# Patient Record
Sex: FEMALE | Race: WHITE | NOT HISPANIC OR LATINO | Employment: OTHER | URBAN - METROPOLITAN AREA
[De-identification: names, ages, dates, MRNs, and addresses within clinical notes are randomized per-mention and may not be internally consistent; named-entity substitution may affect disease eponyms.]

---

## 2017-02-28 ENCOUNTER — APPOINTMENT (OUTPATIENT)
Dept: LAB | Facility: HOSPITAL | Age: 77
End: 2017-02-28
Attending: INTERNAL MEDICINE
Payer: MEDICARE

## 2017-02-28 ENCOUNTER — TRANSCRIBE ORDERS (OUTPATIENT)
Dept: ADMINISTRATIVE | Facility: HOSPITAL | Age: 77
End: 2017-02-28

## 2017-02-28 DIAGNOSIS — E03.9 UNSPECIFIED HYPOTHYROIDISM: ICD-10-CM

## 2017-02-28 DIAGNOSIS — N39.0 URINARY TRACT INFECTION, SITE NOT SPECIFIED: ICD-10-CM

## 2017-02-28 DIAGNOSIS — I10 ESSENTIAL HYPERTENSION, MALIGNANT: Primary | ICD-10-CM

## 2017-02-28 DIAGNOSIS — D64.9 ANEMIA, UNSPECIFIED: ICD-10-CM

## 2017-02-28 DIAGNOSIS — E78.00 PURE HYPERCHOLESTEROLEMIA: ICD-10-CM

## 2017-02-28 DIAGNOSIS — E11.9 DIABETES MELLITUS WITHOUT COMPLICATION (HCC): ICD-10-CM

## 2017-02-28 DIAGNOSIS — M10.00 ACUTE IDIOPATHIC GOUT, UNSPECIFIED SITE: ICD-10-CM

## 2017-02-28 DIAGNOSIS — I10 ESSENTIAL HYPERTENSION, MALIGNANT: ICD-10-CM

## 2017-02-28 LAB
ALBUMIN SERPL BCP-MCNC: 3.7 G/DL (ref 3.5–5)
ALP SERPL-CCNC: 96 U/L (ref 46–116)
ALT SERPL W P-5'-P-CCNC: 68 U/L (ref 12–78)
ANION GAP SERPL CALCULATED.3IONS-SCNC: 11 MMOL/L (ref 4–13)
AST SERPL W P-5'-P-CCNC: 87 U/L (ref 5–45)
BASOPHILS # BLD AUTO: 0.1 THOUSANDS/ΜL (ref 0–0.1)
BASOPHILS NFR BLD AUTO: 1 % (ref 0–1)
BILIRUB SERPL-MCNC: 0.6 MG/DL (ref 0.2–1)
BUN SERPL-MCNC: 15 MG/DL (ref 5–25)
CALCIUM SERPL-MCNC: 8.9 MG/DL (ref 8.3–10.1)
CHLORIDE SERPL-SCNC: 103 MMOL/L (ref 100–108)
CO2 SERPL-SCNC: 26 MMOL/L (ref 21–32)
CREAT SERPL-MCNC: 0.75 MG/DL (ref 0.6–1.3)
EOSINOPHIL # BLD AUTO: 0.3 THOUSAND/ΜL (ref 0–0.61)
EOSINOPHIL NFR BLD AUTO: 3 % (ref 0–6)
ERYTHROCYTE [DISTWIDTH] IN BLOOD BY AUTOMATED COUNT: 14.7 % (ref 11.6–15.1)
ERYTHROCYTE [SEDIMENTATION RATE] IN BLOOD: 20 MM/HOUR (ref 2–25)
GFR SERPL CREATININE-BSD FRML MDRD: >60 ML/MIN/1.73SQ M
GLUCOSE SERPL-MCNC: 120 MG/DL (ref 65–140)
HCT VFR BLD AUTO: 44 % (ref 37–47)
HGB BLD-MCNC: 14.3 G/DL (ref 12–16)
LYMPHOCYTES # BLD AUTO: 1.9 THOUSANDS/ΜL (ref 0.6–4.47)
LYMPHOCYTES NFR BLD AUTO: 20 % (ref 14–44)
MCH RBC QN AUTO: 30.3 PG (ref 27–31)
MCHC RBC AUTO-ENTMCNC: 32.5 G/DL (ref 31.4–37.4)
MCV RBC AUTO: 93 FL (ref 82–98)
MONOCYTES # BLD AUTO: 0.6 THOUSAND/ΜL (ref 0.17–1.22)
MONOCYTES NFR BLD AUTO: 6 % (ref 4–12)
NEUTROPHILS # BLD AUTO: 6.5 THOUSANDS/ΜL (ref 1.85–7.62)
NEUTS SEG NFR BLD AUTO: 70 % (ref 43–75)
NRBC BLD AUTO-RTO: 0 /100 WBCS
PLATELET # BLD AUTO: 297 THOUSANDS/UL (ref 130–400)
PMV BLD AUTO: 8.2 FL (ref 8.9–12.7)
POTASSIUM SERPL-SCNC: 4.3 MMOL/L (ref 3.5–5.3)
PROT SERPL-MCNC: 7.5 G/DL (ref 6.4–8.2)
RBC # BLD AUTO: 4.72 MILLION/UL (ref 4.2–5.4)
SODIUM SERPL-SCNC: 140 MMOL/L (ref 136–145)
WBC # BLD AUTO: 9.3 THOUSAND/UL (ref 4.8–10.8)

## 2017-02-28 PROCEDURE — 80053 COMPREHEN METABOLIC PANEL: CPT | Performed by: INTERNAL MEDICINE

## 2017-02-28 PROCEDURE — 85652 RBC SED RATE AUTOMATED: CPT

## 2017-02-28 PROCEDURE — 85025 COMPLETE CBC W/AUTO DIFF WBC: CPT | Performed by: INTERNAL MEDICINE

## 2017-02-28 PROCEDURE — 36415 COLL VENOUS BLD VENIPUNCTURE: CPT | Performed by: INTERNAL MEDICINE

## 2017-04-25 ENCOUNTER — LAB (OUTPATIENT)
Dept: LAB | Facility: HOSPITAL | Age: 77
End: 2017-04-25
Attending: INTERNAL MEDICINE
Payer: MEDICARE

## 2017-04-25 ENCOUNTER — TRANSCRIBE ORDERS (OUTPATIENT)
Dept: ADMINISTRATIVE | Facility: HOSPITAL | Age: 77
End: 2017-04-25

## 2017-04-25 DIAGNOSIS — N39.0 URINARY TRACT INFECTION, SITE NOT SPECIFIED: Primary | ICD-10-CM

## 2017-04-25 LAB
BACTERIA UR QL AUTO: ABNORMAL /HPF
BILIRUB UR QL STRIP: NEGATIVE
CLARITY UR: ABNORMAL
COLOR UR: YELLOW
GLUCOSE UR STRIP-MCNC: NEGATIVE MG/DL
HGB UR QL STRIP.AUTO: NEGATIVE
HYALINE CASTS #/AREA URNS LPF: ABNORMAL /LPF
KETONES UR STRIP-MCNC: ABNORMAL MG/DL
LEUKOCYTE ESTERASE UR QL STRIP: ABNORMAL
MUCOUS THREADS UR QL AUTO: ABNORMAL
NITRITE UR QL STRIP: NEGATIVE
NON-SQ EPI CELLS URNS QL MICRO: ABNORMAL /HPF
PH UR STRIP.AUTO: 5.5 [PH] (ref 5–9)
PROT UR STRIP-MCNC: ABNORMAL MG/DL
RBC #/AREA URNS AUTO: ABNORMAL /HPF
SP GR UR STRIP.AUTO: 1.02 (ref 1–1.03)
UROBILINOGEN UR QL STRIP.AUTO: 0.2 E.U./DL
WBC #/AREA URNS AUTO: ABNORMAL /HPF

## 2017-04-25 PROCEDURE — 81001 URINALYSIS AUTO W/SCOPE: CPT | Performed by: INTERNAL MEDICINE

## 2017-04-25 PROCEDURE — 87086 URINE CULTURE/COLONY COUNT: CPT

## 2017-04-26 LAB — BACTERIA UR CULT: NORMAL

## 2017-10-02 ENCOUNTER — HOSPITAL ENCOUNTER (OUTPATIENT)
Dept: RADIOLOGY | Facility: HOSPITAL | Age: 77
Discharge: HOME | End: 2017-10-02
Attending: INTERNAL MEDICINE
Payer: MEDICARE

## 2017-10-02 ENCOUNTER — TRANSCRIBE ORDERS (OUTPATIENT)
Dept: ADMINISTRATIVE | Facility: HOSPITAL | Age: 77
End: 2017-10-02

## 2017-10-02 DIAGNOSIS — R05.9 COUGH: Primary | ICD-10-CM

## 2017-10-02 DIAGNOSIS — N64.4 PAINFUL BREASTS: ICD-10-CM

## 2017-10-02 DIAGNOSIS — R07.9 CHEST PAIN, UNSPECIFIED TYPE: ICD-10-CM

## 2017-10-02 PROCEDURE — 71020 HB CHEST X-RAY 2VW FRONTAL&LATL: CPT

## 2017-10-13 ENCOUNTER — TRANSCRIBE ORDERS (OUTPATIENT)
Dept: ADMINISTRATIVE | Facility: HOSPITAL | Age: 77
End: 2017-10-13

## 2017-10-13 DIAGNOSIS — R52 PAIN: Primary | ICD-10-CM

## 2017-10-27 ENCOUNTER — HOSPITAL ENCOUNTER (OUTPATIENT)
Dept: RADIOLOGY | Facility: HOSPITAL | Age: 77
Discharge: HOME | End: 2017-10-27
Attending: INTERNAL MEDICINE
Payer: MEDICARE

## 2017-10-27 DIAGNOSIS — R52 PAIN: ICD-10-CM

## 2017-10-27 PROCEDURE — G0204 DX MAMMO INCL CAD BI: HCPCS

## 2018-06-01 ENCOUNTER — APPOINTMENT (OUTPATIENT)
Dept: LAB | Facility: HOSPITAL | Age: 78
End: 2018-06-01
Attending: INTERNAL MEDICINE
Payer: MEDICARE

## 2018-06-01 ENCOUNTER — TRANSCRIBE ORDERS (OUTPATIENT)
Dept: ADMINISTRATIVE | Facility: HOSPITAL | Age: 78
End: 2018-06-01

## 2018-06-01 DIAGNOSIS — E11.9 SEVERE DIABETES MELLITUS (HCC): ICD-10-CM

## 2018-06-01 DIAGNOSIS — E55.9 VITAMIN D DEFICIENCY: ICD-10-CM

## 2018-06-01 DIAGNOSIS — I51.9 MYXEDEMA HEART DISEASE: ICD-10-CM

## 2018-06-01 DIAGNOSIS — E03.9 MYXEDEMA HEART DISEASE: ICD-10-CM

## 2018-06-01 DIAGNOSIS — Z13.820 SCREENING FOR OSTEOPOROSIS: ICD-10-CM

## 2018-06-01 DIAGNOSIS — D51.9 ANEMIA DUE TO VITAMIN B12 DEFICIENCY, UNSPECIFIED B12 DEFICIENCY TYPE: ICD-10-CM

## 2018-06-01 DIAGNOSIS — I10 ESSENTIAL HYPERTENSION, MALIGNANT: ICD-10-CM

## 2018-06-01 DIAGNOSIS — N39.0 URINARY TRACT INFECTION WITHOUT HEMATURIA, SITE UNSPECIFIED: ICD-10-CM

## 2018-06-01 DIAGNOSIS — E78.00 PURE HYPERCHOLESTEROLEMIA: ICD-10-CM

## 2018-06-01 DIAGNOSIS — D64.9 ANEMIA, UNSPECIFIED TYPE: ICD-10-CM

## 2018-06-01 DIAGNOSIS — D64.9 ANEMIA, UNSPECIFIED TYPE: Primary | ICD-10-CM

## 2018-06-01 LAB
25(OH)D3 SERPL-MCNC: 30.6 NG/ML (ref 30–100)
ALBUMIN SERPL BCP-MCNC: 3.5 G/DL (ref 3.5–5)
ALP SERPL-CCNC: 100 U/L (ref 46–116)
ALT SERPL W P-5'-P-CCNC: 59 U/L (ref 12–78)
ANION GAP SERPL CALCULATED.3IONS-SCNC: 11 MMOL/L (ref 4–13)
AST SERPL W P-5'-P-CCNC: 61 U/L (ref 5–45)
BACTERIA UR QL AUTO: ABNORMAL /HPF
BASOPHILS # BLD AUTO: 0.09 THOUSANDS/ΜL (ref 0–0.1)
BASOPHILS NFR BLD AUTO: 1 % (ref 0–1)
BILIRUB SERPL-MCNC: 0.6 MG/DL (ref 0.2–1)
BILIRUB UR QL STRIP: NEGATIVE
BUN SERPL-MCNC: 15 MG/DL (ref 5–25)
CALCIUM SERPL-MCNC: 9.3 MG/DL (ref 8.3–10.1)
CHLORIDE SERPL-SCNC: 101 MMOL/L (ref 100–108)
CHOLEST SERPL-MCNC: 149 MG/DL (ref 50–200)
CLARITY UR: ABNORMAL
CO2 SERPL-SCNC: 28 MMOL/L (ref 21–32)
COLOR UR: YELLOW
CREAT SERPL-MCNC: 0.78 MG/DL (ref 0.6–1.3)
EOSINOPHIL # BLD AUTO: 0.32 THOUSAND/ΜL (ref 0–0.61)
EOSINOPHIL NFR BLD AUTO: 3 % (ref 0–6)
ERYTHROCYTE [DISTWIDTH] IN BLOOD BY AUTOMATED COUNT: 14.6 % (ref 11.6–15.1)
EST. AVERAGE GLUCOSE BLD GHB EST-MCNC: 157 MG/DL
GFR SERPL CREATININE-BSD FRML MDRD: 72 ML/MIN/1.73SQ M
GLUCOSE P FAST SERPL-MCNC: 147 MG/DL (ref 65–99)
GLUCOSE UR STRIP-MCNC: NEGATIVE MG/DL
HBA1C MFR BLD: 7.1 % (ref 4.2–6.3)
HCT VFR BLD AUTO: 44.6 % (ref 34.8–46.1)
HDLC SERPL-MCNC: 36 MG/DL (ref 40–60)
HGB BLD-MCNC: 14 G/DL (ref 11.5–15.4)
HGB UR QL STRIP.AUTO: ABNORMAL
IMM GRANULOCYTES # BLD AUTO: 0.05 THOUSAND/UL (ref 0–0.2)
IMM GRANULOCYTES NFR BLD AUTO: 1 % (ref 0–2)
KETONES UR STRIP-MCNC: NEGATIVE MG/DL
LDLC SERPL CALC-MCNC: 83 MG/DL (ref 0–100)
LEUKOCYTE ESTERASE UR QL STRIP: ABNORMAL
LYMPHOCYTES # BLD AUTO: 2.07 THOUSANDS/ΜL (ref 0.6–4.47)
LYMPHOCYTES NFR BLD AUTO: 20 % (ref 14–44)
MCH RBC QN AUTO: 30.6 PG (ref 26.8–34.3)
MCHC RBC AUTO-ENTMCNC: 31.4 G/DL (ref 31.4–37.4)
MCV RBC AUTO: 98 FL (ref 82–98)
MONOCYTES # BLD AUTO: 0.44 THOUSAND/ΜL (ref 0.17–1.22)
MONOCYTES NFR BLD AUTO: 4 % (ref 4–12)
MUCOUS THREADS UR QL AUTO: ABNORMAL
NEUTROPHILS # BLD AUTO: 7.5 THOUSANDS/ΜL (ref 1.85–7.62)
NEUTS SEG NFR BLD AUTO: 71 % (ref 43–75)
NITRITE UR QL STRIP: NEGATIVE
NON-SQ EPI CELLS URNS QL MICRO: ABNORMAL /HPF
NONHDLC SERPL-MCNC: 113 MG/DL
NRBC BLD AUTO-RTO: 0 /100 WBCS
PH UR STRIP.AUTO: 5.5 [PH] (ref 5–9)
PLATELET # BLD AUTO: 301 THOUSANDS/UL (ref 149–390)
PMV BLD AUTO: 10.8 FL (ref 8.9–12.7)
POTASSIUM SERPL-SCNC: 4 MMOL/L (ref 3.5–5.3)
PROT SERPL-MCNC: 7.4 G/DL (ref 6.4–8.2)
PROT UR STRIP-MCNC: ABNORMAL MG/DL
RBC # BLD AUTO: 4.57 MILLION/UL (ref 3.81–5.12)
RBC #/AREA URNS AUTO: ABNORMAL /HPF
SODIUM SERPL-SCNC: 140 MMOL/L (ref 136–145)
SP GR UR STRIP.AUTO: >=1.03 (ref 1–1.03)
TRIGL SERPL-MCNC: 152 MG/DL
TSH SERPL DL<=0.05 MIU/L-ACNC: 0.76 UIU/ML (ref 0.36–3.74)
UROBILINOGEN UR QL STRIP.AUTO: 0.2 E.U./DL
VIT B12 SERPL-MCNC: 962 PG/ML (ref 100–900)
WBC # BLD AUTO: 10.47 THOUSAND/UL (ref 4.31–10.16)
WBC #/AREA URNS AUTO: ABNORMAL /HPF

## 2018-06-01 PROCEDURE — 84443 ASSAY THYROID STIM HORMONE: CPT

## 2018-06-01 PROCEDURE — 80061 LIPID PANEL: CPT | Performed by: INTERNAL MEDICINE

## 2018-06-01 PROCEDURE — 80053 COMPREHEN METABOLIC PANEL: CPT | Performed by: INTERNAL MEDICINE

## 2018-06-01 PROCEDURE — 81001 URINALYSIS AUTO W/SCOPE: CPT | Performed by: INTERNAL MEDICINE

## 2018-06-01 PROCEDURE — 82306 VITAMIN D 25 HYDROXY: CPT

## 2018-06-01 PROCEDURE — 83036 HEMOGLOBIN GLYCOSYLATED A1C: CPT | Performed by: INTERNAL MEDICINE

## 2018-06-01 PROCEDURE — 85025 COMPLETE CBC W/AUTO DIFF WBC: CPT | Performed by: INTERNAL MEDICINE

## 2018-06-01 PROCEDURE — 36415 COLL VENOUS BLD VENIPUNCTURE: CPT | Performed by: INTERNAL MEDICINE

## 2018-06-01 PROCEDURE — 82607 VITAMIN B-12: CPT

## 2018-06-28 ENCOUNTER — APPOINTMENT (OUTPATIENT)
Dept: LAB | Facility: HOSPITAL | Age: 78
End: 2018-06-28
Attending: INTERNAL MEDICINE
Payer: MEDICARE

## 2018-06-28 ENCOUNTER — HOSPITAL ENCOUNTER (OUTPATIENT)
Dept: RADIOLOGY | Facility: HOSPITAL | Age: 78
Discharge: HOME | End: 2018-06-28
Attending: INTERNAL MEDICINE
Payer: MEDICARE

## 2018-06-28 DIAGNOSIS — Z13.820 SCREENING FOR OSTEOPOROSIS: ICD-10-CM

## 2018-06-28 DIAGNOSIS — E55.9 VITAMIN D DEFICIENCY: ICD-10-CM

## 2018-06-28 DIAGNOSIS — E03.9 MYXEDEMA HEART DISEASE: ICD-10-CM

## 2018-06-28 DIAGNOSIS — E11.9 SEVERE DIABETES MELLITUS (HCC): ICD-10-CM

## 2018-06-28 DIAGNOSIS — E78.00 PURE HYPERCHOLESTEROLEMIA: ICD-10-CM

## 2018-06-28 DIAGNOSIS — I10 ESSENTIAL HYPERTENSION, MALIGNANT: ICD-10-CM

## 2018-06-28 DIAGNOSIS — D51.9 ANEMIA DUE TO VITAMIN B12 DEFICIENCY, UNSPECIFIED B12 DEFICIENCY TYPE: ICD-10-CM

## 2018-06-28 DIAGNOSIS — N39.0 URINARY TRACT INFECTION WITHOUT HEMATURIA, SITE UNSPECIFIED: ICD-10-CM

## 2018-06-28 DIAGNOSIS — D64.9 ANEMIA, UNSPECIFIED TYPE: ICD-10-CM

## 2018-06-28 DIAGNOSIS — I51.9 MYXEDEMA HEART DISEASE: ICD-10-CM

## 2018-06-28 LAB
BACTERIA UR QL AUTO: ABNORMAL /HPF
BILIRUB UR QL STRIP: NEGATIVE
CLARITY UR: ABNORMAL
COLOR UR: YELLOW
FINE GRAN CASTS URNS QL MICRO: ABNORMAL /LPF
GLUCOSE UR STRIP-MCNC: NEGATIVE MG/DL
HGB UR QL STRIP.AUTO: NEGATIVE
HYALINE CASTS #/AREA URNS LPF: ABNORMAL /LPF
KETONES UR STRIP-MCNC: NEGATIVE MG/DL
LEUKOCYTE ESTERASE UR QL STRIP: ABNORMAL
MUCOUS THREADS UR QL AUTO: ABNORMAL
NITRITE UR QL STRIP: NEGATIVE
NON-SQ EPI CELLS URNS QL MICRO: ABNORMAL /HPF
PH UR STRIP.AUTO: 5.5 [PH] (ref 5–9)
PROT UR STRIP-MCNC: NEGATIVE MG/DL
RBC #/AREA URNS AUTO: ABNORMAL /HPF
SP GR UR STRIP.AUTO: 1.02 (ref 1–1.03)
UROBILINOGEN UR QL STRIP.AUTO: 0.2 E.U./DL
WBC #/AREA URNS AUTO: ABNORMAL /HPF

## 2018-06-28 PROCEDURE — 77080 DXA BONE DENSITY AXIAL: CPT

## 2018-06-28 PROCEDURE — 87086 URINE CULTURE/COLONY COUNT: CPT

## 2018-06-28 PROCEDURE — 81001 URINALYSIS AUTO W/SCOPE: CPT | Performed by: INTERNAL MEDICINE

## 2018-06-29 LAB — BACTERIA UR CULT: NORMAL

## 2018-09-11 ENCOUNTER — TRANSCRIBE ORDERS (OUTPATIENT)
Dept: ADMINISTRATIVE | Facility: HOSPITAL | Age: 78
End: 2018-09-11

## 2018-09-11 ENCOUNTER — APPOINTMENT (OUTPATIENT)
Dept: LAB | Facility: HOSPITAL | Age: 78
End: 2018-09-11
Attending: INTERNAL MEDICINE
Payer: MEDICARE

## 2018-09-11 DIAGNOSIS — E78.00 PURE HYPERCHOLESTEROLEMIA: ICD-10-CM

## 2018-09-11 DIAGNOSIS — E03.9 HYPOTHYROIDISM, UNSPECIFIED TYPE: ICD-10-CM

## 2018-09-11 DIAGNOSIS — I10 ESSENTIAL HYPERTENSION, MALIGNANT: ICD-10-CM

## 2018-09-11 DIAGNOSIS — E11.9 DIABETES MELLITUS WITHOUT COMPLICATION (HCC): ICD-10-CM

## 2018-09-11 DIAGNOSIS — N39.0 URINARY TRACT INFECTION WITHOUT HEMATURIA, SITE UNSPECIFIED: ICD-10-CM

## 2018-09-11 DIAGNOSIS — E55.9 VITAMIN D DEFICIENCY DISEASE: ICD-10-CM

## 2018-09-11 DIAGNOSIS — D64.9 ANEMIA, UNSPECIFIED TYPE: ICD-10-CM

## 2018-09-11 DIAGNOSIS — D64.9 ANEMIA, UNSPECIFIED TYPE: Primary | ICD-10-CM

## 2018-09-11 LAB
ALBUMIN SERPL BCP-MCNC: 3.6 G/DL (ref 3.5–5)
ALP SERPL-CCNC: 92 U/L (ref 46–116)
ALT SERPL W P-5'-P-CCNC: 32 U/L (ref 12–78)
ANION GAP SERPL CALCULATED.3IONS-SCNC: 3 MMOL/L (ref 4–13)
AST SERPL W P-5'-P-CCNC: 35 U/L (ref 5–45)
BILIRUB SERPL-MCNC: 0.6 MG/DL (ref 0.2–1)
BUN SERPL-MCNC: 18 MG/DL (ref 5–25)
CALCIUM SERPL-MCNC: 9 MG/DL (ref 8.3–10.1)
CHLORIDE SERPL-SCNC: 105 MMOL/L (ref 100–108)
CO2 SERPL-SCNC: 32 MMOL/L (ref 21–32)
CREAT SERPL-MCNC: 0.72 MG/DL (ref 0.6–1.3)
EST. AVERAGE GLUCOSE BLD GHB EST-MCNC: 123 MG/DL
GFR SERPL CREATININE-BSD FRML MDRD: 79 ML/MIN/1.73SQ M
GLUCOSE SERPL-MCNC: 109 MG/DL (ref 65–140)
HBA1C MFR BLD: 5.9 % (ref 4.2–6.3)
POTASSIUM SERPL-SCNC: 3.6 MMOL/L (ref 3.5–5.3)
PROT SERPL-MCNC: 7.2 G/DL (ref 6.4–8.2)
SODIUM SERPL-SCNC: 140 MMOL/L (ref 136–145)
T4 FREE SERPL-MCNC: 0.98 NG/DL (ref 0.76–1.46)
TSH SERPL DL<=0.05 MIU/L-ACNC: 1.22 UIU/ML (ref 0.36–3.74)

## 2018-09-11 PROCEDURE — 80053 COMPREHEN METABOLIC PANEL: CPT | Performed by: INTERNAL MEDICINE

## 2018-09-11 PROCEDURE — 36415 COLL VENOUS BLD VENIPUNCTURE: CPT | Performed by: INTERNAL MEDICINE

## 2018-09-11 PROCEDURE — 83036 HEMOGLOBIN GLYCOSYLATED A1C: CPT | Performed by: INTERNAL MEDICINE

## 2018-09-11 PROCEDURE — 84443 ASSAY THYROID STIM HORMONE: CPT

## 2018-09-11 PROCEDURE — 84439 ASSAY OF FREE THYROXINE: CPT

## 2019-06-10 ENCOUNTER — TRANSCRIBE ORDERS (OUTPATIENT)
Dept: ADMINISTRATIVE | Facility: HOSPITAL | Age: 79
End: 2019-06-10

## 2019-06-10 DIAGNOSIS — Z12.39 BREAST SCREENING, UNSPECIFIED: ICD-10-CM

## 2019-06-10 DIAGNOSIS — R06.02 SOB (SHORTNESS OF BREATH): Primary | ICD-10-CM

## 2019-08-13 ENCOUNTER — HOSPITAL ENCOUNTER (OUTPATIENT)
Dept: RADIOLOGY | Facility: HOSPITAL | Age: 79
Discharge: HOME | End: 2019-08-13
Attending: INTERNAL MEDICINE
Payer: MEDICARE

## 2019-08-13 ENCOUNTER — HOSPITAL ENCOUNTER (OUTPATIENT)
Dept: PULMONOLOGY | Facility: HOSPITAL | Age: 79
Discharge: HOME | End: 2019-08-13
Attending: INTERNAL MEDICINE
Payer: MEDICARE

## 2019-08-13 VITALS — HEIGHT: 68 IN | BODY MASS INDEX: 31.07 KG/M2 | WEIGHT: 205 LBS

## 2019-08-13 DIAGNOSIS — Z12.39 BREAST SCREENING, UNSPECIFIED: ICD-10-CM

## 2019-08-13 DIAGNOSIS — R06.02 SOB (SHORTNESS OF BREATH): ICD-10-CM

## 2019-08-13 PROCEDURE — 94726 PLETHYSMOGRAPHY LUNG VOLUMES: CPT | Performed by: INTERNAL MEDICINE

## 2019-08-13 PROCEDURE — 94760 N-INVAS EAR/PLS OXIMETRY 1: CPT

## 2019-08-13 PROCEDURE — 94729 DIFFUSING CAPACITY: CPT

## 2019-08-13 PROCEDURE — 94010 BREATHING CAPACITY TEST: CPT

## 2019-08-13 PROCEDURE — 94729 DIFFUSING CAPACITY: CPT | Performed by: INTERNAL MEDICINE

## 2019-08-13 PROCEDURE — 94726 PLETHYSMOGRAPHY LUNG VOLUMES: CPT

## 2019-08-13 PROCEDURE — 77067 SCR MAMMO BI INCL CAD: CPT

## 2019-08-13 PROCEDURE — 94010 BREATHING CAPACITY TEST: CPT | Performed by: INTERNAL MEDICINE

## 2019-12-06 ENCOUNTER — APPOINTMENT (OUTPATIENT)
Dept: LAB | Facility: HOSPITAL | Age: 79
End: 2019-12-06
Attending: INTERNAL MEDICINE
Payer: MEDICARE

## 2019-12-06 ENCOUNTER — TRANSCRIBE ORDERS (OUTPATIENT)
Dept: ADMINISTRATIVE | Facility: HOSPITAL | Age: 79
End: 2019-12-06

## 2019-12-06 DIAGNOSIS — D64.9 ANEMIA, UNSPECIFIED TYPE: ICD-10-CM

## 2019-12-06 DIAGNOSIS — R06.02 SOB (SHORTNESS OF BREATH): ICD-10-CM

## 2019-12-06 DIAGNOSIS — D64.9 ANEMIA, UNSPECIFIED TYPE: Primary | ICD-10-CM

## 2019-12-06 LAB
25(OH)D3 SERPL-MCNC: 33.3 NG/ML (ref 30–100)
ALBUMIN SERPL BCP-MCNC: 3.8 G/DL (ref 3.5–5)
ALP SERPL-CCNC: 85 U/L (ref 46–116)
ALT SERPL W P-5'-P-CCNC: 43 U/L (ref 12–78)
ANION GAP SERPL CALCULATED.3IONS-SCNC: 6 MMOL/L (ref 4–13)
AST SERPL W P-5'-P-CCNC: 39 U/L (ref 5–45)
BACTERIA UR QL AUTO: ABNORMAL /HPF
BASOPHILS # BLD AUTO: 0.09 THOUSANDS/ΜL (ref 0–0.1)
BASOPHILS NFR BLD AUTO: 1 % (ref 0–1)
BILIRUB SERPL-MCNC: 0.58 MG/DL (ref 0.2–1)
BILIRUB UR QL STRIP: ABNORMAL
BUN SERPL-MCNC: 18 MG/DL (ref 5–25)
CALCIUM SERPL-MCNC: 9.6 MG/DL (ref 8.3–10.1)
CHLORIDE SERPL-SCNC: 106 MMOL/L (ref 100–108)
CHOLEST SERPL-MCNC: 145 MG/DL (ref 50–200)
CLARITY UR: ABNORMAL
CO2 SERPL-SCNC: 26 MMOL/L (ref 21–32)
COLOR UR: YELLOW
CREAT SERPL-MCNC: 0.76 MG/DL (ref 0.6–1.3)
EOSINOPHIL # BLD AUTO: 0.28 THOUSAND/ΜL (ref 0–0.61)
EOSINOPHIL NFR BLD AUTO: 3 % (ref 0–6)
ERYTHROCYTE [DISTWIDTH] IN BLOOD BY AUTOMATED COUNT: 14.2 % (ref 11.6–15.1)
EST. AVERAGE GLUCOSE BLD GHB EST-MCNC: 146 MG/DL
GFR SERPL CREATININE-BSD FRML MDRD: 74 ML/MIN/1.73SQ M
GLUCOSE P FAST SERPL-MCNC: 91 MG/DL (ref 65–99)
GLUCOSE UR STRIP-MCNC: NEGATIVE MG/DL
HBA1C MFR BLD: 6.7 % (ref 4.2–6.3)
HCT VFR BLD AUTO: 43.6 % (ref 34.8–46.1)
HDLC SERPL-MCNC: 37 MG/DL
HGB BLD-MCNC: 14 G/DL (ref 11.5–15.4)
HGB UR QL STRIP.AUTO: ABNORMAL
IMM GRANULOCYTES # BLD AUTO: 0.06 THOUSAND/UL (ref 0–0.2)
IMM GRANULOCYTES NFR BLD AUTO: 1 % (ref 0–2)
KETONES UR STRIP-MCNC: ABNORMAL MG/DL
LDLC SERPL CALC-MCNC: 77 MG/DL (ref 0–100)
LEUKOCYTE ESTERASE UR QL STRIP: ABNORMAL
LYMPHOCYTES # BLD AUTO: 2.61 THOUSANDS/ΜL (ref 0.6–4.47)
LYMPHOCYTES NFR BLD AUTO: 23 % (ref 14–44)
MCH RBC QN AUTO: 30.8 PG (ref 26.8–34.3)
MCHC RBC AUTO-ENTMCNC: 32.1 G/DL (ref 31.4–37.4)
MCV RBC AUTO: 96 FL (ref 82–98)
MONOCYTES # BLD AUTO: 0.74 THOUSAND/ΜL (ref 0.17–1.22)
MONOCYTES NFR BLD AUTO: 7 % (ref 4–12)
MUCOUS THREADS UR QL AUTO: ABNORMAL
NEUTROPHILS # BLD AUTO: 7.36 THOUSANDS/ΜL (ref 1.85–7.62)
NEUTS SEG NFR BLD AUTO: 65 % (ref 43–75)
NITRITE UR QL STRIP: NEGATIVE
NON-SQ EPI CELLS URNS QL MICRO: ABNORMAL /HPF
NONHDLC SERPL-MCNC: 108 MG/DL
NRBC BLD AUTO-RTO: 0 /100 WBCS
PH UR STRIP.AUTO: 5 [PH]
PLATELET # BLD AUTO: 320 THOUSANDS/UL (ref 149–390)
PMV BLD AUTO: 10.8 FL (ref 8.9–12.7)
POTASSIUM SERPL-SCNC: 3.8 MMOL/L (ref 3.5–5.3)
PROT SERPL-MCNC: 7.5 G/DL (ref 6.4–8.2)
PROT UR STRIP-MCNC: ABNORMAL MG/DL
RBC # BLD AUTO: 4.55 MILLION/UL (ref 3.81–5.12)
RBC #/AREA URNS AUTO: ABNORMAL /HPF
SODIUM SERPL-SCNC: 138 MMOL/L (ref 136–145)
SP GR UR STRIP.AUTO: >=1.03 (ref 1–1.03)
T4 FREE SERPL-MCNC: 1.13 NG/DL (ref 0.76–1.46)
TRIGL SERPL-MCNC: 154 MG/DL
TSH SERPL DL<=0.05 MIU/L-ACNC: 0.94 UIU/ML (ref 0.36–3.74)
UROBILINOGEN UR QL STRIP.AUTO: 0.2 E.U./DL
WBC # BLD AUTO: 11.14 THOUSAND/UL (ref 4.31–10.16)
WBC #/AREA URNS AUTO: ABNORMAL /HPF

## 2019-12-06 PROCEDURE — 80053 COMPREHEN METABOLIC PANEL: CPT

## 2019-12-06 PROCEDURE — 82306 VITAMIN D 25 HYDROXY: CPT

## 2019-12-06 PROCEDURE — 36415 COLL VENOUS BLD VENIPUNCTURE: CPT | Performed by: INTERNAL MEDICINE

## 2019-12-06 PROCEDURE — 84439 ASSAY OF FREE THYROXINE: CPT

## 2019-12-06 PROCEDURE — 84443 ASSAY THYROID STIM HORMONE: CPT

## 2019-12-06 PROCEDURE — 85025 COMPLETE CBC W/AUTO DIFF WBC: CPT | Performed by: INTERNAL MEDICINE

## 2019-12-06 PROCEDURE — 81001 URINALYSIS AUTO W/SCOPE: CPT | Performed by: INTERNAL MEDICINE

## 2019-12-06 PROCEDURE — 80061 LIPID PANEL: CPT | Performed by: INTERNAL MEDICINE

## 2019-12-06 PROCEDURE — 83036 HEMOGLOBIN GLYCOSYLATED A1C: CPT | Performed by: INTERNAL MEDICINE

## 2019-12-13 ENCOUNTER — HOSPITAL ENCOUNTER (OUTPATIENT)
Dept: RADIOLOGY | Facility: HOSPITAL | Age: 79
Discharge: HOME | End: 2019-12-13
Attending: INTERNAL MEDICINE
Payer: MEDICARE

## 2019-12-13 DIAGNOSIS — R06.02 SOB (SHORTNESS OF BREATH): ICD-10-CM

## 2019-12-13 PROCEDURE — 71045 X-RAY EXAM CHEST 1 VIEW: CPT

## 2020-01-31 ENCOUNTER — APPOINTMENT (EMERGENCY)
Dept: RADIOLOGY | Facility: HOSPITAL | Age: 80
DRG: 091 | End: 2020-01-31
Payer: MEDICARE

## 2020-01-31 ENCOUNTER — HOSPITAL ENCOUNTER (INPATIENT)
Facility: HOSPITAL | Age: 80
LOS: 4 days | Discharge: HOME | DRG: 091 | End: 2020-02-05
Attending: EMERGENCY MEDICINE | Admitting: INTERNAL MEDICINE
Payer: MEDICARE

## 2020-01-31 DIAGNOSIS — I10 HYPERTENSION: ICD-10-CM

## 2020-01-31 DIAGNOSIS — R09.02 HYPOXIA: ICD-10-CM

## 2020-01-31 DIAGNOSIS — R42 ORTHOSTATIC DIZZINESS: ICD-10-CM

## 2020-01-31 DIAGNOSIS — R06.02 SOB (SHORTNESS OF BREATH): ICD-10-CM

## 2020-01-31 DIAGNOSIS — R42 DIZZINESS: Primary | ICD-10-CM

## 2020-01-31 DIAGNOSIS — I63.9 CVA (CEREBRAL VASCULAR ACCIDENT) (HCC): ICD-10-CM

## 2020-01-31 LAB
ALBUMIN SERPL BCP-MCNC: 3.5 G/DL (ref 3.5–5)
ALP SERPL-CCNC: 81 U/L (ref 46–116)
ALT SERPL W P-5'-P-CCNC: 39 U/L (ref 12–78)
ANION GAP SERPL CALCULATED.3IONS-SCNC: 6 MMOL/L (ref 4–13)
APTT PPP: 25 SECONDS (ref 25–32)
AST SERPL W P-5'-P-CCNC: 43 U/L (ref 5–45)
BASOPHILS # BLD AUTO: 0.08 THOUSANDS/ΜL (ref 0–0.1)
BASOPHILS NFR BLD AUTO: 1 % (ref 0–1)
BILIRUB SERPL-MCNC: 0.3 MG/DL (ref 0.2–1)
BUN SERPL-MCNC: 24 MG/DL (ref 5–25)
CALCIUM SERPL-MCNC: 9.8 MG/DL (ref 8.3–10.1)
CHLORIDE SERPL-SCNC: 104 MMOL/L (ref 100–108)
CO2 SERPL-SCNC: 27 MMOL/L (ref 21–32)
CREAT SERPL-MCNC: 0.81 MG/DL (ref 0.6–1.3)
EOSINOPHIL # BLD AUTO: 0.29 THOUSAND/ΜL (ref 0–0.61)
EOSINOPHIL NFR BLD AUTO: 3 % (ref 0–6)
ERYTHROCYTE [DISTWIDTH] IN BLOOD BY AUTOMATED COUNT: 14.4 % (ref 11.6–15.1)
GFR SERPL CREATININE-BSD FRML MDRD: 68 ML/MIN/1.73SQ M
GLUCOSE SERPL-MCNC: 120 MG/DL (ref 65–140)
HCT VFR BLD AUTO: 41.4 % (ref 34.8–46.1)
HGB BLD-MCNC: 13.1 G/DL (ref 11.5–15.4)
IMM GRANULOCYTES # BLD AUTO: 0.04 THOUSAND/UL (ref 0–0.2)
IMM GRANULOCYTES NFR BLD AUTO: 0 % (ref 0–2)
INR PPP: 1 (ref 0.91–1.09)
LYMPHOCYTES # BLD AUTO: 1.92 THOUSANDS/ΜL (ref 0.6–4.47)
LYMPHOCYTES NFR BLD AUTO: 19 % (ref 14–44)
MCH RBC QN AUTO: 30.7 PG (ref 26.8–34.3)
MCHC RBC AUTO-ENTMCNC: 31.6 G/DL (ref 31.4–37.4)
MCV RBC AUTO: 97 FL (ref 82–98)
MONOCYTES # BLD AUTO: 0.72 THOUSAND/ΜL (ref 0.17–1.22)
MONOCYTES NFR BLD AUTO: 7 % (ref 4–12)
NEUTROPHILS # BLD AUTO: 7.02 THOUSANDS/ΜL (ref 1.85–7.62)
NEUTS SEG NFR BLD AUTO: 70 % (ref 43–75)
NRBC BLD AUTO-RTO: 0 /100 WBCS
PLATELET # BLD AUTO: 240 THOUSANDS/UL (ref 149–390)
PMV BLD AUTO: 10.9 FL (ref 8.9–12.7)
POTASSIUM SERPL-SCNC: 4.6 MMOL/L (ref 3.5–5.3)
PROT SERPL-MCNC: 7.1 G/DL (ref 6.4–8.2)
PROTHROMBIN TIME: 10.7 SECONDS (ref 9.8–12)
RBC # BLD AUTO: 4.27 MILLION/UL (ref 3.81–5.12)
SODIUM SERPL-SCNC: 137 MMOL/L (ref 136–145)
TROPONIN I SERPL-MCNC: <0.02 NG/ML
WBC # BLD AUTO: 10.07 THOUSAND/UL (ref 4.31–10.16)

## 2020-01-31 PROCEDURE — 85730 THROMBOPLASTIN TIME PARTIAL: CPT | Performed by: EMERGENCY MEDICINE

## 2020-01-31 PROCEDURE — 99285 EMERGENCY DEPT VISIT HI MDM: CPT | Performed by: EMERGENCY MEDICINE

## 2020-01-31 PROCEDURE — 36415 COLL VENOUS BLD VENIPUNCTURE: CPT | Performed by: EMERGENCY MEDICINE

## 2020-01-31 PROCEDURE — 71045 X-RAY EXAM CHEST 1 VIEW: CPT

## 2020-01-31 PROCEDURE — 85025 COMPLETE CBC W/AUTO DIFF WBC: CPT | Performed by: EMERGENCY MEDICINE

## 2020-01-31 PROCEDURE — 80053 COMPREHEN METABOLIC PANEL: CPT | Performed by: EMERGENCY MEDICINE

## 2020-01-31 PROCEDURE — 70498 CT ANGIOGRAPHY NECK: CPT

## 2020-01-31 PROCEDURE — 99285 EMERGENCY DEPT VISIT HI MDM: CPT

## 2020-01-31 PROCEDURE — 1123F ACP DISCUSS/DSCN MKR DOCD: CPT | Performed by: INTERNAL MEDICINE

## 2020-01-31 PROCEDURE — 70496 CT ANGIOGRAPHY HEAD: CPT

## 2020-01-31 PROCEDURE — 85610 PROTHROMBIN TIME: CPT | Performed by: EMERGENCY MEDICINE

## 2020-01-31 PROCEDURE — 84484 ASSAY OF TROPONIN QUANT: CPT | Performed by: EMERGENCY MEDICINE

## 2020-01-31 PROCEDURE — 96360 HYDRATION IV INFUSION INIT: CPT

## 2020-01-31 PROCEDURE — 93005 ELECTROCARDIOGRAM TRACING: CPT

## 2020-01-31 RX ORDER — MIRABEGRON 25 MG/1
25 TABLET, FILM COATED, EXTENDED RELEASE ORAL DAILY
Refills: 0 | COMMUNITY
Start: 2019-11-20 | End: 2021-06-16

## 2020-01-31 RX ORDER — ACETAMINOPHEN 325 MG/1
650 TABLET ORAL EVERY 6 HOURS PRN
Status: DISCONTINUED | OUTPATIENT
Start: 2020-01-31 | End: 2020-02-05 | Stop reason: HOSPADM

## 2020-01-31 RX ORDER — ASPIRIN 81 MG/1
81 TABLET, CHEWABLE ORAL DAILY
COMMUNITY
End: 2020-02-05 | Stop reason: HOSPADM

## 2020-01-31 RX ORDER — LEVOTHYROXINE SODIUM 0.1 MG/1
100 TABLET ORAL DAILY
Refills: 1 | COMMUNITY
Start: 2019-11-20 | End: 2021-11-18 | Stop reason: SDUPTHER

## 2020-01-31 RX ORDER — ASPIRIN 325 MG
325 TABLET ORAL DAILY
Status: DISCONTINUED | OUTPATIENT
Start: 2020-02-01 | End: 2020-02-01

## 2020-01-31 RX ORDER — PRIMIDONE 50 MG/1
50 TABLET ORAL
Status: DISCONTINUED | OUTPATIENT
Start: 2020-01-31 | End: 2020-02-05 | Stop reason: HOSPADM

## 2020-01-31 RX ORDER — NEBIVOLOL HYDROCHLORIDE 5 MG/1
5 TABLET ORAL EVERY EVENING
Refills: 2 | COMMUNITY
Start: 2019-12-01

## 2020-01-31 RX ORDER — AMLODIPINE BESYLATE 5 MG/1
5 TABLET ORAL EVERY EVENING
Refills: 1 | COMMUNITY
Start: 2019-11-14 | End: 2020-02-05 | Stop reason: HOSPADM

## 2020-01-31 RX ORDER — NEBIVOLOL 5 MG/1
5 TABLET ORAL EVERY EVENING
Status: DISCONTINUED | OUTPATIENT
Start: 2020-01-31 | End: 2020-02-05 | Stop reason: HOSPADM

## 2020-01-31 RX ORDER — ASPIRIN 325 MG
325 TABLET ORAL ONCE
Status: COMPLETED | OUTPATIENT
Start: 2020-01-31 | End: 2020-01-31

## 2020-01-31 RX ORDER — SODIUM CHLORIDE 9 MG/ML
50 INJECTION, SOLUTION INTRAVENOUS CONTINUOUS
Status: DISCONTINUED | OUTPATIENT
Start: 2020-01-31 | End: 2020-02-02

## 2020-01-31 RX ORDER — ATORVASTATIN CALCIUM 80 MG/1
80 TABLET, FILM COATED ORAL DAILY
Status: DISCONTINUED | OUTPATIENT
Start: 2020-02-01 | End: 2020-02-05 | Stop reason: HOSPADM

## 2020-01-31 RX ORDER — PANTOPRAZOLE SODIUM 40 MG/1
40 TABLET, DELAYED RELEASE ORAL 3 TIMES WEEKLY
COMMUNITY

## 2020-01-31 RX ORDER — AMLODIPINE BESYLATE 5 MG/1
5 TABLET ORAL EVERY EVENING
Status: DISCONTINUED | OUTPATIENT
Start: 2020-01-31 | End: 2020-02-04

## 2020-01-31 RX ORDER — MOMETASONE FUROATE 50 UG/1
1 SPRAY, METERED NASAL DAILY
COMMUNITY

## 2020-01-31 RX ORDER — POTASSIUM CHLORIDE 20 MEQ/1
20 TABLET, EXTENDED RELEASE ORAL WEEKLY
Refills: 1 | COMMUNITY
Start: 2019-11-20

## 2020-01-31 RX ORDER — CETIRIZINE HYDROCHLORIDE 10 MG/1
10 TABLET ORAL DAILY
COMMUNITY

## 2020-01-31 RX ORDER — POTASSIUM CHLORIDE 20 MEQ/1
20 TABLET, EXTENDED RELEASE ORAL WEEKLY
Status: DISCONTINUED | OUTPATIENT
Start: 2020-01-31 | End: 2020-02-05 | Stop reason: HOSPADM

## 2020-01-31 RX ORDER — DULOXETIN HYDROCHLORIDE 60 MG/1
60 CAPSULE, DELAYED RELEASE ORAL DAILY
Refills: 0 | COMMUNITY
Start: 2019-12-02

## 2020-01-31 RX ORDER — PRIMIDONE 50 MG/1
50 TABLET ORAL
COMMUNITY

## 2020-01-31 RX ORDER — FLUTICASONE PROPIONATE 50 MCG
2 SPRAY, SUSPENSION (ML) NASAL DAILY
Status: DISCONTINUED | OUTPATIENT
Start: 2020-02-01 | End: 2020-02-05 | Stop reason: HOSPADM

## 2020-01-31 RX ORDER — OXYBUTYNIN CHLORIDE 5 MG/1
5 TABLET, EXTENDED RELEASE ORAL DAILY
Status: DISCONTINUED | OUTPATIENT
Start: 2020-02-01 | End: 2020-02-05 | Stop reason: HOSPADM

## 2020-01-31 RX ORDER — LEVOTHYROXINE SODIUM 0.1 MG/1
100 TABLET ORAL DAILY
Status: DISCONTINUED | OUTPATIENT
Start: 2020-02-01 | End: 2020-02-05 | Stop reason: HOSPADM

## 2020-01-31 RX ORDER — PANTOPRAZOLE SODIUM 40 MG/1
40 TABLET, DELAYED RELEASE ORAL 3 TIMES WEEKLY
Status: DISCONTINUED | OUTPATIENT
Start: 2020-02-01 | End: 2020-02-05 | Stop reason: HOSPADM

## 2020-01-31 RX ORDER — LORATADINE 10 MG/1
10 TABLET ORAL DAILY
Status: DISCONTINUED | OUTPATIENT
Start: 2020-02-01 | End: 2020-02-05 | Stop reason: HOSPADM

## 2020-01-31 RX ORDER — ATORVASTATIN CALCIUM 80 MG/1
80 TABLET, FILM COATED ORAL DAILY
Refills: 2 | COMMUNITY
Start: 2019-11-14

## 2020-01-31 RX ORDER — DULOXETIN HYDROCHLORIDE 60 MG/1
60 CAPSULE, DELAYED RELEASE ORAL DAILY
Status: DISCONTINUED | OUTPATIENT
Start: 2020-01-31 | End: 2020-02-05 | Stop reason: HOSPADM

## 2020-01-31 RX ADMIN — NEBIVOLOL HYDROCHLORIDE 5 MG: 5 TABLET ORAL at 22:08

## 2020-01-31 RX ADMIN — AMLODIPINE BESYLATE 5 MG: 5 TABLET ORAL at 22:08

## 2020-01-31 RX ADMIN — PRIMIDONE 50 MG: 50 TABLET ORAL at 22:08

## 2020-01-31 RX ADMIN — IOHEXOL 85 ML: 350 INJECTION, SOLUTION INTRAVENOUS at 17:33

## 2020-01-31 RX ADMIN — SODIUM CHLORIDE 1000 ML: 0.9 INJECTION, SOLUTION INTRAVENOUS at 15:05

## 2020-01-31 RX ADMIN — ASPIRIN 325 MG: 325 TABLET, FILM COATED ORAL at 19:15

## 2020-01-31 RX ADMIN — SODIUM CHLORIDE 50 ML/HR: 0.9 INJECTION, SOLUTION INTRAVENOUS at 22:08

## 2020-01-31 RX ADMIN — POTASSIUM CHLORIDE 20 MEQ: 1500 TABLET, EXTENDED RELEASE ORAL at 22:08

## 2020-01-31 RX ADMIN — DULOXETINE HYDROCHLORIDE 60 MG: 60 CAPSULE, DELAYED RELEASE ORAL at 22:41

## 2020-01-31 NOTE — ED PROVIDER NOTES
History  Chief Complaint   Patient presents with    Dizziness     Pt reports feeling dizzy at about noon today  Pt reports having hx of vertigo and denies S/S of vertigo  Pt reports squeezing at the back of head  Pt reports SOb getting out of shower, and then dizziness started  Patient states she has a history of both vertigo and orthostatic dizziness for which she has been evaluated by cardiology in the past   Patient states she was well today when she awoke  After taking a shower hours later she felt sudden onset of shortness of breath, with some pain in the back of the head and severe dizziness patient states he was not like her vertiginous dizziness however her head was spinning  She had no nausea or vomiting  Her symptoms have moderately improved, but is sent in after evaluation by her PMD          Prior to Admission Medications   Prescriptions Last Dose Informant Patient Reported? Taking?    BYSTOLIC 5 MG tablet Past Week at Unknown time  Yes Yes   Sig: Take 5 mg by mouth every evening   DULoxetine (CYMBALTA) 60 mg delayed release capsule 2020 at Unknown time  Yes Yes   Sig: Take 60 mg by mouth daily   MYRBETRIQ 25 MG TB24 Past Week at Unknown time  Yes Yes   Sig: Take 25 mg by mouth daily   amLODIPine (NORVASC) 5 mg tablet Past Week at Unknown time  Yes Yes   Sig: Take 5 mg by mouth every evening   aspirin 81 mg chewable tablet  Self Yes Yes   Sig: Chew 81 mg daily   atorvastatin (LIPITOR) 80 mg tablet Past Week at Unknown time  Yes Yes   Sig: Take 80 mg by mouth daily   cetirizine (ZyrTEC) 10 mg tablet 2020 at Unknown time  Yes Yes   Sig: Take 10 mg by mouth daily   levothyroxine 100 mcg tablet Past Week at Unknown time  Yes Yes   Sig: Take 100 mcg by mouth daily   pantoprazole (PROTONIX) 40 mg tablet Past Week at Unknown time  Yes Yes   Si mg 3 (three) times a week   potassium chloride (K-DUR,KLOR-CON) 20 mEq tablet Past Week at Unknown time  Yes Yes   Sig: Take 20 mEq by mouth once a week    primidone (MYSOLINE) 50 mg tablet Past Week at Unknown time  Yes Yes   Sig: Take 50 mg by mouth daily at bedtime      Facility-Administered Medications: None       Past Medical History:   Diagnosis Date    Blind left eye     Deaf, left     Disease of thyroid gland     DVT complicating pregnancy, unspecified trimester ([facility] [address] ) postpartum    Orthostatic hypotension     Sinus congestion        Past Surgical History:   Procedure Laterality Date    BREAST BIOPSY Left 2010    HYSTERECTOMY      TONSILLECTOMY         Family History   Problem Relation Age of Onset    Breast cancer Sister 76    Ovarian cancer Daughter 48    BRCA1 Negative Daughter     BRCA2 Negative Daughter      I have reviewed and agree with the history as documented  Social History     Tobacco Use    Smoking status: Former Smoker    Smokeless tobacco: Never Used   Substance Use Topics    Alcohol use: Never     Frequency: Never    Drug use: Never        Review of Systems   Constitutional: Negative for chills and fever  HENT: Negative for congestion and sore throat  Eyes: Negative for visual disturbance  Respiratory: Positive for shortness of breath  Negative for cough and chest tightness  Cardiovascular: Negative for chest pain and palpitations  Gastrointestinal: Negative for abdominal pain, nausea and vomiting  Genitourinary: Negative for dysuria  Musculoskeletal: Negative for back pain, gait problem and neck pain  Skin: Negative for rash  Neurological: Positive for dizziness, tremors, light-headedness and headaches  Negative for seizures, syncope, speech difficulty, weakness and numbness  Hematological: Does not bruise/bleed easily  Psychiatric/Behavioral: Negative for confusion  All other systems reviewed and are negative  Physical Exam  Physical Exam   Constitutional: She is oriented to person, place, and time  She appears well-developed and well-nourished     HENT:   Head: Normocephalic and atraumatic  Mouth/Throat: Oropharynx is clear and moist    Eyes: Conjunctivae are normal    Neck: Normal range of motion  Neck supple  No bruit   Cardiovascular: Normal rate, regular rhythm and normal heart sounds  Pulmonary/Chest: Effort normal and breath sounds normal    Abdominal: Soft  Bowel sounds are normal  There is tenderness  Left upper   Musculoskeletal: Normal range of motion  She exhibits no tenderness  Neurological: She is alert and oriented to person, place, and time  No cranial nerve deficit or sensory deficit  She exhibits normal muscle tone  Coordination normal    Skin: Skin is warm and dry  Capillary refill takes less than 2 seconds  Psychiatric: She has a normal mood and affect  Her behavior is normal    Nursing note and vitals reviewed        Vital Signs  ED Triage Vitals [01/31/20 1437]   Temperature Pulse Respirations Blood Pressure SpO2   97 7 °F (36 5 °C) 61 18 154/68 95 %      Temp Source Heart Rate Source Patient Position - Orthostatic VS BP Location FiO2 (%)   Tympanic Monitor Lying Left arm --      Pain Score       No Pain           Vitals:    01/31/20 1437 01/31/20 1600 01/31/20 1630   BP: 154/68     Pulse: 61 74 70   Patient Position - Orthostatic VS: Lying           Visual Acuity      ED Medications  Medications   aspirin tablet 325 mg (has no administration in time range)   sodium chloride 0 9 % bolus 1,000 mL (0 mL Intravenous Stopped 1/31/20 1555)   iohexol (OMNIPAQUE) 350 MG/ML injection (MULTI-DOSE) 100 mL (85 mL Intravenous Given 1/31/20 1733)       Diagnostic Studies  Results Reviewed     Procedure Component Value Units Date/Time    Troponin I [[id number]]  (Normal) Collected:  01/31/20 1505    Lab Status:  Final result Specimen:  Blood from Arm, Right Updated:  01/31/20 1530     Troponin I <0 02 ng/mL     Comprehensive metabolic panel [[id number]] Collected:  01/31/20 1505    Lab Status:  Final result Specimen:  Blood from Arm, Right Updated:  01/31/20 1527     Sodium 137 mmol/L      Potassium 4 6 mmol/L      Chloride 104 mmol/L      CO2 27 mmol/L      ANION GAP 6 mmol/L      BUN 24 mg/dL      Creatinine 0 81 mg/dL      Glucose 120 mg/dL      Calcium 9 8 mg/dL      AST 43 U/L      ALT 39 U/L      Alkaline Phosphatase 81 U/L      Total Protein 7 1 g/dL      Albumin 3 5 g/dL      Total Bilirubin 0 30 mg/dL      eGFR 68 ml/min/1 73sq m     Narrative:       [place] guidelines for Chronic Kidney Disease (CKD):     Stage 1 with normal or high GFR (GFR > 90 mL/min/1 73 square meters)    Stage 2 Mild CKD (GFR = 60-89 mL/min/1 73 square meters)    Stage 3A Moderate CKD (GFR = 45-59 mL/min/1 73 square meters)    Stage 3B Moderate CKD (GFR = 30-44 mL/min/1 73 square meters)    Stage 4 Severe CKD (GFR = 15-29 mL/min/1 73 square meters)    Stage 5 End Stage CKD (GFR <15 mL/min/1 73 square meters)  Note: GFR calculation is accurate only with a steady state creatinine    Protime-INR [[id number]]  (Normal) Collected:  01/31/20 1505    Lab Status:  Final result Specimen:  Blood from Arm, Right Updated:  01/31/20 1523     Protime 10 7 seconds      INR 1 00    APTT [[id number]]  (Normal) Collected:  01/31/20 1505    Lab Status:  Final result Specimen:  Blood from Arm, Right Updated:  01/31/20 1523     PTT 25 seconds     CBC and differential [[id number]] Collected:  01/31/20 1505    Lab Status:  Final result Specimen:  Blood from Arm, Right Updated:  01/31/20 1511     WBC 10 07 Thousand/uL      RBC 4 27 Million/uL      Hemoglobin 13 1 g/dL      Hematocrit 41 4 %      MCV 97 fL      MCH 30 7 pg      MCHC 31 6 g/dL      RDW 14 4 %      MPV 10 9 fL      Platelets 076 Thousands/uL      nRBC 0 /100 WBCs      Neutrophils Relative 70 %      Immat GRANS % 0 %      Lymphocytes Relative 19 %      Monocytes Relative 7 %      Eosinophils Relative 3 %      Basophils Relative 1 %      Neutrophils Absolute 7 02 Thousands/µL      Immature Grans Absolute 0 04 Thousand/uL      Lymphocytes Absolute 1 92 Thousands/µL      Monocytes Absolute 0 72 Thousand/µL      Eosinophils Absolute 0 29 Thousand/µL      Basophils Absolute 0 08 Thousands/µL                  CTA head and neck with and without contrast   Final Result by Andrea Reeves MD (01/31 1758)      No intracranial hemorrhage or cortical infarction  Mild cerebral chronic microangiopathic disease  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  Patent dominant left vertebral artery  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  Essentially fetal right PCA circulation and patent left posterior communicating    artery with unremarkable appearance to the bilateral PCAs  Mild atherosclerotic cervical and intracranial carotid atherosclerotic disease  Focal moderate to severe stenosis in the proximal and distal A2 segments of the left KAMILLE  Complex right thyroid nodule measuring 3 7 cm  Nonemergent sonographic evaluation is recommended for further characterization  I personally discussed this study with Gabby Nolasco on 1/31/2020 at 5:58 PM                Workstation performed: [id number]         XR chest 1 view portable   Final Result by Densiha Erickson MD (01/31 1702)      No acute cardiopulmonary disease              Workstation performed: [id number]         IR consult    (Results Pending)              Procedures  ECG 12 Lead Documentation Only  Date/Time: 1/31/2020 2:41 PM  Performed by: Starr Yee MD  Authorized by: Starr Yee MD     Indications / Diagnosis:  Dizziness  ECG reviewed by me, the ED Provider: yes    Patient location:  ED  Interpretation:     Interpretation: normal    Rate:     ECG rate:  85    ECG rate assessment: normal    Rhythm:     Rhythm: sinus rhythm    Ectopy:     Ectopy: none    QRS:     QRS axis:  Normal    QRS intervals:  Normal  Conduction:     Conduction: normal    ST segments:     ST segments:  Normal  T waves:     T waves: normal    Q waves: Q waves:  III             ED Course                               MDM      Disposition  Final diagnoses:   Dizziness     Time reflects when diagnosis was documented in both MDM as applicable and the Disposition within this note     Time User Action Codes Description Comment    1/31/2020  6:45 PM Aleisha So [R42] Dizziness       ED Disposition     ED Disposition Condition Date/Time Comment    Admit Stable Fri Jan 31, 2020  6:45 PM Case was discussed withDr Caraballo and the patient's admission status was agreed to be Admission Status: observation status to the service of Dr Dawn Zuniga    None         Patient's Medications   Discharge Prescriptions    No medications on file     No discharge procedures on file      ED Provider  Electronically Signed by           Amena Spaulding MD  01/31/20 8997

## 2020-02-01 ENCOUNTER — APPOINTMENT (OUTPATIENT)
Dept: RADIOLOGY | Facility: HOSPITAL | Age: 80
DRG: 091 | End: 2020-02-01
Payer: MEDICARE

## 2020-02-01 LAB
ANION GAP SERPL CALCULATED.3IONS-SCNC: 9 MMOL/L (ref 4–13)
BUN SERPL-MCNC: 21 MG/DL (ref 5–25)
CALCIUM SERPL-MCNC: 8.9 MG/DL (ref 8.3–10.1)
CHLORIDE SERPL-SCNC: 105 MMOL/L (ref 100–108)
CO2 SERPL-SCNC: 28 MMOL/L (ref 21–32)
CREAT SERPL-MCNC: 0.69 MG/DL (ref 0.6–1.3)
ERYTHROCYTE [DISTWIDTH] IN BLOOD BY AUTOMATED COUNT: 14.3 % (ref 11.6–15.1)
GFR SERPL CREATININE-BSD FRML MDRD: 82 ML/MIN/1.73SQ M
GLUCOSE P FAST SERPL-MCNC: 96 MG/DL (ref 65–99)
GLUCOSE SERPL-MCNC: 96 MG/DL (ref 65–140)
HCT VFR BLD AUTO: 38.8 % (ref 34.8–46.1)
HGB BLD-MCNC: 12.4 G/DL (ref 11.5–15.4)
MCH RBC QN AUTO: 31.2 PG (ref 26.8–34.3)
MCHC RBC AUTO-ENTMCNC: 32 G/DL (ref 31.4–37.4)
MCV RBC AUTO: 98 FL (ref 82–98)
PLATELET # BLD AUTO: 207 THOUSANDS/UL (ref 149–390)
PMV BLD AUTO: 11 FL (ref 8.9–12.7)
POTASSIUM SERPL-SCNC: 4 MMOL/L (ref 3.5–5.3)
RBC # BLD AUTO: 3.98 MILLION/UL (ref 3.81–5.12)
SODIUM SERPL-SCNC: 142 MMOL/L (ref 136–145)
TSH SERPL DL<=0.05 MIU/L-ACNC: 0.85 UIU/ML (ref 0.36–3.74)
WBC # BLD AUTO: 8.05 THOUSAND/UL (ref 4.31–10.16)

## 2020-02-01 PROCEDURE — 85027 COMPLETE CBC AUTOMATED: CPT | Performed by: INTERNAL MEDICINE

## 2020-02-01 PROCEDURE — 70551 MRI BRAIN STEM W/O DYE: CPT

## 2020-02-01 PROCEDURE — 99222 1ST HOSP IP/OBS MODERATE 55: CPT | Performed by: INTERNAL MEDICINE

## 2020-02-01 PROCEDURE — 84443 ASSAY THYROID STIM HORMONE: CPT | Performed by: INTERNAL MEDICINE

## 2020-02-01 PROCEDURE — 80048 BASIC METABOLIC PNL TOTAL CA: CPT | Performed by: INTERNAL MEDICINE

## 2020-02-01 RX ORDER — ASPIRIN 81 MG/1
81 TABLET, CHEWABLE ORAL DAILY
Status: DISCONTINUED | OUTPATIENT
Start: 2020-02-02 | End: 2020-02-04

## 2020-02-01 RX ORDER — CLOPIDOGREL BISULFATE 75 MG/1
75 TABLET ORAL DAILY
Status: DISCONTINUED | OUTPATIENT
Start: 2020-02-01 | End: 2020-02-05 | Stop reason: HOSPADM

## 2020-02-01 RX ADMIN — LEVOTHYROXINE SODIUM 100 MCG: 100 TABLET ORAL at 05:44

## 2020-02-01 RX ADMIN — ASPIRIN 325 MG: 325 TABLET, FILM COATED ORAL at 09:39

## 2020-02-01 RX ADMIN — AMLODIPINE BESYLATE 5 MG: 5 TABLET ORAL at 17:13

## 2020-02-01 RX ADMIN — NEBIVOLOL HYDROCHLORIDE 5 MG: 5 TABLET ORAL at 17:13

## 2020-02-01 RX ADMIN — FLUTICASONE PROPIONATE 2 SPRAY: 50 SPRAY, METERED NASAL at 09:48

## 2020-02-01 RX ADMIN — DULOXETINE HYDROCHLORIDE 60 MG: 60 CAPSULE, DELAYED RELEASE ORAL at 21:00

## 2020-02-01 RX ADMIN — LORATADINE 10 MG: 10 TABLET ORAL at 09:39

## 2020-02-01 RX ADMIN — ATORVASTATIN CALCIUM 80 MG: 80 TABLET, FILM COATED ORAL at 09:39

## 2020-02-01 RX ADMIN — ENOXAPARIN SODIUM 40 MG: 40 INJECTION SUBCUTANEOUS at 09:39

## 2020-02-01 RX ADMIN — CLOPIDOGREL BISULFATE 75 MG: 75 TABLET ORAL at 17:13

## 2020-02-01 RX ADMIN — PRIMIDONE 50 MG: 50 TABLET ORAL at 21:01

## 2020-02-01 RX ADMIN — OXYBUTYNIN 5 MG: 5 TABLET, FILM COATED, EXTENDED RELEASE ORAL at 09:39

## 2020-02-01 NOTE — ED NOTES
Pt and daughter requesting to see   for re-explanation before going up to the floor  Dr Scot Mcgowan notified       [address], RN  01/31/20 1920

## 2020-02-01 NOTE — PLAN OF CARE
Problem: Potential for Falls  Goal: Patient will remain free of falls  Description  INTERVENTIONS:  - Assess patient frequently for physical needs  -  Identify cognitive and physical deficits and behaviors that affect risk of falls    -  [place] fall precautions as indicated by assessment   - Educate patient/family on patient safety including physical limitations  - Instruct patient to call for assistance with activity based on assessment  - Modify environment to reduce risk of injury  - Consider OT/PT consult to assist with strengthening/mobility  Outcome: Progressing     Problem: CARDIOVASCULAR - ADULT  Goal: Maintains optimal cardiac output and hemodynamic stability  Description  INTERVENTIONS:  - Monitor I/O, vital signs and rhythm  - Monitor for S/S and trends of decreased cardiac output  - Administer and titrate ordered vasoactive medications to optimize hemodynamic stability  - Assess quality of pulses, skin color and temperature  - Assess for signs of decreased coronary artery perfusion  - Instruct patient to report change in severity of symptoms  Outcome: Progressing     Problem: MUSCULOSKELETAL - ADULT  Goal: Maintain or return mobility to safest level of function  Description  INTERVENTIONS:  - Assess patient's ability to carry out ADLs; assess patient's baseline for ADL function and identify physical deficits which impact ability to perform ADLs (bathing, care of mouth/teeth, toileting, grooming, dressing, etc )  - Assess/evaluate cause of self-care deficits   - Assess range of motion  - Assess patient's mobility  - Assess patient's need for assistive devices and provide as appropriate  - Encourage maximum independence but intervene and supervise when necessary  - Involve family in performance of ADLs  - Assess for home care needs following discharge   - Consider OT consult to assist with ADL evaluation and planning for discharge  - Provide patient education as appropriate  Outcome: Progressing

## 2020-02-01 NOTE — PROGRESS NOTES
Progress Note - Tri Thomas 78 y o  female MRN: [id number]    Unit/Bed#: [address] [place] [address] Encounter: [id number]        Subjective:   Patient feels unsteady when she stands up  Denies chest pains palpitations shortness of breath at this time  Dizziness is less  No nausea vomiting abdominal pain but does complain of bloating sensation and a different feeling in the chest she was not able to describe  Vital signs noted labs noted            Review of Systems    Objective:     Vitals: Blood pressure 125/58, pulse 74, temperature 98 °F (36 7 °C), temperature source Oral, resp  rate 18, height 5' 8" (1 727 m), weight 92 8 kg (204 lb 9 4 oz), SpO2 93 %  ,Body mass index is 31 11 kg/m²        Intake/Output Summary (Last 24 hours) at 2/1/2020 1524  Last data filed at 2/1/2020 0601  Gross per 24 hour   Intake 1634 17 ml   Output    Net 1634 17 ml         Current Facility-Administered Medications:     acetaminophen (TYLENOL) tablet 650 mg, 650 mg, Oral, Q6H PRN, Bladimir Caraballo MD    amLODIPine (NORVASC) tablet 5 mg, 5 mg, Oral, QPM, Bladimir Caraballo MD, 5 mg at 01/31/20 2208    aspirin tablet 325 mg, 325 mg, Oral, Daily, Bladimir Caraballo MD, 325 mg at 02/01/20 0939    atorvastatin (LIPITOR) tablet 80 mg, 80 mg, Oral, Daily, Bladimir Caraballo MD, 80 mg at 02/01/20 0939    DULoxetine (CYMBALTA) delayed release capsule 60 mg, 60 mg, Oral, Daily, Bladimir Caraballo MD, 60 mg at 01/31/20 2241    enoxaparin (LOVENOX) subcutaneous injection 40 mg, 40 mg, Subcutaneous, Daily, Bladimir Caraballo MD, 40 mg at 02/01/20 0939    fluticasone (FLONASE) 50 mcg/act nasal spray 2 spray, 2 spray, Each Nare, Daily, Bladimir aCraballo MD, 2 spray at 02/01/20 0948    levothyroxine tablet 100 mcg, 100 mcg, Oral, Daily, Bladimir Caraballo MD, 100 mcg at 02/01/20 0544    loratadine (CLARITIN) tablet 10 mg, 10 mg, Oral, Daily, Bladimir Caraballo MD, 10 mg at 02/01/20 0939    nebivolol (BYSTOLIC) tablet 5 mg, 5 mg, Oral, QPM, Bladimir Caraballo MD, 5 mg at 01/31/20 2208    oxybutynin (DITROPAN-XL) 24 hr tablet 5 mg, 5 mg, Oral, Daily, Bladimir Caraballo MD, 5 mg at 02/01/20 4159    pantoprazole (PROTONIX) EC tablet 40 mg, 40 mg, Oral, Once per day on Mon Wed Fri, Bladimir Caraballo MD    potassium chloride (K-DUR,KLOR-CON) CR tablet 20 mEq, 20 mEq, Oral, Weekly, Bladimir Caraballo MD, 20 mEq at 01/31/20 2208    primidone (MYSOLINE) tablet 50 mg, 50 mg, Oral, HS, Bladimir Caraballo MD, 50 mg at 01/31/20 2208    sodium chloride 0 9 % infusion, 50 mL/hr, Intravenous, Continuous, Bladimir Caraballo MD, Last Rate: 50 mL/hr at 01/31/20 2208, 50 mL/hr at 01/31/20 2208     Physical Exam   Constitutional: She is oriented to person, place, and time  No distress  Neck: Normal range of motion  Neck supple  No thyromegaly present  Cardiovascular: Normal rate and regular rhythm  Pulmonary/Chest: Effort normal and breath sounds normal    Abdominal: Soft  Bowel sounds are normal    Musculoskeletal: She exhibits no edema or deformity  Neurological: She is alert and oriented to person, place, and time  Skin: Skin is warm and dry  Psychiatric: She has a normal mood and affect   Judgment normal            Lab, Imaging and culture:     Lab Results:     CBC:   Results from last 7 days   Lab Units 02/01/20  0524 01/31/20  1505   WBC Thousand/uL 8 05 10 07   HEMOGLOBIN g/dL 12 4 13 1   HEMATOCRIT % 38 8 41 4   MCV fL 98 97   PLATELETS Thousands/uL 207 240     CMP:   Results from last 7 days   Lab Units 02/01/20  0524 01/31/20  1505   POTASSIUM mmol/L 4 0 4 6   CHLORIDE mmol/L 105 104   CO2 mmol/L 28 27   BUN mg/dL 21 24   CREATININE mg/dL 0 69 0 81   CALCIUM mg/dL 8 9 9 8   AST U/L  --  43   ALT U/L  --  39   ALK PHOS U/L  --  81   EGFR ml/min/1 73sq m 82 68     No components found for: ABG    Magnesium:     Phosphorous:     Troponin:   Results from last 7 days   Lab Units 01/31/20  1505   TROPONIN I ng/mL <0 02     PT/INR:   Results from last 7 days   Lab Units 01/31/20  6695 PTT seconds 25   INR  1 00     Lactic Acid:     BNP:     TSH:   Results from last 7 days   Lab Units 02/01/20  0524   TSH 3RD GENERATON uIU/mL 0 853     Procalcitonin: Invalid input(s): PROCALCITONIN  Urinalysis:       Invalid input(s): CNITRITE      Imaging:   CTA head and neck with and without contrast   Final Result by Georgette Menendez MD (01/31 4303)      No intracranial hemorrhage or cortical infarction  Mild cerebral chronic microangiopathic disease  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  Patent dominant left vertebral artery  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  Essentially fetal right PCA circulation and patent left posterior communicating    artery with unremarkable appearance to the bilateral PCAs  Mild atherosclerotic cervical and intracranial carotid atherosclerotic disease  Focal moderate to severe stenosis in the proximal and distal A2 segments of the left KAMILLE  Complex right thyroid nodule measuring 3 7 cm  Nonemergent sonographic evaluation is recommended for further characterization  I personally discussed this study with Epi Mcdaniels on 1/31/2020 at 5:58 PM                Workstation performed: [id number]         XR chest 1 view portable   Final Result by Nba Hillman MD (01/31 0112)      No acute cardiopulmonary disease  Workstation performed: [id number]         IR consult    (Results Pending)   MRI brain wo contrast    (Results Pending)         Micro:   Lab Results   Component Value Date    URINECX 30,000-39,000 cfu/ml  06/28/2018    URINECX >100,000 cfu/ml Mixed Contaminants X6 04/25/2017    URINECX 50,000-59,000 cfu/ml Mixed Contaminants X4 10/31/2016            Invalid input(s): LEGIONELLAURINARYANTIGEN      CTA head and neck with and without contrast   Final Result      No intracranial hemorrhage or cortical infarction  Mild cerebral chronic microangiopathic disease        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  Patent dominant left vertebral artery  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  Essentially fetal right PCA circulation and patent left posterior communicating    artery with unremarkable appearance to the bilateral PCAs  Mild atherosclerotic cervical and intracranial carotid atherosclerotic disease  Focal moderate to severe stenosis in the proximal and distal A2 segments of the left KAMILLE  Complex right thyroid nodule measuring 3 7 cm  Nonemergent sonographic evaluation is recommended for further characterization  I personally discussed this study with Makenna Leonard on 1/31/2020 at 5:58 PM                Workstation performed: [id number]         XR chest 1 view portable   Final Result      No acute cardiopulmonary disease  Workstation performed: [id number]         IR consult    (Results Pending)   MRI brain wo contrast    (Results Pending)       Invasive Devices     Peripheral Intravenous Line            Peripheral IV 01/31/20 Right Antecubital 1 day                      Assessment:  Dizziness with abnormal CT angiogram rule out CVA  History of BPPV  History of orthostatic hypotension  Hypothyroidism  Anxiety disorder  Shortness of breath etiology not clear  Cardiology note appreciated  Plan:   We will change aspirin to 81 mg and add Plavix 75 mg  Echocardiogram  Neurology consultation is pending  Discussed with the patient and the daughter at length  Follow-up with the MRI report done this morning

## 2020-02-01 NOTE — UTILIZATION REVIEW
Initial Clinical Review    Admission: Date/Time/Statement: 1-31-20 1845  OBSERVATION CHANGED TO INPATIENT 2-1-20  [address] TREATMENT OF DIZZINESS      Inpatient Admission Once     Transfer Service: General Medicine       Question Answer   Admitting Physician KIRSTIN Raya   Level of Care Med Surg   Estimated length of stay More than 2 Midnights   Certification I certify that inpatient services are medically necessary for this patient for a duration of greater than two midnights  See H&P and MD Progress Notes for additional information about the patient's course of treatment  ED Arrival Information     Expected Arrival Acuity Means of Arrival Escorted By Service Admission Type    - 1/31/2020 14:17 Urgent Walk-In Family Member General Medicine Urgent    Arrival Complaint    Blood Pressure Issue; Dizzy        Chief Complaint   Patient presents with    Dizziness     Pt reports feeling dizzy at about noon today  Pt reports having hx of vertigo and denies S/S of vertigo  Pt reports squeezing at the back of head  Pt reports SOb getting out of shower, and then dizziness started  Assessment/Plan:       78year old female presents to ed from home for evaluation and treatment of dizziness  PMHX  orthostatic dizziness and vertigo  On arrival she reports symptoms began hours after taking a shower with shortness of breath, a headache and dizziness ( head spinning)  CTA shows 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 and severe focal stenosis in proximal basilar artery  Moderate to severe stenosis in proximal ad distal A2 left KAMILLE  Treated in ed with iv  9% ns 1L bolus, aspirin  Admit to observation for dizziness  Plan consult cardiology, telemetry, echo, MRI, neurology consult for abnormal CTA  Consult cardiology    Assessment:  1  Dizziness concerning for possible CVA  2  History of orthostatic hypotension  3  BPPV  4   Hypothyroidism       Plan:  Patient has been admitted to Dr Misty Mason service  1  Continue monitor telemetry to rule out any arrhythmia  2  Obtain 2D echocardiogram to evaluate cardiac function, structure and wall motion with bubble study to evaluate for possible ASD/PFO  3  Await results of MRI and Neurology consult  Continue aspirin  Consider adding 2nd anti-platelet agent such as Plavix  4  May need LOVELY to evaluate for left atrial thrombus +/- loop recorder  Awaiting neurology's input      Addendum  neurology consult pending     ED Triage Vitals [01/31/20 1437]   97 7 °F (36 5 °C) 61 18 154/68 95 %      Tympanic Monitor         No Pain       01/31/20 92 8 kg (204 lb 9 4 oz)     Additional Vital Signs:     Vitals:    01/31/20 2145 01/31/20 2148 01/31/20 2152 02/01/20 0345   BP: 128/69 133/71 113/67 135/60   BP Location: Left arm Left arm Left arm Left arm   Pulse: 67 71 76 75   Resp:    18   Temp:    98 2 °F (36 8 °C)   TempSrc:    Oral   SpO2:    94%   Weight:       Height:           Pertinent Labs/Diagnostic Test Results:       ECG 12 Lead Documentation Only  Date/Time: 1/31/2020 2:41 PM        Indications / Diagnosis:  Dizziness  ECG reviewed by me, the ED Provider: yes    Patient location:  ED  Interpretation:     Interpretation: normal    Rate:     ECG rate:  85    ECG rate assessment: normal    Rhythm:     Rhythm: sinus rhythm    Ectopy:     Ectopy: none    QRS:     QRS axis:  Normal    QRS intervals:  Normal  Conduction:     Conduction: normal    ST segments:     ST segments:  Normal  T waves:     T waves: normal    Q waves:     Q waves: III       CTA head and neck with and without contrast   Final  (01/31 1758)      No intracranial hemorrhage or cortical infarction  Mild cerebral chronic microangiopathic disease  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  Patent dominant left vertebral artery  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    Essentially fetal right PCA circulation and patent left posterior communicating artery with unremarkable appearance to the bilateral PCAs  Mild atherosclerotic cervical and intracranial carotid atherosclerotic disease  Focal moderate to severe stenosis in the proximal and distal A2 segments of the left KAMILLE  Complex right thyroid nodule measuring 3 7 cm  Nonemergent sonographic evaluation is recommended for further characterization  XR chest 1 view portable   Final  (01/31 1702)      No acute cardiopulmonary disease           IR consult    (Results Pending)   MRI brain wo contrast    (Results Pending)     Results from last 7 days   Lab Units 02/01/20  0524 01/31/20  1505   WBC Thousand/uL 8 05 10 07   HEMOGLOBIN g/dL 12 4 13 1   HEMATOCRIT % 38 8 41 4   PLATELETS Thousands/uL 207 240   NEUTROS ABS Thousands/µL  --  7 02         Results from last 7 days   Lab Units 02/01/20  0524 01/31/20  1505   SODIUM mmol/L 142 137   POTASSIUM mmol/L 4 0 4 6   CHLORIDE mmol/L 105 104   CO2 mmol/L 28 27   ANION GAP mmol/L 9 6   BUN mg/dL 21 24   CREATININE mg/dL 0 69 0 81   EGFR ml/min/1 73sq m 82 68   CALCIUM mg/dL 8 9 9 8     Results from last 7 days   Lab Units 01/31/20  1505   AST U/L 43   ALT U/L 39   ALK PHOS U/L 81   TOTAL PROTEIN g/dL 7 1   ALBUMIN g/dL 3 5   TOTAL BILIRUBIN mg/dL 0 30         Results from last 7 days   Lab Units 02/01/20  0524 01/31/20  1505   GLUCOSE RANDOM mg/dL 96 120       Results from last 7 days   Lab Units 01/31/20  1505   TROPONIN I ng/mL <0 02         Results from last 7 days   Lab Units 01/31/20  1505   PROTIME seconds 10 7   INR  1 00   PTT seconds 25     Results from last 7 days   Lab Units 02/01/20  0524   TSH 3RD GENERATON uIU/mL 0 853       ED Treatment:   Medication Administration from 01/31/2020 1417 to 01/31/2020 2015       Date/Time Order Dose Route Action     01/31/2020 1505 sodium chloride 0 9 % bolus 1,000 mL 1,000 mL Intravenous New Bag     01/31/2020 1915 aspirin tablet 325 mg 325 mg Oral Given        Past Medical History:   Diagnosis Date    Blind left eye     Deaf, left     Disease of thyroid gland     DVT complicating pregnancy, unspecified trimester ([facility] [address] ) postpartum    Lyme disease     Orthostatic hypotension     Sinus congestion      Present on Admission:  **None**      Admitting Diagnosis: Dizziness [R42]  Age/Sex: 78 y o  female  Admission Orders:  Scheduled Medications:    Medications:  amLODIPine 5 mg Oral QPM   aspirin 325 mg Oral Daily   atorvastatin 80 mg Oral Daily   DULoxetine 60 mg Oral Daily   enoxaparin 40 mg Subcutaneous Daily   fluticasone 2 spray Each Nare Daily   levothyroxine 100 mcg Oral Daily   loratadine 10 mg Oral Daily   nebivolol 5 mg Oral QPM   oxybutynin 5 mg Oral Daily   pantoprazole 40 mg Oral Once per day on Mon Wed Fri   potassium chloride 20 mEq Oral Weekly   primidone 50 mg Oral HS     Continuous IV Infusions:    sodium chloride 50 mL/hr Intravenous Continuous     PRN Meds:    acetaminophen 650 mg Oral Q6H PRN       IP CONSULT TO NEUROLOGY  IP CONSULT TO CARDIOLOGY    Network Utilization Review Department  [email or web address]  ATTENTION: Please call with any questions or concerns to [phone] and carefully listen to the prompts so that you are directed to the right person  All voicemails are confidential   Vicki Servin all requests for admission clinical reviews, approved or denied determinations and any other requests to dedicated fax number below belonging to the campus where the patient is receiving treatment   List of dedicated fax numbers for the Facilities:  FACILITY NAME UR FAX NUMBER   ADMISSION DENIALS (Administrative/Medical Necessity) [phone]   [address]  (Maternity/NICU/Pediatrics) [phone]   Fannie Mckenzie [phone]   [facility] [phone]   Bennie Ricks [address] [place] [address] [phone] [address] [facility] [phone]   [facility]  [phone]   [address] [facility], [facility]  [phone]   [address] [place] [address] [address] [facility] [phone]

## 2020-02-01 NOTE — H&P
H&P Exam - Tomasz Quiñones 78 y o  female MRN: [id number]    Unit/Bed#: 2 [address] Encounter: [id number]      History of Present Illness     HPI:  Tomasz Quiñones is a 78 y o  female who presents with dizziness to my office  It started while she was taking shower  She has a history of orthostatic blood pressure drop and also BPPV but according to her this was different than her usual symptoms  She denies headache double vision chest pain palpitation nausea vomiting  Patient does complain of shortness of breath on and off  She felt unsteady when she was standing on and off  In my office there was a drop in the blood pressures readings and I referred her to the emergency room for further evaluation and treatment  Workup in the emergency room with a CT of the head and neck with contrast had shown mild cerebral chronic microangiopathic disease occlusion of the non dominant right distal intradural vertebral artery, severe focal stenosis in the proximal basilar artery, essentially fetal right PCA of circulation with patent left posterior communicating arteries  There was mild atherosclerotic cervical intracranial carotids disease  There is focal moderate to severe stenosis in the proximal and distal A2 segments of the left KAMILLE  Emergency room physician consulted the endovascular physician who at present time as recommended conservative management and patient subsequently got admitted for further evaluation and treatment  Review of Systems   Constitutional: Positive for activity change and fatigue  Negative for fever  HENT: Negative for ear discharge, ear pain and tinnitus  Eyes: Negative  Respiratory: Positive for shortness of breath  Complains of shortness of breath on and off and also a feeling in the chest she could not describe clearly   Cardiovascular: Negative for chest pain, palpitations and leg swelling     Gastrointestinal: Negative for abdominal distention, blood in stool and diarrhea  Genitourinary: Negative  Musculoskeletal: Positive for arthralgias and myalgias  Neurological: Positive for dizziness, weakness and light-headedness  Hematological: Negative  Psychiatric/Behavioral: The patient is nervous/anxious  All other systems reviewed and are negative        Historical Information   Past Medical History:   Diagnosis Date    Blind left eye     Deaf, left     Disease of thyroid gland     DVT complicating pregnancy, unspecified trimester ([address] ) postpartum    Lyme disease     Orthostatic hypotension     Sinus congestion      Past Surgical History:   Procedure Laterality Date    APPENDECTOMY      BREAST BIOPSY Left 2010     SECTION      HAND SURGERY Left     HERNIA REPAIR      HYSTERECTOMY      ROTATOR CUFF REPAIR Left     TONSILLECTOMY       Family History   Problem Relation Age of Onset    Breast cancer Sister 76    Ovarian cancer Daughter 48    BRCA1 Negative Daughter     BRCA2 Negative Daughter      Social History   Social History     Substance and Sexual Activity   Alcohol Use Never    Frequency: Never     Social History     Substance and Sexual Activity   Drug Use Never     Social History     Tobacco Use   Smoking Status Former Smoker    Last attempt to quit: 1990    Years since quittin 0   Smokeless Tobacco Never Used         Meds/Allergies     Allergies   Allergen Reactions    Penicillins Swelling       Medications Prior to Admission   Medication Sig Dispense Refill Last Dose    Alcaftadine (LASTACAFT OP) Apply 1 drop to eye daily       amLODIPine (NORVASC) 5 mg tablet Take 5 mg by mouth every evening  1 Past Week at Unknown time    aspirin 81 mg chewable tablet Chew 81 mg daily       atorvastatin (LIPITOR) 80 mg tablet Take 80 mg by mouth daily  2 Past Week at Unknown time    BYSTOLIC 5 MG tablet Take 5 mg by mouth every evening  2 Past Week at Unknown time    Calcium Carbonate-Vit D-Min (CALCIUM 1200 PO) Take 1,200 mg by mouth daily       cetirizine (ZyrTEC) 10 mg tablet Take 10 mg by mouth daily   1/30/2020 at Unknown time    Cholecalciferol (D3-1000 PO) Take 1,000 Units by mouth daily       DULoxetine (CYMBALTA) 60 mg delayed release capsule Take 60 mg by mouth daily  0 1/30/2020 at Unknown time    levothyroxine 100 mcg tablet Take 100 mcg by mouth daily  1 Past Week at Unknown time    mometasone (NASONEX) 50 mcg/act nasal spray 2 sprays into each nostril daily       MYRBETRIQ 25 MG TB24 Take 25 mg by mouth daily  0 Past Week at Unknown time    pantoprazole (PROTONIX) 40 mg tablet 40 mg 3 (three) times a week   Past Week at Unknown time    potassium chloride (K-DUR,KLOR-CON) 20 mEq tablet Take 20 mEq by mouth once a week   1 Past Week at Unknown time    primidone (MYSOLINE) 50 mg tablet Take 50 mg by mouth daily at bedtime   Past Week at Unknown time       Objective   Vitals: Blood pressure 126/68, pulse 69, temperature 98 9 °F (37 2 °C), temperature source Oral, resp  rate 19, height 5' 8" (1 727 m), weight 92 8 kg (204 lb 9 4 oz), SpO2 94 %  Intake/Output Summary (Last 24 hours) at 1/31/2020 2134  Last data filed at 1/31/2020 1555  Gross per 24 hour   Intake 1000 ml   Output    Net 1000 ml       Invasive Devices     Peripheral Intravenous Line            Peripheral IV 01/31/20 Right Antecubital less than 1 day                Physical Exam   Constitutional: She is oriented to person, place, and time  She appears well-developed and well-nourished  No distress  HENT:   Head: Normocephalic and atraumatic  Neck: Normal range of motion  Neck supple  No thyromegaly present  Cardiovascular: Normal rate, regular rhythm and normal heart sounds  Pulmonary/Chest: Effort normal and breath sounds normal    Abdominal: Soft  Bowel sounds are normal  She exhibits no distension  There is no tenderness  Musculoskeletal: She exhibits no edema, tenderness or deformity     Neurological: She is alert and oriented to person, place, and time  She displays normal reflexes  No cranial nerve deficit or sensory deficit  She exhibits normal muscle tone  Skin: Skin is warm and dry  Psychiatric: She has a normal mood and affect   Judgment normal          Lab Results:   Recent Results (from the past 72 hour(s))   CBC and differential    Collection Time: 01/31/20  3:05 PM   Result Value Ref Range    WBC 10 07 4 31 - 10 16 Thousand/uL    RBC 4 27 3 81 - 5 12 Million/uL    Hemoglobin 13 1 11 5 - 15 4 g/dL    Hematocrit 41 4 34 8 - 46 1 %    MCV 97 82 - 98 fL    MCH 30 7 26 8 - 34 3 pg    MCHC 31 6 31 4 - 37 4 g/dL    RDW 14 4 11 6 - 15 1 %    MPV 10 9 8 9 - 12 7 fL    Platelets 612 784 - 151 Thousands/uL    nRBC 0 /100 WBCs    Neutrophils Relative 70 43 - 75 %    Immat GRANS % 0 0 - 2 %    Lymphocytes Relative 19 14 - 44 %    Monocytes Relative 7 4 - 12 %    Eosinophils Relative 3 0 - 6 %    Basophils Relative 1 0 - 1 %    Neutrophils Absolute 7 02 1 85 - 7 62 Thousands/µL    Immature Grans Absolute 0 04 0 00 - 0 20 Thousand/uL    Lymphocytes Absolute 1 92 0 60 - 4 47 Thousands/µL    Monocytes Absolute 0 72 0 17 - 1 22 Thousand/µL    Eosinophils Absolute 0 29 0 00 - 0 61 Thousand/µL    Basophils Absolute 0 08 0 00 - 0 10 Thousands/µL   Protime-INR    Collection Time: 01/31/20  3:05 PM   Result Value Ref Range    Protime 10 7 9 8 - 12 0 seconds    INR 1 00 0 91 - 1 09   APTT    Collection Time: 01/31/20  3:05 PM   Result Value Ref Range    PTT 25 25 - 32 seconds   Comprehensive metabolic panel    Collection Time: 01/31/20  3:05 PM   Result Value Ref Range    Sodium 137 136 - 145 mmol/L    Potassium 4 6 3 5 - 5 3 mmol/L    Chloride 104 100 - 108 mmol/L    CO2 27 21 - 32 mmol/L    ANION GAP 6 4 - 13 mmol/L    BUN 24 5 - 25 mg/dL    Creatinine 0 81 0 60 - 1 30 mg/dL    Glucose 120 65 - 140 mg/dL    Calcium 9 8 8 3 - 10 1 mg/dL    AST 43 5 - 45 U/L    ALT 39 12 - 78 U/L    Alkaline Phosphatase 81 46 - 116 U/L    Total Protein 7 1 6 4 - 8 2 g/dL Albumin 3 5 3 5 - 5 0 g/dL    Total Bilirubin 0 30 0 20 - 1 00 mg/dL    eGFR 68 ml/min/1 73sq m   Troponin I    Collection Time: 01/31/20  3:05 PM   Result Value Ref Range    Troponin I <0 02 <=0 04 ng/mL       Imaging:   CTA head and neck with and without contrast   Final Result      No intracranial hemorrhage or cortical infarction  Mild cerebral chronic microangiopathic disease  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  Patent dominant left vertebral artery  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  Essentially fetal right PCA circulation and patent left posterior communicating    artery with unremarkable appearance to the bilateral PCAs  Mild atherosclerotic cervical and intracranial carotid atherosclerotic disease  Focal moderate to severe stenosis in the proximal and distal A2 segments of the left KAMILLE  Complex right thyroid nodule measuring 3 7 cm  Nonemergent sonographic evaluation is recommended for further characterization  I personally discussed this study with Marie Maloney on 1/31/2020 at 5:58 PM                Workstation performed: [id number]         XR chest 1 view portable   Final Result      No acute cardiopulmonary disease  Workstation performed: [id number]         IR consult    (Results Pending)   MRI inpatient order    (Results Pending)       CTA head and neck with and without contrast   Final Result      No intracranial hemorrhage or cortical infarction  Mild cerebral chronic microangiopathic disease  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  Patent dominant left vertebral artery  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  Essentially fetal right PCA circulation and patent left posterior communicating    artery with unremarkable appearance to the bilateral PCAs  Mild atherosclerotic cervical and intracranial carotid atherosclerotic disease  Focal moderate to severe stenosis in the proximal and distal A2 segments of the left KAMILLE  Complex right thyroid nodule measuring 3 7 cm  Nonemergent sonographic evaluation is recommended for further characterization  I personally discussed this study with Nick Eli on 1/31/2020 at 5:58 PM                Workstation performed: [id number]         XR chest 1 view portable   Final Result      No acute cardiopulmonary disease  Workstation performed: [id number]         IR consult    (Results Pending)   MRI inpatient order    (Results Pending)       EKG, Pathology, and Other Studies: I have personally reviewed pertinent reports  Assessment/Plan     Assessment:  Dizziness with abnormal findings on the CT angiogram question CVA  Patient with a history of orthostatic hypotension  BPPV  Hypothyroidism  History of Meniere's disease  Episodes of shortness of breath rule out cardiac etiology  Plan:  Admitted to the telemetry bed monitor for any arrhythmias  MRI of the brain  Neurology consultation  Cardiology consultation  Resume the medications    Code Status: Level 1 - Full Code  Advance Directive and Living Will:      Power of :    POLST:      Counseling / Coordination of Care  Total floor / unit time spent today 60 minutes  Greater than 50% of total time was spent with the patient and / or family counseling and / or coordination of care  A description of the counseling / coordination of care:  Discussed with emergency room physician emergency room nurse staff on the floor discussed with the patient at length  Hector Melo

## 2020-02-01 NOTE — PLAN OF CARE
Problem: Potential for Falls  Goal: Patient will remain free of falls  Description  INTERVENTIONS:  - Assess patient frequently for physical needs  -  Identify cognitive and physical deficits and behaviors that affect risk of falls    -  [place] fall precautions as indicated by assessment   - Educate patient/family on patient safety including physical limitations  - Instruct patient to call for assistance with activity based on assessment  - Modify environment to reduce risk of injury  - Consider OT/PT consult to assist with strengthening/mobility  Outcome: Progressing     Problem: CARDIOVASCULAR - ADULT  Goal: Maintains optimal cardiac output and hemodynamic stability  Description  INTERVENTIONS:  - Monitor I/O, vital signs and rhythm  - Monitor for S/S and trends of decreased cardiac output  - Administer and titrate ordered vasoactive medications to optimize hemodynamic stability  - Assess quality of pulses, skin color and temperature  - Assess for signs of decreased coronary artery perfusion  - Instruct patient to report change in severity of symptoms  Outcome: Progressing     Problem: MUSCULOSKELETAL - ADULT  Goal: Maintain or return mobility to safest level of function  Description  INTERVENTIONS:  - Assess patient's ability to carry out ADLs; assess patient's baseline for ADL function and identify physical deficits which impact ability to perform ADLs (bathing, care of mouth/teeth, toileting, grooming, dressing, etc )  - Assess/evaluate cause of self-care deficits   - Assess range of motion  - Assess patient's mobility  - Assess patient's need for assistive devices and provide as appropriate  - Encourage maximum independence but intervene and supervise when necessary  - Involve family in performance of ADLs  - Assess for home care needs following discharge   - Consider OT consult to assist with ADL evaluation and planning for discharge  - Provide patient education as appropriate  Outcome: Progressing

## 2020-02-01 NOTE — PROGRESS NOTES
Dr Virgil Oglesby on call for Dr Keaton Porras called to clarify MRI order for Radiology  Radiology called and stated that the MRI order did not clarify what it was to be done on  Dr Kj Moura stated that Dr Koffi Lambert is making rounds at "some time today" and to clarify the MRI order with him at that time

## 2020-02-01 NOTE — CONSULTS
Consultation - Cardiology   Yohan Balbuena 78 y o  female MRN: [id number]  Unit/Bed#: 2 [address] Encounter: [id number]    Assessment/Plan     Assessment:  1  Dizziness concerning for possible CVA  2  History of orthostatic hypotension  3  BPPV  4  Hypothyroidism       Plan:  Patient has been admitted to Dr Jerome Arredondo service  1  Continue monitor telemetry to rule out any arrhythmia  2  Obtain 2D echocardiogram to evaluate cardiac function, structure and wall motion with bubble study to evaluate for possible ASD/PFO  3  Await results of MRI and Neurology consult  Continue aspirin  Consider adding 2nd anti-platelet agent such as Plavix  4  May need LOVELY to evaluate for left atrial thrombus +/- loop recorder  Awaiting neurology's input  History of Present Illness   Physician Requesting Consult: Nithin Cardenas MD  Reason for Consult / Principal Problem:  Dizziness      HPI: Yohan Balbuena is a 78y o  year old female who presented to the emergency room with complaints of lightheadedness which occurred after she was taking a shower yesterday  Patient has a history of both BPPV and orthostasis  She admitted in the emergency room that the symptom she was have an seem to be different than her usual symptoms  She saw Dr Koffi Lambert, at his office, and was referred for further evaluation  Troponins were negative and 12 lead EKG is benign  Orthostatic vital signs were borderline positive with only a 15 mmHg drop in her blood pressure when standing after 3 minutes, without symptoms  CT of the head and neck with contrast performed in the emergency room demonstrated mild cerebral chronic microangiopathic disease, occlusion of the non dominant right distal intradural vertebral artery, severe focal stenosis in the proximal basilar artery, essentially fetal right PCA circulation with patent left posterior communicating arteries  There was mild atherosclerotic cervical intracranial carotids disease    There is focal moderate to severe stenosis in the proximal and distal A2 segments of the left KAMILLE  MRI of the brain is currently pending, neuro consult is currently pending  Telemetry has demonstrated sinus rhythm without any ectopy  Patient denies any chest pain, pressure or palpitations  Echocardiogram has been ordered and is currently pending  Inpatient consult to Cardiology  Consult performed by: ROBERT Ndiaye  Consult ordered by: Lidya Aleman MD          Review of Systems   Constitutional: Positive for activity change and fatigue  HENT: Negative for congestion, facial swelling, postnasal drip, tinnitus and voice change  Eyes: Negative  Negative for photophobia and visual disturbance  Respiratory: Negative  Negative for cough, chest tightness and shortness of breath  Cardiovascular: Negative  Negative for chest pain, palpitations and leg swelling  Gastrointestinal: Negative for abdominal distention  Endocrine: Negative  Negative for polydipsia, polyphagia and polyuria  Genitourinary: Negative  Musculoskeletal: Negative  Skin: Negative  Neurological: Positive for dizziness and light-headedness  Negative for syncope  Hematological: Negative  Psychiatric/Behavioral: Negative          Historical Information   Past Medical History:   Diagnosis Date    Blind left eye     Deaf, left     Disease of thyroid gland     DVT complicating pregnancy, unspecified trimester ([address] ) postpartum    Lyme disease     Orthostatic hypotension     Sinus congestion      Past Surgical History:   Procedure Laterality Date    APPENDECTOMY      BREAST BIOPSY Left 2010     SECTION      HAND SURGERY Left     HERNIA REPAIR      HYSTERECTOMY      ROTATOR CUFF REPAIR Left     TONSILLECTOMY       Social History     Substance and Sexual Activity   Alcohol Use Never    Frequency: Never     Social History     Substance and Sexual Activity   Drug Use Never     Social History     Tobacco Use Smoking Status Former Smoker    Last attempt to quit: 1990    Years since quittin 0   Smokeless Tobacco Never Used     Family History:   Family History   Problem Relation Age of Onset    Breast cancer Sister 76    Ovarian cancer Daughter 48    BRCA1 Negative Daughter     BRCA2 Negative Daughter        Meds/Allergies   all current active meds have been reviewed, current meds:   Current Facility-Administered Medications   Medication Dose Route Frequency    acetaminophen (TYLENOL) tablet 650 mg  650 mg Oral Q6H PRN    amLODIPine (NORVASC) tablet 5 mg  5 mg Oral QPM    aspirin tablet 325 mg  325 mg Oral Daily    atorvastatin (LIPITOR) tablet 80 mg  80 mg Oral Daily    DULoxetine (CYMBALTA) delayed release capsule 60 mg  60 mg Oral Daily    enoxaparin (LOVENOX) subcutaneous injection 40 mg  40 mg Subcutaneous Daily    fluticasone (FLONASE) 50 mcg/act nasal spray 2 spray  2 spray Each Nare Daily    levothyroxine tablet 100 mcg  100 mcg Oral Daily    loratadine (CLARITIN) tablet 10 mg  10 mg Oral Daily    nebivolol (BYSTOLIC) tablet 5 mg  5 mg Oral QPM    oxybutynin (DITROPAN-XL) 24 hr tablet 5 mg  5 mg Oral Daily    pantoprazole (PROTONIX) EC tablet 40 mg  40 mg Oral Once per day on     potassium chloride (K-DUR,KLOR-CON) CR tablet 20 mEq  20 mEq Oral Weekly    primidone (MYSOLINE) tablet 50 mg  50 mg Oral HS    sodium chloride 0 9 % infusion  50 mL/hr Intravenous Continuous    and PTA meds:   Prior to Admission Medications   Prescriptions Last Dose Informant Patient Reported? Taking?    Alcaftadine (LASTACAFT OP)   Yes Yes   Sig: Apply 1 drop to eye daily   BYSTOLIC 5 MG tablet Past Week at Unknown time  Yes Yes   Sig: Take 5 mg by mouth every evening   Calcium Carbonate-Vit D-Min (CALCIUM 1200 PO)  Self Yes Yes   Sig: Take 1,200 mg by mouth daily   Cholecalciferol (D3-1000 PO)  Self Yes Yes   Sig: Take 1,000 Units by mouth daily   DULoxetine (CYMBALTA) 60 mg delayed release capsule 2020 at 2000   Yes Yes   Sig: Take 60 mg by mouth daily   MYRBETRIQ 25 MG TB24 Past Week at Unknown time  Yes Yes   Sig: Take 25 mg by mouth daily   amLODIPine (NORVASC) 5 mg tablet Past Week at Unknown time  Yes Yes   Sig: Take 5 mg by mouth every evening   aspirin 81 mg chewable tablet  Self Yes Yes   Sig: Chew 81 mg daily   atorvastatin (LIPITOR) 80 mg tablet Past Week at Unknown time  Yes Yes   Sig: Take 80 mg by mouth daily   cetirizine (ZyrTEC) 10 mg tablet 2020 at Unknown time  Yes Yes   Sig: Take 10 mg by mouth daily   levothyroxine 100 mcg tablet Past Week at Unknown time  Yes Yes   Sig: Take 100 mcg by mouth daily   mometasone (NASONEX) 50 mcg/act nasal spray  Self Yes Yes   Si sprays into each nostril daily   pantoprazole (PROTONIX) 40 mg tablet Past Week at Unknown time  Yes Yes   Si mg 3 (three) times a week   potassium chloride (K-DUR,KLOR-CON) 20 mEq tablet Past Week at Unknown time  Yes Yes   Sig: Take 20 mEq by mouth once a week    primidone (MYSOLINE) 50 mg tablet Past Week at Unknown time  Yes Yes   Sig: Take 50 mg by mouth daily at bedtime      Facility-Administered Medications: None     Allergies   Allergen Reactions    Penicillins Swelling       Objective   Vitals: Blood pressure 135/60, pulse 75, temperature 98 2 °F (36 8 °C), temperature source Oral, resp  rate 18, height 5' 8" (1 727 m), weight 92 8 kg (204 lb 9 4 oz), SpO2 94 %    Orthostatic Blood Pressures      Most Recent Value   Blood Pressure  135/60 filed at 2020 0345   Patient Position - Orthostatic VS  Lying filed at 2020 0345            Intake/Output Summary (Last 24 hours) at 2020 1122  Last data filed at 2020 0601  Gross per 24 hour   Intake 1634 17 ml   Output    Net 1634 17 ml       Invasive Devices     Peripheral Intravenous Line            Peripheral IV 20 Right Antecubital less than 1 day                Physical Exam   Constitutional: She is oriented to person, place, and time  She appears well-developed and well-nourished  No distress  HENT:   Head: Normocephalic  Right Ear: External ear normal    Left Ear: External ear normal    Eyes: Pupils are equal, round, and reactive to light  Conjunctivae are normal  Right eye exhibits no discharge  Left eye exhibits no discharge  No scleral icterus  Neck: Normal range of motion  Neck supple  No thyromegaly present  Cardiovascular: Normal rate, regular rhythm, normal heart sounds and intact distal pulses  No murmur heard  Pulmonary/Chest: Effort normal and breath sounds normal  No respiratory distress  She has no wheezes  She has no rales  Abdominal: Soft  Bowel sounds are normal  She exhibits no distension  There is no rebound  Musculoskeletal: She exhibits no edema  Lymphadenopathy:     She has no cervical adenopathy  Neurological: She is alert and oriented to person, place, and time  Skin: Skin is warm and dry  Capillary refill takes less than 2 seconds  She is not diaphoretic  Psychiatric: She has a normal mood and affect  Her behavior is normal  Judgment and thought content normal    Nursing note and vitals reviewed  Lab Results:   I have personally reviewed pertinent lab results      CBC with diff:   Results from last 7 days   Lab Units 02/01/20  0524   WBC Thousand/uL 8 05   RBC Million/uL 3 98   HEMOGLOBIN g/dL 12 4   HEMATOCRIT % 38 8   MCV fL 98   MCH pg 31 2   MCHC g/dL 32 0   RDW % 14 3   MPV fL 11 0   PLATELETS Thousands/uL 207     CMP:   Results from last 7 days   Lab Units 02/01/20  0524 01/31/20  1505   SODIUM mmol/L 142 137   POTASSIUM mmol/L 4 0 4 6   CHLORIDE mmol/L 105 104   CO2 mmol/L 28 27   BUN mg/dL 21 24   CREATININE mg/dL 0 69 0 81   CALCIUM mg/dL 8 9 9 8   AST U/L  --  43   ALT U/L  --  39   ALK PHOS U/L  --  81   EGFR ml/min/1 73sq m 82 68     Troponin:   0   Lab Value Date/Time    TROPONINI <0 02 01/31/2020 1505     BNP:   Results from last 7 days   Lab Units 02/01/20  0524   POTASSIUM mmol/L 4 0   CHLORIDE mmol/L 105   CO2 mmol/L 28   BUN mg/dL 21   CREATININE mg/dL 0 69   CALCIUM mg/dL 8 9   EGFR ml/min/1 73sq m 82     Coags:   Results from last 7 days   Lab Units 01/31/20  1505   PTT seconds 25   INR  1 00     TSH:   Results from last 7 days   Lab Units 02/01/20  0524   TSH 3RD GENERATON uIU/mL 0 853     Imaging: I have personally reviewed pertinent reports      EKG:  Sinus rhythm without any changes  VTE Prophylaxis: Sequential compression device Izabella Dura)     Code Status: Level 1 - Full Code  Advance Directive and Living Will:      Power of :    POLST:      Eric Cabral, [facility]

## 2020-02-02 PROCEDURE — 99232 SBSQ HOSP IP/OBS MODERATE 35: CPT | Performed by: INTERNAL MEDICINE

## 2020-02-02 RX ORDER — DOCUSATE SODIUM 100 MG/1
100 CAPSULE, LIQUID FILLED ORAL 2 TIMES DAILY PRN
Status: DISCONTINUED | OUTPATIENT
Start: 2020-02-02 | End: 2020-02-05 | Stop reason: HOSPADM

## 2020-02-02 RX ADMIN — OXYBUTYNIN 5 MG: 5 TABLET, FILM COATED, EXTENDED RELEASE ORAL at 08:41

## 2020-02-02 RX ADMIN — DULOXETINE HYDROCHLORIDE 60 MG: 60 CAPSULE, DELAYED RELEASE ORAL at 21:22

## 2020-02-02 RX ADMIN — LEVOTHYROXINE SODIUM 100 MCG: 100 TABLET ORAL at 05:24

## 2020-02-02 RX ADMIN — ASPIRIN 81 MG 81 MG: 81 TABLET ORAL at 08:41

## 2020-02-02 RX ADMIN — DOCUSATE SODIUM 100 MG: 100 CAPSULE, LIQUID FILLED ORAL at 18:55

## 2020-02-02 RX ADMIN — PRIMIDONE 50 MG: 50 TABLET ORAL at 21:23

## 2020-02-02 RX ADMIN — ENOXAPARIN SODIUM 40 MG: 40 INJECTION SUBCUTANEOUS at 08:41

## 2020-02-02 RX ADMIN — CLOPIDOGREL BISULFATE 75 MG: 75 TABLET ORAL at 08:41

## 2020-02-02 RX ADMIN — ATORVASTATIN CALCIUM 80 MG: 80 TABLET, FILM COATED ORAL at 08:41

## 2020-02-02 RX ADMIN — LORATADINE 10 MG: 10 TABLET ORAL at 08:41

## 2020-02-02 RX ADMIN — FLUTICASONE PROPIONATE 2 SPRAY: 50 SPRAY, METERED NASAL at 08:42

## 2020-02-02 NOTE — PROGRESS NOTES
Progress Note - Mikhail Noble 78 y o  female MRN: [id number]    Unit/Bed#: [address] [place] [address] Encounter: [id number]        Subjective:   Chart reviewed patient evaluated discussed with the nursing staff and also with the patient and the daughter  Patient denies chest pains palpitations shortness of breath still has some lightheaded feeling at times  MRI findings noted          Review of Systems    Objective:     Vitals: Blood pressure 135/81, pulse 68, temperature 97 9 °F (36 6 °C), resp  rate 18, height 5' 8" (1 727 m), weight 92 8 kg (204 lb 9 4 oz), SpO2 92 %  ,Body mass index is 31 11 kg/m²        Intake/Output Summary (Last 24 hours) at 2/2/2020 1335  Last data filed at 2/2/2020 0601  Gross per 24 hour   Intake 840 ml   Output    Net 840 ml         Current Facility-Administered Medications:     acetaminophen (TYLENOL) tablet 650 mg, 650 mg, Oral, Q6H PRN, Bladimir Caraballo MD    amLODIPine (NORVASC) tablet 5 mg, 5 mg, Oral, QPM, Bladimir Caraballo MD, 5 mg at 02/01/20 1713    aspirin chewable tablet 81 mg, 81 mg, Oral, Daily, Bladimir Caraballo MD, 81 mg at 02/02/20 0841    atorvastatin (LIPITOR) tablet 80 mg, 80 mg, Oral, Daily, Bladimir Caraballo MD, 80 mg at 02/02/20 0841    clopidogrel (PLAVIX) tablet 75 mg, 75 mg, Oral, Daily, Bladimir Caraballo MD, 75 mg at 02/02/20 0841    DULoxetine (CYMBALTA) delayed release capsule 60 mg, 60 mg, Oral, Daily, Bladimir Caraballo MD, 60 mg at 02/01/20 2100    enoxaparin (LOVENOX) subcutaneous injection 40 mg, 40 mg, Subcutaneous, Daily, Bladimir Caraballo MD, 40 mg at 02/02/20 0841    fluticasone (FLONASE) 50 mcg/act nasal spray 2 spray, 2 spray, Each Nare, Daily, Bladimir Caraballo MD, 2 spray at 02/02/20 0842    levothyroxine tablet 100 mcg, 100 mcg, Oral, Daily, Bladimir Caraballo MD, 100 mcg at 02/02/20 0524    loratadine (CLARITIN) tablet 10 mg, 10 mg, Oral, Daily, Bladimir Caraballo MD, 10 mg at 02/02/20 0841    nebivolol (BYSTOLIC) tablet 5 mg, 5 mg, Oral, QPM, Ronaldo Doyle MD Rashmi, 5 mg at 02/01/20 1713    oxybutynin (DITROPAN-XL) 24 hr tablet 5 mg, 5 mg, Oral, Daily, Bladimir Caraballo MD, 5 mg at 02/02/20 0841    pantoprazole (PROTONIX) EC tablet 40 mg, 40 mg, Oral, Once per day on Mon Wed Fri, Bladimir Caraballo MD    potassium chloride (K-DUR,KLOR-CON) CR tablet 20 mEq, 20 mEq, Oral, Weekly, Bladimir Caraballo MD, 20 mEq at 01/31/20 2208    primidone (MYSOLINE) tablet 50 mg, 50 mg, Oral, HS, Killian Caraballo MD, 50 mg at 02/01/20 2101     Physical Exam   Constitutional: She is oriented to person, place, and time  No distress  Neck: Normal range of motion  Neck supple  No thyromegaly present  Cardiovascular: Normal rate and regular rhythm  Pulmonary/Chest: Effort normal and breath sounds normal    Abdominal: Soft  Bowel sounds are normal    Musculoskeletal: She exhibits no edema or deformity  Neurological: She is alert and oriented to person, place, and time  Skin: Skin is warm and dry  Psychiatric: She has a normal mood and affect   Judgment normal            Lab, Imaging and culture:     Lab Results:     CBC:   Results from last 7 days   Lab Units 02/01/20  0524 01/31/20  1505   WBC Thousand/uL 8 05 10 07   HEMOGLOBIN g/dL 12 4 13 1   HEMATOCRIT % 38 8 41 4   MCV fL 98 97   PLATELETS Thousands/uL 207 240     CMP:   Results from last 7 days   Lab Units 02/01/20  0524 01/31/20  1505   POTASSIUM mmol/L 4 0 4 6   CHLORIDE mmol/L 105 104   CO2 mmol/L 28 27   BUN mg/dL 21 24   CREATININE mg/dL 0 69 0 81   CALCIUM mg/dL 8 9 9 8   AST U/L  --  43   ALT U/L  --  39   ALK PHOS U/L  --  81   EGFR ml/min/1 73sq m 82 68     No components found for: ABG    Magnesium:     Phosphorous:     Troponin:   Results from last 7 days   Lab Units 01/31/20  1505   TROPONIN I ng/mL <0 02     PT/INR:   Results from last 7 days   Lab Units 01/31/20  1505   PTT seconds 25   INR  1 00     Lactic Acid:     BNP:     TSH:   Results from last 7 days   Lab Units 02/01/20  0524   TSH 3RD GENERATON uIU/mL 0 853     Procalcitonin: Invalid input(s): PROCALCITONIN  Urinalysis:       Invalid input(s): CNITRITE      Imaging:   MRI brain wo contrast   Final Result by Hoda Gonzales MD (02/01 1720)      No acute infarction  Mild cerebral chronic microangiopathic disease  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  Abnormal flow void within the right intradural vertebral artery suggesting proximal stenosis  Inflammatory changes within the paranasal sinuses and left mastoid air cells  Workstation performed: [id number]         CTA head and neck with and without contrast   Final Result by Hoda Gonzales MD (01/31 5018)      No intracranial hemorrhage or cortical infarction  Mild cerebral chronic microangiopathic disease  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  Patent dominant left vertebral artery  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  Essentially fetal right PCA circulation and patent left posterior communicating    artery with unremarkable appearance to the bilateral PCAs  Mild atherosclerotic cervical and intracranial carotid atherosclerotic disease  Focal moderate to severe stenosis in the proximal and distal A2 segments of the left KAMILLE  Complex right thyroid nodule measuring 3 7 cm  Nonemergent sonographic evaluation is recommended for further characterization  I personally discussed this study with Basilia Estrada on 1/31/2020 at 5:58 PM                Workstation performed: [id number]         XR chest 1 view portable   Final Result by Chana Fraga MD (01/31 1702)      No acute cardiopulmonary disease              Workstation performed: [id number]         IR consult    (Results Pending)         Micro:   Lab Results   Component Value Date    URINECX 30,000-39,000 cfu/ml  06/28/2018    URINECX >100,000 cfu/ml Mixed Contaminants X6 04/25/2017    URINECX 50,000-59,000 cfu/ml Mixed Contaminants X4 10/31/2016            Invalid input(s): Neno [place]      MRI brain wo contrast   Final Result      No acute infarction  Mild cerebral chronic microangiopathic disease  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  Abnormal flow void within the right intradural vertebral artery suggesting proximal stenosis  Inflammatory changes within the paranasal sinuses and left mastoid air cells  Workstation performed: [id number]         CTA head and neck with and without contrast   Final Result      No intracranial hemorrhage or cortical infarction  Mild cerebral chronic microangiopathic disease  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  Patent dominant left vertebral artery  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  Essentially fetal right PCA circulation and patent left posterior communicating    artery with unremarkable appearance to the bilateral PCAs  Mild atherosclerotic cervical and intracranial carotid atherosclerotic disease  Focal moderate to severe stenosis in the proximal and distal A2 segments of the left KAMILLE  Complex right thyroid nodule measuring 3 7 cm  Nonemergent sonographic evaluation is recommended for further characterization  I personally discussed this study with Nick Eli on 1/31/2020 at 5:58 PM                Workstation performed: [id number]         XR chest 1 view portable   Final Result      No acute cardiopulmonary disease              Workstation performed: [id number]         IR consult    (Results Pending)       Invasive Devices     Peripheral Intravenous Line            Peripheral IV 02/01/20 Left Arm less than 1 day                      Assessment:  Dizziness with abnormal CT angiogram rule out CVA  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

## 2020-02-02 NOTE — PROGRESS NOTES
Progress Note - Cardiology   [facility]     Angeles Guidry 78 y o  female MRN: [id number]  : 1940  Unit/Bed#: [address] Encounter: [id number]    Assessment and Plan:   1  Dizziness with abnormal inter vertebral flow:  MRI negative for CVA  Patient with history of Meniere's disease and BPPV  Neurology consult pending  No arrhythmia noted on telemetry  Echocardiogram pending    2  Shortness of breath:  Family have noted change in mother's ability to perform activities  Patient denies any chest discomfort  Will obtain nuclear stress test to evaluate for any ischemia  3  History of orthostatic hypotension:  Vitals have been stable since admission  Continue to monitor    Subjective / Objective:   Patient seen  Still with complaints of intermittent dizziness  MRI last evening confirmed abnormal flow within the right intradural vertebral artery  Patient also with history of Meniere's disease and deaf in left ear  Family also noted patient has been more short of breath lately and easily fatigued  2D echocardiogram pending    Vitals: Blood pressure 135/81, pulse 68, temperature 97 9 °F (36 6 °C), resp  rate 18, height 5' 8" (1 727 m), weight 92 8 kg (204 lb 9 4 oz), SpO2 (!) 89 %  Vitals:    20 1437 20   Weight: 98 1 kg (216 lb 4 3 oz) 92 8 kg (204 lb 9 4 oz)     Body mass index is 31 11 kg/m²  BP Readings from Last 3 Encounters:   20 135/81     Orthostatic Blood Pressures      Most Recent Value   Blood Pressure  135/81 filed at 2020 0744   Patient Position - Orthostatic VS  Standing for 3 minutes - Orthostatic VS filed at 2020 1824        I/O        07 -  0700  07 -  0700  07 -  0700    P  O  240 240     I V  (mL/kg) 394 2 (4 2) 600 (6 5)     IV Piggyback 1000      Total Intake(mL/kg) 1634 2 (17 6) 840 (9 1)     Net +1634 2 +840            Unmeasured Urine Occurrence  3 x         Invasive Devices Peripheral Intravenous Line            Peripheral IV 02/01/20 Left Arm less than 1 day                  Intake/Output Summary (Last 24 hours) at 2/2/2020 0759  Last data filed at 2/2/2020 0601  Gross per 24 hour   Intake 840 ml   Output    Net 840 ml         Physical Exam:   Physical Exam   Constitutional: She is oriented to person, place, and time  She appears well-developed and well-nourished  No distress  HENT:   Head: Normocephalic and atraumatic  Right Ear: External ear normal    Left Ear: External ear normal    Eyes: Pupils are equal, round, and reactive to light  Conjunctivae are normal  Right eye exhibits no discharge  Left eye exhibits no discharge  No scleral icterus  Neck: Normal range of motion  Neck supple  No JVD present  No thyromegaly present  Cardiovascular: Normal rate, regular rhythm and intact distal pulses  No murmur heard  Pulmonary/Chest: Effort normal and breath sounds normal  No respiratory distress  She has no wheezes  She has no rales  Abdominal: Soft  Bowel sounds are normal  She exhibits no distension  There is no rebound  Musculoskeletal: She exhibits no edema  Neurological: She is alert and oriented to person, place, and time  Skin: Skin is warm and dry  Capillary refill takes less than 2 seconds  She is not diaphoretic  Psychiatric: She has a normal mood and affect  Her behavior is normal  Judgment and thought content normal    Nursing note and vitals reviewed                Medications/ Allergies:     Current Facility-Administered Medications:  acetaminophen 650 mg Oral Q6H PRN Bladimir Caraballo MD    amLODIPine 5 mg Oral QPM Bladimir Caraballo MD    aspirin 81 mg Oral Daily Bladimir Caraballo MD    atorvastatin 80 mg Oral Daily Bladimir Caraballo MD    clopidogrel 75 mg Oral Daily Bladimir Caraballo MD    DULoxetine 60 mg Oral Daily Bladimir Caraballo MD    enoxaparin 40 mg Subcutaneous Daily Bladimir Caraballo MD    fluticasone 2 spray Each Nare Daily Paola Bravo MD levothyroxine 100 mcg Oral Daily Bladimir Caraballo MD    loratadine 10 mg Oral Daily Bladimir Caraballo MD    nebivolol 5 mg Oral QPM Bladimir Caraballo MD    oxybutynin 5 mg Oral Daily Bladimir Caraballo MD    pantoprazole 40 mg Oral Once per day on Mon Wed Fri Bladimir Caraballo MD    potassium chloride 20 mEq Oral Weekly Bladimir Caraballo MD    primidone 50 mg Oral HS Bladimir Caraballo MD    sodium chloride 50 mL/hr Intravenous Continuous Bladimir Caraballo MD Last Rate: 50 mL/hr (01/31/20 2208)       acetaminophen 650 mg Q6H PRN     Allergies   Allergen Reactions    Penicillins Swelling       VTE Pharmacologic Prophylaxis:   Sequential compression device (Venodyne)     Labs:   Troponins:  Results from last 7 days   Lab Units 01/31/20  1505   TROPONIN I ng/mL <0 02     CBC with diff:  Results from last 7 days   Lab Units 02/01/20  0524 01/31/20  1505   WBC Thousand/uL 8 05 10 07   HEMOGLOBIN g/dL 12 4 13 1   HEMATOCRIT % 38 8 41 4   MCV fL 98 97   PLATELETS Thousands/uL 207 240   MCH pg 31 2 30 7   MCHC g/dL 32 0 31 6   RDW % 14 3 14 4   MPV fL 11 0 10 9   NRBC AUTO /100 WBCs  --  0     CMP:  Results from last 7 days   Lab Units 02/01/20  0524 01/31/20  1505   SODIUM mmol/L 142 137   POTASSIUM mmol/L 4 0 4 6   CHLORIDE mmol/L 105 104   CO2 mmol/L 28 27   ANION GAP mmol/L 9 6   BUN mg/dL 21 24   CREATININE mg/dL 0 69 0 81   GLUCOSE FASTING mg/dL 96  --    CALCIUM mg/dL 8 9 9 8   AST U/L  --  43   ALT U/L  --  39   ALK PHOS U/L  --  81   TOTAL PROTEIN g/dL  --  7 1   ALBUMIN g/dL  --  3 5   TOTAL BILIRUBIN mg/dL  --  0 30   EGFR ml/min/1 73sq m 82 68     Coags:  Results from last 7 days   Lab Units 01/31/20  1505   PTT seconds 25   INR  1 00     TSH:  Results from last 7 days   Lab Units 02/01/20  0524   TSH 3RD GENERATON uIU/mL 0 853        Imaging & Testing   I have personally reviewed pertinent reports      Cta Head And Neck With And Without Contrast    Result Date: 1/31/2020  Narrative: CTA NECK AND BRAIN WITH AND WITHOUT CONTRAST INDICATION: Dizziness, vertigo  COMPARISON:   Head CT from July 21, 2011  MRI of the brain from July 22, 2011  TECHNIQUE:  Routine CT imaging of the Brain without contrast   Post contrast imaging was performed after administration of iodinated contrast through the neck and brain  Post contrast axial 0 625 mm images timed to opacify the arterial system  3D rendering was performed on an independent workstation  MIP reconstructions performed  Coronal reconstructions were performed of the noncontrast portion of the brain  Radiation dose length product (DLP) for this visit:  1320 5 mGy-cm   This examination, like all CT scans performed in the [facility], was performed utilizing techniques to minimize radiation dose exposure, including the use of iterative  reconstruction and automated exposure control  IV Contrast:  85 mL of iohexol (OMNIPAQUE)  IMAGE QUALITY:   Diagnostic FINDINGS: NONCONTRAST BRAIN PARENCHYMA: Decreased attenuation is noted in periventricular and subcortical white matter demonstrating an appearance that is statistically most likely to represent mild microangiopathic change  No CT signs of acute infarction  No intracranial mass, mass effect or midline shift  No acute parenchymal hemorrhage  Intracranial carotid and vertebral artery atherosclerotic calcifications  VENTRICLES AND EXTRA-AXIAL SPACES:  Normal for the patient's age  VISUALIZED ORBITS AND PARANASAL SINUSES:  Orbits are unremarkable  Small retention cyst in the right maxillary sinus  Mild inflammatory mucosal thickening  Trace fluid within the left mastoid air cells  Calvarium: Hyperostosis frontalis  Miscellaneous: Torus palatini  CERVICAL VASCULATURE AORTIC ARCH AND GREAT VESSELS:  Mild atherosclerotic disease of the arch, proximal great vessels and visualized subclavian vessels  No significant stenosis  RIGHT VERTEBRAL ARTERY CERVICAL SEGMENT:  Normal origin   The vessel is diffusely 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  LEFT VERTEBRAL ARTERY CERVICAL SEGMENT:  Normal origin  The vessel is normal in caliber throughout the neck  This is the dominant vessel  RIGHT EXTRACRANIAL CAROTID SEGMENT:  Mild atherosclerotic disease of the distal common carotid artery and proximal cervical internal carotid artery without significant stenosis compared to the more distal ICA  Less than 30% stenosis at the ICA origin and carotid bulb  LEFT EXTRACRANIAL CAROTID SEGMENT:  Mild atherosclerotic disease of the distal common carotid artery and proximal cervical internal carotid artery without significant stenosis compared to the more distal ICA  Less than 30% stenosis at the ICA origin and  bulb  The proximal left cervical ICA has a tortuous and retropharyngeal course indenting the left posterior hypopharyngeal wall  NASCET criteria was used to determine the degree of internal carotid artery diameter stenosis  INTRACRANIAL VASCULATURE INTERNAL CAROTID ARTERIES:  Normal enhancement of the intracranial portions of the internal carotid arteries  Normal ophthalmic artery origins  Normal ICA terminus  ANTERIOR CIRCULATION:  The horizontal ACAs are symmetrically unremarkable  There is atherosclerotic disease within the left vertical KAMILLE with focal moderate to severe stenosis in the proximal distal A2 segments  Both vertical ACAs remain patent  MIDDLE CEREBRAL ARTERY CIRCULATION:  M1 segment and middle cerebral artery branches demonstrate normal enhancement bilaterally  Mild stenosis in the proximal left M2 segment  DISTAL VERTEBRAL ARTERIES:  The right intradural vertebral artery is quite diminutive with occlusion in its distal segment  The left intradural vertebral artery is patent  Both posterior inferior cerebellar artery branches are patent however the origin  of the right PICA branch is not well seen  The vertebral basilar junction is patent   BASILAR ARTERY:  There is focal moderate to severe stenosis in the proximal basilar artery with improved caliber to the diffusely hypoplastic remaining basilar artery  POSTERIOR CEREBRAL ARTERIES: The P1 segment is markedly diminutive and likely developmentally hypoplastic with a robust right posterior communicating artery serving as the fetal PCA  The left P1 and P2 segments are well-visualized with a patent left posterior communicating artery  DURAL VENOUS SINUSES:  Normal  NON VASCULAR ANATOMY BONY STRUCTURES:  No acute osseous abnormality  SOFT TISSUES OF THE NECK:  Enlarged right thyroid lobe with heterogeneous right-sided thyroid nodules at the level of the isthmus and occupying much of the right thyroid lobe  The largest thyroid nodule within the right thyroid lobe measures approximately 3 7 x 2 6 cm with irregular borders and internal septations  No cervical adenopathy is appreciated  THORACIC INLET:  Mosaic attenuation in the visualized upper lobes  Impression: No intracranial hemorrhage or cortical infarction  Mild cerebral chronic microangiopathic disease  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  Patent dominant left vertebral artery  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  Essentially fetal right PCA circulation and patent left posterior communicating artery with unremarkable appearance to the bilateral PCAs  Mild atherosclerotic cervical and intracranial carotid atherosclerotic disease  Focal moderate to severe stenosis in the proximal and distal A2 segments of the left KAMILLE  Complex right thyroid nodule measuring 3 7 cm  Nonemergent sonographic evaluation is recommended for further characterization  I personally discussed this study with Franky Ambrosio on 1/31/2020 at 5:58 PM  Workstation performed: [id number]     Xr Chest 1 View Portable    Result Date: 1/31/2020  Narrative: CHEST INDICATION:   Dizziness   COMPARISON:  12/13/2019 EXAM PERFORMED/VIEWS:  XR CHEST PORTABLE FINDINGS: Cardiomediastinal silhouette appears unremarkable  The lungs are clear  No pneumothorax or pleural effusion  Osseous structures appear within normal limits for patient age  Impression: No acute cardiopulmonary disease  Workstation performed: [id number]     Mri Brain Wo Contrast    Result Date: 2/1/2020  Narrative: MRI BRAIN WITHOUT CONTRAST INDICATION: Abnormal CT angiogram  COMPARISON:   CTA of the head from January 31, 2020  TECHNIQUE:  Sagittal T1, axial T2, axial FLAIR, axial T1, axial [place] and axial diffusion imaging  Imaging performed on 1 5T MRI  IMAGE QUALITY:  Diagnostic  FINDINGS: BRAIN PARENCHYMA:  Diffusion-weighted imaging is normal   There is no pathologic parenchymal gradient susceptibility artifact either  Mild periventricular, subcortical and deep cerebral white matter chronic microangiopathic disease is noted  Punctate chronic infarction in the posterior right cerebellum  VENTRICLES:  Normal for the patient's age  SELLA AND PITUITARY GLAND:  Normal  ORBITS:  Normal  PARANASAL SINUSES:  Mild mucosal thickening within the ethmoid and maxillary sinuses  Small retention cyst in the right maxillary sinus  Fluid within the left mastoid air cells  VASCULATURE:  Loss of the normal flow void within the right intradural vertebral artery suggests proximal stenosis  CALVARIUM AND SKULL BASE:  Hyperostosis frontalis  EXTRACRANIAL SOFT TISSUES:  Normal      Impression: No acute infarction  Mild cerebral chronic microangiopathic disease  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

## 2020-02-02 NOTE — PLAN OF CARE
Problem: Potential for Falls  Goal: Patient will remain free of falls  Description  INTERVENTIONS:  - Assess patient frequently for physical needs  -  Identify cognitive and physical deficits and behaviors that affect risk of falls    -  [place] fall precautions as indicated by assessment   - Educate patient/family on patient safety including physical limitations  - Instruct patient to call for assistance with activity based on assessment  - Modify environment to reduce risk of injury  - Consider OT/PT consult to assist with strengthening/mobility  Outcome: Progressing     Problem: CARDIOVASCULAR - ADULT  Goal: Maintains optimal cardiac output and hemodynamic stability  Description  INTERVENTIONS:  - Monitor I/O, vital signs and rhythm  - Monitor for S/S and trends of decreased cardiac output  - Administer and titrate ordered vasoactive medications to optimize hemodynamic stability  - Assess quality of pulses, skin color and temperature  - Assess for signs of decreased coronary artery perfusion  - Instruct patient to report change in severity of symptoms  Outcome: Progressing     Problem: MUSCULOSKELETAL - ADULT  Goal: Maintain or return mobility to safest level of function  Description  INTERVENTIONS:  - Assess patient's ability to carry out ADLs; assess patient's baseline for ADL function and identify physical deficits which impact ability to perform ADLs (bathing, care of mouth/teeth, toileting, grooming, dressing, etc )  - Assess/evaluate cause of self-care deficits   - Assess range of motion  - Assess patient's mobility  - Assess patient's need for assistive devices and provide as appropriate  - Encourage maximum independence but intervene and supervise when necessary  - Involve family in performance of ADLs  - Assess for home care needs following discharge   - Consider OT consult to assist with ADL evaluation and planning for discharge  - Provide patient education as appropriate  Outcome: Progressing

## 2020-02-02 NOTE — PLAN OF CARE
Problem: Potential for Falls  Goal: Patient will remain free of falls  Description  INTERVENTIONS:  - Assess patient frequently for physical needs  -  Identify cognitive and physical deficits and behaviors that affect risk of falls    -  [place] fall precautions as indicated by assessment   - Educate patient/family on patient safety including physical limitations  - Instruct patient to call for assistance with activity based on assessment  - Modify environment to reduce risk of injury  - Consider OT/PT consult to assist with strengthening/mobility  Outcome: Progressing     Problem: CARDIOVASCULAR - ADULT  Goal: Maintains optimal cardiac output and hemodynamic stability  Description  INTERVENTIONS:  - Monitor I/O, vital signs and rhythm  - Monitor for S/S and trends of decreased cardiac output  - Administer and titrate ordered vasoactive medications to optimize hemodynamic stability  - Assess quality of pulses, skin color and temperature  - Assess for signs of decreased coronary artery perfusion  - Instruct patient to report change in severity of symptoms  Outcome: Progressing     Problem: MUSCULOSKELETAL - ADULT  Goal: Maintain or return mobility to safest level of function  Description  INTERVENTIONS:  - Assess patient's ability to carry out ADLs; assess patient's baseline for ADL function and identify physical deficits which impact ability to perform ADLs (bathing, care of mouth/teeth, toileting, grooming, dressing, etc )  - Assess/evaluate cause of self-care deficits   - Assess range of motion  - Assess patient's mobility  - Assess patient's need for assistive devices and provide as appropriate  - Encourage maximum independence but intervene and supervise when necessary  - Involve family in performance of ADLs  - Assess for home care needs following discharge   - Consider OT consult to assist with ADL evaluation and planning for discharge  - Provide patient education as appropriate  Outcome: Progressing

## 2020-02-03 ENCOUNTER — APPOINTMENT (INPATIENT)
Dept: NON INVASIVE DIAGNOSTICS | Facility: HOSPITAL | Age: 80
DRG: 091 | End: 2020-02-03
Payer: MEDICARE

## 2020-02-03 ENCOUNTER — APPOINTMENT (INPATIENT)
Dept: RADIOLOGY | Facility: HOSPITAL | Age: 80
DRG: 091 | End: 2020-02-03
Payer: MEDICARE

## 2020-02-03 LAB
ANION GAP SERPL CALCULATED.3IONS-SCNC: 10 MMOL/L (ref 4–13)
ATRIAL RATE: 85 BPM
BASOPHILS # BLD AUTO: 0.05 THOUSANDS/ΜL (ref 0–0.1)
BASOPHILS NFR BLD AUTO: 1 % (ref 0–1)
BUN SERPL-MCNC: 19 MG/DL (ref 5–25)
CALCIUM SERPL-MCNC: 8.7 MG/DL (ref 8.3–10.1)
CHEST PAIN STATEMENT: NORMAL
CHLORIDE SERPL-SCNC: 105 MMOL/L (ref 100–108)
CO2 SERPL-SCNC: 26 MMOL/L (ref 21–32)
CREAT SERPL-MCNC: 0.63 MG/DL (ref 0.6–1.3)
EOSINOPHIL # BLD AUTO: 0.32 THOUSAND/ΜL (ref 0–0.61)
EOSINOPHIL NFR BLD AUTO: 4 % (ref 0–6)
ERYTHROCYTE [DISTWIDTH] IN BLOOD BY AUTOMATED COUNT: 14.2 % (ref 11.6–15.1)
GFR SERPL CREATININE-BSD FRML MDRD: 84 ML/MIN/1.73SQ M
GLUCOSE SERPL-MCNC: 104 MG/DL (ref 65–140)
HCT VFR BLD AUTO: 38.3 % (ref 34.8–46.1)
HGB BLD-MCNC: 12.5 G/DL (ref 11.5–15.4)
IMM GRANULOCYTES # BLD AUTO: 0.04 THOUSAND/UL (ref 0–0.2)
IMM GRANULOCYTES NFR BLD AUTO: 1 % (ref 0–2)
LYMPHOCYTES # BLD AUTO: 2.06 THOUSANDS/ΜL (ref 0.6–4.47)
LYMPHOCYTES NFR BLD AUTO: 24 % (ref 14–44)
MAX DIASTOLIC BP: 67 MMHG
MAX HEART RATE: 83 BPM
MAX PREDICTED HEART RATE: 139 BPM
MAX. SYSTOLIC BP: 157 MMHG
MCH RBC QN AUTO: 31.1 PG (ref 26.8–34.3)
MCHC RBC AUTO-ENTMCNC: 32.6 G/DL (ref 31.4–37.4)
MCV RBC AUTO: 95 FL (ref 82–98)
MONOCYTES # BLD AUTO: 0.58 THOUSAND/ΜL (ref 0.17–1.22)
MONOCYTES NFR BLD AUTO: 7 % (ref 4–12)
NEUTROPHILS # BLD AUTO: 5.69 THOUSANDS/ΜL (ref 1.85–7.62)
NEUTS SEG NFR BLD AUTO: 63 % (ref 43–75)
NRBC BLD AUTO-RTO: 0 /100 WBCS
P AXIS: 46 DEGREES
PLATELET # BLD AUTO: 202 THOUSANDS/UL (ref 149–390)
PMV BLD AUTO: 10.5 FL (ref 8.9–12.7)
POTASSIUM SERPL-SCNC: 3.6 MMOL/L (ref 3.5–5.3)
PR INTERVAL: 160 MS
PROTOCOL NAME: NORMAL
QRS AXIS: 46 DEGREES
QRSD INTERVAL: 90 MS
QT INTERVAL: 398 MS
QTC INTERVAL: 473 MS
RBC # BLD AUTO: 4.02 MILLION/UL (ref 3.81–5.12)
REASON FOR TERMINATION: NORMAL
SODIUM SERPL-SCNC: 141 MMOL/L (ref 136–145)
T WAVE AXIS: 51 DEGREES
TARGET HR FORMULA: NORMAL
TEST INDICATION: NORMAL
TIME IN EXERCISE PHASE: NORMAL
VENTRICULAR RATE: 85 BPM
WBC # BLD AUTO: 8.74 THOUSAND/UL (ref 4.31–10.16)

## 2020-02-03 PROCEDURE — 78452 HT MUSCLE IMAGE SPECT MULT: CPT | Performed by: INTERNAL MEDICINE

## 2020-02-03 PROCEDURE — 80048 BASIC METABOLIC PNL TOTAL CA: CPT | Performed by: INTERNAL MEDICINE

## 2020-02-03 PROCEDURE — A9502 TC99M TETROFOSMIN: HCPCS

## 2020-02-03 PROCEDURE — 85025 COMPLETE CBC W/AUTO DIFF WBC: CPT | Performed by: INTERNAL MEDICINE

## 2020-02-03 PROCEDURE — 93306 TTE W/DOPPLER COMPLETE: CPT

## 2020-02-03 PROCEDURE — 78452 HT MUSCLE IMAGE SPECT MULT: CPT

## 2020-02-03 PROCEDURE — 93018 CV STRESS TEST I&R ONLY: CPT | Performed by: INTERNAL MEDICINE

## 2020-02-03 PROCEDURE — 93016 CV STRESS TEST SUPVJ ONLY: CPT | Performed by: INTERNAL MEDICINE

## 2020-02-03 PROCEDURE — 93017 CV STRESS TEST TRACING ONLY: CPT

## 2020-02-03 PROCEDURE — 93306 TTE W/DOPPLER COMPLETE: CPT | Performed by: INTERNAL MEDICINE

## 2020-02-03 PROCEDURE — 99232 SBSQ HOSP IP/OBS MODERATE 35: CPT | Performed by: INTERNAL MEDICINE

## 2020-02-03 PROCEDURE — 99223 1ST HOSP IP/OBS HIGH 75: CPT | Performed by: PSYCHIATRY & NEUROLOGY

## 2020-02-03 PROCEDURE — 94761 N-INVAS EAR/PLS OXIMETRY MLT: CPT

## 2020-02-03 PROCEDURE — 93010 ELECTROCARDIOGRAM REPORT: CPT | Performed by: INTERNAL MEDICINE

## 2020-02-03 RX ADMIN — NEBIVOLOL HYDROCHLORIDE 5 MG: 5 TABLET ORAL at 17:16

## 2020-02-03 RX ADMIN — AMLODIPINE BESYLATE 5 MG: 5 TABLET ORAL at 17:17

## 2020-02-03 RX ADMIN — PRIMIDONE 50 MG: 50 TABLET ORAL at 21:49

## 2020-02-03 RX ADMIN — OXYBUTYNIN 5 MG: 5 TABLET, FILM COATED, EXTENDED RELEASE ORAL at 08:09

## 2020-02-03 RX ADMIN — CLOPIDOGREL BISULFATE 75 MG: 75 TABLET ORAL at 08:09

## 2020-02-03 RX ADMIN — LORATADINE 10 MG: 10 TABLET ORAL at 08:09

## 2020-02-03 RX ADMIN — PANTOPRAZOLE SODIUM 40 MG: 40 TABLET, DELAYED RELEASE ORAL at 08:09

## 2020-02-03 RX ADMIN — ENOXAPARIN SODIUM 40 MG: 40 INJECTION SUBCUTANEOUS at 08:09

## 2020-02-03 RX ADMIN — REGADENOSON 0.4 MG: 0.08 INJECTION, SOLUTION INTRAVENOUS at 10:30

## 2020-02-03 RX ADMIN — ATORVASTATIN CALCIUM 80 MG: 80 TABLET, FILM COATED ORAL at 08:09

## 2020-02-03 RX ADMIN — ASPIRIN 81 MG 81 MG: 81 TABLET ORAL at 08:09

## 2020-02-03 RX ADMIN — FLUTICASONE PROPIONATE 2 SPRAY: 50 SPRAY, METERED NASAL at 08:09

## 2020-02-03 RX ADMIN — DULOXETINE HYDROCHLORIDE 60 MG: 60 CAPSULE, DELAYED RELEASE ORAL at 19:58

## 2020-02-03 RX ADMIN — LEVOTHYROXINE SODIUM 100 MCG: 100 TABLET ORAL at 05:54

## 2020-02-03 NOTE — PROGRESS NOTES
Progress Note - Cardiology   [facility]     Keith Pacheco 78 y o  female MRN: [id number]  : 1940  Unit/Bed#: [address]  B Encounter: [id number]    Assessment and Plan:   1  Dizziness with abnormal inter vertebral flow:  MRI negative for CVA  Patient with history of Meniere's disease and BPPV  Neurology consult pending  No arrhythmia noted on telemetry  Echocardiogram pending     2  Shortness of breath:  Family have noted change in mother's ability to perform activities  Patient denies any chest discomfort  Will obtain nuclear stress test to evaluate for any ischemia      3  History of orthostatic hypotension:  Vitals have been stable since admission  Continue to monitor    Subjective / Objective:   Patient still with intermittent dizziness  Neuro consult pending  She denies any chest pain, pressure or palpitations  Telemetry demonstrates sinus rhythm without ectopy  Patient for echocardiogram and nuclear stress test today  Vitals: Blood pressure 122/55, pulse 83, temperature 98 4 °F (36 9 °C), temperature source Oral, resp  rate 15, height 5' 8" (1 727 m), weight 92 8 kg (204 lb 9 4 oz), SpO2 94 %  Vitals:    20 1437 20   Weight: 98 1 kg (216 lb 4 3 oz) 92 8 kg (204 lb 9 4 oz)     Body mass index is 31 11 kg/m²  BP Readings from Last 3 Encounters:   20 122/55     Orthostatic Blood Pressures      Most Recent Value   Blood Pressure  122/55 filed at 2020 0754   Patient Position - Orthostatic VS  Standing - Orthostatic VS filed at 2020 0754        I/O        07 -  0700  07 -  0700  07 -  0700    P  O  240      I V  (mL/kg) 600 (6 5)      IV Piggyback       Total Intake(mL/kg) 840 (9 1)      Net +840             Unmeasured Urine Occurrence 3 x          Invasive Devices     Peripheral Intravenous Line            Peripheral IV 20 Left Arm 1 day                No intake or output data in the 24 hours ending 02/03/20 2163      Physical Exam:   Physical Exam   Constitutional: She is oriented to person, place, and time  She appears well-developed and well-nourished  No distress  HENT:   Head: Normocephalic and atraumatic  Right Ear: External ear normal    Left Ear: External ear normal    Eyes: Pupils are equal, round, and reactive to light  Conjunctivae are normal  Right eye exhibits no discharge  Left eye exhibits no discharge  No scleral icterus  Neck: Normal range of motion  Neck supple  No JVD present  No thyromegaly present  Cardiovascular: Normal rate, regular rhythm, normal heart sounds and intact distal pulses  No murmur heard  Pulmonary/Chest: Effort normal and breath sounds normal  No respiratory distress  She has no wheezes  She has no rales  Abdominal: Soft  Bowel sounds are normal  She exhibits no distension and no mass  Musculoskeletal: She exhibits no edema  Neurological: She is alert and oriented to person, place, and time  Skin: Skin is warm and dry  Capillary refill takes less than 2 seconds  She is not diaphoretic  Psychiatric: She has a normal mood and affect  Her behavior is normal  Judgment and thought content normal    Nursing note and vitals reviewed                  Medications/ Allergies:     Current Facility-Administered Medications:  acetaminophen 650 mg Oral Q6H PRN Bladimir Caraballo MD   amLODIPine 5 mg Oral QPM Bladimir Caraballo MD   aspirin 81 mg Oral Daily Bladimir Caraballo MD   atorvastatin 80 mg Oral Daily Bladimir Caraballo MD   clopidogrel 75 mg Oral Daily Bladimir Caraballo MD   docusate sodium 100 mg Oral BID PRN Maria E Diego MD   DULoxetine 60 mg Oral Daily Bladimir Caraballo MD   enoxaparin 40 mg Subcutaneous Daily Bladimir Caraballo MD   fluticasone 2 spray Each Nare Daily Bladimir Caraballo MD   levothyroxine 100 mcg Oral Daily Bladimir Caraballo MD   loratadine 10 mg Oral Daily Bladimir Caraballo MD   nebivolol 5 mg Oral QPM Bladimir Caraballo MD   oxybutynin 5 mg Oral Daily Bladimir Caraballo MD   pantoprazole 40 mg Oral Once per day on Mon Wed Fri Bladimir Caraballo MD   potassium chloride 20 mEq Oral Weekly Bladimir Caraballo MD   primidone 50 mg Oral HS Bladimir Caraballo MD       acetaminophen 650 mg Q6H PRN   docusate sodium 100 mg BID PRN     Allergies   Allergen Reactions    Penicillins Swelling       VTE Pharmacologic Prophylaxis:   Sequential compression device (Venodyne)     Labs:   Troponins:  Results from last 7 days   Lab Units 01/31/20  1505   TROPONIN I ng/mL <0 02     CBC with diff:  Results from last 7 days   Lab Units 02/03/20  0432 02/01/20  0524 01/31/20  1505   WBC Thousand/uL 8 74 8 05 10 07   HEMOGLOBIN g/dL 12 5 12 4 13 1   HEMATOCRIT % 38 3 38 8 41 4   MCV fL 95 98 97   PLATELETS Thousands/uL 202 207 240   MCH pg 31 1 31 2 30 7   MCHC g/dL 32 6 32 0 31 6   RDW % 14 2 14 3 14 4   MPV fL 10 5 11 0 10 9   NRBC AUTO /100 WBCs 0  --  0     CMP:  Results from last 7 days   Lab Units 02/03/20  0432 02/01/20  0524 01/31/20  1505   SODIUM mmol/L 141 142 137   POTASSIUM mmol/L 3 6 4 0 4 6   CHLORIDE mmol/L 105 105 104   CO2 mmol/L 26 28 27   ANION GAP mmol/L 10 9 6   BUN mg/dL 19 21 24   CREATININE mg/dL 0 63 0 69 0 81   GLUCOSE FASTING mg/dL  --  96  --    CALCIUM mg/dL 8 7 8 9 9 8   AST U/L  --   --  43   ALT U/L  --   --  39   ALK PHOS U/L  --   --  81   TOTAL PROTEIN g/dL  --   --  7 1   ALBUMIN g/dL  --   --  3 5   TOTAL BILIRUBIN mg/dL  --   --  0 30   EGFR ml/min/1 73sq m 84 82 68     Coags:  Results from last 7 days   Lab Units 01/31/20  1505   PTT seconds 25   INR  1 00     TSH:  Results from last 7 days   Lab Units 02/01/20  0524   TSH 3RD GENERATON uIU/mL 0 853       Imaging & Testing   I have personally reviewed pertinent reports  Cta Head And Neck With And Without Contrast    Result Date: 1/31/2020  Narrative: CTA NECK AND BRAIN WITH AND WITHOUT CONTRAST INDICATION: Dizziness, vertigo  COMPARISON:   Head CT from July 21, 2011    MRI of the brain from July 22, 2011  TECHNIQUE:  Routine CT imaging of the Brain without contrast   Post contrast imaging was performed after administration of iodinated contrast through the neck and brain  Post contrast axial 0 625 mm images timed to opacify the arterial system  3D rendering was performed on an independent workstation  MIP reconstructions performed  Coronal reconstructions were performed of the noncontrast portion of the brain  Radiation dose length product (DLP) for this visit:  1320 5 mGy-cm   This examination, like all CT scans performed in the [facility], was performed utilizing techniques to minimize radiation dose exposure, including the use of iterative  reconstruction and automated exposure control  IV Contrast:  85 mL of iohexol (OMNIPAQUE)  IMAGE QUALITY:   Diagnostic FINDINGS: NONCONTRAST BRAIN PARENCHYMA: Decreased attenuation is noted in periventricular and subcortical white matter demonstrating an appearance that is statistically most likely to represent mild microangiopathic change  No CT signs of acute infarction  No intracranial mass, mass effect or midline shift  No acute parenchymal hemorrhage  Intracranial carotid and vertebral artery atherosclerotic calcifications  VENTRICLES AND EXTRA-AXIAL SPACES:  Normal for the patient's age  VISUALIZED ORBITS AND PARANASAL SINUSES:  Orbits are unremarkable  Small retention cyst in the right maxillary sinus  Mild inflammatory mucosal thickening  Trace fluid within the left mastoid air cells  Calvarium: Hyperostosis frontalis  Miscellaneous: Torus palatini  CERVICAL VASCULATURE AORTIC ARCH AND GREAT VESSELS:  Mild atherosclerotic disease of the arch, proximal great vessels and visualized subclavian vessels  No significant stenosis  RIGHT VERTEBRAL ARTERY CERVICAL SEGMENT:  Normal origin  The vessel is diffusely 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  LEFT VERTEBRAL ARTERY CERVICAL SEGMENT:  Normal origin   The vessel is normal in caliber throughout the neck  This is the dominant vessel  RIGHT EXTRACRANIAL CAROTID SEGMENT:  Mild atherosclerotic disease of the distal common carotid artery and proximal cervical internal carotid artery without significant stenosis compared to the more distal ICA  Less than 30% stenosis at the ICA origin and carotid bulb  LEFT EXTRACRANIAL CAROTID SEGMENT:  Mild atherosclerotic disease of the distal common carotid artery and proximal cervical internal carotid artery without significant stenosis compared to the more distal ICA  Less than 30% stenosis at the ICA origin and  bulb  The proximal left cervical ICA has a tortuous and retropharyngeal course indenting the left posterior hypopharyngeal wall  NASCET criteria was used to determine the degree of internal carotid artery diameter stenosis  INTRACRANIAL VASCULATURE INTERNAL CAROTID ARTERIES:  Normal enhancement of the intracranial portions of the internal carotid arteries  Normal ophthalmic artery origins  Normal ICA terminus  ANTERIOR CIRCULATION:  The horizontal ACAs are symmetrically unremarkable  There is atherosclerotic disease within the left vertical KAMILLE with focal moderate to severe stenosis in the proximal distal A2 segments  Both vertical ACAs remain patent  MIDDLE CEREBRAL ARTERY CIRCULATION:  M1 segment and middle cerebral artery branches demonstrate normal enhancement bilaterally  Mild stenosis in the proximal left M2 segment  DISTAL VERTEBRAL ARTERIES:  The right intradural vertebral artery is quite diminutive with occlusion in its distal segment  The left intradural vertebral artery is patent  Both posterior inferior cerebellar artery branches are patent however the origin  of the right PICA branch is not well seen  The vertebral basilar junction is patent   BASILAR ARTERY:  There is focal moderate to severe stenosis in the proximal basilar artery with improved caliber to the diffusely hypoplastic remaining basilar artery  POSTERIOR CEREBRAL ARTERIES: The P1 segment is markedly diminutive and likely developmentally hypoplastic with a robust right posterior communicating artery serving as the fetal PCA  The left P1 and P2 segments are well-visualized with a patent left posterior communicating artery  DURAL VENOUS SINUSES:  Normal  NON VASCULAR ANATOMY BONY STRUCTURES:  No acute osseous abnormality  SOFT TISSUES OF THE NECK:  Enlarged right thyroid lobe with heterogeneous right-sided thyroid nodules at the level of the isthmus and occupying much of the right thyroid lobe  The largest thyroid nodule within the right thyroid lobe measures approximately 3 7 x 2 6 cm with irregular borders and internal septations  No cervical adenopathy is appreciated  THORACIC INLET:  Mosaic attenuation in the visualized upper lobes  Impression: No intracranial hemorrhage or cortical infarction  Mild cerebral chronic microangiopathic disease  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  Patent dominant left vertebral artery  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  Essentially fetal right PCA circulation and patent left posterior communicating artery with unremarkable appearance to the bilateral PCAs  Mild atherosclerotic cervical and intracranial carotid atherosclerotic disease  Focal moderate to severe stenosis in the proximal and distal A2 segments of the left KAMILLE  Complex right thyroid nodule measuring 3 7 cm  Nonemergent sonographic evaluation is recommended for further characterization  I personally discussed this study with Sami Aranda on 1/31/2020 at 5:58 PM  Workstation performed: [id number]     Xr Chest 1 View Portable    Result Date: 1/31/2020  Narrative: CHEST INDICATION:   Dizziness  COMPARISON:  12/13/2019 EXAM PERFORMED/VIEWS:  XR CHEST PORTABLE FINDINGS: Cardiomediastinal silhouette appears unremarkable  The lungs are clear  No pneumothorax or pleural effusion   Osseous structures appear within normal limits for patient age  Impression: No acute cardiopulmonary disease  Workstation performed: [id number]     Mri Brain Wo Contrast    Result Date: 2/1/2020  Narrative: MRI BRAIN WITHOUT CONTRAST INDICATION: Abnormal CT angiogram  COMPARISON:   CTA of the head from January 31, 2020  TECHNIQUE:  Sagittal T1, axial T2, axial FLAIR, axial T1, axial [place] and axial diffusion imaging  Imaging performed on 1 5T MRI  IMAGE QUALITY:  Diagnostic  FINDINGS: BRAIN PARENCHYMA:  Diffusion-weighted imaging is normal   There is no pathologic parenchymal gradient susceptibility artifact either  Mild periventricular, subcortical and deep cerebral white matter chronic microangiopathic disease is noted  Punctate chronic infarction in the posterior right cerebellum  VENTRICLES:  Normal for the patient's age  SELLA AND PITUITARY GLAND:  Normal  ORBITS:  Normal  PARANASAL SINUSES:  Mild mucosal thickening within the ethmoid and maxillary sinuses  Small retention cyst in the right maxillary sinus  Fluid within the left mastoid air cells  VASCULATURE:  Loss of the normal flow void within the right intradural vertebral artery suggests proximal stenosis  CALVARIUM AND SKULL BASE:  Hyperostosis frontalis  EXTRACRANIAL SOFT TISSUES:  Normal      Impression: No acute infarction  Mild cerebral chronic microangiopathic disease  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

## 2020-02-03 NOTE — RESPIRATORY THERAPY NOTE
Home Oxygen Qualifying Test       Patient name: Geovanna Martinez        : 1940   Date of Test:  February 3, 2020  Diagnosis:      Home Oxygen Test:    **Medicare Guidelines require item(s) 1-5 on all ambulatory patients or 1 and 2 on non-ambulatory patients  1   Baseline SPO2 on Room Air at rest 92 %  2   SPO2 during exercise on Room Air 86 %  During exercise monitor SpO2  If SPO2 increases >=89% with ambulation do not add supplemental             oxygen  If <= 88% on room air add O2 via NC and titrate patient  Patient must be ambulated with O2 and titrated to > 88% with exertion  3   SPO2 on Oxygen at rest 96 % 2 lpm     4   SPO2 during exercise on Oxygen  92 % a liter flow of 2 lpm     5   Exercise performed:          walking          [x]  Supplemental Home Oxygen is indicated  []  Client does not qualify for home oxygen        Respiratory Additional Notes- ROB, ambulated with walker  Gareth Staley, RTRRT/ACCS

## 2020-02-03 NOTE — SOCIAL WORK
LOS - 3 days    SW met with pt to assess needs and discuss plans  Discussed goals of making sure pt's needs are met upon discharge and that Freedom of Choice is offered  Pt lives alone in her apartment  Independent, driving (not at night)  Has a cane but only uses it outdoors for balance  No [facility]  Pt's PCP is Dr Hector Baron MD   [facility]  Per pt she has prescription coverage and has no difficulty getting her medication as prescribed  Per pt she had POA paperwork  No copy on chart, requested copy if possible  Pt's daughter is her POA  Pt's plan is to return home when discharged  No discharge needs expressed or anticipated by pt at this time  Offered support in future if needed

## 2020-02-03 NOTE — PROGRESS NOTES
Progress Note - Sabra Gates 78 y o  female MRN: [id number]    Unit/Bed#: 2 [address] Encounter: [id number]        Subjective:   Patient denies chest pains palpitations and shortness of breath  Mild lightheaded feeling  Vital signs noted patient is afebrile  Cardiology note appreciated  Neurology consultation is still pending          Review of Systems    Objective:     Vitals: Blood pressure 122/55, pulse 83, temperature 98 4 °F (36 9 °C), temperature source Oral, resp  rate 15, height 5' 8" (1 727 m), weight 92 8 kg (204 lb 9 4 oz), SpO2 94 %  ,Body mass index is 31 11 kg/m²      No intake or output data in the 24 hours ending 02/03/20 0851      Current Facility-Administered Medications:     acetaminophen (TYLENOL) tablet 650 mg, 650 mg, Oral, Q6H PRN, Bladimir Caraballo MD    amLODIPine (NORVASC) tablet 5 mg, 5 mg, Oral, QPM, Bladimir Caraballo MD, 5 mg at 02/01/20 1713    aspirin chewable tablet 81 mg, 81 mg, Oral, Daily, Bladimir Caraballo MD, 81 mg at 02/03/20 0809    atorvastatin (LIPITOR) tablet 80 mg, 80 mg, Oral, Daily, Bladimir Caraballo MD, 80 mg at 02/03/20 0809    clopidogrel (PLAVIX) tablet 75 mg, 75 mg, Oral, Daily, Bladimir Caraballo MD, 75 mg at 02/03/20 0809    docusate sodium (COLACE) capsule 100 mg, 100 mg, Oral, BID PRN, Lebron Zavaleta MD, 100 mg at 02/02/20 1855    DULoxetine (CYMBALTA) delayed release capsule 60 mg, 60 mg, Oral, Daily, Bladimir Caraballo MD, 60 mg at 02/02/20 2122    enoxaparin (LOVENOX) subcutaneous injection 40 mg, 40 mg, Subcutaneous, Daily, Bladimir Caraballo MD, 40 mg at 02/03/20 0809    fluticasone (FLONASE) 50 mcg/act nasal spray 2 spray, 2 spray, Each Nare, Daily, Bladimir Caraballo MD, 2 spray at 02/03/20 0809    levothyroxine tablet 100 mcg, 100 mcg, Oral, Daily, Bladimir Caraballo MD, 100 mcg at 02/03/20 0554    loratadine (CLARITIN) tablet 10 mg, 10 mg, Oral, Daily, Bladimir Caraballo MD, 10 mg at 02/03/20 0809    nebivolol (BYSTOLIC) tablet 5 mg, 5 mg, Oral, QPM, Bladimir Caraballo MD, 5 mg at 02/01/20 1713    oxybutynin (DITROPAN-XL) 24 hr tablet 5 mg, 5 mg, Oral, Daily, Bladimir Caraballo MD, 5 mg at 02/03/20 0809    pantoprazole (PROTONIX) EC tablet 40 mg, 40 mg, Oral, Once per day on Mon Wed Fri, Bladimir Caraballo MD, 40 mg at 02/03/20 0809    potassium chloride (K-DUR,KLOR-CON) CR tablet 20 mEq, 20 mEq, Oral, Weekly, Bladimir Caraballo MD, 20 mEq at 01/31/20 2208    primidone (MYSOLINE) tablet 50 mg, 50 mg, Oral, HS, Preeti Caraballo MD, 50 mg at 02/02/20 2123     Physical Exam   Constitutional: She is oriented to person, place, and time  No distress  Neck: Normal range of motion  Neck supple  No thyromegaly present  Cardiovascular: Normal rate and regular rhythm  Pulmonary/Chest: Effort normal and breath sounds normal    Abdominal: Soft  Bowel sounds are normal    Musculoskeletal: She exhibits no edema or deformity  Neurological: She is alert and oriented to person, place, and time  Skin: Skin is warm and dry  Psychiatric: She has a normal mood and affect   Judgment normal            Lab, Imaging and culture:     Lab Results:     CBC:   Results from last 7 days   Lab Units 02/03/20 0432 02/01/20  0524 01/31/20  1505   WBC Thousand/uL 8 74 8 05 10 07   HEMOGLOBIN g/dL 12 5 12 4 13 1   HEMATOCRIT % 38 3 38 8 41 4   MCV fL 95 98 97   PLATELETS Thousands/uL 202 207 240     CMP:   Results from last 7 days   Lab Units 02/03/20  0432 02/01/20  0524 01/31/20  1505   POTASSIUM mmol/L 3 6 4 0 4 6   CHLORIDE mmol/L 105 105 104   CO2 mmol/L 26 28 27   BUN mg/dL 19 21 24   CREATININE mg/dL 0 63 0 69 0 81   CALCIUM mg/dL 8 7 8 9 9 8   AST U/L  --   --  43   ALT U/L  --   --  39   ALK PHOS U/L  --   --  81   EGFR ml/min/1 73sq m 84 82 68     No components found for: ABG    Magnesium:     Phosphorous:     Troponin:   Results from last 7 days   Lab Units 01/31/20  1505   TROPONIN I ng/mL <0 02     PT/INR:   Results from last 7 days   Lab Units 01/31/20  1505   PTT seconds 25   INR  1 00     Lactic Acid:     BNP:     TSH:   Results from last 7 days   Lab Units 02/01/20  0524   TSH 3RD GENERATON uIU/mL 0 853     Procalcitonin: Invalid input(s): PROCALCITONIN  Urinalysis:       Invalid input(s): CNITRITE      Imaging:   MRI brain wo contrast   Final Result by Hakeem Cisneros MD (02/01 1720)      No acute infarction  Mild cerebral chronic microangiopathic disease  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  Abnormal flow void within the right intradural vertebral artery suggesting proximal stenosis  Inflammatory changes within the paranasal sinuses and left mastoid air cells  Workstation performed: [id number]         CTA head and neck with and without contrast   Final Result by Hakeem Cisneros MD (01/31 1758)      No intracranial hemorrhage or cortical infarction  Mild cerebral chronic microangiopathic disease  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  Patent dominant left vertebral artery  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  Essentially fetal right PCA circulation and patent left posterior communicating    artery with unremarkable appearance to the bilateral PCAs  Mild atherosclerotic cervical and intracranial carotid atherosclerotic disease  Focal moderate to severe stenosis in the proximal and distal A2 segments of the left KAMILLE  Complex right thyroid nodule measuring 3 7 cm  Nonemergent sonographic evaluation is recommended for further characterization  I personally discussed this study with Margot Benjamin on 1/31/2020 at 5:58 PM                Workstation performed: [id number]         XR chest 1 view portable   Final Result by Mariana Dominguez MD (01/31 1702)      No acute cardiopulmonary disease              Workstation performed: [id number]         IR consult    (Results Pending)         Micro:   Lab Results   Component Value Date    URINECX 30,000-39,000 cfu/ml 06/28/2018    URINECX >100,000 cfu/ml Mixed Contaminants X6 04/25/2017    URINECX 50,000-59,000 cfu/ml Mixed Contaminants X4 10/31/2016            Invalid input(s): Heladio Mares      MRI brain wo contrast   Final Result      No acute infarction  Mild cerebral chronic microangiopathic disease  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  Abnormal flow void within the right intradural vertebral artery suggesting proximal stenosis  Inflammatory changes within the paranasal sinuses and left mastoid air cells  Workstation performed: [id number]         CTA head and neck with and without contrast   Final Result      No intracranial hemorrhage or cortical infarction  Mild cerebral chronic microangiopathic disease  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  Patent dominant left vertebral artery  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  Essentially fetal right PCA circulation and patent left posterior communicating    artery with unremarkable appearance to the bilateral PCAs  Mild atherosclerotic cervical and intracranial carotid atherosclerotic disease  Focal moderate to severe stenosis in the proximal and distal A2 segments of the left KAMILLE  Complex right thyroid nodule measuring 3 7 cm  Nonemergent sonographic evaluation is recommended for further characterization  I personally discussed this study with Jairo Jarquin on 1/31/2020 at 5:58 PM                Workstation performed: [id number]         XR chest 1 view portable   Final Result      No acute cardiopulmonary disease              Workstation performed: [id number]         IR consult    (Results Pending)       Invasive Devices     Peripheral Intravenous Line            Peripheral IV 02/01/20 Left Arm 1 day                      Assessment:  Dizziness with abnormal CT angiogram rule out CVA  MRI showing small punctate chronic infarcts in the right posterior cerebellum  History of BPPV  History of orthostatic hypotension  Hypothyroidism  Anxiety disorder  Shortness of breath etiology not clear    Cardiology note appreciated  Plan:  Patient is on aspirin and Plavix  Echocardiogram today  Neurology consultation is pending  Discussed with the patient

## 2020-02-03 NOTE — CONSULTS
[facility]   Neurology Initial Consult        Information obtained from:   Chief Complaint   Patient presents with    Dizziness     Pt reports feeling dizzy at about noon today  Pt reports having hx of vertigo and denies S/S of vertigo  Pt reports squeezing at the back of head  Pt reports SOb getting out of shower, and then dizziness started  Assessment/Plan:    1  Dizziness with h/o BPPV  2  Chronic Cerebellar CVA  3  Orthostatic Hypotension - on admission  4  Meneire's Disease  5  Sinusitis   6  Essential Tremor    -continue on daily aspirin regimen  -continue Lipitor 40 milligrams daily  -Plavix 75mg daily re: chronic punctate R posterior cerebellum -scheduled for tilt-table testing-can be done outpatient if unable to schedule while in hospital  -[facility] referral  -Primidone 50mg at hs  -antibiotics for sinusitis at PCP discretion  -Continue home use of Zyrtec and Flonase    HPI:    Anastacia Gates is an 64-year-old female with significant comorbidities of hypothyroidism, DVT, orthostatic hypotension, Meniere's disease, BPPV, blindness to the left eye and hearing loss in left ear  Patient reports getting up from her chair on Friday morning to answer her door developed a severe dizziness to the cap of her head  Reports having intense feeling of dizziness and being off balance  Patient reports having a history of dizziness however this seemed very different to her when she sat down she continued to have this dizzy feeling in her head  Patient was being taken to an office appointment with her PCP at that time was found to have slight orthostatic hypotension at that time  Patient continued to feel this intense sense of dizziness was sent to the ER for further evaluation  Patient does have a longstanding history of dizziness with Meniere's disease starting approximately 20 years ago    The patient did indicate she felt this was different noting a pressure to the top of her head and a gait disturbance that did not resolve with sitting  CT of the head was done upon admission noting an occlusion of a non dominant distal right intradural vertebral artery with a patent dominant left vertebral artery  Has a 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 with fetal right PCA circulation and patent left posterior communicating artery  Neurovascular was consulted recommending conservative measures  Patient was seen by Cardiology echo and stress test ordered and to be completed today  The patient reported 1 episode of dizziness this morning however at this time denies headache, lightheadedness, dizziness, shortness of breath, chest pain, abdominal pain, photophobia, or phonophobia  Patient does indicate some sinus congestion which has not resolved with her Zyrtec and Flonase  Is able to ambulate walking with use of walker in hallways however patient normally uses a walking stick for stability  Discussed diagnostic findings with PCP we will schedule tilt-table test as outpatient with [facility] referral   Also noted sinus congestion with findings on MRI, PCP to address possible antibiotic      Past Medical History:   Diagnosis Date    Blind left eye     Deaf, left     Disease of thyroid gland     DVT complicating pregnancy, unspecified trimester ([address] ) postpartum    Lyme disease     Orthostatic hypotension     Sinus congestion        Past Surgical History:   Procedure Laterality Date    APPENDECTOMY      BREAST BIOPSY Left 2010     SECTION      HAND SURGERY Left     HERNIA REPAIR      HYSTERECTOMY      ROTATOR CUFF REPAIR Left     TONSILLECTOMY         Allergies   Allergen Reactions    Penicillins Swelling         Current Facility-Administered Medications:     acetaminophen (TYLENOL) tablet 650 mg, 650 mg, Oral, Q6H PRN, Bladimir Caraballo MD    amLODIPine (NORVASC) tablet 5 mg, 5 mg, Oral, QPM, Bladimir Caraballo MD, 5 mg at 20 0087   aspirin chewable tablet 81 mg, 81 mg, Oral, Daily, Bladimir Caraballo MD, 81 mg at 02/03/20 0809    atorvastatin (LIPITOR) tablet 80 mg, 80 mg, Oral, Daily, Bladimir Caraballo MD, 80 mg at 02/03/20 0809    clopidogrel (PLAVIX) tablet 75 mg, 75 mg, Oral, Daily, Bladimir Caraballo MD, 75 mg at 02/03/20 0809    docusate sodium (COLACE) capsule 100 mg, 100 mg, Oral, BID PRN, Alvin Reyes MD, 100 mg at 02/02/20 1855    DULoxetine (CYMBALTA) delayed release capsule 60 mg, 60 mg, Oral, Daily, Bladimir Caraballo MD, 60 mg at 02/02/20 2122    enoxaparin (LOVENOX) subcutaneous injection 40 mg, 40 mg, Subcutaneous, Daily, Bladimir Caraballo MD, 40 mg at 02/03/20 0809    fluticasone (FLONASE) 50 mcg/act nasal spray 2 spray, 2 spray, Each Nare, Daily, Bladimir Caraballo MD, 2 spray at 02/03/20 0809    levothyroxine tablet 100 mcg, 100 mcg, Oral, Daily, Bladimir Caraballo MD, 100 mcg at 02/03/20 0554    loratadine (CLARITIN) tablet 10 mg, 10 mg, Oral, Daily, Bladimir Caraballo MD, 10 mg at 02/03/20 0809    nebivolol (BYSTOLIC) tablet 5 mg, 5 mg, Oral, QPM, Bladimir Caraballo MD, 5 mg at 02/01/20 1713    oxybutynin (DITROPAN-XL) 24 hr tablet 5 mg, 5 mg, Oral, Daily, Bladimir Caraballo MD, 5 mg at 02/03/20 0809    pantoprazole (PROTONIX) EC tablet 40 mg, 40 mg, Oral, Once per day on Mon Wed Fri, Bladimir Caraballo MD, 40 mg at 02/03/20 0809    potassium chloride (K-DUR,KLOR-CON) CR tablet 20 mEq, 20 mEq, Oral, Weekly, Bladimir Caraballo MD, 20 mEq at 01/31/20 2208    primidone (MYSOLINE) tablet 50 mg, 50 mg, Oral, HS, Bladimir Caraballo MD, 50 mg at 02/02/20 2123    Social History     Socioeconomic History    Marital status:       Spouse name: Not on file    Number of children: Not on file    Years of education: Not on file    Highest education level: Not on file   Occupational History    Not on file   Social Needs    Financial resource strain: Not on file    Food insecurity:     Worry: Not on file     Inability: Not on file   i.Meter needs:     Medical: Not on file     Non-medical: Not on file   Tobacco Use    Smoking status: Former Smoker     Last attempt to quit: 1990     Years since quittin 0    Smokeless tobacco: Never Used   Substance and Sexual Activity    Alcohol use: Never     Frequency: Never    Drug use: Never    Sexual activity: Not on file   Lifestyle    Physical activity:     Days per week: Not on file     Minutes per session: Not on file    Stress: Not on file   Relationships    Social connections:     Talks on phone: Not on file     Gets together: Not on file     Attends Sabianism service: Not on file     Active member of club or organization: Not on file     Attends meetings of clubs or organizations: Not on file     Relationship status: Not on file    Intimate partner violence:     Fear of current or ex partner: Not on file     Emotionally abused: Not on file     Physically abused: Not on file     Forced sexual activity: Not on file   Other Topics Concern    Not on file   Social History Narrative    Not on file       Family History   Problem Relation Age of Onset    Breast cancer Sister 76    Ovarian cancer Daughter 48    BRCA1 Negative Daughter     BRCA2 Negative Daughter          Review of systems:  Please see HPI for positive symptoms  No fever, no chills, no weight change  Ocular: No drainage, no blurred vision  HEENT:  No sore throat, earache  No neck pain  COR:  No chest pain  No palpitations  Lungs:  no sob, wheezing,    GI:  no  nausea, no vomiting, no diarrhea, no constipation, no anorexia  :  No dysuria, frequency, or urgency  No hematuria  Musculoskeletal:  No joint pain or swelling or edema  Skin:  No rash or itching  Psychiatric:  no anxiety, no depression  Endocrine:  No polyuria or polydipsia      Physical examination:  Vitals:    20 1143   BP: 139/66   Pulse: 75   Resp: 18   Temp: (!) 97 4 °F (36 3 °C)   SpO2: 95%       GENERAL APPEARANCE: The patient is alert, oriented  He is in no acute distress  HEENT:  Head is normocephalic  The sinuses are otherwise nontender  Pupils are equal and reactive  NECK:  Supple without lymphadenopathy  HEART:  Regular rate and rhythm  LUNGS:  clear to auscultation  No crackles or wheezes are heard  ABDOMEN:  Soft, nontender, nondistended with good bowel sounds heard  EXTREMITIES:  Without cyanosis, clubbing or edema  Mental status: The patient is alert, attentive, and oriented  Speech is clear and fluent, good repetition, comprehension, and naming  Cranial nerves:  CN II: Visual fields are full to confrontation  Fundoscopic exam is normal with sharp discs and no vascular changes    Pupils are 3 mm and briskly reactive to light  CN III, IV, VI: At primary gaze, there is no eye deviation  CN V: Facial sensation is intact to pinprick in all 3 divisions bilaterally  Corneal responses are intact  CN VII: Face is symmetric with normal eye closure and smile  CN VIII: Hearing is normal to rubbing fingers  CN IX, X: Palate elevates symmetrically  Phonation is normal   CN XI: Head turning and shoulder shrug are intact  CN XII: Tongue is midline with normal movements and no atrophy  Motor: There is no pronator drift of out-stretched arms  Muscle bulk and tone are normal      Muscle exam  Arm Right Left Leg Right Left   Deltoid 5/5 5/5 Iliopsoas 5/5 5/5   Biceps 5/5 5/5 Quads 5/5 5/5   Triceps 5/5 5/5 Hamstrings 5/5 5/5   Wrist Extension   Ankle Dorsi Flexion 5/5 5/5   Wrist Flexion   Ankle Plantar Flexion 5/5 5/5   Interossei   Ankle Eversion     APB   Ankle Inversion         Reflexes   RJ BJ TJ KJ AJ Plantars Sanderson's   Right 2+ 2+ 2+ 2+ 2+ Downgoing Not present   Left 2+ 2+ 2+ 2+ 2+ Downgoing Not present     Sensory:  Light touch, cold sense are intact in fingers and toes  Coordination:  Rapid alternating movements and fine finger movements are intact   There is no dysmetria on finger-to-nose and heel-knee-shin, noted some tremor  There are no abnormal or extraneous movements  Romberg deferred for fall risk  Gait/Stance:  Gait steady with walker, patient is walking stick at home  Lab Results   Component Value Date    WBC 8 74 02/03/2020    HGB 12 5 02/03/2020    HCT 38 3 02/03/2020    MCV 95 02/03/2020     02/03/2020     Lab Results   Component Value Date    HGBA1C 6 7 (H) 12/06/2019     Lab Results   Component Value Date    ALT 39 01/31/2020    AST 43 01/31/2020    ALKPHOS 81 01/31/2020     Lab Results   Component Value Date    CALCIUM 8 7 02/03/2020    K 3 6 02/03/2020    CO2 26 02/03/2020     02/03/2020    BUN 19 02/03/2020    CREATININE 0 63 02/03/2020     TSH 0 853    Review of reports and notes reveal:    Independent Interpretation of images or specimens:  Cta Head And Neck With And Without Contrast Result Date: 1/31/2020  No intracranial hemorrhage or cortical infarction  Mild cerebral chronic microangiopathic disease  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  Patent dominant left vertebral artery  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  Essentially fetal right PCA circulation and patent left posterior communicating artery with unremarkable appearance to the bilateral PCAs  Mild atherosclerotic cervical and intracranial carotid atherosclerotic disease  Focal moderate to severe stenosis in the proximal and distal A2 segments of the left KAMILLE  Complex right thyroid nodule measuring 3 7 cm  Nonemergent sonographic evaluation is recommended for further characterization  Mri Brain Wo Contrast Result Date: 2/1/2020 No acute infarction  Mild cerebral chronic microangiopathic disease  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  Abnormal flow void within the right intradural vertebral artery suggesting proximal stenosis  Inflammatory changes within the paranasal sinuses and left mastoid air cells       Echo:   -R/L atrium midly dilated  -EF range 55-60%  -No regional wall abnormalities  Nuclear Stress Testing  Normal study after pharmacologic stress, Myocardial perfusion normal at rest and with stress  LV function was normal        Thank you for this consult  Total time of encounter: 70  More than 50% of time was spent in counseling and coordination of care of patient

## 2020-02-04 ENCOUNTER — APPOINTMENT (INPATIENT)
Dept: RADIOLOGY | Facility: HOSPITAL | Age: 80
DRG: 091 | End: 2020-02-04
Payer: MEDICARE

## 2020-02-04 ENCOUNTER — APPOINTMENT (INPATIENT)
Dept: NON INVASIVE DIAGNOSTICS | Facility: HOSPITAL | Age: 80
DRG: 091 | End: 2020-02-04
Payer: MEDICARE

## 2020-02-04 PROBLEM — G47.33 OSA (OBSTRUCTIVE SLEEP APNEA): Status: ACTIVE | Noted: 2020-02-04

## 2020-02-04 PROBLEM — J96.01 ACUTE HYPOXEMIC RESPIRATORY FAILURE (HCC): Status: ACTIVE | Noted: 2020-02-04

## 2020-02-04 LAB
D DIMER PPP FEU-MCNC: 0.66 UG/ML FEU (ref 0.19–0.52)
NT-PROBNP SERPL-MCNC: 112 PG/ML

## 2020-02-04 PROCEDURE — 93660 TILT TABLE EVALUATION: CPT

## 2020-02-04 PROCEDURE — 99233 SBSQ HOSP IP/OBS HIGH 50: CPT | Performed by: PSYCHIATRY & NEUROLOGY

## 2020-02-04 PROCEDURE — 85379 FIBRIN DEGRADATION QUANT: CPT | Performed by: PHYSICIAN ASSISTANT

## 2020-02-04 PROCEDURE — 93971 EXTREMITY STUDY: CPT

## 2020-02-04 PROCEDURE — 83880 ASSAY OF NATRIURETIC PEPTIDE: CPT | Performed by: PHYSICIAN ASSISTANT

## 2020-02-04 PROCEDURE — 93660 TILT TABLE EVALUATION: CPT | Performed by: INTERNAL MEDICINE

## 2020-02-04 PROCEDURE — 99223 1ST HOSP IP/OBS HIGH 75: CPT | Performed by: PHYSICIAN ASSISTANT

## 2020-02-04 PROCEDURE — 71275 CT ANGIOGRAPHY CHEST: CPT

## 2020-02-04 PROCEDURE — 93971 EXTREMITY STUDY: CPT | Performed by: SURGERY

## 2020-02-04 RX ORDER — AMLODIPINE BESYLATE 2.5 MG/1
2.5 TABLET ORAL EVERY EVENING
Status: DISCONTINUED | OUTPATIENT
Start: 2020-02-05 | End: 2020-02-05 | Stop reason: HOSPADM

## 2020-02-04 RX ADMIN — LORATADINE 10 MG: 10 TABLET ORAL at 08:22

## 2020-02-04 RX ADMIN — LEVOTHYROXINE SODIUM 100 MCG: 100 TABLET ORAL at 05:17

## 2020-02-04 RX ADMIN — OXYBUTYNIN 5 MG: 5 TABLET, FILM COATED, EXTENDED RELEASE ORAL at 08:22

## 2020-02-04 RX ADMIN — ASPIRIN 81 MG 81 MG: 81 TABLET ORAL at 08:22

## 2020-02-04 RX ADMIN — ATORVASTATIN CALCIUM 80 MG: 80 TABLET, FILM COATED ORAL at 08:22

## 2020-02-04 RX ADMIN — IOHEXOL 85 ML: 350 INJECTION, SOLUTION INTRAVENOUS at 12:12

## 2020-02-04 RX ADMIN — NEBIVOLOL HYDROCHLORIDE 5 MG: 5 TABLET ORAL at 17:42

## 2020-02-04 RX ADMIN — ENOXAPARIN SODIUM 40 MG: 40 INJECTION SUBCUTANEOUS at 08:27

## 2020-02-04 RX ADMIN — PRIMIDONE 50 MG: 50 TABLET ORAL at 21:35

## 2020-02-04 RX ADMIN — CLOPIDOGREL BISULFATE 75 MG: 75 TABLET ORAL at 08:22

## 2020-02-04 RX ADMIN — DULOXETINE HYDROCHLORIDE 60 MG: 60 CAPSULE, DELAYED RELEASE ORAL at 20:01

## 2020-02-04 RX ADMIN — FLUTICASONE PROPIONATE 2 SPRAY: 50 SPRAY, METERED NASAL at 08:24

## 2020-02-04 RX ADMIN — AMLODIPINE BESYLATE 5 MG: 5 TABLET ORAL at 17:42

## 2020-02-04 NOTE — NURSING NOTE
Tilt table test completed  Patient was able to tolerate and complete 30 minute tilt  Patient experienced symptoms of lightheadedness, feeling "odd", and feeling like she should sit down  Patient did experience BP drop of 170/79 to 92/51 with symptoms  However, BP did increase gradually  Patient states she felt better once sitting up after test was completed  Patient transferred to inpatient room in stable condition   Report given to primary RN

## 2020-02-04 NOTE — PROGRESS NOTES
Progress Note - Deon Murray 78 y o  female MRN: [id number]    Unit/Bed#: 2 [address] Encounter: [id number]        Subjective:   Chart reviewed patient evaluated discussed with the nursing staff and also with the pulmonary team   Patient with some shortness of breath with exertion  Her oxygen levels were dropping no and required 2 L of oxygen and home O2 evaluation came back positive requiring 2 L of oxygen  Patient had a tilt-table test which came back positive and has been placed on mysolin  CT scan of the chest for pulmonary embolism was negative  Review of Systems    Objective:     Vitals: Blood pressure 136/70, pulse 62, temperature 98 7 °F (37 1 °C), temperature source Oral, resp  rate 16, height 5' 8" (1 727 m), weight 92 8 kg (204 lb 9 4 oz), SpO2 98 %  ,Body mass index is 31 11 kg/m²        Intake/Output Summary (Last 24 hours) at 2/4/2020 1751  Last data filed at 2/4/2020 0519  Gross per 24 hour   Intake 350 ml   Output    Net 350 ml         Current Facility-Administered Medications:     acetaminophen (TYLENOL) tablet 650 mg, 650 mg, Oral, Q6H PRN, Bladimir Caraballo MD    amLODIPine (NORVASC) tablet 5 mg, 5 mg, Oral, QPM, Bladimir Caraballo MD, 5 mg at 02/04/20 1742    atorvastatin (LIPITOR) tablet 80 mg, 80 mg, Oral, Daily, Bladimir Caraballo MD, 80 mg at 02/04/20 0822    clopidogrel (PLAVIX) tablet 75 mg, 75 mg, Oral, Daily, Bladimir Caraballo MD, 75 mg at 02/04/20 0822    docusate sodium (COLACE) capsule 100 mg, 100 mg, Oral, BID PRN, Keith Alegre MD, 100 mg at 02/02/20 1855    DULoxetine (CYMBALTA) delayed release capsule 60 mg, 60 mg, Oral, Daily, Bladimir Caraballo MD, 60 mg at 02/03/20 1958    enoxaparin (LOVENOX) subcutaneous injection 40 mg, 40 mg, Subcutaneous, Daily, Bladimir Caraballo MD, 40 mg at 02/04/20 0827    fluticasone (FLONASE) 50 mcg/act nasal spray 2 spray, 2 spray, Each Nare, Daily, Bladimir Caraballo MD, 2 spray at 02/04/20 0824    levothyroxine tablet 100 mcg, 100 mcg, Oral, Daily, Bladimir Caraballo MD, 100 mcg at 02/04/20 0517    loratadine (CLARITIN) tablet 10 mg, 10 mg, Oral, Daily, Bladimir Caraballo MD, 10 mg at 02/04/20 6335    nebivolol (BYSTOLIC) tablet 5 mg, 5 mg, Oral, QPM, Bladimir Caraballo MD, 5 mg at 02/04/20 1742    oxybutynin (DITROPAN-XL) 24 hr tablet 5 mg, 5 mg, Oral, Daily, Bladimir Caraballo MD, 5 mg at 02/04/20 4088    pantoprazole (PROTONIX) EC tablet 40 mg, 40 mg, Oral, Once per day on Mon Wed Fri, Bladimir Caraballo MD, 40 mg at 02/03/20 0809    potassium chloride (K-DUR,KLOR-CON) CR tablet 20 mEq, 20 mEq, Oral, Weekly, Bladimir Caraballo MD, 20 mEq at 01/31/20 2208    primidone (MYSOLINE) tablet 50 mg, 50 mg, Oral, HS, Dailysteph Caraballo MD, 50 mg at 02/03/20 2149     Physical Exam   Constitutional: She is oriented to person, place, and time  No distress  Neck: Normal range of motion  Neck supple  No thyromegaly present  Cardiovascular: Normal rate and regular rhythm  Pulmonary/Chest: Effort normal and breath sounds normal    Abdominal: Soft  Bowel sounds are normal    Musculoskeletal: She exhibits no edema or deformity  Neurological: She is alert and oriented to person, place, and time  Skin: Skin is warm and dry  Psychiatric: She has a normal mood and affect   Judgment normal            Lab, Imaging and culture:     Lab Results:     CBC:   Results from last 7 days   Lab Units 02/03/20  0432 02/01/20  0524 01/31/20  1505   WBC Thousand/uL 8 74 8 05 10 07   HEMOGLOBIN g/dL 12 5 12 4 13 1   HEMATOCRIT % 38 3 38 8 41 4   MCV fL 95 98 97   PLATELETS Thousands/uL 202 207 240     CMP:   Results from last 7 days   Lab Units 02/03/20  0432 02/01/20  0524 01/31/20  1505   POTASSIUM mmol/L 3 6 4 0 4 6   CHLORIDE mmol/L 105 105 104   CO2 mmol/L 26 28 27   BUN mg/dL 19 21 24   CREATININE mg/dL 0 63 0 69 0 81   CALCIUM mg/dL 8 7 8 9 9 8   AST U/L  --   --  43   ALT U/L  --   --  39   ALK PHOS U/L  --   --  81   EGFR ml/min/1 73sq m 84 82 68     No components found for: ABG    Magnesium:     Phosphorous:     Troponin:   Results from last 7 days   Lab Units 01/31/20  1505   TROPONIN I ng/mL <0 02     PT/INR:   Results from last 7 days   Lab Units 01/31/20  1505   PTT seconds 25   INR  1 00     Lactic Acid:     BNP:   Results from last 7 days   Lab Units 02/04/20  1549   NT-PRO BNP pg/mL 112     TSH:   Results from last 7 days   Lab Units 02/01/20  0524   TSH 3RD GENERATON uIU/mL 0 853     Procalcitonin: Invalid input(s): PROCALCITONIN  Urinalysis:       Invalid input(s): CNITRITE      Imaging:   VAS lower limb venous duplex study, unilateral/limited   Final Result by Florinda Terrazas MD (02/04 9310)      CTA chest pe study   Final Result by Orlena Hamman, MD (02/04 1324)      1  No acute pulmonary embolism  2   Mosaic attenuation of the lung fields  This is nonspecific and may represent pulmonary edema or small airways disease  3   Mild shotty mediastinal and hilar lymphadenopathy, likely reactive  No pathologically enlarged lymph nodes by CT criteria  4   Mild lobulation of the liver contour  Cirrhosis is not excluded  5   Large right thyroid nodule though likely stable compared to the ultrasound from 2009  The study was marked in EPIC for significant notification  Workstation performed: [id number]         MRI brain wo contrast   Final Result by Deedee Gorman MD (02/01 1720)      No acute infarction  Mild cerebral chronic microangiopathic disease  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  Abnormal flow void within the right intradural vertebral artery suggesting proximal stenosis  Inflammatory changes within the paranasal sinuses and left mastoid air cells  Workstation performed: [id number]         CTA head and neck with and without contrast   Final Result by Deedee Gorman MD (01/31 7659)      No intracranial hemorrhage or cortical infarction  Mild cerebral chronic microangiopathic disease  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  Patent dominant left vertebral artery  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  Essentially fetal right PCA circulation and patent left posterior communicating    artery with unremarkable appearance to the bilateral PCAs  Mild atherosclerotic cervical and intracranial carotid atherosclerotic disease  Focal moderate to severe stenosis in the proximal and distal A2 segments of the left KAMILLE  Complex right thyroid nodule measuring 3 7 cm  Nonemergent sonographic evaluation is recommended for further characterization  I personally discussed this study with Cleve Dallas on 1/31/2020 at 5:58 PM                Workstation performed: [id number]         XR chest 1 view portable   Final Result by Krystina Vargas MD (01/31 1702)      No acute cardiopulmonary disease  Workstation performed: [id number]               Micro:   Lab Results   Component Value Date    URINECX 30,000-39,000 cfu/ml  06/28/2018    URINECX >100,000 cfu/ml Mixed Contaminants X6 04/25/2017    URINECX 50,000-59,000 cfu/ml Mixed Contaminants X4 10/31/2016            Invalid input(s): LEGIONELLAURINARYANTIGEN      VAS lower limb venous duplex study, unilateral/limited   Final Result      CTA chest pe study   Final Result      1  No acute pulmonary embolism  2   Mosaic attenuation of the lung fields  This is nonspecific and may represent pulmonary edema or small airways disease  3   Mild shotty mediastinal and hilar lymphadenopathy, likely reactive  No pathologically enlarged lymph nodes by CT criteria  4   Mild lobulation of the liver contour  Cirrhosis is not excluded  5   Large right thyroid nodule though likely stable compared to the ultrasound from 2009  The study was marked in EPIC for significant notification              Workstation performed: [id number]         MRI brain wo contrast   Final Result      No acute infarction  Mild cerebral chronic microangiopathic disease  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  Abnormal flow void within the right intradural vertebral artery suggesting proximal stenosis  Inflammatory changes within the paranasal sinuses and left mastoid air cells  Workstation performed: [id number]         CTA head and neck with and without contrast   Final Result      No intracranial hemorrhage or cortical infarction  Mild cerebral chronic microangiopathic disease  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  Patent dominant left vertebral artery  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  Essentially fetal right PCA circulation and patent left posterior communicating    artery with unremarkable appearance to the bilateral PCAs  Mild atherosclerotic cervical and intracranial carotid atherosclerotic disease  Focal moderate to severe stenosis in the proximal and distal A2 segments of the left KAMILLE  Complex right thyroid nodule measuring 3 7 cm  Nonemergent sonographic evaluation is recommended for further characterization  I personally discussed this study with Margot Mora on 1/31/2020 at 5:58 PM                Workstation performed: [id number]         XR chest 1 view portable   Final Result      No acute cardiopulmonary disease              Workstation performed: [id number]             Invasive Devices     Peripheral Intravenous Line            Peripheral IV 02/04/20 Left Forearm less than 1 day                      Assessment:  Dizziness appears multifactorial but mostly secondary to orthostatic hypotension with tilt-table test being very positive  MRI showing small punctate chronic infarcts in the right posterior cerebellum  History of BPPV  Hypothyroidism  Anxiety disorder  Neurology note appreciated  Plan:  Patient is   Echocardiogram reviewed good ejection fraction  Neurology consultation i appreciated discussed with neurology team last evening and this morning we will discontinue the aspirin just place her on Plavix  Will also reduce the dose of the amlodipine to 2 5 mg daily   Discussed with the patients daughter at length and also discussed with the patient will arrange for the home oxygen

## 2020-02-04 NOTE — ASSESSMENT & PLAN NOTE
Patient has new hypoxemic respiratory failure  Has an oxygen qualify for 2 L resting and 2 L ambulatory  No clear etiology  No history of hypoxemia, organic lung disease  Ongoing history of chest palpitations  Pursue CTA PE study to rule out PE

## 2020-02-04 NOTE — CONSULTS
Consultation - Pulmonary Medicine  Tri Thomas 78 y o  female MRN: [id number]  Unit/Bed#: [address] [place] [address] B Encounter: [id number]  Code Status: Level 1 - Full Code    Assessment/Plan:  Acute hypoxemic respiratory failure ([address] )  Assessment & Plan  Patient has new hypoxemic respiratory failure  Has an oxygen qualify for 2 L resting and 2 L ambulatory  No clear etiology  No history of hypoxemia, organic lung disease  Ongoing history of chest palpitations  Pursue CTA PE study to rule out PE    MARY LOU (obstructive sleep apnea)  Assessment & Plan  Longstanding history of sleep apnea untreated  Reports she was on CPAP approximately 10 years ago for approximately 1 year  Did not tolerate CPAP due to abnormal sleep patterns and "circadian rhythm"  Had 3 prior CPAP studies which she reported were positive for sleep apnea      D/C and Follow up Needs:  Has qualified for oxygen and will need order for oxygen therapy             Home Oxygen Qualifying Test         Patient name: Harriett Cushing        : 1940   Date of Test:  February 3, 2020             Diagnosis:       Home Oxygen Test:    **Medicare Guidelines require item(s) 1-5 on all ambulatory patients or 1 and 2 on non-ambulatory patients         1  Baseline SPO2 on Room Air at rest 92 %                    2   SPO2 during exercise on Room Air 86 %  During exercise monitor SpO2  If SPO2 increases >=89% with ambulation do not add supplemental             oxygen  If <= 88% on room air add O2 via NC and titrate patient  Patient must be ambulated with O2 and titrated to > 88% with exertion          3  SPO2 on Oxygen at rest 96 % 2 lpm      4  SPO2 during exercise on Oxygen  92 % a liter flow of 2 lpm      5    Exercise performed:          walking           [x]  Supplemental Home Oxygen is indicated      []  Client does not qualify for home oxygen         Respiratory Additional Notes- ROB, ambulated with walker  Alan Bautista RTRRT/ACCS       Thank you for this consultation; we will be happy to follow with you     ______________________________________________________________________      History of Present Illness   Physician Requesting Consult: Lily Allred MD  Reason for Consult / Principal Problem:  Hypoxemia  HX and PE limited by:     HPI:  Jimmy Hidalgo is a 78 y o  female  who presents with dizziness, hypoxemia, and history of dyspnea and history of palpitations  She was initially admitted to the hospital post presentation to her PCP office, Dr Harjit Hackett, with history of dizziness near-syncope  She has known past medical history of orthostatic hypotension, BPPV, Meniere's disease with hearing loss HTN, hyperlipidemia, hypothyroidism, GERD, and atherosclerotic disease  She had significant findings on a CTA of her head neck showing chronic microangiopathic changes and severe focal stenotic basilar arteries with discussion with endovascular team and recommendations for conservative medical management  Also with significant carotid artery stenosis  Chronic sinusitis for which she is on Flonase  She was admitted the hospital for further monitoring with neurologic and cardiologic consults with need for ruling out stroke and cardiac etiology  She had a 2D echo showing mild diastolic dysfunction  She also had a positive tilt-table test which was notable for orthostatic hypotension with blood pressure ranging from the 170s to 90s on tilt  Ongoing discussions for possible discontinuation of antihypertensive medications  She is hospital day 6 and approaching discharge  She was found to be hypoxemic overnight and dyspneic which according to her seems worse as of this hospitalization  She notes a history of dyspnea ongoing since this past December, however, she states that her family has noticed significant dyspnea long prior to this  She does not have a history of oxygen use  She was a remote smoker quit in 1994   No history of personal organic lung disease  She had a DVT back in 1972 which was treated with anticoagulation and ultimately had chronic right lower extremity swelling for approximately 40 years according her  She strangely notes that this resolved over the last 9 months and was apprised that she can now see her ankle again   She describes some ongoing symptoms of heart palpitations and also notes that on recumbency she feels palpitation and when turning to the left feels heart pounding in some dyspnea  Most of her sensations are for palpitations and she does not feel that she has had significant dyspnea over the past 1 month but does notice significant worsening over this past weekend hospital     It appears she has had follow-up with [facility] back in 2010  She reports that she was referred there for sleep apnea and had some tests done back then trialed CPAP for approximately 1 year and then discontinued  She reports that she did not do very well with a because of her sleep-wake cycles  Records have been requested    It appears she had a complete pulmonary function test in August 2019 by her PCP  It also appears that she had numerous scheduled appointments with Pulmonary, however, she canceled her appointments  Pulmonary was consulted regarding etiology for hypoxemia    Smoking history:  Half a pack per day since 10years old quit 1994  Occupational history:  Administration, 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  Environmental History:  No environmental exposures  Travel history:  No recent travel  Respiratory History:  Pneumonia x3, bronchitis, no history of asthma  Oxygen Therapy:  No history of oxygen therapy  PAP Therapy:  CPAP remotely approximately 10 years ago  DME Company:  Not applicable  Rx Insurance:    Notable family history with mother with DVTs and PEs  Review of Systems   Constitutional: Negative for activity change, appetite change, chills, fatigue and fever     HENT: Negative for postnasal drip, rhinorrhea, sinus pressure and sinus pain  Eyes: Negative for visual disturbance  Respiratory: Positive for shortness of breath  Negative for apnea, cough, chest tightness, wheezing and stridor  Cardiovascular: Positive for palpitations  Negative for chest pain and leg swelling  Gastrointestinal: Negative for diarrhea, nausea and vomiting  Endocrine: Negative for cold intolerance and heat intolerance  Musculoskeletal: Negative for arthralgias, back pain, joint swelling and myalgias  Skin: Negative for color change  Neurological: Negative for syncope and light-headedness  Hematological: Negative for adenopathy  Psychiatric/Behavioral: Negative for confusion and sleep disturbance         Past Medical/Surgical History  Past Medical History:   Diagnosis Date    Blind left eye     Deaf, left     Disease of thyroid gland     DVT complicating pregnancy, unspecified trimester ([facility] [address] ) postpartum    Lyme disease     Orthostatic hypotension     Sinus congestion      Past Surgical History:   Procedure Laterality Date    APPENDECTOMY      BREAST BIOPSY Left 2010     SECTION      HAND SURGERY Left     HERNIA REPAIR      HYSTERECTOMY      ROTATOR CUFF REPAIR Left     TONSILLECTOMY         Social History  Social History     Substance and Sexual Activity   Alcohol Use Never    Frequency: Never     Social History     Substance and Sexual Activity   Drug Use Never     Social History     Tobacco Use   Smoking Status Former Smoker    Last attempt to quit: 1990    Years since quittin 0   Smokeless Tobacco Never Used       Family History  Family History   Problem Relation Age of Onset    Breast cancer Sister 76    Ovarian cancer Daughter 48    BRCA1 Negative Daughter     BRCA2 Negative Daughter        Allergies  Allergies   Allergen Reactions    Penicillins Swelling       Home Meds:   Medications Prior to Admission   Medication Sig Dispense Refill Last Dose  Alcaftadine (LASTACAFT OP) Apply 1 drop to eye daily       amLODIPine (NORVASC) 5 mg tablet Take 5 mg by mouth every evening  1 Past Week at Unknown time    aspirin 81 mg chewable tablet Chew 81 mg daily       atorvastatin (LIPITOR) 80 mg tablet Take 80 mg by mouth daily  2 Past Week at Unknown time    BYSTOLIC 5 MG tablet Take 5 mg by mouth every evening  2 Past Week at Unknown time    Calcium Carbonate-Vit D-Min (CALCIUM 1200 PO) Take 1,200 mg by mouth daily       cetirizine (ZyrTEC) 10 mg tablet Take 10 mg by mouth daily   1/30/2020 at Unknown time    Cholecalciferol (D3-1000 PO) Take 1,000 Units by mouth daily       DULoxetine (CYMBALTA) 60 mg delayed release capsule Take 60 mg by mouth daily  0 1/30/2020 at 2000     levothyroxine 100 mcg tablet Take 100 mcg by mouth daily  1 Past Week at Unknown time    mometasone (NASONEX) 50 mcg/act nasal spray 2 sprays into each nostril daily       MYRBETRIQ 25 MG TB24 Take 25 mg by mouth daily  0 Past Week at Unknown time    pantoprazole (PROTONIX) 40 mg tablet 40 mg 3 (three) times a week   Past Week at Unknown time    potassium chloride (K-DUR,KLOR-CON) 20 mEq tablet Take 20 mEq by mouth once a week   1 Past Week at Unknown time    primidone (MYSOLINE) 50 mg tablet Take 50 mg by mouth daily at bedtime   Past Week at Unknown time     Current Meds:   Scheduled Meds:  Current Facility-Administered Medications:  acetaminophen 650 mg Oral Q6H PRN Bladimir Caraballo MD   amLODIPine 5 mg Oral QPM Bladimir Caraballo MD   aspirin 81 mg Oral Daily Bladimir Caraballo MD   atorvastatin 80 mg Oral Daily Bladimir Caraballo MD   clopidogrel 75 mg Oral Daily Bladimir Caraballo MD   docusate sodium 100 mg Oral BID PRN Taz Lea MD   DULoxetine 60 mg Oral Daily Bladimir Caraballo MD   enoxaparin 40 mg Subcutaneous Daily Bladimir Caraballo MD   fluticasone 2 spray Each Nare Daily Bladimir Caraballo MD   levothyroxine 100 mcg Oral Daily Bladimir Caraballo MD   loratadine 10 mg Oral Daily Bladimir Caraballo MD   nebivolol 5 mg Oral QPM Bladimir Caraballo MD   oxybutynin 5 mg Oral Daily Bladimir Caraballo MD   pantoprazole 40 mg Oral Once per day on Mon Wed Fri Bladimir Caraballo MD   potassium chloride 20 mEq Oral Weekly Bladimir Caraballo MD   primidone 50 mg Oral HS Bladimir Caraballo MD     PRN Meds:  acetaminophen 650 mg Q6H PRN   docusate sodium 100 mg BID PRN       ____________________________________________________________________    Objective   Vitals:   Temp:  [97 4 °F (36 3 °C)-98 6 °F (37 °C)] 98 6 °F (37 °C)  HR:  [64-79] 76  Resp:  [13-18] 16  BP: (104-150)/(50-67) 104/50  Weight (last 2 days)     None        Oxygen Therapy  SpO2: 96 %  O2 Flow Rate (L/min): 2 L/min    IV Infusions:        Nutrition:        Diet Orders   (From admission, onward)             Start     Ordered    02/03/20 0001  Diet Cardiovascular; Stress Test (1 Meal); No Caffeine  Diet effective midnight     Comments:  *If patient is having a stress test, no caffeine, decaf, or chocolate is to be given*   Question Answer Comment   Diet Type Cardiovascular    Cardiac Stress Test (1 Meal)    Other Restriction(s): No Caffeine    RD to adjust diet per protocol? Yes        02/02/20 0804    02/01/20 0904  Room Service  Once     Question:  Type of Service  Answer:  Room Service - Appropriate with Assistance    02/01/20 0903                  Ins/Outs:   I/O       02/02 0701 - 02/03 0700 02/03 0701 - 02/04 0700 02/04 0701 - 02/05 0700    P  O   350     I V  (mL/kg)       Total Intake(mL/kg)  350 (3 8)     Net  +350                    Lines/Drains:  Invasive Devices     Peripheral Intravenous Line            Peripheral IV 02/01/20 Left Arm 2 days                ____________________________________________________________________      Physical Exam   Constitutional: She is oriented to person, place, and time  She appears well-developed and well-nourished  HENT:   Head: Normocephalic and atraumatic     Eyes: Pupils are equal, round, and reactive to light  Conjunctivae are normal    Neck: Normal range of motion  Neck supple  Cardiovascular: Normal rate, regular rhythm and normal heart sounds  Pulmonary/Chest: Effort normal  No accessory muscle usage or stridor  No tachypnea  No respiratory distress  She has decreased breath sounds in the right lower field and the left lower field  She has no wheezes  She has no rhonchi  She has no rales  She exhibits no tenderness  98% on 2 L nasal cannula    No significant conversational dyspnea    Lungs clear bilateral    Mildly decreased breath sounds at the bases   Abdominal: Soft  Bowel sounds are normal    Musculoskeletal: Normal range of motion  She exhibits no edema  Right lower extremity chronically slightly larger than the left according to patient history   Neurological: She is alert and oriented to person, place, and time  Skin: Skin is warm and dry     Psychiatric: She has a normal mood and affect        ____________________________________________________________________    Labs:   CBC: Results from last 7 days   Lab Units 02/03/20  0432 02/01/20  0524 01/31/20  1505   WBC Thousand/uL 8 74 8 05 10 07   HEMOGLOBIN g/dL 12 5 12 4 13 1   HEMATOCRIT % 38 3 38 8 41 4   MCV fL 95 98 97   PLATELETS Thousands/uL 202 207 240     CMP: Results from last 7 days   Lab Units 02/03/20  0432 02/01/20  0524 01/31/20  1505   POTASSIUM mmol/L 3 6 4 0 4 6   CHLORIDE mmol/L 105 105 104   CO2 mmol/L 26 28 27   BUN mg/dL 19 21 24   CREATININE mg/dL 0 63 0 69 0 81   CALCIUM mg/dL 8 7 8 9 9 8   AST U/L  --   --  43   ALT U/L  --   --  39   ALK PHOS U/L  --   --  81   EGFR ml/min/1 73sq m 84 82 68     Magnesium:     Phosphorous:     Troponin:   Results from last 7 days   Lab Units 01/31/20  1505   TROPONIN I ng/mL <0 02     PT/INR:   Results from last 7 days   Lab Units 01/31/20  1505   PTT seconds 25   INR  1 00     Lactic Acid:     BNP:     ABG:      Procalcitonin: Invalid input(s): PROCALCITONIN    Imaging:   MRI brain wo contrast   Final Result by Elissa Jackman MD (02/01 1720)      No acute infarction  Mild cerebral chronic microangiopathic disease  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  Abnormal flow void within the right intradural vertebral artery suggesting proximal stenosis  Inflammatory changes within the paranasal sinuses and left mastoid air cells  Workstation performed: [id number]         CTA head and neck with and without contrast   Final Result by Elissa Jackman MD (01/31 3386)      No intracranial hemorrhage or cortical infarction  Mild cerebral chronic microangiopathic disease  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  Patent dominant left vertebral artery  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  Essentially fetal right PCA circulation and patent left posterior communicating    artery with unremarkable appearance to the bilateral PCAs  Mild atherosclerotic cervical and intracranial carotid atherosclerotic disease  Focal moderate to severe stenosis in the proximal and distal A2 segments of the left KAMILLE  Complex right thyroid nodule measuring 3 7 cm  Nonemergent sonographic evaluation is recommended for further characterization  I personally discussed this study with Jessee Nguyen on 1/31/2020 at 5:58 PM                Workstation performed: [id number]         XR chest 1 view portable   Final Result by Kathleen De León MD (01/31 1702)      No acute cardiopulmonary disease  Workstation performed: [id number]             PFT:  8/13/2019    Luh Eisenberg 78 y o  ZMB:[birth year] JEJ:[id number]     PULMONARY FUNCTION TEST     Indication:  Shortness of breath     Requesting provider:  Osmar Lovett     Results:  Patient gave good effort and cooperation    Results of this test appear to be valid despite not all testing meeting ATS standards for reproducibility  Baseline oxygen saturation was 93% on room air with a heart rate of 82      Spirometry:  FEV1/FVC Ratio is 84  FEV1 is 1 96L which is 90% predicted  FVC is 2 34L which is 81% predicted        Flow volume loop:  Normal     Lung volumes: Total lung capacity is 3 33L which is 58% predicted  Residual volume is 37% predicted      Diffusing capacity:  39% predicted     IMPRESSION:  Results of the test may be inaccurate as the patient was unable to complete the testing according to ATS standards     1  Normal spirometry  2  Total lung capacity suggesting restrictive lung disease without air trapping  3  Severe diffusion impairment     EKG/ECHO:     [address]  [address] [facility], [facility]  [phone]     Transthoracic Echocardiogram  2D, M-mode, Doppler, and Color Doppler     Study date:  2020     Patient: Zeke Soares  MR number: [id number]  Account number: [de-identified]  : 1940  Age: 78 years  Gender: Female  Status: Inpatient  Location: Bedside  Height: 68 in  Weight: 203 5 lb  BP: 125/ 56 mmHg     Indications: SOB,Dizziness     Diagnoses: R06 00 - Dyspnea, unspecified     Sonographer:  JAGDEEP Warner  Referring Physician:  ROBERT Baer  [facility]  Interpreting Physician:  DO PARUL Treadwell     LEFT VENTRICLE:  Systolic function was normal by visual assessment  Ejection fraction was estimated in the range of 55 % to 60 %  There were no regional wall motion abnormalities  There was mild concentric hypertrophy  Doppler parameters were consistent with abnormal left ventricular relaxation (grade 1 diastolic dysfunction)      MITRAL VALVE:  There was mild regurgitation      AORTIC VALVE:  There was no evidence for stenosis  There was no regurgitation      TRICUSPID VALVE:  There was mild regurgitation    Pulmonary artery systolic pressure was within the normal range      HISTORY: PRIOR HISTORY: Blind left eye,Deaf left ear,thyroid disease,DVT,Lyme disease,orthostatic hypotension      PROCEDURE: The procedure was performed at the bedside  This was a routine study  The transthoracic approach was used  The study included complete 2D imaging, M-mode, complete spectral Doppler, and color Doppler  The heart rate was 68 bpm,  at the start of the study  Images were obtained from the parasternal, apical, subcostal, and suprasternal notch acoustic windows  Image quality was adequate      LEFT VENTRICLE: Size was normal  Systolic function was normal by visual assessment  Ejection fraction was estimated in the range of 55 % to 60 %  There were no regional wall motion abnormalities  There was mild concentric hypertrophy  DOPPLER: Doppler parameters were consistent with abnormal left ventricular relaxation (grade 1 diastolic dysfunction)      RIGHT VENTRICLE: The size was normal  Systolic function was normal  DOPPLER: Systolic pressure was within the normal range      LEFT ATRIUM: The atrium was mildly dilated      RIGHT ATRIUM: The atrium was mildly dilated      MITRAL VALVE: Valve structure was normal  There was normal leaflet separation  No echocardiographic evidence for prolapse  DOPPLER: The transmitral velocity was within the normal range  There was no evidence for stenosis  There was mild  regurgitation      AORTIC VALVE: The valve was trileaflet  Leaflets exhibited normal thickness, normal cuspal separation, and sclerosis  DOPPLER: Transaortic velocity was within the normal range  There was no evidence for stenosis  There was no  regurgitation      TRICUSPID VALVE: The valve structure was normal  There was normal leaflet separation  DOPPLER: The transtricuspid velocity was within the normal range  There was mild regurgitation  Pulmonary artery systolic pressure was within the normal  range   Estimated peak PA pressure was 33 mmHg      PULMONIC VALVE: Leaflets exhibited normal thickness, no calcification, and normal cuspal separation  DOPPLER: The transpulmonic velocity was within the normal range  There was no regurgitation      PERICARDIUM: There was no thickening  There was no pericardial effusion      AORTA: The root exhibited normal size      PULMONARY ARTERY: The size was normal  The morphology appeared normal      SYSTEM MEASUREMENT TABLES     2D mode  AoR Diam 2D: 3 7 cm  LA Diam (2D): 3 9 cm  LA/Ao (2D): 1 05  FS (2D Teich): 25 9 %  IVSd (2D): 1 33 cm  LVDEV: 47 4 cmï¾³  LVESV: 22 8 cmï¾³  LVIDd(2D): 3 4 cm  LVISd (2D): 2 52 cm  LVOT Area 2D: 2 84 cmï¾²  LVPWd (2D): 1 28 cm  SV (Teich): 24 6 cmï¾³     Apical four chamber  LVEF A4C: 52 %     Unspecified Scan Mode  ROXI Cont Eq (Peak Urban): 2 21 cmï¾²  LVOT Diam : 2 cm  LVOT Vmax: 1320 mm/s  LVOT Vmax; Mean: 1320 mm/s  Peak Grad ; Mean: 7 mm[Hg]  MV Peak A Urban: 1080 mm/s  MV Peak E Urban   Mean: 959 mm/s  MVA (PHT): 2 65 cmï¾²  PHT: 84 ms  Max P mm[Hg]  V Max: 2500 mm/s  Vmax: 2370 mm/s  RA Area: 25 9 cmï¾²  RA Volume: 86 cmï¾³  TAPSE: 1 6 cm     [employer] Accredited Echocardiography Laboratory     Prepared and electronically signed by  Isaac Mesa DO  Signed 2020 13:09:49    Micro: Lab Results   Component Value Date    URINECX 30,000-39,000 cfu/ml  2018    URINECX >100,000 cfu/ml Mixed Contaminants X6 2017    URINECX 50,000-59,000 cfu/ml Mixed Contaminants X4 10/31/2016        ____________________________________________________________________

## 2020-02-04 NOTE — ASSESSMENT & PLAN NOTE
Longstanding history of sleep apnea untreated  Reports she was on CPAP approximately 10 years ago for approximately 1 year  Did not tolerate CPAP due to abnormal sleep patterns and "circadian rhythm"  Had 3 prior CPAP studies which she reported were positive for sleep apnea

## 2020-02-04 NOTE — PROGRESS NOTES
Neurology Consult Follow Up      Beth Smith is a 78 y o  female  [address] [facility]    [id number]        Assessment/Recommendations:     1  Dizziness with h/o BPPV  2  Chronic Cerebellar CVA  3  Orthostatic Hypotension   4  Meneire's Disease  5  Sinusitis   6  Essential Tremor     -Decrease Norvasc to 2 5mg daily  -DC Ditropan XL  -continue on daily aspirin regimen  -continue Lipitor 40 milligrams daily  -Plavix 75mg daily re: chronic punctate R posterior cerebellum -scheduled for tilt-table testing-can be done outpatient if unable to schedule while in hospital  -[facility] referral  -Primidone 50mg at hs  -antibiotics for sinusitis at PCP discretion  -Continue home use of Zyrtec and Flonase  -encourage fluid intake of 2-3L/day of non caffeinated beverages  -Support stockings      78year-old female with reports of dizziness and imbalance at home when she got up from a chair insert toward  Has had a couple of episodes while in hospital   Knows to have significant comorbidities including Meniere's disease, vertigo and orthostatic hypotension  During this admission there is no evidence of acute stroke, balance could be sequela of chronic cerebellar stroke  Tilt table testing was ordered for dysautonomia and completed this morning noted a significant change in systolic blood pressure from 170/79 to 92/51  Symptoms of lightheadedness persisted throughout the study  Discussed with Cardiology will consider discontinuing Ditropan XL and decreasing Norvasc dosing to 2 5 mg daily  Per patient, noted no change in symptomatology after she started on Ditropan XL, will discontinue it  Cardiology encouraging increase fluid intake of 2-3L/day of non caffeinated beverages and compression stockings for support  Chief Complaint:    Subjective:  Patient noted slight dizziness with imbalance when she got up from her tilt-table testing today  Denies any issues overnight or sensory testing    Denies any headache, lightheaded, dizziness, chest pain, shortness of breath, abdominal pain, nausea or vomiting  Past Medical History:   Diagnosis Date    Blind left eye     Deaf, left     Disease of thyroid gland     DVT complicating pregnancy, unspecified trimester ([facility] ) postpartum    Lyme disease     Orthostatic hypotension     Sinus congestion      Social History     Socioeconomic History    Marital status:       Spouse name: Not on file    Number of children: Not on file    Years of education: Not on file    Highest education level: Not on file   Occupational History    Not on file   Social Needs    Financial resource strain: Not on file    Food insecurity:     Worry: Not on file     Inability: Not on file    Transportation needs:     Medical: Not on file     Non-medical: Not on file   Tobacco Use    Smoking status: Former Smoker     Last attempt to quit: 1990     Years since quittin 0    Smokeless tobacco: Never Used   Substance and Sexual Activity    Alcohol use: Never     Frequency: Never    Drug use: Never    Sexual activity: Not on file   Lifestyle    Physical activity:     Days per week: Not on file     Minutes per session: Not on file    Stress: Not on file   Relationships    Social connections:     Talks on phone: Not on file     Gets together: Not on file     Attends [employer] service: Not on file     Active member of club or organization: Not on file     Attends meetings of clubs or organizations: Not on file     Relationship status: Not on file    Intimate partner violence:     Fear of current or ex partner: Not on file     Emotionally abused: Not on file     Physically abused: Not on file     Forced sexual activity: Not on file   Other Topics Concern    Not on file   Social History Narrative    Not on file     Family History   Problem Relation Age of Onset    Breast cancer Sister 76    Ovarian cancer Daughter 48    BRCA1 Negative Daughter     BRCA2 Negative Daughter        ROS:  Please see HPI for positive symptoms  No fever, no chills, no weight change  Ocular: No drainage, no blurred vision  HEENT:  No sore throat, earache, or congestion  No neck pain  COR:  No chest pain  No palpitations  Lungs:  no sob, wheezing,    GI:  no  nausea, no vomiting, no diarrhea, no constipation, no anorexia  :  No dysuria, frequency, or urgency  No hematuria  N  Musculoskeletal:  No joint pain or swelling or edema  Skin:  No rash or itching  Psychiatric:  no anxiety, no depression  Endocrine:  No polyuria or polydipsia  Objective:  /50 (BP Location: Right arm)   Pulse 76   Temp 98 6 °F (37 °C) (Oral)   Resp 16   Ht 5' 8" (1 727 m)   Wt 92 8 kg (204 lb 9 4 oz)   SpO2 96%   BMI 31 11 kg/m²     General: alert   Mental status: oriented x3  Attention: normal  Knowledge: fair  Language and Speech: normal  Cranial nerves: II-XII intact  Muscle tone: normal  Motor strength:  5/5 throughout  Sensory: normal to light touch, vibration  Proprioception intact  Gait:  Steady with walker/walking stick  Coordination: finger to nose and heel to toe normal  Reflexes: 2+ throughout      Labs:      Lab Results   Component Value Date    WBC 8 74 02/03/2020    HGB 12 5 02/03/2020    HCT 38 3 02/03/2020    MCV 95 02/03/2020     02/03/2020     Lab Results   Component Value Date    HGBA1C 6 7 (H) 12/06/2019     Lab Results   Component Value Date    ALT 39 01/31/2020    AST 43 01/31/2020    ALKPHOS 81 01/31/2020     Lab Results   Component Value Date    CALCIUM 8 7 02/03/2020    K 3 6 02/03/2020    CO2 26 02/03/2020     02/03/2020    BUN 19 02/03/2020    CREATININE 0 63 02/03/2020         Review of reports and notes reveal:     Cta Head And Neck With And Without Contrast Result Date: 1/31/2020  No intracranial hemorrhage or cortical infarction  Mild cerebral chronic microangiopathic disease  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  Patent dominant left vertebral artery  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  Essentially fetal right PCA circulation and patent left posterior communicating artery with unremarkable appearance to the bilateral PCAs  Mild atherosclerotic cervical and intracranial carotid atherosclerotic disease  Focal moderate to severe stenosis in the proximal and distal A2 segments of the left KAMILLE  Complex right thyroid nodule measuring 3 7 cm  Nonemergent sonographic evaluation is recommended for further characterization        Mri Brain Wo Contrast Result Date: 2/1/2020 No acute infarction  Mild cerebral chronic microangiopathic disease  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  Abnormal flow void within the right intradural vertebral artery suggesting proximal stenosis  Inflammatory changes within the paranasal sinuses and left mastoid air cells       Echo:   -R/L atrium midly dilated  -EF range 55-60%  -No regional wall abnormalities      Nuclear Stress Testing  Normal study after pharmacologic stress, Myocardial perfusion normal at rest and with stress  LV function was normal      Tilt Table:  02/04/2020  Positive tilt table test with reduction in BP immediately upon standing with no heart rate change  Symptoms of lightheadedness persisted throughout the study  No arrhythmia is present        Thank you for this consult      Total time of encounter:  30 min  More than 50% of the time was used in counseling and/or coordination of care  Extent of couseling and/or coordination of care

## 2020-02-05 VITALS
HEART RATE: 68 BPM | SYSTOLIC BLOOD PRESSURE: 148 MMHG | TEMPERATURE: 98 F | OXYGEN SATURATION: 96 % | HEIGHT: 68 IN | WEIGHT: 204.59 LBS | BODY MASS INDEX: 31.01 KG/M2 | RESPIRATION RATE: 20 BRPM | DIASTOLIC BLOOD PRESSURE: 73 MMHG

## 2020-02-05 LAB
CRP SERPL QL: 7.9 MG/L
ERYTHROCYTE [SEDIMENTATION RATE] IN BLOOD: 28 MM/HOUR (ref 2–25)

## 2020-02-05 PROCEDURE — 99231 SBSQ HOSP IP/OBS SF/LOW 25: CPT | Performed by: INTERNAL MEDICINE

## 2020-02-05 PROCEDURE — 86140 C-REACTIVE PROTEIN: CPT | Performed by: PHYSICIAN ASSISTANT

## 2020-02-05 PROCEDURE — 86038 ANTINUCLEAR ANTIBODIES: CPT | Performed by: PHYSICIAN ASSISTANT

## 2020-02-05 PROCEDURE — 85652 RBC SED RATE AUTOMATED: CPT | Performed by: PHYSICIAN ASSISTANT

## 2020-02-05 PROCEDURE — 86235 NUCLEAR ANTIGEN ANTIBODY: CPT | Performed by: PHYSICIAN ASSISTANT

## 2020-02-05 PROCEDURE — 86255 FLUORESCENT ANTIBODY SCREEN: CPT | Performed by: PHYSICIAN ASSISTANT

## 2020-02-05 RX ORDER — AMLODIPINE BESYLATE 2.5 MG/1
2.5 TABLET ORAL EVERY EVENING
Qty: 30 TABLET | Refills: 0 | Status: SHIPPED | OUTPATIENT
Start: 2020-02-05

## 2020-02-05 RX ORDER — ALBUTEROL SULFATE 90 UG/1
2 AEROSOL, METERED RESPIRATORY (INHALATION) EVERY 6 HOURS PRN
Qty: 8.5 G | Refills: 0 | Status: SHIPPED | OUTPATIENT
Start: 2020-02-05

## 2020-02-05 RX ORDER — PREDNISONE 10 MG/1
10 TABLET ORAL DAILY
Qty: 20 TABLET | Refills: 0 | Status: SHIPPED | OUTPATIENT
Start: 2020-02-05 | End: 2020-04-13

## 2020-02-05 RX ORDER — CLOPIDOGREL BISULFATE 75 MG/1
75 TABLET ORAL DAILY
Qty: 30 TABLET | Refills: 0 | Status: SHIPPED | OUTPATIENT
Start: 2020-02-06

## 2020-02-05 RX ADMIN — NEBIVOLOL HYDROCHLORIDE 5 MG: 5 TABLET ORAL at 17:32

## 2020-02-05 RX ADMIN — OXYBUTYNIN 5 MG: 5 TABLET, FILM COATED, EXTENDED RELEASE ORAL at 10:13

## 2020-02-05 RX ADMIN — LORATADINE 10 MG: 10 TABLET ORAL at 10:13

## 2020-02-05 RX ADMIN — ENOXAPARIN SODIUM 40 MG: 40 INJECTION SUBCUTANEOUS at 10:13

## 2020-02-05 RX ADMIN — LEVOTHYROXINE SODIUM 100 MCG: 100 TABLET ORAL at 05:13

## 2020-02-05 RX ADMIN — ATORVASTATIN CALCIUM 80 MG: 80 TABLET, FILM COATED ORAL at 10:12

## 2020-02-05 RX ADMIN — AMLODIPINE BESYLATE 2.5 MG: 2.5 TABLET ORAL at 17:32

## 2020-02-05 RX ADMIN — PANTOPRAZOLE SODIUM 40 MG: 40 TABLET, DELAYED RELEASE ORAL at 10:13

## 2020-02-05 RX ADMIN — FLUTICASONE PROPIONATE 2 SPRAY: 50 SPRAY, METERED NASAL at 10:13

## 2020-02-05 RX ADMIN — CLOPIDOGREL BISULFATE 75 MG: 75 TABLET ORAL at 10:13

## 2020-02-05 NOTE — SOCIAL WORK
Patient in need of Home 02  Referral sent to [facility]  O2 will be delivered this afternoon  Will follow through discharge

## 2020-02-05 NOTE — PROGRESS NOTES
Progress Note - Pulmonary   Jean Pierre Esteban 78 y o  female MRN: [id number]  Unit/Bed#: 2 [address] Encounter: [id number]    Assessment:  Shortness of breath with activity  I did review CTA of chest that was done on 02/04  No evidence of PE but there is some mosaic pattern in both lung fields possibly due to element of pneumonitis or small airways disease  Patient may have some mild underlying asthma or chronic respiratory bronchiolitis  Exercise-induced hypoxemia  Complete PFT that was done in August showed normal forced vital capacity and FEV1 but TLC was moderately reduced at 58% of predicted  Diffusion capacity was severely decreased  Echocardiogram done 2/3 shows normal LV systolic function with normal pulmonary pressure of 33 mm Hg  No significant valvular heart disease  History of sleep disorder breathing diagnosed about 20 years ago    Plan:  Patient did qualify for home oxygen with 2 liters/minute with activity  I did talk about monitor O2 saturation home to buy a pulse oximeter to do this  I did inform her that the goal be for O2 saturation to be 90% or better  C reactive protein is mildly elevated 7 9  There is family history of asthma  Would give him  Trial prednisone 20 mg for 7 days then 10 mg for 7 days and patient can use the albuterol inhaler 2 puffs 4 times a day as needed  She can follow up in our office in 2-3 weeks  I did advise her to make an appointment    Subjective:   Patient not having any shortness of breath at rest   She does have history of being short of breath with activity at home  No cough    Objective:     Vitals: Blood pressure 149/69, pulse 68, temperature 98 °F (36 7 °C), temperature source Oral, resp  rate 20, height 5' 8" (1 727 m), weight 92 8 kg (204 lb 9 4 oz), SpO2 96 %  ,Body mass index is 31 11 kg/m²  No intake or output data in the 24 hours ending 02/05/20 1106    Physical Exam: Physical Exam   Constitutional: She is oriented to person, place, and time  She appears well-developed and well-nourished  No distress  On 2 L of oxygen O2 saturation is 97%   HENT:   Head: Normocephalic  Nose: Nose normal    Mouth/Throat: Oropharynx is clear and moist  No oropharyngeal exudate  Eyes: Pupils are equal, round, and reactive to light  Conjunctivae are normal    Neck: Neck supple  No JVD present  No tracheal deviation present  Cardiovascular: Normal rate, regular rhythm and normal heart sounds  Pulmonary/Chest: Effort normal    Faint expiratory wheeze left lower lobe which cleared after a deep breaths  No crackles or rhonchi   Abdominal: Soft  She exhibits no distension  There is no tenderness  There is no guarding  Musculoskeletal: She exhibits no edema  No edema or clubbing  Lymphadenopathy:     She has no cervical adenopathy  Neurological: She is alert and oriented to person, place, and time  Skin: Skin is warm and dry  No rash noted  Psychiatric: She has a normal mood and affect  Her behavior is normal  Thought content normal         Labs: I have personally reviewed pertinent lab results  , ABG: No results found for: PHART, BOD7AFG, PO2ART, MTX0MBO, H1TMKPED, BEART, SOURCE, BNP: No results found for: BNP, CBC:   Lab Results   Component Value Date    WBC 8 74 02/03/2020    HGB 12 5 02/03/2020    HCT 38 3 02/03/2020    MCV 95 02/03/2020     02/03/2020    ADJUSTEDWBC 10 60 04/06/2016    MCH 31 1 02/03/2020    MCHC 32 6 02/03/2020    RDW 14 2 02/03/2020    MPV 10 5 02/03/2020    NRBC 0 02/03/2020   , CMP:   Lab Results   Component Value Date    K 3 6 02/03/2020     02/03/2020    CO2 26 02/03/2020    BUN 19 02/03/2020    CREATININE 0 63 02/03/2020    CALCIUM 8 7 02/03/2020    AST 43 01/31/2020    ALT 39 01/31/2020    ALKPHOS 81 01/31/2020    EGFR 84 02/03/2020   , PT/INR:   Lab Results   Component Value Date    INR 1 00 01/31/2020   , Troponin: No results found for: TROPONIN    Imaging and other studies: I have personally reviewed pertinent reports     and I have personally reviewed pertinent films in PACS

## 2020-02-05 NOTE — PLAN OF CARE
Problem: Potential for Falls  Goal: Patient will remain free of falls  Description  INTERVENTIONS:  - Assess patient frequently for physical needs  -  Identify cognitive and physical deficits and behaviors that affect risk of falls    -  [place] fall precautions as indicated by assessment   - Educate patient/family on patient safety including physical limitations  - Instruct patient to call for assistance with activity based on assessment  - Modify environment to reduce risk of injury  - Consider OT/PT consult to assist with strengthening/mobility  Outcome: Progressing     Problem: CARDIOVASCULAR - ADULT  Goal: Maintains optimal cardiac output and hemodynamic stability  Description  INTERVENTIONS:  - Monitor I/O, vital signs and rhythm  - Monitor for S/S and trends of decreased cardiac output  - Administer and titrate ordered vasoactive medications to optimize hemodynamic stability  - Assess quality of pulses, skin color and temperature  - Assess for signs of decreased coronary artery perfusion  - Instruct patient to report change in severity of symptoms  Outcome: Progressing     Problem: MUSCULOSKELETAL - ADULT  Goal: Maintain or return mobility to safest level of function  Description  INTERVENTIONS:  - Assess patient's ability to carry out ADLs; assess patient's baseline for ADL function and identify physical deficits which impact ability to perform ADLs (bathing, care of mouth/teeth, toileting, grooming, dressing, etc )  - Assess/evaluate cause of self-care deficits   - Assess range of motion  - Assess patient's mobility  - Assess patient's need for assistive devices and provide as appropriate  - Encourage maximum independence but intervene and supervise when necessary  - Involve family in performance of ADLs  - Assess for home care needs following discharge   - Consider OT consult to assist with ADL evaluation and planning for discharge  - Provide patient education as appropriate  Outcome: Progressing

## 2020-02-06 LAB
ENA SCL70 AB SER-ACNC: <0.2 AI (ref 0–0.9)
ENA SS-A AB SER-ACNC: <0.2 AI (ref 0–0.9)
ENA SS-B AB SER-ACNC: <0.2 AI (ref 0–0.9)

## 2020-02-06 NOTE — DISCHARGE SUMMARY
Discharge Summary - Cheryl Reeves 78 y o  female MRN: [id number]    Unit/Bed#: 2 [address] Encounter: [id number]    Admission Date: 1/31/2020     Admitting Diagnosis: Dizziness [R42]    HPI:  Patient is a 73-year-old white female with multiple medical problems has been brought to my office with chief complaint of dizziness  He has a history of orthostatic blood pressure drops and also BPPV and according to her this time it was different than her usual symptoms  Patient was evaluated in the emergency room with a CT scan of the head and neck with contrast and had shown mild cerebral chronic microangiopathic disease occlusion of the non dominant right side distal entered dural vertebral artery severe focal stenosis in the proximal basilar artery essentially fetal right PCA of circulation with patent left posterior communicating arteries there was focal moderate to severe stenosis in the proximal and distal A2 segments of the left KAMILLE   Patient subsequently got admitted for further evaluation and treatment    Procedures Performed:   Orders Placed This Encounter   Procedures    ED ECG Documentation Only       Hospital Course:  Patient was admitted to general medical floor on telemetry bed no arrhythmias were noted  Patient subsequently had an MRI of the brain done which has revealed no acute infarcts and also shown mild cerebral chronic microangiopathic disease chronic punctate infarction in the right posterior cerebellum  Patient was also seen by the cardiologist because she was complaining of shortness of breath at times  She had an echocardiogram done which was unremarkable and also stress test was unremarkable  While in the hospital it was noted her oxygen levels were dropping when she was ambulating  Home oxygen evaluation was done baseline SpO2 on room air was at 92% in SpO2 during exercise dropped to 86% and supplemental home oxygen was indicated      Patient was seen by the pulmonary will also felt patient rule out pulmonary embolism and a CT scan of the chest was done with contrast and no embolism was detected  Patient had a PFT done in the past August which had shown normal forced vital capacity and FEV1 but TLC was moderately reduced at 58% of predicted diffusion capacity was severely decreased  Patient was seen by the pulmonary physician also because of the family history of asthma he recommended short course of steroids and albuterol inhaler p r n  And recommended a follow-up in his office in couple of weeks time    Patient was seen by the neurologist   She also had a tilt-table test which was positive with a drop in the systolic pressure by about 80-90 mmHg  Neurologist recommended to cut back on the Norvasc to 2 5 mg   Midodrine was not recommended at this time  The also recommended a follow-up in the [facility]  After reviewing the CT angiogram they recommended Plavix and to discontinue the aspirin  Since patient condition has improved on the day of discharge she had no acute problems no shortness of breath chest pains palpitations vital signs were stable she is being discharged on the medications mentioned in the after visit summary  The changes that were made include reducing the dose of the Norvasc to 2 5 mg daily  She has been given prednisone 20 mg for 7 days followed by 10 mg for 7 days and also she was given albuterol inhaler 4 times a day 2 puffs as needed  She has been provided home oxygen  She will be seen by me in the office in 1 week time and also by the pulmonologist in the neurologist in 2 weeks time  She is going to make an appointment with the [facility] also for her unsteady gait and dizziness    Complications:  None    Labs:  Recent Results (from the past 72 hour(s))   D-dimer, quantitative    Collection Time: 02/04/20 11:53 AM   Result Value Ref Range    D-Dimer, Quant 0 66 (H) 0 19 - 0 52 ug/ml FEU   NT-BNP PRO    Collection Time: 02/04/20  3:49 PM   Result Value Ref Range    NT-proBNP 112 <450 pg/mL   C-reactive protein    Collection Time: 02/05/20  9:35 AM   Result Value Ref Range    CRP 7 9 (H) <3 0 mg/L   Sedimentation rate, automated    Collection Time: 02/05/20  9:35 AM   Result Value Ref Range    Sed Rate 28 (H) 2 - 25 mm/hour     VAS lower limb venous duplex study, unilateral/limited   Final Result      CTA chest pe study   Final Result      1  No acute pulmonary embolism  2   Mosaic attenuation of the lung fields  This is nonspecific and may represent pulmonary edema or small airways disease  3   Mild shotty mediastinal and hilar lymphadenopathy, likely reactive  No pathologically enlarged lymph nodes by CT criteria  4   Mild lobulation of the liver contour  Cirrhosis is not excluded  5   Large right thyroid nodule though likely stable compared to the ultrasound from 2009  The study was marked in EPIC for significant notification  Workstation performed: [id number]         MRI brain wo contrast   Final Result      No acute infarction  Mild cerebral chronic microangiopathic disease  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  Abnormal flow void within the right intradural vertebral artery suggesting proximal stenosis  Inflammatory changes within the paranasal sinuses and left mastoid air cells  Workstation performed: [id number]         CTA head and neck with and without contrast   Final Result      No intracranial hemorrhage or cortical infarction  Mild cerebral chronic microangiopathic disease  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  Patent dominant left vertebral artery  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  Essentially fetal right PCA circulation and patent left posterior communicating    artery with unremarkable appearance to the bilateral PCAs  Mild atherosclerotic cervical and intracranial carotid atherosclerotic disease  Focal moderate to severe stenosis in the proximal and distal A2 segments of the left KAMILLE  Complex right thyroid nodule measuring 3 7 cm  Nonemergent sonographic evaluation is recommended for further characterization  I personally discussed this study with Lamar Rocha on 1/31/2020 at 5:58 PM                Workstation performed: [id number]         XR chest 1 view portable   Final Result      No acute cardiopulmonary disease  Workstation performed: [id number]           Invasive Devices     None                 Discharge Diagnosis:   Dizziness appears to be multifactorial   Patient had significant orthostatic hypotension with a tilt-table test   MRA showing small punctate chronic infarcts in the right posterior cerebellum and she has also got history of BPPV  Patient's with oxygen saturations dropping with ambulation and qualified for the home oxygen which has been arranged  Hypothyroidism  Anxiety disorder  History of sleep apnea but patient claims she had 3 studies which were not conclusive    Plan:  Patient is being discharged on the medications mentioned in the after visit summary and also the end of the discharge summary  Norvasc has been reduced to 2 5 mg  Patient will be on home O2  She will be followed in the office in 1 week time and also with the pulmonary and neurology in 2 weeks time  She will also make appointment with the [facility]  Condition at Discharge: fair     Discharge instructions/Information to patient and family:   See after visit summary for information provided to patient and family  Provisions for Follow-Up Care:  See after visit summary for information related to follow-up care and any pertinent home health orders  Disposition: Home        Discharge Statement   I spent 30 minutes discharging the patient  This time was spent on the day of discharge  I had direct contact with the patient on the day of discharge   Additional documentation is required if more than 30 minutes were spent on discharge  Discharge Medications:  See after visit summary for reconciled discharge medications provided to patient and family          Discharge Medication List as of 2/5/2020 11:15 AM      CONTINUE these medications which have NOT CHANGED    Details   Alcaftadine (LASTACAFT OP) Apply 1 drop to eye daily, Historical Med      atorvastatin (LIPITOR) 80 mg tablet Take 80 mg by mouth daily, Starting Thu 11/14/2019, Historical Med      BYSTOLIC 5 MG tablet Take 5 mg by mouth every evening, Starting Sun 12/1/2019, Historical Med      Calcium Carbonate-Vit D-Min (CALCIUM 1200 PO) Take 1,200 mg by mouth daily, Historical Med      cetirizine (ZyrTEC) 10 mg tablet Take 10 mg by mouth daily, Historical Med      Cholecalciferol (D3-1000 PO) Take 1,000 Units by mouth daily, Historical Med      DULoxetine (CYMBALTA) 60 mg delayed release capsule Take 60 mg by mouth daily, Starting Mon 12/2/2019, Historical Med      levothyroxine 100 mcg tablet Take 100 mcg by mouth daily, Starting Wed 11/20/2019, Historical Med      mometasone (NASONEX) 50 mcg/act nasal spray 2 sprays into each nostril daily, Historical Med      MYRBETRIQ 25 MG TB24 Take 25 mg by mouth daily, Starting Wed 11/20/2019, Historical Med      pantoprazole (PROTONIX) 40 mg tablet 40 mg 3 (three) times a week, Historical Med      potassium chloride (K-DUR,KLOR-CON) 20 mEq tablet Take 20 mEq by mouth once a week , Starting Wed 11/20/2019, Historical Med      primidone (MYSOLINE) 50 mg tablet Take 50 mg by mouth daily at bedtime, Historical Med               Discharge Medication List as of 2/5/2020 11:15 AM      STOP taking these medications       aspirin 81 mg chewable tablet Comments:   Reason for Stopping:                 Discharge Medication List as of 2/5/2020 11:15 AM      START taking these medications    Details   albuterol (PROAIR HFA) 90 mcg/act inhaler Inhale 2 puffs every 6 (six) hours as needed for wheezing, Starting Wed 2/5/2020, Print      clopidogrel (PLAVIX) 75 mg tablet Take 1 tablet (75 mg total) by mouth daily, Starting u 2/6/2020, Print      predniSONE 10 mg tablet Take 1 tablet (10 mg total) by mouth daily Prednisone 10 mg 2 tablets for 7 days followed by 1 tablet for 7 days, Starting Wed 2/5/2020, Print

## 2020-02-06 NOTE — NURSING NOTE
IV removed prior to discharge, pt left via wheelchair accompanied by RN  Pt had home O2  AVS and prescriptions given to and reviewed with pt

## 2020-02-07 LAB
C-ANCA TITR SER IF: NORMAL TITER
MYELOPEROXIDASE AB SER IA-ACNC: <9 U/ML (ref 0–9)
P-ANCA ATYPICAL TITR SER IF: NORMAL TITER
P-ANCA TITR SER IF: NORMAL TITER
PROTEINASE3 AB SER IA-ACNC: <3.5 U/ML (ref 0–3.5)
RYE IGE QN: NEGATIVE

## 2020-03-31 ENCOUNTER — TELEPHONE (OUTPATIENT)
Dept: PULMONOLOGY | Facility: MEDICAL CENTER | Age: 80
End: 2020-03-31

## 2020-03-31 NOTE — TELEPHONE ENCOUNTER
Patient called bc she said you were going to put orders in for her to have testing done but when she called to schedule them they said nothing was in Epic     Can you please put the orders in?

## 2020-04-06 ENCOUNTER — TELEPHONE (OUTPATIENT)
Dept: PULMONOLOGY | Facility: MEDICAL CENTER | Age: 80
End: 2020-04-06

## 2020-04-06 ENCOUNTER — TELEMEDICINE (OUTPATIENT)
Dept: PULMONOLOGY | Facility: MEDICAL CENTER | Age: 80
End: 2020-04-06
Payer: MEDICARE

## 2020-04-06 DIAGNOSIS — R93.89 ABNORMAL CT OF THE CHEST: ICD-10-CM

## 2020-04-06 DIAGNOSIS — J96.11 CHRONIC RESPIRATORY FAILURE WITH HYPOXIA (HCC): ICD-10-CM

## 2020-04-06 DIAGNOSIS — J84.9 INTERSTITIAL LUNG DISEASE (HCC): Primary | ICD-10-CM

## 2020-04-06 PROBLEM — J96.01 ACUTE HYPOXEMIC RESPIRATORY FAILURE (HCC): Status: RESOLVED | Noted: 2020-02-04 | Resolved: 2020-04-06

## 2020-04-06 PROBLEM — G47.33 OSA (OBSTRUCTIVE SLEEP APNEA): Status: RESOLVED | Noted: 2020-02-04 | Resolved: 2020-04-06

## 2020-04-06 PROCEDURE — 99443 PR PHYS/QHP TELEPHONE EVALUATION 21-30 MIN: CPT | Performed by: NURSE PRACTITIONER

## 2020-04-07 ENCOUNTER — TELEPHONE (OUTPATIENT)
Dept: NEUROLOGY | Facility: CLINIC | Age: 80
End: 2020-04-07

## 2020-04-13 ENCOUNTER — TELEMEDICINE (OUTPATIENT)
Dept: NEUROLOGY | Facility: CLINIC | Age: 80
End: 2020-04-13
Payer: MEDICARE

## 2020-04-13 VITALS — BODY MASS INDEX: 30.18 KG/M2 | WEIGHT: 198.5 LBS

## 2020-04-13 DIAGNOSIS — I95.1 ORTHOSTATIC HYPOTENSION: ICD-10-CM

## 2020-04-13 DIAGNOSIS — G25.0 TREMOR, ESSENTIAL: ICD-10-CM

## 2020-04-13 DIAGNOSIS — R42 DIZZINESS AND GIDDINESS: Primary | ICD-10-CM

## 2020-04-13 DIAGNOSIS — H81.13 BENIGN PAROXYSMAL POSITIONAL VERTIGO DUE TO BILATERAL VESTIBULAR DISORDER: ICD-10-CM

## 2020-04-13 PROCEDURE — 99214 OFFICE O/P EST MOD 30 MIN: CPT | Performed by: NURSE PRACTITIONER

## 2020-04-13 RX ORDER — SODIUM FLUORIDE 6 MG/ML
PASTE, DENTIFRICE DENTAL
COMMUNITY
Start: 2020-02-27

## 2020-04-13 RX ORDER — CLOBETASOL PROPIONATE 0.5 MG/G
1 CREAM TOPICAL AS NEEDED
COMMUNITY
Start: 2020-02-25

## 2020-04-13 RX ORDER — FLUTICASONE PROPIONATE 50 MCG
1 SPRAY, SUSPENSION (ML) NASAL DAILY
COMMUNITY
End: 2021-06-16

## 2020-07-30 ENCOUNTER — TRANSCRIBE ORDERS (OUTPATIENT)
Dept: ADMINISTRATIVE | Facility: HOSPITAL | Age: 80
End: 2020-07-30

## 2020-07-30 ENCOUNTER — APPOINTMENT (OUTPATIENT)
Dept: LAB | Facility: HOSPITAL | Age: 80
End: 2020-07-30
Attending: INTERNAL MEDICINE
Payer: MEDICARE

## 2020-07-30 DIAGNOSIS — N39.0 URINARY TRACT INFECTION WITHOUT HEMATURIA, SITE UNSPECIFIED: ICD-10-CM

## 2020-07-30 DIAGNOSIS — J84.9 INTERSTITIAL LUNG DISEASE (HCC): ICD-10-CM

## 2020-07-30 DIAGNOSIS — D64.9 ANEMIA, UNSPECIFIED TYPE: ICD-10-CM

## 2020-07-30 DIAGNOSIS — I10 ESSENTIAL HYPERTENSION, MALIGNANT: ICD-10-CM

## 2020-07-30 DIAGNOSIS — E78.00 PURE HYPERCHOLESTEROLEMIA: ICD-10-CM

## 2020-07-30 DIAGNOSIS — E03.9 HYPOTHYROIDISM, UNSPECIFIED TYPE: ICD-10-CM

## 2020-07-30 DIAGNOSIS — E11.9 DIABETES MELLITUS WITHOUT COMPLICATION (HCC): ICD-10-CM

## 2020-07-30 DIAGNOSIS — D64.9 ANEMIA, UNSPECIFIED TYPE: Primary | ICD-10-CM

## 2020-07-30 DIAGNOSIS — E55.9 VITAMIN D DEFICIENCY: ICD-10-CM

## 2020-07-30 LAB
ALBUMIN SERPL BCP-MCNC: 3.5 G/DL (ref 3.5–5)
ALP SERPL-CCNC: 88 U/L (ref 46–116)
ALT SERPL W P-5'-P-CCNC: 49 U/L (ref 12–78)
ANION GAP SERPL CALCULATED.3IONS-SCNC: 9 MMOL/L (ref 4–13)
AST SERPL W P-5'-P-CCNC: 63 U/L (ref 5–45)
BASOPHILS # BLD AUTO: 0.07 THOUSANDS/ΜL (ref 0–0.1)
BASOPHILS NFR BLD AUTO: 1 % (ref 0–1)
BILIRUB SERPL-MCNC: 0.5 MG/DL (ref 0.2–1)
BUN SERPL-MCNC: 16 MG/DL (ref 5–25)
CALCIUM SERPL-MCNC: 9.1 MG/DL (ref 8.3–10.1)
CHLORIDE SERPL-SCNC: 103 MMOL/L (ref 100–108)
CO2 SERPL-SCNC: 26 MMOL/L (ref 21–32)
CREAT SERPL-MCNC: 0.65 MG/DL (ref 0.6–1.3)
CRP SERPL QL: 14.2 MG/L
EOSINOPHIL # BLD AUTO: 0.35 THOUSAND/ΜL (ref 0–0.61)
EOSINOPHIL NFR BLD AUTO: 4 % (ref 0–6)
ERYTHROCYTE [DISTWIDTH] IN BLOOD BY AUTOMATED COUNT: 15.1 % (ref 11.6–15.1)
EST. AVERAGE GLUCOSE BLD GHB EST-MCNC: 143 MG/DL
GFR SERPL CREATININE-BSD FRML MDRD: 83 ML/MIN/1.73SQ M
GLUCOSE P FAST SERPL-MCNC: 127 MG/DL (ref 65–99)
HBA1C MFR BLD: 6.6 %
HCT VFR BLD AUTO: 43.5 % (ref 34.8–46.1)
HGB BLD-MCNC: 14 G/DL (ref 11.5–15.4)
IMM GRANULOCYTES # BLD AUTO: 0.03 THOUSAND/UL (ref 0–0.2)
IMM GRANULOCYTES NFR BLD AUTO: 0 % (ref 0–2)
LYMPHOCYTES # BLD AUTO: 1.85 THOUSANDS/ΜL (ref 0.6–4.47)
LYMPHOCYTES NFR BLD AUTO: 20 % (ref 14–44)
MCH RBC QN AUTO: 30.7 PG (ref 26.8–34.3)
MCHC RBC AUTO-ENTMCNC: 32.2 G/DL (ref 31.4–37.4)
MCV RBC AUTO: 95 FL (ref 82–98)
MONOCYTES # BLD AUTO: 0.54 THOUSAND/ΜL (ref 0.17–1.22)
MONOCYTES NFR BLD AUTO: 6 % (ref 4–12)
NEUTROPHILS # BLD AUTO: 6.61 THOUSANDS/ΜL (ref 1.85–7.62)
NEUTS SEG NFR BLD AUTO: 69 % (ref 43–75)
NRBC BLD AUTO-RTO: 0 /100 WBCS
PLATELET # BLD AUTO: 305 THOUSANDS/UL (ref 149–390)
PMV BLD AUTO: 10.5 FL (ref 8.9–12.7)
POTASSIUM SERPL-SCNC: 4.1 MMOL/L (ref 3.5–5.3)
PROT SERPL-MCNC: 7.3 G/DL (ref 6.4–8.2)
RBC # BLD AUTO: 4.56 MILLION/UL (ref 3.81–5.12)
SODIUM SERPL-SCNC: 138 MMOL/L (ref 136–145)
T4 FREE SERPL-MCNC: 0.98 NG/DL (ref 0.76–1.46)
TSH SERPL DL<=0.05 MIU/L-ACNC: 1.02 UIU/ML (ref 0.36–3.74)
WBC # BLD AUTO: 9.45 THOUSAND/UL (ref 4.31–10.16)

## 2020-07-30 PROCEDURE — 85025 COMPLETE CBC W/AUTO DIFF WBC: CPT | Performed by: INTERNAL MEDICINE

## 2020-07-30 PROCEDURE — 84439 ASSAY OF FREE THYROXINE: CPT

## 2020-07-30 PROCEDURE — 83036 HEMOGLOBIN GLYCOSYLATED A1C: CPT | Performed by: INTERNAL MEDICINE

## 2020-07-30 PROCEDURE — 80053 COMPREHEN METABOLIC PANEL: CPT | Performed by: INTERNAL MEDICINE

## 2020-07-30 PROCEDURE — 84443 ASSAY THYROID STIM HORMONE: CPT

## 2020-07-30 PROCEDURE — 36415 COLL VENOUS BLD VENIPUNCTURE: CPT | Performed by: INTERNAL MEDICINE

## 2020-07-30 PROCEDURE — 86140 C-REACTIVE PROTEIN: CPT

## 2020-08-04 DIAGNOSIS — R06.02 SHORTNESS OF BREATH: Primary | ICD-10-CM

## 2020-08-04 RX ORDER — PREDNISONE 1 MG/1
TABLET ORAL
Qty: 60 TABLET | Refills: 0 | Status: SHIPPED | OUTPATIENT
Start: 2020-08-04 | End: 2021-06-16

## 2020-08-04 NOTE — PROGRESS NOTES
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

## 2020-08-04 NOTE — PROGRESS NOTES
I spoke with patient regarding results of CRP  It is elevated to 14 2  Patient has agreed to have high-resolution CT of the chest   This will be done after or on August 18, 2020  In the interim she is going to take prednisone 10 mg for 15 days and then 5 mg daily thereafter  Additionally a repeat CRP will be drawn in 1 month  I will contact patient after results of high-resolution CT  She has been feeling more short of breath

## 2020-08-20 ENCOUNTER — APPOINTMENT (OUTPATIENT)
Dept: LAB | Facility: HOSPITAL | Age: 80
End: 2020-08-20
Payer: MEDICARE

## 2020-08-20 ENCOUNTER — TRANSCRIBE ORDERS (OUTPATIENT)
Dept: ADMINISTRATIVE | Facility: HOSPITAL | Age: 80
End: 2020-08-20

## 2020-08-20 ENCOUNTER — HOSPITAL ENCOUNTER (OUTPATIENT)
Dept: RADIOLOGY | Facility: HOSPITAL | Age: 80
Discharge: HOME | End: 2020-08-20
Payer: MEDICARE

## 2020-08-20 DIAGNOSIS — R06.02 SHORTNESS OF BREATH: ICD-10-CM

## 2020-08-20 LAB
AMORPH URATE CRY URNS QL MICRO: ABNORMAL /HPF
BACTERIA UR QL AUTO: ABNORMAL /HPF
BILIRUB UR QL STRIP: NEGATIVE
CLARITY UR: CLEAR
COLOR UR: ABNORMAL
CRP SERPL QL: 5 MG/L
GLUCOSE UR STRIP-MCNC: NEGATIVE MG/DL
HGB UR QL STRIP.AUTO: NEGATIVE
HYALINE CASTS #/AREA URNS LPF: ABNORMAL /LPF
KETONES UR STRIP-MCNC: NEGATIVE MG/DL
LEUKOCYTE ESTERASE UR QL STRIP: ABNORMAL
MUCOUS THREADS UR QL AUTO: ABNORMAL
NITRITE UR QL STRIP: NEGATIVE
NON-SQ EPI CELLS URNS QL MICRO: ABNORMAL /HPF
PH UR STRIP.AUTO: 6 [PH]
PROT UR STRIP-MCNC: ABNORMAL MG/DL
RBC #/AREA URNS AUTO: ABNORMAL /HPF
SP GR UR STRIP.AUTO: 1.02 (ref 1–1.03)
UROBILINOGEN UR QL STRIP.AUTO: 0.2 E.U./DL
WBC #/AREA URNS AUTO: ABNORMAL /HPF

## 2020-08-20 PROCEDURE — G1004 CDSM NDSC: HCPCS

## 2020-08-20 PROCEDURE — 71250 CT THORAX DX C-: CPT

## 2020-08-20 PROCEDURE — 81001 URINALYSIS AUTO W/SCOPE: CPT | Performed by: INTERNAL MEDICINE

## 2020-08-20 PROCEDURE — 87086 URINE CULTURE/COLONY COUNT: CPT

## 2020-08-20 PROCEDURE — 86140 C-REACTIVE PROTEIN: CPT

## 2020-08-20 PROCEDURE — 36415 COLL VENOUS BLD VENIPUNCTURE: CPT

## 2020-08-22 LAB — BACTERIA UR CULT: NORMAL

## 2020-09-30 ENCOUNTER — TRANSCRIBE ORDERS (OUTPATIENT)
Dept: ADMINISTRATIVE | Facility: HOSPITAL | Age: 80
End: 2020-09-30

## 2020-09-30 DIAGNOSIS — M81.0 AGE-RELATED OSTEOPOROSIS WITHOUT CURRENT PATHOLOGICAL FRACTURE: ICD-10-CM

## 2020-09-30 DIAGNOSIS — R14.0 BLOATING SYMPTOM: Primary | ICD-10-CM

## 2020-10-28 ENCOUNTER — HOSPITAL ENCOUNTER (OUTPATIENT)
Dept: RADIOLOGY | Facility: HOSPITAL | Age: 80
Discharge: HOME | End: 2020-10-28
Attending: INTERNAL MEDICINE
Payer: MEDICARE

## 2020-10-28 DIAGNOSIS — M81.0 AGE-RELATED OSTEOPOROSIS WITHOUT CURRENT PATHOLOGICAL FRACTURE: ICD-10-CM

## 2020-10-28 DIAGNOSIS — R14.0 BLOATING SYMPTOM: ICD-10-CM

## 2020-10-28 PROCEDURE — 77080 DXA BONE DENSITY AXIAL: CPT

## 2020-10-28 PROCEDURE — 76700 US EXAM ABDOM COMPLETE: CPT

## 2020-12-29 ENCOUNTER — LAB (OUTPATIENT)
Dept: LAB | Facility: HOSPITAL | Age: 80
End: 2020-12-29
Attending: INTERNAL MEDICINE
Payer: MEDICARE

## 2020-12-29 ENCOUNTER — TRANSCRIBE ORDERS (OUTPATIENT)
Dept: ADMINISTRATIVE | Facility: HOSPITAL | Age: 80
End: 2020-12-29

## 2020-12-29 DIAGNOSIS — D64.9 ANEMIA, UNSPECIFIED TYPE: ICD-10-CM

## 2020-12-29 DIAGNOSIS — E78.00 PURE HYPERCHOLESTEROLEMIA: ICD-10-CM

## 2020-12-29 DIAGNOSIS — I10 ESSENTIAL HYPERTENSION, MALIGNANT: ICD-10-CM

## 2020-12-29 DIAGNOSIS — D64.9 ANEMIA, UNSPECIFIED TYPE: Primary | ICD-10-CM

## 2020-12-29 DIAGNOSIS — M10.9 GOUT, UNSPECIFIED CAUSE, UNSPECIFIED CHRONICITY, UNSPECIFIED SITE: ICD-10-CM

## 2020-12-29 DIAGNOSIS — N39.0 URINARY TRACT INFECTION WITHOUT HEMATURIA, SITE UNSPECIFIED: ICD-10-CM

## 2020-12-29 DIAGNOSIS — R94.5 NONSPECIFIC ABNORMAL RESULTS OF LIVER FUNCTION STUDY: ICD-10-CM

## 2020-12-29 DIAGNOSIS — E11.9 DIABETES MELLITUS WITHOUT COMPLICATION (HCC): ICD-10-CM

## 2020-12-29 DIAGNOSIS — M06.9 RHEUMATOID ARTHRITIS, INVOLVING UNSPECIFIED SITE, UNSPECIFIED WHETHER RHEUMATOID FACTOR PRESENT (HCC): ICD-10-CM

## 2020-12-29 DIAGNOSIS — E03.9 HYPOTHYROIDISM, UNSPECIFIED TYPE: ICD-10-CM

## 2020-12-29 LAB
ALBUMIN SERPL BCP-MCNC: 3.5 G/DL (ref 3.5–5)
ALP SERPL-CCNC: 93 U/L (ref 46–116)
ALT SERPL W P-5'-P-CCNC: 52 U/L (ref 12–78)
ANION GAP SERPL CALCULATED.3IONS-SCNC: 9 MMOL/L (ref 4–13)
AST SERPL W P-5'-P-CCNC: 48 U/L (ref 5–45)
BACTERIA UR QL AUTO: ABNORMAL /HPF
BASOPHILS # BLD AUTO: 0.08 THOUSANDS/ΜL (ref 0–0.1)
BASOPHILS NFR BLD AUTO: 1 % (ref 0–1)
BILIRUB DIRECT SERPL-MCNC: 0.13 MG/DL (ref 0–0.2)
BILIRUB SERPL-MCNC: 0.5 MG/DL (ref 0.2–1)
BILIRUB UR QL STRIP: NEGATIVE
BUN SERPL-MCNC: 16 MG/DL (ref 5–25)
CALCIUM SERPL-MCNC: 9.6 MG/DL (ref 8.3–10.1)
CHLORIDE SERPL-SCNC: 102 MMOL/L (ref 100–108)
CHOLEST SERPL-MCNC: 159 MG/DL (ref 50–200)
CLARITY UR: CLEAR
CO2 SERPL-SCNC: 29 MMOL/L (ref 21–32)
COLOR UR: YELLOW
CREAT SERPL-MCNC: 0.71 MG/DL (ref 0.6–1.3)
CRP SERPL QL: 9.3 MG/L
EOSINOPHIL # BLD AUTO: 0.47 THOUSAND/ΜL (ref 0–0.61)
EOSINOPHIL NFR BLD AUTO: 4 % (ref 0–6)
ERYTHROCYTE [DISTWIDTH] IN BLOOD BY AUTOMATED COUNT: 14 % (ref 11.6–15.1)
ERYTHROCYTE [SEDIMENTATION RATE] IN BLOOD: 27 MM/HOUR (ref 0–29)
EST. AVERAGE GLUCOSE BLD GHB EST-MCNC: 140 MG/DL
GFR SERPL CREATININE-BSD FRML MDRD: 80 ML/MIN/1.73SQ M
GLUCOSE P FAST SERPL-MCNC: 126 MG/DL (ref 65–99)
GLUCOSE UR STRIP-MCNC: NEGATIVE MG/DL
HBA1C MFR BLD: 6.5 %
HBV SURFACE AB SER-ACNC: 3.16 MIU/ML
HBV SURFACE AG SER QL: NORMAL
HCT VFR BLD AUTO: 44.2 % (ref 34.8–46.1)
HCV AB SER QL: NORMAL
HDLC SERPL-MCNC: 39 MG/DL
HGB BLD-MCNC: 13.7 G/DL (ref 11.5–15.4)
HGB UR QL STRIP.AUTO: NEGATIVE
IMM GRANULOCYTES # BLD AUTO: 0.06 THOUSAND/UL (ref 0–0.2)
IMM GRANULOCYTES NFR BLD AUTO: 1 % (ref 0–2)
KETONES UR STRIP-MCNC: NEGATIVE MG/DL
LDLC SERPL CALC-MCNC: 86 MG/DL (ref 0–100)
LEUKOCYTE ESTERASE UR QL STRIP: ABNORMAL
LYMPHOCYTES # BLD AUTO: 2.47 THOUSANDS/ΜL (ref 0.6–4.47)
LYMPHOCYTES NFR BLD AUTO: 22 % (ref 14–44)
MCH RBC QN AUTO: 30.6 PG (ref 26.8–34.3)
MCHC RBC AUTO-ENTMCNC: 31 G/DL (ref 31.4–37.4)
MCV RBC AUTO: 99 FL (ref 82–98)
MONOCYTES # BLD AUTO: 0.76 THOUSAND/ΜL (ref 0.17–1.22)
MONOCYTES NFR BLD AUTO: 7 % (ref 4–12)
NEUTROPHILS # BLD AUTO: 7.36 THOUSANDS/ΜL (ref 1.85–7.62)
NEUTS SEG NFR BLD AUTO: 65 % (ref 43–75)
NITRITE UR QL STRIP: NEGATIVE
NON-SQ EPI CELLS URNS QL MICRO: ABNORMAL /HPF
NONHDLC SERPL-MCNC: 120 MG/DL
NRBC BLD AUTO-RTO: 0 /100 WBCS
PH UR STRIP.AUTO: 6.5 [PH]
PLATELET # BLD AUTO: 288 THOUSANDS/UL (ref 149–390)
PMV BLD AUTO: 10.7 FL (ref 8.9–12.7)
POTASSIUM SERPL-SCNC: 4 MMOL/L (ref 3.5–5.3)
PROT SERPL-MCNC: 7.3 G/DL (ref 6.4–8.2)
PROT UR STRIP-MCNC: NEGATIVE MG/DL
RBC # BLD AUTO: 4.48 MILLION/UL (ref 3.81–5.12)
RBC #/AREA URNS AUTO: ABNORMAL /HPF
SODIUM SERPL-SCNC: 140 MMOL/L (ref 136–145)
SP GR UR STRIP.AUTO: 1.02 (ref 1–1.03)
T4 FREE SERPL-MCNC: 0.97 NG/DL (ref 0.76–1.46)
TRIGL SERPL-MCNC: 172 MG/DL
TSH SERPL DL<=0.05 MIU/L-ACNC: 2.08 UIU/ML (ref 0.36–3.74)
UROBILINOGEN UR QL STRIP.AUTO: 0.2 E.U./DL
WBC # BLD AUTO: 11.2 THOUSAND/UL (ref 4.31–10.16)
WBC #/AREA URNS AUTO: ABNORMAL /HPF

## 2020-12-29 PROCEDURE — 82248 BILIRUBIN DIRECT: CPT

## 2020-12-29 PROCEDURE — 84443 ASSAY THYROID STIM HORMONE: CPT

## 2020-12-29 PROCEDURE — 86803 HEPATITIS C AB TEST: CPT

## 2020-12-29 PROCEDURE — 84439 ASSAY OF FREE THYROXINE: CPT

## 2020-12-29 PROCEDURE — 80061 LIPID PANEL: CPT | Performed by: INTERNAL MEDICINE

## 2020-12-29 PROCEDURE — 86706 HEP B SURFACE ANTIBODY: CPT

## 2020-12-29 PROCEDURE — 85652 RBC SED RATE AUTOMATED: CPT

## 2020-12-29 PROCEDURE — 85025 COMPLETE CBC W/AUTO DIFF WBC: CPT | Performed by: INTERNAL MEDICINE

## 2020-12-29 PROCEDURE — 87340 HEPATITIS B SURFACE AG IA: CPT

## 2020-12-29 PROCEDURE — 86140 C-REACTIVE PROTEIN: CPT

## 2020-12-29 PROCEDURE — 80053 COMPREHEN METABOLIC PANEL: CPT | Performed by: INTERNAL MEDICINE

## 2020-12-29 PROCEDURE — 83036 HEMOGLOBIN GLYCOSYLATED A1C: CPT | Performed by: INTERNAL MEDICINE

## 2020-12-29 PROCEDURE — 81001 URINALYSIS AUTO W/SCOPE: CPT | Performed by: INTERNAL MEDICINE

## 2020-12-29 PROCEDURE — 36415 COLL VENOUS BLD VENIPUNCTURE: CPT | Performed by: INTERNAL MEDICINE

## 2020-12-29 PROCEDURE — 87086 URINE CULTURE/COLONY COUNT: CPT

## 2020-12-30 LAB — BACTERIA UR CULT: NORMAL

## 2021-01-28 DIAGNOSIS — Z23 ENCOUNTER FOR IMMUNIZATION: ICD-10-CM

## 2021-03-02 ENCOUNTER — IMMUNIZATIONS (OUTPATIENT)
Dept: FAMILY MEDICINE CLINIC | Facility: HOSPITAL | Age: 81
End: 2021-03-02

## 2021-03-02 DIAGNOSIS — Z23 ENCOUNTER FOR IMMUNIZATION: Primary | ICD-10-CM

## 2021-03-02 PROCEDURE — 0011A SARS-COV-2 / COVID-19 MRNA VACCINE (MODERNA) 100 MCG: CPT

## 2021-03-02 PROCEDURE — 91301 SARS-COV-2 / COVID-19 MRNA VACCINE (MODERNA) 100 MCG: CPT

## 2021-03-30 ENCOUNTER — IMMUNIZATIONS (OUTPATIENT)
Dept: FAMILY MEDICINE CLINIC | Facility: HOSPITAL | Age: 81
End: 2021-03-30

## 2021-03-30 DIAGNOSIS — Z23 ENCOUNTER FOR IMMUNIZATION: Primary | ICD-10-CM

## 2021-03-30 PROCEDURE — 91301 SARS-COV-2 / COVID-19 MRNA VACCINE (MODERNA) 100 MCG: CPT

## 2021-03-30 PROCEDURE — 0012A SARS-COV-2 / COVID-19 MRNA VACCINE (MODERNA) 100 MCG: CPT

## 2021-06-16 ENCOUNTER — OFFICE VISIT (OUTPATIENT)
Dept: PULMONOLOGY | Facility: MEDICAL CENTER | Age: 81
End: 2021-06-16
Payer: MEDICARE

## 2021-06-16 ENCOUNTER — APPOINTMENT (OUTPATIENT)
Dept: LAB | Facility: HOSPITAL | Age: 81
End: 2021-06-16
Attending: INTERNAL MEDICINE
Payer: MEDICARE

## 2021-06-16 VITALS
HEART RATE: 78 BPM | HEIGHT: 68 IN | RESPIRATION RATE: 20 BRPM | BODY MASS INDEX: 29.86 KG/M2 | WEIGHT: 197 LBS | DIASTOLIC BLOOD PRESSURE: 80 MMHG | SYSTOLIC BLOOD PRESSURE: 150 MMHG | OXYGEN SATURATION: 94 % | TEMPERATURE: 98.7 F

## 2021-06-16 DIAGNOSIS — R06.02 SHORTNESS OF BREATH: ICD-10-CM

## 2021-06-16 DIAGNOSIS — R09.02 HYPOXEMIA: ICD-10-CM

## 2021-06-16 DIAGNOSIS — J30.1 SEASONAL ALLERGIC RHINITIS DUE TO POLLEN: ICD-10-CM

## 2021-06-16 DIAGNOSIS — J96.11 CHRONIC HYPOXEMIC RESPIRATORY FAILURE (HCC): ICD-10-CM

## 2021-06-16 DIAGNOSIS — R06.02 SOB (SHORTNESS OF BREATH): Primary | ICD-10-CM

## 2021-06-16 LAB
ANION GAP SERPL CALCULATED.3IONS-SCNC: 9 MMOL/L (ref 4–13)
BUN SERPL-MCNC: 20 MG/DL (ref 5–25)
CALCIUM SERPL-MCNC: 10.4 MG/DL (ref 8.3–10.1)
CHLORIDE SERPL-SCNC: 106 MMOL/L (ref 100–108)
CO2 SERPL-SCNC: 31 MMOL/L (ref 21–32)
CREAT SERPL-MCNC: 0.78 MG/DL (ref 0.6–1.3)
GFR SERPL CREATININE-BSD FRML MDRD: 71 ML/MIN/1.73SQ M
GLUCOSE P FAST SERPL-MCNC: 131 MG/DL (ref 65–99)
POTASSIUM SERPL-SCNC: 4.3 MMOL/L (ref 3.5–5.3)
SODIUM SERPL-SCNC: 146 MMOL/L (ref 136–145)

## 2021-06-16 PROCEDURE — 80048 BASIC METABOLIC PNL TOTAL CA: CPT

## 2021-06-16 PROCEDURE — 36415 COLL VENOUS BLD VENIPUNCTURE: CPT

## 2021-06-16 PROCEDURE — 94618 PULMONARY STRESS TESTING: CPT | Performed by: INTERNAL MEDICINE

## 2021-06-16 PROCEDURE — 99214 OFFICE O/P EST MOD 30 MIN: CPT | Performed by: INTERNAL MEDICINE

## 2021-06-16 NOTE — PROGRESS NOTES
Assessment/Plan        Problem List Items Addressed This Visit        Respiratory    Chronic hypoxemic respiratory failure ([facility] [address] )       Ray Iraheta  does have oxygen desaturation with activity  Walking she saturates does low as 84%  Oximetry testing as below and she improved with use of 2 L of oxygen with ambulation as described below  I will order home oxygen and she use 2 liters/minutes with activity and at bedtime  No evidence of any pulmonary hypertension   By echocardiogram done 2020  Pulse oximetry testing:    Room air O2 saturation at rest was 93%  And on room air with ambulating 150 feet lowest O2 saturation was 84%    On 2 l/m nc  Oxygen at rest O2 saturation was 98% and on 2 l/m NC ambulating 150 feet O2 sat was 92%      She did have a CT scan of the chest done in August 2020 which did not show any distinct interstitial lung disease  I did order oxygen through [facility]         Relevant Orders    Home Oxygen with Portability    Seasonal allergic rhinitis due to pollen       Other    Shortness of breath - Primary      She has shortness of breath with minimal activity  Prior PFT in August 2018 showed total lung capacity moderate decreased at 58% of predicted  I did order CTA PE study of chest to evaluate for any possibility of new interstitial lung disease or other process that could be causing her shortness of breath including pulmonary embolism  There was no airflow obstruction noted on her prior PFT  she does have oxygen desaturation with ambulation of unclear etiology           Relevant Orders    Basic metabolic panel (Completed)    CTA chest pe study (Completed)    Hypoxemia    Relevant Orders    CTA chest pe study (Completed)            Follow-up and Shortness of Breath      HPI     Ray Iraheta does have history of hypoxemia and previous CT of chest done in February of last year showed evidence of mosaic pattern in both lung fields possibly due to 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  A follow-up have  CT of chest was done 2020 which did not show any interstitial lung disease and prior mosaic attenuation pattern was no longer present had resolved  She did have nodular surface contour liver possibly secondary cirrhosis  She had oxygen in the past but does not have it anymore  Alberto Ballard does get short of breath walking  feet  No chest pain  No wheezing or cough  She had oxygen for short period time last year but this was  returned to the DME in 2020  Echocardiogram done in February of last year showed LV systolic function be normal with estimated PA pressure of 33 millimeters Hg which is normal   No significant valvular heart disease  She quit smoking in 1990 and smoked 3/4 packs of cigarettes per day for 44 years     She has history DVT during 1 of her pregnancies  This occurred after the delivery  That she states after she had open cholecystectomy many years ago she had a DVT not long afterwards  No history of PE at that time  She does take zyrtec for allergic rhinitis  She quit smoking 19 years ago and smoked a pack per day for 46 years      Past Medical History:   Diagnosis Date    Blind left eye     Deaf, left     Disease of thyroid gland     DVT complicating pregnancy, unspecified trimester ([facility] [address] ) postpartum    Hearing loss     LEFT EAR    History of shingles     fACIAL     Lyme disease     Meniere's disease     Orthostatic hypotension     Sinus congestion        Past Surgical History:   Procedure Laterality Date    APPENDECTOMY      BREAST BIOPSY Left 2010     SECTION      HAND SURGERY Left     HERNIA REPAIR      HYSTERECTOMY      ROTATOR CUFF REPAIR Left     TONSILLECTOMY           Current Outpatient Medications:     albuterol (PROAIR HFA) 90 mcg/act inhaler, Inhale 2 puffs every 6 (six) hours as needed for wheezing, Disp: 8 5 g, Rfl: 0    Alcaftadine (LASTACAFT OP), Apply 1 drop to eye as needed , Disp: , Rfl:     amLODIPine (NORVASC) 2 5 mg tablet, Take 1 tablet (2 5 mg total) by mouth every evening, Disp: 30 tablet, Rfl: 0    atorvastatin (LIPITOR) 80 mg tablet, Take 80 mg by mouth daily, Disp: , Rfl: 2    BYSTOLIC 5 MG tablet, Take 5 mg by mouth every evening, Disp: , Rfl: 2    Calcium Carbonate-Vit D-Min (CALCIUM 1200 PO), Take 1,200 mg by mouth daily, Disp: , Rfl:     cetirizine (ZyrTEC) 10 mg tablet, Take 10 mg by mouth daily, Disp: , Rfl:     Cholecalciferol (D3-1000 PO), Take 1,000 Units by mouth daily, Disp: , Rfl:     clobetasol (TEMOVATE) 0 05 % cream, Apply 1 application topically as needed To affected area, Disp: , Rfl:     clopidogrel (PLAVIX) 75 mg tablet, Take 1 tablet (75 mg total) by mouth daily, Disp: 30 tablet, Rfl: 0    DULoxetine (CYMBALTA) 60 mg delayed release capsule, Take 60 mg by mouth daily, Disp: , Rfl: 0    levothyroxine 100 mcg tablet, Take 100 mcg by mouth daily, Disp: , Rfl: 1    mometasone (NASONEX) 50 mcg/act nasal spray, 2 sprays into each nostril daily, Disp: , Rfl:     pantoprazole (PROTONIX) 40 mg tablet, 40 mg 3 (three) times a week, Disp: , Rfl:     primidone (MYSOLINE) 50 mg tablet, Take 50 mg by mouth daily at bedtime, Disp: , Rfl:     potassium chloride (K-DUR,KLOR-CON) 20 mEq tablet, Take 20 mEq by mouth once a week , Disp: , Rfl: 1    PREVIDENT 5000 BOOSTER PLUS 1 1 % PSTE, USE UTD, Disp: , Rfl:     Allergies   Allergen Reactions    Penicillins Swelling       Social History     Tobacco Use    Smoking status: Former Smoker     Packs/day: 0 75     Years: 44 00     Pack years: 33 00     Quit date: 1990     Years since quittin 4    Smokeless tobacco: Never Used   Substance Use Topics    Alcohol use: Never         Family History   Problem Relation Age of Onset    Breast cancer Sister 76    Ovarian cancer Daughter 48    BRCA1 Negative Daughter     BRCA2 Negative Daughter        Review of Systems   Constitutional: Negative for chills, fever and unexpected weight change  HENT: Negative for congestion, rhinorrhea and sore throat  Eyes: Negative for discharge and redness  Respiratory: Positive for shortness of breath  Negative for cough  Cardiovascular: Negative for chest pain, palpitations and leg swelling  Gastrointestinal: Negative for abdominal distention, abdominal pain and nausea  Endocrine: Negative for polydipsia and polyphagia  Genitourinary: Negative for dysuria  Musculoskeletal: Negative for joint swelling and myalgias  Skin: Negative for rash  Neurological: Negative for light-headedness  Psychiatric/Behavioral: Negative for decreased concentration  Vitals:    06/16/21 1347   BP: 150/80   Pulse: 78   Resp: 20   Temp: 98 7 °F (37 1 °C)   SpO2: 94%     Pulse oximetry testing:    Room air O2 saturation at rest was 93%  And on room air with ambulating 150 feet lowest O2 saturation was 84%    On 2 l/m nc  Oxygen at rest O2 saturation was 98% and on 2 l/m NC ambulating 150 feet O2 sat was 92%      Physical Exam  Constitutional:       General: She is not in acute distress  Appearance: She is well-developed  HENT:      Head: Normocephalic  Nose: Nose normal       Mouth/Throat:      Pharynx: No oropharyngeal exudate  Eyes:      Conjunctiva/sclera: Conjunctivae normal       Pupils: Pupils are equal, round, and reactive to light  Neck:      Vascular: No JVD  Cardiovascular:      Rate and Rhythm: Normal rate and regular rhythm  Heart sounds: Normal heart sounds  Pulmonary:      Effort: Pulmonary effort is normal       Comments: Lung sounds are clear  No wheezes crackles or rhonchi  Abdominal:      General: There is no distension  Palpations: Abdomen is soft  Tenderness: There is no abdominal tenderness  Musculoskeletal:      Cervical back: Neck supple  Comments: Trace edema lower extremities    There is some slight bluish discoloration of toes of both feet due to venous insufficiency   Skin:     General: Skin is warm and dry  Neurological:      Mental Status: She is alert and oriented to person, place, and time     Psychiatric:         Mood and Affect: Mood normal          Behavior: Behavior normal

## 2021-06-16 NOTE — PATIENT INSTRUCTIONS
schedule CT scan of chest   I will call you with results    Get blood work today to check kidney numbers    I will arrange for oxygen again for through [address]   Would use 2 liters/minute when you sleep and with activity

## 2021-06-24 ENCOUNTER — HOSPITAL ENCOUNTER (OUTPATIENT)
Dept: RADIOLOGY | Facility: HOSPITAL | Age: 81
Discharge: HOME | End: 2021-06-24
Attending: INTERNAL MEDICINE
Payer: MEDICARE

## 2021-06-24 ENCOUNTER — TELEPHONE (OUTPATIENT)
Dept: PULMONOLOGY | Facility: CLINIC | Age: 81
End: 2021-06-24

## 2021-06-24 DIAGNOSIS — R06.02 SHORTNESS OF BREATH: ICD-10-CM

## 2021-06-24 DIAGNOSIS — R09.02 HYPOXEMIA: ICD-10-CM

## 2021-06-24 PROCEDURE — G1004 CDSM NDSC: HCPCS

## 2021-06-24 PROCEDURE — 71275 CT ANGIOGRAPHY CHEST: CPT

## 2021-06-24 RX ADMIN — IOHEXOL 85 ML: 350 INJECTION, SOLUTION INTRAVENOUS at 14:09

## 2021-06-24 NOTE — TELEPHONE ENCOUNTER
I called patient's phone and left message for her to call our office tomorrow  to review review results of CT scan of chest   No answer on her phone

## 2021-06-26 PROBLEM — R09.02 HYPOXEMIA: Status: ACTIVE | Noted: 2021-06-26

## 2021-06-26 PROBLEM — J30.1 SEASONAL ALLERGIC RHINITIS DUE TO POLLEN: Status: ACTIVE | Noted: 2021-06-26

## 2021-06-26 NOTE — ASSESSMENT & PLAN NOTE
She has shortness of breath with minimal activity  Prior PFT in August 2018 showed total lung capacity moderate decreased at 58% of predicted  I did order CTA PE study of chest to evaluate for any possibility of new interstitial lung disease or other process that could be causing her shortness of breath including pulmonary embolism  There was no airflow obstruction noted on her prior PFT  she does have oxygen desaturation with ambulation of unclear etiology

## 2021-06-26 NOTE — ASSESSMENT & PLAN NOTE
Abhijeet Lopez  does have oxygen desaturation with activity  Walking she saturates does low as 84%  Oximetry testing as below and she improved with use of 2 L of oxygen with ambulation as described below  I will order home oxygen and she use 2 liters/minutes with activity and at bedtime  No evidence of any pulmonary hypertension   By echocardiogram done 2020  Pulse oximetry testing:    Room air O2 saturation at rest was 93%  And on room air with ambulating 150 feet lowest O2 saturation was 84%    On 2 l/m nc  Oxygen at rest O2 saturation was 98% and on 2 l/m NC ambulating 150 feet O2 sat was 92%      She did have a CT scan of the chest done in August 2020 which did not show any distinct interstitial lung disease        I did order oxygen through [facility]

## 2021-06-30 DIAGNOSIS — E04.1 THYROID NODULE: Primary | ICD-10-CM

## 2021-06-30 NOTE — PROGRESS NOTES
I spoke with patient regarding CT scan of chest    There is a 2 9 x 2 6 cm right thyroid nodule that is slightly larger than last Ct in August 2020 when it was 2 5 x 2 5 cm in size    I will refer her to endocrinologist   She was agreeable to this    Patient would likereport and Dr Hazel Reyes office phone # emailed to her at [facility] com

## 2021-08-12 ENCOUNTER — OFFICE VISIT (OUTPATIENT)
Dept: PULMONOLOGY | Facility: MEDICAL CENTER | Age: 81
End: 2021-08-12
Payer: MEDICARE

## 2021-08-12 VITALS
DIASTOLIC BLOOD PRESSURE: 74 MMHG | HEIGHT: 68 IN | TEMPERATURE: 96.8 F | SYSTOLIC BLOOD PRESSURE: 150 MMHG | BODY MASS INDEX: 29.4 KG/M2 | RESPIRATION RATE: 12 BRPM | OXYGEN SATURATION: 90 % | WEIGHT: 194 LBS | HEART RATE: 103 BPM

## 2021-08-12 DIAGNOSIS — R00.2 PALPITATIONS: ICD-10-CM

## 2021-08-12 DIAGNOSIS — R06.00 DYSPNEA ON EXERTION: ICD-10-CM

## 2021-08-12 DIAGNOSIS — J96.11 CHRONIC HYPOXEMIC RESPIRATORY FAILURE (HCC): Primary | ICD-10-CM

## 2021-08-12 PROCEDURE — 99214 OFFICE O/P EST MOD 30 MIN: CPT | Performed by: PHYSICIAN ASSISTANT

## 2021-08-12 NOTE — PATIENT INSTRUCTIONS
Plan for today/next follow-up:    Chronic Hypoxemic Respiratory Failure:  -chronically uses 2 L sporadically / mostly at night  -perform night time pulse oxygen screening     Hx of Restrictive Lung disease:  -non specific  -historically does not appear ILD induced    Heart Palpitiations:  -possibly d/t nocturnal hypoxemia   -obtain overnight sleep screening  -referral to cardiology for ongoing palpitations / may need holter evaluation    Severely Reduced Diffusion Capacity:  -recheck complete PFT for monitoring for ongoing pulmonary process  -Call central scheduling at [phone] to schedule your follow up test     Historical Poor Sleep:  -no history of sleep apnea  -would consider sleep study if overnight oxygen study non diagnostic    Follow up 1 month

## 2021-08-12 NOTE — PROGRESS NOTES
Assessment/Plan:    Problem List Items Addressed This Visit        Respiratory    Chronic hypoxemic respiratory failure (HCC) - Primary    Relevant Orders    Pulse oximetry overnight      Other Visit Diagnoses     Palpitations        Relevant Orders    Ambulatory referral to Cardiology    Dyspnea on exertion        Relevant Orders    Complete PFT with post Bronchodilator and Six Minute walk            Plan for today/next follow-up:    Chronic Hypoxemic Respiratory Failure:  -chronically uses 2 L sporadically / mostly at night  -perform night time pulse oxygen screening     Hx of Restrictive Lung disease:  -non specific  -historically does not appear ILD induced    Heart Palpitiations:  -possibly d/t nocturnal hypoxemia   -obtain overnight sleep screening  -referral to cardiology for ongoing palpitations / may need holter evaluation    Severely Reduced Diffusion Capacity:  -recheck complete PFT for monitoring for ongoing pulmonary process  -Call central scheduling at [phone] to schedule your follow up test     Historical Poor Sleep:  -no history of sleep apnea  -would consider sleep study if overnight oxygen study non diagnostic    Follow up 1 month      No follow-ups on file  All questions are answered to the patient's satisfaction and understanding  She verbalizes understanding  She is encouraged to call with any further questions or concerns  ______________________________________________________________________    Chief Complaint: No chief complaint on file  Patient ID: Aris Rosenberg is a 78 y o  y o  female has a past medical history of Blind left eye, Deaf, left, Disease of thyroid gland, DVT complicating pregnancy, unspecified trimester ([address] ) (postpartum), Hearing loss, History of shingles (2007), Lyme disease, Meniere's disease, Orthostatic hypotension, and Sinus congestion  8/12/2021  Patient presents today for follow-up visit for:  Pounding in chest at night time      Chhaya Cox  does have oxygen desaturation with activity  Walking she saturates does low as 84% as per prior visits  Intermittently uses oxygen  No prior PAH per echo  Prior has been on 2 L w/ exertion intermittently and HS  Some nights wakes and 02 is off  Frequently w/ chest pounding at night time  Discussed possibly nocturnal hypoxemia  As well discussed cardiology consult / referral   Discussed referral to cardiolgy for holter  May further need sleep study  Discussed needing further f/u in future  Given ongoing SOB will puruse PFT  No historical ILD on CT or HRCT  Echo Reviewed: 2/2020:  EF 55-60 / G1DD / PAP 33  PFT:  Venda Peto 78 y o  MILAGRO:3/4/2087 QRE:[id number]     PULMONARY FUNCTION TEST     Indication:  Shortness of breath     Requesting provider:  Lupe Cano     Results:  Patient gave good effort and cooperation  Results of this test appear to be valid despite not all testing meeting ATS standards for reproducibility  Baseline oxygen saturation was 93% on room air with a heart rate of 82      Spirometry:  FEV1/FVC Ratio is 84  FEV1 is 1 96L which is 90% predicted  FVC is 2 34L which is 81% predicted        Flow volume loop:  Normal     Lung volumes: Total lung capacity is 3 33L which is 58% predicted  Residual volume is 37% predicted      Diffusing capacity:  39% predicted     IMPRESSION:  Results of the test may be inaccurate as the patient was unable to complete the testing according to ATS standards     1  Normal spirometry  2  Total lung capacity suggesting restrictive lung disease without air trapping  3  Severe diffusion impairment   PSG:      Review of Systems   Constitutional: Negative for activity change, appetite change, chills, fatigue and fever  HENT: Negative for postnasal drip, rhinorrhea, sinus pressure and sinus pain  Eyes: Negative for visual disturbance  Respiratory: Positive for shortness of breath  Negative for apnea, cough, chest tightness, wheezing and stridor  Cardiovascular: Positive for palpitations  Negative for chest pain and leg swelling  Gastrointestinal: Negative for diarrhea, nausea and vomiting  Endocrine: Negative for cold intolerance and heat intolerance  Musculoskeletal: Negative for arthralgias, back pain, joint swelling and myalgias  Skin: Negative for color change  Neurological: Negative for syncope and light-headedness  Hematological: Negative for adenopathy  Psychiatric/Behavioral: Negative for confusion and sleep disturbance  The following portions of the patient's history were reviewed and updated as appropriate: allergies, current medications, past family history, past medical history, past social history, past surgical history and problem list     Smoking history: She reports that she quit smoking about 31 years ago  She has a 33 00 pack-year smoking history  She has never used smokeless tobacco   Social history: She reports that she quit smoking about 31 years ago  She has a 33 00 pack-year smoking history  She has never used smokeless tobacco  She reports that she does not drink alcohol and does not use drugs    Past Medical History:   Diagnosis Date    Blind left eye     Deaf, left     Disease of thyroid gland     DVT complicating pregnancy, unspecified trimester ([address] ) postpartum    Hearing loss     LEFT EAR    History of shingles 2007    fACIAL     Lyme disease     Meniere's disease     Orthostatic hypotension     Sinus congestion      Past Surgical History:   Procedure Laterality Date    APPENDECTOMY      BREAST BIOPSY Left 2010     SECTION      HAND SURGERY Left     HERNIA REPAIR      HYSTERECTOMY      ROTATOR CUFF REPAIR Left     TONSILLECTOMY       Family History   Problem Relation Age of Onset    Breast cancer Sister 76    Ovarian cancer Daughter 48    BRCA1 Negative Daughter     BRCA2 Negative Daughter      Immunization History   Administered Date(s) Administered    SARS-CoV-2 / COVID-19 mRNA IM (Moderna) 03/02/2021, 03/30/2021     Current Outpatient Medications   Medication Sig Dispense Refill    Alcaftadine (LASTACAFT OP) Apply 1 drop to eye as needed       amLODIPine (NORVASC) 2 5 mg tablet Take 1 tablet (2 5 mg total) by mouth every evening 30 tablet 0    atorvastatin (LIPITOR) 80 mg tablet Take 80 mg by mouth daily  2    BYSTOLIC 5 MG tablet Take 5 mg by mouth every evening  2    Calcium Carbonate-Vit D-Min (CALCIUM 1200 PO) Take 1,200 mg by mouth daily      cetirizine (ZyrTEC) 10 mg tablet Take 10 mg by mouth daily      Cholecalciferol (D3-1000 PO) Take 1,000 Units by mouth daily      clobetasol (TEMOVATE) 0 05 % cream Apply 1 application topically as needed To affected area      clopidogrel (PLAVIX) 75 mg tablet Take 1 tablet (75 mg total) by mouth daily 30 tablet 0    DULoxetine (CYMBALTA) 60 mg delayed release capsule Take 60 mg by mouth daily  0    levothyroxine 100 mcg tablet Take 100 mcg by mouth daily  1    mometasone (NASONEX) 50 mcg/act nasal spray 2 sprays into each nostril daily      pantoprazole (PROTONIX) 40 mg tablet 40 mg 3 (three) times a week      potassium chloride (K-DUR,KLOR-CON) 20 mEq tablet Take 20 mEq by mouth once a week   1    PREVIDENT 5000 BOOSTER PLUS 1 1 % PSTE USE UTD      primidone (MYSOLINE) 50 mg tablet Take 50 mg by mouth daily at bedtime      albuterol (PROAIR HFA) 90 mcg/act inhaler Inhale 2 puffs every 6 (six) hours as needed for wheezing (Patient not taking: Reported on 8/12/2021) 8 5 g 0     No current facility-administered medications for this visit  Allergies: Penicillins    Objective:  Vitals:    08/12/21 1406 08/12/21 1503   BP: (!) 168/102 150/74   BP Location:  Left arm   Patient Position:  Sitting   Pulse: 103    Resp: 12    Temp: (!) 96 8 °F (36 °C)    TempSrc: Temporal    SpO2: 90%    Weight: 88 kg (194 lb)    Height: 5' 8" (1 727 m)    Oxygen Therapy  SpO2: 90 %      Wt Readings from Last 3 Encounters:   08/12/21 88 kg (194 lb)   06/16/21 89 4 kg (197 lb)   04/13/20 90 kg (198 lb 8 oz)     Body mass index is 29 5 kg/m²  Physical Exam  Constitutional:       Appearance: She is well-developed  HENT:      Head: Normocephalic and atraumatic  Eyes:      Conjunctiva/sclera: Conjunctivae normal       Pupils: Pupils are equal, round, and reactive to light  Cardiovascular:      Rate and Rhythm: Normal rate and regular rhythm  Heart sounds: Normal heart sounds  Pulmonary:      Effort: Pulmonary effort is normal  No respiratory distress  Breath sounds: Normal breath sounds  No wheezing or rales  Chest:      Chest wall: No tenderness  Abdominal:      General: Bowel sounds are normal       Palpations: Abdomen is soft  Musculoskeletal:         General: Normal range of motion  Cervical back: Normal range of motion and neck supple  Skin:     General: Skin is warm and dry  Neurological:      Mental Status: She is alert and oriented to person, place, and time  Lab Review:   Appointment on 06/16/2021   Component Date Value    Sodium 06/16/2021 146*    Potassium 06/16/2021 4 3     Chloride 06/16/2021 106     CO2 06/16/2021 31     ANION GAP 06/16/2021 9     BUN 06/16/2021 20     Creatinine 06/16/2021 0 78     Glucose, Fasting 06/16/2021 131*    Calcium 06/16/2021 10 4*    eGFR 06/16/2021 71        Diagnostics:  No orders to display     6/24/21 CTA PE study : no PE  8/20/2020:  HRCT no evidence of interstitial lung disease         ESS:    No results found  Portions of the record may have been created with voice recognition software  Occasional wrong word or "sound a like" substitutions may have occurred due to the inherent limitations of voice recognition software  Read the chart carefully and recognize, using context, where substitutions have occurred      Electronically Signed by Calos Valle PA-C

## 2021-08-26 ENCOUNTER — CONSULT (OUTPATIENT)
Dept: CARDIOLOGY CLINIC | Facility: CLINIC | Age: 81
End: 2021-08-26
Payer: MEDICARE

## 2021-08-26 VITALS
HEART RATE: 74 BPM | SYSTOLIC BLOOD PRESSURE: 138 MMHG | OXYGEN SATURATION: 96 % | TEMPERATURE: 98.5 F | DIASTOLIC BLOOD PRESSURE: 60 MMHG | WEIGHT: 196 LBS | BODY MASS INDEX: 29.8 KG/M2

## 2021-08-26 DIAGNOSIS — R00.2 PALPITATIONS: Primary | ICD-10-CM

## 2021-08-26 DIAGNOSIS — R06.02 SHORTNESS OF BREATH: ICD-10-CM

## 2021-08-26 DIAGNOSIS — E78.5 DYSLIPIDEMIA: ICD-10-CM

## 2021-08-26 DIAGNOSIS — I10 ESSENTIAL HYPERTENSION: ICD-10-CM

## 2021-08-26 DIAGNOSIS — J96.11 CHRONIC HYPOXEMIC RESPIRATORY FAILURE (HCC): ICD-10-CM

## 2021-08-26 PROCEDURE — 99215 OFFICE O/P EST HI 40 MIN: CPT | Performed by: INTERNAL MEDICINE

## 2021-08-26 PROCEDURE — 93229 REMOTE 30 DAY ECG TECH SUPP: CPT | Performed by: INTERNAL MEDICINE

## 2021-08-26 NOTE — PROGRESS NOTES
Enrolled and Activated Zio Heart Monitor with patient to be worn for 14 days  Reviewed instructions on wear/symptoms/restrictions and how to remove and return of device  Pt expressed a verbal understanding  No further questions upon departure from the office

## 2021-08-26 NOTE — PROGRESS NOTES
[facility]  [address] [facility] [address]  [address]   [place], [address]    Cardiology Consultation    Fabricio York  [id number]  1940      Consult for: palpitations   Appreciate consult by: Garett Rodriguez MD      Discussion/Summary:     1  Palpitations  Ambulatory referral to Cardiology    POCT ECG    AMB extended holter monitor   2  Chronic hypoxemic respiratory failure (HCC)  POCT ECG   3  Shortness of breath  POCT ECG   4  Essential hypertension     5  Dyslipidemia        - Patient with palpitations which occur intermittently  No structural heart disease on echocardiogram     Will schedule for 2 week event monitor to rule out any significant arrhythmias as she has risk factors for atrial fibrillation and SVT  (Placed this visit)  - Continue  Plavix (History of TIA with PVD)  - Continue amlodipine 2 5 mg daily along with bystolic 5 mg daily  Target BP is < 140/90 (history of TIA with PVD)  - Continue atorvastatin 80 mg - most recent LDL was 86  Will not intensify treatment due to age > [de-identified] and poor conditioning  HPI:     Fabricio York is a 78 y o  here for evaluation of palpitations  She has been feeling symptoms of a racing heart beat that has been present for the past 2 months  There was no inciting event  Symptoms last for 5-10 minutes then resolve without intervention  They occur every few days  She denies any associated shortness of breath or chest pain  She denies any syncope or near syncope  She has chronic dyspnea with reduced DLCO on PFTs  She follows with pulmonary medicine     She had echocardiogram and stress test done in February 2020 which were normal       Past Medical History:   Diagnosis Date    Blind left eye     Deaf, left     Disease of thyroid gland     DVT complicating pregnancy, unspecified trimester ([address] ) postpartum    Hearing loss     LEFT EAR    History of shingles 2007    fACIAL     Lyme disease     Meniere's disease     Orthostatic hypotension     Sinus congestion      Social History     Tobacco Use    Smoking status: Former Smoker     Packs/day: 0 75     Years: 44 00     Pack years: 33 00     Quit date: 1990     Years since quittin 5    Smokeless tobacco: Never Used   Vaping Use    Vaping Use: Never used   Substance Use Topics    Alcohol use: Never    Drug use: Never      Family History   Problem Relation Age of Onset    Breast cancer Sister 76    Ovarian cancer Daughter 48    BRCA1 Negative Daughter     BRCA2 Negative Daughter      Past Surgical History:   Procedure Laterality Date    APPENDECTOMY      BREAST BIOPSY Left 2010     SECTION      HAND SURGERY Left     HERNIA REPAIR      HYSTERECTOMY      ROTATOR CUFF REPAIR Left     TONSILLECTOMY         Current Outpatient Medications:     Alcaftadine (LASTACAFT OP), Apply 1 drop to eye as needed , Disp: , Rfl:     amLODIPine (NORVASC) 2 5 mg tablet, Take 1 tablet (2 5 mg total) by mouth every evening, Disp: 30 tablet, Rfl: 0    atorvastatin (LIPITOR) 80 mg tablet, Take 80 mg by mouth daily, Disp: , Rfl: 2    BYSTOLIC 5 MG tablet, Take 5 mg by mouth every evening, Disp: , Rfl: 2    Calcium Carbonate-Vit D-Min (CALCIUM 1200 PO), Take 1,200 mg by mouth daily, Disp: , Rfl:     cetirizine (ZyrTEC) 10 mg tablet, Take 10 mg by mouth daily, Disp: , Rfl:     Cholecalciferol (D3-1000 PO), Take 1,000 Units by mouth daily, Disp: , Rfl:     clobetasol (TEMOVATE) 0 05 % cream, Apply 1 application topically as needed To affected area, Disp: , Rfl:     clopidogrel (PLAVIX) 75 mg tablet, Take 1 tablet (75 mg total) by mouth daily, Disp: 30 tablet, Rfl: 0    DULoxetine (CYMBALTA) 60 mg delayed release capsule, Take 60 mg by mouth daily, Disp: , Rfl: 0    levothyroxine 100 mcg tablet, Take 100 mcg by mouth daily, Disp: , Rfl: 1    mometasone (NASONEX) 50 mcg/act nasal spray, 2 sprays into each nostril daily, Disp: , Rfl:     potassium chloride (K-DUR,KLOR-CON) 20 mEq tablet, Take 20 mEq by mouth once a week , Disp: , Rfl: 1    PREVIDENT 5000 BOOSTER PLUS 1 1 % PSTE, USE UTD, Disp: , Rfl:     primidone (MYSOLINE) 50 mg tablet, Take 50 mg by mouth daily at bedtime, Disp: , Rfl:     albuterol (PROAIR HFA) 90 mcg/act inhaler, Inhale 2 puffs every 6 (six) hours as needed for wheezing (Patient not taking: Reported on 8/26/2021), Disp: 8 5 g, Rfl: 0    pantoprazole (PROTONIX) 40 mg tablet, 40 mg 3 (three) times a week (Patient not taking: Reported on 8/26/2021), Disp: , Rfl:   Allergies   Allergen Reactions    Penicillins Swelling       Review of Systems:   Review of Systems   Respiratory: Positive for shortness of breath  Cardiovascular: Positive for palpitations  Negative for chest pain and leg swelling         Physical Examination:     Vitals:    08/26/21 1407   BP: 138/60   BP Location: Right arm   Patient Position: Sitting   Cuff Size: Standard   Pulse: 74   Temp: 98 5 °F (36 9 °C)   SpO2: 96%   Weight: 88 9 kg (196 lb)       Physical Exam     Labs:     Lab Results   Component Value Date    WBC 11 20 (H) 12/29/2020    HGB 13 7 12/29/2020    HCT 44 2 12/29/2020    MCV 99 (H) 12/29/2020    RDW 14 0 12/29/2020     12/29/2020     BMP:  Lab Results   Component Value Date    SODIUM 146 (H) 06/16/2021    K 4 3 06/16/2021     06/16/2021    CO2 31 06/16/2021    BUN 20 06/16/2021    CREATININE 0 78 06/16/2021    GLUC 104 02/03/2020    GLUF 131 (H) 06/16/2021    CALCIUM 10 4 (H) 06/16/2021    EGFR 71 06/16/2021     LFT:  Lab Results   Component Value Date    AST 48 (H) 12/29/2020    ALT 52 12/29/2020    ALKPHOS 93 12/29/2020    TP 7 3 12/29/2020    ALB 3 5 12/29/2020      Lab Results   Component Value Date    WZQ4KVVRTHOP 2 081 12/29/2020     Lab Results   Component Value Date    HGBA1C 6 5 (H) 12/29/2020     Lipid Profile:   Lab Results   Component Value Date    CHOLESTEROL 159 12/29/2020    HDL 39 (L) 12/29/2020    LDLCALC 86 12/29/2020    TRIG 172 (H) 2020     Lab Results   Component Value Date    CHOLESTEROL 159 2020    CHOLESTEROL 145 2019     Lab Results   Component Value Date    TROPONINI <0 02 2020     Lab Results   Component Value Date    NTBNP 112 2020      No results found for this or any previous visit (from the past 672 hour(s))  Imaging & Testing   I have personally reviewed pertinent reports  Cardiac Testing   Results for orders placed during the hospital encounter of 20    Echo complete with contrast if indicated    [address] [phone]    Transthoracic Echocardiogram  2D, M-mode, Doppler, and Color Doppler    Study date:  2020    Patient: Javier Hudson  MR number: [id number]  Account number: [de-identified]  : 1940  Age: 78 years  Gender: Female  Status: Inpatient  Location: Bedside  Height: 68 in  Weight: 203 5 lb  BP: 125/ 56 mmHg    Indications: SOB,Dizziness    Diagnoses: R06 00 - Dyspnea, unspecified    Sonographer:  JAGDEEP Spangler  Referring Physician:  ROBERT Bailey  Group:  [facility]  Interpreting Physician:  Missy Gambino DO    SUMMARY    LEFT VENTRICLE:  Systolic function was normal by visual assessment  Ejection fraction was estimated in the range of 55 % to 60 %  There were no regional wall motion abnormalities  There was mild concentric hypertrophy  Doppler parameters were consistent with abnormal left ventricular relaxation (grade 1 diastolic dysfunction)  MITRAL VALVE:  There was mild regurgitation  AORTIC VALVE:  There was no evidence for stenosis  There was no regurgitation  TRICUSPID VALVE:  There was mild regurgitation  Pulmonary artery systolic pressure was within the normal range  HISTORY: PRIOR HISTORY: Blind left eye,Deaf left ear,thyroid disease,DVT,Lyme disease,orthostatic hypotension      PROCEDURE: The procedure was performed at the bedside  This was a routine study  The transthoracic approach was used  The study included complete 2D imaging, M-mode, complete spectral Doppler, and color Doppler  The heart rate was 68 bpm,  at the start of the study  Images were obtained from the parasternal, apical, subcostal, and suprasternal notch acoustic windows  Image quality was adequate  LEFT VENTRICLE: Size was normal  Systolic function was normal by visual assessment  Ejection fraction was estimated in the range of 55 % to 60 %  There were no regional wall motion abnormalities  There was mild concentric hypertrophy  DOPPLER: Doppler parameters were consistent with abnormal left ventricular relaxation (grade 1 diastolic dysfunction)  RIGHT VENTRICLE: The size was normal  Systolic function was normal  DOPPLER: Systolic pressure was within the normal range  LEFT ATRIUM: The atrium was mildly dilated  RIGHT ATRIUM: The atrium was mildly dilated  MITRAL VALVE: Valve structure was normal  There was normal leaflet separation  No echocardiographic evidence for prolapse  DOPPLER: The transmitral velocity was within the normal range  There was no evidence for stenosis  There was mild  regurgitation  AORTIC VALVE: The valve was trileaflet  Leaflets exhibited normal thickness, normal cuspal separation, and sclerosis  DOPPLER: Transaortic velocity was within the normal range  There was no evidence for stenosis  There was no  regurgitation  TRICUSPID VALVE: The valve structure was normal  There was normal leaflet separation  DOPPLER: The transtricuspid velocity was within the normal range  There was mild regurgitation  Pulmonary artery systolic pressure was within the normal  range  Estimated peak PA pressure was 33 mmHg  PULMONIC VALVE: Leaflets exhibited normal thickness, no calcification, and normal cuspal separation  DOPPLER: The transpulmonic velocity was within the normal range  There was no regurgitation      PERICARDIUM: There was no thickening  There was no pericardial effusion  AORTA: The root exhibited normal size  PULMONARY ARTERY: The size was normal  The morphology appeared normal     SYSTEM MEASUREMENT TABLES    2D mode  AoR Diam 2D: 3 7 cm  LA Diam (2D): 3 9 cm  LA/Ao (2D): 1 05  FS (2D Teich): 25 9 %  IVSd (2D): 1 33 cm  LVDEV: 47 4 cmï¾³  LVESV: 22 8 cmï¾³  LVIDd(2D): 3 4 cm  LVISd (2D): 2 52 cm  LVOT Area 2D: 2 84 cmï¾²  LVPWd (2D): 1 28 cm  SV (Teich): 24 6 cmï¾³    Apical four chamber  LVEF A4C: 52 %    Unspecified Scan Mode  ROXI Cont Eq (Peak Urban): 2 21 cmï¾²  LVOT Diam : 2 cm  LVOT Vmax: 1320 mm/s  LVOT Vmax; Mean: 1320 mm/s  Peak Grad ; Mean: 7 mm[Hg]  MV Peak A Urban: 1080 mm/s  MV Peak E Urban  Mean: 959 mm/s  MVA (PHT): 2 65 cmï¾²  PHT: 84 ms  Max P mm[Hg]  V Max: 2500 mm/s  Vmax: 2370 mm/s  RA Area: 25 9 cmï¾²  RA Volume: 86 cmï¾³  TAPSE: 1 6 cm    [employer] Accredited Echocardiography Laboratory    Prepared and electronically signed by    Tiarra Peters DO  Signed 2020 13:09:49    No results found for this or any previous visit  No results found for this or any previous visit  No results found for this or any previous visit  No results found for this or any previous visit      Results for orders placed during the hospital encounter of 20    NM Myocardial Perfusion Spect (Pharmacological Induced Stress and/or Rest)    Narrative  [address]  [address] [place], [facility] 6 [phone]    Rest/Stress Gated SPECT Myocardial Perfusion Imaging After Regadenoson    Patient: John Mcginnis  MR number: [id number]  Account number: [de-identified]  : 1940  Age: 78 years  Gender: Female  Status: Inpatient  Location: Stress lab  Height: 68 in  Weight: 204 lb  BP: 157/ 67 mmHg    Allergies: PENICILLINS    Diagnosis: R42  - Dizziness and giddiness    Technician:  Irvin Dawn  RN:  FABIO ArzateN  Group:  [facility]  Report Prepared By[de-identified]  Maranda Krishnan Maryelizabeth Mcardle, FABION  Interpreting Physician:  Missy Gambino DO    INDICATIONS: Evaluation for coronary artery disease  HISTORY: The patient is a 78year old  female  Chest pain status: no chest pain  Other symptoms: dizziness  Coronary artery disease risk factors: dyslipidemia and hypertension  Cardiovascular history: none significant  Medications: a calcium channel blocker, aspirin, clopidogrel, a lipid lowering agent, and thyroid medications  PHYSICAL EXAM: Baseline physical exam screening: no wheezes audible  REST ECG: Normal sinus rhythm  Normal baseline ECG  PROCEDURE: The study was performed in the the Stress lab  A regadenoson infusion pharmacologic stress test was performed  Gated SPECT myocardial perfusion imaging was performed after stress and at rest  Systolic blood pressure was 157  mmHg, at the start of the study  Diastolic blood pressure was 67 mmHg, at the start of the study  The heart rate was 70 bpm, at the start of the study  Patient was not experiencing chest pain at the time of the injection of the  radiopharmaceutical  IV double checked  Regadenoson protocol:  Time HR bpm SBP mmHg DBP mmHg Symptoms ST change Rhythm/conduct  Baseline 10:22 70 157 67 none none NSR, no ectopy, NSR  Immediate 10:30 81 132 58 mild dyspnea, flushing -- same as above, rare PVC's  1 min 10:31 79 148 64 same as above none same as above  2 min 10:32 77 148 67 subsiding -- same as above  3 min 10:33 73 135 63 subsiding -- same as above  4 min 10:34 76 136 62 none -- NSR  No medications or fluids given  STRESS SUMMARY: Duration of pharmacologic stress was 3 min  Functional capacity was normal  Maximal heart rate during stress was 83 bpm  The heart rate response to stress was normal  There was normal resting blood pressure with an  appropriate response to stress  The rate-pressure product for the peak heart rate and blood pressure was 14302  There was no chest pain during stress   The stress test was terminated due to protocol completion  Pre oxygen saturation: 96 %  Peak oxygen saturation: 96 %  The stress ECG was negative for ischemia and normal  There were no stress arrhythmias or conduction abnormalities  ISOTOPE ADMINISTRATION:  Resting isotope administration Stress isotope administration  Agent Sestamibi Sestamibi  Dose 10 09 mCi 33 mCi  Date 02/03/2020 02/03/2020    The radiopharmaceutical was injected at the peak effect of pharmacologic stress  MYOCARDIAL PERFUSION IMAGING:  The image quality was good  Left ventricular size was normal     PERFUSION DEFECTS:  -  There were no perfusion defects  GATED SPECT:  The calculated left ventricular ejection fraction was 69 %  Left ventricular ejection fraction was within normal limits by visual estimate  There was no diagnostic evidence for left ventricular regional abnormality  SUMMARY:  -  Stress results: Target heart rate was achieved  There was no chest pain during stress  -  ECG conclusions: The stress ECG was negative for ischemia and normal   -  Perfusion imaging: There were no perfusion defects   -  Gated SPECT: The calculated left ventricular ejection fraction was 69 %  Left ventricular ejection fraction was within normal limits by visual estimate  There was no diagnostic evidence for left ventricular regional abnormality  IMPRESSIONS: Normal study after pharmacologic stress  Myocardial perfusion imaging was normal at rest and with stress  Left ventricular systolic function was normal     Prepared and signed by    Krystyna Roca DO  Signed 02/03/2020 14:32:17      EKG: Personally reviewed  normal ECG      Krystyna Roca DO [facility]  [phone]  Please call with any questions

## 2021-08-27 PROBLEM — I10 ESSENTIAL HYPERTENSION: Status: ACTIVE | Noted: 2021-08-27

## 2021-08-27 PROBLEM — E78.5 DYSLIPIDEMIA: Status: ACTIVE | Noted: 2021-08-27

## 2021-08-27 PROCEDURE — 93000 ELECTROCARDIOGRAM COMPLETE: CPT | Performed by: INTERNAL MEDICINE

## 2021-09-27 ENCOUNTER — TELEPHONE (OUTPATIENT)
Dept: CARDIOLOGY CLINIC | Facility: CLINIC | Age: 81
End: 2021-09-27

## 2021-09-27 ENCOUNTER — CLINICAL SUPPORT (OUTPATIENT)
Dept: CARDIOLOGY CLINIC | Facility: CLINIC | Age: 81
End: 2021-09-27
Payer: MEDICARE

## 2021-09-27 DIAGNOSIS — R00.2 PALPITATIONS: ICD-10-CM

## 2021-09-27 PROCEDURE — 93248 EXT ECG>7D<15D REV&INTERPJ: CPT | Performed by: INTERNAL MEDICINE

## 2021-10-01 ENCOUNTER — TELEPHONE (OUTPATIENT)
Dept: CARDIOLOGY CLINIC | Facility: CLINIC | Age: 81
End: 2021-10-01

## 2021-10-19 ENCOUNTER — APPOINTMENT (OUTPATIENT)
Dept: LAB | Facility: HOSPITAL | Age: 81
End: 2021-10-19
Attending: INTERNAL MEDICINE
Payer: MEDICARE

## 2021-10-19 DIAGNOSIS — D64.9 ANEMIA, UNSPECIFIED TYPE: ICD-10-CM

## 2021-10-19 DIAGNOSIS — I10 ESSENTIAL HYPERTENSION, MALIGNANT: ICD-10-CM

## 2021-10-19 DIAGNOSIS — E55.9 VITAMIN D DEFICIENCY: ICD-10-CM

## 2021-10-19 DIAGNOSIS — E78.00 PURE HYPERCHOLESTEROLEMIA: ICD-10-CM

## 2021-10-19 DIAGNOSIS — E11.9 TYPE 2 DIABETES MELLITUS WITHOUT COMPLICATION, WITHOUT LONG-TERM CURRENT USE OF INSULIN (HCC): ICD-10-CM

## 2021-10-19 DIAGNOSIS — E03.9 HYPOTHYROIDISM, UNSPECIFIED TYPE: ICD-10-CM

## 2021-10-19 DIAGNOSIS — N39.0 URINARY TRACT INFECTION WITHOUT HEMATURIA, SITE UNSPECIFIED: ICD-10-CM

## 2021-10-19 LAB
25(OH)D3 SERPL-MCNC: 34.7 NG/ML (ref 30–100)
TSH SERPL DL<=0.05 MIU/L-ACNC: 0.5 UIU/ML (ref 0.36–3.74)

## 2021-10-19 PROCEDURE — 82306 VITAMIN D 25 HYDROXY: CPT

## 2021-10-19 PROCEDURE — 84443 ASSAY THYROID STIM HORMONE: CPT

## 2021-10-19 PROCEDURE — 87086 URINE CULTURE/COLONY COUNT: CPT

## 2021-10-20 LAB — BACTERIA UR CULT: NORMAL

## 2021-11-02 ENCOUNTER — OFFICE VISIT (OUTPATIENT)
Dept: CARDIOLOGY CLINIC | Facility: CLINIC | Age: 81
End: 2021-11-02
Payer: MEDICARE

## 2021-11-02 VITALS
WEIGHT: 190 LBS | OXYGEN SATURATION: 93 % | DIASTOLIC BLOOD PRESSURE: 66 MMHG | HEIGHT: 68 IN | TEMPERATURE: 98.4 F | BODY MASS INDEX: 28.79 KG/M2 | SYSTOLIC BLOOD PRESSURE: 104 MMHG | HEART RATE: 75 BPM

## 2021-11-02 DIAGNOSIS — R00.2 PALPITATIONS: ICD-10-CM

## 2021-11-02 DIAGNOSIS — I47.1 SVT (SUPRAVENTRICULAR TACHYCARDIA) (HCC): Primary | ICD-10-CM

## 2021-11-02 DIAGNOSIS — E78.5 DYSLIPIDEMIA: ICD-10-CM

## 2021-11-02 DIAGNOSIS — I10 ESSENTIAL HYPERTENSION: ICD-10-CM

## 2021-11-02 DIAGNOSIS — Z86.73 HISTORY OF TRANSIENT ISCHEMIC ATTACK (TIA): ICD-10-CM

## 2021-11-02 PROCEDURE — 99214 OFFICE O/P EST MOD 30 MIN: CPT | Performed by: INTERNAL MEDICINE

## 2021-11-18 ENCOUNTER — CONSULT (OUTPATIENT)
Dept: ENDOCRINOLOGY | Facility: CLINIC | Age: 81
End: 2021-11-18
Payer: MEDICARE

## 2021-11-18 VITALS
HEART RATE: 70 BPM | HEIGHT: 68 IN | WEIGHT: 198.4 LBS | BODY MASS INDEX: 30.07 KG/M2 | DIASTOLIC BLOOD PRESSURE: 60 MMHG | SYSTOLIC BLOOD PRESSURE: 110 MMHG

## 2021-11-18 DIAGNOSIS — R00.2 PALPITATIONS: ICD-10-CM

## 2021-11-18 DIAGNOSIS — E04.1 THYROID NODULE: ICD-10-CM

## 2021-11-18 DIAGNOSIS — R25.1 TREMOR: ICD-10-CM

## 2021-11-18 DIAGNOSIS — E03.9 ACQUIRED HYPOTHYROIDISM: Primary | ICD-10-CM

## 2021-11-18 PROCEDURE — 99205 OFFICE O/P NEW HI 60 MIN: CPT | Performed by: INTERNAL MEDICINE

## 2021-11-18 RX ORDER — LEVOTHYROXINE SODIUM 88 UG/1
88 TABLET ORAL DAILY
Qty: 90 TABLET | Refills: 3 | Status: SHIPPED | OUTPATIENT
Start: 2021-11-18

## 2021-11-22 ENCOUNTER — HOSPITAL ENCOUNTER (OUTPATIENT)
Dept: PULMONOLOGY | Facility: HOSPITAL | Age: 81
Discharge: HOME | End: 2021-11-22
Payer: MEDICARE

## 2021-11-22 ENCOUNTER — OFFICE VISIT (OUTPATIENT)
Dept: PULMONOLOGY | Facility: MEDICAL CENTER | Age: 81
End: 2021-11-22
Payer: MEDICARE

## 2021-11-22 ENCOUNTER — DOCUMENTATION (OUTPATIENT)
Dept: PULMONOLOGY | Facility: MEDICAL CENTER | Age: 81
End: 2021-11-22

## 2021-11-22 VITALS
RESPIRATION RATE: 20 BRPM | WEIGHT: 194 LBS | SYSTOLIC BLOOD PRESSURE: 130 MMHG | TEMPERATURE: 97.8 F | HEIGHT: 68 IN | BODY MASS INDEX: 29.4 KG/M2 | DIASTOLIC BLOOD PRESSURE: 64 MMHG | HEART RATE: 76 BPM | OXYGEN SATURATION: 95 %

## 2021-11-22 DIAGNOSIS — R06.00 DYSPNEA ON EXERTION: ICD-10-CM

## 2021-11-22 DIAGNOSIS — E03.9 ACQUIRED HYPOTHYROIDISM: ICD-10-CM

## 2021-11-22 DIAGNOSIS — G47.34 NOCTURNAL HYPOXEMIA: Primary | ICD-10-CM

## 2021-11-22 DIAGNOSIS — R09.02 EXERCISE HYPOXEMIA: ICD-10-CM

## 2021-11-22 PROCEDURE — 94726 PLETHYSMOGRAPHY LUNG VOLUMES: CPT | Performed by: INTERNAL MEDICINE

## 2021-11-22 PROCEDURE — 94729 DIFFUSING CAPACITY: CPT

## 2021-11-22 PROCEDURE — 94060 EVALUATION OF WHEEZING: CPT

## 2021-11-22 PROCEDURE — 94761 N-INVAS EAR/PLS OXIMETRY MLT: CPT

## 2021-11-22 PROCEDURE — 94618 PULMONARY STRESS TESTING: CPT | Performed by: INTERNAL MEDICINE

## 2021-11-22 PROCEDURE — 94726 PLETHYSMOGRAPHY LUNG VOLUMES: CPT

## 2021-11-22 PROCEDURE — 94060 EVALUATION OF WHEEZING: CPT | Performed by: INTERNAL MEDICINE

## 2021-11-22 PROCEDURE — 99214 OFFICE O/P EST MOD 30 MIN: CPT | Performed by: INTERNAL MEDICINE

## 2021-11-22 PROCEDURE — 94729 DIFFUSING CAPACITY: CPT | Performed by: INTERNAL MEDICINE

## 2021-11-22 RX ORDER — PREDNISONE 10 MG/1
TABLET ORAL
Qty: 50 TABLET | Refills: 0 | Status: SHIPPED | OUTPATIENT
Start: 2021-11-22

## 2021-11-22 RX ORDER — ALBUTEROL SULFATE 2.5 MG/3ML
2.5 SOLUTION RESPIRATORY (INHALATION) ONCE
Status: DISCONTINUED | OUTPATIENT
Start: 2021-11-22 | End: 2021-11-26 | Stop reason: HOSPADM

## 2021-11-22 RX ORDER — ALBUTEROL SULFATE 2.5 MG/3ML
2.5 SOLUTION RESPIRATORY (INHALATION) ONCE
Status: CANCELLED | OUTPATIENT
Start: 2021-11-22

## 2021-11-23 ENCOUNTER — DOCUMENTATION (OUTPATIENT)
Dept: PULMONOLOGY | Facility: MEDICAL CENTER | Age: 81
End: 2021-11-23

## 2021-11-23 ENCOUNTER — TELEPHONE (OUTPATIENT)
Dept: PULMONOLOGY | Facility: MEDICAL CENTER | Age: 81
End: 2021-11-23

## 2021-12-06 ENCOUNTER — HOSPITAL ENCOUNTER (OUTPATIENT)
Dept: RADIOLOGY | Facility: HOSPITAL | Age: 81
Discharge: HOME | End: 2021-12-06
Attending: INTERNAL MEDICINE
Payer: MEDICARE

## 2021-12-06 DIAGNOSIS — E03.9 ACQUIRED HYPOTHYROIDISM: ICD-10-CM

## 2021-12-06 DIAGNOSIS — E04.1 THYROID NODULE: ICD-10-CM

## 2021-12-06 PROCEDURE — 76536 US EXAM OF HEAD AND NECK: CPT

## 2021-12-09 ENCOUNTER — HOSPITAL ENCOUNTER (OUTPATIENT)
Dept: RADIOLOGY | Facility: HOSPITAL | Age: 81
Discharge: HOME | End: 2021-12-09
Attending: INTERNAL MEDICINE
Payer: MEDICARE

## 2021-12-09 ENCOUNTER — APPOINTMENT (OUTPATIENT)
Dept: LAB | Facility: HOSPITAL | Age: 81
End: 2021-12-09
Attending: INTERNAL MEDICINE
Payer: MEDICARE

## 2021-12-09 DIAGNOSIS — E08.00 DIABETES MELLITUS DUE TO UNDERLYING CONDITION WITH HYPEROSMOLARITY WITHOUT COMA, WITHOUT LONG-TERM CURRENT USE OF INSULIN (HCC): ICD-10-CM

## 2021-12-09 DIAGNOSIS — I10 ESSENTIAL HYPERTENSION, MALIGNANT: ICD-10-CM

## 2021-12-09 DIAGNOSIS — R10.11 RUQ PAIN: ICD-10-CM

## 2021-12-09 DIAGNOSIS — E78.00 PURE HYPERCHOLESTEROLEMIA: ICD-10-CM

## 2021-12-09 DIAGNOSIS — R94.6 NONSPECIFIC ABNORMAL RESULTS OF THYROID FUNCTION STUDY: ICD-10-CM

## 2021-12-09 DIAGNOSIS — E55.9 AVITAMINOSIS D: ICD-10-CM

## 2021-12-09 DIAGNOSIS — E03.9 ACQUIRED HYPOTHYROIDISM: ICD-10-CM

## 2021-12-09 DIAGNOSIS — N10 ACUTE PYELONEPHRITIS: ICD-10-CM

## 2021-12-09 DIAGNOSIS — R94.5 ABNORMAL RESULTS OF LIVER FUNCTION STUDIES: ICD-10-CM

## 2021-12-09 DIAGNOSIS — I51.9 MYXEDEMA HEART DISEASE: ICD-10-CM

## 2021-12-09 DIAGNOSIS — E04.1 THYROID NODULE: ICD-10-CM

## 2021-12-09 DIAGNOSIS — D50.0 IRON DEFICIENCY ANEMIA DUE TO CHRONIC BLOOD LOSS: ICD-10-CM

## 2021-12-09 DIAGNOSIS — E03.9 MYXEDEMA HEART DISEASE: ICD-10-CM

## 2021-12-09 LAB
ALBUMIN SERPL BCP-MCNC: 3.4 G/DL (ref 3.5–5)
ALP SERPL-CCNC: 101 U/L (ref 46–116)
ALT SERPL W P-5'-P-CCNC: 53 U/L (ref 12–78)
AMYLASE SERPL-CCNC: 32 IU/L (ref 25–115)
ANION GAP SERPL CALCULATED.3IONS-SCNC: 9 MMOL/L (ref 4–13)
AST SERPL W P-5'-P-CCNC: 56 U/L (ref 5–45)
BASOPHILS # BLD AUTO: 0.09 THOUSANDS/ΜL (ref 0–0.1)
BASOPHILS NFR BLD AUTO: 1 % (ref 0–1)
BILIRUB SERPL-MCNC: 0.41 MG/DL (ref 0.2–1)
BUN SERPL-MCNC: 18 MG/DL (ref 5–25)
CALCIUM ALBUM COR SERPL-MCNC: 10.1 MG/DL (ref 8.3–10.1)
CALCIUM SERPL-MCNC: 9.6 MG/DL (ref 8.3–10.1)
CHLORIDE SERPL-SCNC: 104 MMOL/L (ref 100–108)
CO2 SERPL-SCNC: 30 MMOL/L (ref 21–32)
CREAT SERPL-MCNC: 0.72 MG/DL (ref 0.6–1.3)
EOSINOPHIL # BLD AUTO: 0.41 THOUSAND/ΜL (ref 0–0.61)
EOSINOPHIL NFR BLD AUTO: 4 % (ref 0–6)
ERYTHROCYTE [DISTWIDTH] IN BLOOD BY AUTOMATED COUNT: 14.3 % (ref 11.6–15.1)
GFR SERPL CREATININE-BSD FRML MDRD: 78 ML/MIN/1.73SQ M
GLUCOSE P FAST SERPL-MCNC: 153 MG/DL (ref 65–99)
HCT VFR BLD AUTO: 43 % (ref 34.8–46.1)
HGB BLD-MCNC: 13.5 G/DL (ref 11.5–15.4)
IMM GRANULOCYTES # BLD AUTO: 0.06 THOUSAND/UL (ref 0–0.2)
IMM GRANULOCYTES NFR BLD AUTO: 1 % (ref 0–2)
LIPASE SERPL-CCNC: 129 U/L (ref 73–393)
LYMPHOCYTES # BLD AUTO: 2.3 THOUSANDS/ΜL (ref 0.6–4.47)
LYMPHOCYTES NFR BLD AUTO: 21 % (ref 14–44)
MCH RBC QN AUTO: 30.8 PG (ref 26.8–34.3)
MCHC RBC AUTO-ENTMCNC: 31.4 G/DL (ref 31.4–37.4)
MCV RBC AUTO: 98 FL (ref 82–98)
MONOCYTES # BLD AUTO: 0.72 THOUSAND/ΜL (ref 0.17–1.22)
MONOCYTES NFR BLD AUTO: 7 % (ref 4–12)
NEUTROPHILS # BLD AUTO: 7.55 THOUSANDS/ΜL (ref 1.85–7.62)
NEUTS SEG NFR BLD AUTO: 66 % (ref 43–75)
NRBC BLD AUTO-RTO: 0 /100 WBCS
PLATELET # BLD AUTO: 267 THOUSANDS/UL (ref 149–390)
PMV BLD AUTO: 10.9 FL (ref 8.9–12.7)
POTASSIUM SERPL-SCNC: 4.3 MMOL/L (ref 3.5–5.3)
PROT SERPL-MCNC: 7 G/DL (ref 6.4–8.2)
RBC # BLD AUTO: 4.38 MILLION/UL (ref 3.81–5.12)
SODIUM SERPL-SCNC: 143 MMOL/L (ref 136–145)
T4 FREE SERPL-MCNC: 0.91 NG/DL (ref 0.76–1.46)
TSH SERPL DL<=0.05 MIU/L-ACNC: 2.23 UIU/ML (ref 0.36–3.74)
WBC # BLD AUTO: 11.13 THOUSAND/UL (ref 4.31–10.16)

## 2021-12-09 PROCEDURE — 83690 ASSAY OF LIPASE: CPT

## 2021-12-09 PROCEDURE — 82150 ASSAY OF AMYLASE: CPT

## 2021-12-09 PROCEDURE — 85025 COMPLETE CBC W/AUTO DIFF WBC: CPT

## 2021-12-09 PROCEDURE — 36415 COLL VENOUS BLD VENIPUNCTURE: CPT

## 2021-12-09 PROCEDURE — 76705 ECHO EXAM OF ABDOMEN: CPT

## 2021-12-09 PROCEDURE — 84443 ASSAY THYROID STIM HORMONE: CPT

## 2021-12-09 PROCEDURE — 80053 COMPREHEN METABOLIC PANEL: CPT

## 2021-12-09 PROCEDURE — 84439 ASSAY OF FREE THYROXINE: CPT

## 2021-12-10 ENCOUNTER — IMMUNIZATIONS (OUTPATIENT)
Dept: FAMILY MEDICINE CLINIC | Facility: HOSPITAL | Age: 81
End: 2021-12-10

## 2021-12-10 ENCOUNTER — TELEPHONE (OUTPATIENT)
Dept: ENDOCRINOLOGY | Facility: CLINIC | Age: 81
End: 2021-12-10

## 2021-12-10 DIAGNOSIS — Z23 ENCOUNTER FOR IMMUNIZATION: Primary | ICD-10-CM

## 2021-12-10 PROCEDURE — 0064A COVID-19 MODERNA VACC 0.25 ML BOOSTER: CPT

## 2021-12-10 PROCEDURE — 91306 COVID-19 MODERNA VACC 0.25 ML BOOSTER: CPT

## 2021-12-16 ENCOUNTER — HOSPITAL ENCOUNTER (OUTPATIENT)
Dept: RADIOLOGY | Facility: HOSPITAL | Age: 81
Discharge: HOME | End: 2021-12-16
Attending: INTERNAL MEDICINE
Payer: MEDICARE

## 2021-12-16 DIAGNOSIS — R94.5 ABNORMAL RESULTS OF LIVER FUNCTION STUDIES: ICD-10-CM

## 2021-12-16 DIAGNOSIS — R10.11 RIGHT UPPER QUADRANT PAIN: ICD-10-CM

## 2021-12-16 PROCEDURE — 74177 CT ABD & PELVIS W/CONTRAST: CPT

## 2021-12-16 PROCEDURE — G1004 CDSM NDSC: HCPCS

## 2021-12-16 RX ADMIN — IOHEXOL 100 ML: 350 INJECTION, SOLUTION INTRAVENOUS at 13:27

## 2022-01-04 ENCOUNTER — TELEPHONE (OUTPATIENT)
Dept: PULMONOLOGY | Facility: MEDICAL CENTER | Age: 82
End: 2022-01-04

## 2022-01-04 NOTE — TELEPHONE ENCOUNTER
Received fax from [facility] stating they had left several messages for the patient to contact their office regarding overnight oximetry test  LM message to call our office regarding overnight oximetry test

## 2022-01-06 NOTE — TELEPHONE ENCOUNTER
LM for patient to call office back to reschedule missed appointment with Dr Kathie Mai  No Show letter mailed

## 2022-03-21 ENCOUNTER — OFFICE VISIT (OUTPATIENT)
Dept: PULMONOLOGY | Facility: MEDICAL CENTER | Age: 82
End: 2022-03-21
Payer: MEDICARE

## 2022-03-21 VITALS
BODY MASS INDEX: 29.07 KG/M2 | HEART RATE: 79 BPM | SYSTOLIC BLOOD PRESSURE: 116 MMHG | HEIGHT: 68 IN | WEIGHT: 191.8 LBS | RESPIRATION RATE: 12 BRPM | DIASTOLIC BLOOD PRESSURE: 74 MMHG | TEMPERATURE: 94.6 F | OXYGEN SATURATION: 95 %

## 2022-03-21 DIAGNOSIS — J30.1 SEASONAL ALLERGIC RHINITIS DUE TO POLLEN: ICD-10-CM

## 2022-03-21 DIAGNOSIS — R06.00 DYSPNEA ON EXERTION: ICD-10-CM

## 2022-03-21 DIAGNOSIS — G47.34 NOCTURNAL HYPOXEMIA: Primary | ICD-10-CM

## 2022-03-21 PROCEDURE — 99214 OFFICE O/P EST MOD 30 MIN: CPT | Performed by: INTERNAL MEDICINE

## 2022-03-21 RX ORDER — CLONAZEPAM 0.5 MG/1
0.5 TABLET ORAL
COMMUNITY
Start: 2022-03-15

## 2022-03-21 NOTE — PATIENT INSTRUCTIONS
Continue oxygen 3 liters/minute at bedtime and can use during the day as needed for active    Use Zyrtec and nasal spray Nasonex    Also can use saline nasal spray as often as she wanted

## 2022-03-22 NOTE — PROGRESS NOTES
Assessment/Plan        Problem List Items Addressed This Visit        Respiratory    Seasonal allergic rhinitis due to pollen     She started have some sneezing  She does get some postnasal drainage at times  Told she can resume taking her Nasonex and Zyrtec  Can also use saline nasal rinse         Nocturnal hypoxemia - Primary     Maxim Coleman does have nocturnal hypoxemia some exercise do's hypoxemia  Some of this is prior related to diastolic cardiac dysfunction and also possibly due to alveolar hypoventilation from being overweight  Prior CT scan of chest done June of last year was a PE study showed no evidence of PE or any sign of any interstitial lung disease    She will continue oxygen 3 liters/minute at bedtime and as needed during the day with activity                Other    Dyspnea on exertion     She states that her shortness of breath is improved since last visit  She gets some exertional shortness of breath but this is better  No wheezing  PFT done November shows no sign of any obstructive disease  There was mild to moderate restrictive pair which is probably due to her being overweight                 Cc: some shortness of breath with activity      HPI     Maxim Coleman presents for follow-up visit today  She uses oxygen 3 liters/minute at bedtime sometimes when sitting at home  She has some seasonal allergies that she started his knees  She use Nasonex in the past which has helped her  Not having any wheezing or cough  She also will take Zyrtec when needed  She does have sleep-related hypoxemia also exercise-induced hypoxemia  She did have CTA PE study of chest done June of last year with no evidence of PE  Also no evidence of any interstitial lung disease  She did have complete PFT done November of 2021  I did review this and showed total lung capacity to be mildly to moderately decreased at 67% predicted    FVC was normal at 2 35 L or 85% predicted FEV1 was normal at 1 9 L or 89% predicted obstructive ratio 81%  She did have echocardiogram done in 2020 which showed LV systolic function be normal with ejection fraction of 55-60%  There was grade 1 diastolic dysfunction  Pulmonary pressure appeared to be normal   There was mild mitral regurgitation    Does have hypothyroidism and is on thyroid supplement  She states she is doing okay  Not having any shortness of breath walking on level ground at home  She does live in [facility]      Her DME company for her oxygen is [email or web address]    Past Medical History:   Diagnosis Date    Blind left eye     Deaf, left     Disease of thyroid gland     DVT complicating pregnancy, unspecified trimester ([address] ) postpartum    Hearing loss     LEFT EAR    History of shingles     fACIAL     Lyme disease     Meniere's disease     Orthostatic hypotension     Sinus congestion        Past Surgical History:   Procedure Laterality Date    APPENDECTOMY      BREAST BIOPSY Left 2010     SECTION      HAND SURGERY Left     HERNIA REPAIR      HYSTERECTOMY      ROTATOR CUFF REPAIR Left     TONSILLECTOMY           Current Outpatient Medications:     Alcaftadine (LASTACAFT OP), Apply 1 drop to eye as needed , Disp: , Rfl:     amLODIPine (NORVASC) 2 5 mg tablet, Take 1 tablet (2 5 mg total) by mouth every evening, Disp: 30 tablet, Rfl: 0    atorvastatin (LIPITOR) 80 mg tablet, Take 80 mg by mouth daily, Disp: , Rfl: 2    BYSTOLIC 5 MG tablet, Take 5 mg by mouth every evening, Disp: , Rfl: 2    Calcium Carbonate-Vit D-Min (CALCIUM 1200 PO), Take 1,200 mg by mouth daily, Disp: , Rfl:     cetirizine (ZyrTEC) 10 mg tablet, Take 10 mg by mouth daily, Disp: , Rfl:     Cholecalciferol (D3-1000 PO), Take 2,000 Units by mouth daily  , Disp: , Rfl:     clonazePAM (KlonoPIN) 0 5 mg tablet, Take 0 5 mg by mouth daily at bedtime as needed, Disp: , Rfl:     DULoxetine (CYMBALTA) 60 mg delayed release capsule, Take 60 mg by mouth daily, Disp: , Rfl: 0    levothyroxine 88 mcg tablet, Take 1 tablet (88 mcg total) by mouth daily, Disp: 90 tablet, Rfl: 3    mometasone (NASONEX) 50 mcg/act nasal spray, 1 spray into each nostril daily  , Disp: , Rfl:     potassium chloride (K-DUR,KLOR-CON) 20 mEq tablet, Take 20 mEq by mouth once a week , Disp: , Rfl: 1    PREVIDENT 5000 BOOSTER PLUS 1 1 % PSTE, USE UTD, Disp: , Rfl:     primidone (MYSOLINE) 50 mg tablet, Take 50 mg by mouth daily at bedtime, Disp: , Rfl:     albuterol (PROAIR HFA) 90 mcg/act inhaler, Inhale 2 puffs every 6 (six) hours as needed for wheezing (Patient not taking: Reported on 2021), Disp: 8 5 g, Rfl: 0    clobetasol (TEMOVATE) 0 05 % cream, Apply 1 application topically as needed To affected area (Patient not taking: Reported on 2021 ), Disp: , Rfl:     clopidogrel (PLAVIX) 75 mg tablet, Take 1 tablet (75 mg total) by mouth daily (Patient not taking: Reported on 3/21/2022 ), Disp: 30 tablet, Rfl: 0    pantoprazole (PROTONIX) 40 mg tablet, 40 mg 3 (three) times a week (Patient not taking: Reported on 2021), Disp: , Rfl:     predniSONE 10 mg tablet, Take 3 tablets daily for 7 days then 2 tablets daily for 7 days then 1 tablet daily for 7 days then stop (Patient not taking: Reported on 3/21/2022 ), Disp: 50 tablet, Rfl: 0    Allergies   Allergen Reactions    Penicillins Swelling       Social History     Tobacco Use    Smoking status: Former Smoker     Packs/day: 0 75     Years: 44 00     Pack years: 33 00     Quit date: 1990     Years since quittin 1    Smokeless tobacco: Never Used   Substance Use Topics    Alcohol use: Never         Family History   Problem Relation Age of Onset    Breast cancer Sister 76    Ovarian cancer Daughter 48    BRCA1 Negative Daughter     BRCA2 Negative Daughter        Review of Systems   Constitutional: Negative for chills, fever and unexpected weight change  HENT: Negative for congestion, rhinorrhea and sore throat  Does have postnasal drainage does have some sneezing   Eyes: Negative for discharge and redness  Respiratory:        Has some mild shortness of breath with activity   Cardiovascular: Negative for chest pain, palpitations and leg swelling  Gastrointestinal: Negative for abdominal distention, abdominal pain and nausea  Endocrine: Negative for polydipsia and polyphagia  Genitourinary: Negative for dysuria  Musculoskeletal: Negative for joint swelling and myalgias  Skin: Negative for rash  Neurological: Negative for light-headedness  Psychiatric/Behavioral: Negative for confusion  Vitals:    03/21/22 1551   BP: 116/74   Pulse: 79   Resp: 12   Temp: (!) 94 6 °F (34 8 °C)   SpO2: 95% on room air     Height 5 ft 8 in tall weight 191 lb BMI 29 16      Physical Exam  Vitals and nursing note reviewed  Constitutional:       General: She is not in acute distress  Appearance: She is well-developed  HENT:      Head: Normocephalic and atraumatic  Right Ear: External ear normal       Left Ear: External ear normal       Nose: Nose normal       Mouth/Throat:      Mouth: Mucous membranes are moist       Pharynx: Oropharynx is clear  Eyes:      Conjunctiva/sclera: Conjunctivae normal    Cardiovascular:      Rate and Rhythm: Normal rate and regular rhythm  Heart sounds: No murmur heard  Pulmonary:      Effort: Pulmonary effort is normal  No respiratory distress  Breath sounds: Normal breath sounds  Comments: Lung sounds are clear  No wheezes, crackles or rhonchi  Abdominal:      Palpations: Abdomen is soft  Tenderness: There is no abdominal tenderness  Musculoskeletal:      Cervical back: Neck supple  Comments: No edema or cyanosis   Skin:     General: Skin is warm and dry  Neurological:      Mental Status: She is alert and oriented to person, place, and time  Mental status is at baseline     Psychiatric:         Mood and Affect: Mood normal          Behavior: Behavior normal            :

## 2022-03-22 NOTE — ASSESSMENT & PLAN NOTE
Angelic Wise does have nocturnal hypoxemia some exercise do's hypoxemia  Some of this is prior related to diastolic cardiac dysfunction and also possibly due to alveolar hypoventilation from being overweight    Prior CT scan of chest done June of last year was a PE study showed no evidence of PE or any sign of any interstitial lung disease    She will continue oxygen 3 liters/minute at bedtime and as needed during the day with activity

## 2022-03-22 NOTE — ASSESSMENT & PLAN NOTE
She started have some sneezing  She does get some postnasal drainage at times  Told she can resume taking her Nasonex and Zyrtec    Can also use saline nasal rinse

## 2022-03-22 NOTE — ASSESSMENT & PLAN NOTE
She states that her shortness of breath is improved since last visit  She gets some exertional shortness of breath but this is better  No wheezing  PFT done November shows no sign of any obstructive disease    There was mild to moderate restrictive pair which is probably due to her being overweight

## 2022-09-02 DIAGNOSIS — F33.40 RECURRENT MAJOR DEPRESSIVE DISORDER, IN REMISSION (HCC): ICD-10-CM

## 2022-09-02 DIAGNOSIS — I10 HTN (HYPERTENSION), BENIGN: Primary | ICD-10-CM

## 2022-09-06 RX ORDER — NEBIVOLOL 5 MG/1
TABLET ORAL
Qty: 90 TABLET | Refills: 1 | Status: SHIPPED | OUTPATIENT
Start: 2022-09-06

## 2022-09-06 RX ORDER — DULOXETIN HYDROCHLORIDE 60 MG/1
CAPSULE, DELAYED RELEASE ORAL
Qty: 90 CAPSULE | Refills: 1 | Status: SHIPPED | OUTPATIENT
Start: 2022-09-06

## 2022-09-14 DIAGNOSIS — E78.5 HYPERLIPIDEMIA, UNSPECIFIED HYPERLIPIDEMIA TYPE: Primary | ICD-10-CM

## 2022-09-14 RX ORDER — ATORVASTATIN CALCIUM 80 MG/1
TABLET, FILM COATED ORAL
Qty: 90 TABLET | Refills: 0 | Status: SHIPPED | OUTPATIENT
Start: 2022-09-14

## 2022-09-22 DIAGNOSIS — R25.1 TREMORS OF NERVOUS SYSTEM: Primary | ICD-10-CM

## 2022-09-22 RX ORDER — PRIMIDONE 50 MG/1
TABLET ORAL
Qty: 90 TABLET | Refills: 0 | Status: SHIPPED | OUTPATIENT
Start: 2022-09-22

## 2022-10-12 ENCOUNTER — OFFICE VISIT (OUTPATIENT)
Dept: FAMILY MEDICINE CLINIC | Facility: CLINIC | Age: 82
End: 2022-10-12
Payer: MEDICARE

## 2022-10-12 DIAGNOSIS — Z13.0 SCREENING FOR DEFICIENCY ANEMIA: ICD-10-CM

## 2022-10-12 DIAGNOSIS — E78.5 DYSLIPIDEMIA: ICD-10-CM

## 2022-10-12 DIAGNOSIS — F41.9 ANXIETY: ICD-10-CM

## 2022-10-12 DIAGNOSIS — R73.03 PRE-DIABETES: ICD-10-CM

## 2022-10-12 DIAGNOSIS — L30.9 DERMATITIS: ICD-10-CM

## 2022-10-12 DIAGNOSIS — K21.9 GASTROESOPHAGEAL REFLUX DISEASE WITHOUT ESOPHAGITIS: ICD-10-CM

## 2022-10-12 DIAGNOSIS — N39.0 URINARY TRACT INFECTION WITHOUT HEMATURIA, SITE UNSPECIFIED: ICD-10-CM

## 2022-10-12 DIAGNOSIS — E04.1 THYROID NODULE: ICD-10-CM

## 2022-10-12 DIAGNOSIS — Z23 ENCOUNTER FOR IMMUNIZATION: Primary | ICD-10-CM

## 2022-10-12 DIAGNOSIS — I10 HYPERTENSION: ICD-10-CM

## 2022-10-12 DIAGNOSIS — I10 ESSENTIAL HYPERTENSION: ICD-10-CM

## 2022-10-12 DIAGNOSIS — G47.34 NOCTURNAL HYPOXEMIA: ICD-10-CM

## 2022-10-12 DIAGNOSIS — E03.9 ACQUIRED HYPOTHYROIDISM: ICD-10-CM

## 2022-10-12 PROCEDURE — G0008 ADMIN INFLUENZA VIRUS VAC: HCPCS | Performed by: INTERNAL MEDICINE

## 2022-10-12 PROCEDURE — 90662 IIV NO PRSV INCREASED AG IM: CPT | Performed by: INTERNAL MEDICINE

## 2022-10-12 PROCEDURE — 99214 OFFICE O/P EST MOD 30 MIN: CPT | Performed by: INTERNAL MEDICINE

## 2022-10-12 RX ORDER — AMLODIPINE BESYLATE 2.5 MG/1
2.5 TABLET ORAL EVERY EVENING
Qty: 90 TABLET | Refills: 0 | Status: SHIPPED | OUTPATIENT
Start: 2022-10-12

## 2022-10-12 RX ORDER — TRIAMCINOLONE ACETONIDE 5 MG/G
CREAM TOPICAL 2 TIMES DAILY
Qty: 30 G | Refills: 0 | Status: SHIPPED | OUTPATIENT
Start: 2022-10-12

## 2022-10-12 RX ORDER — CLONAZEPAM 0.5 MG/1
0.5 TABLET ORAL
Qty: 30 TABLET | Refills: 0 | Status: SHIPPED | OUTPATIENT
Start: 2022-10-12

## 2022-10-12 RX ORDER — LEVOTHYROXINE SODIUM 88 UG/1
88 TABLET ORAL DAILY
Qty: 90 TABLET | Refills: 0 | Status: SHIPPED | OUTPATIENT
Start: 2022-10-12

## 2022-10-12 NOTE — PROGRESS NOTES
Office Visit Note  10/13/22     Leodan Baker 78 y o  female MRN: [id number]  : 1940    Assessment:     1  Encounter for immunization  -     influenza vaccine, high-dose, PF 0 5 mL    2  Acquired hypothyroidism  Assessment & Plan:  Patient with hypothyroidism currently taking 88 mcg of levothyroxine last T4 level 0 91 TSH level is 2 2 to will continue with the same dose of medication follow up with repeat lab  Orders:  -     levothyroxine 88 mcg tablet; Take 1 tablet (88 mcg total) by mouth daily  -     TSH, 3rd generation with Free T4 reflex; Future; Expected date: 2022    3  Dyslipidemia  Assessment & Plan:  Patient with history of dyslipidemia currently taking atorvastatin 80  Lipid profile shows cholesterol 125 triglycerides 159 HDL is 35 and LDL is 58   Will continue with the current dose follow up with repeat lab  Orders:  -     Lipid panel; Future    4  Essential hypertension  Assessment & Plan:  Patient with a history of hypertension currently taking amlodipine 2 5 mg daily  Today's blood pressure is 116/72       5  Nocturnal hypoxemia  Assessment & Plan:  Patient with a history of having nocturnal hypoxemia currently using 3 L of oxygen at bedtime and is being followed by the pulmonary physician which she has not seen him for a while now  Recommend a follow-up with the pulmonary physician also  6  Hypertension  -     amLODIPine (NORVASC) 2 5 mg tablet; Take 1 tablet (2 5 mg total) by mouth every evening  -     Comprehensive metabolic panel; Future    7  Thyroid nodule  Assessment & Plan:  Patient had a thyroid nodule noted on the ultrasound which met the criteria for the biopsy  The nodule was on the right side  Patient was seen by the endocrinologist soon recommended the biopsy but she has not gone for it yet  Orders:  -     levothyroxine 88 mcg tablet; Take 1 tablet (88 mcg total) by mouth daily  -     TSH, 3rd generation with Free T4 reflex;  Future; Expected date: 12/23/2022    8  Anxiety  -     clonazePAM (KlonoPIN) 0 5 mg tablet; Take 1 tablet (0 5 mg total) by mouth daily at bedtime as needed for anxiety    9  Screening for deficiency anemia  -     CBC and differential; Future    10  Pre-diabetes  -     Hemoglobin A1C; Future; Expected date: 10/26/2022    11  Urinary tract infection without hematuria, site unspecified  -     UA w Reflex to Microscopic w Reflex to Culture; Future; Expected date: 10/12/2022    12  Gastroesophageal reflux disease without esophagitis  Assessment & Plan:  Patient with recurrent GERD symptoms currently taking pantoprazole 40 mg daily will continue with the same  Recommend at a later date an EGD  13  Dermatitis  -     triamcinolone (KENALOG) 0 5 % cream; Apply topically 2 (two) times a day        Discussion Summary and Plan: Today's care plan and medications were reviewed with patient in detail and all their questions answered to their satisfaction  Chief Complaint   Patient presents with   • Follow-up      Subjective:  Patient has come for evaluation regarding symptoms anxiety depression  Her daughter passed away recently  Complains of not able to focus concentrate  Denies any symptoms of chest pains palpitation shortness of breath  Reviewed all the medications with the patient at length  The following portions of the patient's history were reviewed and updated as appropriate: allergies, current medications, past family history, past medical history, past social history, past surgical history and problem list     Review of Systems   Constitutional: Negative for chills and fever  HENT: Negative for ear pain and sore throat  Eyes: Negative for pain and visual disturbance  Respiratory: Negative for cough and shortness of breath  Cardiovascular: Negative for chest pain and palpitations  Gastrointestinal: Negative for abdominal pain and vomiting  Genitourinary: Negative for dysuria and hematuria     Musculoskeletal: Negative for arthralgias and back pain  Skin: Negative for color change and rash  Neurological: Negative for seizures and syncope  All other systems reviewed and are negative          Historical Information   Patient Active Problem List   Diagnosis   • Dyspnea on exertion   • Dizziness   • Abnormal CT of the chest   • Chronic hypoxemic respiratory failure (HCC)   • Hypoxemia   • Seasonal allergic rhinitis due to pollen   • Essential hypertension   • Dyslipidemia   • History of transient ischemic attack (TIA)   • Acquired hypothyroidism   • Thyroid nodule   • Palpitations   • Tremor   • Nocturnal hypoxemia   • Encounter for immunization   • Gastroesophageal reflux disease without esophagitis     Past Medical History:   Diagnosis Date   • Anxiety    • Blind left eye    • Deaf, left    • Depression    • Disease of thyroid gland    • DVT complicating pregnancy, unspecified trimester ([facility] [address] ) postpartum   • Hearing loss     LEFT EAR   • History of shingles 2007    fACIAL    • Lyme disease    • Meniere's disease    • Obesity    • Orthostatic hypotension    • Sinus congestion    • Sleep apnea      Past Surgical History:   Procedure Laterality Date   • APPENDECTOMY     • BREAST BIOPSY Left 2010   •  SECTION      x2   • DXA PROCEDURE (HISTORICAL)  10/28/2020   • EYE SURGERY     • HAND SURGERY Left    • HERNIA REPAIR     • HYSTERECTOMY     • ROTATOR CUFF REPAIR Left    • TONSILLECTOMY     • TYMPANOSTOMY TUBE PLACEMENT       Social History     Substance and Sexual Activity   Alcohol Use Never     Social History     Substance and Sexual Activity   Drug Use Never     Social History     Tobacco Use   Smoking Status Former Smoker   • Packs/day: 0 75   • Years: 44 00   • Pack years: 33 00   • Quit date: 1990   • Years since quittin 7   Smokeless Tobacco Never Used     Family History   Problem Relation Age of Onset   • Depression Mother    • Hypertension Mother    • Obesity Mother    • Depression Father    • Alcohol abuse Father    • Stroke Father    • Breast cancer Sister 76   • Ovarian cancer Daughter 48   • BRCA1 Negative Daughter    • BRCA2 Negative Daughter      Health Maintenance Due   Topic   • Medicare Annual Wellness Visit (AWV)    • Pneumococcal Vaccine: 65+ Years (1 - PCV)   • Depression Screening    • BMI: Followup Plan    • Fall Risk    • Urinary Incontinence Screening    • COVID-19 Vaccine (4 - Booster for Moderna series)      Meds/Allergies       Current Outpatient Medications:   •  amLODIPine (NORVASC) 2 5 mg tablet, Take 1 tablet (2 5 mg total) by mouth every evening, Disp: 90 tablet, Rfl: 0  •  atorvastatin (LIPITOR) 80 mg tablet, TAKE 1 TABLET BY MOUTH EVERY NIGHT AT BEDTIME, Disp: 90 tablet, Rfl: 0  •  Calcium Carbonate-Vit D-Min (CALCIUM 1200 PO), Take 1,200 mg by mouth daily, Disp: , Rfl:   •  cetirizine (ZyrTEC) 10 mg tablet, Take 10 mg by mouth daily, Disp: , Rfl:   •  Cholecalciferol (D3-1000 PO), Take 2,000 Units by mouth daily  , Disp: , Rfl:   •  clonazePAM (KlonoPIN) 0 5 mg tablet, Take 1 tablet (0 5 mg total) by mouth daily at bedtime as needed for anxiety, Disp: 30 tablet, Rfl: 0  •  clopidogrel (PLAVIX) 75 mg tablet, Take 1 tablet (75 mg total) by mouth daily, Disp: 30 tablet, Rfl: 0  •  DULoxetine (CYMBALTA) 60 mg delayed release capsule, TAKE ONE CAPSULE BY MOUTH EVERY DAY, Disp: 90 capsule, Rfl: 1  •  levothyroxine 88 mcg tablet, Take 1 tablet (88 mcg total) by mouth daily, Disp: 90 tablet, Rfl: 0  •  mometasone (NASONEX) 50 mcg/act nasal spray, 1 spray into each nostril daily  , Disp: , Rfl:   •  nebivolol (BYSTOLIC) 5 mg tablet, TAKE 1 TABLET BY MOUTH EVERY MORNING, Disp: 90 tablet, Rfl: 1  •  pantoprazole (PROTONIX) 40 mg tablet, 40 mg daily, Disp: , Rfl:   •  potassium chloride (K-DUR,KLOR-CON) 20 mEq tablet, Take 20 mEq by mouth once a week , Disp: , Rfl: 1  •  primidone (MYSOLINE) 50 mg tablet, TAKE 1 TABLET BY MOUTH EVERY DAY, Disp: 90 tablet, Rfl: 0  •  triamcinolone (KENALOG) 0 5 % cream, Apply topically 2 (two) times a day, Disp: 30 g, Rfl: 0  •  clobetasol (TEMOVATE) 0 05 % cream, Apply 1 application topically as needed To affected area (Patient not taking: No sig reported), Disp: , Rfl:   •  PREVIDENT 5000 BOOSTER PLUS 1 1 % PSTE, USE UTD (Patient not taking: Reported on 10/12/2022), Disp: , Rfl:       Objective:    Vitals:   /72 (BP Location: Right arm, Patient Position: Sitting, Cuff Size: Standard)   Pulse 62   Ht 5' 5 5" (1 664 m)   Wt 84 4 kg (186 lb)   SpO2 95%   BMI 30 48 kg/m²   Body mass index is 30 48 kg/m²  Vitals:    10/12/22 1543   Weight: 84 4 kg (186 lb)       Physical Exam  Vitals and nursing note reviewed  Constitutional:       Appearance: Normal appearance  Cardiovascular:      Rate and Rhythm: Normal rate and regular rhythm  Heart sounds: Normal heart sounds  Pulmonary:      Effort: Pulmonary effort is normal       Breath sounds: Normal breath sounds  Abdominal:      General: Abdomen is flat  Palpations: Abdomen is soft  Musculoskeletal:      Cervical back: Normal range of motion and neck supple  Right lower leg: No edema  Left lower leg: No edema  Neurological:      Mental Status: She is alert and oriented to person, place, and time  Lab Review   No visits with results within 2 Month(s) from this visit     Latest known visit with results is:   Appointment on 12/09/2021   Component Date Value Ref Range Status   • Amylase 12/09/2021 32  25 - 115 IU/L Final   • WBC 12/09/2021 11 13 (A) 4 31 - 10 16 Thousand/uL Final   • RBC 12/09/2021 4 38  3 81 - 5 12 Million/uL Final   • Hemoglobin 12/09/2021 13 5  11 5 - 15 4 g/dL Final   • Hematocrit 12/09/2021 43 0  34 8 - 46 1 % Final   • MCV 12/09/2021 98  82 - 98 fL Final   • MCH 12/09/2021 30 8  26 8 - 34 3 pg Final   • MCHC 12/09/2021 31 4  31 4 - 37 4 g/dL Final   • RDW 12/09/2021 14 3  11 6 - 15 1 % Final   • MPV 12/09/2021 10 9  8 9 - 12 7 fL Final   • Platelets 12/09/2021 267  149 - 390 Thousands/uL Final   • nRBC 12/09/2021 0  /100 WBCs Final   • Neutrophils Relative 12/09/2021 66  43 - 75 % Final   • Immat GRANS % 12/09/2021 1  0 - 2 % Final   • Lymphocytes Relative 12/09/2021 21  14 - 44 % Final   • Monocytes Relative 12/09/2021 7  4 - 12 % Final   • Eosinophils Relative 12/09/2021 4  0 - 6 % Final   • Basophils Relative 12/09/2021 1  0 - 1 % Final   • Neutrophils Absolute 12/09/2021 7 55  1 85 - 7 62 Thousands/µL Final   • Immature Grans Absolute 12/09/2021 0 06  0 00 - 0 20 Thousand/uL Final   • Lymphocytes Absolute 12/09/2021 2 30  0 60 - 4 47 Thousands/µL Final   • Monocytes Absolute 12/09/2021 0 72  0 17 - 1 22 Thousand/µL Final   • Eosinophils Absolute 12/09/2021 0 41  0 00 - 0 61 Thousand/µL Final   • Basophils Absolute 12/09/2021 0 09  0 00 - 0 10 Thousands/µL Final   • Sodium 12/09/2021 143  136 - 145 mmol/L Final   • Potassium 12/09/2021 4 3  3 5 - 5 3 mmol/L Final   • Chloride 12/09/2021 104  100 - 108 mmol/L Final   • CO2 12/09/2021 30  21 - 32 mmol/L Final   • ANION GAP 12/09/2021 9  4 - 13 mmol/L Final   • BUN 12/09/2021 18  5 - 25 mg/dL Final   • Creatinine 12/09/2021 0 72  0 60 - 1 30 mg/dL Final    Standardized to IDMS reference method   • Glucose, Fasting 12/09/2021 153 (A) 65 - 99 mg/dL Final    Specimen collection should occur prior to Sulfasalazine administration due to the potential for falsely depressed results  Specimen collection should occur prior to Sulfapyridine administration due to the potential for falsely elevated results  • Calcium 12/09/2021 9 6  8 3 - 10 1 mg/dL Final   • Corrected Calcium 12/09/2021 10 1  8 3 - 10 1 mg/dL Final   • AST 12/09/2021 56 (A) 5 - 45 U/L Final    Specimen collection should occur prior to Sulfasalazine administration due to the potential for falsely depressed results      • ALT 12/09/2021 53  12 - 78 U/L Final    Specimen collection should occur prior to Sulfasalazine administration due to the potential for falsely depressed results  • Alkaline Phosphatase 12/09/2021 101  46 - 116 U/L Final   • Total Protein 12/09/2021 7 0  6 4 - 8 2 g/dL Final   • Albumin 12/09/2021 3 4 (A) 3 5 - 5 0 g/dL Final   • Total Bilirubin 12/09/2021 0 41  0 20 - 1 00 mg/dL Final    Use of this assay is not recommended for patients undergoing treatment with eltrombopag due to the potential for falsely elevated results  • eGFR 12/09/2021 78  ml/min/1 73sq m Final   • Lipase 12/09/2021 129  73 - 393 u/L Final   • TSH 3RD GENERATON 12/09/2021 2 226  0 358 - 3 740 uIU/mL Final    The recommended reference ranges for TSH during pregnancy are as follows:   First trimester 0 1 to 2 5 uIU/mL   Second trimester  0 2 to 3 0 uIU/mL   Third trimester 0 3 to 3 0 uIU/m    Note: Normal ranges may not apply to patients who are transgender, non-binary, or whose legal sex, sex at birth, and gender identity differ  • Free T4 12/09/2021 0 91  0 76 - 1 46 ng/dL Final    Specimen collection should occur prior to Sulfasalazine administration due to the potential for falsely elevated results  Vianney Gaspar        "This note has been constructed using a voice recognition system  Therefore there may be syntax, spelling, and/or grammatical errors   Please call if you have any questions  "

## 2022-10-13 VITALS
DIASTOLIC BLOOD PRESSURE: 72 MMHG | WEIGHT: 186 LBS | HEART RATE: 62 BPM | OXYGEN SATURATION: 95 % | SYSTOLIC BLOOD PRESSURE: 116 MMHG | HEIGHT: 66 IN | BODY MASS INDEX: 29.89 KG/M2

## 2022-10-13 NOTE — ASSESSMENT & PLAN NOTE
Patient had a thyroid nodule noted on the ultrasound which met the criteria for the biopsy  The nodule was on the right side  Patient was seen by the endocrinologist soon recommended the biopsy but she has not gone for it yet

## 2022-10-13 NOTE — ASSESSMENT & PLAN NOTE
Patient with a history of hypertension currently taking amlodipine 2 5 mg daily  Today's blood pressure is 116/72

## 2022-10-13 NOTE — ASSESSMENT & PLAN NOTE
Patient with hypothyroidism currently taking 88 mcg of levothyroxine last T4 level 0 91 TSH level is 2 2 to will continue with the same dose of medication follow up with repeat lab

## 2022-10-13 NOTE — ASSESSMENT & PLAN NOTE
Patient with a history of having nocturnal hypoxemia currently using 3 L of oxygen at bedtime and is being followed by the pulmonary physician which she has not seen him for a while now  Recommend a follow-up with the pulmonary physician also

## 2022-10-13 NOTE — ASSESSMENT & PLAN NOTE
Patient with recurrent GERD symptoms currently taking pantoprazole 40 mg daily will continue with the same  Recommend at a later date an EGD

## 2022-10-13 NOTE — ASSESSMENT & PLAN NOTE
Patient with history of dyslipidemia currently taking atorvastatin 80  Lipid profile shows cholesterol 125 triglycerides 159 HDL is 35 and LDL is 58   Will continue with the current dose follow up with repeat lab

## 2022-11-03 ENCOUNTER — OFFICE VISIT (OUTPATIENT)
Dept: FAMILY MEDICINE CLINIC | Facility: CLINIC | Age: 82
End: 2022-11-03

## 2022-11-03 ENCOUNTER — HOSPITAL ENCOUNTER (OUTPATIENT)
Dept: RADIOLOGY | Facility: HOSPITAL | Age: 82
Discharge: HOME | End: 2022-11-03
Attending: INTERNAL MEDICINE

## 2022-11-03 VITALS
DIASTOLIC BLOOD PRESSURE: 70 MMHG | OXYGEN SATURATION: 96 % | HEIGHT: 66 IN | SYSTOLIC BLOOD PRESSURE: 120 MMHG | WEIGHT: 185 LBS | BODY MASS INDEX: 29.73 KG/M2 | HEART RATE: 71 BPM

## 2022-11-03 DIAGNOSIS — I10 ESSENTIAL HYPERTENSION: Primary | ICD-10-CM

## 2022-11-03 DIAGNOSIS — E78.5 DYSLIPIDEMIA: ICD-10-CM

## 2022-11-03 DIAGNOSIS — R22.41 LOCALIZED SWELLING OF RIGHT LOWER LEG: ICD-10-CM

## 2022-11-03 DIAGNOSIS — F41.9 ANXIETY: ICD-10-CM

## 2022-11-03 DIAGNOSIS — K21.9 GASTROESOPHAGEAL REFLUX DISEASE WITHOUT ESOPHAGITIS: ICD-10-CM

## 2022-11-03 DIAGNOSIS — Z86.73 HISTORY OF TRANSIENT ISCHEMIC ATTACK (TIA): ICD-10-CM

## 2022-11-03 DIAGNOSIS — G47.34 NOCTURNAL HYPOXEMIA: ICD-10-CM

## 2022-11-03 DIAGNOSIS — E03.9 ACQUIRED HYPOTHYROIDISM: ICD-10-CM

## 2022-11-03 NOTE — ASSESSMENT & PLAN NOTE
Patient uses oxygen 3 L at bedtime history of nocturnal hypoxemia  However patient denies any shortness of breath at this time

## 2022-11-03 NOTE — PROGRESS NOTES
Office Visit Note  22     Jeb List 78 y o  female MRN: [id number]  : 1940    Assessment:     1  Essential hypertension  Assessment & Plan:  Patient's blood pressure is stable at 1 20/70  Currently taking nebivolol 5 mg daily  2  History of transient ischemic attack (TIA)  Assessment & Plan:  History of TIA like symptoms currently taking clopidogrel 75 mg daily will continue with the same  3  Dyslipidemia  Assessment & Plan:  Patient on atorvastatin 80 mg daily repeat lipid profile is pending which was ordered the last visit      4  Gastroesophageal reflux disease without esophagitis    5  Acquired hypothyroidism  Assessment & Plan:  History of hypothyroidism currently on levothyroxine 88 mcg repeat TSH level is pending  6  Localized swelling of right lower leg  Assessment & Plan:  As mentioned in HPI patient elbow swelling/lump in the right calf area no injury no shortness of breath chest pains palpitations  History of DVT in the past   Will arrange for S tab Doppler venous study and based on the findings make decisions  Orders:  -     VAS lower limb venous duplex study, complete bilateral; Future; Expected date: 2022    7  Anxiety  Assessment & Plan:  Patient recently has been not feeling well ever since her daughter passed away currently taking duloxetine 60 mg daily and also clonazepam 0 5 mg daily p r n  patient also admits not moving around much in the recent past   She she has been having call swelling also  8  Nocturnal hypoxemia  Assessment & Plan:  Patient uses oxygen 3 L at bedtime history of nocturnal hypoxemia  However patient denies any shortness of breath at this time  Discussion Summary and Plan: Today's care plan and medications were reviewed with patient in detail and all their questions answered to their satisfaction      Chief Complaint   Patient presents with   • Leg Pain      Subjective:  Patient is coming here for evaluation regarding a lump she noted in the right calf area 3 days back  History of deep vein thrombosis in the past after childbirth and was on heparin  Denies any symptoms of shortness of breath chest pains or palpitations  There was no injury to the right leg in the recent past   However patient has been not moving around much since her daughter passed away recently  Seeing the patient on an emergency basis      The following portions of the patient's history were reviewed and updated as appropriate: allergies, current medications, past family history, past medical history, past social history, past surgical history and problem list     Review of Systems   Constitutional: Negative for chills and fever  HENT: Negative for ear pain and sore throat  Eyes: Negative for pain and visual disturbance  Respiratory: Negative for cough and shortness of breath  Cardiovascular: Negative for chest pain and palpitations  Gastrointestinal: Negative for abdominal pain and vomiting  Genitourinary: Negative for dysuria and hematuria  Musculoskeletal: Negative for arthralgias and back pain  Right leg pain and swelling   Skin: Negative for color change and rash  Neurological: Negative for seizures and syncope  All other systems reviewed and are negative          Historical Information   Patient Active Problem List   Diagnosis   • Dyspnea on exertion   • Dizziness   • Abnormal CT of the chest   • Chronic hypoxemic respiratory failure (HCC)   • Hypoxemia   • Seasonal allergic rhinitis due to pollen   • Essential hypertension   • Dyslipidemia   • History of transient ischemic attack (TIA)   • Acquired hypothyroidism   • Thyroid nodule   • Palpitations   • Tremor   • Nocturnal hypoxemia   • Encounter for immunization   • Gastroesophageal reflux disease without esophagitis   • Localized swelling of right lower leg   • Anxiety     Past Medical History:   Diagnosis Date   • Anxiety    • Blind left eye    • Deaf, left • Depression    • Disease of thyroid gland    • DVT complicating pregnancy, unspecified trimester ([facility] [address] ) postpartum   • Hearing loss     LEFT EAR   • History of shingles 2007    fACIAL    • Lyme disease    • Meniere's disease    • Obesity    • Orthostatic hypotension    • Sinus congestion    • Sleep apnea      Past Surgical History:   Procedure Laterality Date   • APPENDECTOMY     • BREAST BIOPSY Left 2010   •  SECTION      x2   • DXA PROCEDURE (HISTORICAL)  10/28/2020   • EYE SURGERY     • HAND SURGERY Left    • HERNIA REPAIR     • HYSTERECTOMY     • ROTATOR CUFF REPAIR Left    • TONSILLECTOMY     • TYMPANOSTOMY TUBE PLACEMENT       Social History     Substance and Sexual Activity   Alcohol Use Never     Social History     Substance and Sexual Activity   Drug Use Never     Social History     Tobacco Use   Smoking Status Former Smoker   • Packs/day: 0 75   • Years: 44 00   • Pack years: 33 00   • Quit date: 1990   • Years since quittin 7   Smokeless Tobacco Never Used     Family History   Problem Relation Age of Onset   • Depression Mother    • Hypertension Mother    • Obesity Mother    • Depression Father    • Alcohol abuse Father    • Stroke Father    • Breast cancer Sister 76   • Ovarian cancer Daughter 48   • BRCA1 Negative Daughter    • BRCA2 Negative Daughter      Health Maintenance Due   Topic   • Medicare Annual Wellness Visit (AWV)    • Pneumococcal Vaccine: 65+ Years (1 - PCV)   • Depression Screening    • BMI: Followup Plan    • Fall Risk    • Urinary Incontinence Screening    • COVID-19 Vaccine (4 - Booster for Moderna series)      Meds/Allergies       Current Outpatient Medications:   •  amLODIPine (NORVASC) 2 5 mg tablet, Take 1 tablet (2 5 mg total) by mouth every evening, Disp: 90 tablet, Rfl: 0  •  atorvastatin (LIPITOR) 80 mg tablet, TAKE 1 TABLET BY MOUTH EVERY NIGHT AT BEDTIME, Disp: 90 tablet, Rfl: 0  •  Calcium Carbonate-Vit D-Min (CALCIUM 1200 PO), Take 1,200 mg by mouth daily, Disp: , Rfl:   •  cetirizine (ZyrTEC) 10 mg tablet, Take 10 mg by mouth daily, Disp: , Rfl:   •  Cholecalciferol (D3-1000 PO), Take 2,000 Units by mouth daily  , Disp: , Rfl:   •  clobetasol (TEMOVATE) 0 05 % cream, Apply 1 application topically as needed To affected area, Disp: , Rfl:   •  clonazePAM (KlonoPIN) 0 5 mg tablet, Take 1 tablet (0 5 mg total) by mouth daily at bedtime as needed for anxiety, Disp: 30 tablet, Rfl: 0  •  clopidogrel (PLAVIX) 75 mg tablet, Take 1 tablet (75 mg total) by mouth daily, Disp: 30 tablet, Rfl: 0  •  DULoxetine (CYMBALTA) 60 mg delayed release capsule, TAKE ONE CAPSULE BY MOUTH EVERY DAY, Disp: 90 capsule, Rfl: 1  •  levothyroxine 88 mcg tablet, Take 1 tablet (88 mcg total) by mouth daily, Disp: 90 tablet, Rfl: 0  •  mometasone (NASONEX) 50 mcg/act nasal spray, 1 spray into each nostril daily  , Disp: , Rfl:   •  nebivolol (BYSTOLIC) 5 mg tablet, TAKE 1 TABLET BY MOUTH EVERY MORNING, Disp: 90 tablet, Rfl: 1  •  pantoprazole (PROTONIX) 40 mg tablet, 40 mg daily, Disp: , Rfl:   •  potassium chloride (K-DUR,KLOR-CON) 20 mEq tablet, Take 20 mEq by mouth once a week , Disp: , Rfl: 1  •  primidone (MYSOLINE) 50 mg tablet, TAKE 1 TABLET BY MOUTH EVERY DAY, Disp: 90 tablet, Rfl: 0  •  triamcinolone (KENALOG) 0 5 % cream, Apply topically 2 (two) times a day, Disp: 30 g, Rfl: 0      Objective:    Vitals:   /70 (BP Location: Right arm, Patient Position: Sitting, Cuff Size: Standard)   Pulse 71   Ht 5' 5 5" (1 664 m)   Wt 83 9 kg (185 lb)   SpO2 96%   BMI 30 32 kg/m²   Body mass index is 30 32 kg/m²  Vitals:    11/03/22 0740   Weight: 83 9 kg (185 lb)       Physical Exam  Vitals and nursing note reviewed  Constitutional:       Appearance: Normal appearance  Cardiovascular:      Rate and Rhythm: Normal rate and regular rhythm  Heart sounds: Normal heart sounds  Pulmonary:      Effort: Pulmonary effort is normal       Breath sounds: Normal breath sounds  Musculoskeletal:      Cervical back: Normal range of motion and neck supple  Right lower leg: No edema  Left lower leg: No edema  Comments: Swelling noted in the calf area patient on the right side also has varicosities of the veins in both lower extremities   Neurological:      Mental Status: She is alert  Lab Review   No visits with results within 2 Month(s) from this visit     Latest known visit with results is:   Appointment on 12/09/2021   Component Date Value Ref Range Status   • Amylase 12/09/2021 32  25 - 115 IU/L Final   • WBC 12/09/2021 11 13 (A) 4 31 - 10 16 Thousand/uL Final   • RBC 12/09/2021 4 38  3 81 - 5 12 Million/uL Final   • Hemoglobin 12/09/2021 13 5  11 5 - 15 4 g/dL Final   • Hematocrit 12/09/2021 43 0  34 8 - 46 1 % Final   • MCV 12/09/2021 98  82 - 98 fL Final   • MCH 12/09/2021 30 8  26 8 - 34 3 pg Final   • MCHC 12/09/2021 31 4  31 4 - 37 4 g/dL Final   • RDW 12/09/2021 14 3  11 6 - 15 1 % Final   • MPV 12/09/2021 10 9  8 9 - 12 7 fL Final   • Platelets 36/57/6593 267  149 - 390 Thousands/uL Final   • nRBC 12/09/2021 0  /100 WBCs Final   • Neutrophils Relative 12/09/2021 66  43 - 75 % Final   • Immat GRANS % 12/09/2021 1  0 - 2 % Final   • Lymphocytes Relative 12/09/2021 21  14 - 44 % Final   • Monocytes Relative 12/09/2021 7  4 - 12 % Final   • Eosinophils Relative 12/09/2021 4  0 - 6 % Final   • Basophils Relative 12/09/2021 1  0 - 1 % Final   • Neutrophils Absolute 12/09/2021 7 55  1 85 - 7 62 Thousands/µL Final   • Immature Grans Absolute 12/09/2021 0 06  0 00 - 0 20 Thousand/uL Final   • Lymphocytes Absolute 12/09/2021 2 30  0 60 - 4 47 Thousands/µL Final   • Monocytes Absolute 12/09/2021 0 72  0 17 - 1 22 Thousand/µL Final   • Eosinophils Absolute 12/09/2021 0 41  0 00 - 0 61 Thousand/µL Final   • Basophils Absolute 12/09/2021 0 09  0 00 - 0 10 Thousands/µL Final   • Sodium 12/09/2021 143  136 - 145 mmol/L Final   • Potassium 12/09/2021 4 3  3 5 - 5 3 mmol/L Final   • Chloride 12/09/2021 104  100 - 108 mmol/L Final   • CO2 12/09/2021 30  21 - 32 mmol/L Final   • ANION GAP 12/09/2021 9  4 - 13 mmol/L Final   • BUN 12/09/2021 18  5 - 25 mg/dL Final   • Creatinine 12/09/2021 0 72  0 60 - 1 30 mg/dL Final    Standardized to IDMS reference method   • Glucose, Fasting 12/09/2021 153 (A) 65 - 99 mg/dL Final    Specimen collection should occur prior to Sulfasalazine administration due to the potential for falsely depressed results  Specimen collection should occur prior to Sulfapyridine administration due to the potential for falsely elevated results  • Calcium 12/09/2021 9 6  8 3 - 10 1 mg/dL Final   • Corrected Calcium 12/09/2021 10 1  8 3 - 10 1 mg/dL Final   • AST 12/09/2021 56 (A) 5 - 45 U/L Final    Specimen collection should occur prior to Sulfasalazine administration due to the potential for falsely depressed results  • ALT 12/09/2021 53  12 - 78 U/L Final    Specimen collection should occur prior to Sulfasalazine administration due to the potential for falsely depressed results  • Alkaline Phosphatase 12/09/2021 101  46 - 116 U/L Final   • Total Protein 12/09/2021 7 0  6 4 - 8 2 g/dL Final   • Albumin 12/09/2021 3 4 (A) 3 5 - 5 0 g/dL Final   • Total Bilirubin 12/09/2021 0 41  0 20 - 1 00 mg/dL Final    Use of this assay is not recommended for patients undergoing treatment with eltrombopag due to the potential for falsely elevated results  • eGFR 12/09/2021 78  ml/min/1 73sq m Final   • Lipase 12/09/2021 129  73 - 393 u/L Final   • TSH 3RD GENERATON 12/09/2021 2 226  0 358 - 3 740 uIU/mL Final    The recommended reference ranges for TSH during pregnancy are as follows:   First trimester 0 1 to 2 5 uIU/mL   Second trimester  0 2 to 3 0 uIU/mL   Third trimester 0 3 to 3 0 uIU/m    Note: Normal ranges may not apply to patients who are transgender, non-binary, or whose legal sex, sex at birth, and gender identity differ     • Free T4 12/09/2021 0 91  0 76 - 1 46 ng/dL Final    Specimen collection should occur prior to Sulfasalazine administration due to the potential for falsely elevated results  Estuardo Trevino        "This note has been constructed using a voice recognition system  Therefore there may be syntax, spelling, and/or grammatical errors   Please call if you have any questions  "

## 2022-11-03 NOTE — ASSESSMENT & PLAN NOTE
As mentioned in HPI patient elbow swelling/lump in the right calf area no injury no shortness of breath chest pains palpitations  History of DVT in the past   Will arrange for S tab Doppler venous study and based on the findings make decisions

## 2022-11-03 NOTE — ASSESSMENT & PLAN NOTE
Patient on atorvastatin 80 mg daily repeat lipid profile is pending which was ordered the last visit

## 2022-11-07 ENCOUNTER — TELEPHONE (OUTPATIENT)
Dept: CARDIOLOGY CLINIC | Facility: CLINIC | Age: 82
End: 2022-11-07

## 2022-11-21 ENCOUNTER — TELEPHONE (OUTPATIENT)
Dept: FAMILY MEDICINE CLINIC | Facility: CLINIC | Age: 82
End: 2022-11-21

## 2022-11-30 ENCOUNTER — OFFICE VISIT (OUTPATIENT)
Dept: FAMILY MEDICINE CLINIC | Facility: CLINIC | Age: 82
End: 2022-11-30

## 2022-11-30 VITALS
HEART RATE: 73 BPM | OXYGEN SATURATION: 95 % | WEIGHT: 183 LBS | SYSTOLIC BLOOD PRESSURE: 126 MMHG | HEIGHT: 66 IN | DIASTOLIC BLOOD PRESSURE: 76 MMHG | BODY MASS INDEX: 29.41 KG/M2

## 2022-11-30 DIAGNOSIS — R29.6 FREQUENT FALLS: Primary | ICD-10-CM

## 2022-11-30 DIAGNOSIS — K21.9 GASTROESOPHAGEAL REFLUX DISEASE WITHOUT ESOPHAGITIS: ICD-10-CM

## 2022-11-30 DIAGNOSIS — I63.9 CVA (CEREBRAL VASCULAR ACCIDENT) (HCC): ICD-10-CM

## 2022-11-30 DIAGNOSIS — Z86.73 HISTORY OF TRANSIENT ISCHEMIC ATTACK (TIA): ICD-10-CM

## 2022-11-30 DIAGNOSIS — I10 ESSENTIAL HYPERTENSION: ICD-10-CM

## 2022-11-30 DIAGNOSIS — F41.9 ANXIETY: ICD-10-CM

## 2022-11-30 DIAGNOSIS — E03.9 ACQUIRED HYPOTHYROIDISM: ICD-10-CM

## 2022-11-30 DIAGNOSIS — F33.40 RECURRENT MAJOR DEPRESSIVE DISORDER, IN REMISSION (HCC): ICD-10-CM

## 2022-11-30 DIAGNOSIS — R09.02 HYPOXEMIA: ICD-10-CM

## 2022-11-30 DIAGNOSIS — E78.5 DYSLIPIDEMIA: ICD-10-CM

## 2022-11-30 RX ORDER — DULOXETIN HYDROCHLORIDE 60 MG/1
60 CAPSULE, DELAYED RELEASE ORAL DAILY
Qty: 90 CAPSULE | Refills: 1 | Status: SHIPPED | OUTPATIENT
Start: 2022-11-30

## 2022-11-30 RX ORDER — CLONAZEPAM 0.5 MG/1
0.5 TABLET ORAL
Qty: 30 TABLET | Refills: 0 | Status: SHIPPED | OUTPATIENT
Start: 2022-11-30

## 2022-11-30 RX ORDER — CLOPIDOGREL BISULFATE 75 MG/1
75 TABLET ORAL DAILY
Qty: 30 TABLET | Refills: 6 | Status: SHIPPED | OUTPATIENT
Start: 2022-11-30

## 2022-11-30 NOTE — ASSESSMENT & PLAN NOTE
Patient still with anxiety currently taking clonazepam at bedtime p r n    Has been under lot of stress in the recent past

## 2022-11-30 NOTE — PROGRESS NOTES
Office Visit Note  22     Debbie Cuevas 78 y o  female MRN: [id number]  : 1940    Assessment:     1  Frequent falls  Assessment & Plan:  Patient with a history of frequent falls in the past also attributed to orthostatic hypotension  She has also history of TIA in the past currently she is wearing Cleve stockings  She denies any symptoms in the recent fall with any dizziness chest pains palpitations prior to the fall  Exam is unremarkable except for the blood pressure  Will get MRI of the brain    Orders:  -     Ambulatory Referral to Neurology; Future  -     Ambulatory Referral to Physical Therapy; Future  -     MRI brain wo contrast; Future; Expected date: 2022  -     Ambulatory Referral to Physical Therapy; Future    2  CVA (cerebral vascular accident) ([address] )  -     clopidogrel (PLAVIX) 75 mg tablet; Take 1 tablet (75 mg total) by mouth daily    3  Recurrent major depressive disorder, in remission (HCC)  -     DULoxetine (CYMBALTA) 60 mg delayed release capsule; Take 1 capsule (60 mg total) by mouth daily    4  Anxiety  Assessment & Plan:  Patient still with anxiety currently taking clonazepam at bedtime p r n  Has been under lot of stress in the recent past     Orders:  -     clonazePAM (KlonoPIN) 0 5 mg tablet; Take 1 tablet (0 5 mg total) by mouth daily at bedtime as needed for anxiety    5  Gastroesophageal reflux disease without esophagitis  Assessment & Plan:  Patient currently taking pantoprazole 40 mg daily will continue the same discussed with patient regarding diet keeping the head end of the bed up while sleeping      6  Acquired hypothyroidism  Assessment & Plan:  Patient has not gone for a repeat blood test yet currently taking levothyroxine 88 mcg will continue the same  7  History of transient ischemic attack (TIA)  Assessment & Plan:  History of transient ischemic attacks on clopidogrel will continue      8   Dyslipidemia  Assessment & Plan:  Patient has not gone for the blood work at continue atorvastatin 80 mg at HS      9  Essential hypertension  Assessment & Plan:  Patient's blood pressure 126/76 she has history of orthostatic blood pressures on standing up her pressure is 100/60  Discussed with patient regarding getting over the bed and out of the chair slowly  10  Hypoxemia  Assessment & Plan:  Patient uses 2 L oxygen the nighttime  Discussion Summary and Plan: Today's care plan and medications were reviewed with patient in detail and all their questions answered to their satisfaction  Chief Complaint   Patient presents with   • Follow-up     Had a fall, went blank and fell  Did not go to ER, could not get up off the floor  Daughter and  came and got her up  Subjective:  Patient has come in for evaluation regarding symptoms of frequent falls   She recently had a fall without any warning she landed on her right side injuring her right thigh area causing large hematoma and also injured her right ribcage area  There was no loss of consciousness no head injury  History of falls in the past also she had near syncopal episodes she has orthostatic hypotension  For which she was in the hospital and had an extensive workup  However she feels this fall his little different  She feels very unsteady with the gait  Discussed with the patient at length various causes including orthostatic hypotension       The following portions of the patient's history were reviewed and updated as appropriate: allergies, current medications, past family history, past medical history, past social history, past surgical history and problem list     Review of Systems   Constitutional: Positive for fatigue  Negative for chills and fever  HENT: Negative for ear pain and sore throat  Eyes: Negative for pain and visual disturbance  Respiratory: Negative for cough and shortness of breath  Cardiovascular: Negative for chest pain and palpitations  Gastrointestinal: Negative for abdominal pain and vomiting  Genitourinary: Negative for dysuria and hematuria  Musculoskeletal: Positive for arthralgias  Negative for back pain  Skin: Negative for color change and rash  Neurological: Negative for seizures and syncope  All other systems reviewed and are negative          Historical Information   Patient Active Problem List   Diagnosis   • Dyspnea on exertion   • Dizziness   • Abnormal CT of the chest   • Chronic hypoxemic respiratory failure (HCC)   • Hypoxemia   • Seasonal allergic rhinitis due to pollen   • Essential hypertension   • Dyslipidemia   • History of transient ischemic attack (TIA)   • Acquired hypothyroidism   • Thyroid nodule   • Palpitations   • Tremor   • Nocturnal hypoxemia   • Encounter for immunization   • Gastroesophageal reflux disease without esophagitis   • Localized swelling of right lower leg   • Anxiety   • Frequent falls     Past Medical History:   Diagnosis Date   • Anxiety    • Blind left eye    • Deaf, left    • Depression    • Disease of thyroid gland    • DVT complicating pregnancy, unspecified trimester ([facility] ) postpartum   • Hearing loss     LEFT EAR   • History of shingles 2007    fACIAL    • Lyme disease    • Meniere's disease    • Obesity    • Orthostatic hypotension    • Sinus congestion    • Sleep apnea      Past Surgical History:   Procedure Laterality Date   • APPENDECTOMY     • BREAST BIOPSY Left 2010   •  SECTION      x2   • DXA PROCEDURE (HISTORICAL)  10/28/2020   • EYE SURGERY     • HAND SURGERY Left    • HERNIA REPAIR     • HYSTERECTOMY     • ROTATOR CUFF REPAIR Left    • TONSILLECTOMY     • TYMPANOSTOMY TUBE PLACEMENT       Social History     Substance and Sexual Activity   Alcohol Use Never     Social History     Substance and Sexual Activity   Drug Use Never     Social History     Tobacco Use   Smoking Status Former   • Packs/day: 0 75   • Years: 44 00   • Pack years: 33 00   • Types: Cigarettes   • Quit date: 1990   • Years since quittin 8   Smokeless Tobacco Never     Family History   Problem Relation Age of Onset   • Depression Mother    • Hypertension Mother    • Obesity Mother    • Depression Father    • Alcohol abuse Father    • Stroke Father    • Breast cancer Sister 76   • Ovarian cancer Daughter 48   • BRCA1 Negative Daughter    • BRCA2 Negative Daughter      Health Maintenance Due   Topic   • Medicare Annual Wellness Visit (AWV)    • Hepatitis B Vaccine (1 of 3 - 3-dose series)   • Pneumococcal Vaccine: 65+ Years (1 - PCV)   • Depression Screening    • BMI: Followup Plan    • Fall Risk    • Urinary Incontinence Screening    • COVID-19 Vaccine (4 - Booster for Moderna series)      Meds/Allergies       Current Outpatient Medications:   •  amLODIPine (NORVASC) 2 5 mg tablet, TAKE 1 TABLET BY MOUTH EVERY MORNING, Disp: 90 tablet, Rfl: 1  •  atorvastatin (LIPITOR) 80 mg tablet, TAKE 1 TABLET BY MOUTH EVERY NIGHT AT BEDTIME, Disp: 90 tablet, Rfl: 0  •  Calcium Carbonate-Vit D-Min (CALCIUM 1200 PO), Take 1,200 mg by mouth daily, Disp: , Rfl:   •  cetirizine (ZyrTEC) 10 mg tablet, Take 10 mg by mouth daily, Disp: , Rfl:   •  Cholecalciferol (D3-1000 PO), Take 2,000 Units by mouth daily  , Disp: , Rfl:   •  clonazePAM (KlonoPIN) 0 5 mg tablet, Take 1 tablet (0 5 mg total) by mouth daily at bedtime as needed for anxiety, Disp: 30 tablet, Rfl: 0  •  clopidogrel (PLAVIX) 75 mg tablet, Take 1 tablet (75 mg total) by mouth daily, Disp: 30 tablet, Rfl: 6  •  DULoxetine (CYMBALTA) 60 mg delayed release capsule, Take 1 capsule (60 mg total) by mouth daily, Disp: 90 capsule, Rfl: 1  •  levothyroxine 88 mcg tablet, Take 1 tablet (88 mcg total) by mouth daily, Disp: 90 tablet, Rfl: 0  •  mometasone (NASONEX) 50 mcg/act nasal spray, 1 spray into each nostril daily  , Disp: , Rfl:   •  nebivolol (BYSTOLIC) 5 mg tablet, TAKE 1 TABLET BY MOUTH EVERY MORNING, Disp: 90 tablet, Rfl: 1  •  pantoprazole (PROTONIX) 40 mg tablet, 40 mg daily, Disp: , Rfl:   •  potassium chloride (K-DUR,KLOR-CON) 20 mEq tablet, Take 20 mEq by mouth once a week , Disp: , Rfl: 1  •  primidone (MYSOLINE) 50 mg tablet, TAKE 1 TABLET BY MOUTH EVERY DAY, Disp: 90 tablet, Rfl: 0  •  clobetasol (TEMOVATE) 0 05 % cream, Apply 1 application topically as needed To affected area (Patient not taking: Reported on 11/30/2022), Disp: , Rfl:   •  triamcinolone (KENALOG) 0 5 % cream, Apply topically 2 (two) times a day, Disp: 30 g, Rfl: 0      Objective:    Vitals:   /76 (BP Location: Right arm, Patient Position: Sitting, Cuff Size: Standard) Comment (Patient Position): Orthostatic hypotension 100/60  Pulse 73   Ht 5' 6" (1 676 m)   Wt 83 kg (183 lb)   SpO2 95%   BMI 29 54 kg/m²   Body mass index is 29 54 kg/m²  Vitals:    11/30/22 1319   Weight: 83 kg (183 lb)       Physical Exam  Vitals and nursing note reviewed  Constitutional:       Appearance: Normal appearance  Cardiovascular:      Rate and Rhythm: Normal rate and regular rhythm  Heart sounds: Normal heart sounds  Pulmonary:      Effort: Pulmonary effort is normal       Breath sounds: Normal breath sounds  Abdominal:      General: Abdomen is flat  Palpations: Abdomen is soft  Musculoskeletal:      Cervical back: Normal range of motion and neck supple  Right lower leg: No edema  Left lower leg: No edema  Neurological:      Mental Status: She is alert and oriented to person, place, and time  Lab Review   No visits with results within 2 Month(s) from this visit     Latest known visit with results is:   Appointment on 12/09/2021   Component Date Value Ref Range Status   • Amylase 12/09/2021 32  25 - 115 IU/L Final   • WBC 12/09/2021 11 13 (H)  4 31 - 10 16 Thousand/uL Final   • RBC 12/09/2021 4 38  3 81 - 5 12 Million/uL Final   • Hemoglobin 12/09/2021 13 5  11 5 - 15 4 g/dL Final   • Hematocrit 12/09/2021 43 0  34 8 - 46 1 % Final   • MCV 12/09/2021 98  82 - 98 fL Final   • MCH 12/09/2021 30 8  26 8 - 34 3 pg Final   • MCHC 12/09/2021 31 4  31 4 - 37 4 g/dL Final   • RDW 12/09/2021 14 3  11 6 - 15 1 % Final   • MPV 12/09/2021 10 9  8 9 - 12 7 fL Final   • Platelets 44/23/6219 267  149 - 390 Thousands/uL Final   • nRBC 12/09/2021 0  /100 WBCs Final   • Neutrophils Relative 12/09/2021 66  43 - 75 % Final   • Immat GRANS % 12/09/2021 1  0 - 2 % Final   • Lymphocytes Relative 12/09/2021 21  14 - 44 % Final   • Monocytes Relative 12/09/2021 7  4 - 12 % Final   • Eosinophils Relative 12/09/2021 4  0 - 6 % Final   • Basophils Relative 12/09/2021 1  0 - 1 % Final   • Neutrophils Absolute 12/09/2021 7 55  1 85 - 7 62 Thousands/µL Final   • Immature Grans Absolute 12/09/2021 0 06  0 00 - 0 20 Thousand/uL Final   • Lymphocytes Absolute 12/09/2021 2 30  0 60 - 4 47 Thousands/µL Final   • Monocytes Absolute 12/09/2021 0 72  0 17 - 1 22 Thousand/µL Final   • Eosinophils Absolute 12/09/2021 0 41  0 00 - 0 61 Thousand/µL Final   • Basophils Absolute 12/09/2021 0 09  0 00 - 0 10 Thousands/µL Final   • Sodium 12/09/2021 143  136 - 145 mmol/L Final   • Potassium 12/09/2021 4 3  3 5 - 5 3 mmol/L Final   • Chloride 12/09/2021 104  100 - 108 mmol/L Final   • CO2 12/09/2021 30  21 - 32 mmol/L Final   • ANION GAP 12/09/2021 9  4 - 13 mmol/L Final   • BUN 12/09/2021 18  5 - 25 mg/dL Final   • Creatinine 12/09/2021 0 72  0 60 - 1 30 mg/dL Final    Standardized to IDMS reference method   • Glucose, Fasting 12/09/2021 153 (H)  65 - 99 mg/dL Final    Specimen collection should occur prior to Sulfasalazine administration due to the potential for falsely depressed results  Specimen collection should occur prior to Sulfapyridine administration due to the potential for falsely elevated results     • Calcium 12/09/2021 9 6  8 3 - 10 1 mg/dL Final   • Corrected Calcium 12/09/2021 10 1  8 3 - 10 1 mg/dL Final   • AST 12/09/2021 56 (H)  5 - 45 U/L Final    Specimen collection should occur prior to Sulfasalazine administration due to the potential for falsely depressed results  • ALT 12/09/2021 53  12 - 78 U/L Final    Specimen collection should occur prior to Sulfasalazine administration due to the potential for falsely depressed results  • Alkaline Phosphatase 12/09/2021 101  46 - 116 U/L Final   • Total Protein 12/09/2021 7 0  6 4 - 8 2 g/dL Final   • Albumin 12/09/2021 3 4 (L)  3 5 - 5 0 g/dL Final   • Total Bilirubin 12/09/2021 0 41  0 20 - 1 00 mg/dL Final    Use of this assay is not recommended for patients undergoing treatment with eltrombopag due to the potential for falsely elevated results  • eGFR 12/09/2021 78  ml/min/1 73sq m Final   • Lipase 12/09/2021 129  73 - 393 u/L Final   • TSH 3RD GENERATON 12/09/2021 2 226  0 358 - 3 740 uIU/mL Final    The recommended reference ranges for TSH during pregnancy are as follows:   First trimester 0 1 to 2 5 uIU/mL   Second trimester  0 2 to 3 0 uIU/mL   Third trimester 0 3 to 3 0 uIU/m    Note: Normal ranges may not apply to patients who are transgender, non-binary, or whose legal sex, sex at birth, and gender identity differ  • Free T4 12/09/2021 0 91  0 76 - 1 46 ng/dL Final    Specimen collection should occur prior to Sulfasalazine administration due to the potential for falsely elevated results  Berkley Perez MD        "This note has been constructed using a voice recognition system  Therefore there may be syntax, spelling, and/or grammatical errors   Please call if you have any questions  "

## 2022-11-30 NOTE — ASSESSMENT & PLAN NOTE
Patient has not gone for a repeat blood test yet currently taking levothyroxine 88 mcg will continue the same

## 2022-11-30 NOTE — ASSESSMENT & PLAN NOTE
Patient currently taking pantoprazole 40 mg daily will continue the same discussed with patient regarding diet keeping the head end of the bed up while sleeping

## 2022-11-30 NOTE — ASSESSMENT & PLAN NOTE
Patient with a history of frequent falls in the past also attributed to orthostatic hypotension  She has also history of TIA in the past currently she is wearing Cleve stockings  She denies any symptoms in the recent fall with any dizziness chest pains palpitations prior to the fall  Exam is unremarkable except for the blood pressure    Will get MRI of the brain

## 2022-11-30 NOTE — ASSESSMENT & PLAN NOTE
Patient's blood pressure 126/76 she has history of orthostatic blood pressures on standing up her pressure is 100/60  Discussed with patient regarding getting over the bed and out of the chair slowly

## 2022-12-05 ENCOUNTER — RA CDI HCC (OUTPATIENT)
Dept: OTHER | Facility: HOSPITAL | Age: 82
End: 2022-12-05

## 2022-12-19 ENCOUNTER — OFFICE VISIT (OUTPATIENT)
Dept: FAMILY MEDICINE CLINIC | Facility: CLINIC | Age: 82
End: 2022-12-19

## 2022-12-19 VITALS
HEIGHT: 66 IN | DIASTOLIC BLOOD PRESSURE: 76 MMHG | SYSTOLIC BLOOD PRESSURE: 120 MMHG | HEART RATE: 82 BPM | BODY MASS INDEX: 29.41 KG/M2 | OXYGEN SATURATION: 97 % | WEIGHT: 183 LBS

## 2022-12-19 DIAGNOSIS — R10.11 RIGHT UPPER QUADRANT PAIN: ICD-10-CM

## 2022-12-19 DIAGNOSIS — R29.6 FREQUENT FALLS: ICD-10-CM

## 2022-12-19 DIAGNOSIS — E03.9 ACQUIRED HYPOTHYROIDISM: ICD-10-CM

## 2022-12-19 DIAGNOSIS — J96.11 CHRONIC HYPOXEMIC RESPIRATORY FAILURE (HCC): ICD-10-CM

## 2022-12-19 DIAGNOSIS — R10.13 DYSPEPSIA: Primary | ICD-10-CM

## 2022-12-19 DIAGNOSIS — K21.9 GASTROESOPHAGEAL REFLUX DISEASE WITHOUT ESOPHAGITIS: ICD-10-CM

## 2022-12-19 DIAGNOSIS — R42 DIZZINESS: ICD-10-CM

## 2022-12-19 DIAGNOSIS — E78.5 DYSLIPIDEMIA: ICD-10-CM

## 2022-12-19 DIAGNOSIS — I10 ESSENTIAL HYPERTENSION: ICD-10-CM

## 2022-12-19 DIAGNOSIS — F41.9 ANXIETY: ICD-10-CM

## 2022-12-19 NOTE — ASSESSMENT & PLAN NOTE
Patient on pantoprazole we will continue the same we will also get gastroenterologist input regarding this dyspepsia symptoms bloating sensation may need an EGD

## 2022-12-19 NOTE — ASSESSMENT & PLAN NOTE
Patient blood pressure is stable 120/76 standing up 110/70 recommend patient to get up slowly out of the bed and chair

## 2022-12-19 NOTE — PROGRESS NOTES
Office Visit Note  22     Jonah Fish 78 y o  female MRN: [id number]  : 1940    Assessment:     1  Dyspepsia  Assessment & Plan:  As in HPI complaining of bloating sensation dyspepsia symptoms right upper quadrant discomfort  Patient has diffuse mild tenderness on examination of the abdomen  Patient also has got diarrhea nausea at times question gastroenteritis patient is status post cholecystectomy  Will order labs and also ultrasound of the liver common bile duct follow-up with a gastroenterologist    Orders:  -     Ambulatory Referral to Gastroenterology; Future; Expected date: 2023    2  Acquired hypothyroidism  Assessment & Plan:  Patient to have a repeat lab work not done yet currently on levothyroxine 88 mcg we will continue the same and adjust the dose if necessary after looking the lab results  3  Anxiety  Assessment & Plan:  Patient with anxiety symptoms on and off she takes clonazepam at bedtime  Stress management discussed      4  Essential hypertension  Assessment & Plan:  Patient blood pressure is stable 120/76 standing up 110/70 recommend patient to get up slowly out of the bed and chair      5  Dizziness  Assessment & Plan:  Patient with symptoms of dizziness for long period of time there was some orthostatic changes in the blood pressures also in the past she wears GAGE stockings  Recommend patient drink more fluids  No orthostatic changes acute record this time      6  Gastroesophageal reflux disease without esophagitis  Assessment & Plan:  Patient on pantoprazole we will continue the same we will also get gastroenterologist input regarding this dyspepsia symptoms bloating sensation may need an EGD      7  Dyslipidemia    8  Frequent falls  Assessment & Plan:  Patient with frequent falls history of orthostatic blood pressure changes history of TIA wearing GAGE stockings  Reordered MRI of the brain but not done yet      9   Right upper quadrant pain  -     US right upper quadrant; Future; Expected date: 12/19/2022  -     Ambulatory Referral to Gastroenterology; Future; Expected date: 01/02/2023    10  Chronic hypoxemic respiratory failure (HCC)  Assessment & Plan:  Patient uses oxygen 2 L in the nighttime but sometimes she has been using in the daytime also when she feels stressed  We will continue the same  Discussion Summary and Plan: Today's care plan and medications were reviewed with patient in detail and all their questions answered to their satisfaction  Chief Complaint   Patient presents with   • Follow-up      Subjective:  Patient is coming here for evaluation regarding symptoms of nausea bloating sensation cramps feeling in the abdomen and also following also feeling very fatigued I reviewed the  notes she has written regarding the GI symptoms she has been having some episodes of diarrhea increased gas feeling she has taken over-the-counter Gas-X but it did not help much and the patient also complains of increasing pain in the right rib cage area off and on and  denies any symptoms of chest pain or palpitations and no shortness of breath on exertion patient is status post gallbladder surgery  Will recommend patient to have clear liquid diet for 24 hours we will also do an ultrasound of the liver and common bile duct also recommend  to go for the lab work which I have already ordered  We will make a referral to the gastroenterologist also      The following portions of the patient's history were reviewed and updated as appropriate: allergies, current medications, past family history, past medical history, past social history, past surgical history and problem list     Review of Systems   Constitutional: Negative for chills and fever  HENT: Negative for ear pain and sore throat  Eyes: Negative for pain and visual disturbance  Respiratory: Negative for cough and shortness of breath  Cardiovascular: Negative for chest pain and palpitations  Gastrointestinal: Negative for abdominal pain and vomiting  Dyspepsia symptoms bloating sensation right upper quadrant discomfort   Genitourinary: Negative for dysuria and hematuria  Musculoskeletal: Negative for arthralgias and back pain  Skin: Negative for color change and rash  Neurological: Negative for seizures and syncope  All other systems reviewed and are negative          Historical Information   Patient Active Problem List   Diagnosis   • Dyspnea on exertion   • Dizziness   • Abnormal CT of the chest   • Chronic hypoxemic respiratory failure (HCC)   • Seasonal allergic rhinitis due to pollen   • Essential hypertension   • Dyslipidemia   • History of transient ischemic attack (TIA)   • Acquired hypothyroidism   • Thyroid nodule   • Palpitations   • Tremor   • Nocturnal hypoxemia   • Encounter for immunization   • Gastroesophageal reflux disease without esophagitis   • Localized swelling of right lower leg   • Anxiety   • Frequent falls   • Dyspepsia     Past Medical History:   Diagnosis Date   • Anxiety    • Blind left eye    • Deaf, left    • Depression    • Disease of thyroid gland    • DVT complicating pregnancy, unspecified trimester ([facility] [address] ) postpartum   • Hearing loss     LEFT EAR   • History of shingles 2007    fACIAL    • Lyme disease    • Meniere's disease    • Obesity    • Orthostatic hypotension    • Sinus congestion    • Sleep apnea      Past Surgical History:   Procedure Laterality Date   • APPENDECTOMY     • BREAST BIOPSY Left    •  SECTION      x2   • DXA PROCEDURE (HISTORICAL)  10/28/2020   • EYE SURGERY     • HAND SURGERY Left    • HERNIA REPAIR     • HYSTERECTOMY     • ROTATOR CUFF REPAIR Left    • TONSILLECTOMY     • TYMPANOSTOMY TUBE PLACEMENT       Social History     Substance and Sexual Activity   Alcohol Use Never     Social History     Substance and Sexual Activity   Drug Use Never     Social History     Tobacco Use   Smoking Status Former   • Packs/day: 0 75 • Years: 44 00   • Pack years: 33 00   • Types: Cigarettes   • Quit date: 1990   • Years since quittin 9   Smokeless Tobacco Never     Family History   Problem Relation Age of Onset   • Depression Mother    • Hypertension Mother    • Obesity Mother    • Depression Father    • Alcohol abuse Father    • Stroke Father    • Breast cancer Sister 76   • Ovarian cancer Daughter 48   • BRCA1 Negative Daughter    • BRCA2 Negative Daughter      Health Maintenance Due   Topic   • Medicare Annual Wellness Visit (AWV)    • Hepatitis B Vaccine (1 of 3 - 3-dose series)   • Depression Screening    • BMI: Followup Plan    • Fall Risk    • Urinary Incontinence Screening    • Pneumococcal Vaccine: 65+ Years (1 - PCV)   • COVID-19 Vaccine (4 - Booster for Moderna series)      Meds/Allergies       Current Outpatient Medications:   •  amLODIPine (NORVASC) 2 5 mg tablet, TAKE 1 TABLET BY MOUTH EVERY MORNING, Disp: 90 tablet, Rfl: 1  •  atorvastatin (LIPITOR) 80 mg tablet, TAKE 1 TABLET BY MOUTH EVERY NIGHT AT BEDTIME, Disp: 90 tablet, Rfl: 0  •  Calcium Carbonate-Vit D-Min (CALCIUM 1200 PO), Take 1,200 mg by mouth daily, Disp: , Rfl:   •  cetirizine (ZyrTEC) 10 mg tablet, Take 10 mg by mouth daily, Disp: , Rfl:   •  Cholecalciferol (D3-1000 PO), Take 2,000 Units by mouth daily  , Disp: , Rfl:   •  clobetasol (TEMOVATE) 0 05 % cream, Apply 1 application topically as needed To affected area, Disp: , Rfl:   •  clonazePAM (KlonoPIN) 0 5 mg tablet, Take 1 tablet (0 5 mg total) by mouth daily at bedtime as needed for anxiety, Disp: 30 tablet, Rfl: 0  •  clopidogrel (PLAVIX) 75 mg tablet, Take 1 tablet (75 mg total) by mouth daily, Disp: 30 tablet, Rfl: 6  •  DULoxetine (CYMBALTA) 60 mg delayed release capsule, Take 1 capsule (60 mg total) by mouth daily, Disp: 90 capsule, Rfl: 1  •  levothyroxine 88 mcg tablet, Take 1 tablet (88 mcg total) by mouth daily, Disp: 90 tablet, Rfl: 0  •  mometasone (NASONEX) 50 mcg/act nasal spray, 1 spray into each nostril daily  , Disp: , Rfl:   •  nebivolol (BYSTOLIC) 5 mg tablet, TAKE 1 TABLET BY MOUTH EVERY MORNING, Disp: 90 tablet, Rfl: 1  •  pantoprazole (PROTONIX) 40 mg tablet, 40 mg daily, Disp: , Rfl:   •  potassium chloride (K-DUR,KLOR-CON) 20 mEq tablet, Take 20 mEq by mouth once a week , Disp: , Rfl: 1  •  primidone (MYSOLINE) 50 mg tablet, TAKE 1 TABLET BY MOUTH EVERY DAY, Disp: 90 tablet, Rfl: 0  •  triamcinolone (KENALOG) 0 5 % cream, Apply topically 2 (two) times a day, Disp: 30 g, Rfl: 0      Objective:    Vitals:   /76 (BP Location: Right arm, Patient Position: Sitting, Cuff Size: Standard)   Pulse 82   Ht 5' 6" (1 676 m)   Wt 83 kg (183 lb)   SpO2 97%   BMI 29 54 kg/m²   Body mass index is 29 54 kg/m²  Vitals:    12/19/22 1502   Weight: 83 kg (183 lb)       Physical Exam  Vitals and nursing note reviewed  Constitutional:       Appearance: Normal appearance  Cardiovascular:      Rate and Rhythm: Normal rate and regular rhythm  Heart sounds: Normal heart sounds  Pulmonary:      Effort: Pulmonary effort is normal       Breath sounds: Normal breath sounds  Abdominal:      General: Abdomen is flat  Bowel sounds are normal  There is no distension  Palpations: Abdomen is soft  Tenderness: There is abdominal tenderness  There is no right CVA tenderness, left CVA tenderness or guarding  Musculoskeletal:      Cervical back: Normal range of motion and neck supple  Right lower leg: No edema  Left lower leg: No edema  Skin:     General: Skin is warm and dry  Neurological:      Mental Status: She is alert and oriented to person, place, and time  Lab Review   No visits with results within 2 Month(s) from this visit     Latest known visit with results is:   Appointment on 12/09/2021   Component Date Value Ref Range Status   • Amylase 12/09/2021 32  25 - 115 IU/L Final   • WBC 12/09/2021 11 13 (H)  4 31 - 10 16 Thousand/uL Final   • RBC 12/09/2021 4 38 3 81 - 5 12 Million/uL Final   • Hemoglobin 12/09/2021 13 5  11 5 - 15 4 g/dL Final   • Hematocrit 12/09/2021 43 0  34 8 - 46 1 % Final   • MCV 12/09/2021 98  82 - 98 fL Final   • MCH 12/09/2021 30 8  26 8 - 34 3 pg Final   • MCHC 12/09/2021 31 4  31 4 - 37 4 g/dL Final   • RDW 12/09/2021 14 3  11 6 - 15 1 % Final   • MPV 12/09/2021 10 9  8 9 - 12 7 fL Final   • Platelets 14/60/5062 267  149 - 390 Thousands/uL Final   • nRBC 12/09/2021 0  /100 WBCs Final   • Neutrophils Relative 12/09/2021 66  43 - 75 % Final   • Immat GRANS % 12/09/2021 1  0 - 2 % Final   • Lymphocytes Relative 12/09/2021 21  14 - 44 % Final   • Monocytes Relative 12/09/2021 7  4 - 12 % Final   • Eosinophils Relative 12/09/2021 4  0 - 6 % Final   • Basophils Relative 12/09/2021 1  0 - 1 % Final   • Neutrophils Absolute 12/09/2021 7 55  1 85 - 7 62 Thousands/µL Final   • Immature Grans Absolute 12/09/2021 0 06  0 00 - 0 20 Thousand/uL Final   • Lymphocytes Absolute 12/09/2021 2 30  0 60 - 4 47 Thousands/µL Final   • Monocytes Absolute 12/09/2021 0 72  0 17 - 1 22 Thousand/µL Final   • Eosinophils Absolute 12/09/2021 0 41  0 00 - 0 61 Thousand/µL Final   • Basophils Absolute 12/09/2021 0 09  0 00 - 0 10 Thousands/µL Final   • Sodium 12/09/2021 143  136 - 145 mmol/L Final   • Potassium 12/09/2021 4 3  3 5 - 5 3 mmol/L Final   • Chloride 12/09/2021 104  100 - 108 mmol/L Final   • CO2 12/09/2021 30  21 - 32 mmol/L Final   • ANION GAP 12/09/2021 9  4 - 13 mmol/L Final   • BUN 12/09/2021 18  5 - 25 mg/dL Final   • Creatinine 12/09/2021 0 72  0 60 - 1 30 mg/dL Final    Standardized to IDMS reference method   • Glucose, Fasting 12/09/2021 153 (H)  65 - 99 mg/dL Final    Specimen collection should occur prior to Sulfasalazine administration due to the potential for falsely depressed results  Specimen collection should occur prior to Sulfapyridine administration due to the potential for falsely elevated results     • Calcium 12/09/2021 9 6  8 3 - 10 1 mg/dL Final   • Corrected Calcium 12/09/2021 10 1  8 3 - 10 1 mg/dL Final   • AST 12/09/2021 56 (H)  5 - 45 U/L Final    Specimen collection should occur prior to Sulfasalazine administration due to the potential for falsely depressed results  • ALT 12/09/2021 53  12 - 78 U/L Final    Specimen collection should occur prior to Sulfasalazine administration due to the potential for falsely depressed results  • Alkaline Phosphatase 12/09/2021 101  46 - 116 U/L Final   • Total Protein 12/09/2021 7 0  6 4 - 8 2 g/dL Final   • Albumin 12/09/2021 3 4 (L)  3 5 - 5 0 g/dL Final   • Total Bilirubin 12/09/2021 0 41  0 20 - 1 00 mg/dL Final    Use of this assay is not recommended for patients undergoing treatment with eltrombopag due to the potential for falsely elevated results  • eGFR 12/09/2021 78  ml/min/1 73sq m Final   • Lipase 12/09/2021 129  73 - 393 u/L Final   • TSH 3RD GENERATON 12/09/2021 2 226  0 358 - 3 740 uIU/mL Final    The recommended reference ranges for TSH during pregnancy are as follows:   First trimester 0 1 to 2 5 uIU/mL   Second trimester  0 2 to 3 0 uIU/mL   Third trimester 0 3 to 3 0 uIU/m    Note: Normal ranges may not apply to patients who are transgender, non-binary, or whose legal sex, sex at birth, and gender identity differ  • Free T4 12/09/2021 0 91  0 76 - 1 46 ng/dL Final    Specimen collection should occur prior to Sulfasalazine administration due to the potential for falsely elevated results  Valentín Foster MD        "This note has been constructed using a voice recognition system  Therefore there may be syntax, spelling, and/or grammatical errors   Please call if you have any questions  "

## 2022-12-19 NOTE — ASSESSMENT & PLAN NOTE
Patient with frequent falls history of orthostatic blood pressure changes history of TIA wearing GAGE stockings    Reordered MRI of the brain but not done yet

## 2022-12-19 NOTE — ASSESSMENT & PLAN NOTE
Patient uses oxygen 2 L in the nighttime but sometimes she has been using in the daytime also when she feels stressed  We will continue the same

## 2022-12-19 NOTE — ASSESSMENT & PLAN NOTE
Patient to have a repeat lab work not done yet currently on levothyroxine 88 mcg we will continue the same and adjust the dose if necessary after looking the lab results

## 2022-12-19 NOTE — ASSESSMENT & PLAN NOTE
Patient with symptoms of dizziness for long period of time there was some orthostatic changes in the blood pressures also in the past she wears GAGE stockings  Recommend patient drink more fluids    No orthostatic changes acute record this time

## 2022-12-19 NOTE — ASSESSMENT & PLAN NOTE
As in HPI complaining of bloating sensation dyspepsia symptoms right upper quadrant discomfort  Patient has diffuse mild tenderness on examination of the abdomen  Patient also has got diarrhea nausea at times question gastroenteritis patient is status post cholecystectomy    Will order labs and also ultrasound of the liver common bile duct follow-up with a gastroenterologist

## 2022-12-19 NOTE — ASSESSMENT & PLAN NOTE
Patient with anxiety symptoms on and off she takes clonazepam at bedtime    Stress management discussed

## 2022-12-27 DIAGNOSIS — K21.9 GASTROESOPHAGEAL REFLUX DISEASE WITHOUT ESOPHAGITIS: Primary | ICD-10-CM

## 2022-12-28 RX ORDER — PANTOPRAZOLE SODIUM 40 MG/1
TABLET, DELAYED RELEASE ORAL
Qty: 90 TABLET | Refills: 1 | Status: SHIPPED | OUTPATIENT
Start: 2022-12-28 | End: 2023-01-03 | Stop reason: SDUPTHER

## 2022-12-30 ENCOUNTER — APPOINTMENT (OUTPATIENT)
Dept: LAB | Facility: HOSPITAL | Age: 82
End: 2022-12-30

## 2022-12-30 DIAGNOSIS — E78.5 DYSLIPIDEMIA: ICD-10-CM

## 2022-12-30 DIAGNOSIS — N39.0 URINARY TRACT INFECTION WITHOUT HEMATURIA, SITE UNSPECIFIED: ICD-10-CM

## 2022-12-30 DIAGNOSIS — E03.9 ACQUIRED HYPOTHYROIDISM: ICD-10-CM

## 2022-12-30 DIAGNOSIS — E04.1 THYROID NODULE: ICD-10-CM

## 2022-12-30 DIAGNOSIS — Z13.0 SCREENING FOR DEFICIENCY ANEMIA: ICD-10-CM

## 2022-12-30 DIAGNOSIS — I10 HYPERTENSION: ICD-10-CM

## 2022-12-30 DIAGNOSIS — R73.03 PRE-DIABETES: ICD-10-CM

## 2022-12-30 LAB
ALBUMIN SERPL BCP-MCNC: 3.4 G/DL (ref 3.5–5)
ALP SERPL-CCNC: 128 U/L (ref 46–116)
ALT SERPL W P-5'-P-CCNC: 50 U/L (ref 12–78)
ANION GAP SERPL CALCULATED.3IONS-SCNC: 8 MMOL/L (ref 4–13)
AST SERPL W P-5'-P-CCNC: 80 U/L (ref 5–45)
BASOPHILS # BLD AUTO: 0.07 THOUSANDS/ÂΜL (ref 0–0.1)
BASOPHILS NFR BLD AUTO: 1 % (ref 0–1)
BILIRUB SERPL-MCNC: 0.68 MG/DL (ref 0.2–1)
BUN SERPL-MCNC: 14 MG/DL (ref 5–25)
CALCIUM ALBUM COR SERPL-MCNC: 10.2 MG/DL (ref 8.3–10.1)
CALCIUM SERPL-MCNC: 9.7 MG/DL (ref 8.3–10.1)
CHLORIDE SERPL-SCNC: 102 MMOL/L (ref 96–108)
CHOLEST SERPL-MCNC: 151 MG/DL
CO2 SERPL-SCNC: 31 MMOL/L (ref 21–32)
CREAT SERPL-MCNC: 0.73 MG/DL (ref 0.6–1.3)
EOSINOPHIL # BLD AUTO: 0.38 THOUSAND/ÂΜL (ref 0–0.61)
EOSINOPHIL NFR BLD AUTO: 4 % (ref 0–6)
ERYTHROCYTE [DISTWIDTH] IN BLOOD BY AUTOMATED COUNT: 15 % (ref 11.6–15.1)
GFR SERPL CREATININE-BSD FRML MDRD: 75 ML/MIN/1.73SQ M
GLUCOSE P FAST SERPL-MCNC: 130 MG/DL (ref 65–99)
HCT VFR BLD AUTO: 41.6 % (ref 34.8–46.1)
HDLC SERPL-MCNC: 37 MG/DL
HGB BLD-MCNC: 13.5 G/DL (ref 11.5–15.4)
IMM GRANULOCYTES # BLD AUTO: 0.04 THOUSAND/UL (ref 0–0.2)
IMM GRANULOCYTES NFR BLD AUTO: 0 % (ref 0–2)
LDLC SERPL CALC-MCNC: 72 MG/DL (ref 0–100)
LYMPHOCYTES # BLD AUTO: 2.25 THOUSANDS/ÂΜL (ref 0.6–4.47)
LYMPHOCYTES NFR BLD AUTO: 23 % (ref 14–44)
MCH RBC QN AUTO: 31.6 PG (ref 26.8–34.3)
MCHC RBC AUTO-ENTMCNC: 32.5 G/DL (ref 31.4–37.4)
MCV RBC AUTO: 97 FL (ref 82–98)
MONOCYTES # BLD AUTO: 0.67 THOUSAND/ÂΜL (ref 0.17–1.22)
MONOCYTES NFR BLD AUTO: 7 % (ref 4–12)
NEUTROPHILS # BLD AUTO: 6.28 THOUSANDS/ÂΜL (ref 1.85–7.62)
NEUTS SEG NFR BLD AUTO: 65 % (ref 43–75)
NONHDLC SERPL-MCNC: 114 MG/DL
NRBC BLD AUTO-RTO: 0 /100 WBCS
PLATELET # BLD AUTO: 279 THOUSANDS/UL (ref 149–390)
PMV BLD AUTO: 11.1 FL (ref 8.9–12.7)
POTASSIUM SERPL-SCNC: 3.9 MMOL/L (ref 3.5–5.3)
PROT SERPL-MCNC: 6.9 G/DL (ref 6.4–8.4)
RBC # BLD AUTO: 4.27 MILLION/UL (ref 3.81–5.12)
SODIUM SERPL-SCNC: 141 MMOL/L (ref 135–147)
TRIGL SERPL-MCNC: 209 MG/DL
TSH SERPL DL<=0.05 MIU/L-ACNC: 1 UIU/ML (ref 0.45–4.5)
WBC # BLD AUTO: 9.69 THOUSAND/UL (ref 4.31–10.16)

## 2022-12-31 LAB
EST. AVERAGE GLUCOSE BLD GHB EST-MCNC: 143 MG/DL
HBA1C MFR BLD: 6.6 %

## 2023-01-03 ENCOUNTER — OFFICE VISIT (OUTPATIENT)
Dept: FAMILY MEDICINE CLINIC | Facility: CLINIC | Age: 83
End: 2023-01-03

## 2023-01-03 VITALS
SYSTOLIC BLOOD PRESSURE: 130 MMHG | HEART RATE: 86 BPM | WEIGHT: 183 LBS | HEIGHT: 66 IN | DIASTOLIC BLOOD PRESSURE: 78 MMHG | BODY MASS INDEX: 29.41 KG/M2 | OXYGEN SATURATION: 90 %

## 2023-01-03 DIAGNOSIS — R29.6 FREQUENT FALLS: ICD-10-CM

## 2023-01-03 DIAGNOSIS — R74.8 ELEVATED LIVER ENZYMES: Primary | ICD-10-CM

## 2023-01-03 DIAGNOSIS — M17.11 PRIMARY OSTEOARTHRITIS OF RIGHT KNEE: ICD-10-CM

## 2023-01-03 DIAGNOSIS — E78.5 DYSLIPIDEMIA: ICD-10-CM

## 2023-01-03 DIAGNOSIS — J96.11 CHRONIC HYPOXEMIC RESPIRATORY FAILURE (HCC): ICD-10-CM

## 2023-01-03 DIAGNOSIS — E03.9 ACQUIRED HYPOTHYROIDISM: ICD-10-CM

## 2023-01-03 DIAGNOSIS — K21.9 GASTROESOPHAGEAL REFLUX DISEASE WITHOUT ESOPHAGITIS: ICD-10-CM

## 2023-01-03 DIAGNOSIS — E11.9 TYPE 2 DIABETES MELLITUS WITHOUT COMPLICATION, WITHOUT LONG-TERM CURRENT USE OF INSULIN (HCC): ICD-10-CM

## 2023-01-03 DIAGNOSIS — I47.1 SVT (SUPRAVENTRICULAR TACHYCARDIA) (HCC): ICD-10-CM

## 2023-01-03 DIAGNOSIS — I10 ESSENTIAL HYPERTENSION: ICD-10-CM

## 2023-01-03 DIAGNOSIS — R00.2 PALPITATIONS: ICD-10-CM

## 2023-01-03 DIAGNOSIS — F41.9 ANXIETY: ICD-10-CM

## 2023-01-03 PROBLEM — I47.10 SVT (SUPRAVENTRICULAR TACHYCARDIA): Status: ACTIVE | Noted: 2023-01-03

## 2023-01-03 RX ORDER — CLONAZEPAM 0.5 MG/1
0.5 TABLET ORAL
Qty: 30 TABLET | Refills: 0 | Status: SHIPPED | OUTPATIENT
Start: 2023-01-03

## 2023-01-03 RX ORDER — PANTOPRAZOLE SODIUM 40 MG/1
40 TABLET, DELAYED RELEASE ORAL EVERY MORNING
Qty: 90 TABLET | Refills: 1 | Status: SHIPPED | OUTPATIENT
Start: 2023-01-03

## 2023-01-03 NOTE — ASSESSMENT & PLAN NOTE
Patient with a history of palpitations on and off history of supraventricular tachycardia currently taking nebivolol 5 mg daily currently her heart rate is around 70/min regular we will monitor

## 2023-01-03 NOTE — ASSESSMENT & PLAN NOTE
With hypothyroidism her TSH level is normal 0 99 we will continue with the current dose of medication 88 mcg of levothyroxine daily

## 2023-01-03 NOTE — ASSESSMENT & PLAN NOTE
Patient complains of palpitations on and off I reviewed the records from the past history of similar complaints and was noted to have paroxysmal supraventricular tachycardia currently her heart rate is around 76/min

## 2023-01-03 NOTE — ASSESSMENT & PLAN NOTE
His blood pressure is stable at 130/78 currently he is taking amlodipine 2 5 mg daily and Bystolic 5 mg daily we will continue the same

## 2023-01-03 NOTE — ASSESSMENT & PLAN NOTE
Patient with anxiety symptoms on clonazepam as needed at bedtime we will continue the same medications reviewed stress management discussed

## 2023-01-03 NOTE — ASSESSMENT & PLAN NOTE
Patient's liver enzymes are coming up high her AST is 18 ALT is 50 alkaline phosphatase is 128 I ordered ultrasound for the liver not done yet  Recommend to go for the same

## 2023-01-03 NOTE — ASSESSMENT & PLAN NOTE
Patient's triglycerides are coming up high at 209 HDL 37 LDL 72 and cholesterol of 151 again emphasized regarding diet exercise losing weight

## 2023-01-03 NOTE — PROGRESS NOTES
Office Visit Note  23     Patricia Knox 78 y o  female MRN: [id number]  : 1940    Assessment:     1  Palpitations  Assessment & Plan:  Patient complains of palpitations on and off I reviewed the records from the past history of similar complaints and was noted to have paroxysmal supraventricular tachycardia currently her heart rate is around 76/min  2  Gastroesophageal reflux disease without esophagitis  Assessment & Plan:  Continue pantoprazole 40 mg daily in the morning    Orders:  -     pantoprazole (PROTONIX) 40 mg tablet; Take 1 tablet (40 mg total) by mouth every morning    3  Acquired hypothyroidism  Assessment & Plan: With hypothyroidism her TSH level is normal 0 99 we will continue with the current dose of medication 88 mcg of levothyroxine daily  4  Anxiety  Assessment & Plan:  Patient with anxiety symptoms on clonazepam as needed at bedtime we will continue the same medications reviewed stress management discussed  Orders:  -     clonazePAM (KlonoPIN) 0 5 mg tablet; Take 1 tablet (0 5 mg total) by mouth daily at bedtime as needed for anxiety    5  Chronic hypoxemic respiratory failure (HCC)  Assessment & Plan:  Patient to continue oxygen 2 L at bedtime      6  Dyslipidemia  Assessment & Plan:  Patient's triglycerides are coming up high at 209 HDL 37 LDL 72 and cholesterol of 151 again emphasized regarding diet exercise losing weight  7  Essential hypertension  Assessment & Plan:  His blood pressure is stable at 130/78 currently he is taking amlodipine 2 5 mg daily and Bystolic 5 mg daily we will continue the same  8  Frequent falls  Assessment & Plan:  Patient with a history of frequent falls in the recent past history of orthostatic blood pressure changes history of TIA currently wearing GAGE stockings MRI ordered but not done yet discussed with the patient at length to get this thing done as soon as possible      9   SVT (supraventricular tachycardia) [facility])  Assessment & Plan:  Patient with a history of palpitations on and off history of supraventricular tachycardia currently taking nebivolol 5 mg daily currently her heart rate is around 70/min regular we will monitor  10  Primary osteoarthritis of right knee  Assessment & Plan:  Patient with difficulty with ambulation because of  the right knee pain history of receiving steroid injections in the past history of osteoarthritis  Movements of the knee causing discomfort and pain will refer her to the orthopedic surgeon for possible steroid injection  Orders:  -     Ambulatory Referral to Orthopedic Surgery; Future    11  Elevated liver enzymes  Assessment & Plan:  Patient's liver enzymes are coming up high her AST is 18 ALT is 50 alkaline phosphatase is 128 I ordered ultrasound for the liver not done yet  Recommend to go for the same  12  Type 2 diabetes mellitus without complication, without long-term current use of insulin ([facility] )  Assessment & Plan:    Lab Results   Component Value Date    HGBA1C 6 6 (H) 12/30/2022   Patient is blood sugar is mildly elevated at 130 her A1c is 6 6  Emphasized again regarding diet exercise losing weight watching the carbohydrate intake  Will monitor closely if necessary started on medication like metformin if A1c keeps going up high  Try to avoid medications          Discussion Summary and Plan: Today's care plan and medications were reviewed with patient in detail and all their questions answered to their satisfaction  Chief Complaint   Patient presents with   • Follow-up      Subjective:  Patient is coming here for evaluation regarding multiple symptoms including dyspepsia which is getting better she had bloating sensation gaseous feeling she took some Gas-X  Lab work had revealed elevated liver enzyme AST  Triglycerides were high also her A1c is at 6 6    Patient also complains of palpitations history of supraventricular tachycardia in the past   No chest pains as such or shortness of breath  Patient also complains of pain in the right knee sometimes difficult to ambulate history of receiving injections in the right knee in the past   Patient has not gone for the ultrasound of the gallbladder yet history of frequent falls I ordered MRI also not done yet  The following portions of the patient's history were reviewed and updated as appropriate: allergies, current medications, past family history, past medical history, past social history, past surgical history and problem list     Review of Systems   Constitutional: Negative for chills and fever  HENT: Negative for ear pain and sore throat  Eyes: Negative for pain and visual disturbance  Respiratory: Negative for cough and shortness of breath  Cardiovascular: Positive for palpitations  Negative for chest pain  Gastrointestinal: Negative for abdominal pain and vomiting  Genitourinary: Negative for dysuria and hematuria  Musculoskeletal: Positive for arthralgias  Negative for back pain  Skin: Negative for color change and rash  Neurological: Negative for seizures and syncope  Psychiatric/Behavioral: The patient is nervous/anxious  All other systems reviewed and are negative          Historical Information   Patient Active Problem List   Diagnosis   • Dyspnea on exertion   • Dizziness   • Chronic hypoxemic respiratory failure (HCC)   • Seasonal allergic rhinitis due to pollen   • Essential hypertension   • Dyslipidemia   • History of transient ischemic attack (TIA)   • Acquired hypothyroidism   • Thyroid nodule   • Palpitations   • Tremor   • Nocturnal hypoxemia   • Encounter for immunization   • Gastroesophageal reflux disease without esophagitis   • Localized swelling of right lower leg   • Anxiety   • Frequent falls   • Dyspepsia   • SVT (supraventricular tachycardia) (HCC)   • Osteoarthritis of right knee   • Elevated liver enzymes   • Type 2 diabetes mellitus, without long-term current use of insulin ([facility] )     Past Medical History:   Diagnosis Date   • Anxiety    • Blind left eye    • Deaf, left    • Depression    • Disease of thyroid gland    • DVT complicating pregnancy, unspecified trimester ([facility] ) postpartum   • Hearing loss     LEFT EAR   • History of shingles 2007    fACIAL    • Lyme disease    • Meniere's disease    • Obesity    • Orthostatic hypotension    • Sinus congestion    • Sleep apnea      Past Surgical History:   Procedure Laterality Date   • APPENDECTOMY     • BREAST BIOPSY Left 2010   •  SECTION      x2   • DXA PROCEDURE (HISTORICAL)  10/28/2020   • EYE SURGERY     • HAND SURGERY Left    • HERNIA REPAIR     • HYSTERECTOMY     • ROTATOR CUFF REPAIR Left    • TONSILLECTOMY     • TYMPANOSTOMY TUBE PLACEMENT       Social History     Substance and Sexual Activity   Alcohol Use Never     Social History     Substance and Sexual Activity   Drug Use Never     Social History     Tobacco Use   Smoking Status Former   • Packs/day: 0 75   • Years: 44 00   • Pack years: 33 00   • Types: Cigarettes   • Quit date: 1990   • Years since quittin 9   Smokeless Tobacco Never     Family History   Problem Relation Age of Onset   • Depression Mother    • Hypertension Mother    • Obesity Mother    • Depression Father    • Alcohol abuse Father    • Stroke Father    • Breast cancer Sister 76   • Ovarian cancer Daughter 48   • BRCA1 Negative Daughter    • BRCA2 Negative Daughter      Health Maintenance Due   Topic   • Medicare Annual Wellness Visit (AWV)    • Kidney Health Evaluation: GFR    • Kidney Health Evaluation: Microalbumin/Creatinine Ratio    • Hepatitis B Vaccine (1 of 3 - 3-dose series)   • Pneumococcal Vaccine: 65+ Years (1 - PCV)   • Diabetic Foot Exam    • DM Eye Exam    • Depression Screening    • BMI: Followup Plan    • Fall Risk    • Urinary Incontinence Screening    • COVID-19 Vaccine (4 - Booster for Moderna series)      Meds/Allergies       Current Outpatient Medications:   •  amLODIPine (NORVASC) 2 5 mg tablet, TAKE 1 TABLET BY MOUTH EVERY MORNING, Disp: 90 tablet, Rfl: 1  •  atorvastatin (LIPITOR) 80 mg tablet, TAKE 1 TABLET BY MOUTH EVERY NIGHT AT BEDTIME, Disp: 90 tablet, Rfl: 0  •  Calcium Carbonate-Vit D-Min (CALCIUM 1200 PO), Take 1,200 mg by mouth daily, Disp: , Rfl:   •  cetirizine (ZyrTEC) 10 mg tablet, Take 10 mg by mouth daily, Disp: , Rfl:   •  Cholecalciferol (D3-1000 PO), Take 2,000 Units by mouth daily  , Disp: , Rfl:   •  clobetasol (TEMOVATE) 0 05 % cream, Apply 1 application topically as needed To affected area, Disp: , Rfl:   •  clonazePAM (KlonoPIN) 0 5 mg tablet, Take 1 tablet (0 5 mg total) by mouth daily at bedtime as needed for anxiety, Disp: 30 tablet, Rfl: 0  •  clopidogrel (PLAVIX) 75 mg tablet, Take 1 tablet (75 mg total) by mouth daily, Disp: 30 tablet, Rfl: 6  •  DULoxetine (CYMBALTA) 60 mg delayed release capsule, Take 1 capsule (60 mg total) by mouth daily, Disp: 90 capsule, Rfl: 1  •  levothyroxine 88 mcg tablet, Take 1 tablet (88 mcg total) by mouth daily, Disp: 90 tablet, Rfl: 0  •  mometasone (NASONEX) 50 mcg/act nasal spray, 1 spray into each nostril daily  , Disp: , Rfl:   •  nebivolol (BYSTOLIC) 5 mg tablet, TAKE 1 TABLET BY MOUTH EVERY MORNING, Disp: 90 tablet, Rfl: 1  •  pantoprazole (PROTONIX) 40 mg tablet, Take 1 tablet (40 mg total) by mouth every morning, Disp: 90 tablet, Rfl: 1  •  potassium chloride (K-DUR,KLOR-CON) 20 mEq tablet, Take 20 mEq by mouth once a week , Disp: , Rfl: 1  •  primidone (MYSOLINE) 50 mg tablet, TAKE 1 TABLET BY MOUTH EVERY DAY, Disp: 90 tablet, Rfl: 0  •  triamcinolone (KENALOG) 0 5 % cream, Apply topically 2 (two) times a day, Disp: 30 g, Rfl: 0      Objective:    Vitals:   /78 (BP Location: Right arm, Patient Position: Sitting, Cuff Size: Standard)   Pulse 86   Ht 5' 6" (1 676 m)   Wt 83 kg (183 lb)   SpO2 90%   BMI 29 54 kg/m²   Body mass index is 29 54 kg/m²    Vitals:    01/03/23 1413 Weight: 83 kg (183 lb)       Physical Exam  Vitals and nursing note reviewed  Constitutional:       Appearance: Normal appearance  Cardiovascular:      Rate and Rhythm: Normal rate and regular rhythm  Heart sounds: Normal heart sounds  Pulmonary:      Effort: Pulmonary effort is normal       Breath sounds: Normal breath sounds  Abdominal:      Palpations: Abdomen is soft  Musculoskeletal:      Cervical back: Normal range of motion and neck supple  Right lower leg: No edema  Left lower leg: No edema  Neurological:      Mental Status: She is alert and oriented to person, place, and time           Lab Review   Appointment on 12/30/2022   Component Date Value Ref Range Status   • WBC 12/30/2022 9 69  4 31 - 10 16 Thousand/uL Final   • RBC 12/30/2022 4 27  3 81 - 5 12 Million/uL Final   • Hemoglobin 12/30/2022 13 5  11 5 - 15 4 g/dL Final   • Hematocrit 12/30/2022 41 6  34 8 - 46 1 % Final   • MCV 12/30/2022 97  82 - 98 fL Final   • MCH 12/30/2022 31 6  26 8 - 34 3 pg Final   • MCHC 12/30/2022 32 5  31 4 - 37 4 g/dL Final   • RDW 12/30/2022 15 0  11 6 - 15 1 % Final   • MPV 12/30/2022 11 1  8 9 - 12 7 fL Final   • Platelets 10/15/6235 279  149 - 390 Thousands/uL Final   • nRBC 12/30/2022 0  /100 WBCs Final   • Neutrophils Relative 12/30/2022 65  43 - 75 % Final   • Immat GRANS % 12/30/2022 0  0 - 2 % Final   • Lymphocytes Relative 12/30/2022 23  14 - 44 % Final   • Monocytes Relative 12/30/2022 7  4 - 12 % Final   • Eosinophils Relative 12/30/2022 4  0 - 6 % Final   • Basophils Relative 12/30/2022 1  0 - 1 % Final   • Neutrophils Absolute 12/30/2022 6 28  1 85 - 7 62 Thousands/µL Final   • Immature Grans Absolute 12/30/2022 0 04  0 00 - 0 20 Thousand/uL Final   • Lymphocytes Absolute 12/30/2022 2 25  0 60 - 4 47 Thousands/µL Final   • Monocytes Absolute 12/30/2022 0 67  0 17 - 1 22 Thousand/µL Final   • Eosinophils Absolute 12/30/2022 0 38  0 00 - 0 61 Thousand/µL Final   • Basophils Absolute 12/30/2022 0 07  0 00 - 0 10 Thousands/µL Final   • Sodium 12/30/2022 141  135 - 147 mmol/L Final   • Potassium 12/30/2022 3 9  3 5 - 5 3 mmol/L Final   • Chloride 12/30/2022 102  96 - 108 mmol/L Final   • CO2 12/30/2022 31  21 - 32 mmol/L Final   • ANION GAP 12/30/2022 8  4 - 13 mmol/L Final   • BUN 12/30/2022 14  5 - 25 mg/dL Final   • Creatinine 12/30/2022 0 73  0 60 - 1 30 mg/dL Final    Standardized to IDMS reference method   • Glucose, Fasting 12/30/2022 130 (H)  65 - 99 mg/dL Final    Specimen collection should occur prior to Sulfasalazine administration due to the potential for falsely depressed results  Specimen collection should occur prior to Sulfapyridine administration due to the potential for falsely elevated results  • Calcium 12/30/2022 9 7  8 3 - 10 1 mg/dL Final   • Corrected Calcium 12/30/2022 10 2 (H)  8 3 - 10 1 mg/dL Final   • AST 12/30/2022 80 (H)  5 - 45 U/L Final    Specimen collection should occur prior to Sulfasalazine administration due to the potential for falsely depressed results  • ALT 12/30/2022 50  12 - 78 U/L Final    Specimen collection should occur prior to Sulfasalazine administration due to the potential for falsely depressed results  • Alkaline Phosphatase 12/30/2022 128 (H)  46 - 116 U/L Final   • Total Protein 12/30/2022 6 9  6 4 - 8 4 g/dL Final   • Albumin 12/30/2022 3 4 (L)  3 5 - 5 0 g/dL Final   • Total Bilirubin 12/30/2022 0 68  0 20 - 1 00 mg/dL Final    Use of this assay is not recommended for patients undergoing treatment with eltrombopag due to the potential for falsely elevated results  • eGFR 12/30/2022 75  ml/min/1 73sq m Final   • Hemoglobin A1C 12/30/2022 6 6 (H)  Normal 3 8-5 6%; PreDiabetic 5 7-6 4%;  Diabetic >=6 5%; Glycemic control for adults with diabetes <7 0% % Final   • EAG 12/30/2022 143  mg/dl Final   • Cholesterol 12/30/2022 151  See Comment mg/dL Final    Cholesterol:         Pediatric <18 Years        Desirable          <170 mg/dL Borderline High    170-199 mg/dL      High               >=200 mg/dL        Adult >=18 Years            Desirable         <200 mg/dL      Borderline High   200-239 mg/dL      High              >239 mg/dL     • Triglycerides 12/30/2022 209 (H)  See Comment mg/dL Final    Triglyceride:     0-9Y            <75mg/dL     10Y-17Y         <90 mg/dL       >=18Y     Normal          <150 mg/dL     Borderline High 150-199 mg/dL     High            200-499 mg/dL        Very High       >499 mg/dL    Specimen collection should occur prior to N-Acetylcysteine or Metamizole administration due to the potential for falsely depressed results  • HDL, Direct 12/30/2022 37 (L)  >=50 mg/dL Final    Specimen collection should occur prior to Metamizole administration due to the potential for falsley depressed results  • LDL Calculated 12/30/2022 72  0 - 100 mg/dL Final    LDL Cholesterol:     Optimal           <100 mg/dl     Near Optimal      100-129 mg/dl     Above Optimal       Borderline High 130-159 mg/dl       High            160-189 mg/dl       Very High       >189 mg/dl         This screening LDL is a calculated result  It does not have the accuracy of the Direct Measured LDL in the monitoring of patients with hyperlipidemia and/or statin therapy  Direct Measure LDL (KYX664) must be ordered separately in these patients  • Non-HDL-Chol (CHOL-HDL) 12/30/2022 114  mg/dl Final   • TSH 3RD GENERATON 12/30/2022 0 999  0 450 - 4 500 uIU/mL Final    The recommended reference ranges for TSH during pregnancy are as follows:   First trimester 0 1 to 2 5 uIU/mL   Second trimester  0 2 to 3 0 uIU/mL   Third trimester 0 3 to 3 0 uIU/m    Note: Normal ranges may not apply to patients who are transgender, non-binary, or whose legal sex, sex at birth, and gender identity differ  Adult TSH (3rd generation) reference range follows the recommended guidelines of the [employer], January, 2020           Bladimir Caraballo MD        "This note has been constructed using a voice recognition system  Therefore there may be syntax, spelling, and/or grammatical errors   Please call if you have any questions  "

## 2023-01-03 NOTE — ASSESSMENT & PLAN NOTE
Lab Results   Component Value Date    HGBA1C 6 6 (H) 12/30/2022   Patient is blood sugar is mildly elevated at 130 her A1c is 6 6  Emphasized again regarding diet exercise losing weight watching the carbohydrate intake  Will monitor closely if necessary started on medication like metformin if A1c keeps going up high    Try to avoid medications

## 2023-01-03 NOTE — ASSESSMENT & PLAN NOTE
Patient with a history of frequent falls in the recent past history of orthostatic blood pressure changes history of TIA currently wearing GAGE stockings MRI ordered but not done yet discussed with the patient at length to get this thing done as soon as possible

## 2023-01-03 NOTE — ASSESSMENT & PLAN NOTE
Patient with difficulty with ambulation because of  the right knee pain history of receiving steroid injections in the past history of osteoarthritis  Movements of the knee causing discomfort and pain will refer her to the orthopedic surgeon for possible steroid injection

## 2023-01-10 PROBLEM — H91.8X3 ASYMMETRICAL HEARING LOSS: Status: ACTIVE | Noted: 2023-01-10

## 2023-01-10 PROBLEM — H91.8X9 ASYMMETRICAL HEARING LOSS: Status: ACTIVE | Noted: 2023-01-10

## 2023-01-12 ENCOUNTER — HOSPITAL ENCOUNTER (OUTPATIENT)
Dept: RADIOLOGY | Facility: HOSPITAL | Age: 83
Discharge: HOME | End: 2023-01-12

## 2023-01-12 DIAGNOSIS — R10.11 RIGHT UPPER QUADRANT PAIN: ICD-10-CM

## 2023-01-27 ENCOUNTER — RA CDI HCC (OUTPATIENT)
Dept: OTHER | Facility: HOSPITAL | Age: 83
End: 2023-01-27

## 2023-02-02 ENCOUNTER — APPOINTMENT (OUTPATIENT)
Dept: RADIOLOGY | Facility: CLINIC | Age: 83
End: 2023-02-02

## 2023-02-02 ENCOUNTER — OFFICE VISIT (OUTPATIENT)
Dept: OBGYN CLINIC | Facility: CLINIC | Age: 83
End: 2023-02-02

## 2023-02-02 VITALS
WEIGHT: 183 LBS | HEART RATE: 80 BPM | DIASTOLIC BLOOD PRESSURE: 75 MMHG | SYSTOLIC BLOOD PRESSURE: 137 MMHG | HEIGHT: 66 IN | BODY MASS INDEX: 29.41 KG/M2

## 2023-02-02 DIAGNOSIS — Z01.89 ENCOUNTER FOR LOWER EXTREMITY COMPARISON IMAGING STUDY: ICD-10-CM

## 2023-02-02 DIAGNOSIS — M17.11 PRIMARY OSTEOARTHRITIS OF RIGHT KNEE: ICD-10-CM

## 2023-02-02 DIAGNOSIS — M47.816 LUMBAR SPONDYLOSIS: ICD-10-CM

## 2023-02-02 DIAGNOSIS — M17.11 PRIMARY OSTEOARTHRITIS OF RIGHT KNEE: Primary | ICD-10-CM

## 2023-02-02 PROBLEM — H61.21 RIGHT EAR IMPACTED CERUMEN: Status: ACTIVE | Noted: 2023-02-02

## 2023-02-02 RX ORDER — DEXAMETHASONE SODIUM PHOSPHATE 10 MG/ML
40 INJECTION, SOLUTION INTRAMUSCULAR; INTRAVENOUS
Status: COMPLETED | OUTPATIENT
Start: 2023-02-02 | End: 2023-02-02

## 2023-02-02 RX ADMIN — DEXAMETHASONE SODIUM PHOSPHATE 40 MG: 10 INJECTION, SOLUTION INTRAMUSCULAR; INTRAVENOUS at 12:05

## 2023-02-02 NOTE — PROGRESS NOTES
Assessment/Plan:  1   Primary osteoarthritis of right knee  Ambulatory Referral to Orthopedic Surgery          ***    Subjective:   Farooq Brown is a 78 y o  female who presents ***      Review of Systems      Past Medical History:   Diagnosis Date   • Anxiety    • Blind left eye    • Deaf, left    • Depression    • Disease of thyroid gland    • DVT complicating pregnancy, unspecified trimester ([address] ) postpartum   • Hearing loss     LEFT EAR   • History of shingles 2007    fACIAL    • Lyme disease    • Meniere's disease    • Obesity    • Orthostatic hypotension    • Sinus congestion    • Sleep apnea        Past Surgical History:   Procedure Laterality Date   • APPENDECTOMY     • BREAST BIOPSY Left 2010   •  SECTION      x2   • DXA PROCEDURE (HISTORICAL)  10/28/2020   • EYE SURGERY     • HAND SURGERY Left    • HERNIA REPAIR     • HYSTERECTOMY     • ROTATOR CUFF REPAIR Left    • TONSILLECTOMY     • TYMPANOSTOMY TUBE PLACEMENT         Family History   Problem Relation Age of Onset   • Depression Mother    • Hypertension Mother    • Obesity Mother    • Depression Father    • Alcohol abuse Father    • Stroke Father    • Breast cancer Sister 76   • Ovarian cancer Daughter 48   • BRCA1 Negative Daughter    • BRCA2 Negative Daughter        Social History     Occupational History   • Not on file   Tobacco Use   • Smoking status: Former     Packs/day: 0 75     Years: 44 00     Pack years: 33 00     Types: Cigarettes     Quit date: 1990     Years since quittin 0   • Smokeless tobacco: Never   Vaping Use   • Vaping Use: Never used   Substance and Sexual Activity   • Alcohol use: Never   • Drug use: Never   • Sexual activity: Not on file         Current Outpatient Medications:   •  amLODIPine (NORVASC) 2 5 mg tablet, TAKE 1 TABLET BY MOUTH EVERY MORNING, Disp: 90 tablet, Rfl: 1  •  atorvastatin (LIPITOR) 80 mg tablet, TAKE 1 TABLET BY MOUTH DAILY AT BEDTIME, Disp: 90 tablet, Rfl: 1  •  Calcium Carbonate-Vit D-Min (CALCIUM 1200 PO), Take 1,200 mg by mouth daily, Disp: , Rfl:   •  cetirizine (ZyrTEC) 10 mg tablet, Take 10 mg by mouth daily, Disp: , Rfl:   •  Cholecalciferol (D3-1000 PO), Take 2,000 Units by mouth daily  , Disp: , Rfl:   •  clobetasol (TEMOVATE) 0 05 % cream, Apply 1 application topically as needed To affected area, Disp: , Rfl:   •  clonazePAM (KlonoPIN) 0 5 mg tablet, Take 1 tablet (0 5 mg total) by mouth daily at bedtime as needed for anxiety, Disp: 30 tablet, Rfl: 0  •  clopidogrel (PLAVIX) 75 mg tablet, Take 1 tablet (75 mg total) by mouth daily, Disp: 30 tablet, Rfl: 6  •  DULoxetine (CYMBALTA) 60 mg delayed release capsule, Take 1 capsule (60 mg total) by mouth daily, Disp: 90 capsule, Rfl: 1  •  levothyroxine 88 mcg tablet, Take 1 tablet (88 mcg total) by mouth daily, Disp: 90 tablet, Rfl: 0  •  mometasone (NASONEX) 50 mcg/act nasal spray, 1 spray into each nostril daily  , Disp: , Rfl:   •  nebivolol (BYSTOLIC) 5 mg tablet, TAKE 1 TABLET BY MOUTH EVERY EVENING, Disp: 90 tablet, Rfl: 1  •  pantoprazole (PROTONIX) 40 mg tablet, Take 1 tablet (40 mg total) by mouth every morning, Disp: 90 tablet, Rfl: 1  •  potassium chloride (K-DUR,KLOR-CON) 20 mEq tablet, Take 20 mEq by mouth once a week , Disp: , Rfl: 1  •  primidone (MYSOLINE) 50 mg tablet, TAKE 1 TABLET BY MOUTH EVERY DAY, Disp: 90 tablet, Rfl: 0  •  triamcinolone (KENALOG) 0 5 % cream, Apply topically 2 (two) times a day, Disp: 30 g, Rfl: 0    Allergies   Allergen Reactions   • Penicillins Swelling       Objective: There were no vitals filed for this visit  Ortho Exam    Physical Exam    I have personally reviewed pertinent films in PACS and my interpretation is as follows:  ***      This document was created using speech voice recognition software     Grammatical errors, random word insertions, pronoun errors, and incomplete sentences are an occasional consequence of this system due to software limitations, ambient noise, and hardware issues  Any formal questions or concerns about content, text, or information contained within the body of this dictation should be directly addressed to the provider for clarification

## 2023-02-02 NOTE — PROGRESS NOTES
Assessment/Plan:  1  Primary osteoarthritis of right knee  Ambulatory Referral to Orthopedic Surgery    XR knee 3 vw right non injury    Large joint arthrocentesis: R knee      2  Encounter for lower extremity comparison imaging study  XR knee 1 or 2 vw left      3  Lumbar spondylosis  Ambulatory Referral to Pain Management        Scribe Attestation    I,:  Carla Sullivan Call am acting as a scribe while in the presence of the attending physician :       I,:  Reny Juarez DO personally performed the services described in this documentation    as scribed in my presence :         Large joint arthrocentesis: R knee  Universal Protocol:  Consent: Verbal consent not obtained  Risks and benefits: risks, benefits and alternatives were discussed  Consent given by: patient  Time out: Immediately prior to procedure a "time out" was called to verify the correct patient, procedure, equipment, support staff and site/side marked as required  Patient understanding: patient states understanding of the procedure being performed  Patient consent: the patient's understanding of the procedure matches consent given  Patient identity confirmed: verbally with patient    Supporting Documentation  Indications: pain   Procedure Details  Location: knee - R knee  Needle size: 22 G  Ultrasound guidance: no  Approach: anterolateral  Medications administered: 40 mg dexamethasone 100 mg/10 mL (4ml roplvacaine HCL (Naropin) 0 5% injection)    Patient tolerance: patient tolerated the procedure well with no immediate complications  Dressing:  Sterile dressing applied          Colen Ahumada and I reviewed her x-rays together  She does have severe lateral compartment osteoarthritis  She has responded well to steroid injection previously and is requesting an injection today  I am amenable to this  Discussed the risks and benefits of the steroid injection  Colen Ahumada tolerated the procedure well  Postinjection instructions were provided    Should her knee be sore she can apply ice and 10-minute intervals  She verbally states she understood  Should she fail to find relief with the steroid injection she can consider Visco injections  We discussed the medication Euflexxa  This would be a series of 3 shots, 1/week over 3-week  She will contact our office if she does not find relief over the next 2 to 3 weeks from the steroid shot  I have referred her to Dr Destiny Carrasco from pain management for her chronic back pain  I feel physical therapy may be difficult for her  Subjective:   Tahir Martínez is a 78 y o  female who presents to the clinic today complaining of right knee pain  Lucia Álvarez states she has a known history of right knee osteoarthritis  She has seen another provider for this  She states she has had a steroid injection back in 2017 which did provide her a significant amount of relief  She is requesting an injection today  Her chief complaint is of an intermittent moderate ache that is global about the right knee and is exacerbated when bearing weight on the right lower extremity  Walking can be quite painful for her  She denies radiating pain or distal paresthesias  Review of Systems   Constitutional: Negative for chills, fever and unexpected weight change  HENT: Negative for hearing loss, nosebleeds and sore throat  Eyes: Negative for pain, redness and visual disturbance  Respiratory: Negative for cough, shortness of breath and wheezing  Cardiovascular: Negative for chest pain, palpitations and leg swelling  Gastrointestinal: Negative for abdominal pain, nausea and vomiting  Endocrine: Negative for polydipsia and polyuria  Genitourinary: Negative for dysuria and hematuria  Musculoskeletal:        See HPI   Skin: Negative for rash and wound  Neurological: Negative for dizziness, numbness and headaches  Psychiatric/Behavioral: Negative for decreased concentration and suicidal ideas  The patient is not nervous/anxious            Past Medical History:   Diagnosis Date   • Anxiety    • Blind left eye    • Deaf, left    • Depression    • Disease of thyroid gland    • DVT complicating pregnancy, unspecified trimester ([address] ) postpartum   • Hearing loss     LEFT EAR   • History of shingles 2007    fACIAL    • Lyme disease    • Meniere's disease    • Obesity    • Orthostatic hypotension    • Sinus congestion    • Sleep apnea        Past Surgical History:   Procedure Laterality Date   • APPENDECTOMY     • BREAST BIOPSY Left 2010   •  SECTION      x2   • DXA PROCEDURE (HISTORICAL)  10/28/2020   • EYE SURGERY     • HAND SURGERY Left    • HERNIA REPAIR     • HYSTERECTOMY     • ROTATOR CUFF REPAIR Left    • TONSILLECTOMY     • TYMPANOSTOMY TUBE PLACEMENT         Family History   Problem Relation Age of Onset   • Depression Mother    • Hypertension Mother    • Obesity Mother    • Depression Father    • Alcohol abuse Father    • Stroke Father    • Breast cancer Sister 76   • Ovarian cancer Daughter 48   • BRCA1 Negative Daughter    • BRCA2 Negative Daughter        Social History     Occupational History   • Not on file   Tobacco Use   • Smoking status: Former     Packs/day: 0 75     Years: 44 00     Pack years: 33 00     Types: Cigarettes     Quit date: 1990     Years since quittin 0   • Smokeless tobacco: Never   Vaping Use   • Vaping Use: Never used   Substance and Sexual Activity   • Alcohol use: Never   • Drug use: Never   • Sexual activity: Not on file         Current Outpatient Medications:   •  amLODIPine (NORVASC) 2 5 mg tablet, TAKE 1 TABLET BY MOUTH EVERY MORNING, Disp: 90 tablet, Rfl: 1  •  atorvastatin (LIPITOR) 80 mg tablet, TAKE 1 TABLET BY MOUTH DAILY AT BEDTIME, Disp: 90 tablet, Rfl: 1  •  Calcium Carbonate-Vit D-Min (CALCIUM 1200 PO), Take 1,200 mg by mouth daily, Disp: , Rfl:   •  cetirizine (ZyrTEC) 10 mg tablet, Take 10 mg by mouth daily, Disp: , Rfl:   •  Cholecalciferol (D3-1000 PO), Take 2,000 Units by mouth daily  , Disp: , Rfl:   •  clobetasol (TEMOVATE) 0 05 % cream, Apply 1 application topically as needed To affected area, Disp: , Rfl:   •  clonazePAM (KlonoPIN) 0 5 mg tablet, Take 1 tablet (0 5 mg total) by mouth daily at bedtime as needed for anxiety, Disp: 30 tablet, Rfl: 0  •  clopidogrel (PLAVIX) 75 mg tablet, Take 1 tablet (75 mg total) by mouth daily, Disp: 30 tablet, Rfl: 6  •  DULoxetine (CYMBALTA) 60 mg delayed release capsule, Take 1 capsule (60 mg total) by mouth daily, Disp: 90 capsule, Rfl: 1  •  levothyroxine 88 mcg tablet, Take 1 tablet (88 mcg total) by mouth daily, Disp: 90 tablet, Rfl: 0  •  mometasone (NASONEX) 50 mcg/act nasal spray, 1 spray into each nostril daily  , Disp: , Rfl:   •  nebivolol (BYSTOLIC) 5 mg tablet, TAKE 1 TABLET BY MOUTH EVERY EVENING, Disp: 90 tablet, Rfl: 1  •  pantoprazole (PROTONIX) 40 mg tablet, Take 1 tablet (40 mg total) by mouth every morning, Disp: 90 tablet, Rfl: 1  •  potassium chloride (K-DUR,KLOR-CON) 20 mEq tablet, Take 20 mEq by mouth once a week , Disp: , Rfl: 1  •  primidone (MYSOLINE) 50 mg tablet, TAKE 1 TABLET BY MOUTH EVERY DAY, Disp: 90 tablet, Rfl: 0  •  triamcinolone (KENALOG) 0 5 % cream, Apply topically 2 (two) times a day, Disp: 30 g, Rfl: 0    Allergies   Allergen Reactions   • Penicillins Swelling       Objective:  Vitals:    02/02/23 1109   BP: 137/75   Pulse: 80       Right Knee Exam     Tenderness   The patient is experiencing tenderness in the lateral joint line  Range of Motion   Extension: 0   Flexion: 90     Tests   Varus: negative Valgus: negative  Drawer:  Anterior - negative    Posterior - negative    Other   Erythema: absent  Scars: absent  Sensation: normal  Swelling: mild  Effusion: no effusion present          Observations     Right Knee   Negative for effusion  Physical Exam  Vitals reviewed  Constitutional:       Appearance: She is well-developed  HENT:      Head: Normocephalic and atraumatic     Eyes:      General: Right eye: No discharge  Left eye: No discharge  Conjunctiva/sclera: Conjunctivae normal    Cardiovascular:      Rate and Rhythm: Regular rhythm  Pulmonary:      Effort: Pulmonary effort is normal  No respiratory distress  Breath sounds: No stridor  Musculoskeletal:      Cervical back: Normal range of motion and neck supple  Right knee: No effusion  Skin:     General: Skin is warm and dry  Neurological:      Mental Status: She is alert and oriented to person, place, and time  Psychiatric:         Behavior: Behavior normal            I have personally reviewed pertinent films in PACS and my interpretation is as follows:  X-rays of the right knee taken today demonstrate severe lateral compartment osteoarthritis  There are large osteophytes posteriorly  X-rays of the lumbar spine from September 9, 2016 were reviewed and demonstrates mild degenerative changes with disc space narrowing at L4-L5 and lower lumbar facet arthropathy  This document was created using speech voice recognition software  Grammatical errors, random word insertions, pronoun errors, and incomplete sentences are an occasional consequence of this system due to software limitations, ambient noise, and hardware issues  Any formal questions or concerns about content, text, or information contained within the body of this dictation should be directly addressed to the provider for clarification

## 2023-02-08 ENCOUNTER — OFFICE VISIT (OUTPATIENT)
Dept: PULMONOLOGY | Facility: MEDICAL CENTER | Age: 83
End: 2023-02-08

## 2023-02-08 VITALS
BODY MASS INDEX: 28.25 KG/M2 | TEMPERATURE: 98 F | DIASTOLIC BLOOD PRESSURE: 82 MMHG | HEART RATE: 79 BPM | WEIGHT: 175.8 LBS | RESPIRATION RATE: 12 BRPM | SYSTOLIC BLOOD PRESSURE: 142 MMHG | HEIGHT: 66 IN

## 2023-02-08 DIAGNOSIS — R06.02 SHORTNESS OF BREATH ON EXERTION: Chronic | ICD-10-CM

## 2023-02-08 DIAGNOSIS — J96.11 CHRONIC HYPOXEMIC RESPIRATORY FAILURE (HCC): Primary | Chronic | ICD-10-CM

## 2023-02-08 DIAGNOSIS — J30.1 SEASONAL ALLERGIC RHINITIS DUE TO POLLEN: Chronic | ICD-10-CM

## 2023-02-08 NOTE — PROGRESS NOTES
Assessment/Plan:     Problem List Items Addressed This Visit        Respiratory    Chronic hypoxemic respiratory failure (HCC) - Primary (Chronic)     Patient room air oxygen at rest was 95%  With ambulating 100 feet her O2 saturation went to 86%  On 2 L at rest O2 saturation was 98% and with ambulation was 96%  I did offer supplemental oxygen with ambulation which patient is deferred  She does use 3 L of supplemental oxygen at nighttime  She does use and benefit  I did discuss detriments of not using this  According to patient she has had a recent fall 6 weeks ago I will be contacting her PCP to discuss this with patient  Does have supplemental oxygen at home  I have told her to use at least 2 L when she is walking she has agreed to do this but defers any kind of portable oxygen         Seasonal allergic rhinitis due to pollen (Chronic)     She does use Nasonex nasal spray also uses Zyrtec as needed  She appears stable  Other    Shortness of breath on exertion (Chronic)     Patient does have shortness of breath on exertion  She did have a PFT done in November 2021 that showed a restrictive pattern  Patient have a CTA of her chest on that I personally reviewed  Lungs were clear and no PE was seen  She is hypoxic now with exertion but defers any supplemental oxygen  She does follow with her primary care physician as well as cardiology  Have ordered a chest x-ray for her  She has agreed to follow-up with                Return in about 6 months (around 8/8/2023)  All questions are answered to the patient's satisfaction and understanding  She verbalizes understanding  She is encouraged to call with any further questions or concerns  Portions of the record may have been created with voice recognition software  Occasional wrong word or "sound a like" substitutions may have occurred due to the inherent limitations of voice recognition software    Read the chart carefully and recognize, using context, where substitutions have occurred  Electronically Signed by ROBERT Huerta    ______________________________________________________________________    Chief Complaint: No chief complaint on file  Patient ID: Jeannie Sanders is a 78 y o  y o  female has a past medical history of Anxiety, Blind left eye, Deaf, left, Depression, Disease of thyroid gland, DVT complicating pregnancy, unspecified trimester ([address] ) (postpartum), Hearing loss, History of shingles (2007), Lyme disease, Meniere's disease, Obesity, Orthostatic hypotension, Sinus congestion, and Sleep apnea  2/8/2023  Patient presents today for follow-up visit  HPI Jonathan Flood is an 49-year-old female who has a history of nocturnal hypoxemia  She also has shortness of breath and seasonal allergic rhinitis  She had a 2D echocardiogram in February 2020  Ejection fraction was 55 to 60% there is grade 1 diastolic dysfunction she also has history of mild regurgitation of mitral valve pulmonary artery pressures within normal limits and no evidence for stenosis or regurgitation of aortic valve  Patient did have a CTA chest PE study in June 2021 lungs were clear no pulmonary embolus was seen  Patient has been seen in the past by cardiology she has history of TIA and SVT as well as heart palpitations  Review of Systems   Constitutional: Negative  HENT: Negative  Eyes: Negative  Respiratory: Positive for shortness of breath  Cardiovascular: Negative  Gastrointestinal: Negative  Endocrine: Negative  Genitourinary: Negative  Musculoskeletal: Negative  Skin: Negative  Allergic/Immunologic: Negative  Neurological: Negative  Hematological: Negative  Psychiatric/Behavioral: Negative  Smoking history: She reports that she quit smoking about 33 years ago  Her smoking use included cigarettes  She has a 33 00 pack-year smoking history   She has never used smokeless tobacco     The following portions of the patient's history were reviewed and updated as appropriate: current medications, past family history, past medical history, past social history, past surgical history and problem list     Immunization History   Administered Date(s) Administered   • COVID-19 MODERNA VACC 0 25 ML IM BOOSTER 12/10/2021   • COVID-19 MODERNA VACC 0 5 ML IM 03/02/2021, 03/30/2021   • Influenza, high dose seasonal 0 7 mL 10/12/2022     Current Outpatient Medications   Medication Sig Dispense Refill   • amLODIPine (NORVASC) 2 5 mg tablet TAKE 1 TABLET(2 5 MG) BY MOUTH EVERY EVENING 90 tablet 1   • atorvastatin (LIPITOR) 80 mg tablet TAKE 1 TABLET BY MOUTH DAILY AT BEDTIME 90 tablet 1   • Calcium Carbonate-Vit D-Min (CALCIUM 1200 PO) Take 1,200 mg by mouth daily     • cetirizine (ZyrTEC) 10 mg tablet Take 10 mg by mouth daily     • Cholecalciferol (D3-1000 PO) Take 2,000 Units by mouth daily       • clobetasol (TEMOVATE) 0 05 % cream Apply 1 application topically as needed To affected area     • clonazePAM (KlonoPIN) 0 5 mg tablet Take 1 tablet (0 5 mg total) by mouth daily at bedtime as needed for anxiety 30 tablet 0   • clopidogrel (PLAVIX) 75 mg tablet TAKE 1 TABLET BY MOUTH EVERY DAY 90 tablet 1   • DULoxetine (CYMBALTA) 60 mg delayed release capsule Take 1 capsule (60 mg total) by mouth daily 90 capsule 1   • levothyroxine 88 mcg tablet Take 1 tablet (88 mcg total) by mouth daily 90 tablet 0   • mometasone (NASONEX) 50 mcg/act nasal spray 1 spray into each nostril daily       • nebivolol (BYSTOLIC) 5 mg tablet TAKE 1 TABLET BY MOUTH EVERY EVENING 90 tablet 1   • pantoprazole (PROTONIX) 40 mg tablet Take 1 tablet (40 mg total) by mouth every morning 90 tablet 1   • potassium chloride (K-DUR,KLOR-CON) 20 mEq tablet Take 20 mEq by mouth once a week   1   • primidone (MYSOLINE) 50 mg tablet TAKE 1 TABLET BY MOUTH EVERY DAY 90 tablet 0   • triamcinolone (KENALOG) 0 5 % cream Apply topically 2 (two) times a day 30 g 0     No current facility-administered medications for this visit  Allergies: Penicillins    Objective:  Vitals:    02/08/23 1306   BP: 142/82   Pulse: 79   Resp: 12   Temp: 98 °F (36 7 °C)   TempSrc: Temporal   Weight: 79 7 kg (175 lb 12 8 oz)   Height: 5' 6" (1 676 m)   Oxygen Therapy  SpO2:  (89 upon arrival)    Wt Readings from Last 3 Encounters:   02/08/23 79 7 kg (175 lb 12 8 oz)   02/02/23 83 kg (183 lb)   01/10/23 83 kg (183 lb)     Body mass index is 28 37 kg/m²  Physical Exam  Constitutional:       Appearance: She is normal weight  HENT:      Head: Normocephalic  Nose: Nose normal       Mouth/Throat:      Mouth: Mucous membranes are moist       Pharynx: Oropharynx is clear  Cardiovascular:      Rate and Rhythm: Normal rate and regular rhythm  Pulses: Normal pulses  Pulmonary:      Effort: Pulmonary effort is normal    Abdominal:      General: Abdomen is flat  Genitourinary:     General: Normal vulva  Musculoskeletal:         General: Normal range of motion  Skin:     General: Skin is warm and dry  Capillary Refill: Capillary refill takes less than 2 seconds  Neurological:      General: No focal deficit present  Mental Status: She is alert and oriented to person, place, and time     Psychiatric:         Behavior: Behavior normal          Lab Review:   Appointment on 12/30/2022   Component Date Value   • WBC 12/30/2022 9 69    • RBC 12/30/2022 4 27    • Hemoglobin 12/30/2022 13 5    • Hematocrit 12/30/2022 41 6    • MCV 12/30/2022 97    • MCH 12/30/2022 31 6    • MCHC 12/30/2022 32 5    • RDW 12/30/2022 15 0    • MPV 12/30/2022 11 1    • Platelets 48/00/0684 279    • nRBC 12/30/2022 0    • Neutrophils Relative 12/30/2022 65    • Immat GRANS % 12/30/2022 0    • Lymphocytes Relative 12/30/2022 23    • Monocytes Relative 12/30/2022 7    • Eosinophils Relative 12/30/2022 4    • Basophils Relative 12/30/2022 1    • Neutrophils Absolute 12/30/2022 6 28    • Immature Grans Absolute 12/30/2022 0 04    • Lymphocytes Absolute 2022 2 25    • Monocytes Absolute 2022 0 67    • Eosinophils Absolute 2022 0 38    • Basophils Absolute 2022 0 07    • Sodium 2022 141    • Potassium 2022 3 9    • Chloride 2022 102    • CO2 2022 31    • ANION GAP 2022 8    • BUN 2022 14    • Creatinine 2022 0 73    • Glucose, Fasting 2022 130 (H)    • Calcium 2022 9 7    • Corrected Calcium 2022 10 2 (H)    • AST 2022 80 (H)    • ALT 2022 50    • Alkaline Phosphatase 2022 128 (H)    • Total Protein 2022 6 9    • Albumin 2022 3 4 (L)    • Total Bilirubin 2022 0 68    • eGFR 2022 75    • Hemoglobin A1C 2022 6 6 (H)    • EAG 2022 143    • Cholesterol 2022 151    • Triglycerides 2022 209 (H)    • HDL, Direct 2022 37 (L)    • LDL Calculated 2022 72    • Non-HDL-Chol (CHOL-HDL) 2022 114    • TSH 3RD GENERATON 2022 0 999        Past Surgical History:   Procedure Laterality Date   • APPENDECTOMY     • BREAST BIOPSY Left    •  SECTION      x2   • DXA PROCEDURE (HISTORICAL)  10/28/2020   • EYE SURGERY     • HAND SURGERY Left    • HERNIA REPAIR     • HYSTERECTOMY     • ROTATOR CUFF REPAIR Left    • TONSILLECTOMY     • TYMPANOSTOMY TUBE PLACEMENT          Family History   Problem Relation Age of Onset   • Depression Mother    • Hypertension Mother    • Obesity Mother    • Depression Father    • Alcohol abuse Father    • Stroke Father    • Breast cancer Sister 76   • Ovarian cancer Daughter 48   • BRCA1 Negative Daughter    • BRCA2 Negative Daughter         Diagnostics:  I have personally reviewed pertinent reports  Office Spirometry Results:     ESS:    XR knee 1 or 2 vw left    Result Date: 2023  Narrative: LEFT KNEE INDICATION:   Z01 89: Encounter for other specified special examinations   COMPARISON:  None VIEWS:  XR KNEE 1 OR 2 VW LEFT FINDINGS: There is no acute fracture or dislocation  There is no joint effusion  Mild osteoarthritis with small osteophytes seen  No lytic or blastic osseous lesion  Soft tissues are unremarkable  Impression: No acute osseous abnormality  Workstation performed: [id number]     XR knee 3 vw right non injury    Result Date: 2/5/2023  Narrative: RIGHT KNEE INDICATION:   M17 11: Unilateral primary osteoarthritis, right knee  COMPARISON:  None VIEWS:  XR KNEE 3 VW RIGHT NON INJURY FINDINGS: There is no acute fracture or dislocation  There is no joint effusion  Moderate to severe osteoarthritis with narrowing of the lateral tibiofemoral joint and small osteophytes seen  No lytic or blastic osseous lesion  Soft tissues are unremarkable  Impression: No acute osseous abnormality  Workstation performed: [id number]     JU right upper quadrant    Result Date: 1/18/2023  Narrative: RIGHT UPPER QUADRANT ULTRASOUND INDICATION:     R10 11: Right upper quadrant pain  COMPARISON:  CT 12/16/2021 and priors TECHNIQUE:   Real-time ultrasound of the right upper quadrant was performed with a curvilinear transducer with both volumetric sweeps and still imaging techniques  FINDINGS: PANCREAS:  Visualized portions of the pancreas are within normal limits  AORTA AND IVC:  Visualized portions are normal for patient age  LIVER: Size:  Enlarged  The liver measures 20 2 cm in the midclavicular line  Contour:  Surface contour is smooth  Parenchyma: There is mild diffuse increased echogenicity with smooth echotexture, without significant beam attenuation or loss of periportal echogenicity  Most consistent with mild hepatic steatosis  Simple right liver cyst measures 5 x 6 x 5 mm  No liver mass identified  Limited imaging of the main portal vein shows it to be patent and hepatopetal   BILIARY: Patient has undergone cholecystectomy  No intrahepatic biliary dilatation  CBD measures 9 0 mm  No choledocholithiasis  KIDNEY: Right kidney measures 12 6 x 4 9 x 5 0 cm   Volume 163 2 mL No hydronephrosis  Simple lower pole cyst measures 1 3 x 1 6 x 1 5 cm  Additional simple cyst measures 1 1 x 1 x 1 1 cm  ASCITES:   None  Impression: 1  Simple right renal cysts  2   Enlarged mildly echogenic liver suggesting hepatic steatosis   Workstation performed: [id number]

## 2023-02-08 NOTE — ASSESSMENT & PLAN NOTE
Patient room air oxygen at rest was 95%  With ambulating 100 feet her O2 saturation went to 86%  On 2 L at rest O2 saturation was 98% and with ambulation was 96%  I did offer supplemental oxygen with ambulation which patient is deferred  She does use 3 L of supplemental oxygen at nighttime  She does use and benefit  I did discuss detriments of not using this  According to patient she has had a recent fall 6 weeks ago I will be contacting her PCP to discuss this with patient  Does have supplemental oxygen at home    I have told her to use at least 2 L when she is walking she has agreed to do this but defers any kind of portable oxygen

## 2023-02-09 ENCOUNTER — HOSPITAL ENCOUNTER (OUTPATIENT)
Dept: RADIOLOGY | Facility: HOSPITAL | Age: 83
Discharge: HOME | End: 2023-02-09
Attending: INTERNAL MEDICINE

## 2023-02-09 DIAGNOSIS — R29.6 FREQUENT FALLS: ICD-10-CM

## 2023-03-04 ENCOUNTER — HOSPITAL ENCOUNTER (OUTPATIENT)
Dept: RADIOLOGY | Facility: HOSPITAL | Age: 83
Discharge: HOME | End: 2023-03-04
Attending: INTERNAL MEDICINE

## 2023-03-27 ENCOUNTER — TELEPHONE (OUTPATIENT)
Dept: GASTROENTEROLOGY | Facility: CLINIC | Age: 83
End: 2023-03-27

## 2023-03-30 ENCOUNTER — OFFICE VISIT (OUTPATIENT)
Dept: FAMILY MEDICINE CLINIC | Facility: CLINIC | Age: 83
End: 2023-03-30

## 2023-03-30 VITALS
OXYGEN SATURATION: 96 % | SYSTOLIC BLOOD PRESSURE: 110 MMHG | WEIGHT: 177 LBS | HEART RATE: 58 BPM | DIASTOLIC BLOOD PRESSURE: 60 MMHG | BODY MASS INDEX: 28.45 KG/M2 | HEIGHT: 66 IN

## 2023-03-30 DIAGNOSIS — J96.11 CHRONIC HYPOXEMIC RESPIRATORY FAILURE (HCC): Chronic | ICD-10-CM

## 2023-03-30 DIAGNOSIS — F33.40 RECURRENT MAJOR DEPRESSIVE DISORDER, IN REMISSION (HCC): ICD-10-CM

## 2023-03-30 DIAGNOSIS — E03.9 ACQUIRED HYPOTHYROIDISM: ICD-10-CM

## 2023-03-30 DIAGNOSIS — K21.9 GASTROESOPHAGEAL REFLUX DISEASE WITHOUT ESOPHAGITIS: ICD-10-CM

## 2023-03-30 DIAGNOSIS — M17.11 PRIMARY OSTEOARTHRITIS OF RIGHT KNEE: ICD-10-CM

## 2023-03-30 DIAGNOSIS — R22.41 LOCALIZED SWELLING OF RIGHT LOWER LEG: ICD-10-CM

## 2023-03-30 DIAGNOSIS — M81.0 AGE-RELATED OSTEOPOROSIS WITHOUT CURRENT PATHOLOGICAL FRACTURE: ICD-10-CM

## 2023-03-30 DIAGNOSIS — R25.1 TREMORS OF NERVOUS SYSTEM: ICD-10-CM

## 2023-03-30 DIAGNOSIS — I10 ESSENTIAL HYPERTENSION: ICD-10-CM

## 2023-03-30 DIAGNOSIS — R42 DIZZINESS: Primary | ICD-10-CM

## 2023-03-30 DIAGNOSIS — F41.9 ANXIETY: ICD-10-CM

## 2023-03-30 DIAGNOSIS — R29.6 FREQUENT FALLS: ICD-10-CM

## 2023-03-30 DIAGNOSIS — E78.5 DYSLIPIDEMIA: ICD-10-CM

## 2023-03-30 DIAGNOSIS — G47.34 NOCTURNAL HYPOXEMIA: ICD-10-CM

## 2023-03-30 DIAGNOSIS — R10.13 DYSPEPSIA: ICD-10-CM

## 2023-03-30 DIAGNOSIS — Z86.73 HISTORY OF TRANSIENT ISCHEMIC ATTACK (TIA): ICD-10-CM

## 2023-03-30 DIAGNOSIS — E11.9 TYPE 2 DIABETES MELLITUS WITHOUT COMPLICATION, WITHOUT LONG-TERM CURRENT USE OF INSULIN (HCC): ICD-10-CM

## 2023-03-30 DIAGNOSIS — I47.1 SVT (SUPRAVENTRICULAR TACHYCARDIA) (HCC): ICD-10-CM

## 2023-03-30 DIAGNOSIS — D51.8 OTHER VITAMIN B12 DEFICIENCY ANEMIA: ICD-10-CM

## 2023-03-30 DIAGNOSIS — R74.8 ELEVATED LIVER ENZYMES: ICD-10-CM

## 2023-03-30 RX ORDER — CLONAZEPAM 0.5 MG/1
0.5 TABLET ORAL
Qty: 30 TABLET | Refills: 0 | Status: SHIPPED | OUTPATIENT
Start: 2023-03-30

## 2023-03-30 RX ORDER — PRIMIDONE 50 MG/1
50 TABLET ORAL DAILY
Qty: 90 TABLET | Refills: 1 | Status: SHIPPED | OUTPATIENT
Start: 2023-03-30

## 2023-03-30 NOTE — PROGRESS NOTES
Name: Lizette Spivey      : 1940      MRN: [id number]  Encounter Provider: José Mckeon MD  Encounter Date: 3/30/2023   Encounter department: [place] [address]     1  Dizziness  Assessment & Plan:  Dizziness comparatively less but on and off she still has it  Work-up in the past has been negative we repeated the MRI which came back negative if the symptoms persist we will have him reevaluated by the neurology  2  Recurrent major depressive disorder, in remission ([facility] [address] )    3  Acquired hypothyroidism  Assessment & Plan:  Patient is currently taking levothyroxine 88 mcg we will continue the same last TSH level is 0 99      4  Anxiety  Assessment & Plan:  Continue clonazepam 0 5 mg as needed along with duloxetine 60 mg daily stress management discussed with the patient  Orders:  -     clonazePAM (KlonoPIN) 0 5 mg tablet; Take 1 tablet (0 5 mg total) by mouth daily at bedtime as needed for anxiety    5  Chronic hypoxemic respiratory failure [facility])  Assessment & Plan:  Pulmonary note appreciated  Patient was recommended to use oxygen while ambulating initially she has denied it now if she feels she will need the oxygen while ambulating I recommended her to get in touch with the pulmonary and follow-up  6  Dyslipidemia  Assessment & Plan:  Continue atorvastatin 80 mg follow-up with repeat lipid profile    Orders:  -     Lipid panel; Future    7  Dyspepsia  Assessment & Plan:  Patient with dyspepsia like symptoms currently taking pantoprazole on and off diarrhea recommended patient to have be evaluated by the gastroenterologist not done yet  Continue pantoprazole look into all medications which might trigger diarrhea      8  Elevated liver enzymes  Assessment & Plan:  Patient with elevated liver enzymes we will follow-up with repeat 1  Patient had an ultrasound of the liver which had shown enlarged mildly echogenic liver suggesting hepatic steatosis    Follow-up with the gastroenterologist also  9  Essential hypertension  Assessment & Plan: Today patient blood pressure is 110/60 currently taking amlodipine 2 5 mg and nebivolol 5 mg recommend patient to check the blood pressures at home also and bring some readings if the pressures are running low will adjust the medications  With regards to the dizziness recommend patient to check the blood pressure when she feels little dizzy      10  Frequent falls  Assessment & Plan:  MRI is negative  Recommend patient to drink more fluids GAGE stockings  We will also discuss about referring to the balance center      11  Gastroesophageal reflux disease without esophagitis  Assessment & Plan:  Continue pantoprazole      12  History of transient ischemic attack (TIA)  Assessment & Plan:  Continue clopidogrel      13  Localized swelling of right lower leg  Assessment & Plan:  No swelling noted vascular study negative      14  Nocturnal hypoxemia  Assessment & Plan:  Patient on oxygen 3 L at bedtime recommend patient to start using oxygen with ambulation also      15  Primary osteoarthritis of right knee  Assessment & Plan:  Not seen by the orthopedic yet recommend Tylenol as needed      16  SVT (supraventricular tachycardia) (HCC)  Assessment & Plan:  Stable 72/min on nebivolol 5 mg daily      17  Type 2 diabetes mellitus without complication, without long-term current use of insulin (HCC)  Assessment & Plan:    Lab Results   Component Value Date    HGBA1C 6 6 (H) 12/30/2022   Hemoglobin A1c 6 6 follow-up with repeat 1 if it is still high start her on medication  Orders:  -     Comprehensive metabolic panel; Future  -     Hemoglobin A1C; Future; Expected date: 03/31/2023  -     Microalbumin / creatinine urine ratio  -     UA w Reflex to Microscopic w Reflex to Culture; Future; Expected date: 03/30/2023    18  Tremors of nervous system  -     primidone (MYSOLINE) 50 mg tablet; Take 1 tablet (50 mg total) by mouth daily    19  Other vitamin B12 deficiency anemia  -     Vitamin B12; Future    20  Age-related osteoporosis without current pathological fracture  -     DXA bone density spine hip and pelvis; Future; Expected date: 03/30/2023    BMI Counseling: Body mass index is 28 57 kg/m²  The BMI is above normal  Nutrition recommendations include decreasing portion sizes, encouraging healthy choices of fruits and vegetables, consuming healthier snacks, limiting drinks that contain sugar, increasing intake of lean protein and reducing intake of cholesterol  Exercise recommendations include exercising 3-5 times per week and strength training exercises  No pharmacotherapy was ordered  Rationale for BMI follow-up plan is due to patient being overweight or obese  Depression Screening and Follow-up Plan: Patient was screened for depression during today's encounter  They screened negative with a PHQ-2 score of 0  Urinary Incontinence Plan of Care: counseling topics discussed: practice Kegel (pelvic floor strengthening) exercises and preventing constipation  Referral was placed for Urology  Patient with history of urinary incontinence recommend patient to do some Kegel exercises to prevent constipation and also refer the patient to the urologist for further evaluation            Subjective     Patient is here today for a follow up visit  She complains of some dizziness and as well as diarrhea at times  Nocturia x2-3  She has incontinence and uses about 2-3 diapers a day  Patient with hemoglobin A1c of 6 6 currently not on any medications  Denies chest pains palpitation shortness of breath  Medications reviewed she had history of frequent falls in the recent past work-up was negative including MRI  She had elevated liver enzymes and ultrasound had shown hepatic steatosis  Recommend patient to go for lab work done including A1c if it is still coming up high recommend very strongly to go on medication to control    Meanwhile recommend patient to follow strict diet with regards to carbohydrate calorie intake    Review of Systems   Constitutional: Negative for chills and fever  HENT: Positive for rhinorrhea  Negative for congestion, ear pain, postnasal drip and sore throat  Eyes: Negative for pain and visual disturbance  Respiratory: Negative for cough and shortness of breath  Cardiovascular: Negative for chest pain and palpitations  Gastrointestinal: Positive for diarrhea  Negative for abdominal pain, constipation and vomiting  Genitourinary: Negative for difficulty urinating, dysuria, hematuria and vaginal pain  Musculoskeletal: Negative for arthralgias and back pain  Skin: Negative for color change and rash  Neurological: Positive for dizziness  Negative for seizures, syncope and headaches  Psychiatric/Behavioral: Negative for agitation, confusion and hallucinations  All other systems reviewed and are negative        Past Medical History:   Diagnosis Date   • Anxiety    • Blind left eye    • Deaf, left    • Depression    • Disease of thyroid gland    • DVT complicating pregnancy, unspecified trimester ([facility] [address] ) postpartum   • Hearing loss     LEFT EAR   • History of shingles 2007    fACIAL    • Lyme disease    • Meniere's disease    • Obesity    • Orthostatic hypotension    • Sinus congestion    • Sleep apnea      Past Surgical History:   Procedure Laterality Date   • APPENDECTOMY     • BREAST BIOPSY Left    •  SECTION      x2   • DXA PROCEDURE (HISTORICAL)  10/28/2020   • EYE SURGERY     • HAND SURGERY Left    • HERNIA REPAIR     • HYSTERECTOMY     • ROTATOR CUFF REPAIR Left    • TONSILLECTOMY     • TYMPANOSTOMY TUBE PLACEMENT       Family History   Problem Relation Age of Onset   • Depression Mother    • Hypertension Mother    • Obesity Mother    • Depression Father    • Alcohol abuse Father    • Stroke Father    • Breast cancer Sister 76   • Ovarian cancer Daughter 48   • BRCA1 Negative Daughter    • BRCA2 Negative Daughter      Social History     Socioeconomic History   • Marital status:       Spouse name: None   • Number of children: None   • Years of education: None   • Highest education level: None   Occupational History   • None   Tobacco Use   • Smoking status: Former     Packs/day: 0 75     Years: 44 00     Pack years: 33 00     Types: Cigarettes     Quit date: 1990     Years since quittin 1   • Smokeless tobacco: Never   Vaping Use   • Vaping Use: Never used   Substance and Sexual Activity   • Alcohol use: Never   • Drug use: Never   • Sexual activity: None   Other Topics Concern   • None   Social History Narrative   • None     Social Determinants of Health     Financial Resource Strain: Not on file   Food Insecurity: Not on file   Transportation Needs: Not on file   Physical Activity: Not on file   Stress: Not on file   Social Connections: Not on file   Intimate Partner Violence: Not on file   Housing Stability: Not on file     Current Outpatient Medications on File Prior to Visit   Medication Sig   • amLODIPine (NORVASC) 2 5 mg tablet TAKE 1 TABLET(2 5 MG) BY MOUTH EVERY EVENING   • atorvastatin (LIPITOR) 80 mg tablet TAKE 1 TABLET BY MOUTH DAILY AT BEDTIME   • Calcium Carbonate-Vit D-Min (CALCIUM 1200 PO) Take 1,200 mg by mouth daily   • cetirizine (ZyrTEC) 10 mg tablet Take 10 mg by mouth daily   • Cholecalciferol (D3-1000 PO) Take 2,000 Units by mouth daily     • clobetasol (TEMOVATE) 0 05 % cream Apply 1 application topically as needed To affected area   • clopidogrel (PLAVIX) 75 mg tablet TAKE 1 TABLET BY MOUTH EVERY DAY   • DULoxetine (CYMBALTA) 60 mg delayed release capsule Take 1 capsule (60 mg total) by mouth daily   • levothyroxine 88 mcg tablet Take 1 tablet (88 mcg total) by mouth daily   • mometasone (NASONEX) 50 mcg/act nasal spray 1 spray into each nostril daily     • nebivolol (BYSTOLIC) 5 mg tablet TAKE 1 TABLET BY MOUTH EVERY EVENING   • pantoprazole (PROTONIX) 40 mg tablet Take 1 "tablet (40 mg total) by mouth every morning   • potassium chloride (K-DUR,KLOR-CON) 20 mEq tablet Take 20 mEq by mouth once a week    • triamcinolone (KENALOG) 0 5 % cream Apply topically 2 (two) times a day     Allergies   Allergen Reactions   • Penicillins Swelling     Immunization History   Administered Date(s) Administered   • COVID-19 MODERNA VACC 0 25 ML IM BOOSTER 12/10/2021   • COVID-19 MODERNA VACC 0 5 ML IM 03/02/2021, 03/30/2021   • Influenza, high dose seasonal 0 7 mL 10/12/2022       Diabetic Foot Exam    Patient's shoes and socks removed  Right Foot/Ankle   Right Foot Inspection  Skin Exam: skin normal and skin intact  No dry skin, no warmth, no callus, no erythema, no maceration, no abnormal color, no pre-ulcer, no ulcer and no callus  Toe Exam: ROM and strength within normal limits  No swelling, no tenderness, erythema and  no right toe deformity    Sensory   Monofilament testing: intact    Vascular  Capillary refills: < 3 seconds  The right DP pulse is 2+  The right PT pulse is 2+  Left Foot/Ankle  Left Foot Inspection  Skin Exam: skin normal and skin intact  No dry skin, no warmth, no erythema, no maceration, normal color, no pre-ulcer, no ulcer and no callus  Toe Exam: ROM and strength within normal limits  No swelling, no tenderness, no erythema and no left toe deformity  Sensory   Monofilament testing: intact    Vascular  Capillary refills: < 3 seconds  The left DP pulse is 2+  The left PT pulse is 2+  Assign Risk Category  No deformity present  No loss of protective sensation  No weak pulses  Risk: 0      Objective     /60   Pulse 58   Ht 5' 6\" (1 676 m)   Wt 80 3 kg (177 lb)   SpO2 96%   BMI 28 57 kg/m²     Physical Exam  Vitals and nursing note reviewed  Constitutional:       Appearance: Normal appearance  Cardiovascular:      Rate and Rhythm: Normal rate and regular rhythm        Pulses: no weak pulses          Dorsalis pedis pulses are 2+ on the right " side and 2+ on the left side  Posterior tibial pulses are 2+ on the right side and 2+ on the left side  Heart sounds: Normal heart sounds  Pulmonary:      Effort: Pulmonary effort is normal       Breath sounds: Normal breath sounds  Abdominal:      Palpations: Abdomen is soft  Musculoskeletal:      Cervical back: Normal range of motion and neck supple  Right lower leg: No edema  Left lower leg: No edema  Feet:      Right foot:      Skin integrity: No ulcer, skin breakdown, erythema, warmth, callus or dry skin  Left foot:      Skin integrity: No ulcer, skin breakdown, erythema, warmth, callus or dry skin  Skin:     General: Skin is warm and dry  Neurological:      General: No focal deficit present  Mental Status: She is alert and oriented to person, place, and time         Smiley George MD

## 2023-04-01 NOTE — ASSESSMENT & PLAN NOTE
Lab Results   Component Value Date    HGBA1C 6 6 (H) 12/30/2022   Hemoglobin A1c 6 6 follow-up with repeat 1 if it is still high start her on medication

## 2023-04-01 NOTE — ASSESSMENT & PLAN NOTE
Dizziness comparatively less but on and off she still has it  Work-up in the past has been negative we repeated the MRI which came back negative if the symptoms persist we will have him reevaluated by the neurology

## 2023-04-01 NOTE — ASSESSMENT & PLAN NOTE
Continue clonazepam 0 5 mg as needed along with duloxetine 60 mg daily stress management discussed with the patient

## 2023-04-01 NOTE — ASSESSMENT & PLAN NOTE
Patient with elevated liver enzymes we will follow-up with repeat 1  Patient had an ultrasound of the liver which had shown enlarged mildly echogenic liver suggesting hepatic steatosis  Follow-up with the gastroenterologist also

## 2023-04-01 NOTE — ASSESSMENT & PLAN NOTE
MRI is negative  Recommend patient to drink more fluids GAGE stockings    We will also discuss about referring to the balance center

## 2023-04-01 NOTE — ASSESSMENT & PLAN NOTE
Patient with dyspepsia like symptoms currently taking pantoprazole on and off diarrhea recommended patient to have be evaluated by the gastroenterologist not done yet    Continue pantoprazole look into all medications which might trigger diarrhea

## 2023-04-01 NOTE — ASSESSMENT & PLAN NOTE
Today patient blood pressure is 110/60 currently taking amlodipine 2 5 mg and nebivolol 5 mg recommend patient to check the blood pressures at home also and bring some readings if the pressures are running low will adjust the medications    With regards to the dizziness recommend patient to check the blood pressure when she feels little dizzy

## 2023-04-03 PROBLEM — H61.21 RIGHT EAR IMPACTED CERUMEN: Status: RESOLVED | Noted: 2023-02-02 | Resolved: 2023-04-03

## 2023-04-05 ENCOUNTER — OFFICE VISIT (OUTPATIENT)
Dept: GASTROENTEROLOGY | Facility: CLINIC | Age: 83
End: 2023-04-05

## 2023-04-05 VITALS
HEART RATE: 81 BPM | SYSTOLIC BLOOD PRESSURE: 164 MMHG | WEIGHT: 175 LBS | DIASTOLIC BLOOD PRESSURE: 64 MMHG | HEIGHT: 66 IN | BODY MASS INDEX: 28.12 KG/M2

## 2023-04-05 DIAGNOSIS — K76.89 LIVER CYST: ICD-10-CM

## 2023-04-05 DIAGNOSIS — K76.0 FATTY LIVER: ICD-10-CM

## 2023-04-05 DIAGNOSIS — R16.0 HEPATOMEGALY: Primary | ICD-10-CM

## 2023-04-05 NOTE — PROGRESS NOTES
[facility]  - Outpatient Consultation  Deanne Cordova 78 y o  female MRN: [id number]  Encounter: [id number]          ASSESSMENT AND PLAN:      1  Hepatomegaly    2  Fatty liver    3  Liver cyst    Mild elevation of AST at 180, ultrasound suggest fatty liver, mild hepatomegaly and a small liver cyst, risk factors may be hyperlipidemia, encourage her to watch her diet  I do not see any need for further work-up and are not excessive testing in this otherwise elderly and frail patient  Mild indigestion, antireflux measures reviewed, she takes pantoprazole with relief  History of colon anoscopy 7 years ago, no need for screening colonoscopy at this time  She has used some local cream for hemorrhoids  ______________________________________________________________________    HPI:      Patient came in for evaluation of fatty liver  She had mild elevation of AST at 80, ultrasound suggested fatty liver hepatomegaly  She denies any known history of liver disease hepatitis jaundice easy bleeding bruising itching, no high risk behavior history  Does have occasional discomfort abdomen, backache, denies any dysphagia coughing choking spells  Has occasional heartburn indigestion, bowels are irregular  Has some hemorrhoidal issues  Has perianal excoriation from urinary incontinence  Denies chest pain shortness of breath, lives alone, somewhat frail and unsteady  Diet medications more than 10 pertinent systems of the current records were reviewed  Last colonoscopy about 7 years ago  REVIEW OF SYSTEMS:    CONSTITUTIONAL: Denies any fever, chills, rigors, and weight loss  HEENT: No earache or tinnitus  CARDIOVASCULAR: No chest pain or palpitations  RESPIRATORY: Denies any cough, hemoptysis, shortness of breath or dyspnea on exertion  GASTROINTESTINAL: As noted in the History of Present Illness  GENITOURINARY: Denies any hematuria or dysuria    NEUROLOGIC: No dizziness or vertigo  MUSCULOSKELETAL: Denies any joint swellings  SKIN: Denies skin rashes or itching  ENDOCRINE: Denies excessive thirst  Denies intolerance to heat or cold  PSYCHOSOCIAL: Denies depression or anxiety  Denies any recent memory loss         Historical Information   Past Medical History:   Diagnosis Date   • Anxiety    • Blind left eye    • Deaf, left    • Depression    • Disease of thyroid gland    • DVT complicating pregnancy, unspecified trimester ([address] ) postpartum   • Hearing loss     LEFT EAR   • History of shingles 2007    fACIAL    • Lyme disease    • Meniere's disease    • Obesity    • Orthostatic hypotension    • Sinus congestion    • Sleep apnea      Past Surgical History:   Procedure Laterality Date   • APPENDECTOMY     • BREAST BIOPSY Left 2010   •  SECTION      x2   • DXA PROCEDURE (HISTORICAL)  10/28/2020   • EYE SURGERY     • HAND SURGERY Left    • HERNIA REPAIR     • HYSTERECTOMY     • ROTATOR CUFF REPAIR Left    • TONSILLECTOMY     • TYMPANOSTOMY TUBE PLACEMENT       Social History   Social History     Substance and Sexual Activity   Alcohol Use Never     Social History     Substance and Sexual Activity   Drug Use Never     Social History     Tobacco Use   Smoking Status Former   • Packs/day: 0 75   • Years: 44 00   • Pack years: 33 00   • Types: Cigarettes   • Quit date: 1990   • Years since quittin 1   Smokeless Tobacco Never     Family History   Problem Relation Age of Onset   • Depression Mother    • Hypertension Mother    • Obesity Mother    • Depression Father    • Alcohol abuse Father    • Stroke Father    • Breast cancer Sister 76   • Ovarian cancer Daughter 48   • BRCA1 Negative Daughter    • BRCA2 Negative Daughter        Meds/Allergies       Current Outpatient Medications:   •  amLODIPine (NORVASC) 2 5 mg tablet  •  atorvastatin (LIPITOR) 80 mg tablet  •  Calcium Carbonate-Vit D-Min (CALCIUM 1200 PO)  •  cetirizine (ZyrTEC) 10 mg tablet  •  Cholecalciferol "(D3-1000 PO)  •  clobetasol (TEMOVATE) 0 05 % cream  •  clonazePAM (KlonoPIN) 0 5 mg tablet  •  clopidogrel (PLAVIX) 75 mg tablet  •  DULoxetine (CYMBALTA) 60 mg delayed release capsule  •  levothyroxine 88 mcg tablet  •  mometasone (NASONEX) 50 mcg/act nasal spray  •  nebivolol (BYSTOLIC) 5 mg tablet  •  pantoprazole (PROTONIX) 40 mg tablet  •  potassium chloride (K-DUR,KLOR-CON) 20 mEq tablet  •  primidone (MYSOLINE) 50 mg tablet  •  triamcinolone (KENALOG) 0 5 % cream    Allergies   Allergen Reactions   • Penicillins Swelling           Objective     Blood pressure 164/64, pulse 81, height 5' 6\" (1 676 m), weight 79 4 kg (175 lb)  Body mass index is 28 25 kg/m²  PHYSICAL EXAM:      General Appearance:   Alert, cooperative, no distress   HEENT:   Normocephalic, atraumatic, anicteric  Neck:  Supple, symmetrical, trachea midline   Lungs:   Clear to auscultation bilaterally; no rales, rhonchi or wheezing; respirations unlabored    Heart[de-identified]   Regular rate and rhythm; no murmur  Abdomen:   Soft, non-tender, non-distended; normal bowel sounds; no masses, no organomegaly    Genitalia:   Deferred    Rectal:   Deferred    Extremities:  No cyanosis, clubbing or edema    Skin:  No jaundice, rashes, or lesions    Lymph nodes:  No palpable cervical lymphadenopathy        Lab Results:   No visits with results within 1 Day(s) from this visit     Latest known visit with results is:   Appointment on 12/30/2022   Component Date Value   • WBC 12/30/2022 9 69    • RBC 12/30/2022 4 27    • Hemoglobin 12/30/2022 13 5    • Hematocrit 12/30/2022 41 6    • MCV 12/30/2022 97    • MCH 12/30/2022 31 6    • MCHC 12/30/2022 32 5    • RDW 12/30/2022 15 0    • MPV 12/30/2022 11 1    • Platelets 59/63/7512 279    • nRBC 12/30/2022 0    • Neutrophils Relative 12/30/2022 65    • Immat GRANS % 12/30/2022 0    • Lymphocytes Relative 12/30/2022 23    • Monocytes Relative 12/30/2022 7    • Eosinophils Relative 12/30/2022 4    • Basophils " Relative 12/30/2022 1    • Neutrophils Absolute 12/30/2022 6 28    • Immature Grans Absolute 12/30/2022 0 04    • Lymphocytes Absolute 12/30/2022 2 25    • Monocytes Absolute 12/30/2022 0 67    • Eosinophils Absolute 12/30/2022 0 38    • Basophils Absolute 12/30/2022 0 07    • Sodium 12/30/2022 141    • Potassium 12/30/2022 3 9    • Chloride 12/30/2022 102    • CO2 12/30/2022 31    • ANION GAP 12/30/2022 8    • BUN 12/30/2022 14    • Creatinine 12/30/2022 0 73    • Glucose, Fasting 12/30/2022 130 (H)    • Calcium 12/30/2022 9 7    • Corrected Calcium 12/30/2022 10 2 (H)    • AST 12/30/2022 80 (H)    • ALT 12/30/2022 50    • Alkaline Phosphatase 12/30/2022 128 (H)    • Total Protein 12/30/2022 6 9    • Albumin 12/30/2022 3 4 (L)    • Total Bilirubin 12/30/2022 0 68    • eGFR 12/30/2022 75    • Hemoglobin A1C 12/30/2022 6 6 (H)    • EAG 12/30/2022 143    • Cholesterol 12/30/2022 151    • Triglycerides 12/30/2022 209 (H)    • HDL, Direct 12/30/2022 37 (L)    • LDL Calculated 12/30/2022 72    • Non-HDL-Chol (CHOL-HDL) 12/30/2022 114    • TSH 3RD GENERATON 12/30/2022 0 999          Radiology Results:   No results found

## 2023-04-22 DIAGNOSIS — F41.9 ANXIETY: ICD-10-CM

## 2023-04-23 DIAGNOSIS — E03.9 ACQUIRED HYPOTHYROIDISM: ICD-10-CM

## 2023-04-23 DIAGNOSIS — E04.1 THYROID NODULE: ICD-10-CM

## 2023-04-24 RX ORDER — LEVOTHYROXINE SODIUM 88 UG/1
TABLET ORAL
Qty: 90 TABLET | Refills: 0 | Status: SHIPPED | OUTPATIENT
Start: 2023-04-24

## 2023-04-24 RX ORDER — CLONAZEPAM 0.5 MG/1
TABLET ORAL
Qty: 30 TABLET | Refills: 0 | Status: SHIPPED | OUTPATIENT
Start: 2023-04-24

## 2023-05-09 ENCOUNTER — CONSULT (OUTPATIENT)
Dept: PAIN MEDICINE | Facility: CLINIC | Age: 83
End: 2023-05-09

## 2023-05-09 ENCOUNTER — APPOINTMENT (OUTPATIENT)
Dept: RADIOLOGY | Facility: CLINIC | Age: 83
End: 2023-05-09

## 2023-05-09 VITALS
HEART RATE: 76 BPM | TEMPERATURE: 98.6 F | BODY MASS INDEX: 29.05 KG/M2 | SYSTOLIC BLOOD PRESSURE: 135 MMHG | WEIGHT: 180 LBS | DIASTOLIC BLOOD PRESSURE: 64 MMHG

## 2023-05-09 DIAGNOSIS — M54.50 LOW BACK PAIN, UNSPECIFIED BACK PAIN LATERALITY, UNSPECIFIED CHRONICITY, UNSPECIFIED WHETHER SCIATICA PRESENT: ICD-10-CM

## 2023-05-09 DIAGNOSIS — G89.29 CHRONIC BILATERAL LOW BACK PAIN WITHOUT SCIATICA: Primary | ICD-10-CM

## 2023-05-09 DIAGNOSIS — G89.4 CHRONIC PAIN SYNDROME: ICD-10-CM

## 2023-05-09 DIAGNOSIS — S22.080A T12 COMPRESSION FRACTURE, INITIAL ENCOUNTER (HCC): ICD-10-CM

## 2023-05-09 DIAGNOSIS — M54.50 CHRONIC BILATERAL LOW BACK PAIN WITHOUT SCIATICA: Primary | ICD-10-CM

## 2023-05-09 NOTE — PROGRESS NOTES
Assessment:  1  Chronic bilateral low back pain without sciatica    2  Low back pain, unspecified back pain laterality, unspecified chronicity, unspecified whether sciatica present    3  T12 compression fracture, initial encounter ([facility] [address] )    4  Chronic pain syndrome        Plan:  Orders Placed This Encounter   Procedures   • XR spine lumbar minimum 4 views non injury     Standing Status:   Future     Number of Occurrences:   1     Standing Expiration Date:   5/9/2027     Scheduling Instructions:      Bring along any outside films relating to this procedure  • XR spine thoracic 3 vw     Standing Status:   Future     Number of Occurrences:   1     Standing Expiration Date:   5/9/2027     Scheduling Instructions:      Bring along any outside films relating to this procedure  • CT lumbar spine without contrast     Standing Status:   Future     Standing Expiration Date:   5/9/2027     Scheduling Instructions: There is no prep for this study  Please bring your insurance cards, a form of photo ID and a list of your medications with you  Arrive 15 minutes prior to your appointment time to register  On the day of your test, please bring any prior CT or MRI studies of this area with you that were not performed at a [facility]  To schedule this appointment, please contact Central Scheduling at [phone]  Order Specific Question:   What is the patient's sedation requirement?      Answer:   No Sedation     Order Specific Question:   Release to patient through [facility]     Answer:   Immediate     Order Specific Question:   Reason for Exam (FREE TEXT)     Answer:   History of T12 compression deformity   • Ambulatory referral to Physical Therapy     Standing Status:   Future     Standing Expiration Date:   5/9/2024     Referral Priority:   Routine     Referral Type:   Physical Therapy     Referral Reason:   Specialty Services Required     Requested Specialty:   Physical Therapy     Number of Visits Requested:   1     Expiration Date:   5/9/2024       Ms Avril Pelayo is a pleasant 78-year-old female who presents for initial evaluation regarding chronic low back pain  On December 16, 2021 CAT scan of the abdomen demonstrated a mild T12 compression fracture which may be contributing to the ongoing low back pain  At this time further diagnostic work-up and conservative measures would be beneficial and warranted  As such we will  1  Place the patient in formal physical therapy program starting with aqua therapy with plans to transition to land-based physical therapy  2  We will order updated thoracic and lumbar x-rays to evaluate for bony pathology contributing to symptoms  3  We will order CAT scan lumbar spine  4  We will consider medial branch blocks with subsequent radiofrequency ablation pending imaging results and relief/no relief from conservative measures with physical therapy  All questions answered, patient is agreeable with plan  History of Present Illness:    Ted Alvarez is a 78 y o  female who presents to [facility] for initial evaluation of the above stated pain complaints  The patient has a past medical and chronic pain history as outlined in the assessment section  She was referred by Kena Fitch DO  [address]  [place]    Ms Avril Pelayo is a pleasant 78-year-old female who presents for initial evaluation regarding chronic mid to low back pain but denies any significant radicular symptoms into the bilateral lower extremities  She reports the pain has been here for years and also does report falls in the past   Today reports moderate pain rated 4-7 out of 10 and interfere with activities  Pain is intermittent 30 to 60% of the time that is present throughout the day and night  Describes symptoms as burning, shooting, sharp, dull aching pain  Also reports lower extremity weakness    Requires a cane for ambulation  Symptoms are worse with standing, bending, walking  Reports moderate relief with heat, chiropractic manipulation, physical therapy in the past   Denies smoking, marijuana use  Currently taking over-the-counter Tylenol with minimal relief  Presents today for initial evaluation  Review of Systems:    Review of Systems   Constitutional: Positive for unexpected weight change  Negative for chills and fatigue  HENT: Positive for hearing loss  Negative for ear pain, mouth sores and sinus pressure  Eyes: Positive for visual disturbance  Negative for pain and redness  Respiratory: Positive for shortness of breath  Negative for wheezing  Cardiovascular: Positive for palpitations and leg swelling  Negative for chest pain  Gastrointestinal: Positive for abdominal pain  Negative for nausea  Endocrine: Negative for polyphagia  Genitourinary: Positive for frequency  Musculoskeletal: Positive for back pain, gait problem and joint swelling  Negative for arthralgias and neck pain  Skin: Negative for wound  Neurological: Positive for dizziness  Negative for seizures and weakness  Psychiatric/Behavioral: Positive for decreased concentration, dysphoric mood and sleep disturbance  The patient is nervous/anxious              Past Medical History:   Diagnosis Date   • Anxiety    • Arthritis     r knee and shoulders   • Blind left eye    • Deaf, left    • Depression    • Diabetes mellitus ([facility] [address] )    • Disease of thyroid gland    • DVT complicating pregnancy, unspecified trimester ([facility] [address] ) postpartum   • Hearing loss     LEFT EAR   • History of shingles 2007    fACIAL    • Hyperlipidemia    • Hypertension    • Liver disease    • Lyme disease    • Meniere's disease    • Obesity    • Orthostatic hypotension    • Psychiatric problem    • Sinus congestion    • Sleep apnea    • Stroke [facility])        Past Surgical History:   Procedure Laterality Date   • APPENDECTOMY     • BREAST BIOPSY Left 2010   •  SECTION      x2   • DXA PROCEDURE (HISTORICAL)  10/28/2020   • EYE SURGERY     • HAND SURGERY Left    • HERNIA REPAIR     • HYSTERECTOMY     • ROTATOR CUFF REPAIR Left    • TONSILLECTOMY     • TYMPANOSTOMY TUBE PLACEMENT         Family History   Problem Relation Age of Onset   • Depression Mother    • Hypertension Mother    • Obesity Mother    • Depression Father    • Alcohol abuse Father    • Stroke Father    • Breast cancer Sister 76   • Ovarian cancer Daughter 48   • BRCA1 Negative Daughter    • BRCA2 Negative Daughter        Social History     Occupational History   • Not on file   Tobacco Use   • Smoking status: Former     Packs/day: 0 75     Years: 44 00     Pack years: 33 00     Types: Cigarettes     Quit date: 1990     Years since quittin 2   • Smokeless tobacco: Never   Vaping Use   • Vaping Use: Never used   Substance and Sexual Activity   • Alcohol use: Never   • Drug use: Never   • Sexual activity: Not on file         Current Outpatient Medications:   •  amLODIPine (NORVASC) 2 5 mg tablet, TAKE 1 TABLET(2 5 MG) BY MOUTH EVERY EVENING, Disp: 90 tablet, Rfl: 1  •  atorvastatin (LIPITOR) 80 mg tablet, TAKE 1 TABLET BY MOUTH DAILY AT BEDTIME, Disp: 90 tablet, Rfl: 1  •  Calcium Carbonate-Vit D-Min (CALCIUM 1200 PO), Take 1,200 mg by mouth daily, Disp: , Rfl:   •  cetirizine (ZyrTEC) 10 mg tablet, Take 10 mg by mouth daily, Disp: , Rfl:   •  Cholecalciferol (D3-1000 PO), Take 2,000 Units by mouth daily  , Disp: , Rfl:   •  clobetasol (TEMOVATE) 0 05 % cream, Apply 1 application topically as needed To affected area, Disp: , Rfl:   •  clonazePAM (KlonoPIN) 0 5 mg tablet, TAKE 1 TABLET(0 5 MG) BY MOUTH DAILY AT BEDTIME AS NEEDED FOR ANXIETY, Disp: 30 tablet, Rfl: 0  •  clopidogrel (PLAVIX) 75 mg tablet, TAKE 1 TABLET BY MOUTH EVERY DAY, Disp: 90 tablet, Rfl: 1  •  DULoxetine (CYMBALTA) 60 mg delayed release capsule, Take 1 capsule (60 mg total) by mouth daily, Disp: 90 capsule, Rfl: 1  • levothyroxine 88 mcg tablet, TAKE 1 TABLET(88 MCG) BY MOUTH DAILY, Disp: 90 tablet, Rfl: 0  •  mometasone (NASONEX) 50 mcg/act nasal spray, 1 spray into each nostril daily  , Disp: , Rfl:   •  nebivolol (BYSTOLIC) 5 mg tablet, TAKE 1 TABLET BY MOUTH EVERY EVENING, Disp: 90 tablet, Rfl: 1  •  pantoprazole (PROTONIX) 40 mg tablet, Take 1 tablet (40 mg total) by mouth every morning, Disp: 90 tablet, Rfl: 1  •  potassium chloride (K-DUR,KLOR-CON) 20 mEq tablet, Take 20 mEq by mouth once a week , Disp: , Rfl: 1  •  primidone (MYSOLINE) 50 mg tablet, Take 1 tablet (50 mg total) by mouth daily, Disp: 90 tablet, Rfl: 1  •  triamcinolone (KENALOG) 0 5 % cream, Apply topically 2 (two) times a day, Disp: 30 g, Rfl: 0    Allergies   Allergen Reactions   • Penicillins Swelling       Physical Exam:    /64   Pulse 76   Temp 98 6 °F (37 °C)   Wt 81 6 kg (180 lb)   BMI 29 05 kg/m²     Constitutional: normal, well developed, well nourished, alert, in no distress and non-toxic and no overt pain behavior    Eyes: anicteric  HEENT: grossly intact  Neck: supple, symmetric, trachea midline and no masses   Pulmonary:even and unlabored  Cardiovascular:No edema or pitting edema present  Skin:Normal without rashes or lesions and well hydrated  Psychiatric:Mood and affect appropriate  Neurologic:Cranial Nerves II-XII grossly intact  Musculoskeletal:antalgic and ambulates with cane, tenderness palpation bilateral lumbar paraspinals, decreased active and passive range of motion with lumbar flexion and extension limited by pain, forward flexed posture, MMT 5 out of 5 bilateral extremities, sensation grossly tact light touch, DTRs hyporeflexic bilateral patellar and Achilles reflexes, positive pain to palpation bilateral lumbar facets with axial loading and rotational forces left and right    Imaging  CT lumbar spine without contrast    (Results Pending)       Orders Placed This Encounter   Procedures   • XR spine lumbar minimum 4 views non injury   • XR spine thoracic 3 vw   • CT lumbar spine without contrast   • Ambulatory referral to Physical Therapy

## 2023-05-17 ENCOUNTER — HOSPITAL ENCOUNTER (OUTPATIENT)
Dept: RADIOLOGY | Facility: HOSPITAL | Age: 83
Discharge: HOME | End: 2023-05-17
Attending: PHYSICAL MEDICINE & REHABILITATION

## 2023-05-17 DIAGNOSIS — G89.4 CHRONIC PAIN SYNDROME: ICD-10-CM

## 2023-05-17 DIAGNOSIS — S22.080A T12 COMPRESSION FRACTURE, INITIAL ENCOUNTER (HCC): ICD-10-CM

## 2023-05-17 DIAGNOSIS — M54.50 CHRONIC BILATERAL LOW BACK PAIN WITHOUT SCIATICA: ICD-10-CM

## 2023-05-17 DIAGNOSIS — M54.50 LOW BACK PAIN, UNSPECIFIED BACK PAIN LATERALITY, UNSPECIFIED CHRONICITY, UNSPECIFIED WHETHER SCIATICA PRESENT: ICD-10-CM

## 2023-05-17 DIAGNOSIS — G89.29 CHRONIC BILATERAL LOW BACK PAIN WITHOUT SCIATICA: ICD-10-CM

## 2023-05-17 NOTE — LETTER
[address] [place] [address] 20  [address] [facility] [address]      May 25, 2023    MRN: [id number]     Phone: [phone]     Dear Ms Jerod Nelson recently had a(n) Cat Scan performed on 5/17/2023 at  [address] [place] [address] that was requested by Schuyler Wu DO  The study was reviewed by a radiologist, which is a physician who specializes in medical imaging  The radiologist issued a report describing his or her findings  In that report there was a finding that the radiologist felt warranted further discussion with your health care provider and that discussion would be beneficial to you  The results were sent to Schuyler Wu DO on 05/19/2023  4:07 PM  We recommend that you contact Schuyler Wu DO at [phone] or set up an appointment to discuss the results of the imaging test  If you have already heard from Schuyler Wu DO regarding the results of your study, you can disregard this letter  This letter is not meant to alarm you, but intended to encourage you to follow-up on your results with the provider that sent you for the imaging study  In addition, we have enclosed answers to frequently asked questions by other patients who have also received a letter to review results with their health care provider (see page two)  Thank you for choosing [facility] for your medical imaging needs  FREQUENTLY ASKED QUESTIONS    Why am I receiving this letter? [facility] requires us to notify patients who have findings on imaging exams that may require more testing or follow-up with a health professional within the next 3 months          How serious is the finding on the imaging test?  This letter is sent to all patients who may need follow-up or more testing within the next 3 months  Receiving this letter does not necessarily mean you have a life-threatening imaging finding or disease  Recommendations in the radiologist’s imaging report are general in nature and it is up to your healthcare provider to say whether those recommendations make sense for your situation  You are strongly encouraged to talk to your health care provider about the results and ask whether additional steps need to be taken  Where can I get a copy of the final report for my recent radiology exam?  To get a full copy of the report you can access your records online at [email or web address] or please contact [facility] Medical Records Department at [phone] Monday through Friday between 8 am and 6 pm          What do I need to do now? Please contact your health care provider who requested the imaging study to discuss what further actions (if any) are needed  You may have already heard from (your ordering provider) in regard to this test in which case you can disregard this letter  NOTICE IN ACCORDANCE WITH THE [place] STATE “PATIENT TEST RESULT INFORMATION ACT OF 2018”    You are receiving this notice as a result of a determination by your diagnostic imaging service that further discussions of your test results are warranted and would be beneficial to you  The complete results of your test or tests have been or will be sent to the health care practitioner that ordered the test or tests  It is recommended that you contact your health care practitioner to discuss your results as soon as possible

## 2023-05-17 NOTE — PROGRESS NOTES
Called Dr Marquez Meter office & spoke w/ Ty Liu to relay that mutual patient Herb Joseph was here with us 05/17/2023 in [facility] Radiology department for CT evaluation of Lumbar spine  Following her CT imaging she did have a dizzy spell/ near syncopal event  She was assisted into a seated position and manual BP was checked, which initial reading was found to 116/54 Left arm  She had no loss of consciousness, no vomiting, vision changes, or other notable symptoms  She reports she did eat this morning closer to 9am, but has not had anything to eat since that time  She was provided apple juice in the case that some of her symptoms could have been from hypoglycemia  She had significant pallor noted, but per patient report her natural complexion is very fair at baseline  15 minute recovering period was provided to patient and repeat manual BP was again evaluated  2nd reading manual found to be 118/68 which was an improvement from her initial reading   Patient reports symptomatically feeling improved, le

## 2023-05-22 ENCOUNTER — TELEPHONE (OUTPATIENT)
Dept: PAIN MEDICINE | Facility: CLINIC | Age: 83
End: 2023-05-22

## 2023-05-22 ENCOUNTER — TELEPHONE (OUTPATIENT)
Dept: PAIN MEDICINE | Facility: MEDICAL CENTER | Age: 83
End: 2023-05-22

## 2023-05-22 NOTE — TELEPHONE ENCOUNTER
"----- Message from Argelia Alcocer DO sent at 5/22/2023  3:31 PM EDT -----  Please notify patient of CT lumbar spine results demonstrating  \"5 2 cm lobulated perinephric cystic lesion above the left kidney, likely benign, which appears unchanged since 12/16/2021 but has gradually grown in size since 12/5/2008  Consider MRI abdomen with and without contrast  Considerations related to the   patient's age and/or comorbidities may be used to alter these recommendations  \"    Please have patient schedule with her PCP to consider further diagnostic imaging if needed    Would not proceed with MBB plan until this has been addressed  Thank you  ----- Message -----  From: Interface, Radiology Results In  Sent: 5/19/2023   4:08 PM EDT  To: Argelia Alcocer DO      "

## 2023-05-22 NOTE — TELEPHONE ENCOUNTER
S/W pt and advised  Aware we would not move forward with MBB until this is addressed  Pt states in the past she was told it was not concerning  She notes she has a f/u with PCP 5/31  I recommended seh call their office now to have them review the scan and they may possibly want additional testing prior to her appointment   She was agreeable

## 2023-05-22 NOTE — TELEPHONE ENCOUNTER
Caller: [facility]     Doctor/Office : Dr Silva    Radiology CB# :     [facility] Radiology called to report Significant on CT Scan

## 2023-05-30 ENCOUNTER — APPOINTMENT (OUTPATIENT)
Dept: LAB | Facility: HOSPITAL | Age: 83
End: 2023-05-30

## 2023-05-30 DIAGNOSIS — E11.9 TYPE 2 DIABETES MELLITUS WITHOUT COMPLICATION, WITHOUT LONG-TERM CURRENT USE OF INSULIN (HCC): ICD-10-CM

## 2023-05-30 DIAGNOSIS — E78.5 DYSLIPIDEMIA: ICD-10-CM

## 2023-05-30 DIAGNOSIS — D51.8 OTHER VITAMIN B12 DEFICIENCY ANEMIA: ICD-10-CM

## 2023-05-30 LAB
ALBUMIN SERPL BCP-MCNC: 3.8 G/DL (ref 3.5–5)
ALP SERPL-CCNC: 89 U/L (ref 34–104)
ALT SERPL W P-5'-P-CCNC: 26 U/L (ref 7–52)
ANION GAP SERPL CALCULATED.3IONS-SCNC: 8 MMOL/L (ref 4–13)
AST SERPL W P-5'-P-CCNC: 46 U/L (ref 13–39)
BILIRUB SERPL-MCNC: 0.59 MG/DL (ref 0.2–1)
BUN SERPL-MCNC: 23 MG/DL (ref 5–25)
CALCIUM SERPL-MCNC: 9.4 MG/DL (ref 8.4–10.2)
CHLORIDE SERPL-SCNC: 102 MMOL/L (ref 96–108)
CHOLEST SERPL-MCNC: 139 MG/DL
CO2 SERPL-SCNC: 27 MMOL/L (ref 21–32)
CREAT SERPL-MCNC: 0.66 MG/DL (ref 0.6–1.3)
CREAT UR-MCNC: 212 MG/DL
EST. AVERAGE GLUCOSE BLD GHB EST-MCNC: 146 MG/DL
GFR SERPL CREATININE-BSD FRML MDRD: 80 ML/MIN/1.73SQ M
GLUCOSE P FAST SERPL-MCNC: 127 MG/DL (ref 65–99)
HBA1C MFR BLD: 6.7 %
HDLC SERPL-MCNC: 34 MG/DL
LDLC SERPL CALC-MCNC: 72 MG/DL (ref 0–100)
MICROALBUMIN UR-MCNC: 215 MG/L (ref 0–20)
MICROALBUMIN/CREAT 24H UR: 101 MG/G CREATININE (ref 0–30)
NONHDLC SERPL-MCNC: 105 MG/DL
POTASSIUM SERPL-SCNC: 3.9 MMOL/L (ref 3.5–5.3)
PROT SERPL-MCNC: 6.5 G/DL (ref 6.4–8.4)
SODIUM SERPL-SCNC: 137 MMOL/L (ref 135–147)
TRIGL SERPL-MCNC: 164 MG/DL
VIT B12 SERPL-MCNC: 367 PG/ML (ref 180–914)

## 2023-05-30 RX ORDER — CHLORHEXIDINE GLUCONATE 0.12 MG/ML
RINSE ORAL
COMMUNITY
Start: 2023-05-16

## 2023-05-31 ENCOUNTER — OFFICE VISIT (OUTPATIENT)
Dept: FAMILY MEDICINE CLINIC | Facility: CLINIC | Age: 83
End: 2023-05-31

## 2023-05-31 VITALS — HEART RATE: 75 BPM | WEIGHT: 180 LBS | OXYGEN SATURATION: 94 % | BODY MASS INDEX: 28.93 KG/M2 | HEIGHT: 66 IN

## 2023-05-31 DIAGNOSIS — E03.9 ACQUIRED HYPOTHYROIDISM: ICD-10-CM

## 2023-05-31 DIAGNOSIS — J30.1 SEASONAL ALLERGIC RHINITIS DUE TO POLLEN: Chronic | ICD-10-CM

## 2023-05-31 DIAGNOSIS — Z86.73 HISTORY OF TRANSIENT ISCHEMIC ATTACK (TIA): ICD-10-CM

## 2023-05-31 DIAGNOSIS — R29.6 FREQUENT FALLS: ICD-10-CM

## 2023-05-31 DIAGNOSIS — I47.1 SVT (SUPRAVENTRICULAR TACHYCARDIA) (HCC): ICD-10-CM

## 2023-05-31 DIAGNOSIS — E11.9 TYPE 2 DIABETES MELLITUS WITHOUT COMPLICATION, WITHOUT LONG-TERM CURRENT USE OF INSULIN (HCC): ICD-10-CM

## 2023-05-31 DIAGNOSIS — I10 ESSENTIAL HYPERTENSION: ICD-10-CM

## 2023-05-31 DIAGNOSIS — I10 HYPERTENSION: ICD-10-CM

## 2023-05-31 DIAGNOSIS — R42 DIZZINESS: ICD-10-CM

## 2023-05-31 DIAGNOSIS — F41.9 ANXIETY: ICD-10-CM

## 2023-05-31 DIAGNOSIS — E04.1 THYROID NODULE: ICD-10-CM

## 2023-05-31 DIAGNOSIS — Z00.00 MEDICARE ANNUAL WELLNESS VISIT, SUBSEQUENT: Primary | ICD-10-CM

## 2023-05-31 DIAGNOSIS — K21.9 GASTROESOPHAGEAL REFLUX DISEASE WITHOUT ESOPHAGITIS: ICD-10-CM

## 2023-05-31 DIAGNOSIS — J96.11 CHRONIC HYPOXEMIC RESPIRATORY FAILURE (HCC): Chronic | ICD-10-CM

## 2023-05-31 RX ORDER — AMLODIPINE BESYLATE 2.5 MG/1
2.5 TABLET ORAL DAILY
Qty: 90 TABLET | Refills: 1 | Status: SHIPPED | OUTPATIENT
Start: 2023-05-31

## 2023-05-31 RX ORDER — CLONAZEPAM 0.5 MG/1
0.5 TABLET ORAL
Qty: 30 TABLET | Refills: 0 | Status: SHIPPED | OUTPATIENT
Start: 2023-05-31

## 2023-05-31 NOTE — PATIENT INSTRUCTIONS
Medicare Preventive Visit Patient Instructions  Thank you for completing your Welcome to Medicare Visit or Medicare Annual Wellness Visit today  Your next wellness visit will be due in one year (5/31/2024)  The screening/preventive services that you may require over the next 5-10 years are detailed below  Some tests may not apply to you based off risk factors and/or age  Screening tests ordered at today's visit but not completed yet may show as past due  Also, please note that scanned in results may not display below  Preventive Screenings:  Service Recommendations Previous Testing/Comments   Colorectal Cancer Screening  * Colonoscopy    * Fecal Occult Blood Test (FOBT)/Fecal Immunochemical Test (FIT)  * Fecal DNA/Cologuard Test  * Flexible Sigmoidoscopy Age: 39-70 years old   Colonoscopy: every 10 years (may be performed more frequently if at higher risk)  OR  FOBT/FIT: every 1 year  OR  Cologuard: every 3 years  OR  Sigmoidoscopy: every 5 years  Screening may be recommended earlier than age 39 if at higher risk for colorectal cancer  Also, an individualized decision between you and your healthcare provider will decide whether screening between the ages of 74-80 would be appropriate  Colonoscopy: Not on file  FOBT/FIT: Not on file  Cologuard: Not on file  Sigmoidoscopy: Not on file          Breast Cancer Screening Age: 36 years old  Frequency: every 1-2 years  Not required if history of left and right mastectomy Mammogram: 08/13/2019        Cervical Cancer Screening Between the ages of 21-29, pap smear recommended once every 3 years  Between the ages of 33-67, can perform pap smear with HPV co-testing every 5 years     Recommendations may differ for women with a history of total hysterectomy, cervical cancer, or abnormal pap smears in past  Pap Smear: Not on file        Hepatitis C Screening Once for adults born between [facility]  More frequently in patients at high risk for Hepatitis C Hep C Antibody: 12/29/2020        Diabetes Screening 1-2 times per year if you're at risk for diabetes or have pre-diabetes Fasting glucose: 127 mg/dL (5/30/2023)  A1C: 6 7 % (5/30/2023)      Cholesterol Screening Once every 5 years if you don't have a lipid disorder  May order more often based on risk factors  Lipid panel: 05/30/2023          Other Preventive Screenings Covered by Medicare:  1  Abdominal Aortic Aneurysm (AAA) Screening: covered once if your at risk  You're considered to be at risk if you have a family history of AAA  2  Lung Cancer Screening: covers low dose CT scan once per year if you meet all of the following conditions: (1) Age 50-69; (2) No signs or symptoms of lung cancer; (3) Current smoker or have quit smoking within the last 15 years; (4) You have a tobacco smoking history of at least 20 pack years (packs per day multiplied by number of years you smoked); (5) You get a written order from a healthcare provider  3  Glaucoma Screening: covered annually if you're considered high risk: (1) You have diabetes OR (2) Family history of glaucoma OR (3)  aged 48 and older OR (3)  American aged 72 and older  3  Osteoporosis Screening: covered every 2 years if you meet one of the following conditions: (1) You're estrogen deficient and at risk for osteoporosis based off medical history and other findings; (2) Have a vertebral abnormality; (3) On glucocorticoid therapy for more than 3 months; (4) Have primary hyperparathyroidism; (5) On osteoporosis medications and need to assess response to drug therapy  · Last bone density test (DXA Scan): 10/28/2020   5  HIV Screening: covered annually if you're between the age of 15-65  Also covered annually if you are younger than 13 and older than 72 with risk factors for HIV infection  For pregnant patients, it is covered up to 3 times per pregnancy      Immunizations:  Immunization Recommendations   Influenza Vaccine Annual influenza vaccination during flu season is recommended for all persons aged >= 6 months who do not have contraindications   Pneumococcal Vaccine   * Pneumococcal conjugate vaccine = PCV13 (Prevnar 13), PCV15 (Vaxneuvance), PCV20 (Prevnar 20)  * Pneumococcal polysaccharide vaccine = PPSV23 (Pneumovax) Adults 25-60 years old: 1-3 doses may be recommended based on certain risk factors  Adults 72 years old: 1-2 doses may be recommended based off what pneumonia vaccine you previously received   Hepatitis B Vaccine 3 dose series if at intermediate or high risk (ex: diabetes, end stage renal disease, liver disease)   Tetanus (Td) Vaccine - COST NOT COVERED BY MEDICARE PART B Following completion of primary series, a booster dose should be given every 10 years to maintain immunity against tetanus  Td may also be given as tetanus wound prophylaxis  Tdap Vaccine - COST NOT COVERED BY MEDICARE PART B Recommended at least once for all adults  For pregnant patients, recommended with each pregnancy  Shingles Vaccine (Shingrix) - COST NOT COVERED BY MEDICARE PART B  2 shot series recommended in those aged 48 and above     Health Maintenance Due:      Topic Date Due   • Hepatitis C Screening  Completed     Immunizations Due:      Topic Date Due   • Pneumococcal Vaccine: 65+ Years (1 - PCV) Never done   • COVID-19 Vaccine (5 - Moderna series) 02/04/2022     Advance Directives   What are advance directives? Advance directives are legal documents that state your wishes and plans for medical care  These plans are made ahead of time in case you lose your ability to make decisions for yourself  Advance directives can apply to any medical decision, such as the treatments you want, and if you want to donate organs  What are the types of advance directives? There are many types of advance directives, and each state has rules about how to use them  You may choose a combination of any of the following:  · Living will:   This is a written record of the treatment you want  You can also choose which treatments you do not want, which to limit, and which to stop at a certain time  This includes surgery, medicine, IV fluid, and tube feedings  · Durable power of  for healthcare [facility]): This is a written record that states who you want to make healthcare choices for you when you are unable to make them for yourself  This person, called a proxy, is usually a family member or a friend  You may choose more than 1 proxy  · Do not resuscitate (DNR) order:  A DNR order is used in case your heart stops beating or you stop breathing  It is a request not to have certain forms of treatment, such as CPR  A DNR order may be included in other types of advance directives  · Medical directive: This covers the care that you want if you are in a coma, near death, or unable to make decisions for yourself  You can list the treatments you want for each condition  Treatment may include pain medicine, surgery, blood transfusions, dialysis, IV or tube feedings, and a ventilator (breathing machine)  · Values history: This document has questions about your views, beliefs, and how you feel and think about life  This information can help others choose the care that you would choose  Why are advance directives important? An advance directive helps you control your care  Although spoken wishes may be used, it is better to have your wishes written down  Spoken wishes can be misunderstood, or not followed  Treatments may be given even if you do not want them  An advance directive may make it easier for your family to make difficult choices about your care  Urinary Incontinence   Urinary incontinence (UI)  is when you lose control of your bladder  UI develops because your bladder cannot store or empty urine properly  The 3 most common types of UI are stress incontinence, urge incontinence, or both  Medicines:   · May be given to help strengthen your bladder control   Report any side effects of medication to your healthcare provider  Do pelvic muscle exercises often:  Your pelvic muscles help you stop urinating  Squeeze these muscles tight for 5 seconds, then relax for 5 seconds  Gradually work up to squeezing for 10 seconds  Do 3 sets of 15 repetitions a day, or as directed  This will help strengthen your pelvic muscles and improve bladder control  Train your bladder:  Go to the bathroom at set times, such as every 2 hours, even if you do not feel the urge to go  You can also try to hold your urine when you feel the urge to go  For example, hold your urine for 5 minutes when you feel the urge to go  As that becomes easier, hold your urine for 10 minutes  Self-care:   · Keep a UI record  Write down how often you leak urine and how much you leak  Make a note of what you were doing when you leaked urine  · Drink liquids as directed  You may need to limit the amount of liquid you drink to help control your urine leakage  Do not drink any liquid right before you go to bed  Limit or do not have drinks that contain caffeine or alcohol  · Prevent constipation  Eat a variety of high-fiber foods  Good examples are high-fiber cereals, beans, vegetables, and whole-grain breads  Walking is the best way to trigger your intestines to have a bowel movement  · Exercise regularly and maintain a healthy weight  Weight loss and exercise will decrease pressure on your bladder and help you control your leakage  · Use a catheter as directed  to help empty your bladder  A catheter is a tiny, plastic tube that is put into your bladder to drain your urine  · Go to behavior therapy as directed  Behavior therapy may be used to help you learn to control your urge to urinate  Weight Management   Why it is important to manage your weight:  Being overweight increases your risk of health conditions such as heart disease, high blood pressure, type 2 diabetes, and certain types of cancer   It can also increase your risk for osteoarthritis, sleep apnea, and other respiratory problems  Aim for a slow, steady weight loss  Even a small amount of weight loss can lower your risk of health problems  How to lose weight safely:  A safe and healthy way to lose weight is to eat fewer calories and get regular exercise  You can lose up about 1 pound a week by decreasing the number of calories you eat by 500 calories each day  Healthy meal plan for weight management:  A healthy meal plan includes a variety of foods, contains fewer calories, and helps you stay healthy  A healthy meal plan includes the following:  · Eat whole-grain foods more often  A healthy meal plan should contain fiber  Fiber is the part of grains, fruits, and vegetables that is not broken down by your body  Whole-grain foods are healthy and provide extra fiber in your diet  Some examples of whole-grain foods are whole-wheat breads and pastas, oatmeal, brown rice, and bulgur  · Eat a variety of vegetables every day  Include dark, leafy greens such as spinach, kale, cindy greens, and mustard greens  Eat yellow and orange vegetables such as carrots, sweet potatoes, and winter squash  · Eat a variety of fruits every day  Choose fresh or canned fruit (canned in its own juice or light syrup) instead of juice  Fruit juice has very little or no fiber  · Eat low-fat dairy foods  Drink fat-free (skim) milk or 1% milk  Eat fat-free yogurt and low-fat cottage cheese  Try low-fat cheeses such as mozzarella and other reduced-fat cheeses  · Choose meat and other protein foods that are low in fat  Choose beans or other legumes such as split peas or lentils  Choose fish, skinless poultry (chicken or turkey), or lean cuts of red meat (beef or pork)  Before you cook meat or poultry, cut off any visible fat  · Use less fat and oil  Try baking foods instead of frying them  Add less fat, such as margarine, sour cream, regular salad dressing and mayonnaise to foods   Eat fewer high-fat foods  Some examples of high-fat foods include french fries, doughnuts, ice cream, and cakes  · Eat fewer sweets  Limit foods and drinks that are high in sugar  This includes candy, cookies, regular soda, and sweetened drinks  Exercise:  Exercise at least 30 minutes per day on most days of the week  Some examples of exercise include walking, biking, dancing, and swimming  You can also fit in more physical activity by taking the stairs instead of the elevator or parking farther away from stores  Ask your healthcare provider about the best exercise plan for you  © Copyright [address] 2018 Information is for End User's use only and may not be sold, redistributed or otherwise used for commercial purposes   All illustrations and images included in CareNotes® are the copyrighted property of [employer]  or [address] [place] [address]

## 2023-05-31 NOTE — PROGRESS NOTES
Assessment and Plan:     Problem List Items Addressed This Visit        Digestive    Gastroesophageal reflux disease without esophagitis       Endocrine    Acquired hypothyroidism    Thyroid nodule    Type 2 diabetes mellitus, without long-term current use of insulin (HCC)       Respiratory    Chronic hypoxemic respiratory failure (HCC) (Chronic)    Seasonal allergic rhinitis due to pollen (Chronic)       Cardiovascular and Mediastinum    Essential hypertension    Relevant Medications    amLODIPine (NORVASC) 2 5 mg tablet    SVT (supraventricular tachycardia) (HCC)    Relevant Medications    amLODIPine (NORVASC) 2 5 mg tablet       Other    Dizziness    History of transient ischemic attack (TIA)    Anxiety    Relevant Medications    clonazePAM (KlonoPIN) 0 5 mg tablet    Frequent falls   Other Visit Diagnoses     Medicare annual wellness visit, subsequent    -  Primary    Hypertension        Relevant Medications    amLODIPine (NORVASC) 2 5 mg tablet          Urinary Incontinence Plan of Care: counseling topics discussed: practice Kegel (pelvic floor strengthening) exercises, use restroom every 2 hours, limiting fluid intake 3-4 hours before bed and preventing constipation  Patient has a history of incontinence for many years according to her work-up was negative          Preventive health issues were discussed with patient, and age appropriate screening tests were ordered as noted in patient's After Visit Summary  Personalized health advice and appropriate referrals for health education or preventive services given if needed, as noted in patient's After Visit Summary  History of Present Illness:     Patient presents for a Medicare Wellness Visit    Patient is coming here for evaluation regarding the symptoms of dizziness on and off for which in the past she had an extensive work-up which was negative she does have a history of orthostatic blood pressure changes    He recently had his CT scan of the back and while in the radiology department she had an episode of lightheaded feeling  History of low back pain  Medications reviewed Labs reviewed  Patient Care Team:  Osmani Peters MD as PCP - General (Internal Medicine)     Review of Systems:     Review of Systems   Constitutional: Negative for chills and fever  HENT: Negative for ear pain, sore throat and trouble swallowing  Eyes: Negative for pain and visual disturbance  Respiratory: Negative for cough and shortness of breath  Cardiovascular: Negative for chest pain and palpitations  Gastrointestinal: Negative for abdominal pain, constipation, diarrhea and vomiting  Endocrine: Negative for polydipsia  Genitourinary: Negative for dysuria and hematuria  Musculoskeletal: Negative for arthralgias and back pain  Skin: Negative for color change and rash  Neurological: Positive for dizziness  Negative for seizures, syncope and headaches  Psychiatric/Behavioral: Negative for agitation, confusion and sleep disturbance  All other systems reviewed and are negative         Problem List:     Patient Active Problem List   Diagnosis   • Shortness of breath on exertion   • Dizziness   • Chronic hypoxemic respiratory failure (HCC)   • Seasonal allergic rhinitis due to pollen   • Essential hypertension   • Dyslipidemia   • History of transient ischemic attack (TIA)   • Acquired hypothyroidism   • Thyroid nodule   • Palpitations   • Tremor   • Nocturnal hypoxemia   • Gastroesophageal reflux disease without esophagitis   • Localized swelling of right lower leg   • Anxiety   • Frequent falls   • Dyspepsia   • SVT (supraventricular tachycardia) (HCC)   • Osteoarthritis of right knee   • Elevated liver enzymes   • Type 2 diabetes mellitus, without long-term current use of insulin (HCC)   • Asymmetrical hearing loss   • Recurrent major depressive disorder, in remission [facility])      Past Medical and Surgical History:     Past Medical History:   Diagnosis Date   • Anxiety    • Arthritis     r knee and shoulders   • Blind left eye    • Deaf, left    • Depression    • Diabetes mellitus ([facility] [address] )    • Disease of thyroid gland    • DVT complicating pregnancy, unspecified trimester ([facility] [address] ) postpartum   • Hearing loss     LEFT EAR   • History of shingles 2007    fACIAL    • Hyperlipidemia    • Hypertension    • Liver disease    • Lyme disease    • Meniere's disease    • Obesity    • Orthostatic hypotension    • Psychiatric problem    • Sinus congestion    • Sleep apnea    • Stroke [facility])      Past Surgical History:   Procedure Laterality Date   • APPENDECTOMY     • BREAST BIOPSY Left 2010   •  SECTION      x2   • DXA PROCEDURE (HISTORICAL)  10/28/2020   • EYE SURGERY     • HAND SURGERY Left    • HERNIA REPAIR     • HYSTERECTOMY     • ROTATOR CUFF REPAIR Left    • TONSILLECTOMY     • TYMPANOSTOMY TUBE PLACEMENT        Family History:     Family History   Problem Relation Age of Onset   • Depression Mother    • Hypertension Mother    • Obesity Mother    • Depression Father    • Alcohol abuse Father    • Stroke Father    • Breast cancer Sister 76   • Ovarian cancer Daughter 48   • BRCA1 Negative Daughter    • BRCA2 Negative Daughter       Social History:     Social History     Socioeconomic History   • Marital status:       Spouse name: None   • Number of children: None   • Years of education: None   • Highest education level: None   Occupational History   • None   Tobacco Use   • Smoking status: Former     Packs/day: 0 75     Years: 44 00     Total pack years: 33 00     Types: Cigarettes     Quit date: 1990     Years since quittin 3   • Smokeless tobacco: Never   Vaping Use   • Vaping Use: Never used   Substance and Sexual Activity   • Alcohol use: Never   • Drug use: Never   • Sexual activity: None   Other Topics Concern   • None   Social History Narrative   • None     Social Determinants of Health     Financial Resource Strain: Low Risk  (2023)    Overall Financial Resource Strain (CARDIA)    • Difficulty of Paying Living Expenses: Not hard at all   Food Insecurity: Not on file   Transportation Needs: No Transportation Needs (5/31/2023)    PRAPARE - Transportation    • Lack of Transportation (Medical): No    • Lack of Transportation (Non-Medical): No   Physical Activity: Not on file   Stress: Not on file   Social Connections: Not on file   Intimate Partner Violence: Not on file   Housing Stability: Not on file      Medications and Allergies:     Current Outpatient Medications   Medication Sig Dispense Refill   • amLODIPine (NORVASC) 2 5 mg tablet Take 1 tablet (2 5 mg total) by mouth daily 90 tablet 1   • atorvastatin (LIPITOR) 80 mg tablet TAKE 1 TABLET BY MOUTH DAILY AT BEDTIME 90 tablet 1   • Calcium Carbonate-Vit D-Min (CALCIUM 1200 PO) Take 1,200 mg by mouth daily     • cetirizine (ZyrTEC) 10 mg tablet Take 10 mg by mouth daily     • chlorhexidine (PERIDEX) 0 12 % solution RINSE FOR 30 SECONDS WITH 1/2OZ AFTER BREAKFAST AND AT BEDTIME   STARTING 2 DAYS PRIOR TO VISIT AND 1 HOUR PRIOR TO APPOINTENT     • Cholecalciferol (D3-1000 PO) Take 2,000 Units by mouth daily       • clobetasol (TEMOVATE) 0 05 % cream Apply 1 application topically as needed To affected area     • clonazePAM (KlonoPIN) 0 5 mg tablet Take 1 tablet (0 5 mg total) by mouth daily at bedtime as needed for seizures 30 tablet 0   • clopidogrel (PLAVIX) 75 mg tablet TAKE 1 TABLET BY MOUTH EVERY DAY 90 tablet 1   • DULoxetine (CYMBALTA) 60 mg delayed release capsule Take 1 capsule (60 mg total) by mouth daily 90 capsule 1   • levothyroxine 88 mcg tablet TAKE 1 TABLET(88 MCG) BY MOUTH DAILY 90 tablet 0   • mometasone (NASONEX) 50 mcg/act nasal spray 1 spray into each nostril daily       • nebivolol (BYSTOLIC) 5 mg tablet TAKE 1 TABLET BY MOUTH EVERY EVENING 90 tablet 1   • pantoprazole (PROTONIX) 40 mg tablet Take 1 tablet (40 mg total) by mouth every morning 90 tablet 1   • potassium chloride (K-DUR,KLOR-CON) 20 mEq tablet Take 20 mEq by mouth once a week   1   • primidone (MYSOLINE) 50 mg tablet Take 1 tablet (50 mg total) by mouth daily 90 tablet 1   • triamcinolone (KENALOG) 0 5 % cream Apply topically 2 (two) times a day 30 g 0     No current facility-administered medications for this visit  Allergies   Allergen Reactions   • Penicillins Swelling      Immunizations:     Immunization History   Administered Date(s) Administered   • COVID-19 MODERNA VACC 0 25 ML IM BOOSTER 12/10/2021   • COVID-19 MODERNA VACC 0 5 ML IM 03/02/2021, 03/30/2021, 12/10/2021   • INFLUENZA 10/12/2022   • Influenza, high dose seasonal 0 7 mL 10/12/2022      Health Maintenance:         Topic Date Due   • Hepatitis C Screening  Completed         Topic Date Due   • Pneumococcal Vaccine: 65+ Years (1 - PCV) Never done   • COVID-19 Vaccine (5 - Moderna series) 02/04/2022      Medicare Screening Tests and Risk Assessments:     Last Medicare Wellness visit information reviewed, patient interviewed and updates made to the record today  Health Risk Assessment:   Patient rates overall health as fair  Patient feels that their physical health rating is slightly worse  Patient is very satisfied with their life  Eyesight was rated as slightly worse  Hearing was rated as slightly worse  Patient feels that their emotional and mental health rating is same  Patients states they are never, rarely angry  Patient states they are always unusually tired/fatigued  Pain experienced in the last 7 days has been some  Patient's pain rating has been 4/10  Patient states that she has experienced no weight loss or gain in last 6 months  Weight is stable    Depression Screening:   PHQ-9 Score: 8      Fall Risk Screening:    In the past year, patient has experienced: history of falling in past year    Number of falls: 1  Injured during fall?: No    Feels unsteady when standing or walking?: Yes    Worried about falling?: Yes      Urinary Incontinence Screening:   Patient has leaked urine accidently in the last six months  Uses 4 pads a day    Home Safety:  Patient does not have trouble with stairs inside or outside of their home  Patient has working smoke alarms and has working carbon monoxide detector  Home safety hazards include: none  No home safety hazards    Nutrition:   Current diet is No Added Salt and Low Cholesterol  No sugar    Medications:   Patient is currently taking over-the-counter supplements  OTC medications include: see medication list  Patient is able to manage medications  No problems managing meds    Activities of Daily Living (ADLs)/Instrumental Activities of Daily Living (IADLs):   Walk and transfer into and out of bed and chair?: Yes  Dress and groom yourself?: Yes    Bathe or shower yourself?: Yes    Feed yourself? Yes  Do your laundry/housekeeping?: Yes  Manage your money, pay your bills and track your expenses?: Yes  Make your own meals?: Yes    Do your own shopping?: Yes    ADL comments: Pt is independent    Previous Hospitalizations:   Any hospitalizations or ED visits within the last 12 months?: No      Hospitalization Comments: Conditions are stable    Advance Care Planning:   Living will: Yes    Durable POA for healthcare: Yes    Advanced directive: Yes    Advanced directive counseling given: Yes    Five wishes given: No    Patient declined ACP directive: No    End of Life Decisions reviewed with patient: Yes    Provider agrees with end of life decisions: Yes      Comments: Patient has living will, Dario Liu, advanced directives she is going to bring a copy of that for the records to the next visit  According to the patient she has not clearly mentioned her wishes and the above documents      Cognitive Screening:   Provider or family/friend/caregiver concerned regarding cognition?: No    PREVENTIVE SCREENINGS      Cardiovascular Screening:    General: Screening Not Indicated and History Lipid Disorder      Diabetes "Screening:     General: History Diabetes and Risks and Benefits Discussed      Colorectal Cancer Screening:     General: Screening Not Indicated      Breast Cancer Screening:     General: Risks and Benefits Discussed and Screening Not Indicated      Cervical Cancer Screening:    General: Screening Not Indicated      Osteoporosis Screening:    General: History Osteoporosis    Due for: DXA Axial      Abdominal Aortic Aneurysm (AAA) Screening:        General: Risks and Benefits Discussed      Lung Cancer Screening:     General: Screening Not Indicated      Hepatitis C Screening:    General: Screening Current    Screening, Brief Intervention, and Referral to Treatment (SBIRT)    Screening    Typical number of drinks in a week: 0    Single Item Drug Screening:  How often have you used an illegal drug (including marijuana) or a prescription medication for non-medical reasons in the past year? never    Single Item Drug Screen Score: 0  Interpretation: Negative screen for possible drug use disorder    Brief Intervention  Alcohol & drug use screenings were reviewed  No concerns regarding substance use disorder identified  Other Counseling Topics:   Car/seat belt/driving safety and calcium and vitamin D intake  No results found  Physical Exam:     Pulse 75   Ht 5' 6\" (1 676 m)   Wt 81 6 kg (180 lb)   SpO2 94%   BMI 29 05 kg/m²     Physical Exam  Vitals and nursing note reviewed  Constitutional:       Appearance: Normal appearance  She is well-developed  She is obese  She is not ill-appearing  HENT:      Head: Normocephalic and atraumatic  Nose: Nose normal       Mouth/Throat:      Pharynx: Oropharynx is clear  Eyes:      Conjunctiva/sclera: Conjunctivae normal    Cardiovascular:      Rate and Rhythm: Normal rate and regular rhythm  Pulses: Normal pulses  Heart sounds: Normal heart sounds  Pulmonary:      Effort: Pulmonary effort is normal  No respiratory distress        Breath sounds: " Normal breath sounds  Abdominal:      General: Bowel sounds are normal  There is no distension  Palpations: Abdomen is soft  Tenderness: There is no abdominal tenderness  Musculoskeletal:         General: No swelling  Cervical back: Neck supple  Skin:     General: Skin is warm and dry  Capillary Refill: Capillary refill takes less than 2 seconds  Coloration: Skin is not pale  Findings: No erythema or rash  Neurological:      Mental Status: She is alert and oriented to person, place, and time  Motor: Weakness present  Gait: Gait abnormal (pt is using a cane)  Psychiatric:         Mood and Affect: Mood normal          Thought Content:  Thought content normal           Lina Olguin MD

## 2023-07-16 DIAGNOSIS — F41.9 ANXIETY: ICD-10-CM

## 2023-07-18 RX ORDER — CLONAZEPAM 0.5 MG/1
TABLET ORAL
Qty: 30 TABLET | Refills: 0 | Status: SHIPPED | OUTPATIENT
Start: 2023-07-18

## 2023-07-23 DIAGNOSIS — F33.40 RECURRENT MAJOR DEPRESSIVE DISORDER, IN REMISSION (HCC): ICD-10-CM

## 2023-07-23 RX ORDER — DULOXETIN HYDROCHLORIDE 60 MG/1
CAPSULE, DELAYED RELEASE ORAL
Qty: 90 CAPSULE | Refills: 1 | Status: SHIPPED | OUTPATIENT
Start: 2023-07-23

## 2023-07-24 ENCOUNTER — RA CDI HCC (OUTPATIENT)
Dept: OTHER | Facility: HOSPITAL | Age: 83
End: 2023-07-24

## 2023-08-07 DIAGNOSIS — I63.9 CVA (CEREBRAL VASCULAR ACCIDENT) (HCC): ICD-10-CM

## 2023-08-07 RX ORDER — CLOPIDOGREL BISULFATE 75 MG/1
TABLET ORAL
Qty: 90 TABLET | Refills: 1 | Status: SHIPPED | OUTPATIENT
Start: 2023-08-07

## 2023-08-08 DIAGNOSIS — I10 HTN (HYPERTENSION), BENIGN: ICD-10-CM

## 2023-08-08 RX ORDER — NEBIVOLOL 5 MG/1
TABLET ORAL
Qty: 90 TABLET | Refills: 1 | Status: SHIPPED | OUTPATIENT
Start: 2023-08-08

## 2023-08-18 DIAGNOSIS — E03.9 ACQUIRED HYPOTHYROIDISM: ICD-10-CM

## 2023-08-18 DIAGNOSIS — E04.1 THYROID NODULE: ICD-10-CM

## 2023-08-18 RX ORDER — LEVOTHYROXINE SODIUM 88 UG/1
TABLET ORAL
Qty: 90 TABLET | Refills: 0 | Status: SHIPPED | OUTPATIENT
Start: 2023-08-18

## 2023-08-22 ENCOUNTER — TELEPHONE (OUTPATIENT)
Dept: PULMONOLOGY | Facility: MEDICAL CENTER | Age: 83
End: 2023-08-22

## 2023-08-22 NOTE — TELEPHONE ENCOUNTER
LM for patient to call office back to schedule 6m f/u , please schedule next available. Appointment reminder mailed.

## 2023-09-13 ENCOUNTER — HOSPITAL ENCOUNTER (OUTPATIENT)
Dept: RADIOLOGY | Facility: HOSPITAL | Age: 83
Discharge: HOME | End: 2023-09-13
Attending: INTERNAL MEDICINE
Payer: MEDICARE

## 2023-09-13 VITALS — WEIGHT: 170 LBS | BODY MASS INDEX: 27.32 KG/M2 | HEIGHT: 66 IN

## 2023-09-13 DIAGNOSIS — M81.0 AGE-RELATED OSTEOPOROSIS WITHOUT CURRENT PATHOLOGICAL FRACTURE: ICD-10-CM

## 2023-09-13 PROCEDURE — 77080 DXA BONE DENSITY AXIAL: CPT

## 2023-09-22 DIAGNOSIS — F41.9 ANXIETY: ICD-10-CM

## 2023-09-25 RX ORDER — CLONAZEPAM 0.5 MG/1
TABLET ORAL
Qty: 30 TABLET | Refills: 0 | Status: SHIPPED | OUTPATIENT
Start: 2023-09-25

## 2023-10-04 ENCOUNTER — OFFICE VISIT (OUTPATIENT)
Dept: FAMILY MEDICINE CLINIC | Facility: CLINIC | Age: 83
End: 2023-10-04
Payer: MEDICARE

## 2023-10-04 VITALS — BODY MASS INDEX: 27 KG/M2 | WEIGHT: 168 LBS | HEART RATE: 78 BPM | OXYGEN SATURATION: 95 % | HEIGHT: 66 IN

## 2023-10-04 DIAGNOSIS — E11.9 TYPE 2 DIABETES MELLITUS WITHOUT COMPLICATION, WITHOUT LONG-TERM CURRENT USE OF INSULIN (HCC): ICD-10-CM

## 2023-10-04 DIAGNOSIS — J96.11 CHRONIC HYPOXEMIC RESPIRATORY FAILURE (HCC): Chronic | ICD-10-CM

## 2023-10-04 DIAGNOSIS — E78.5 DYSLIPIDEMIA: ICD-10-CM

## 2023-10-04 DIAGNOSIS — I10 ESSENTIAL HYPERTENSION: ICD-10-CM

## 2023-10-04 DIAGNOSIS — K21.9 GASTROESOPHAGEAL REFLUX DISEASE WITHOUT ESOPHAGITIS: ICD-10-CM

## 2023-10-04 DIAGNOSIS — H91.8X3 ASYMMETRICAL HEARING LOSS: ICD-10-CM

## 2023-10-04 DIAGNOSIS — E03.9 ACQUIRED HYPOTHYROIDISM: ICD-10-CM

## 2023-10-04 DIAGNOSIS — F41.9 ANXIETY: ICD-10-CM

## 2023-10-04 DIAGNOSIS — Z13.0 SCREENING FOR DEFICIENCY ANEMIA: ICD-10-CM

## 2023-10-04 DIAGNOSIS — R42 DIZZINESS: Primary | ICD-10-CM

## 2023-10-04 DIAGNOSIS — Z23 NEED FOR VACCINATION: ICD-10-CM

## 2023-10-04 DIAGNOSIS — Z86.73 HISTORY OF TRANSIENT ISCHEMIC ATTACK (TIA): ICD-10-CM

## 2023-10-04 DIAGNOSIS — R74.8 ELEVATED LIVER ENZYMES: ICD-10-CM

## 2023-10-04 PROCEDURE — 99214 OFFICE O/P EST MOD 30 MIN: CPT | Performed by: INTERNAL MEDICINE

## 2023-10-04 PROCEDURE — G0008 ADMIN INFLUENZA VIRUS VAC: HCPCS

## 2023-10-04 PROCEDURE — 90662 IIV NO PRSV INCREASED AG IM: CPT

## 2023-10-04 NOTE — ASSESSMENT & PLAN NOTE
Patient with anxiety symptoms on and off she takes clonazepam as needed meanwhile she will continue with duloxetine 60 mg daily discussed with patient regarding stress management

## 2023-10-04 NOTE — ASSESSMENT & PLAN NOTE
Recommend patient to follow-up with the ENT for hearing aids.   However it appears to be very expensive

## 2023-10-04 NOTE — ASSESSMENT & PLAN NOTE
Discussed with patient regarding oxygen to be used while ambulating she agreed and she is going to be following up with the pulmonary I discussed this at the last visit also.   Patient does desaturate as per the pulmonary note

## 2023-10-04 NOTE — ASSESSMENT & PLAN NOTE
Currently patient taking pantoprazole on and off.   Patient not seen with a gastroenterologist as recommended in the past.

## 2023-10-04 NOTE — ASSESSMENT & PLAN NOTE
Lab Results   Component Value Date    HGBA1C 6.7 (H) 05/30/2023   Patient's A1c at 6.7 I reviewed her diet in detail how to read the labels patient prefers not to be on any medication we will follow with repeat hemoglobin A1c based on that make decisions.

## 2023-10-04 NOTE — ASSESSMENT & PLAN NOTE
Patient with hypothyroidism on levothyroxine 88 mcg daily we will continue the same follow-up with the repeat TSH level.

## 2023-10-04 NOTE — ASSESSMENT & PLAN NOTE
Patient felt lightheaded while coming to the office her blood pressures were little on the lower side 120/76 laying down 96/66 standing up recommend very strongly to drink more fluids she already wears compression stockings she has a history of hypertension also for which she is taking amlodipine 2.5 mg and nebivolol 5 mg daily she had extensive work-up done for the dizziness in the hospital.  Patient should be careful while getting out of the bed and chair to wait for few seconds before she starts walking

## 2023-10-04 NOTE — PROGRESS NOTES
Office Visit Note  10/04/23     Stoney Sarmiento 78 y.o. female MRN: [id number]  : 1940    Assessment:     1. Dizziness  Assessment & Plan:  Patient felt lightheaded while coming to the office her blood pressures were little on the lower side 120/76 laying down 96/66 standing up recommend very strongly to drink more fluids she already wears compression stockings she has a history of hypertension also for which she is taking amlodipine 2.5 mg and nebivolol 5 mg daily she had extensive work-up done for the dizziness in the hospital.  Patient should be careful while getting out of the bed and chair to wait for few seconds before she starts walking      2. Need for vaccination  -     influenza vaccine, high-dose, PF 0.7 mL (FLUZONE HIGH-DOSE)    3. Acquired hypothyroidism  Assessment & Plan:  Patient with hypothyroidism on levothyroxine 88 mcg daily we will continue the same follow-up with the repeat TSH level. Orders:  -     TSH, 3rd generation with Free T4 reflex; Future; Expected date: 10/04/2023    4. Screening for deficiency anemia  -     CBC and differential; Future    5. Dyslipidemia  Assessment & Plan:  Patient on atorvastatin 80 mg we will continue follow-up with a lipid profile    Orders:  -     Lipid panel; Future    6. Type 2 diabetes mellitus without complication, without long-term current use of insulin (HCC)  Assessment & Plan:    Lab Results   Component Value Date    HGBA1C 6.7 (H) 2023   Patient's A1c at 6.7 I reviewed her diet in detail how to read the labels patient prefers not to be on any medication we will follow with repeat hemoglobin A1c based on that make decisions. Orders:  -     Comprehensive metabolic panel; Future  -     Hemoglobin A1C; Future; Expected date: 10/04/2023  -     UA w Reflex to Microscopic w Reflex to Culture; Future; Expected date: 10/04/2023  -     Albumin / creatinine urine ratio; Future    7.  Anxiety  Assessment & Plan:  Patient with anxiety symptoms on and off she takes clonazepam as needed meanwhile she will continue with duloxetine 60 mg daily discussed with patient regarding stress management      8. Asymmetrical hearing loss  Assessment & Plan:  Recommend patient to follow-up with the ENT for hearing aids. However it appears to be very expensive      9. Chronic hypoxemic respiratory failure (HCC)  Assessment & Plan:  Discussed with patient regarding oxygen to be used while ambulating she agreed and she is going to be following up with the pulmonary I discussed this at the last visit also. Patient does desaturate as per the pulmonary note      10. Elevated liver enzymes  Assessment & Plan:  Follow-up with a gastroenterologist follow-up with repeat lab      11. Essential hypertension  Assessment & Plan:  Recommend patient check the blood pressures at home we may be stopping one of the medications if they are running on the lower side on the time      12. Gastroesophageal reflux disease without esophagitis    13. History of transient ischemic attack (TIA)  Assessment & Plan:  Patient on clopidogrel continue               Discussion Summary and Plan: Today's care plan and medications were reviewed with patient in detail and all their questions answered to their satisfaction. Chief Complaint   Patient presents with   • Follow-up      Subjective:  Patient is coming here for evaluation regarding symptoms of lightheadedness when she was coming to the office briefly. History of lightheaded feeling dizzy feeling with low blood pressures in the past.  She denies any symptoms of chest pains palpitations she does get mild shortness of breath with exertion and is being followed by the pulmonary physician also who had recommended to her to have oxygen which she was not very keen about getting it. No chest pains palpitations.   Exam revealed orthostatic changes of blood pressure laying down was 120/76 standing up was 96/66 recommend very strongly drink more fluids compression stockings she is drinking only around 30 ounces of water daily. Patient with A1c of 6.7 discussed with her about the diet in detail about cutting back carbohydrate intake watch the labels we will repeat the hemoglobin A1c prior to the next visit. The following portions of the patient's history were reviewed and updated as appropriate: allergies, current medications, past family history, past medical history, past social history, past surgical history and problem list.    Review of Systems   Constitutional: Negative for chills and fever. HENT: Negative for ear pain and sore throat. Eyes: Negative for pain and visual disturbance. Respiratory: Negative for cough and shortness of breath. Cardiovascular: Negative for chest pain and palpitations. Gastrointestinal: Negative for abdominal pain and vomiting. Genitourinary: Negative for dysuria and hematuria. Musculoskeletal: Positive for arthralgias and back pain. Skin: Negative for color change and rash. Neurological: Positive for dizziness. Negative for seizures and syncope. All other systems reviewed and are negative.         Historical Information   Patient Active Problem List   Diagnosis   • Shortness of breath on exertion   • Dizziness   • Chronic hypoxemic respiratory failure (HCC)   • Seasonal allergic rhinitis due to pollen   • Essential hypertension   • Dyslipidemia   • History of transient ischemic attack (TIA)   • Acquired hypothyroidism   • Thyroid nodule   • Palpitations   • Tremor   • Nocturnal hypoxemia   • Gastroesophageal reflux disease without esophagitis   • Localized swelling of right lower leg   • Anxiety   • Frequent falls   • Dyspepsia   • SVT (supraventricular tachycardia)   • Osteoarthritis of right knee   • Elevated liver enzymes   • Type 2 diabetes mellitus, without long-term current use of insulin (HCC)   • Asymmetrical hearing loss   • Recurrent major depressive disorder, in remission ([address])     Past Medical History:   Diagnosis Date   • Anxiety    • Arthritis     r knee and shoulders   • Blind left eye    • Deaf, left    • Depression    • Diabetes mellitus ([address])    • Disease of thyroid gland    • DVT complicating pregnancy, unspecified trimester ([address]) postpartum   • Hearing loss     LEFT EAR   • History of shingles 2007    fACIAL    • Hyperlipidemia    • Hypertension    • Liver disease    • Lyme disease    • Meniere's disease    • Obesity    • Orthostatic hypotension    • Psychiatric problem    • Sinus congestion    • Sleep apnea    • Stroke [facility])      Past Surgical History:   Procedure Laterality Date   • APPENDECTOMY     • BREAST BIOPSY Left 2010   •  SECTION      x2   • DXA PROCEDURE (HISTORICAL)  10/28/2020   • EYE SURGERY     • HAND SURGERY Left    • HERNIA REPAIR     • HYSTERECTOMY     • ROTATOR CUFF REPAIR Left    • TONSILLECTOMY     • TYMPANOSTOMY TUBE PLACEMENT       Social History     Substance and Sexual Activity   Alcohol Use Never     Social History     Substance and Sexual Activity   Drug Use Never     Social History     Tobacco Use   Smoking Status Former   • Packs/day: 0.75   • Years: 44.00   • Total pack years: 33.00   • Types: Cigarettes   • Quit date: 1990   • Years since quittin.6   • Passive exposure: Past   Smokeless Tobacco Never     Family History   Problem Relation Age of Onset   • Depression Mother    • Hypertension Mother    • Obesity Mother    • Depression Father    • Alcohol abuse Father    • Stroke Father    • Breast cancer Sister 76   • Ovarian cancer Daughter 48   • BRCA1 Negative Daughter    • BRCA2 Negative Daughter      Health Maintenance Due   Topic   • Pneumococcal Vaccine: 65+ Years (1 - PCV)   • DM Eye Exam    • COVID-19 Vaccine (5 - Moderna series)   • Depression Remission PHQ    • HEMOGLOBIN A1C    • BMI: Followup Plan       Meds/Allergies       Current Outpatient Medications:   •  amLODIPine (NORVASC) 2.5 mg tablet, Take 1 tablet (2.5 mg total) by mouth daily, Disp: 90 tablet, Rfl: 1  •  atorvastatin (LIPITOR) 80 mg tablet, TAKE 1 TABLET BY MOUTH DAILY AT BEDTIME, Disp: 90 tablet, Rfl: 1  •  Calcium Carbonate-Vit D-Min (CALCIUM 1200 PO), Take 1,200 mg by mouth daily, Disp: , Rfl:   •  cetirizine (ZyrTEC) 10 mg tablet, Take 10 mg by mouth daily, Disp: , Rfl:   •  chlorhexidine (PERIDEX) 0.12 % solution, RINSE FOR 30 SECONDS WITH 1/2OZ AFTER BREAKFAST AND AT BEDTIME. STARTING 2 DAYS PRIOR TO VISIT AND 1 HOUR PRIOR TO APPOINTENT, Disp: , Rfl:   •  Cholecalciferol (D3-1000 PO), Take 2,000 Units by mouth daily  , Disp: , Rfl:   •  clobetasol (TEMOVATE) 0.05 % cream, Apply 1 application topically as needed To affected area, Disp: , Rfl:   •  clonazePAM (KlonoPIN) 0.5 mg tablet, TAKE 1 TABLET(0.5 MG) BY MOUTH DAILY AT BEDTIME AS NEEDED FOR SEIZURES, Disp: 30 tablet, Rfl: 0  •  clopidogrel (PLAVIX) 75 mg tablet, TAKE 1 TABLET BY MOUTH EVERY DAY, Disp: 90 tablet, Rfl: 1  •  DULoxetine (CYMBALTA) 60 mg delayed release capsule, TAKE 1 CAPSULE(60 MG) BY MOUTH DAILY, Disp: 90 capsule, Rfl: 1  •  levothyroxine 88 mcg tablet, TAKE 1 TABLET(88 MCG) BY MOUTH DAILY, Disp: 90 tablet, Rfl: 0  •  mometasone (NASONEX) 50 mcg/act nasal spray, 1 spray into each nostril daily  , Disp: , Rfl:   •  nebivolol (BYSTOLIC) 5 mg tablet, TAKE 1 TABLET BY MOUTH EVERY EVENING, Disp: 90 tablet, Rfl: 1  •  pantoprazole (PROTONIX) 40 mg tablet, Take 1 tablet (40 mg total) by mouth every morning, Disp: 90 tablet, Rfl: 1  •  potassium chloride (K-DUR,KLOR-CON) 20 mEq tablet, Take 20 mEq by mouth once a week , Disp: , Rfl: 1  •  primidone (MYSOLINE) 50 mg tablet, Take 1 tablet (50 mg total) by mouth daily, Disp: 90 tablet, Rfl: 1  •  triamcinolone (KENALOG) 0.5 % cream, Apply topically 2 (two) times a day, Disp: 30 g, Rfl: 0      Objective:    Vitals:   Pulse 78   Ht 5' 6" (1.676 m)   Wt 76.2 kg (168 lb)   SpO2 95%   BMI 27.12 kg/m²   Body mass index is 27.12 kg/m².   Vitals:    10/04/23 1556 Weight: 76.2 kg (168 lb)       Physical Exam  Vitals and nursing note reviewed. Constitutional:       Appearance: Normal appearance. Cardiovascular:      Rate and Rhythm: Normal rate and regular rhythm. Heart sounds: Normal heart sounds. Pulmonary:      Effort: Pulmonary effort is normal.      Breath sounds: Normal breath sounds. Abdominal:      General: Abdomen is flat. Palpations: Abdomen is soft. Musculoskeletal:      Cervical back: Normal range of motion and neck supple. Right lower leg: No edema. Left lower leg: No edema. Skin:     General: Skin is warm and dry. Neurological:      General: No focal deficit present. Mental Status: She is alert and oriented to person, place, and time. Lab Review   No visits with results within 2 Month(s) from this visit. Latest known visit with results is:   Appointment on 05/30/2023   Component Date Value Ref Range Status   • Sodium 05/30/2023 137  135 - 147 mmol/L Final   • Potassium 05/30/2023 3.9  3.5 - 5.3 mmol/L Final   • Chloride 05/30/2023 102  96 - 108 mmol/L Final   • CO2 05/30/2023 27  21 - 32 mmol/L Final   • ANION GAP 05/30/2023 8  4 - 13 mmol/L Final   • BUN 05/30/2023 23  5 - 25 mg/dL Final   • Creatinine 05/30/2023 0.66  0.60 - 1.30 mg/dL Final    Standardized to IDMS reference method   • Glucose, Fasting 05/30/2023 127 (H)  65 - 99 mg/dL Final   • Calcium 05/30/2023 9.4  8.4 - 10.2 mg/dL Final   • AST 05/30/2023 46 (H)  13 - 39 U/L Final   • ALT 05/30/2023 26  7 - 52 U/L Final    Specimen collection should occur prior to Sulfasalazine administration due to the potential for falsely depressed results.     • Alkaline Phosphatase 05/30/2023 89  34 - 104 U/L Final   • Total Protein 05/30/2023 6.5  6.4 - 8.4 g/dL Final   • Albumin 05/30/2023 3.8  3.5 - 5.0 g/dL Final   • Total Bilirubin 05/30/2023 0.59  0.20 - 1.00 mg/dL Final    Use of this assay is not recommended for patients undergoing treatment with eltrombopag due to the potential for falsely elevated results. N-acetyl-p-benzoquinone imine (metabolite of Acetaminophen) will generate erroneously low results in samples for patients that have taken an overdose of Acetaminophen. • eGFR 05/30/2023 80  ml/min/1.73sq m Final   • Hemoglobin A1C 05/30/2023 6.7 (H)  Normal 3.8-5.6%; PreDiabetic 5.7-6.4%; Diabetic >=6.5%; Glycemic control for adults with diabetes <7.0% % Final   • EAG 05/30/2023 146  mg/dl Final   • Cholesterol 05/30/2023 139  See Comment mg/dL Final    Cholesterol:         Pediatric <18 Years        Desirable          <170 mg/dL      Borderline High    170-199 mg/dL      High               >=200 mg/dL        Adult >=18 Years            Desirable         <200 mg/dL      Borderline High   200-239 mg/dL      High              >239 mg/dL     • Triglycerides 05/30/2023 164 (H)  See Comment mg/dL Final    Triglyceride:     0-9Y            <75mg/dL     10Y-17Y         <90 mg/dL       >=18Y     Normal          <150 mg/dL     Borderline High 150-199 mg/dL     High            200-499 mg/dL        Very High       >499 mg/dL    Specimen collection should occur prior to Metamizole administration due to the potential for falsely depressed results. • HDL, Direct 05/30/2023 34 (L)  >=50 mg/dL Final   • LDL Calculated 05/30/2023 72  0 - 100 mg/dL Final    LDL Cholesterol:     Optimal           <100 mg/dl     Near Optimal      100-129 mg/dl     Above Optimal       Borderline High 130-159 mg/dl       High            160-189 mg/dl       Very High       >189 mg/dl         This screening LDL is a calculated result. It does not have the accuracy of the Direct Measured LDL in the monitoring of patients with hyperlipidemia and/or statin therapy. Direct Measure LDL (ERZ404) must be ordered separately in these patients.    • Non-HDL-Chol (CHOL-HDL) 05/30/2023 105  mg/dl Final   • Vitamin B-12 05/30/2023 367  180 - 914 pg/mL Final         Yuliana Davila MD        "This note has been constructed using a voice recognition system. Therefore there may be syntax, spelling, and/or grammatical errors.  Please call if you have any questions. "

## 2023-11-10 DIAGNOSIS — F41.9 ANXIETY: ICD-10-CM

## 2023-11-10 RX ORDER — CLONAZEPAM 0.5 MG/1
TABLET ORAL
Qty: 30 TABLET | Refills: 1 | Status: SHIPPED | OUTPATIENT
Start: 2023-11-10

## 2023-11-11 DIAGNOSIS — R25.1 TREMORS OF NERVOUS SYSTEM: ICD-10-CM

## 2023-11-13 DIAGNOSIS — E78.5 HYPERLIPIDEMIA, UNSPECIFIED HYPERLIPIDEMIA TYPE: ICD-10-CM

## 2023-11-13 DIAGNOSIS — K21.9 GASTROESOPHAGEAL REFLUX DISEASE WITHOUT ESOPHAGITIS: ICD-10-CM

## 2023-11-13 RX ORDER — PRIMIDONE 50 MG/1
50 TABLET ORAL DAILY
Qty: 90 TABLET | Refills: 1 | Status: SHIPPED | OUTPATIENT
Start: 2023-11-13

## 2023-11-14 RX ORDER — ATORVASTATIN CALCIUM 80 MG/1
TABLET, FILM COATED ORAL
Qty: 90 TABLET | Refills: 1 | Status: SHIPPED | OUTPATIENT
Start: 2023-11-14

## 2023-11-14 RX ORDER — PANTOPRAZOLE SODIUM 40 MG/1
40 TABLET, DELAYED RELEASE ORAL DAILY
Qty: 90 TABLET | Refills: 1 | Status: SHIPPED | OUTPATIENT
Start: 2023-11-14

## 2023-11-15 ENCOUNTER — APPOINTMENT (INPATIENT)
Dept: NON INVASIVE DIAGNOSTICS | Facility: HOSPITAL | Age: 83
DRG: 312 | End: 2023-11-15
Payer: MEDICARE

## 2023-11-15 ENCOUNTER — HOSPITAL ENCOUNTER (INPATIENT)
Facility: HOSPITAL | Age: 83
LOS: 3 days | Discharge: NURSING, REHAB OR LONG-TERM CARE | DRG: 312 | End: 2023-11-18
Attending: EMERGENCY MEDICINE | Admitting: STUDENT IN AN ORGANIZED HEALTH CARE EDUCATION/TRAINING PROGRAM
Payer: MEDICARE

## 2023-11-15 ENCOUNTER — RA CDI HCC (OUTPATIENT)
Dept: OTHER | Facility: HOSPITAL | Age: 83
End: 2023-11-15

## 2023-11-15 ENCOUNTER — APPOINTMENT (EMERGENCY)
Dept: RADIOLOGY | Facility: HOSPITAL | Age: 83
DRG: 312 | End: 2023-11-15
Payer: MEDICARE

## 2023-11-15 DIAGNOSIS — S22.019A CLOSED T1 FRACTURE (HCC): ICD-10-CM

## 2023-11-15 DIAGNOSIS — I95.1 ORTHOSTATIC HYPOTENSION: ICD-10-CM

## 2023-11-15 DIAGNOSIS — R55 SYNCOPE: ICD-10-CM

## 2023-11-15 DIAGNOSIS — I10 ESSENTIAL HYPERTENSION: ICD-10-CM

## 2023-11-15 DIAGNOSIS — R33.9 URINARY RETENTION: ICD-10-CM
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
## 2023-11-15 DIAGNOSIS — M62.82 RHABDOMYOLYSIS: Primary | ICD-10-CM

## 2023-11-15 LAB
ALBUMIN SERPL BCP-MCNC: 4.1 G/DL (ref 3.5–5)
ALP SERPL-CCNC: 71 U/L (ref 34–104)
ALT SERPL W P-5'-P-CCNC: 25 U/L (ref 7–52)
ANION GAP SERPL CALCULATED.3IONS-SCNC: 11 MMOL/L
AORTIC ROOT: 3.7 CM
AORTIC VALVE MEAN VELOCITY: 14.2 M/S
APICAL FOUR CHAMBER EJECTION FRACTION: 61 %
AST SERPL W P-5'-P-CCNC: 69 U/L (ref 13–39)
AV AREA BY CONTINUOUS VTI: 2.4 CM2
AV AREA PEAK VELOCITY: 1.9 CM2
AV LVOT MEAN GRADIENT: 4 MMHG
AV LVOT PEAK GRADIENT: 7 MMHG
AV MEAN GRADIENT: 10 MMHG
AV PEAK GRADIENT: 21 MMHG
AV VALVE AREA: 2.39 CM2
AV VELOCITY RATIO: 0.6
BACTERIA UR QL AUTO: ABNORMAL /HPF
BASOPHILS # BLD AUTO: 0.08 THOUSANDS/ÂΜL (ref 0–0.1)
BASOPHILS NFR BLD AUTO: 1 % (ref 0–1)
BILIRUB SERPL-MCNC: 0.93 MG/DL (ref 0.2–1)
BILIRUB UR QL STRIP: ABNORMAL
BUN SERPL-MCNC: 20 MG/DL (ref 5–25)
CALCIUM SERPL-MCNC: 10.1 MG/DL (ref 8.4–10.2)
CHLORIDE SERPL-SCNC: 102 MMOL/L (ref 96–108)
CK SERPL-CCNC: 1085 U/L (ref 26–192)
CLARITY UR: CLEAR
CO2 SERPL-SCNC: 25 MMOL/L (ref 21–32)
COLOR UR: ABNORMAL
CREAT SERPL-MCNC: 0.73 MG/DL (ref 0.6–1.3)
DOP CALC AO PEAK VEL: 2.3 M/S
DOP CALC AO VTI: 41.72 CM
DOP CALC LVOT AREA: 3.14 CM2
DOP CALC LVOT CARDIAC INDEX: 4.15 L/MIN/M2
DOP CALC LVOT CARDIAC OUTPUT: 7.67 L/MIN
DOP CALC LVOT DIAMETER: 2 CM
DOP CALC LVOT PEAK VEL VTI: 31.8 CM
DOP CALC LVOT PEAK VEL: 1.37 M/S
DOP CALC LVOT STROKE INDEX: 54.6 ML/M2
DOP CALC LVOT STROKE VOLUME: 99.85
E WAVE DECELERATION TIME: 250 MS
E/A RATIO: 0.86
EOSINOPHIL # BLD AUTO: 0.08 THOUSAND/ÂΜL (ref 0–0.61)
EOSINOPHIL NFR BLD AUTO: 1 % (ref 0–6)
ERYTHROCYTE [DISTWIDTH] IN BLOOD BY AUTOMATED COUNT: 15.9 % (ref 11.6–15.1)
FRACTIONAL SHORTENING: 31 (ref 28–44)
GFR SERPL CREATININE-BSD FRML MDRD: 75 ML/MIN/1.73SQ M
GLUCOSE SERPL-MCNC: 87 MG/DL (ref 65–140)
GLUCOSE UR STRIP-MCNC: NEGATIVE MG/DL
HCT VFR BLD AUTO: 44.4 % (ref 34.8–46.1)
HGB BLD-MCNC: 14.7 G/DL (ref 11.5–15.4)
HGB UR QL STRIP.AUTO: NEGATIVE
HYALINE CASTS #/AREA URNS LPF: ABNORMAL /LPF
IMM GRANULOCYTES # BLD AUTO: 0.05 THOUSAND/UL (ref 0–0.2)
IMM GRANULOCYTES NFR BLD AUTO: 1 % (ref 0–2)
INTERVENTRICULAR SEPTUM IN DIASTOLE (PARASTERNAL SHORT AXIS VIEW): 1.2 CM
INTERVENTRICULAR SEPTUM: 1.2 CM (ref 0.6–1.1)
KETONES UR STRIP-MCNC: ABNORMAL MG/DL
LAAS-AP2: 23.3 CM2
LAAS-AP4: 23.7 CM2
LEFT ATRIUM SIZE: 3.6 CM
LEFT ATRIUM VOLUME (MOD BIPLANE): 81 ML
LEFT ATRIUM VOLUME INDEX (MOD BIPLANE): 43.8 ML/M2
LEFT INTERNAL DIMENSION IN SYSTOLE: 3.1 CM (ref 2.1–4)
LEFT VENTRICULAR INTERNAL DIMENSION IN DIASTOLE: 4.5 CM (ref 3.5–6)
LEFT VENTRICULAR POSTERIOR WALL IN END DIASTOLE: 1.1 CM
LEFT VENTRICULAR STROKE VOLUME: 54 ML
LEUKOCYTE ESTERASE UR QL STRIP: ABNORMAL
LVSV (TEICH): 54 ML
LYMPHOCYTES # BLD AUTO: 0.92 THOUSANDS/ÂΜL (ref 0.6–4.47)
LYMPHOCYTES NFR BLD AUTO: 10 % (ref 14–44)
MCH RBC QN AUTO: 31.5 PG (ref 26.8–34.3)
MCHC RBC AUTO-ENTMCNC: 33.1 G/DL (ref 31.4–37.4)
MCV RBC AUTO: 95 FL (ref 82–98)
MONOCYTES # BLD AUTO: 0.8 THOUSAND/ÂΜL (ref 0.17–1.22)
MONOCYTES NFR BLD AUTO: 8 % (ref 4–12)
MUCOUS THREADS UR QL AUTO: ABNORMAL
MV E'TISSUE VEL-SEP: 5 CM/S
MV PEAK A VEL: 1.23 M/S
MV PEAK E VEL: 106 CM/S
MV STENOSIS PRESSURE HALF TIME: 72 MS
MV VALVE AREA P 1/2 METHOD: 3.06
NEUTROPHILS # BLD AUTO: 7.76 THOUSANDS/ÂΜL (ref 1.85–7.62)
NEUTS SEG NFR BLD AUTO: 79 % (ref 43–75)
NITRITE UR QL STRIP: NEGATIVE
NON-SQ EPI CELLS URNS QL MICRO: ABNORMAL /HPF
NRBC BLD AUTO-RTO: 0 /100 WBCS
PH UR STRIP.AUTO: 5.5 [PH]
PLATELET # BLD AUTO: 192 THOUSANDS/UL (ref 149–390)
PMV BLD AUTO: 11.1 FL (ref 8.9–12.7)
POTASSIUM SERPL-SCNC: 3.9 MMOL/L (ref 3.5–5.3)
PROT SERPL-MCNC: 7.2 G/DL (ref 6.4–8.4)
PROT UR STRIP-MCNC: ABNORMAL MG/DL
RBC # BLD AUTO: 4.66 MILLION/UL (ref 3.81–5.12)
RBC #/AREA URNS AUTO: ABNORMAL /HPF
RIGHT ATRIUM AREA SYSTOLE A4C: 13.2 CM2
RIGHT VENTRICLE ID DIMENSION: 3.2 CM
SARS-COV-2 RNA RESP QL NAA+PROBE: NEGATIVE
SL CV LEFT ATRIUM LENGTH A2C: 5.2 CM
SL CV LV EF: 58
SL CV PED ECHO LEFT VENTRICLE DIASTOLIC VOLUME (MOD BIPLANE) 2D: 93 ML
SL CV PED ECHO LEFT VENTRICLE SYSTOLIC VOLUME (MOD BIPLANE) 2D: 39 ML
SODIUM SERPL-SCNC: 138 MMOL/L (ref 135–147)
SP GR UR STRIP.AUTO: 1.02 (ref 1–1.03)
TR MAX PG: 32 MMHG
TR PEAK VELOCITY: 2.8 M/S
TRICUSPID ANNULAR PLANE SYSTOLIC EXCURSION: 2.1 CM
TRICUSPID VALVE PEAK REGURGITATION VELOCITY: 2.81 M/S
TSH SERPL DL<=0.05 MIU/L-ACNC: 1.18 UIU/ML (ref 0.45–4.5)
UROBILINOGEN UR QL STRIP.AUTO: 1 E.U./DL
WBC # BLD AUTO: 9.69 THOUSAND/UL (ref 4.31–10.16)
WBC #/AREA URNS AUTO: ABNORMAL /HPF

## 2023-11-15 PROCEDURE — 80053 COMPREHEN METABOLIC PANEL: CPT | Performed by: EMERGENCY MEDICINE

## 2023-11-15 PROCEDURE — 81001 URINALYSIS AUTO W/SCOPE: CPT | Performed by: STUDENT IN AN ORGANIZED HEALTH CARE EDUCATION/TRAINING PROGRAM

## 2023-11-15 PROCEDURE — 82550 ASSAY OF CK (CPK): CPT | Performed by: EMERGENCY MEDICINE

## 2023-11-15 PROCEDURE — 84443 ASSAY THYROID STIM HORMONE: CPT | Performed by: STUDENT IN AN ORGANIZED HEALTH CARE EDUCATION/TRAINING PROGRAM

## 2023-11-15 PROCEDURE — 99285 EMERGENCY DEPT VISIT HI MDM: CPT | Performed by: EMERGENCY MEDICINE

## 2023-11-15 PROCEDURE — 85025 COMPLETE CBC W/AUTO DIFF WBC: CPT | Performed by: EMERGENCY MEDICINE

## 2023-11-15 PROCEDURE — G1004 CDSM NDSC: HCPCS

## 2023-11-15 PROCEDURE — 99223 1ST HOSP IP/OBS HIGH 75: CPT | Performed by: STUDENT IN AN ORGANIZED HEALTH CARE EDUCATION/TRAINING PROGRAM

## 2023-11-15 PROCEDURE — 96360 HYDRATION IV INFUSION INIT: CPT

## 2023-11-15 PROCEDURE — 93306 TTE W/DOPPLER COMPLETE: CPT

## 2023-11-15 PROCEDURE — 99223 1ST HOSP IP/OBS HIGH 75: CPT | Performed by: INTERNAL MEDICINE

## 2023-11-15 PROCEDURE — 87635 SARS-COV-2 COVID-19 AMP PRB: CPT

## 2023-11-15 PROCEDURE — 36415 COLL VENOUS BLD VENIPUNCTURE: CPT | Performed by: EMERGENCY MEDICINE

## 2023-11-15 PROCEDURE — 93306 TTE W/DOPPLER COMPLETE: CPT | Performed by: INTERNAL MEDICINE

## 2023-11-15 PROCEDURE — 96361 HYDRATE IV INFUSION ADD-ON: CPT

## 2023-11-15 PROCEDURE — 72125 CT NECK SPINE W/O DYE: CPT

## 2023-11-15 PROCEDURE — 93005 ELECTROCARDIOGRAM TRACING: CPT

## 2023-11-15 PROCEDURE — 99284 EMERGENCY DEPT VISIT MOD MDM: CPT

## 2023-11-15 PROCEDURE — 70450 CT HEAD/BRAIN W/O DYE: CPT

## 2023-11-15 RX ORDER — NEBIVOLOL 10 MG/1
5 TABLET ORAL EVERY EVENING
Status: DISCONTINUED | OUTPATIENT
Start: 2023-11-15 | End: 2023-11-15

## 2023-11-15 RX ORDER — METHOCARBAMOL 500 MG/1
500 TABLET, FILM COATED ORAL EVERY 8 HOURS SCHEDULED
Status: DISCONTINUED | OUTPATIENT
Start: 2023-11-15 | End: 2023-11-16

## 2023-11-15 RX ORDER — MELATONIN
2000 DAILY
Status: DISCONTINUED | OUTPATIENT
Start: 2023-11-15 | End: 2023-11-18 | Stop reason: HOSPADM

## 2023-11-15 RX ORDER — LANOLIN ALCOHOL/MO/W.PET/CERES
2 CREAM (GRAM) TOPICAL
Status: DISCONTINUED | OUTPATIENT
Start: 2023-11-15 | End: 2023-11-18 | Stop reason: HOSPADM

## 2023-11-15 RX ORDER — ACETAMINOPHEN 325 MG/1
650 TABLET ORAL EVERY 8 HOURS SCHEDULED
Status: DISCONTINUED | OUTPATIENT
Start: 2023-11-15 | End: 2023-11-18 | Stop reason: HOSPADM

## 2023-11-15 RX ORDER — SODIUM CHLORIDE 9 MG/ML
125 INJECTION, SOLUTION INTRAVENOUS CONTINUOUS
Status: DISCONTINUED | OUTPATIENT
Start: 2023-11-15 | End: 2023-11-16

## 2023-11-15 RX ORDER — CLOPIDOGREL BISULFATE 75 MG/1
75 TABLET ORAL DAILY
Status: DISCONTINUED | OUTPATIENT
Start: 2023-11-15 | End: 2023-11-18 | Stop reason: HOSPADM

## 2023-11-15 RX ORDER — AMLODIPINE BESYLATE 2.5 MG/1
2.5 TABLET ORAL DAILY
Status: DISCONTINUED | OUTPATIENT
Start: 2023-11-15 | End: 2023-11-16

## 2023-11-15 RX ORDER — ENOXAPARIN SODIUM 100 MG/ML
40 INJECTION SUBCUTANEOUS DAILY
Status: DISCONTINUED | OUTPATIENT
Start: 2023-11-15 | End: 2023-11-18 | Stop reason: HOSPADM

## 2023-11-15 RX ORDER — LEVOTHYROXINE SODIUM 88 UG/1
88 TABLET ORAL
Status: DISCONTINUED | OUTPATIENT
Start: 2023-11-15 | End: 2023-11-18 | Stop reason: HOSPADM

## 2023-11-15 RX ORDER — NEBIVOLOL 10 MG/1
5 TABLET ORAL EVERY EVENING
Status: DISCONTINUED | OUTPATIENT
Start: 2023-11-15 | End: 2023-11-16

## 2023-11-15 RX ORDER — PRIMIDONE 50 MG/1
50 TABLET ORAL DAILY
Status: DISCONTINUED | OUTPATIENT
Start: 2023-11-15 | End: 2023-11-18 | Stop reason: HOSPADM

## 2023-11-15 RX ORDER — PANTOPRAZOLE SODIUM 40 MG/1
40 TABLET, DELAYED RELEASE ORAL DAILY
Status: DISCONTINUED | OUTPATIENT
Start: 2023-11-15 | End: 2023-11-18 | Stop reason: HOSPADM

## 2023-11-15 RX ORDER — CLONAZEPAM 0.5 MG/1
0.5 TABLET ORAL
Status: DISCONTINUED | OUTPATIENT
Start: 2023-11-15 | End: 2023-11-18 | Stop reason: HOSPADM

## 2023-11-15 RX ORDER — DULOXETIN HYDROCHLORIDE 60 MG/1
60 CAPSULE, DELAYED RELEASE ORAL DAILY
Status: DISCONTINUED | OUTPATIENT
Start: 2023-11-15 | End: 2023-11-18 | Stop reason: HOSPADM

## 2023-11-15 RX ORDER — ATORVASTATIN CALCIUM 80 MG/1
80 TABLET, FILM COATED ORAL
Status: DISCONTINUED | OUTPATIENT
Start: 2023-11-15 | End: 2023-11-18 | Stop reason: HOSPADM

## 2023-11-15 RX ADMIN — ENOXAPARIN SODIUM 40 MG: 40 INJECTION SUBCUTANEOUS at 09:25

## 2023-11-15 RX ADMIN — METHOCARBAMOL 500 MG: 500 TABLET ORAL at 13:09

## 2023-11-15 RX ADMIN — ACETAMINOPHEN 650 MG: 325 TABLET ORAL at 09:41

## 2023-11-15 RX ADMIN — METHOCARBAMOL 500 MG: 500 TABLET ORAL at 22:20

## 2023-11-15 RX ADMIN — Medication 2 TABLET: at 09:25

## 2023-11-15 RX ADMIN — PRIMIDONE 50 MG: 50 TABLET ORAL at 09:25

## 2023-11-15 RX ADMIN — DULOXETINE HYDROCHLORIDE 60 MG: 60 CAPSULE, DELAYED RELEASE ORAL at 09:25

## 2023-11-15 RX ADMIN — SODIUM CHLORIDE 1000 ML: 0.9 INJECTION, SOLUTION INTRAVENOUS at 13:09

## 2023-11-15 RX ADMIN — AMLODIPINE BESYLATE 2.5 MG: 2.5 TABLET ORAL at 09:25

## 2023-11-15 RX ADMIN — LEVOTHYROXINE SODIUM 88 MCG: 88 TABLET ORAL at 09:25

## 2023-11-15 RX ADMIN — CLOPIDOGREL BISULFATE 75 MG: 75 TABLET ORAL at 09:25

## 2023-11-15 RX ADMIN — ACETAMINOPHEN 650 MG: 325 TABLET ORAL at 22:20

## 2023-11-15 RX ADMIN — Medication 2000 UNITS: at 09:25

## 2023-11-15 RX ADMIN — PANTOPRAZOLE SODIUM 40 MG: 40 TABLET, DELAYED RELEASE ORAL at 09:25

## 2023-11-15 RX ADMIN — ATORVASTATIN CALCIUM 80 MG: 80 TABLET, FILM COATED ORAL at 22:20

## 2023-11-15 RX ADMIN — SODIUM CHLORIDE 1000 ML: 0.9 INJECTION, SOLUTION INTRAVENOUS at 04:24

## 2023-11-15 RX ADMIN — METHOCARBAMOL 500 MG: 500 TABLET ORAL at 09:41

## 2023-11-15 RX ADMIN — ACETAMINOPHEN 650 MG: 325 TABLET ORAL at 13:08

## 2023-11-15 RX ADMIN — SODIUM CHLORIDE 125 ML/HR: 0.9 INJECTION, SOLUTION INTRAVENOUS at 09:24

## 2023-11-15 NOTE — ASSESSMENT & PLAN NOTE
Patient follows with Dr. Laura Andino  - At the time had episodes of palpitations lasting for several seconds.    -At the time it was recommended to switch to an alternate beta blocker from [facility] but patient did not want any medication changes at the time  -Continue Bystolic for now  -Telemetry

## 2023-11-15 NOTE — CONSULTS
Consultation - Cardiology   [facility]     Kathy Olvera 78 y.o. female MRN: [id number]  : 1940  Unit/Bed#: [address] [place] [address] Encounter: [id number]      Assessment & Plan   Syncope. - Presented on 11/15/23 for evaluation after fall. Patient reports that she had fallen on 23 but was unable to get to the phone until 11/15/23. Patient reports that she remembers bending over to pick something up from the floor and then awoke on the floor not knowing what happened and down the martinez from the room where she remembers she was. She states that she was unable to get up off the floor due to weakness in her legs. She denies any prodrome. She denies experiencing chest pain, palpitations, shortness of breath, lightheadedness, dizziness, headache, nausea, vomiting or diaphoresis prior to event. - Patient with known history of orthostatic hypotension, positive orthostatic vitals signs during admission.   - Suspect syncope secondary to orthostatic hypotension.  - 11/15/23 EKG: Preliminary read- sinus rhythm, 77 bpm.  Nonspecific T wave abnormality. - 11/15/23 Telemetry reviewed: Currently sinus rhythm, 70 bpm.  No evidence of arrhythmia on telemetry. - 11/15/23 TTE: LVEF 58%. Left ventricle diastolic function is mildly abnormal, consistent with grade I (abnormal) relaxation. Left atrial filling pressure is elevated. Right ventricle cavity size normal.  Left atrium moderately dilated. Aortic valve with sclerosis. Trace mitral valve regurgitation. Trace tricuspid valve regurgitation.  - Continue telemetry. Hypertension.    - BP stable. - Continue amlodipine 2.5 mg daily and Bystolic 5 mg daily. - Continue to monitor BP.      Orthostatic hypotension.    - Patient with documented history of orthostatic hypotension, documented since .  - Positive orthostatic vital signs during admission:   Vitals:    11/15/23 1215 11/15/23 1220 11/15/23 1225 11/15/23 1335   BP: 131/62 111/54 (!) 95/49 111/54   Pulse: 75 75 88 77   Patient Position - Orthostatic VS: Lying - Orthostatic VS Sitting - Orthostatic VS Standing - Orthostatic VS    - Patient reports states that she periodically wears compression stockings but is not currently on any medications.   - Recommend daily compression stocking use. SVT. - Patient last seen in [facility] outpatient cardiology office on 11/02/23.   - 9/27/21 AMB extended holter monitor: Patient had a min HR of 57 bpm, max HR of 169 bpm, and avg HR of 76 bpm. Predominant underlying rhythm was Sinus Rhythm. 11 Supraventricular Tachycardia runs occurred, the run with the fastest interval lasting 4 beats with a max rate of 169 bpm, the longest lasting 19 beats with an avg rate of 116 bpm. Supraventricular Tachycardia was detected within +/- 45 seconds of symptomatic patient event(s). Isolated SVEs were rare (<1.0%), SVE Couplets were rare (<1.0%), and SVE Triplets were rare (<1.0%). Isolated VEs were rare (<1.0%), VE Couplets were rare (<1.0%), and no VE Triplets were present. Ventricular Bigeminy and Trigeminy were present. Available electrograms reviewed. She had episodes of palpitations that occasionally corresponded to PVC. Fluttering sensation once corresponded to sinus tachycardia 100-105 bpm.  No significant abnormalities seen. - Patient denies experiencing palpitations. - 11/15/23 EKG: Preliminary read- sinus rhythm, 77 bpm.  Nonspecific T wave abnormality. - 11/15/23 Telemetry reviewed: Currently sinus rhythm, 70 bpm.  No evidence of arrhythmia on telemetry. - Continue Bystolic 5 mg daily. - Continue telemetry. - Monitor electrolytes. Replete potassium above 4 and magnesium above 2. Dyslipidemia.    - 5/30/23 lipid panel: Cholesterol 139, triglycerides 164, HDL 34, LDL 72.   - Continue Lipitor 80 mg daily. Hypothyroidism.    - Currently on levothyroxine 88 mcg daily. - 11/15/23 TSH: 1.180.   - Care per primary team    Type II diabetes.     - 5/30/23 HgbA1c: 6.7.   - Care per primary team.    GERD.    - Currently on pantoprazole 40 mg daily.  - Follows outpatient with  [facility], last seen on/05/23. Summary of Recommendations: Thank you for your consultation. Physician Requesting Consult: Rosalind Oviedo DO    Reason for Consult / Principal Problem: Syncope. Inpatient consult to Cardiology  Consult performed by: Mirlande Boyer PA-C  Consult ordered by: Rosalind Oviedo DO        HPI: Victorina Evans is a 78 y.o. female with PMHx HTN, orthostatic hypotension, SVT, dyslipidemia, hypothyroidism, type II diabetes, GERD who presented on 11/15/23 for evaluation after fall. Patient reports that she had fallen on 11/13/23 but was unable to get to the phone until 11/15/23. Patient reports that she remembers bending over to pick something up from the floor and then awoke on the floor not knowing what happened and down the martinez from the room where she remembers she was. She states that she was unable to get up off the floor due to weakness in her legs. She denies any prodrome. She denies experiencing chest pain, palpitations, shortness of breath, lightheadedness, dizziness, headache, nausea, vomiting or diaphoresis prior to event. Patient has a known history of orthostatic hypotension. She states that she periodically wears compression stockings but is not currently on any medications. Review of Systems   Constitutional:  Negative for activity change, appetite change, chills, diaphoresis, fatigue, fever and unexpected weight change. Respiratory:  Negative for chest tightness, shortness of breath and wheezing. Cardiovascular:  Negative for chest pain, palpitations and leg swelling. Gastrointestinal:  Negative for abdominal pain, constipation, diarrhea, nausea and vomiting. Musculoskeletal:  Positive for arthralgias. Skin: Negative. Neurological:  Positive for syncope.  Negative for dizziness, weakness, light-headedness, numbness and headaches.        Historical Information   Past Medical History:   Diagnosis Date   • Anxiety    • Arthritis     r knee and shoulders   • Blind left eye    • Deaf, left    • Depression    • Diabetes mellitus ([address])    • Disease of thyroid gland    • DVT complicating pregnancy, unspecified trimester ([address]) postpartum   • Hearing loss     LEFT EAR   • History of shingles 2007    fACIAL    • Hyperlipidemia    • Hypertension    • Liver disease    • Lyme disease    • Meniere's disease    • Obesity    • Orthostatic hypotension    • Psychiatric problem    • Sinus congestion    • Sleep apnea    • Stroke [facility])      Past Surgical History:   Procedure Laterality Date   • APPENDECTOMY     • BREAST BIOPSY Left 2010   •  SECTION      x2   • DXA PROCEDURE (HISTORICAL)  10/28/2020   • EYE SURGERY     • HAND SURGERY Left    • HERNIA REPAIR     • HYSTERECTOMY     • ROTATOR CUFF REPAIR Left    • TONSILLECTOMY     • TYMPANOSTOMY TUBE PLACEMENT       Social History     Substance and Sexual Activity   Alcohol Use Never     Social History     Substance and Sexual Activity   Drug Use Never     Social History     Tobacco Use   Smoking Status Former   • Packs/day: 0.75   • Years: 44.00   • Total pack years: 33.00   • Types: Cigarettes   • Quit date: 1990   • Years since quittin.8   • Passive exposure: Past   Smokeless Tobacco Never     Family History:   Family History   Problem Relation Age of Onset   • Depression Mother    • Hypertension Mother    • Obesity Mother    • Depression Father    • Alcohol abuse Father    • Stroke Father    • Breast cancer Sister 76   • Ovarian cancer Daughter 48   • BRCA1 Negative Daughter    • BRCA2 Negative Daughter        Meds/Allergies    PTA meds:    Medications Prior to Admission   Medication   • amLODIPine (NORVASC) 2.5 mg tablet   • atorvastatin (LIPITOR) 80 mg tablet   • Calcium Carbonate-Vit D-Min (CALCIUM 1200 PO)   • cetirizine (ZyrTEC) 10 mg tablet   • chlorhexidine (PERIDEX) 0.12 % solution   • Cholecalciferol (D3-1000 PO)   • clobetasol (TEMOVATE) 0.05 % cream   • clonazePAM (KlonoPIN) 0.5 mg tablet   • clopidogrel (PLAVIX) 75 mg tablet   • DULoxetine (CYMBALTA) 60 mg delayed release capsule   • levothyroxine 88 mcg tablet   • mometasone (NASONEX) 50 mcg/act nasal spray   • nebivolol (BYSTOLIC) 5 mg tablet   • pantoprazole (PROTONIX) 40 mg tablet   • potassium chloride (K-DUR,KLOR-CON) 20 mEq tablet   • primidone (MYSOLINE) 50 mg tablet   • triamcinolone (KENALOG) 0.5 % cream      Allergies   Allergen Reactions   • Penicillins Swelling       Current Facility-Administered Medications:   •  acetaminophen (TYLENOL) tablet 650 mg, 650 mg, Oral, Q8H [address], Mari Emery DO, 650 mg at 11/15/23 1308  •  amLODIPine (NORVASC) tablet 2.5 mg, 2.5 mg, Oral, Daily, Mari Emery DO, 2.5 mg at 11/15/23 9099  •  atorvastatin (LIPITOR) tablet 80 mg, 80 mg, Oral, HS, Mari Emery DO  •  calcium carbonate-vitamin D 500 mg-5 mcg tablet 2 tablet, 2 tablet, Oral, Daily With Breakfast, Mari Emery DO, 2 tablet at 11/15/23 4255  •  cholecalciferol (VITAMIN D3) tablet 2,000 Units, 2,000 Units, Oral, Daily, Mari Emery DO, 2,000 Units at 11/15/23 0925  •  clonazePAM (KlonoPIN) tablet 0.5 mg, 0.5 mg, Oral, HS PRN, Mari Emery DO  •  clopidogrel (PLAVIX) tablet 75 mg, 75 mg, Oral, Daily, Mari Emery DO, 75 mg at 11/15/23 1806  •  DULoxetine (CYMBALTA) delayed release capsule 60 mg, 60 mg, Oral, Daily, Mari Emery DO, 60 mg at 11/15/23 0925  •  enoxaparin (LOVENOX) subcutaneous injection 40 mg, 40 mg, Subcutaneous, Daily, Mari Emery DO, 40 mg at 11/15/23 2978  •  levothyroxine tablet 88 mcg, 88 mcg, Oral, Early Morning, Mari Emery DO, 88 mcg at 11/15/23 0925  •  methocarbamol (ROBAXIN) tablet 500 mg, 500 mg, Oral, Q8H [address], Mari Emery DO, 500 mg at 11/15/23 1309  •  nebivolol (BYSTOLIC) tablet 5 mg, 5 mg, Oral, QPM, Mari Emery DO  •  pantoprazole (PROTONIX) EC tablet 40 mg, 40 mg, Oral, Daily, Mari Emery DO, 40 mg at 11/15/23 8549  •  primidone (MYSOLINE) tablet 50 mg, 50 mg, Oral, Daily, Mari Emery DO, 50 mg at 11/15/23 7496  •  sodium chloride 0.9 % infusion, 125 mL/hr, Intravenous, Continuous, Mari Emery DO, Last Rate: 125 mL/hr at 11/15/23 0924, 125 mL/hr at 11/15/23 0924    VTE Pharmacologic Prophylaxis:   Enoxaparin (Lovenox)    Objective:   Vitals: Blood pressure 111/54, pulse 77, temperature 97.8 °F (36.6 °C), temperature source Axillary, resp. rate 18, height 5' 6" (1.676 m), weight 75.3 kg (166 lb), SpO2 92 %. Body mass index is 26.79 kg/m². Wt Readings from Last 3 Encounters:   11/15/23 75.3 kg (166 lb)   10/04/23 76.2 kg (168 lb)   09/13/23 77.1 kg (170 lb)     BP Readings from Last 3 Encounters:   11/15/23 111/54   05/09/23 135/64   04/05/23 164/64     Orthostatic Blood Pressures      Flowsheet Row Most Recent Value   Blood Pressure 111/54 filed at 11/15/2023 1335   Patient Position - Orthostatic VS Standing - Orthostatic VS filed at 11/15/2023 1225            Intake/Output Summary (Last 24 hours) at 11/15/2023 1547  Last data filed at 11/15/2023 0601  Gross per 24 hour   Intake 1000 ml   Output --   Net 1000 ml       Invasive Devices       Peripheral Intravenous Line  Duration             Peripheral IV 11/15/23 Right Antecubital <1 day                      Physical Exam:   Physical Exam  Vitals reviewed. Constitutional:       General: She is not in acute distress. Cardiovascular:      Rate and Rhythm: Normal rate and regular rhythm. Pulses: Normal pulses. Heart sounds: Murmur heard. Pulmonary:      Effort: Pulmonary effort is normal. No respiratory distress. Abdominal:      General: Abdomen is flat. There is no distension. Palpations: Abdomen is soft. Tenderness: There is no abdominal tenderness. Musculoskeletal:      Right lower leg: No edema. Left lower leg: No edema. Skin:     General: Skin is warm and dry. Neurological:      Mental Status: She is alert. Labs:   Troponins:  Results from last 7 days   Lab Units 11/15/23  0421   CK TOTAL U/L 1,085*       CBC with diff:   Results from last 7 days   Lab Units 11/15/23  0421   WBC Thousand/uL 9.69   HEMOGLOBIN g/dL 14.7   HEMATOCRIT % 44.4   MCV fL 95   PLATELETS Thousands/uL 192   RBC Million/uL 4.66   MCH pg 31.5   MCHC g/dL 33.1   RDW % 15.9*   MPV fL 11.1   NRBC AUTO /100 WBCs 0       CMP:   Results from last 7 days   Lab Units 11/15/23  0421   SODIUM mmol/L 138   POTASSIUM mmol/L 3.9   CHLORIDE mmol/L 102   CO2 mmol/L 25   ANION GAP mmol/L 11   BUN mg/dL 20   CREATININE mg/dL 0.73   CALCIUM mg/dL 10.1   AST U/L 69*   ALT U/L 25   ALK PHOS U/L 71   TOTAL PROTEIN g/dL 7.2   ALBUMIN g/dL 4.1   TOTAL BILIRUBIN mg/dL 0.93   EGFR ml/min/1.73sq m 75   GLUCOSE RANDOM mg/dL 87       Magnesium:     Coags:     TSH:    Results from last 7 days   Lab Units 11/15/23  0421   TSH 3RD GENERATON uIU/mL 1.180     No components found for: "TSH3"  Lipid Profile:     Lipid Profile:   Lab Results   Component Value Date    CHOLESTEROL 139 05/30/2023    HDL 34 (L) 05/30/2023    LDLCALC 72 05/30/2023    TRIG 164 (H) 05/30/2023     Hgb A1c:     NT-proBNP: No results for input(s): "NTBNP" in the last 72 hours. Imaging & Testing     Cardiac testing:     Echo complete w/ contrast if indicated    Result Date: 11/15/2023  Narrative: •  Left Ventricle: Left ventricular cavity size is normal. Wall thickness is mildly increased. The left ventricular ejection fraction is 58% by single dimension measurement. Wall motion is normal. Diastolic function is mildly abnormal, consistent with grade I (abnormal) relaxation. Left atrial filling pressure is elevated. •  Right Ventricle: Right ventricular cavity size is normal. •  Left Atrium: The atrium is mildly dilated. •  Aortic Valve: There is aortic valve sclerosis.        Results for orders placed during the hospital encounter of 01/31/20    Echo complete with contrast if indicated    Narrative  [address]  [phone]    Transthoracic Echocardiogram  2D, M-mode, Doppler, and Color Doppler    Study date:  2020    Patient: Lizzeth Tomlin  MR number: [id number]  Account number: [de-identified]  : 1940  Age: 78 years  Gender: Female  Status: Inpatient  Location: Bedside  Height: 68 in  Weight: 203.5 lb  BP: 125/ 56 mmHg    Indications: SOB,Dizziness    Diagnoses: R06.00 - Dyspnea, unspecified    Sonographer:  JAGDEEP Osorio  Referring Physician:  ROBERT Stein  Group:  [facility]  Interpreting Physician:  DO PARUL Butt    LEFT VENTRICLE:  Systolic function was normal by visual assessment. Ejection fraction was estimated in the range of 55 % to 60 %. There were no regional wall motion abnormalities. There was mild concentric hypertrophy. Doppler parameters were consistent with abnormal left ventricular relaxation (grade 1 diastolic dysfunction). MITRAL VALVE:  There was mild regurgitation. AORTIC VALVE:  There was no evidence for stenosis. There was no regurgitation. TRICUSPID VALVE:  There was mild regurgitation. Pulmonary artery systolic pressure was within the normal range. HISTORY: PRIOR HISTORY: Blind left eye,Deaf left ear,thyroid disease,DVT,Lyme disease,orthostatic hypotension. PROCEDURE: The procedure was performed at the bedside. This was a routine study. The transthoracic approach was used. The study included complete 2D imaging, M-mode, complete spectral Doppler, and color Doppler. The heart rate was 68 bpm,  at the start of the study. Images were obtained from the parasternal, apical, subcostal, and suprasternal notch acoustic windows. Image quality was adequate. LEFT VENTRICLE: Size was normal. Systolic function was normal by visual assessment. Ejection fraction was estimated in the range of 55 % to 60 %.  There were no regional wall motion abnormalities. There was mild concentric hypertrophy. DOPPLER: Doppler parameters were consistent with abnormal left ventricular relaxation (grade 1 diastolic dysfunction). RIGHT VENTRICLE: The size was normal. Systolic function was normal. DOPPLER: Systolic pressure was within the normal range. LEFT ATRIUM: The atrium was mildly dilated. RIGHT ATRIUM: The atrium was mildly dilated. MITRAL VALVE: Valve structure was normal. There was normal leaflet separation. No echocardiographic evidence for prolapse. DOPPLER: The transmitral velocity was within the normal range. There was no evidence for stenosis. There was mild  regurgitation. AORTIC VALVE: The valve was trileaflet. Leaflets exhibited normal thickness, normal cuspal separation, and sclerosis. DOPPLER: Transaortic velocity was within the normal range. There was no evidence for stenosis. There was no  regurgitation. TRICUSPID VALVE: The valve structure was normal. There was normal leaflet separation. DOPPLER: The transtricuspid velocity was within the normal range. There was mild regurgitation. Pulmonary artery systolic pressure was within the normal  range. Estimated peak PA pressure was 33 mmHg. PULMONIC VALVE: Leaflets exhibited normal thickness, no calcification, and normal cuspal separation. DOPPLER: The transpulmonic velocity was within the normal range. There was no regurgitation. PERICARDIUM: There was no thickening. There was no pericardial effusion. AORTA: The root exhibited normal size.     PULMONARY ARTERY: The size was normal. The morphology appeared normal.    SYSTEM MEASUREMENT TABLES    2D mode  AoR Diam 2D: 3.7 cm  LA Diam (2D): 3.9 cm  LA/Ao (2D): 1.05  FS (2D Teich): 25.9 %  IVSd (2D): 1.33 cm  LVDEV: 47.4 cmï¾³  LVESV: 22.8 cmï¾³  LVIDd(2D): 3.4 cm  LVISd (2D): 2.52 cm  LVOT Area 2D: 2.84 cmï¾²  LVPWd (2D): 1.28 cm  SV (Teich): 24.6 cmï¾³    Apical four chamber  LVEF A4C: 52 %    Unspecified Scan Mode  ROXI Cont Eq (Peak Urban): 2.21 cmï¾²  LVOT Diam.: 2 cm  LVOT Vmax: 1320 mm/s  LVOT Vmax; Mean: 1320 mm/s  Peak Grad.; Mean: 7 mm[Hg]  MV Peak A Urban: 1080 mm/s  MV Peak E Urban. Mean: 959 mm/s  MVA (PHT): 2.65 cmï¾²  PHT: 84 ms  Max P mm[Hg]  V Max: 2500 mm/s  Vmax: 2370 mm/s  RA Area: 25.9 cmï¾²  RA Volume: 86 cmï¾³  TAPSE: 1.6 cm    [facility] Accredited Echocardiography Laboratory    Prepared and electronically signed by    Kiersten Tam DO  Signed 2020 13:09:49      Results for orders placed during the hospital encounter of 20    NM Myocardial Perfusion Spect (Pharmacological Induced Stress and/or Rest)    [address]  [phone]    Rest/Stress Gated SPECT Myocardial Perfusion Imaging After Regadenoson    Patient: Victorina Evans  MR number: [id number]  Account number: [de-identified]  : 1940  Age: 78 years  Gender: Female  Status: Inpatient  Location: Stress lab  Height: 68 in  Weight: 204 lb  BP: 157/ 67 mmHg    Allergies: PENICILLINS    Diagnosis: R42. - Dizziness and giddiness    Technician:  Pool Whitehead  RN:  KENJI Call  Group:  Mahin Tatum  Report Prepared By[de-identified]  KENJI Call  Interpreting Physician:  Kiersten Tam DO    INDICATIONS: Evaluation for coronary artery disease. HISTORY: The patient is a 78year old  female. Chest pain status: no chest pain. Other symptoms: dizziness. Coronary artery disease risk factors: dyslipidemia and hypertension. Cardiovascular history: none significant. Medications: a calcium channel blocker, aspirin, clopidogrel, a lipid lowering agent, and thyroid medications. PHYSICAL EXAM: Baseline physical exam screening: no wheezes audible. REST ECG: Normal sinus rhythm. Normal baseline ECG. PROCEDURE: The study was performed in the the Stress lab. A regadenoson infusion pharmacologic stress test was performed.  Gated SPECT myocardial perfusion imaging was performed after stress and at rest. Systolic blood pressure was 157  mmHg, at the start of the study. Diastolic blood pressure was 67 mmHg, at the start of the study. The heart rate was 70 bpm, at the start of the study. Patient was not experiencing chest pain at the time of the injection of the  radiopharmaceutical. IV double checked. Regadenoson protocol:  Time HR bpm SBP mmHg DBP mmHg Symptoms ST change Rhythm/conduct  Baseline 10:22 70 157 67 none none NSR, no ectopy, NSR  Immediate 10:30 81 132 58 mild dyspnea, flushing -- same as above, rare PVC's  1 min 10:31 79 148 64 same as above none same as above  2 min 10:32 77 148 67 subsiding -- same as above  3 min 10:33 73 135 63 subsiding -- same as above  4 min 10:34 76 136 62 none -- NSR  No medications or fluids given. STRESS SUMMARY: Duration of pharmacologic stress was 3 min. Functional capacity was normal. Maximal heart rate during stress was 83 bpm. The heart rate response to stress was normal. There was normal resting blood pressure with an  appropriate response to stress. The rate-pressure product for the peak heart rate and blood pressure was 37125. There was no chest pain during stress. The stress test was terminated due to protocol completion. Pre oxygen saturation: 96 %. Peak oxygen saturation: 96 %. The stress ECG was negative for ischemia and normal. There were no stress arrhythmias or conduction abnormalities. ISOTOPE ADMINISTRATION:  Resting isotope administration Stress isotope administration  Agent Sestamibi Sestamibi  Dose 10.09 mCi 33 mCi  Date 02/03/2020 02/03/2020    The radiopharmaceutical was injected at the peak effect of pharmacologic stress. MYOCARDIAL PERFUSION IMAGING:  The image quality was good. Left ventricular size was normal.    PERFUSION DEFECTS:  -  There were no perfusion defects. GATED SPECT:  The calculated left ventricular ejection fraction was 69 %.  Left ventricular ejection fraction was within normal limits by visual estimate. There was no diagnostic evidence for left ventricular regional abnormality. SUMMARY:  -  Stress results: Target heart rate was achieved. There was no chest pain during stress. -  ECG conclusions: The stress ECG was negative for ischemia and normal.  -  Perfusion imaging: There were no perfusion defects.  -  Gated SPECT: The calculated left ventricular ejection fraction was 69 %. Left ventricular ejection fraction was within normal limits by visual estimate. There was no diagnostic evidence for left ventricular regional abnormality. IMPRESSIONS: Normal study after pharmacologic stress. Myocardial perfusion imaging was normal at rest and with stress. Left ventricular systolic function was normal.    Prepared and signed by    Luke Drake DO  Signed 02/03/2020 14:32:17      Imaging: I have personally reviewed pertinent reports. CT spine cervical without contrast    Result Date: 11/15/2023    Impression: No cervical spine fracture or traumatic malalignment. Minimal compression deformity of T1 superior endplate, age-indeterminate but new since chest CTA on 6/24/2021. Correlate clinically. Stable dominant right thyroid nodule. CT head without contrast    Result Date: 11/15/2023    Impression: No acute intracranial abnormality. EKG/ Monitor: Personally reviewed. Telemetry reviewed: Currently sinus rhythm, 70 bpm.  No evidence of arrhythmia on telemetry. 11/15/23 EKG: Preliminary read- sinus rhythm, 77 bpm.  Nonspecific T wave abnormality.      Code Status: Level 3 - DNAR and DNI  Advance Directive and Living Will:      POLST:        Vic Choi PA-C

## 2023-11-15 NOTE — PLAN OF CARE
Problem: Potential for Falls  Goal: Patient will remain free of falls  Description: INTERVENTIONS:  - Educate patient/family on patient safety including physical limitations  - Instruct patient to call for assistance with activity   - Consult OT/PT to assist with strengthening/mobility   - Keep Call bell within reach  - Keep bed low and locked with side rails adjusted as appropriate  - Keep care items and personal belongings within reach  - Initiate and maintain comfort rounds  - Make Fall Risk Sign visible to staff  - Offer Toileting every  Hours, in advance of need  - Initiate/Maintain alarm  - Obtain necessary fall risk management equipment:   - Apply yellow socks and bracelet for high fall risk patients  - Consider moving patient to room near nurses station  Outcome: Progressing     Problem: PAIN - ADULT  Goal: Verbalizes/displays adequate comfort level or baseline comfort level  Description: Interventions:  - Encourage patient to monitor pain and request assistance  - Assess pain using appropriate pain scale  - Administer analgesics based on type and severity of pain and evaluate response  - Implement non-pharmacological measures as appropriate and evaluate response  - Consider cultural and social influences on pain and pain management  - Notify physician/advanced practitioner if interventions unsuccessful or patient reports new pain  Outcome: Progressing     Problem: INFECTION - ADULT  Goal: Absence or prevention of progression during hospitalization  Description: INTERVENTIONS:  - Assess and monitor for signs and symptoms of infection  - Monitor lab/diagnostic results  - Monitor all insertion sites, i.e. indwelling lines, tubes, and drains  - Monitor endotracheal if appropriate and nasal secretions for changes in amount and color  - Wallace appropriate cooling/warming therapies per order  - Administer medications as ordered  - Instruct and encourage patient and family to use good hand hygiene technique  - Identify and instruct in appropriate isolation precautions for identified infection/condition  Outcome: Progressing     Problem: SAFETY ADULT  Goal: Patient will remain free of falls  Description: INTERVENTIONS:  - Educate patient/family on patient safety including physical limitations  - Instruct patient to call for assistance with activity   - Consult OT/PT to assist with strengthening/mobility   - Keep Call bell within reach  - Keep bed low and locked with side rails adjusted as appropriate  - Keep care items and personal belongings within reach  - Initiate and maintain comfort rounds  - Make Fall Risk Sign visible to staff  - Offer Toileting every  Hours, in advance of need  - Initiate/Maintain alarm  - Obtain necessary fall risk management equipment  - Apply yellow socks and bracelet for high fall risk patients  - Consider moving patient to room near nurses station  Outcome: Progressing  Goal: Maintain or return to baseline ADL function  Description: INTERVENTIONS:  -  Assess patient's ability to carry out ADLs; assess patient's baseline for ADL function and identify physical deficits which impact ability to perform ADLs (bathing, care of mouth/teeth, toileting, grooming, dressing, etc.)  - Assess/evaluate cause of self-care deficits   - Assess range of motion  - Assess patient's mobility; develop plan if impaired  - Assess patient's need for assistive devices and provide as appropriate  - Encourage maximum independence but intervene and supervise when necessary  - Involve family in performance of ADLs  - Assess for home care needs following discharge   - Consider OT consult to assist with ADL evaluation and planning for discharge  - Provide patient education as appropriate  Outcome: Progressing     Problem: DISCHARGE PLANNING  Goal: Discharge to home or other facility with appropriate resources  Description: INTERVENTIONS:  - Identify barriers to discharge w/patient and caregiver  - Arrange for needed discharge resources and transportation as appropriate  - Identify discharge learning needs (meds, wound care, etc.)  - Arrange for interpretive services to assist at discharge as needed  - Refer to Case Management Department for coordinating discharge planning if the patient needs post-hospital services based on physician/advanced practitioner order or complex needs related to functional status, cognitive ability, or social support system  Outcome: Progressing     Problem: NEUROSENSORY - ADULT  Goal: Achieves stable or improved neurological status  Description: INTERVENTIONS  - Monitor and report changes in neurological status  - Monitor vital signs such as temperature, blood pressure, glucose, and any other labs ordered   - Initiate measures to prevent increased intracranial pressure  - Monitor for seizure activity and implement precautions if appropriate      Outcome: Progressing     Problem: CARDIOVASCULAR - ADULT  Goal: Maintains optimal cardiac output and hemodynamic stability  Description: INTERVENTIONS:  - Monitor I/O, vital signs and rhythm  - Monitor for S/S and trends of decreased cardiac output  - Administer and titrate ordered vasoactive medications to optimize hemodynamic stability  - Assess quality of pulses, skin color and temperature  - Assess for signs of decreased coronary artery perfusion  - Instruct patient to report change in severity of symptoms  Outcome: Progressing  Goal: Absence of cardiac dysrhythmias or at baseline rhythm  Description: INTERVENTIONS:  - Continuous cardiac monitoring, vital signs, obtain 12 lead EKG if ordered  - Administer antiarrhythmic and heart rate control medications as ordered  - Monitor electrolytes and administer replacement therapy as ordered  Outcome: Progressing

## 2023-11-15 NOTE — ASSESSMENT & PLAN NOTE
While reaching to get her gown she fell backwards and next thing she remembers is being on hallway  Unable to tell if she had syncopayl episode but does nto remember what happened  Admits to having previous episodes of falling where she describes it as her muscles giving out on her but unable to further clarify. States she denies having syncopy but does not remember what happened and how she got to hallway as she lives alone.     CT head and cervical spine negative for acute findings    Denies any prodromal symptoms  Telemetry  Obtain echo  Orthostatic vitals positive  Give 1 L bolus and NS @ 100ml/hr for 24 hours  History of SVT follows with Dr. Raghav Petty at the time was advised to switch Bystolic to an alternate betablocker but patient did not want any changes   Consult cardio  Check TSH    Admits to having low back pain, will obtain thoracolumbar xrays

## 2023-11-15 NOTE — CASE MANAGEMENT
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    SVT (supraventricular tachycardia) 2023    Osteoarthritis of right knee 2023    Elevated liver enzymes 2023    Type 2 diabetes mellitus, without long-term current use of insulin ([address]) 2023    Dyspepsia 2022    Frequent falls 2022    Localized swelling of right lower leg 2022    Anxiety 2022    Gastroesophageal reflux disease without esophagitis 10/12/2022    Nocturnal hypoxemia 2021    Acquired hypothyroidism 2021    Thyroid nodule 2021    Palpitations 2021    Tremor 2021    History of transient ischemic attack (TIA) 2021    Essential hypertension 2021    Dyslipidemia 2021    Seasonal allergic rhinitis due to pollen 2021    Chronic hypoxemic respiratory failure ([address]) 2020    Dizziness 2020    Shortness of breath on exertion       LOS (days): 0  Geometric Mean LOS (GMLOS) (days): 2.40  Days to GMLOS:2     OBJECTIVE:    Risk of Unplanned Readmission Score: 10.75     Current admission status: Inpatient    Preferred Pharmacy:   [address] [address] [address]  [address] [place] [address] [address] [place] [id number]  Phone: [phone] Fax: [phone]    Primary Care Provider: Shaniqua Ho MD    Primary Insurance: MEDICARE  Secondary Insurance: AARP    ASSESSMENT:  [address] [facility], [address] [place] [address] Road - Daughter   Primary Phone: [phone] (Home)                 Readmission Root Cause  30 Day Readmission: No    Patient Information  Admitted from[de-identified] Home  Mental Status: Alert  During Assessment patient was accompanied by: Not accompanied during assessment  Assessment information provided by[de-identified] Daughter (Patient's Daughter Kitty Benedict answered assessment questions.)  Primary Caregiver: Family  Caregiver's Name[de-identified] Daughter Blayne Cortes Relationship to Patient[de-identified] Family Member  Caregiver's Telephone Number[de-identified] [phone]  Support Systems: Daughter  [place] of Residence: [address] do you live in?: [address] entry access options.  Select all that apply.: No steps to enter home  Type of Current Residence: Apartment  Floor Level: 1  Upon entering residence, is there a bedroom on the main floor (no further steps)?: Yes  Upon entering residence, is there a bathroom on the main floor (no further steps)?: Yes  In the last 12 months, was there a time when you were not able to pay the mortgage or rent on time?: No  In the last 12 months, how many places have you lived?: 1  In the last 12 months, was there a time when you did not have a steady place to sleep or slept in a shelter (including now)?: No  Homeless/housing insecurity resource given?: N/A  Living Arrangements: Lives Alone    Activities of Daily Living Prior to Admission  Functional Status: Independent  Completes ADLs independently?: Yes  Ambulates independently?: No  Level of ambulatory dependence: Assistance  Does patient use assisted devices?: Yes  Assisted Devices (DME) used: Straight Cane  Does patient currently own DME?: Yes  What DME does the patient currently own?: Straight Cane  Does patient have a history of Outpatient Therapy (PT/OT)?: Yes (Patient has a history of OP PT, could not recall the location.)  Does the patient have a history of Short-Term Rehab?: No  Does patient have a history of HHC?: No  Does patient currently have [facility]?: No (Patient currently has someone come into her home to help with household-laundry, cleaning etc. Patient doesn't have any healthcare services at home currently.)       Patient Information Continued  Income Source: Pension/long term  Does patient have prescription coverage?: Yes  Within the past 12 months, you worried that your food would run out before you got the money to buy more.: Never true  Within the past 12 months, the food you bought just didn't last and you didn't have money to get more.: Never true  Food insecurity resource given?: N/A    Means of Transportation  In the past 12 months, has lack of transportation kept you from medical appointments or from getting medications?: No  In the past 12 months, has lack of transportation kept you from meetings, work, or from getting things needed for daily living?: No  Was application for public transport provided?: N/A    DISCHARGE DETAILS:    Discharge planning discussed with[de-identified] Patient's Daughter  Freedom of Choice: Yes     CM contacted family/caregiver?: Yes    Contacts  Patient Contacts: Kade Nunez  Relationship to Patient[de-identified] Family  Contact Method: Phone  Phone Number: [phone]  Reason/Outcome: Emergency Contact      CM spoke with patient briefly but unable to complete assessment as she was falling asleep. CM called and spoke with patient's Daughter Kavon Dee who answered assessment questions. Kavon Dee stated that patient would be open to both [facility] if recommended. PT/OT evaluations pending, CM will continue to follow.

## 2023-11-15 NOTE — ED PROVIDER NOTES
History  Chief Complaint   Patient presents with    Fall     Pt comes in EMS with a fall from standing position occurring on Monday AM. Pt was not able to get to get up and get to a phone until this am to call EMS. pt states that she feels sore all over. Pt has a hx of falls and on Plavix with no LOC. Pt states that she was having L flank pain but has gone away since being on O3      25-year-old female presenting to ED today after a fall early Monday morning. Patient has been on the ground since then. He says that she was too weak to get up. She has been able to sort of roll and grab things that she needs. However by time she was able to get to her phone her phone had . It was not until she was able to get a charge that she was able to call EMS. She states that when she fell initially she was reaching for her robe. She hit the back of her head. Denying any LOC. Denies any pain anywhere except for in the hip as she was on the ground for a while. She has no pain when she moves her hips however. She states that she thinks it is muscular. No chest pain or shortness of breath no fevers or chills. No abdominal pain. Prior to Admission Medications   Prescriptions Last Dose Informant Patient Reported? Taking?    Calcium Carbonate-Vit D-Min (CALCIUM 1200 PO)  Self Yes No   Sig: Take 1,200 mg by mouth daily   Cholecalciferol (D3-1000 PO) 2023 Self Yes No   Sig: Take 2,000 Units by mouth daily     DULoxetine (CYMBALTA) 60 mg delayed release capsule   No No   Sig: TAKE 1 CAPSULE(60 MG) BY MOUTH DAILY   amLODIPine (NORVASC) 2.5 mg tablet 2023  No No   Sig: Take 1 tablet (2.5 mg total) by mouth daily   atorvastatin (LIPITOR) 80 mg tablet 2023  No No   Sig: TAKE 1 TABLET BY MOUTH DAILY AT BEDTIME   cetirizine (ZyrTEC) 10 mg tablet 2023 Self Yes No   Sig: Take 10 mg by mouth daily   chlorhexidine (PERIDEX) 0.12 % solution   Yes No   Sig: RINSE FOR 30 SECONDS WITH 1/2OZ AFTER BREAKFAST AND AT BEDTIME.  STARTING 2 DAYS PRIOR TO VISIT AND 1 HOUR PRIOR TO APPOINTENT   clobetasol (TEMOVATE) 0.05 % cream  Self Yes No   Sig: Apply 1 application topically as needed To affected area   clonazePAM (KlonoPIN) 0.5 mg tablet 2023  No No   Sig: TAKE 1 TABLET(0.5 MG) BY MOUTH DAILY AT BEDTIME AS NEEDED FOR SEIZURES   clopidogrel (PLAVIX) 75 mg tablet 2023  No No   Sig: TAKE 1 TABLET BY MOUTH EVERY DAY   levothyroxine 88 mcg tablet   No No   Sig: TAKE 1 TABLET(88 MCG) BY MOUTH DAILY   mometasone (NASONEX) 50 mcg/act nasal spray  Self Yes No   Si spray into each nostril daily     nebivolol (BYSTOLIC) 5 mg tablet   No No   Sig: TAKE 1 TABLET BY MOUTH EVERY EVENING   pantoprazole (PROTONIX) 40 mg tablet   No No   Sig: TAKE 1 TABLET(40 MG) BY MOUTH EVERY MORNING   potassium chloride (K-DUR,KLOR-CON) 20 mEq tablet 2023 Self Yes No   Sig: Take 20 mEq by mouth once a week    primidone (MYSOLINE) 50 mg tablet   No No   Sig: TAKE 1 TABLET(50 MG) BY MOUTH DAILY   triamcinolone (KENALOG) 0.5 % cream  Self No No   Sig: Apply topically 2 (two) times a day      Facility-Administered Medications: None       Past Medical History:   Diagnosis Date    Anxiety     Arthritis     r knee and shoulders    Blind left eye     Deaf, left     Depression     Diabetes mellitus ([address])     Disease of thyroid gland     DVT complicating pregnancy, unspecified trimester ([address]) postpartum    Hearing loss     LEFT EAR    History of shingles     fACIAL     Hyperlipidemia     Hypertension     Liver disease     Lyme disease     Meniere's disease     Obesity     Orthostatic hypotension     Psychiatric problem     Sinus congestion     Sleep apnea     Stroke [facility])        Past Surgical History:   Procedure Laterality Date    APPENDECTOMY      BREAST BIOPSY Left 2010     SECTION      x2    DXA PROCEDURE (HISTORICAL)  10/28/2020    EYE SURGERY      HAND SURGERY Left     HERNIA REPAIR      HYSTERECTOMY      ROTATOR CUFF REPAIR Left     TONSILLECTOMY      TYMPANOSTOMY TUBE PLACEMENT         Family History   Problem Relation Age of Onset    Depression Mother     Hypertension Mother     Obesity Mother     Depression Father     Alcohol abuse Father     Stroke Father     Breast cancer Sister 76    Ovarian cancer Daughter 48    BRCA1 Negative Daughter     BRCA2 Negative Daughter      I have reviewed and agree with the history as documented. E-Cigarette/Vaping    E-Cigarette Use Never User      E-Cigarette/Vaping Substances    Nicotine No     THC No     CBD No     Flavoring No     Other No     Unknown No      Social History     Tobacco Use    Smoking status: Former     Packs/day: 0.75     Years: 44.00     Total pack years: 33.00     Types: Cigarettes     Quit date: 1990     Years since quittin.8     Passive exposure: Past    Smokeless tobacco: Never   Vaping Use    Vaping Use: Never used   Substance Use Topics    Alcohol use: Never    Drug use: Never       Review of Systems   Constitutional:  Negative for chills and fever. HENT:  Negative for hearing loss. Eyes:  Negative for visual disturbance. Respiratory:  Negative for shortness of breath. Cardiovascular:  Negative for chest pain. Gastrointestinal:  Negative for abdominal pain, constipation, diarrhea, nausea and vomiting. Genitourinary:  Negative for difficulty urinating. Musculoskeletal:  Negative for myalgias. Skin:  Negative for color change. Neurological:  Negative for dizziness and headaches. Psychiatric/Behavioral:  Negative for agitation. All other systems reviewed and are negative. Physical Exam  Physical Exam  Vitals and nursing note reviewed. Constitutional:       General: She is not in acute distress. Appearance: Normal appearance. She is well-developed. She is not ill-appearing. HENT:      Head: Normocephalic and atraumatic.       Right Ear: External ear normal.      Left Ear: External ear normal.      Nose: Nose normal. No congestion. Mouth/Throat:      Mouth: Mucous membranes are moist.      Pharynx: Oropharynx is clear. No oropharyngeal exudate. Eyes:      General:         Right eye: No discharge. Left eye: No discharge. Extraocular Movements: Extraocular movements intact. Conjunctiva/sclera: Conjunctivae normal.      Pupils: Pupils are equal, round, and reactive to light. Cardiovascular:      Rate and Rhythm: Normal rate and regular rhythm. Heart sounds: Normal heart sounds. No murmur heard. No friction rub. No gallop. Pulmonary:      Effort: Pulmonary effort is normal. No respiratory distress. Breath sounds: Normal breath sounds. No stridor. No wheezing. Abdominal:      General: Bowel sounds are normal. There is no distension. Palpations: Abdomen is soft. Tenderness: There is no abdominal tenderness. Musculoskeletal:         General: No swelling. Normal range of motion. Cervical back: Normal range of motion and neck supple. No rigidity. Comments: No C, T, L-spine tenderness. No pain with passive range of motion of the lower extremities. No pain with passive range of motion's of the upper extremities. Patient did have a small bruise on the left shoulder however patient says that she just got her Shingrix vaccine. Skin:     General: Skin is warm and dry. Capillary Refill: Capillary refill takes less than 2 seconds. Comments: Stage I sacral pressure ulcer. Neurological:      General: No focal deficit present. Mental Status: She is alert and oriented to person, place, and time. Mental status is at baseline. Cranial Nerves: No cranial nerve deficit. Motor: No weakness.    Psychiatric:         Mood and Affect: Mood normal.         Behavior: Behavior normal.         Vital Signs  ED Triage Vitals   Temperature Pulse Respirations Blood Pressure SpO2   11/15/23 0417 11/15/23 0414 11/15/23 0414 11/15/23 0414 11/15/23 0414   97.5 °F (36.4 °C) 69 20 159/72 99 %      Temp Source Heart Rate Source Patient Position - Orthostatic VS BP Location FiO2 (%)   11/15/23 0417 11/15/23 0414 11/15/23 0430 11/15/23 0414 --   Oral Monitor Lying Right arm       Pain Score       --                  Vitals:    11/15/23 0414 11/15/23 0430 11/15/23 0530   BP: 159/72 165/70 163/71   Pulse: 69 66 68   Patient Position - Orthostatic VS:  Lying          Visual Acuity  Visual Acuity      Flowsheet Row Most Recent Value   L Pupil Size (mm) 3   R Pupil Size (mm) 3            ED Medications  Medications   sodium chloride 0.9 % bolus 1,000 mL (0 mL Intravenous Stopped 11/15/23 0601)       Diagnostic Studies  Results Reviewed       Procedure Component Value Units Date/Time    CK [[id number]]  (Abnormal) Collected: 11/15/23 0421    Lab Status: Final result Specimen: Blood from Arm, Right Updated: 11/15/23 0446     Total CK 1,085 U/L     Comprehensive metabolic panel [[id number]]  (Abnormal) Collected: 11/15/23 0421    Lab Status: Final result Specimen: Blood from Arm, Right Updated: 11/15/23 0446     Sodium 138 mmol/L      Potassium 3.9 mmol/L      Chloride 102 mmol/L      CO2 25 mmol/L      ANION GAP 11 mmol/L      BUN 20 mg/dL      Creatinine 0.73 mg/dL      Glucose 87 mg/dL      Calcium 10.1 mg/dL      AST 69 U/L      ALT 25 U/L      Alkaline Phosphatase 71 U/L      Total Protein 7.2 g/dL      Albumin 4.1 g/dL      Total Bilirubin 0.93 mg/dL      eGFR 75 ml/min/1.73sq m     Narrative:      [place] guidelines for Chronic Kidney Disease (CKD):     Stage 1 with normal or high GFR (GFR > 90 mL/min/1.73 square meters)    Stage 2 Mild CKD (GFR = 60-89 mL/min/1.73 square meters)    Stage 3A Moderate CKD (GFR = 45-59 mL/min/1.73 square meters)    Stage 3B Moderate CKD (GFR = 30-44 mL/min/1.73 square meters)    Stage 4 Severe CKD (GFR = 15-29 mL/min/1.73 square meters)    Stage 5 End Stage CKD (GFR <15 mL/min/1.73 square meters)  Note: GFR calculation is accurate only with a steady state creatinine    CBC and differential [[id number]]  (Abnormal) Collected: 11/15/23 0421    Lab Status: Final result Specimen: Blood from Arm, Right Updated: 11/15/23 0427     WBC 9.69 Thousand/uL      RBC 4.66 Million/uL      Hemoglobin 14.7 g/dL      Hematocrit 44.4 %      MCV 95 fL      MCH 31.5 pg      MCHC 33.1 g/dL      RDW 15.9 %      MPV 11.1 fL      Platelets 775 Thousands/uL      nRBC 0 /100 WBCs      Neutrophils Relative 79 %      Immat GRANS % 1 %      Lymphocytes Relative 10 %      Monocytes Relative 8 %      Eosinophils Relative 1 %      Basophils Relative 1 %      Neutrophils Absolute 7.76 Thousands/µL      Immature Grans Absolute 0.05 Thousand/uL      Lymphocytes Absolute 0.92 Thousands/µL      Monocytes Absolute 0.80 Thousand/µL      Eosinophils Absolute 0.08 Thousand/µL      Basophils Absolute 0.08 Thousands/µL                    CT head without contrast   Final Result by Dylon Dorado MD (11/15 0601)      No acute intracranial abnormality. Workstation performed: [id number]         CT spine cervical without contrast   Final Result by Dylon Dorado MD (82/96 [id number])      No cervical spine fracture or traumatic malalignment. Minimal compression deformity of T1 superior endplate, age-indeterminate but new since chest CTA on 6/24/2021. Correlate clinically. Stable dominant right thyroid nodule. The study was marked in [facility] for immediate notification. Workstation performed: [id number]                    Procedures  Procedures         ED Course  ED Course as of 11/15/23 0623   Wed Nov 15, 2023   0430 CBC and differential(!)  Hemoglobin and hematocrit within normal limits. White blood cell within normal limits. No signs of acute blood loss anemia or infection. 0448 Total CK(!): 1,085  Elevated consistent with element of rhabdomyolysis. 0448 Comprehensive metabolic panel(!)  No actionable derangements.   Minimal elevation in AST.                               SBIRT 22yo+      Flowsheet Row Most Recent Value   Initial Alcohol Screen: US AUDIT-C     1. How often do you have a drink containing alcohol? 0 Filed at: 11/15/2023 0428   2. How many drinks containing alcohol do you have on a typical day you are drinking? 0 Filed at: 11/15/2023 0428   3b. FEMALE Any Age, or MALE 65+: How often do you have 4 or more drinks on one occassion? 0 Filed at: 11/15/2023 0428   Audit-C Score 0 Filed at: 11/15/2023 6558   KEYANA: How many times in the past year have you. .. Used an illegal drug or used a prescription medication for non-medical reasons? Never Filed at: 11/15/2023 0428                      Medical Decision Making  18-year-old female presenting to ED today after fall. Given that she is been on the ground for a long period of time concern for rhabdomyolysis. Could also be an KAYDEN secondary to dehydration given the fact that she has been unable to eat or drink for the last day or so. Given her nature of the fall, with head strike on Plavix concern for intracranial hemorrhage or skull fracture. We will do a CT head and CT C-spine without contrast to evaluate. We will give patient normal saline bolus as of now as well. I discussed the case with Hospitalist on call. Patient to be admitted to their service for further management. Amount and/or Complexity of Data Reviewed  Labs: ordered. Decision-making details documented in ED Course. Radiology: ordered. Risk  Decision regarding hospitalization. Disposition  Final diagnoses:   Rhabdomyolysis   Fall, initial encounter   Closed T1 fracture [facility])     Time reflects when diagnosis was documented in both MDM as applicable and the Disposition within this note       Time User Action Codes Description Comment    11/15/2023  4:49 AM Nilton Varela Add [M62.82] Rhabdomyolysis     11/15/2023  4:49 AM Nilton Varela Add Kamilla. OQWZ] SGKJ, initial encounter     11/15/2023  6:21 AM Clary Hinson Pritesh Add [X24.892D] Closed T1 fracture [facility])           ED Disposition       ED Disposition   Admit    Condition   Stable    Date/Time   Wed Nov 15, 2023 0621    Comment   Case was discussed with MARTELL and the patient's admission status was agreed to be Admission Status: inpatient status to the service of [facility] . Follow-up Information    None         Patient's Medications   Discharge Prescriptions    No medications on file       No discharge procedures on file.     PDMP Review       None            ED Provider  Electronically Signed by             Saba Eisenberg MD  11/15/23 0694

## 2023-11-15 NOTE — H&P
[address] pMDsoft  H&P  Name: Kathy Lantigua 78 y.o. female I MRN: [id number]  Unit/Bed#: [address] [place] [address] I Date of Admission: 11/15/2023   Date of Service: 11/15/2023 I Hospital Day: 0      Assessment/Plan   * Syncope, cardiogenic  Assessment & Plan  While reaching to get her gown she fell backwards and next thing she remembers is being on hallway  Unable to tell if she had syncopayl episode but does nto remember what happened  Admits to having previous episodes of falling where she describes it as her muscles giving out on her but unable to further clarify. States she denies having syncopy but does not remember what happened and how she got to hallway as she lives alone.     CT head and cervical spine negative for acute findings    Denies any prodromal symptoms  Telemetry  Obtain echo  Orthostatic vitals positive  Give 1 L bolus and NS @ 100ml/hr for 24 hours  History of SVT follows with Dr. Ricki Drummond at the time was advised to switch Bystolic to an alternate betablocker but patient did not want any changes   Consult cardio  Check TSH    Admits to having low back pain, will obtain thoracolumbar xrays    SVT (supraventricular tachycardia)  Assessment & Plan  Patient follows with Dr. Ricki Drummond  - At the time had episodes of palpitations lasting for several seconds.    -At the time it was recommended to switch to an alternate beta blocker from [facility] but patient did not want any medication changes at the time  -Continue Bystolic for now  -Telemetry    Non-traumatic rhabdomyolysis  Assessment & Plan  Patient states that she was laying on the ground for about 24 hours as she was unable to get to her phone  She states that she finally was able to reach her phone and dial 911  CK elevated in the ER 1085  Cr 0.7  Given 1L bolus and place on NS @ 100ml/hr for 24 hours  F/U AM Ck      Recurrent major depressive disorder, in remission [facility])  Assessment & Plan  Resume home duloxetine 60 mg daily    Acquired hypothyroidism  Assessment & Plan  Continue levothyroxine    History of transient ischemic attack (TIA)  Assessment & Plan  Continue statin and plavix    Dyslipidemia  Assessment & Plan  Continue statin    Essential hypertension  Assessment & Plan  Resume home amlodipine and nebivolol            VTE Pharmacologic Prophylaxis: VTE Score: 3 Moderate Risk (Score 3-4) - Pharmacological DVT Prophylaxis Ordered: enoxaparin (Lovenox). Code Status: Level 3 - DNAR and DNI   Discussion with family: Updated  (daughter) via phone. Anticipated Length of Stay: Patient will be admitted on an inpatient basis with an anticipated length of stay of greater than 2 midnights secondary to syncopy. Total Time Spent on Date of Encounter in care of patient: 50 mins. This time was spent on one or more of the following: performing physical exam; counseling and coordination of care; obtaining or reviewing history; documenting in the medical record; reviewing/ordering tests, medications or procedures; communicating with other healthcare professionals and discussing with patient's family/caregivers. Chief Complaint: Fall at home    History of Present Illness:  Ivet Mueller is a 78 y.o. female with a PMH of SVT, TIA, hypertension, hyperlipidemia, hypothyroidism, who presents from home after sustaining a fall. Patient states that she was reaching forward to grab her gown and the next thing she remembers is waking up in the hallway. Patient states that she has had multiple episodes in the past where " my muscles gave out on me". When asked if she fainted she states that she does not know. She admits to hitting the back of her head and having mild pain in her low back. .  Patient states she has been laying on the ground for the last 24 hours until she managed to pull herself up to grab the phone and call 911.   Patient states that she lives alone and is interested in talking to case management regarding outpatient resources. Upon arrival to the ED CT head and cervical spine were negative for acute findings. Lab work was noted for elevated CK of 1085. Patient admitted for IV hydration and monitoring for any possible arrhythmias. Review of Systems:  Review of Systems   Constitutional:  Negative for chills and fever. HENT:  Negative for ear pain and sore throat. Eyes:  Negative for pain and visual disturbance. Respiratory:  Negative for cough and shortness of breath. Cardiovascular:  Negative for chest pain and palpitations. Gastrointestinal:  Negative for abdominal pain and vomiting. Genitourinary:  Negative for dysuria and hematuria. Musculoskeletal:  Positive for back pain and myalgias. Negative for arthralgias. Skin:  Negative for color change and rash. Neurological:  Positive for syncope. Negative for seizures. All other systems reviewed and are negative. Past Medical and Surgical History:   Past Medical History:   Diagnosis Date    Anxiety     Arthritis     r knee and shoulders    Blind left eye     Deaf, left     Depression     Diabetes mellitus ([address])     Disease of thyroid gland     DVT complicating pregnancy, unspecified trimester ([address]) postpartum    Hearing loss     LEFT EAR    History of shingles 2007    fACIAL     Hyperlipidemia     Hypertension     Liver disease     Lyme disease     Meniere's disease     Obesity     Orthostatic hypotension     Psychiatric problem     Sinus congestion     Sleep apnea     Stroke [facility])        Past Surgical History:   Procedure Laterality Date    APPENDECTOMY      BREAST BIOPSY Left 2010     SECTION      x2    DXA PROCEDURE (HISTORICAL)  10/28/2020    EYE SURGERY      HAND SURGERY Left     HERNIA REPAIR      HYSTERECTOMY      ROTATOR CUFF REPAIR Left     TONSILLECTOMY      TYMPANOSTOMY TUBE PLACEMENT         Meds/Allergies:  Prior to Admission medications    Medication Sig Start Date End Date Taking?  Authorizing Provider   amLODIPine (NORVASC) 2.5 mg tablet Take 1 tablet (2.5 mg total) by mouth daily 5/31/23   Bladimir Caraballo MD   atorvastatin (LIPITOR) 80 mg tablet TAKE 1 TABLET BY MOUTH DAILY AT BEDTIME 11/14/23   Bladimir Caraballo MD   Calcium Carbonate-Vit D-Min (CALCIUM 1200 PO) Take 1,200 mg by mouth daily    Historical Provider, MD   cetirizine (ZyrTEC) 10 mg tablet Take 10 mg by mouth daily    Historical Provider, MD   chlorhexidine (PERIDEX) 0.12 % solution RINSE FOR 30 SECONDS WITH 1/2OZ AFTER BREAKFAST AND AT BEDTIME.  STARTING 2 DAYS PRIOR TO VISIT AND 1 HOUR PRIOR TO APPOINTENT 5/16/23   Historical Provider, MD   Cholecalciferol (D3-1000 PO) Take 2,000 Units by mouth daily      Historical Provider, MD   clobetasol (TEMOVATE) 0.05 % cream Apply 1 application topically as needed To affected area 2/25/20   Historical Provider, MD   clonazePAM (KlonoPIN) 0.5 mg tablet TAKE 1 TABLET(0.5 MG) BY MOUTH DAILY AT BEDTIME AS NEEDED FOR SEIZURES 11/10/23   Bladimir Caraballo MD   clopidogrel (PLAVIX) 75 mg tablet TAKE 1 TABLET BY MOUTH EVERY DAY 8/7/23   Bladimir Caraballo MD   DULoxetine (CYMBALTA) 60 mg delayed release capsule TAKE 1 CAPSULE(60 MG) BY MOUTH DAILY 7/23/23   Bladimir Caraballo MD   levothyroxine 88 mcg tablet TAKE 1 TABLET(88 MCG) BY MOUTH DAILY 8/18/23   Bladimir Caraballo MD   mometasone (NASONEX) 50 mcg/act nasal spray 1 spray into each nostril daily      Historical Provider, MD   nebivolol (BYSTOLIC) 5 mg tablet TAKE 1 TABLET BY MOUTH EVERY EVENING 8/8/23   Bladimir Caraballo MD   pantoprazole (PROTONIX) 40 mg tablet TAKE 1 TABLET(40 MG) BY MOUTH EVERY MORNING 11/14/23   Bladimir Caraballo MD   potassium chloride (K-DUR,KLOR-CON) 20 mEq tablet Take 20 mEq by mouth once a week  11/20/19   Historical Provider, MD   primidone (MYSOLINE) 50 mg tablet TAKE 1 TABLET(50 MG) BY MOUTH DAILY 11/13/23   Bladimir Caraballo MD   triamcinolone (KENALOG) 0.5 % cream Apply topically 2 (two) times a day 10/12/22   Constance Anne MD     I have reviewed home medications with patient personally. Allergies: Allergies   Allergen Reactions    Penicillins Swelling       Social History:  Marital Status:    Occupation: retired  Patient Pre-hospital Living Situation: Home  Patient Pre-hospital Level of Mobility: unable to be assessed at time of evaluation  Patient Pre-hospital Diet Restrictions: diabetic  Substance Use History:   Social History     Substance and Sexual Activity   Alcohol Use Never     Social History     Tobacco Use   Smoking Status Former    Packs/day: 0.75    Years: 44.00    Total pack years: 33.00    Types: Cigarettes    Quit date: 1990    Years since quittin.8    Passive exposure: Past   Smokeless Tobacco Never     Social History     Substance and Sexual Activity   Drug Use Never       Family History:  Family History   Problem Relation Age of Onset    Depression Mother     Hypertension Mother     Obesity Mother     Depression Father     Alcohol abuse Father     Stroke Father     Breast cancer Sister 76    Ovarian cancer Daughter 48    BRCA1 Negative Daughter     BRCA2 Negative Daughter        Physical Exam:     Vitals:   Blood Pressure: 111/54 (11/15/23 1335)  Pulse: 77 (11/15/23 1335)  Temperature: 97.8 °F (36.6 °C) (11/15/23 1144)  Temp Source: Axillary (11/15/23 1144)  Respirations: 18 (11/15/23 1144)  Height: 5' 6" (167.6 cm) (11/15/23 1335)  Weight - Scale: 75.3 kg (166 lb) (11/15/23 1335)  SpO2: 92 % (11/15/23 1230)    Physical Exam  Vitals and nursing note reviewed. Constitutional:       General: She is not in acute distress. Appearance: She is well-developed. HENT:      Head: Normocephalic and atraumatic. Eyes:      Conjunctiva/sclera: Conjunctivae normal.   Cardiovascular:      Rate and Rhythm: Normal rate and regular rhythm. Heart sounds: No murmur heard. Pulmonary:      Effort: Pulmonary effort is normal. No respiratory distress. Breath sounds: Normal breath sounds.    Abdominal:      Palpations: Abdomen is soft. Tenderness: There is no abdominal tenderness. Musculoskeletal:         General: No swelling. Cervical back: Neck supple. Skin:     General: Skin is warm and dry. Capillary Refill: Capillary refill takes less than 2 seconds. Neurological:      Mental Status: She is alert. Psychiatric:         Mood and Affect: Mood normal.          Additional Data:     Lab Results:  Results from last 7 days   Lab Units 11/15/23  0421   WBC Thousand/uL 9.69   HEMOGLOBIN g/dL 14.7   HEMATOCRIT % 44.4   PLATELETS Thousands/uL 192   NEUTROS PCT % 79*   LYMPHS PCT % 10*   MONOS PCT % 8   EOS PCT % 1     Results from last 7 days   Lab Units 11/15/23  0421   SODIUM mmol/L 138   POTASSIUM mmol/L 3.9   CHLORIDE mmol/L 102   CO2 mmol/L 25   BUN mg/dL 20   CREATININE mg/dL 0.73   ANION GAP mmol/L 11   CALCIUM mg/dL 10.1   ALBUMIN g/dL 4.1   TOTAL BILIRUBIN mg/dL 0.93   ALK PHOS U/L 71   ALT U/L 25   AST U/L 69*   GLUCOSE RANDOM mg/dL 87                       Lines/Drains:  Invasive Devices       Peripheral Intravenous Line  Duration             Peripheral IV 11/15/23 Right Antecubital <1 day                        Imaging: Reviewed radiology reports from this admission including: CT head  CT head without contrast   Final Result by Joyce Chauhan MD (11/15 0601)      No acute intracranial abnormality. Workstation performed: [id number]         CT spine cervical without contrast   Final Result by Joyce Chauhan MD (27/34 1075)      No cervical spine fracture or traumatic malalignment. Minimal compression deformity of T1 superior endplate, age-indeterminate but new since chest CTA on 6/24/2021. Correlate clinically. Stable dominant right thyroid nodule. The study was marked in [facility] for immediate notification.             Workstation performed: [id number]         XR spine thoracolumbar 2 vw    (Results Pending)       EKG and Other Studies Reviewed on Admission:   EKG:  Ordered, pending. ** Please Note: This note has been constructed using a voice recognition system.  **

## 2023-11-15 NOTE — ASSESSMENT & PLAN NOTE
Patient states that she was laying on the ground for about 24 hours as she was unable to get to her phone  She states that she finally was able to reach her phone and dial 911  CK elevated in the ER 1085  Cr 0.7  Given 1L bolus and place on NS @ 100ml/hr for 24 hours  F/U AM Ck

## 2023-11-15 NOTE — OCCUPATIONAL THERAPY NOTE
Occupational Therapy Cancellation     Patient Name: Eugene Hopkins  CDTTT'F Date: 11/15/2023  Problem List  Principal Problem:    Syncope, cardiogenic  Active Problems:    Non-traumatic rhabdomyolysis    Past Medical History  Past Medical History:   Diagnosis Date    Anxiety     Arthritis     r knee and shoulders    Blind left eye     Deaf, left     Depression     Diabetes mellitus ([address])     Disease of thyroid gland     DVT complicating pregnancy, unspecified trimester ([address]) postpartum    Hearing loss     LEFT EAR    History of shingles 2007    fACIAL     Hyperlipidemia     Hypertension     Liver disease     Lyme disease     Meniere's disease     Obesity     Orthostatic hypotension     Psychiatric problem     Sinus congestion     Sleep apnea     Stroke [facility])      Past Surgical History  Past Surgical History:   Procedure Laterality Date    APPENDECTOMY      BREAST BIOPSY Left 2010     SECTION      x2    DXA PROCEDURE (HISTORICAL)  10/28/2020    EYE SURGERY      HAND SURGERY Left     HERNIA REPAIR      HYSTERECTOMY      ROTATOR CUFF REPAIR Left     TONSILLECTOMY      TYMPANOSTOMY TUBE PLACEMENT          11/15/23 1336   Note Type   Note type Cancelled Session  (Wed 11/15/23)   Cancel Reasons Other  (ECHO at bedside)   Additional Comments OT orders received and chart review completed. Attempted to see pt for OT eval. ECHO at bedside.  Will cancel and continue to follow as appropriate / schedule allows     Stacie Rodriguez OTR/L  [id number]  [id number]

## 2023-11-16 ENCOUNTER — APPOINTMENT (INPATIENT)
Dept: RADIOLOGY | Facility: HOSPITAL | Age: 83
DRG: 312 | End: 2023-11-16
Payer: MEDICARE

## 2023-11-16 PROBLEM — M62.82 NON-TRAUMATIC RHABDOMYOLYSIS: Status: RESOLVED | Noted: 2023-11-15 | Resolved: 2023-11-16

## 2023-11-16 PROBLEM — N39.490 OVERFLOW INCONTINENCE OF URINE: Status: ACTIVE | Noted: 2023-11-16

## 2023-11-16 LAB
ALBUMIN SERPL BCP-MCNC: 3.4 G/DL (ref 3.5–5)
ANION GAP SERPL CALCULATED.3IONS-SCNC: 6 MMOL/L
ATRIAL RATE: 78 BPM
BUN SERPL-MCNC: 17 MG/DL (ref 5–25)
CALCIUM ALBUM COR SERPL-MCNC: 9.7 MG/DL (ref 8.3–10.1)
CALCIUM SERPL-MCNC: 9.2 MG/DL (ref 8.4–10.2)
CHLORIDE SERPL-SCNC: 108 MMOL/L (ref 96–108)
CK SERPL-CCNC: 310 U/L (ref 26–192)
CO2 SERPL-SCNC: 26 MMOL/L (ref 21–32)
CREAT SERPL-MCNC: 0.66 MG/DL (ref 0.6–1.3)
ERYTHROCYTE [DISTWIDTH] IN BLOOD BY AUTOMATED COUNT: 15.9 % (ref 11.6–15.1)
GFR SERPL CREATININE-BSD FRML MDRD: 80 ML/MIN/1.73SQ M
GLUCOSE SERPL-MCNC: 82 MG/DL (ref 65–140)
HCT VFR BLD AUTO: 37.6 % (ref 34.8–46.1)
HGB BLD-MCNC: 11.8 G/DL (ref 11.5–15.4)
MAGNESIUM SERPL-MCNC: 1.5 MG/DL (ref 1.9–2.7)
MCH RBC QN AUTO: 30.3 PG (ref 26.8–34.3)
MCHC RBC AUTO-ENTMCNC: 31.4 G/DL (ref 31.4–37.4)
MCV RBC AUTO: 97 FL (ref 82–98)
P AXIS: 62 DEGREES
PHOSPHATE SERPL-MCNC: 2.7 MG/DL (ref 2.3–4.1)
PLATELET # BLD AUTO: 147 THOUSANDS/UL (ref 149–390)
PMV BLD AUTO: 10.8 FL (ref 8.9–12.7)
POTASSIUM SERPL-SCNC: 3.8 MMOL/L (ref 3.5–5.3)
PR INTERVAL: 126 MS
QRS AXIS: 58 DEGREES
QRSD INTERVAL: 86 MS
QT INTERVAL: 406 MS
QTC INTERVAL: 459 MS
RBC # BLD AUTO: 3.89 MILLION/UL (ref 3.81–5.12)
SODIUM SERPL-SCNC: 140 MMOL/L (ref 135–147)
T WAVE AXIS: 65 DEGREES
VENTRICULAR RATE: 77 BPM
WBC # BLD AUTO: 6.69 THOUSAND/UL (ref 4.31–10.16)

## 2023-11-16 PROCEDURE — 93010 ELECTROCARDIOGRAM REPORT: CPT | Performed by: INTERNAL MEDICINE

## 2023-11-16 PROCEDURE — 83735 ASSAY OF MAGNESIUM: CPT | Performed by: STUDENT IN AN ORGANIZED HEALTH CARE EDUCATION/TRAINING PROGRAM

## 2023-11-16 PROCEDURE — 97163 PT EVAL HIGH COMPLEX 45 MIN: CPT

## 2023-11-16 PROCEDURE — 99232 SBSQ HOSP IP/OBS MODERATE 35: CPT | Performed by: STUDENT IN AN ORGANIZED HEALTH CARE EDUCATION/TRAINING PROGRAM

## 2023-11-16 PROCEDURE — 97167 OT EVAL HIGH COMPLEX 60 MIN: CPT

## 2023-11-16 PROCEDURE — 99232 SBSQ HOSP IP/OBS MODERATE 35: CPT | Performed by: INTERNAL MEDICINE

## 2023-11-16 PROCEDURE — 82550 ASSAY OF CK (CPK): CPT | Performed by: STUDENT IN AN ORGANIZED HEALTH CARE EDUCATION/TRAINING PROGRAM

## 2023-11-16 PROCEDURE — 85027 COMPLETE CBC AUTOMATED: CPT | Performed by: STUDENT IN AN ORGANIZED HEALTH CARE EDUCATION/TRAINING PROGRAM

## 2023-11-16 PROCEDURE — 80069 RENAL FUNCTION PANEL: CPT | Performed by: STUDENT IN AN ORGANIZED HEALTH CARE EDUCATION/TRAINING PROGRAM

## 2023-11-16 PROCEDURE — 72080 X-RAY EXAM THORACOLMB 2/> VW: CPT

## 2023-11-16 PROCEDURE — 97110 THERAPEUTIC EXERCISES: CPT

## 2023-11-16 RX ORDER — MAGNESIUM SULFATE HEPTAHYDRATE 40 MG/ML
4 INJECTION, SOLUTION INTRAVENOUS ONCE
Status: COMPLETED | OUTPATIENT
Start: 2023-11-16 | End: 2023-11-16

## 2023-11-16 RX ORDER — MAGNESIUM SULFATE HEPTAHYDRATE 40 MG/ML
4 INJECTION, SOLUTION INTRAVENOUS ONCE
Status: DISCONTINUED | OUTPATIENT
Start: 2023-11-16 | End: 2023-11-16

## 2023-11-16 RX ORDER — TAMSULOSIN HYDROCHLORIDE 0.4 MG/1
0.4 CAPSULE ORAL
Status: DISCONTINUED | OUTPATIENT
Start: 2023-11-16 | End: 2023-11-18 | Stop reason: HOSPADM

## 2023-11-16 RX ORDER — METHOCARBAMOL 500 MG/1
750 TABLET, FILM COATED ORAL EVERY 8 HOURS SCHEDULED
Status: DISCONTINUED | OUTPATIENT
Start: 2023-11-16 | End: 2023-11-18 | Stop reason: HOSPADM

## 2023-11-16 RX ORDER — MIDODRINE HYDROCHLORIDE 5 MG/1
2.5 TABLET ORAL
Status: DISCONTINUED | OUTPATIENT
Start: 2023-11-16 | End: 2023-11-16

## 2023-11-16 RX ORDER — BISOPROLOL FUMARATE 5 MG/1
2.5 TABLET, FILM COATED ORAL DAILY
Status: DISCONTINUED | OUTPATIENT
Start: 2023-11-17 | End: 2023-11-18 | Stop reason: HOSPADM

## 2023-11-16 RX ORDER — GABAPENTIN 100 MG/1
100 CAPSULE ORAL 3 TIMES DAILY
Status: DISCONTINUED | OUTPATIENT
Start: 2023-11-16 | End: 2023-11-18 | Stop reason: HOSPADM

## 2023-11-16 RX ADMIN — POTASSIUM & SODIUM PHOSPHATES POWDER PACK 280-160-250 MG 2 PACKET: 280-160-250 PACK at 09:27

## 2023-11-16 RX ADMIN — DULOXETINE HYDROCHLORIDE 60 MG: 60 CAPSULE, DELAYED RELEASE ORAL at 09:14

## 2023-11-16 RX ADMIN — GABAPENTIN 100 MG: 100 CAPSULE ORAL at 10:31

## 2023-11-16 RX ADMIN — AMLODIPINE BESYLATE 2.5 MG: 2.5 TABLET ORAL at 09:14

## 2023-11-16 RX ADMIN — GABAPENTIN 100 MG: 100 CAPSULE ORAL at 22:38

## 2023-11-16 RX ADMIN — SODIUM CHLORIDE 125 ML/HR: 0.9 INJECTION, SOLUTION INTRAVENOUS at 04:44

## 2023-11-16 RX ADMIN — CLOPIDOGREL BISULFATE 75 MG: 75 TABLET ORAL at 09:14

## 2023-11-16 RX ADMIN — METHOCARBAMOL 750 MG: 500 TABLET ORAL at 13:43

## 2023-11-16 RX ADMIN — PANTOPRAZOLE SODIUM 40 MG: 40 TABLET, DELAYED RELEASE ORAL at 06:23

## 2023-11-16 RX ADMIN — TAMSULOSIN HYDROCHLORIDE 0.4 MG: 0.4 CAPSULE ORAL at 18:44

## 2023-11-16 RX ADMIN — ENOXAPARIN SODIUM 40 MG: 40 INJECTION SUBCUTANEOUS at 09:14

## 2023-11-16 RX ADMIN — Medication 2 TABLET: at 06:40

## 2023-11-16 RX ADMIN — ATORVASTATIN CALCIUM 80 MG: 80 TABLET, FILM COATED ORAL at 22:29

## 2023-11-16 RX ADMIN — ACETAMINOPHEN 650 MG: 325 TABLET ORAL at 22:29

## 2023-11-16 RX ADMIN — LEVOTHYROXINE SODIUM 88 MCG: 88 TABLET ORAL at 06:22

## 2023-11-16 RX ADMIN — GABAPENTIN 100 MG: 100 CAPSULE ORAL at 16:04

## 2023-11-16 RX ADMIN — PRIMIDONE 50 MG: 50 TABLET ORAL at 09:14

## 2023-11-16 RX ADMIN — MAGNESIUM SULFATE HEPTAHYDRATE 4 G: 40 INJECTION, SOLUTION INTRAVENOUS at 09:20

## 2023-11-16 RX ADMIN — METHOCARBAMOL 750 MG: 500 TABLET ORAL at 22:29

## 2023-11-16 RX ADMIN — ACETAMINOPHEN 650 MG: 325 TABLET ORAL at 06:23

## 2023-11-16 RX ADMIN — Medication 2000 UNITS: at 09:14

## 2023-11-16 RX ADMIN — ACETAMINOPHEN 650 MG: 325 TABLET ORAL at 13:43

## 2023-11-16 RX ADMIN — METHOCARBAMOL 500 MG: 500 TABLET ORAL at 06:22

## 2023-11-16 NOTE — PROGRESS NOTES
Progress Note - Urology      Patient: Tona Torrez   : 1940 Sex: female   MRN: [id number]     CSN: [id number]  Unit/Bed#: [address] [place] [address]     SUBJECTIVE:   Starting tamsulosin for hypocontractile bladder has not started yet      Objective   Vitals: /55 (BP Location: Right arm)   Pulse 77   Temp 98 °F (36.7 °C) (Oral)   Resp 16   Ht 5' 6" (1.676 m)   Wt 77.8 kg (171 lb 9.6 oz)   SpO2 95%   BMI 27.70 kg/m²     I/O last 24 hours:   In: -   Out: [address] [Urine:1550]      Physical Exam:   General Alert awake   Normocephalic atraumatic PERRLA  Lungs clear bilaterally  Cardiac normal S1 normal S2  Abdomen soft, flank pain  Extremities no edema      Lab Results: CBC:   Lab Results   Component Value Date    WBC 6.69 2023    HGB 11.8 2023    HCT 37.6 2023    MCV 97 2023     (L) 2023    ADJUSTEDWBC 10.60 2016    RBC 3.89 2023    MCH 30.3 2023    MCHC 31.4 2023    RDW 15.9 (H) 2023    MPV 10.8 2023    NRBC 0 11/15/2023     CMP:   Lab Results   Component Value Date     2023    CO2 26 2023    BUN 17 2023    CREATININE 0.66 2023    CALCIUM 9.2 2023    AST 69 (H) 11/15/2023    ALT 25 11/15/2023    ALKPHOS 71 11/15/2023    EGFR 80 2023     Urinalysis:   Lab Results   Component Value Date    COLORU Dk Yellow 11/15/2023    CLARITYU Clear 11/15/2023    SPECGRAV 1.025 11/15/2023    PHUR 5.5 11/15/2023    PHUR 5.5 2018    LEUKOCYTESUR Trace (A) 11/15/2023    NITRITE Negative 11/15/2023    GLUCOSEU Negative 11/15/2023    KETONESU 15 (1+) (A) 11/15/2023    BILIRUBINUR Small (A) 11/15/2023    BLOODU Negative 11/15/2023     Urine Culture:   Lab Results   Component Value Date    URINECX >100,000 cfu/ml 10/19/2021     PSA: No results found for: "PSA"      Assessment/ Plan:  Continue Bernard Dumont MD

## 2023-11-16 NOTE — ASSESSMENT & PLAN NOTE
Patient follows with Dr. Lm Mendoza  - At the time had episodes of palpitations lasting for several seconds, pt was recommended to switch to an alternate beta blocker from Bystolic but patient did not want any medication changes at the time  Cardiology consulted  Amlodipine and bystolic discontinued  Pt started on bisoprolol 2.5 mg daily

## 2023-11-16 NOTE — WOUND OSTOMY CARE
Progress Note - Wound   Fantasma Adrian 78 y.o. female MRN: [id number]  Unit/Bed#: [address] [place] [address] Encounter: [id number]      Assessment:   The patient was unable to be seen today for wound visit due to having an echocardiogram done as per primary RN. Assessment Findings:  1-As per photo taken on admission, patient has intertriginous dermatitis to right groin. Order for Interdry already in place and is an appropriate dressing. This was discussed with primary RN. Plan:   Skin care plans:  1-Cleanse skin folds to bilateral groin daily with foaming cleanser & pat dry. Apply Interdry to right groin fold - may reuse - do not discard - please wash Interdry with soap and water and hang to dry. Do not mix with powders or lotions as this will inactivate the silver impregnated in the product. 2-Hydraguard to bilateral sacrum, buttock and heels BID and PRN  3-Float heels on 2 pillows to offload pressure so heels are not in contact with mattress or pillows. 4-Ehob pressure redistribution cushion in chair when out of bed. Limit prolonged sitting. 5-Moisturize skin daily with skin nourishing cream.  6-Turn/reposition q2h or when medically stable for pressure re-distribution on skin. Wound 11/15/23 Other (comment) Groin Anterior;Right (Active)   Wound Image    11/15/23 0995     Discussed assessment findings, and plan of care/recommendations with Maddison Mcintyre RN. Wound care will follow along with patient throughout admission, please call or tiger text with questions and concerns. Recommendations written as orders.   Elizabeth DOLL, RN, Jerod Garza

## 2023-11-16 NOTE — ASSESSMENT & PLAN NOTE
Patient states that she was laying on the ground for about 24 hours as she was unable to get to her phone  She states that she finally was able to reach her phone and dial 911  CK elevated in the ER 1085  Cr 0.7  Given 1L bolus and place on NS @ 100ml/hr for 24 hours  CK down to 310

## 2023-11-16 NOTE — PLAN OF CARE
Problem: Potential for Falls  Goal: Patient will remain free of falls  Description: INTERVENTIONS:  - Educate patient/family on patient safety including physical limitations  - Instruct patient to call for assistance with activity   - Consult OT/PT to assist with strengthening/mobility   - Keep Call bell within reach  - Keep bed low and locked with side rails adjusted as appropriate  - Keep care items and personal belongings within reach  - Initiate and maintain comfort rounds  - Make Fall Risk Sign visible to staff  - Offer Toileting every  Hours, in advance of need  - Initiate/Maintain alarm  - Obtain necessary fall risk management equipment:   - Apply yellow socks and bracelet for high fall risk patients  - Consider moving patient to room near nurses station  Outcome: Progressing     Problem: PAIN - ADULT  Goal: Verbalizes/displays adequate comfort level or baseline comfort level  Description: Interventions:  - Encourage patient to monitor pain and request assistance  - Assess pain using appropriate pain scale  - Administer analgesics based on type and severity of pain and evaluate response  - Implement non-pharmacological measures as appropriate and evaluate response  - Consider cultural and social influences on pain and pain management  - Notify physician/advanced practitioner if interventions unsuccessful or patient reports new pain  Outcome: Progressing     Problem: INFECTION - ADULT  Goal: Absence or prevention of progression during hospitalization  Description: INTERVENTIONS:  - Assess and monitor for signs and symptoms of infection  - Monitor lab/diagnostic results  - Monitor all insertion sites, i.e. indwelling lines, tubes, and drains  - Monitor endotracheal if appropriate and nasal secretions for changes in amount and color  - Grimes appropriate cooling/warming therapies per order  - Administer medications as ordered  - Instruct and encourage patient and family to use good hand hygiene technique  - Identify and instruct in appropriate isolation precautions for identified infection/condition  Outcome: Progressing     Problem: SAFETY ADULT  Goal: Patient will remain free of falls  Description: INTERVENTIONS:  - Educate patient/family on patient safety including physical limitations  - Instruct patient to call for assistance with activity   - Consult OT/PT to assist with strengthening/mobility   - Keep Call bell within reach  - Keep bed low and locked with side rails adjusted as appropriate  - Keep care items and personal belongings within reach  - Initiate and maintain comfort rounds  - Make Fall Risk Sign visible to staff  - Offer Toileting every  Hours, in advance of need  - Initiate/Maintain alarm  - Obtain necessary fall risk management equipment:   - Apply yellow socks and bracelet for high fall risk patients  - Consider moving patient to room near nurses station  Outcome: Progressing  Goal: Maintain or return to baseline ADL function  Description: INTERVENTIONS:  -  Assess patient's ability to carry out ADLs; assess patient's baseline for ADL function and identify physical deficits which impact ability to perform ADLs (bathing, care of mouth/teeth, toileting, grooming, dressing, etc.)  - Assess/evaluate cause of self-care deficits   - Assess range of motion  - Assess patient's mobility; develop plan if impaired  - Assess patient's need for assistive devices and provide as appropriate  - Encourage maximum independence but intervene and supervise when necessary  - Involve family in performance of ADLs  - Assess for home care needs following discharge   - Consider OT consult to assist with ADL evaluation and planning for discharge  - Provide patient education as appropriate  Outcome: Progressing     Problem: DISCHARGE PLANNING  Goal: Discharge to home or other facility with appropriate resources  Description: INTERVENTIONS:  - Identify barriers to discharge w/patient and caregiver  - Arrange for needed discharge resources and transportation as appropriate  - Identify discharge learning needs (meds, wound care, etc.)  - Arrange for interpretive services to assist at discharge as needed  - Refer to Case Management Department for coordinating discharge planning if the patient needs post-hospital services based on physician/advanced practitioner order or complex needs related to functional status, cognitive ability, or social support system  Outcome: Progressing     Problem: NEUROSENSORY - ADULT  Goal: Achieves stable or improved neurological status  Description: INTERVENTIONS  - Monitor and report changes in neurological status  - Monitor vital signs such as temperature, blood pressure, glucose, and any other labs ordered   - Initiate measures to prevent increased intracranial pressure  - Monitor for seizure activity and implement precautions if appropriate      Outcome: Progressing     Problem: CARDIOVASCULAR - ADULT  Goal: Maintains optimal cardiac output and hemodynamic stability  Description: INTERVENTIONS:  - Monitor I/O, vital signs and rhythm  - Monitor for S/S and trends of decreased cardiac output  - Administer and titrate ordered vasoactive medications to optimize hemodynamic stability  - Assess quality of pulses, skin color and temperature  - Assess for signs of decreased coronary artery perfusion  - Instruct patient to report change in severity of symptoms  Outcome: Progressing  Goal: Absence of cardiac dysrhythmias or at baseline rhythm  Description: INTERVENTIONS:  - Continuous cardiac monitoring, vital signs, obtain 12 lead EKG if ordered  - Administer antiarrhythmic and heart rate control medications as ordered  - Monitor electrolytes and administer replacement therapy as ordered  Outcome: Progressing

## 2023-11-16 NOTE — OCCUPATIONAL THERAPY NOTE
Occupational Therapy Evaluation     Patient Name: Jesus Hancock  HSMQH'[id number] Date: 2023  Problem List  Principal Problem:    Syncope, cardiogenic  Active Problems:    Essential hypertension    Dyslipidemia    History of transient ischemic attack (TIA)    Acquired hypothyroidism    SVT (supraventricular tachycardia)    Recurrent major depressive disorder, in remission ([address])    Non-traumatic rhabdomyolysis    Past Medical History  Past Medical History:   Diagnosis Date    Anxiety     Arthritis     r knee and shoulders    Blind left eye     Deaf, left     Depression     Diabetes mellitus ([address])     Disease of thyroid gland     DVT complicating pregnancy, unspecified trimester ([address]) postpartum    Hearing loss     LEFT EAR    History of shingles 2007    fACIAL     Hyperlipidemia     Hypertension     Liver disease     Lyme disease     Meniere's disease     Obesity     Orthostatic hypotension     Psychiatric problem     Sinus congestion     Sleep apnea     Stroke [facility])      Past Surgical History  Past Surgical History:   Procedure Laterality Date    APPENDECTOMY      BREAST BIOPSY Left 2010     SECTION      x2    DXA PROCEDURE (HISTORICAL)  10/28/2020    EYE SURGERY      HAND SURGERY Left     HERNIA REPAIR      HYSTERECTOMY      ROTATOR CUFF REPAIR Left     TONSILLECTOMY      TYMPANOSTOMY TUBE PLACEMENT           23 1033   OT Last Visit   OT Visit Date 23  (Thursday)   Note Type   Note type Evaluation   Pain Assessment   Pain Assessment Tool 0-10   Pain Score No Pain   Restrictions/Precautions   Weight Bearing Precautions Per Order No   Braces or Orthoses   (none ordered)   Other Precautions Chair Alarm; Bed Alarm;Telemetry;Multiple lines; Fall Risk;O2;Pain;Hard of hearing  (unable to hear L ear; Rob[place])   Home Living   Type of [address] [place] [address]  (1st floor apt w/ 0 JING)   Home Layout One level;Performs ADLs on one level; Able to live on main level with bedroom/bathroom   Bathroom Shower/Tub Tub/shower unit   Bathroom Toilet Standard   Bathroom Equipment Other (Comment)  (no DME)   [address]; Other (Comment)  (uses cane outside apt)   Additional Comments Pt reports living alone in 1st floor apt   Prior Function   Level of [place] Independent with ADLs; Independent with functional mobility; Needs assistance with IADLS;Other (Comment)  (+ drive, cleaning service, orders groceries /food online)   Lives With Alone   Receives Help From Family; Other (Comment)  (cleaning service)   IADLs Independent with driving; Independent with meal prep; Independent with medication management  (orders groceries / food online, has laundry service)   Falls in the last 6 months 1 to 4  (pt reports 3)   Vocational Retired   Comments Pt reports I w/ ADLs w/ out use of AD w/ in apr. Uses cane in community. + drive and has cleaning /laundry service. Lifestyle   Autonomy Pt reports I w/ ADLs home alone in 1st floor apt   Reciprocal Relationships Pt reports living alone. Supportive family. 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 Intrinsic Gratification Pt reports enjoying reading / Audible   General   Additional Pertinent History Pt admitted from home following a fall w/ prolonged downtime. Diagnosed w/ syncope, cardiogenic; non-traumatic rhabdomyolysis; CT cervical spine impression:No cervical spine fracture or traumatic malalignment. Minimal compression deformity of T1 superior endplate, age-indeterminate but new since chest CTA on 6/24/2021   Family/Caregiver Present No   Additional General Comments Significant PMH impacting her occupational performance includes anxiety/depression, chronic hypoxemic respiratory failure, DM II, hx TIA, osteoarthritis R knee, SVT, deaf L ear, L rotator cuff repair, HTN.  Personal and environmental factors impacting performance includes lives alone, advanced age, inability to complete IADLs, difficulty managing stairs, limited support / assist, fall history; supports / strengths include first floor set- up, independent at baseline, supportive family   Subjective   Subjective "This is the first time my 2 conditions almost met" "You need to speak into my right ear, I can't hear out of my left"   ADL   Where Assessed Other (Comment)  (commode vs standing in room)   Eating Assistance 7  Independent   Eating Deficit Setup   Grooming Assistance 6  Modified Independent  (seated on commode to comb hair and complete oral hygiene)   Grooming Deficit Setup; Increased time to complete   UB Bathing Assistance 5  Supervision/Setup  (due to + lines after set- up seated on commode)   UB Bathing Deficit Setup;Supervision/safety; Increased time to complete;Verbal cueing   LB Bathing Assistance 4  Minimal Assistance  (seated vs standing w/ + time after set- up)   LB Bathing Deficit Setup;Steadying; Increased time to complete;Supervision/safety;Verbal cueing   UB Dressing Assistance 5  Supervision/Setup   UB Dressing Deficit Setup;Verbal cueing; Increased time to complete  (due to multiple lines (IV, telemebetry, Robbin))   LB Dressing Assistance Unable to assess   Toileting Assistance  5  Supervision/Setup  (able to complete personal hygiene seated on commode after set- up)   Toileting Deficit Setup; Bedside commode   Bed Mobility   Supine to Sit Unable to assess   Sit to Supine Unable to assess   Additional Comments Pt seated on commode upon therapist arrival and OOB in w/c w/ GRAYSON, Spencer Smith / Chepe Cast leaving floor for x-ray   Transfers   Sit to Stand 5  Supervision   Additional items Assist x 1; Armrests; Verbal cues; Increased time required   Stand to Sit 5  Supervision   Additional items Assist x 1; Armrests; Increased time required;Verbal cues   Additional Comments Pt performed sit to stand from commode w/ S and cues for hand placement   Functional Mobility   Functional Mobility 4  Minimal assistance  (CG/ steadying)   Additional Comments engaged in functional mobility w/ min A (CG/steadying) w/ out use of AD approx 30'. Recommend trial using LRAD to improve stability, pace. Pt was unsteady   Additional items   (no AD)   Balance   Static Sitting Good   Static Standing Fair -   Ambulatory   (fair <> poor + w/ out use of AD)   Activity Tolerance   Activity Tolerance Patient tolerated treatment well   Medical Staff Made Aware spoke w/ PT, Eulogio Bullock and , [address] [place] [address]   Nurse Made Aware spoke w/ RN, Lauren Salgado and Junior CARPIO   RUCHAZ Assessment   RUE Assessment [facility]   RUE Strength   RUE Overall Strength Within Functional Limits - able to perform ADL tasks with strength  (observed during ADLs)   LUE Assessment   LUE Assessment WFL   LUE Strength   LUE Overall Strength Within Functional Limits - able to perform ADL tasks with strength  (observed during ADLs; hs L rotator cuff repair)   Hand Function   Gross Motor Coordination Functional   Fine Motor Coordination Functional   Vision-Basic Assessment   Current Vision Wears glasses all the time   Cognition   Overall Cognitive Status [facility]   Arousal/Participation Alert; Cooperative   Attention Attends with cues to redirect  (+ [place])   Orientation Level Oriented X4   Memory Decreased recall of recent events; Other (Comment)  (details, timeline events)   Following Commands Follows multistep commands with increased time or repetition   Comments Identified pt by full name and birthdate. Alert, generally oriented and able to follow directions during ADLs. Pleasant and cooperative. Motivated to participate and reported that it "feels good to be on her feet"   Assessment   Limitation Decreased ADL status; Decreased endurance;Decreased self-care trans;Decreased high-level ADLs  (impaired activity tolerance, standing tolerance, pain, forward functional reach, generalized weakness)   Assessment Pt is an 77yo female admitted to [facility] on 11/15/23 following a fall from home. Diagnosed w/ syncope, non - traumatic rhabdomyolysis, and Minimal compression deformity of T1 superior endplate. Pt reports living alone in 1st floor apt at baseline and I w/ ADLs w/ out use of AD inside. Pt reports use of cane in community. Upon eval, pt alert and generally oriented. Able to follow directions w/ + time due to L ear hard of hearing. Pt required mod I to complete grooming seated after set- up, S UB bathing seated, min A LB bathing, S UBD, S toileting using commode, S sit <> stand and min A functional mobility w/ out use of AD. Pt is completing ADLs below baseline level of I and would benefit from OT in acute care to maximize functional independence. Recommend level II rehab resource intensity when medically stable for discharge from acute care at this time pend medical optimization. Will continue to follow 3-5X week. Goals   Patient Goals Pt stated that she has started looking into increased assistance at home   Plan   Treatment Interventions ADL retraining;Functional transfer training; Endurance training;Patient/family training;Equipment evaluation/education; Compensatory technique education;Continued evaluation; Energy conservation; Activityengagement   Goal Expiration Date 11/26/23   OT Treatment Day 0  (Thursday 11/16/23)   OT Frequency 3-5x/wk   Discharge Recommendation   Rehab Resource Intensity Level, OT II (Moderate Resource Intensity)   Equipment Recommended Bedside commode; Shower/Tub chair with back ($)   Commode Type Standard   Additional Comments  use of AD for functional mobility   AM-PAC Daily Activity Inpatient   Lower Body Dressing 3   Bathing 3   Toileting 3   Upper Body Dressing 3   Grooming 4   Eating 4   Daily Activity Raw Score 20   Daily Activity Standardized Score (Calc for Raw Score >=11) 42.03   AM-PAC Applied Cognition Inpatient   Following a Speech/Presentation 3   Understanding Ordinary Conversation 4   Taking Medications 3 Remembering Where Things Are Placed or Put Away 4   Remembering List of 4-5 Errands 3   Taking Care of Complicated Tasks 3   Applied Cognition Raw Score 20   Applied Cognition Standardized Score 41.76   Barthel Index   Feeding 10   Bathing 0   Grooming Score 5   Dressing Score 5   Bladder Score 10   Bowels Score 10   Toilet Use Score 5   Transfers (Bed/Chair) Score 10   Mobility (Level Surface) Score 0   Stairs Score 0   Barthel Index Score 55   End of Consult   Education Provided Yes  (role of OT)   Patient Position at End of Consult All needs within reach  (seated OOB in w/c w/ RN/ PCA)   Nurse Communication Nurse aware of consult   Licensure   [place] License Number  Colletta Pagan, OTR/L [id number]        The patient's raw score on the AM-PAC Daily Activity Inpatient Short Form is 20. A raw score of greater than or equal to 19 suggests the patient may benefit from discharge to home. Please refer to the recommendation of the Occupational Therapist for safe discharge planning.     Pt goals to be met by 11/26/23 to max I w/ ADLs and improve engagement to return home includes:    -Pt will complete bed mobility supine <> sit w/ mod I in preparation for ADLs    -Pt will complete LBD w/ mod I for + time demonstrating good recall tech, body mechanics    -Pt will complete functional transfers to bed, chair, and toilet using LRAD, DME as needed w/ mod I    -Pt will consistently engage in functional mobility using LRAD w/ mod I household distances using LRAD to max I to return home alone    -Pt will complete functional tub/shower transfer using LRAD, DME as needed w/ S to max I w/ bathing    -Pt will complete UBD independently    -Pt will demonstrate improved functional standing tolerance for at least 10 minutes w/ at least fair balance using LRAD as needed while engaged in grooming/ UB bathing w/ mod I    -Pt will demonstrate improved functional activity, sitting tolerance OOB in chair for all meals    Gita LYNN Cornelius Wymargaux  [id number]  [id number]

## 2023-11-16 NOTE — ASSESSMENT & PLAN NOTE
While reaching to get her gown she fell backwards and next thing she remembers is being on hallway  Unable to tell if she had syncopayl episode but does nto remember what happened  Admits to having previous episodes of falling where she describes it as her muscles giving out on her but unable to further clarify. States she denies having syncopy but does not remember what happened and how she got to hallway as she lives alone.     CT head and cervical spine negative for acute findings    Denies any prodromal symptoms  Telemetry  Obtain echo  Orthostatic vitals positive on day of admission  Give 1 L bolus and NS @ 100ml/hr for 24 hours  History of SVT follows with Dr. Simon Carrillo at the time was advised to switch Bystolic to an alternate betablocker but patient did not want any changes   Consult cardio  Amlodipine and bystolic discontinued  Pt started on bisoprolol 2.5 mg daily  F/U carotid US    Admits to having low back pain, thoracolumbar xrays: No acute osseous abnormality

## 2023-11-16 NOTE — PLAN OF CARE
Problem: Potential for Falls  Goal: Patient will remain free of falls  Description: INTERVENTIONS:  - Educate patient/family on patient safety including physical limitations  - Instruct patient to call for assistance with activity   - Consult OT/PT to assist with strengthening/mobility   - Keep Call bell within reach  - Keep bed low and locked with side rails adjusted as appropriate  - Keep care items and personal belongings within reach  - Initiate and maintain comfort rounds  - Make Fall Risk Sign visible to staff  - Offer Toileting every 2 Hours, in advance of need  - Initiate/Maintain bed alarm  - Obtain necessary fall risk management equipment:  walker, yellow socks and bracelet, bed alarm.  Call bell within reach  - Apply yellow socks and bracelet for high fall risk patients  - Consider moving patient to room near nurses station  Outcome: Progressing     Problem: PAIN - ADULT  Goal: Verbalizes/displays adequate comfort level or baseline comfort level  Description: Interventions:  - Encourage patient to monitor pain and request assistance  - Assess pain using appropriate pain scale  - Administer analgesics based on type and severity of pain and evaluate response  - Implement non-pharmacological measures as appropriate and evaluate response  - Consider cultural and social influences on pain and pain management  - Notify physician/advanced practitioner if interventions unsuccessful or patient reports new pain  Outcome: Progressing     Problem: INFECTION - ADULT  Goal: Absence or prevention of progression during hospitalization  Description: INTERVENTIONS:  - Assess and monitor for signs and symptoms of infection  - Monitor lab/diagnostic results  - Monitor all insertion sites, i.e. indwelling lines, tubes, and drains  - Monitor endotracheal if appropriate and nasal secretions for changes in amount and color  - [place] appropriate cooling/warming therapies per order  - Administer medications as ordered  - Instruct and encourage patient and family to use good hand hygiene technique  - Identify and instruct in appropriate isolation precautions for identified infection/condition  Outcome: Progressing     Problem: SAFETY ADULT  Goal: Patient will remain free of falls  Description: INTERVENTIONS:  - Educate patient/family on patient safety including physical limitations  - Instruct patient to call for assistance with activity   - Consult OT/PT to assist with strengthening/mobility   - Keep Call bell within reach  - Keep bed low and locked with side rails adjusted as appropriate  - Keep care items and personal belongings within reach  - Initiate and maintain comfort rounds  - Make Fall Risk Sign visible to staff  - Offer Toileting every 2 Hours, in advance of need  - Initiate/Maintain bed alarm    Problem: DISCHARGE PLANNING  Goal: Discharge to home or other facility with appropriate resources  Description: INTERVENTIONS:  - Identify barriers to discharge w/patient and caregiver  - Arrange for needed discharge resources and transportation as appropriate  - Identify discharge learning needs (meds, wound care, etc.)  - Arrange for interpretive services to assist at discharge as needed  - Refer to Case Management Department for coordinating discharge planning if the patient needs post-hospital services based on physician/advanced practitioner order or complex needs related to functional status, cognitive ability, or social support system  Outcome: Progressing     Problem: NEUROSENSORY - ADULT  Goal: Achieves stable or improved neurological status  Description: INTERVENTIONS  - Monitor and report changes in neurological status  - Monitor vital signs such as temperature, blood pressure, glucose, and any other labs ordered   - Initiate measures to prevent increased intracranial pressure  - Monitor for seizure activity and implement precautions if appropriate      Outcome: Progressing   - Apply yellow socks and bracelet for high fall risk patients  - Consider moving patient to room near nurses station  Outcome: Progressing

## 2023-11-16 NOTE — PLAN OF CARE
Problem: OCCUPATIONAL THERAPY ADULT  Goal: Performs self-care activities at highest level of function for planned discharge setting. See evaluation for individualized goals. Description: Treatment Interventions: ADL retraining, Functional transfer training, Endurance training, Patient/family training, Equipment evaluation/education, Compensatory technique education, Continued evaluation, Energy conservation, Activityengagement  Equipment Recommended: Bedside commode, Shower/Tub chair with back ($)       See flowsheet documentation for full assessment, interventions and recommendations. Note: Limitation: Decreased ADL status, Decreased endurance, Decreased self-care trans, Decreased high-level ADLs (impaired activity tolerance, standing tolerance, pain, forward functional reach, generalized weakness)     Assessment: Pt is an 77yo female admitted to [facility] [address] on 11/15/23 following a fall from home. Diagnosed w/ syncope, non - traumatic rhabdomyolysis, and Minimal compression deformity of T1 superior endplate. Pt reports living alone in 1st floor apt at baseline and I w/ ADLs w/ out use of AD inside. Pt reports use of cane in community. Upon eval, pt alert and generally oriented. Able to follow directions w/ + time due to L ear hard of hearing. Pt required mod I to complete grooming seated after set- up, S UB bathing seated, min A LB bathing, S UBD, S toileting using commode, S sit <> stand and min A functional mobility w/ out use of AD. Pt is completing ADLs below baseline level of I and would benefit from OT in acute care to maximize functional independence. Recommend level II rehab resource intensity when medically stable for discharge from acute care at this time pend medical optimization. Will continue to follow 3-5X week.      Rehab Resource Intensity Level, OT: II (Moderate Resource Intensity)

## 2023-11-16 NOTE — PROGRESS NOTES
Progress Note - Cardiology   [facility]     Brandt Luciano 78 y.o. female MRN: [id number]  : 1940  Unit/Bed#: 2 [place] [address] Encounter: [id number]    Assessment and Plan:   Syncope. - Presented on 11/15/23 for evaluation after fall. Patient reports that she had fallen on 23 but was unable to get to the phone until 11/15/23. Patient reports that she remembers bending over to pick something up from the floor and then awoke on the floor not knowing what happened and down the martinez from the room where she remembers she was. She states that she was unable to get up off the floor due to weakness in her legs. She denies any prodrome. She denies experiencing chest pain, palpitations, shortness of breath, lightheadedness, dizziness, headache, nausea, vomiting or diaphoresis prior to event. - Patient with known history of orthostatic hypotension, positive orthostatic vitals signs during admission.   - Suspect syncope secondary to orthostatic hypotension.  - 11/15/23 EKG: normal sinus rhythm, 77 bpm.   - No evidence of SVT on telemetry since admission. Review of telemetry notes patient has remained in sinus rhythm since admission.  - 11/15/23 TTE: LVEF 58%. Left ventricle diastolic function is mildly abnormal, consistent with grade I (abnormal) relaxation. Left atrial filling pressure is elevated. Right ventricle cavity size normal.  Left atrium moderately dilated. Aortic valve with sclerosis. Trace mitral valve regurgitation. Trace tricuspid valve regurgitation.  - Recommend bilateral lower extremity compression stockings in setting of orthostatic hypotension.   - Patient also with noted history of atherosclerotic disease of anterior circulation, middle cerebral artery circulation, distal vertebral arteries, basilar arteries and posterior cerebral arteries. 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 Patent dominant left vertebral artery. 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. Essentially fetal right PCA circulation and patent left posterior communicating artery with unremarkable appearance to the bilateral PCAs. Mild atherosclerotic cervical and intracranial carotid atherosclerotic disease.  - Continue telemetry. Hypertension.     - BP stable. - Change Bystolic 5 mg daily to bisoprolol 2.5 mg daily.  - Stop amlodipine in setting of orthostatic hypotension.  - Continue to monitor BP. Orthostatic hypotension.     - Patient with documented history of orthostatic hypotension, documented since 2020.  - Positive orthostatic vital signs during admission:   Vitals          Vitals:     11/15/23 1215 11/15/23 1220 11/15/23 1225 11/15/23 1335   BP: 131/62 111/54 (!) 95/49 111/54   Pulse: 75 75 88 77   Patient Position - Orthostatic VS: Lying - Orthostatic VS Sitting - Orthostatic VS Standing - Orthostatic VS        - Patient reports states that she periodically wears compression stockings but is not currently on any medications.   - Recommend daily compression stocking use. - Change Bystolic 5 mg daily to bisoprolol 2.5 mg daily.  - Stop amlodipine in setting of orthostatic hypotension. 4.  Rhabdomyolysis. - Presented on 11/15/23 for evaluation after fall. Patient reports that she had fallen on 11/13/23 but was unable to get to the phone to call for help until 11/15/23.   - 11/15/23 total CK: 1085.   - 11/16/23 total CK: 310.   - Care per primary team.     5. SVT. - Patient last seen in [facility] outpatient cardiology office on 11/02/23.   - 9/27/21 AMB extended holter monitor: Patient had a min HR of 57 bpm, max HR of 169 bpm, and avg HR of 76 bpm. Predominant underlying rhythm was Sinus Rhythm.  11 Supraventricular Tachycardia runs occurred, the run with the fastest interval lasting 4 beats with a max rate of 169 bpm, the longest lasting 19 beats with an avg rate of 116 bpm. Supraventricular Tachycardia was detected within +/- 45 seconds of symptomatic patient event(s). Isolated SVEs were rare (<1.0%), SVE Couplets were rare (<1.0%), and SVE Triplets were rare (<1.0%). Isolated VEs were rare (<1.0%), VE Couplets were rare (<1.0%), and no VE Triplets were present. Ventricular Bigeminy and Trigeminy were present. Available electrograms reviewed. She had episodes of palpitations that occasionally corresponded to PVC. Fluttering sensation once corresponded to sinus tachycardia 100-105 bpm.  No significant abnormalities seen. - Patient denies experiencing palpitations. - 11/15/23 EKG: normal sinus rhythm, 77 bpm.   - No evidence of SVT on telemetry since admission.  - 2/03/20 nuclear stress test: Normal study after pharmacologic stress. Myocardial perfusion imaging was normal at rest and with stress. Left ventricular systolic function was normal.   - Change Bystolic 5 mg daily to bisoprolol 2.5 mg daily. - Continue telemetry. - Monitor electrolytes. Replete potassium above 4 and magnesium above 2.     6. Dyslipidemia.     - 5/30/23 lipid panel: Cholesterol 139, triglycerides 164, HDL 34, LDL 72.   - Continue Lipitor 80 mg daily. 7. Hypothyroidism.     - Currently on levothyroxine 88 mcg daily. - 11/15/23 TSH: 1.180.   - Care per primary team     8. Type II diabetes. - 5/30/23 HgbA1c: 6.7.   - Care per primary team.     GERD.     - Currently on pantoprazole 40 mg daily.  - Follows outpatient with [facility], last seen on 4/05/23. Subjective / Objective:          No acute events. Patient offers no complaints this morning. She denies experiencing chest pain, palpitations, shortness of breath, lightheadedness, dizziness, headache, nausea, vomiting, or syncope. Vitals: Blood pressure 147/61, pulse 63, temperature (!) 97.2 °F (36.2 °C), temperature source Axillary, resp. rate 18, height 5' 6" (1.676 m), weight 77.8 kg (171 lb 9.6 oz), SpO2 94 %.   Vitals:    11/15/23 1335 11/16/23 0541   Weight: 75.3 kg (166 lb) 77.8 kg (171 lb 9.6 oz)     Body mass index is 27.7 kg/m². BP Readings from Last 3 Encounters:   11/16/23 147/61   05/09/23 135/64   04/05/23 164/64     Orthostatic Blood Pressures      Flowsheet Row Most Recent Value   Blood Pressure 147/61 filed at 11/16/2023 0751   Patient Position - Orthostatic VS Lying filed at 11/16/2023 0751          I/O         11/14 0701  11/15 0700 11/15 0701  11/16 0700 11/16 0701  11/17 0700    IV Piggyback 1000      Total Intake(mL/kg) 1000      Urine (mL/kg/hr)  1550 (0.8)     Total Output  1550     Net +1000 -1550                  Invasive Devices       Peripheral Intravenous Line  Duration             Peripheral IV 11/15/23 Right Antecubital 1 day                      Intake/Output Summary (Last 24 hours) at 11/16/2023 0845  Last data filed at 11/16/2023 0450  Gross per 24 hour   Intake --   Output 1550 ml   Net -1550 ml         Physical Exam:   Physical Exam  Vitals reviewed. Constitutional:       General: She is not in acute distress. Cardiovascular:      Rate and Rhythm: Normal rate and regular rhythm. Pulses: Normal pulses. Heart sounds: Murmur heard. Pulmonary:      Effort: Pulmonary effort is normal. No respiratory distress. Breath sounds: Normal breath sounds. Abdominal:      General: Abdomen is flat. There is no distension. Palpations: Abdomen is soft. Tenderness: There is no abdominal tenderness. Musculoskeletal:      Right lower leg: No edema. Left lower leg: No edema. Skin:     General: Skin is warm and dry. Neurological:      Mental Status: She is alert.        Medications/ Allergies:     Current Facility-Administered Medications   Medication Dose Route Frequency Provider Last Rate    acetaminophen  650 mg Oral Q8H [facility] Mari Emery, DO      amLODIPine  2.5 mg Oral Daily Mari Emery, DO      atorvastatin  80 mg Oral HS Mari Emery, DO      calcium carbonate-vitamin D  2 tablet Oral Daily With Breakfast Pandi Todhe, DO      cholecalciferol  2,000 Units Oral Daily Pandi Toiselae, DO      clonazePAM  0.5 mg Oral HS PRN Pandi Todhe, DO      clopidogrel  75 mg Oral Daily Pandi Todhe, DO      DULoxetine  60 mg Oral Daily Pandi Todhe, DO      enoxaparin  40 mg Subcutaneous Daily Pandi Todhe, DO      levothyroxine  88 mcg Oral Early Morning Pandi Todhe, DO      methocarbamol  500 mg Oral Q8H [facility] Pandi Todhe, DO      nebivolol  5 mg Oral QPM Pandi Todhe, DO      pantoprazole  40 mg Oral Daily Pandi Todhe, DO      primidone  50 mg Oral Daily Pandi Todhe, DO      sodium chloride  125 mL/hr Intravenous Continuous Pandi Toiselae,  mL/hr (11/16/23 0444)     clonazePAM, 0.5 mg, HS PRN      Allergies   Allergen Reactions    Penicillins Swelling       VTE Pharmacologic Prophylaxis:   Enoxaparin (Lovenox)    Labs:   Troponins:  Results from last 7 days   Lab Units 11/16/23  0557 11/15/23  0421   CK TOTAL U/L 310* 1,085*         CBC with diff:  Results from last 7 days   Lab Units 11/16/23  0557 11/15/23  0421   WBC Thousand/uL 6.69 9.69   HEMOGLOBIN g/dL 11.8 14.7   HEMATOCRIT % 37.6 44.4   MCV fL 97 95   PLATELETS Thousands/uL 147* 192   RBC Million/uL 3.89 4.66   MCH pg 30.3 31.5   MCHC g/dL 31.4 33.1   RDW % 15.9* 15.9*   MPV fL 10.8 11.1   NRBC AUTO /100 WBCs  --  0       CMP:  Results from last 7 days   Lab Units 11/16/23  0557 11/15/23  0421   SODIUM mmol/L 140 138   POTASSIUM mmol/L 3.8 3.9   CHLORIDE mmol/L 108 102   CO2 mmol/L 26 25   ANION GAP mmol/L 6 11   BUN mg/dL 17 20   CREATININE mg/dL 0.66 0.73   CALCIUM mg/dL 9.2 10.1   AST U/L  --  69*   ALT U/L  --  25   ALK PHOS U/L  --  71   TOTAL PROTEIN g/dL  --  7.2   ALBUMIN g/dL 3.4* 4.1   TOTAL BILIRUBIN mg/dL  --  0.93   EGFR ml/min/1.73sq m 80 75       Magnesium:  Results from last 7 days   Lab Units 11/16/23  0557   MAGNESIUM mg/dL 1.5*     Coags:    TSH:  Results from last 7 days   Lab Units 11/15/23  0421   TSH 3RD GENERATON uIU/mL 1.180     No components found for: "TSH3"  Lipid Profile:    Hgb A1c:    NT-proBNP: No results for input(s): "NTBNP" in the last 72 hours. Imaging & Testing   I have personally reviewed pertinent reports. CT spine cervical without contrast     Result Date: 11/15/2023     Impression: No cervical spine fracture or traumatic malalignment. Minimal compression deformity of T1 superior endplate, age-indeterminate but new since chest CTA on 2021. Correlate clinically. Stable dominant right thyroid nodule. CT head without contrast     Result Date: 11/15/2023     Impression: No acute intracranial abnormality. EKG / Monitor: Personally reviewed. Telemetry reviewed: Currently normal sinus rhythm, 67 bpm. PVCs noted. No evidence of atrial fibrillation/atrial flutter, or tachyarrhythmia. Cardiac testing:   Echo complete w/ contrast if indicated     Result Date: 11/15/2023  Narrative:   Left Ventricle: Left ventricular cavity size is normal. Wall thickness is mildly increased. The left ventricular ejection fraction is 58% by single dimension measurement. Wall motion is normal. Diastolic function is mildly abnormal, consistent with grade I (abnormal) relaxation. Left atrial filling pressure is elevated. Right Ventricle: Right ventricular cavity size is normal.   Left Atrium: The atrium is mildly dilated. Aortic Valve: There is aortic valve sclerosis.          Results for orders placed during the hospital encounter of 20     Echo complete with contrast if indicated     Narrative  [address] [place] [address] [facility].  [address]  [phone]     Transthoracic Echocardiogram  2D, M-mode, Doppler, and Color Doppler     Study date:  2020     Patient: Cem Doll  MR number: [id number]  Account number: [de-identified]  : 1940  Age: 78 years  Gender: Female  Status: Inpatient  Location: Bedside  Height: 68 in  Weight: 203.5 lb  BP: 125/ 56 mmHg     Indications: SOB,Dizziness     Diagnoses: R06.00 - Dyspnea, unspecified     Sonographer:  JAGDEEP Marin  Referring Physician:  ROBERT Greenfield  Group:  [facility]  Interpreting Physician:  Shane Troncoso DO     SUMMARY     LEFT VENTRICLE:  Systolic function was normal by visual assessment. Ejection fraction was estimated in the range of 55 % to 60 %. There were no regional wall motion abnormalities. There was mild concentric hypertrophy. Doppler parameters were consistent with abnormal left ventricular relaxation (grade 1 diastolic dysfunction). MITRAL VALVE:  There was mild regurgitation. AORTIC VALVE:  There was no evidence for stenosis. There was no regurgitation. TRICUSPID VALVE:  There was mild regurgitation. Pulmonary artery systolic pressure was within the normal range. HISTORY: PRIOR HISTORY: Blind left eye,Deaf left ear,thyroid disease,DVT,Lyme disease,orthostatic hypotension. PROCEDURE: The procedure was performed at the bedside. This was a routine study. The transthoracic approach was used. The study included complete 2D imaging, M-mode, complete spectral Doppler, and color Doppler. The heart rate was 68 bpm,  at the start of the study. Images were obtained from the parasternal, apical, subcostal, and suprasternal notch acoustic windows. Image quality was adequate. LEFT VENTRICLE: Size was normal. Systolic function was normal by visual assessment. Ejection fraction was estimated in the range of 55 % to 60 %. There were no regional wall motion abnormalities. There was mild concentric hypertrophy. DOPPLER: Doppler parameters were consistent with abnormal left ventricular relaxation (grade 1 diastolic dysfunction). RIGHT VENTRICLE: The size was normal. Systolic function was normal. DOPPLER: Systolic pressure was within the normal range. LEFT ATRIUM: The atrium was mildly dilated. RIGHT ATRIUM: The atrium was mildly dilated.      MITRAL VALVE: Valve structure was normal. There was normal leaflet separation. No echocardiographic evidence for prolapse. DOPPLER: The transmitral velocity was within the normal range. There was no evidence for stenosis. There was mild  regurgitation. AORTIC VALVE: The valve was trileaflet. Leaflets exhibited normal thickness, normal cuspal separation, and sclerosis. DOPPLER: Transaortic velocity was within the normal range. There was no evidence for stenosis. There was no  regurgitation. TRICUSPID VALVE: The valve structure was normal. There was normal leaflet separation. DOPPLER: The transtricuspid velocity was within the normal range. There was mild regurgitation. Pulmonary artery systolic pressure was within the normal  range. Estimated peak PA pressure was 33 mmHg. PULMONIC VALVE: Leaflets exhibited normal thickness, no calcification, and normal cuspal separation. DOPPLER: The transpulmonic velocity was within the normal range. There was no regurgitation. PERICARDIUM: There was no thickening. There was no pericardial effusion. AORTA: The root exhibited normal size. PULMONARY ARTERY: The size was normal. The morphology appeared normal.     SYSTEM MEASUREMENT TABLES     2D mode  AoR Diam 2D: 3.7 cm  LA Diam (2D): 3.9 cm  LA/Ao (2D): 1.05  FS (2D Teich): 25.9 %  IVSd (2D): 1.33 cm  LVDEV: 47.4 cmï¾³  LVESV: 22.8 cmï¾³  LVIDd(2D): 3.4 cm  LVISd (2D): 2.52 cm  LVOT Area 2D: 2.84 cmï¾²  LVPWd (2D): 1.28 cm  SV (Teich): 24.6 cmï¾³     Apical four chamber  LVEF A4C: 52 %     Unspecified Scan Mode  ROXI Cont Eq (Peak Urban): 2.21 cmï¾²  LVOT Diam.: 2 cm  LVOT Vmax: 1320 mm/s  LVOT Vmax; Mean: 1320 mm/s  Peak Grad.; Mean: 7 mm[Hg]  MV Peak A Urban: 1080 mm/s  MV Peak E Urban.  Mean: 959 mm/s  MVA (PHT): 2.65 cmï¾²  PHT: 84 ms  Max P mm[Hg]  V Max: 2500 mm/s  Vmax: 2370 mm/s  RA Area: 25.9 cmï¾²  RA Volume: 86 cmï¾³  TAPSE: 1.6 cm     [employer] Accredited Echocardiography Laboratory     Prepared and electronically signed by     Félix Connolly DO  Signed 2020 13:09:49        Results for orders placed during the hospital encounter of 20     NM Myocardial Perfusion Spect (Pharmacological Induced Stress and/or Rest)     [place] [address] [facility].  [place] [address] [address]  [phone]     Rest/Stress Gated SPECT Myocardial Perfusion Imaging After Regadenoson     Patient: Kathy Lantigua  MR number: [id number]  Account number: [de-identified]  : 1940  Age: 78 years  Gender: Female  Status: Inpatient  Location: Stress lab  Height: 68 in  Weight: 204 lb  BP: 157/ 67 mmHg     Allergies: PENICILLINS     Diagnosis: R42. - Dizziness and giddiness     Technician:  Kajal Aguirre  RN:  KNEJI Pepper  Group:  [facility] Pulido  Report Prepared By[de-identified]  KENJI Pepper  Interpreting Physician:  Félix Connolly DO     INDICATIONS: Evaluation for coronary artery disease. HISTORY: The patient is a 78year old  female. Chest pain status: no chest pain. Other symptoms: dizziness. Coronary artery disease risk factors: dyslipidemia and hypertension. Cardiovascular history: none significant. Medications: a calcium channel blocker, aspirin, clopidogrel, a lipid lowering agent, and thyroid medications. PHYSICAL EXAM: Baseline physical exam screening: no wheezes audible. REST ECG: Normal sinus rhythm. Normal baseline ECG. PROCEDURE: The study was performed in the the Stress lab. A regadenoson infusion pharmacologic stress test was performed. Gated SPECT myocardial perfusion imaging was performed after stress and at rest. Systolic blood pressure was 157  mmHg, at the start of the study. Diastolic blood pressure was 67 mmHg, at the start of the study. The heart rate was 70 bpm, at the start of the study. Patient was not experiencing chest pain at the time of the injection of the  radiopharmaceutical. IV double checked.   Regadenoson protocol:  Time HR bpm SBP mmHg DBP mmHg Symptoms ST change Rhythm/conduct  Baseline 10:22 70 157 67 none none NSR, no ectopy, NSR  Immediate 10:30 81 132 58 mild dyspnea, flushing -- same as above, rare PVC's  1 min 10:31 79 148 64 same as above none same as above  2 min 10:32 77 148 67 subsiding -- same as above  3 min 10:33 73 135 63 subsiding -- same as above  4 min 10:34 76 136 62 none -- NSR  No medications or fluids given. STRESS SUMMARY: Duration of pharmacologic stress was 3 min. Functional capacity was normal. Maximal heart rate during stress was 83 bpm. The heart rate response to stress was normal. There was normal resting blood pressure with an  appropriate response to stress. The rate-pressure product for the peak heart rate and blood pressure was 27287. There was no chest pain during stress. The stress test was terminated due to protocol completion. Pre oxygen saturation: 96 %. Peak oxygen saturation: 96 %. The stress ECG was negative for ischemia and normal. There were no stress arrhythmias or conduction abnormalities. ISOTOPE ADMINISTRATION:  Resting isotope administration Stress isotope administration  Agent Sestamibi Sestamibi  Dose 10.09 mCi 33 mCi  Date 02/03/2020 02/03/2020     The radiopharmaceutical was injected at the peak effect of pharmacologic stress. MYOCARDIAL PERFUSION IMAGING:  The image quality was good. Left ventricular size was normal.     PERFUSION DEFECTS:  -  There were no perfusion defects. GATED SPECT:  The calculated left ventricular ejection fraction was 69 %. Left ventricular ejection fraction was within normal limits by visual estimate. There was no diagnostic evidence for left ventricular regional abnormality. SUMMARY:  -  Stress results: Target heart rate was achieved. There was no chest pain during stress. -  ECG conclusions: The stress ECG was negative for ischemia and normal.  -  Perfusion imaging:  There were no perfusion defects.  -  Gated SPECT: The calculated left ventricular ejection fraction was 69 %. Left ventricular ejection fraction was within normal limits by visual estimate. There was no diagnostic evidence for left ventricular regional abnormality. IMPRESSIONS: Normal study after pharmacologic stress. Myocardial perfusion imaging was normal at rest and with stress.  Left ventricular systolic function was normal.     Prepared and signed by     Emiliano Underwood DO  Signed 02/03/2020 14:32:17         Felton Aguirre PA-C

## 2023-11-16 NOTE — DISCHARGE INSTR - OTHER ORDERS
Plan:   Skin care plans:    1-Cleanse skin folds to bilateral groin daily with foaming cleanser & pat dry. Apply Interdry to right groin fold - may reuse - do not discard - please wash Interdry with soap and water and hang to dry. Do not mix with powders or lotions as this will inactivate the silver impregnated in the product. 2-Hydraguard barrier cream to bilateral sacrum, buttock and heels 2x/day and as needed. 3-Float heels on 2 pillows to offload pressure so heels are not in contact with mattress or pillows. 4-Use pressure redistribution cushion in chair when out of bed. Limit prolonged sitting. 5-Moisturize skin daily with skin nourishing cream.  6-Turn/reposition every 2 hrs in bed for pressure re-distribution on skin.

## 2023-11-16 NOTE — PHYSICAL THERAPY NOTE
PHYSICAL THERAPY EVALUATION/TREATMENT     11/16/23 1145   PT Last Visit   PT Visit Date 11/16/23   Note Type   Note type Evaluation   Pain Assessment   Pain Assessment Tool Segura-Baker FACES   Segura-Baker FACES Pain Rating 4  (Abdominal area, lumbar thoracic area, and cervical pain. Patient reports flareup since laying on x-ray table)   Restrictions/Precautions   Other Precautions Chair Alarm; Bed Alarm; Fall Risk;Hard of hearing   Home Living   Type of Home Apartment   Home Layout One level  (No stairs to enter)   Home Equipment Cane   Additional Comments Patient reports using cane outside of home   Prior Function   Level of [place] Independent with ADLs; Independent with functional mobility   Lives With Alone   Receives Help From Family; Other (Comment)  (Patient with a cleaning service)   IADLs Independent with driving; Independent with meal prep; Independent with medication management   Falls in the last 6 months 1 to 4   Comments Patient reports several falls with syncope? .  Patient also reports driving at times and remaining independent   General   Additional Pertinent History Chart reviewed, patient admitted with syncope, cardiogenic.   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   Subjective   Subjective Patient wanting to stay independent and stop falling as patient has had 3 recent falls prior to admission   RLE Assessment   RLE Assessment   (Range of motion within functional limits, strength 3+/5)   LLE Assessment   LLE Assessment   (Range of motion within functional limits, strength 3+/5)   Coordination   Movements are Fluid and Coordinated 0 Coordination and Movement Description Decreased balance and coordination with transfers gait and higher-level balance activity   Bed Mobility   Additional Comments Patient sitting out of bed in bedside chair upon arrival by therapist   Transfers   Sit to Stand 3  Moderate assistance   Additional items Assist x 1;Verbal cues   Stand to Sit 4  Minimal assistance   Additional items Assist x 1;Verbal cues   Ambulation/Elevation   Gait Assistance 3  Moderate assist   Additional items Assist x 1;Verbal cues; Tactile cues   Assistive Device Rolling walker   Distance 15 feet with change in direction. Shortened stride length narrow base of support and unsteady gait patterning   Balance   Static Sitting Fair +   Dynamic Sitting Fair   Static Standing Fair -   Dynamic Standing Poor +   Ambulatory Poor +   Activity Tolerance   Activity Tolerance Patient limited by fatigue;Patient limited by pain   Nurse Made Aware Yes   Assessment   Prognosis Good   Problem List Decreased strength;Decreased range of motion;Decreased endurance; Impaired balance;Decreased mobility; Decreased coordination;Pain  (T1 compression fracture of indeterminate age-no bracing or restrictions ordered at this time)   Assessment Patient seen for Physical Therapy evaluation. Patient admitted with Syncope, cardiogenic. Comorbidities affecting patient's physical performance include: . Personal factors affecting patient at time of initial evaluation include: ambulating with assistive device, inability to ambulate household distances, inability to navigate community distances, inability to navigate level surfaces without external assistance, inability to perform dynamic tasks in community, limited home support, positive fall history, hearing impairments, inability to perform physical activity, inability to perform ADLS, and inability to perform IADLS .  Prior to admission, patient was independent with functional mobility with cane at times out of house, independent with ADLS, requiring assist for IADLS, ambulating household distance, ambulating community distances, and home with family assist.  Please find objective findings from Physical Therapy assessment regarding body systems outlined above with impairments and limitations including weakness, decreased ROM, impaired balance, decreased endurance, impaired coordination, gait deviations, pain, decreased activity tolerance, decreased functional mobility tolerance, and fall risk. The Barthel Index was used as a functional outcome tool presenting with a score of Barthel Index Score: 45 today indicating marked limitations of functional mobility and ADLS. Patient's clinical presentation is currently unstable/unpredictable as seen in patient's presentation of vital sign response, changing level of pain, increased fall risk, new onset of impairment of functional mobility, decreased endurance, and new onset of weakness. Pt would benefit from continued Physical Therapy treatment to address deficits as defined above and maximize level of functional mobility. As demonstrated by objective findings, the assigned level of complexity for this evaluation is high. The patient's -[facility] Basic Mobility Inpatient Short Form Raw Score is 13. A Raw score of less than or equal to 16 suggests the patient may benefit from discharge to post-acute rehabilitation services. Please also refer to the recommendation of the Physical Therapist for safe discharge planning.    Goals   Patient Goals Patient wants to remain independent and figure out what is going on medically   STG Expiration Date 11/23/23   Short Term Goal #1 Transfers and gait with rolling walker with min assist of 1   Short Term Goal #2 Gait endurance to 40 feet   LTG Expiration Date 11/30/23   Long Term Goal #1 Strength bilateral lower extremities 4 -/5   Long Term Goal #2 Independent transfers and gait with roller walker for functional household distances for return home   Plan Treatment/Interventions ADL retraining;Functional transfer training;LE strengthening/ROM; Therapeutic exercise; Endurance training;Patient/family training;Equipment eval/education; Bed mobility;Gait training; Compensatory technique education   PT Frequency Other (Comment)  (5 times per week)   Discharge Recommendation   Rehab Resource Intensity Level, PT II (Moderate Resource Intensity)   AM-PAC Basic Mobility Inpatient   Turning in Flat Bed Without Bedrails 3   Lying on Back to Sitting on Edge of Flat Bed Without Bedrails 3   Moving Bed to Chair 2   Standing Up From Chair Using Arms 2   Walk in Room 2   Climb 3-5 Stairs With Railing 1   Basic Mobility Inpatient Raw Score 13   Basic Mobility Standardized Score 33.99   Highest Level Of Mobility   -HLM Goal 4: Move to chair/commode   JH-HLM Achieved 6: Walk 10 steps or more   Barthel Index   Feeding 10   Bathing 0   Grooming Score 0   Dressing Score 5   Bladder Score 10   Bowels Score 10   Toilet Use Score 5   Transfers (Bed/Chair) Score 5   Mobility (Level Surface) Score 0   Stairs Score 0   Barthel Index Score 45   Additional Treatment Session   Start Time 1130   End Time 1145   Treatment Assessment S: Patient reports generalized pain throughout abdominal and back area status post laying on x-ray table O: Bilateral lower extremity exercise completed as listed below. Education completed and proper posture and body mechanics A: Patient will benefit from continued physical therapy with progression as tolerated and increasing functional mobility with clinical staff as well. T1 compression fracture present of indeterminate age although no orders for bracing or restrictions at this time patient tolerated limited activity at this time   Exercises   Hamstring Stretch Sitting;5 reps;PROM; Bilateral   Hip Flexion Sitting;10 reps;Bilateral  (Alternating)   Hip Abduction Sitting;10 reps;Bilateral  (Alternating)   Knee AROM Long Arc Quad Sitting;10 reps;Bilateral  (Alternating) Ankle Pumps Sitting;Bilateral;20 reps   Balance training  Sidestepping and backward walking completed for balance and coordination   Licensure   [place] License Number  Chaim Wright, PT 4 [id number]

## 2023-11-16 NOTE — PROGRESS NOTES
Progress Note - Urology      Patient: Jesus Hancock   : 1940 Sex: female   MRN: [id number]     CSN: [id number]  Unit/Bed#: [address] [place] [address]     SUBJECTIVE:   Patient seen on afternoon rounds  Starting tamsulosin  To be transferred to short-term rehab      Objective   Vitals: /55 (BP Location: Right arm)   Pulse 77   Temp 98 °F (36.7 °C) (Oral)   Resp 16   Ht 5' 6" (1.676 m)   Wt 77.8 kg (171 lb 9.6 oz)   SpO2 95%   BMI 27.70 kg/m²     I/O last 24 hours:   In: -   Out: [address] [Urine:1550]      Physical Exam:   General Alert awake   Normocephalic atraumatic PERRLA  Lungs clear bilaterally  Cardiac normal S1 normal S2  Abdomen soft, flank pain  Extremities no edema      Lab Results: CBC:   Lab Results   Component Value Date    WBC 6.69 2023    HGB 11.8 2023    HCT 37.6 2023    MCV 97 2023     (L) 2023    ADJUSTEDWBC 10.60 2016    RBC 3.89 2023    MCH 30.3 2023    MCHC 31.4 2023    RDW 15.9 (H) 2023    MPV 10.8 2023    NRBC 0 11/15/2023     CMP:   Lab Results   Component Value Date     2023    CO2 26 2023    BUN 17 2023    CREATININE 0.66 2023    CALCIUM 9.2 2023    AST 69 (H) 11/15/2023    ALT 25 11/15/2023    ALKPHOS 71 11/15/2023    EGFR 80 2023     Urinalysis:   Lab Results   Component Value Date    COLORU Dk Yellow 11/15/2023    CLARITYU Clear 11/15/2023    SPECGRAV 1.025 11/15/2023    PHUR 5.5 11/15/2023    PHUR 5.5 2018    LEUKOCYTESUR Trace (A) 11/15/2023    NITRITE Negative 11/15/2023    GLUCOSEU Negative 11/15/2023    KETONESU 15 (1+) (A) 11/15/2023    BILIRUBINUR Small (A) 11/15/2023    BLOODU Negative 11/15/2023     Urine Culture:   Lab Results   Component Value Date    URINECX >100,000 cfu/ml 10/19/2021     PSA: No results found for: "PSA"      Assessment/ Plan:  Hypocontractile bladder  Acute cystitis  On tamsulosin 0.4 mg  Was seen in the office for follow-up in a few weeks 7-day course of antibiotics          Francesco Foss MD

## 2023-11-16 NOTE — CASE MANAGEMENT
Case Management Discharge Planning Note    Patient name Celeste Mccormick  Location 2 [facility] 218/2 Amy 218 MRN [id number]  : 1940 Date 2023       Current Admission Date: 11/15/2023  Current Admission Diagnosis:Syncope, cardiogenic   Patient Active Problem List    Diagnosis Date Noted    Fall 11/15/2023    Syncope, cardiogenic 11/15/2023    Non-traumatic rhabdomyolysis 11/15/2023    Recurrent major depressive disorder, in remission ([address]) 2023    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    SVT (supraventricular tachycardia) 2023    Osteoarthritis of right knee 2023    Elevated liver enzymes 2023    Type 2 diabetes mellitus, without long-term current use of insulin ([address]) 2023    Dyspepsia 2022    Frequent falls 2022    Localized swelling of right lower leg 2022    Anxiety 2022    Gastroesophageal reflux disease without esophagitis 10/12/2022    Nocturnal hypoxemia 2021    Acquired hypothyroidism 2021    Thyroid nodule 2021    Palpitations 2021    Tremor 2021    History of transient ischemic attack (TIA) 2021    Essential hypertension 2021    Dyslipidemia 2021    Seasonal allergic rhinitis due to pollen 2021    Chronic hypoxemic respiratory failure ([address]) 2020    Dizziness 2020    Shortness of breath on exertion       LOS (days): 1  Geometric Mean LOS (GMLOS) (days): 2.40  Days to GMLOS:1     OBJECTIVE:  Risk of Unplanned Readmission Score: 11.89     Current admission status: Inpatient   Preferred Pharmacy:   0 [address] [address]  Phone: [phone] Fax: [phone]    Primary Care Provider: Lizett Shields MD    Primary Insurance: MEDICARE  Secondary Insurance: AARP    DISCHARGE DETAILS:    Discharge planning discussed with[de-identified] Patient  Freedom of Choice: Yes  Comments - Freedom of Choice: CM met with patient bedside to review recommendations for therapy. Patient's choice is STR and gave consent to have blanket referrals sent. CM contacted family/caregiver?: Yes  Were Treatment Team discharge recommendations reviewed with patient/caregiver?: Yes  Did patient/caregiver verbalize understanding of patient care needs?: Yes  Were patient/caregiver advised of the risks associated with not following Treatment Team discharge recommendations?: Yes    Contacts  Patient Contacts: Erik Mccormick  Relationship to Patient[de-identified] Family  Contact Method: Phone  Phone Number: [phone]  Reason/Outcome: Emergency Contact, Discharge Planning    Other Referral/Resources/Interventions Provided:  Interventions: Short Term Rehab  Referral Comments: CM sent blanket referrals for STR in [place]. Treatment Team Recommendation: Short Term Rehab, Home with [address]  Discharge Destination Plan[de-identified] Short Term Rehab      CM met with patient bedside to introduce role and discuss recommendations for therapy. Patient consented to have blanket referrals sent. Patient shared that she is hesitant to return to her home after her fall and would appreciate not having to return yet as she finds it upsetting. CM sent blanket referrals in [place].

## 2023-11-16 NOTE — CONSULTS
H&P Exam - Urology       Patient: Ayana Parker   : 1940 Sex: female   MRN: [id number]     CSN: [id number]      History of Present Illness   HPI:  Ayana Parker is a 78 y.o. female who presents to [address] [place] [address] ER yesterday afternoon having a syncopal episode undergoing ER CAT scan of the head and cervix confirming no acute findings as well as supraventricular tachycardia patient admitted and found to have high postvoid residuals of over 300 cc urologic consult called for patient seen at the bedside stating that she does get up 3 times a night voiding 5-6 times a day with episodes of urgency urgency incontinence undergoing vaginal hysterectomy over 20 years ago        Review of Systems:   Constitutional:  Negative for activity change, fever, chills and diaphoresis. HENT: Negative for hearing loss and trouble swallowing. Eyes: Negative for itching and visual disturbance. Respiratory: Negative for chest tightness and shortness of breath. Cardiovascular: Negative for chest pain, edema. Gastrointestinal: Negative for abdominal distention, na abdominal pain, constipation, diarrhea, Nausea and vomiting. Genitourinary: Negative for decreased urine volume, difficulty urinating, dysuria, enuresis, frequency, hematuria and urgency. Musculoskeletal: Negative for gait problem and myalgias. Neurological: Negative for dizziness and headaches. Hematological: Does not bruise/bleed easily.        Historical Information   Past Medical History:   Diagnosis Date    Anxiety     Arthritis     r knee and shoulders    Blind left eye     Deaf, left     Depression     Diabetes mellitus ([address])     Disease of thyroid gland     DVT complicating pregnancy, unspecified trimester ([address]) postpartum    Hearing loss     LEFT EAR    History of shingles 2007    fACIAL     Hyperlipidemia     Hypertension     Liver disease     Lyme disease     Meniere's disease     Obesity     Orthostatic hypotension     Psychiatric problem Sinus congestion     Sleep apnea     Stroke [facility])      Past Surgical History:   Procedure Laterality Date    APPENDECTOMY      BREAST BIOPSY Left 2010     SECTION      x2    DXA PROCEDURE (HISTORICAL)  10/28/2020    EYE SURGERY      HAND SURGERY Left     HERNIA REPAIR      HYSTERECTOMY      ROTATOR CUFF REPAIR Left     TONSILLECTOMY      TYMPANOSTOMY TUBE PLACEMENT       Social History   Social History     Substance and Sexual Activity   Alcohol Use Never     Social History     Substance and Sexual Activity   Drug Use Never     Social History     Tobacco Use   Smoking Status Former    Packs/day: 0.75    Years: 44.00    Total pack years: 33.00    Types: Cigarettes    Quit date: 1990    Years since quittin.8    Passive exposure: Past   Smokeless Tobacco Never     Family History:   Family History   Problem Relation Age of Onset    Depression Mother     Hypertension Mother     Obesity Mother     Depression Father     Alcohol abuse Father     Stroke Father     Breast cancer Sister 76    Ovarian cancer Daughter 48    BRCA1 Negative Daughter     BRCA2 Negative Daughter        Meds/Allergies   Medications Prior to Admission   Medication    amLODIPine (NORVASC) 2.5 mg tablet    atorvastatin (LIPITOR) 80 mg tablet    Calcium Carbonate-Vit D-Min (CALCIUM 1200 PO)    cetirizine (ZyrTEC) 10 mg tablet    chlorhexidine (PERIDEX) 0.12 % solution    Cholecalciferol (D3-1000 PO)    clobetasol (TEMOVATE) 0.05 % cream    clonazePAM (KlonoPIN) 0.5 mg tablet    clopidogrel (PLAVIX) 75 mg tablet    DULoxetine (CYMBALTA) 60 mg delayed release capsule    levothyroxine 88 mcg tablet    mometasone (NASONEX) 50 mcg/act nasal spray    nebivolol (BYSTOLIC) 5 mg tablet    pantoprazole (PROTONIX) 40 mg tablet    potassium chloride (K-DUR,KLOR-CON) 20 mEq tablet    primidone (MYSOLINE) 50 mg tablet    triamcinolone (KENALOG) 0.5 % cream     Allergies   Allergen Reactions    Penicillins Swelling       Objective   Vitals: BP 132/55 (BP Location: Right arm)   Pulse 77   Temp 98 °F (36.7 °C) (Oral)   Resp 16   Ht 5' 6" (1.676 m)   Wt 77.8 kg (171 lb 9.6 oz)   SpO2 95%   BMI 27.70 kg/m²     Physical Exam:  General Alert awake   Normocephalic atraumatic PERRLA  Lungs clear bilaterally  Cardiac normal S1 normal S2  Abdomen soft, flank pain  Vaginal exam severe atrophic vaginitis  Unable to palpate meatus or urethra  Extremities no edema    I/O last 24 hours:   In: -   Out: 1550 [Urine:1550]    Invasive Devices       Peripheral Intravenous Line  Duration             Peripheral IV 11/15/23 Right Antecubital 1 day                        Lab Results: CBC:   Lab Results   Component Value Date    WBC 6.69 11/16/2023    HGB 11.8 11/16/2023    HCT 37.6 11/16/2023    MCV 97 11/16/2023     (L) 11/16/2023    ADJUSTEDWBC 10.60 04/06/2016    RBC 3.89 11/16/2023    MCH 30.3 11/16/2023    MCHC 31.4 11/16/2023    RDW 15.9 (H) 11/16/2023    MPV 10.8 11/16/2023    NRBC 0 11/15/2023     CMP:   Lab Results   Component Value Date     11/16/2023    CO2 26 11/16/2023    BUN 17 11/16/2023    CREATININE 0.66 11/16/2023    CALCIUM 9.2 11/16/2023    AST 69 (H) 11/15/2023    ALT 25 11/15/2023    ALKPHOS 71 11/15/2023    EGFR 80 11/16/2023     Urinalysis:   Lab Results   Component Value Date    COLORU Dk Yellow 11/15/2023    CLARITYU Clear 11/15/2023    SPECGRAV 1.025 11/15/2023    PHUR 5.5 11/15/2023    PHUR 5.5 06/28/2018    LEUKOCYTESUR Trace (A) 11/15/2023    NITRITE Negative 11/15/2023    GLUCOSEU Negative 11/15/2023    KETONESU 15 (1+) (A) 11/15/2023    BILIRUBINUR Small (A) 11/15/2023    BLOODU Negative 11/15/2023     Urine Culture:   Lab Results   Component Value Date    URINECX >100,000 cfu/ml 10/19/2021     PSA: No results found for: "PSA"        Assessment/ Plan:  Hypocontractile bladder  Acute cystitis  Nocturia  Frequency urgency urgency incontinence possible overactive bladder  Start tamsulosin 0.4 mg  Will take 3 days to open bladder neck  Patient being discharged to short-term rehab continue tamsulosin we will see in the office in 3 to 4 weeks for reevaluation check postvoid residual      aRbia Saunders MD

## 2023-11-16 NOTE — ASSESSMENT & PLAN NOTE
Patient having urinary retention  Admits to having incontinence for years and figured it was due to aging  Denies having urge to void  Retention protocol  Consult urology

## 2023-11-17 ENCOUNTER — APPOINTMENT (INPATIENT)
Dept: RADIOLOGY | Facility: HOSPITAL | Age: 83
DRG: 312 | End: 2023-11-17
Payer: MEDICARE

## 2023-11-17 LAB
ALBUMIN SERPL BCP-MCNC: 3.5 G/DL (ref 3.5–5)
ANION GAP SERPL CALCULATED.3IONS-SCNC: 6 MMOL/L
BUN SERPL-MCNC: 11 MG/DL (ref 5–25)
CALCIUM SERPL-MCNC: 9.2 MG/DL (ref 8.4–10.2)
CHLORIDE SERPL-SCNC: 107 MMOL/L (ref 96–108)
CO2 SERPL-SCNC: 27 MMOL/L (ref 21–32)
CREAT SERPL-MCNC: 0.6 MG/DL (ref 0.6–1.3)
DME PARACHUTE DELIVERY DATE REQUESTED: NORMAL
DME PARACHUTE ITEM DESCRIPTION: NORMAL
DME PARACHUTE ITEM DESCRIPTION: NORMAL
DME PARACHUTE ORDER STATUS: NORMAL
DME PARACHUTE SUPPLIER NAME: NORMAL
DME PARACHUTE SUPPLIER PHONE: NORMAL
ERYTHROCYTE [DISTWIDTH] IN BLOOD BY AUTOMATED COUNT: 15.8 % (ref 11.6–15.1)
GFR SERPL CREATININE-BSD FRML MDRD: 83 ML/MIN/1.73SQ M
GLUCOSE SERPL-MCNC: 100 MG/DL (ref 65–140)
HCT VFR BLD AUTO: 37.2 % (ref 34.8–46.1)
HGB BLD-MCNC: 12.2 G/DL (ref 11.5–15.4)
MAGNESIUM SERPL-MCNC: 1.7 MG/DL (ref 1.9–2.7)
MCH RBC QN AUTO: 31.6 PG (ref 26.8–34.3)
MCHC RBC AUTO-ENTMCNC: 32.8 G/DL (ref 31.4–37.4)
MCV RBC AUTO: 96 FL (ref 82–98)
PHOSPHATE SERPL-MCNC: 2.6 MG/DL (ref 2.3–4.1)
PLATELET # BLD AUTO: 159 THOUSANDS/UL (ref 149–390)
PMV BLD AUTO: 10.4 FL (ref 8.9–12.7)
POTASSIUM SERPL-SCNC: 4 MMOL/L (ref 3.5–5.3)
RBC # BLD AUTO: 3.86 MILLION/UL (ref 3.81–5.12)
SODIUM SERPL-SCNC: 140 MMOL/L (ref 135–147)
WBC # BLD AUTO: 6.64 THOUSAND/UL (ref 4.31–10.16)

## 2023-11-17 PROCEDURE — 99232 SBSQ HOSP IP/OBS MODERATE 35: CPT | Performed by: STUDENT IN AN ORGANIZED HEALTH CARE EDUCATION/TRAINING PROGRAM

## 2023-11-17 PROCEDURE — 97535 SELF CARE MNGMENT TRAINING: CPT

## 2023-11-17 PROCEDURE — 99232 SBSQ HOSP IP/OBS MODERATE 35: CPT | Performed by: INTERNAL MEDICINE

## 2023-11-17 PROCEDURE — 85027 COMPLETE CBC AUTOMATED: CPT | Performed by: STUDENT IN AN ORGANIZED HEALTH CARE EDUCATION/TRAINING PROGRAM

## 2023-11-17 PROCEDURE — 80069 RENAL FUNCTION PANEL: CPT | Performed by: STUDENT IN AN ORGANIZED HEALTH CARE EDUCATION/TRAINING PROGRAM

## 2023-11-17 PROCEDURE — 83735 ASSAY OF MAGNESIUM: CPT | Performed by: STUDENT IN AN ORGANIZED HEALTH CARE EDUCATION/TRAINING PROGRAM

## 2023-11-17 PROCEDURE — 93880 EXTRACRANIAL BILAT STUDY: CPT

## 2023-11-17 PROCEDURE — 93880 EXTRACRANIAL BILAT STUDY: CPT | Performed by: SURGERY

## 2023-11-17 RX ORDER — MAGNESIUM SULFATE HEPTAHYDRATE 40 MG/ML
4 INJECTION, SOLUTION INTRAVENOUS ONCE
Status: COMPLETED | OUTPATIENT
Start: 2023-11-17 | End: 2023-11-17

## 2023-11-17 RX ADMIN — LEVOTHYROXINE SODIUM 88 MCG: 88 TABLET ORAL at 05:25

## 2023-11-17 RX ADMIN — TAMSULOSIN HYDROCHLORIDE 0.4 MG: 0.4 CAPSULE ORAL at 16:17

## 2023-11-17 RX ADMIN — Medication 2000 UNITS: at 08:21

## 2023-11-17 RX ADMIN — CLONAZEPAM 0.5 MG: 0.5 TABLET ORAL at 21:51

## 2023-11-17 RX ADMIN — ENOXAPARIN SODIUM 40 MG: 40 INJECTION SUBCUTANEOUS at 08:22

## 2023-11-17 RX ADMIN — ACETAMINOPHEN 650 MG: 325 TABLET ORAL at 05:25

## 2023-11-17 RX ADMIN — BISOPROLOL FUMARATE 2.5 MG: 5 TABLET ORAL at 08:21

## 2023-11-17 RX ADMIN — GABAPENTIN 100 MG: 100 CAPSULE ORAL at 08:21

## 2023-11-17 RX ADMIN — POTASSIUM & SODIUM PHOSPHATES POWDER PACK 280-160-250 MG 2 PACKET: 280-160-250 PACK at 11:46

## 2023-11-17 RX ADMIN — METHOCARBAMOL 750 MG: 500 TABLET ORAL at 15:08

## 2023-11-17 RX ADMIN — CLOPIDOGREL BISULFATE 75 MG: 75 TABLET ORAL at 08:21

## 2023-11-17 RX ADMIN — ATORVASTATIN CALCIUM 80 MG: 80 TABLET, FILM COATED ORAL at 21:42

## 2023-11-17 RX ADMIN — PANTOPRAZOLE SODIUM 40 MG: 40 TABLET, DELAYED RELEASE ORAL at 05:25

## 2023-11-17 RX ADMIN — GABAPENTIN 100 MG: 100 CAPSULE ORAL at 21:42

## 2023-11-17 RX ADMIN — PRIMIDONE 50 MG: 50 TABLET ORAL at 08:21

## 2023-11-17 RX ADMIN — ACETAMINOPHEN 650 MG: 325 TABLET ORAL at 21:42

## 2023-11-17 RX ADMIN — MAGNESIUM SULFATE HEPTAHYDRATE 4 G: 40 INJECTION, SOLUTION INTRAVENOUS at 11:46

## 2023-11-17 RX ADMIN — DULOXETINE HYDROCHLORIDE 60 MG: 60 CAPSULE, DELAYED RELEASE ORAL at 08:22

## 2023-11-17 RX ADMIN — ACETAMINOPHEN 650 MG: 325 TABLET ORAL at 15:07

## 2023-11-17 RX ADMIN — METHOCARBAMOL 750 MG: 500 TABLET ORAL at 05:24

## 2023-11-17 RX ADMIN — METHOCARBAMOL 750 MG: 500 TABLET ORAL at 21:42

## 2023-11-17 RX ADMIN — GABAPENTIN 100 MG: 100 CAPSULE ORAL at 16:17

## 2023-11-17 RX ADMIN — Medication 2 TABLET: at 08:21

## 2023-11-17 NOTE — ASSESSMENT & PLAN NOTE
While reaching to get her gown she fell backwards and next thing she remembers is being on hallway  Unable to tell if she had syncopayl episode but does nto remember what happened  Admits to having previous episodes of falling where she describes it as her muscles giving out on her but unable to further clarify. States she denies having syncopy but does not remember what happened and how she got to hallway as she lives alone. CT head negative for acute findings  CT Cervical spine: No cervical spine fracture or traumatic malalignment. Minimal compression deformity of T1 superior endplate, age-indeterminate but new since chest CTA on 6/24/2021. Correlate clinically. Patient denies any neck/head pain. Admitted to having low back pain on admission, thoracolumbar xrays: No acute osseous abnormality   Outpatient neurosurgery referral made on d/c    Denies any prodromal symptoms  Telemetry  Orthostatic vitals positive on day of admission  Give 1 L bolus and NS @ 100ml/hr for 24 hours  History of SVT follows with Dr. Tenzin Perez at the time was advised to switch Bystolic to an alternate betablocker but patient did not want any changes   Consult cardio  11/16/23 TTE : Left Ventricle: Left ventricular cavity size is normal. Wall thickness is mildly increased. The left ventricular ejection fraction is 58% by single dimension measurement     Carotid US  RIGHT:  There is <50% stenosis noted in the internal carotid artery. Plaque is  heterogenous and irregular. Vertebral artery flow is antegrade, however, doppler flow pattern suggests  distal stenosis. There is no significant subclavian artery disease. LEFT:  There is <50% stenosis noted in the internal carotid artery. Plaque is  heterogenous and irregular. Vertebral artery flow is antegrade. There is no significant subclavian artery disease      11/17 Amlodipine and bystolic discontinued.  Pt started on bisoprolol 2.5 mg daily  11/18 orthostatic vitals remain positive today  Given 500 cc bolus and cardio starting on midodrine 5 mg with breakfast and lunch  Continue wearing GAGE stockings, check orthostatics at rehab

## 2023-11-17 NOTE — PROGRESS NOTES
Progress Note - Cardiology   [facility]     Brandt Luciano 78 y.o. female MRN: [id number]  : 1940  Unit/Bed#: 2 [place] [address] Encounter: [id number]    Assessment and Plan:   1. Orthostatic hypotension with concomitant hypertension: Further syncopal episodes,    -   Bystolic was discontinued and transition to Zebeta 2.5 mg daily with improvement in both resting and orthostatic vital signs.    -   Continue Zebeta 2.5 mg daily    -   Continue monitor vital signs    2. Rhabdomyolysis in the setting of fall with unknown downtime: Initial CK was 1085 with repeat of 310    3. Dyslipidemia: Continue Lipitor 80 mg daily    4. Diabetes type 2: Managed per primary team    Subjective / Objective:   Patient seen and examined. No complaints offered at this time. Vitals: Blood pressure 129/55, pulse 92, temperature 97.6 °F (36.4 °C), resp. rate 17, height 5' 6" (1.676 m), weight 77.6 kg (171 lb 1.2 oz), SpO2 90 %. Vitals:    23 0541 23 0600   Weight: 77.8 kg (171 lb 9.6 oz) 77.6 kg (171 lb 1.2 oz)     Body mass index is 27.61 kg/m².   BP Readings from Last 3 Encounters:   23 129/55   23 135/64   23 164/64     Orthostatic Blood Pressures      Flowsheet Row Most Recent Value   Blood Pressure 129/55 filed at 2023 0756   Patient Position - Orthostatic VS Standing - Orthostatic VS filed at 2023 0756          I/O         11/15 0701   0700  0701   0700  0701   0700    IV Piggyback       Total Intake(mL/kg)       Urine (mL/kg/hr) 1550 (0.8)      Total Output 1550      Net -1550                   Invasive Devices       Peripheral Intravenous Line  Duration             Peripheral IV 23 Distal;Left;Dorsal (posterior) Forearm <1 day    Peripheral IV 23 Left;Ventral (anterior) Forearm <1 day                    No intake or output data in the 24 hours ending 23 1508      Physical Exam:   Physical Exam  Vitals and nursing note reviewed. Constitutional:       General: She is not in acute distress. Appearance: Normal appearance. She is normal weight. HENT:      Right Ear: External ear normal.      Left Ear: External ear normal.   Eyes:      General: No scleral icterus. Right eye: No discharge. Left eye: No discharge. Cardiovascular:      Rate and Rhythm: Normal rate and regular rhythm. Pulses: Normal pulses. Pulmonary:      Effort: Pulmonary effort is normal. No respiratory distress. Breath sounds: Normal breath sounds. Abdominal:      General: Bowel sounds are normal. There is no distension. Palpations: Abdomen is soft. Musculoskeletal:      Right lower leg: No edema. Left lower leg: No edema. Skin:     General: Skin is warm and dry. Capillary Refill: Capillary refill takes less than 2 seconds. Neurological:      General: No focal deficit present. Mental Status: She is alert. Mental status is at baseline.    Psychiatric:         Mood and Affect: Mood normal.                   Medications/ Allergies:     Current Facility-Administered Medications   Medication Dose Route Frequency Provider Last Rate    acetaminophen  650 mg Oral Q8H [facility] Mair Emery, DO      atorvastatin  80 mg Oral HS Mari Emery, DO      bisoprolol  2.5 mg Oral Daily Robert Howard PA-C      calcium carbonate-vitamin D  2 tablet Oral Daily With Breakfast Mari Emery, DO      cholecalciferol  2,000 Units Oral Daily Mari Emery, DO      clonazePAM  0.5 mg Oral HS PRN Mari Emery, DO      clopidogrel  75 mg Oral Daily Mari Topiotr, DO      DULoxetine  60 mg Oral Daily Mari Emery, DO      enoxaparin  40 mg Subcutaneous Daily Mari Emery, DO      gabapentin  100 mg Oral TID Mari Emery, DO      levothyroxine  88 mcg Oral Early Morning Mari Emery, DO      methocarbamol  750 mg Oral Q8H [facility] Mari Emery, DO      pantoprazole  40 mg Oral Daily Mari Topiotr, DO      primidone  50 mg Oral Daily Mari Topiotr, DO tamsulosin  0.4 mg Oral Daily With Fannie Fernandez MD       clonazePAM, 0.5 mg, HS PRN      Allergies   Allergen Reactions    Penicillins Swelling       VTE Pharmacologic Prophylaxis:   Sequential compression device (Venodyne)     Labs:   Troponins:  Results from last 7 days   Lab Units 11/16/23  0557 11/15/23  0421   CK TOTAL U/L 310* 1,085*     CBC with diff:  Results from last 7 days   Lab Units 11/17/23  0806 11/16/23  0557 11/15/23  0421   WBC Thousand/uL 6.64 6.69 9.69   HEMOGLOBIN g/dL 12.2 11.8 14.7   HEMATOCRIT % 37.2 37.6 44.4   MCV fL 96 97 95   PLATELETS Thousands/uL 159 147* 192   RBC Million/uL 3.86 3.89 4.66   MCH pg 31.6 30.3 31.5   MCHC g/dL 32.8 31.4 33.1   RDW % 15.8* 15.9* 15.9*   MPV fL 10.4 10.8 11.1   NRBC AUTO /100 WBCs  --   --  0     CMP:  Results from last 7 days   Lab Units 11/17/23  0806 11/16/23 0557 11/15/23  0421   SODIUM mmol/L 140 140 138   POTASSIUM mmol/L 4.0 3.8 3.9   CHLORIDE mmol/L 107 108 102   CO2 mmol/L 27 26 25   ANION GAP mmol/L 6 6 11   BUN mg/dL 11 17 20   CREATININE mg/dL 0.60 0.66 0.73   CALCIUM mg/dL 9.2 9.2 10.1   AST U/L  --   --  69*   ALT U/L  --   --  25   ALK PHOS U/L  --   --  71   TOTAL PROTEIN g/dL  --   --  7.2   ALBUMIN g/dL 3.5 3.4* 4.1   TOTAL BILIRUBIN mg/dL  --   --  0.93   EGFR ml/min/1.73sq m 83 80 75     Magnesium:  Results from last 7 days   Lab Units 11/17/23 0806 11/16/23 0557   MAGNESIUM mg/dL 1.7* 1.5*     TSH:  Results from last 7 days   Lab Units 11/15/23  0421   TSH 3RD GENERATON uIU/mL 1.180       Imaging & Testing   I have personally reviewed pertinent reports. VAS carotid complete study    Result Date: 11/17/2023  Narrative:  [facility] REPORT CLINICAL: Indications: Patient presents with recent episode of syncope. Operative History: No prior cardiovascular surgeries. Risk Factors: The patient has history of HTN, Hyperlipidemia and previous smoking (quit >10yrs ago). Clinical: Left Pressure:  129/55 mm Hg.   FINDINGS: Right        Impression  PSV  EDV (cm/s)  Direction of Flow  Ratio  Dist. ICA                 52          13                      0.50  Mid. ICA                  97          18                      0.95  Prox. ICA    1 - 49%     106          13                      1.04  Dist CCA                  93          14                            Mid CCA                  102           7                      0.83  Prox CCA                 123          11                      1.09  Ext Carotid              166           0                      1.62  Prox Vert                 44           0  Antegrade                 Subclavian               161           6                            Innominate               113          13                             Left         Impression  PSV  EDV (cm/s)  Direction of Flow  Ratio  Dist. ICA                 52          11                      0.39  Mid. ICA                  50          16                      0.38  Prox. ICA    1 - 49%      88          16                      0.67  Dist CCA                  99           9                            Mid CCA                  131          13                      1.20  Prox CCA                 110           6                            Ext Carotid              114           0                      0.87  Prox Vert                 69          17  Antegrade                 Subclavian               142           0                               CONCLUSION:  Impression RIGHT: There is <50% stenosis noted in the internal carotid artery. Plaque is heterogenous and irregular. Vertebral artery flow is antegrade, however, doppler flow pattern suggests distal stenosis. There is no significant subclavian artery disease. LEFT: There is <50% stenosis noted in the internal carotid artery. Plaque is heterogenous and irregular. Vertebral artery flow is antegrade. There is no significant subclavian artery disease. Tech Note: Enlarged right thyroid.   SIGNATURE: Electronically Signed by: Mariannmp Vishnu on 2023-11-17 09:24:09 AM    XR spine thoracolumbar 2 vw    Result Date: 11/16/2023  Narrative: THORACOLUMBAR SPINE INDICATION:   back pain after fall. COMPARISON: 5/9/2023 VIEWS:  XR SPINE THORACOLUMBAR 2 VW FINDINGS: There is no new acute fracture or pathologic bone lesion. T12 mild anterosuperior chronic wedge deformity. Thoracic vertebral alignment is within normal limits. Lower thoracic mild spondylosis. L2-L3 moderate/marked degenerative disc changes. There is no displacement of the paraspinal line. The pedicles appear intact. Impression: No acute osseous abnormality. Workstation performed: [id number]     Echo complete w/ contrast if indicated    Result Date: 11/15/2023  Narrative:   Left Ventricle: Left ventricular cavity size is normal. Wall thickness is mildly increased. The left ventricular ejection fraction is 58% by single dimension measurement. Wall motion is normal. Diastolic function is mildly abnormal, consistent with grade I (abnormal) relaxation. Left atrial filling pressure is elevated. Right Ventricle: Right ventricular cavity size is normal.   Left Atrium: The atrium is mildly dilated. Aortic Valve: There is aortic valve sclerosis. CT spine cervical without contrast    Result Date: 11/15/2023  Narrative: CT CERVICAL SPINE - WITHOUT CONTRAST INDICATION:   Fall unwitnessed on plavix. COMPARISON: CTA chest on 6/24/2021. TECHNIQUE:  CT examination of the cervical spine was performed without intravenous contrast.  Contiguous axial images were obtained. Multiplanar 2D reformatted images were created from the source data. Radiation dose length product (DLP) for this visit:  473.17 mGy-cm . This examination, like all CT scans performed in the [facility], was performed utilizing techniques to minimize radiation dose exposure, including the use of iterative  reconstruction and automated exposure control.  IMAGE QUALITY: Diagnostic. FINDINGS: ALIGNMENT:  Normal alignment of the cervical spine. No subluxation. VERTEBRAE:  No fracture in the cervical spine. Minimal compression deformity of T1 superior endplate, age-indeterminate but new since 2021. DEGENERATIVE CHANGES: Mild multilevel cervical degenerative changes are noted without critical central canal stenosis. PREVERTEBRAL AND PARASPINAL SOFT TISSUES: Unremarkable THORACIC INLET: Redemonstration of a dominant right thyroid nodule measuring 3.1 cm. This was previously characterized with ultrasound and is stable in size when remeasured similarly. Impression: No cervical spine fracture or traumatic malalignment. Minimal compression deformity of T1 superior endplate, age-indeterminate but new since chest CTA on 6/24/2021. Correlate clinically. Stable dominant right thyroid nodule. The study was marked in [facility] for immediate notification. Workstation performed: [id number]     CT head without contrast    Result Date: 11/15/2023  Narrative: CT BRAIN - WITHOUT CONTRAST INDICATION:   Head trauma, moderate-severe Fall unwitnessed on plavix. COMPARISON: CTA head and neck on 3/4/2023. TECHNIQUE:  CT examination of the brain was performed. Multiplanar 2D reformatted images were created from the source data. Radiation dose length product (DLP) for this visit:  974.83 mGy-cm . This examination, like all CT scans performed in the [facility], was performed utilizing techniques to minimize radiation dose exposure, including the use of iterative  reconstruction and automated exposure control. IMAGE QUALITY:  Diagnostic. FINDINGS: PARENCHYMA: Decreased attenuation is noted in periventricular and subcortical white matter demonstrating an appearance that is statistically most likely to represent mild microangiopathic change; this appearance is similar when compared to most recent prior examination. No CT signs of acute infarction.   No intracranial mass, mass effect or midline shift. No acute parenchymal hemorrhage. VENTRICLES AND EXTRA-AXIAL SPACES:  Normal for the patient's age. VISUALIZED ORBITS: Normal visualized orbits. PARANASAL SINUSES: Mild mucosal thickening of the visualized paranasal sinuses. CALVARIUM AND EXTRACRANIAL SOFT TISSUES:  Normal.     Impression: No acute intracranial abnormality. Workstation performed: [id number]          [facility]      "This note was completed in part utilizing [employer] direct voice recognition software. Grammatical errors, random word insertion, spelling mistakes, and incomplete sentences may be an occasional consequence of the system secondary to software limitations, ambient noise and hardware issues. Please read the chart carefully and recognize, using context, where substitutions have occurred.   If you have any questions or concerns about the context, text or information contained within the body of this dictation, please contact myself, the provider, for further clarification."

## 2023-11-17 NOTE — OCCUPATIONAL THERAPY NOTE
Occupational Therapy Progress Note     Patient Name: Ani Yusuf  ACCWB'Z Date: 11/17/2023  Problem List  Principal Problem:    Syncope, cardiogenic  Active Problems:    Essential hypertension    Dyslipidemia    History of transient ischemic attack (TIA)    Acquired hypothyroidism    SVT (supraventricular tachycardia)    Recurrent major depressive disorder, in remission ([address])    Overflow incontinence of urine       11/17/23 0941   OT Last Visit   OT Visit Date 11/17/23  (Friday)   Note Type   Note Type Treatment   Pain Assessment   Pain Assessment Tool 0-10   Pain Score No Pain   Restrictions/Precautions   Weight Bearing Precautions Per Order No   Braces or Orthoses   (none ordered)   Other Precautions Chair Alarm; Bed Alarm;Telemetry; Fall Risk;Hard of hearing  (speak in pt' R ear)   ADL   Where Assessed Edge of bed  (vs bathroom)   Eating Assistance 6  Modified independent   Eating Deficit Setup   Grooming Assistance 5  Supervision/Setup   Grooming Deficit Setup;Supervision/safety;Verbal cueing;Standing with assistive device   Grooming Comments standing at sink in bathroom to complete oral hygiene   UB Bathing Assistance 5  Supervision/Setup   UB [place]; Increased time to complete;Supervision/safety;Verbal cueing   UB Bathing Comments standing at sink to wash dry hands/ face   UB Dressing Assistance 6  Modified independent   UB Dressing Deficit Setup; Increased time to complete   Toileting Assistance  5  Supervision/Setup   Toileting Deficit Setup;Grab bar use;Supervison/safety   Toileting Comments completed personal hygiene after set- up w/ S   Bed Mobility   Supine to Sit Unable to assess   Sit to Supine 5  Supervision   Additional items Assist x 1;Bedrails; Increased time required   Additional Comments Pt seated at EOB upon arrival w/ call bell activated reporting need to use the bathroom and supine HOB elevated at end of session w/ needs met, call bell in reach and bed alarm activated Transfers   Sit to Stand 4  Minimal assistance   Additional items Assist x 1; Increased time required;Verbal cues; Bedrails;Armrests  (2 attempts and cues for hand placement from EOB)   Stand to Sit 5  Supervision   Additional items Assist x 1; Increased time required;Verbal cues; Bedrails;Armrests  (instruction for hand placement)   Toilet transfer 4  Minimal assistance   Additional items Assist x 1; Increased time required;Verbal cues  (L grab bar use)   Additional Comments Pt performed sit <> stand 2X. Pt declined OOB to chair reporting she would like to rest / sleep as she did not sleep all night due to urology's stimulant   Functional Mobility   Functional Mobility   (min A <> S)   Additional Comments engaged in functional mobility   Additional items Rolling walker   Toilet Transfers   Toilet Transfer From Rolling walker   Toilet Transfer Type To and from   Toilet Transfer to [employer] Transfers Verbal cues; Minimal assistance   Subjective   Subjective "I want to get back into bed in case sleep takes over"   Cognition   Overall Cognitive Status [facility]   Arousal/Participation Alert; Cooperative   Attention Attends with cues to redirect   Orientation Level Oriented X4   Memory Decreased recall of recent events; Other (Comment)  (details, timeline events leading to fall)   Following Commands Follows multistep commands with increased time or repetition   Comments Identified pt by full name and birthdate. Alert, generally oriented and able to communicate wants / needs   Activity Tolerance   Activity Tolerance Patient limited by fatigue   Medical Staff Made Aware spoke w/ RN, Yadira   Assessment   Assessment Pt seen for skilled OT tx session focusing on activity engagement. Pt agreeable and motivated to participate reporting she needs to use the bathroom. Pt demonstrated improved stability and balane w/ use of RW.  Engaged in functional mobility to bathroom and completed toileting w/ S. Pt engaged in grooming standing at sink. From an OT perspective recommend level III rehab resource intensity when medically stable for discharge from acute care pend medical optimization. Will continue to follow   Plan   Treatment Interventions ADL retraining;Functional transfer training; Endurance training;Patient/family training;Equipment evaluation/education; Compensatory technique education;Continued evaluation; Energy conservation; Activityengagement   Goal Expiration Date 11/26/23   OT Treatment Day 1  (Friday)   OT Frequency 3-5x/wk   Discharge Recommendation   Rehab Resource Intensity Level, OT III (Minimum Resource Intensity)   Equipment Recommended Bedside commode; Shower/Tub chair with back ($)   Commode Type Standard   Additional Comments  use of RW for functional mobilty at this time   AM-PAC Daily Activity Inpatient   Lower Body Dressing 3   Bathing 3   Toileting 3   Upper Body Dressing 4   Grooming 4   Eating 4   Daily Activity Raw Score 21   Daily Activity Standardized Score (Calc for Raw Score >=11) 44.27   AM-PAC Applied Cognition Inpatient   Following a Speech/Presentation 3   Understanding Ordinary Conversation 4   Taking Medications 3   Remembering Where Things Are Placed or Put Away 4   Remembering List of 4-5 Errands 3   Taking Care of Complicated Tasks 3   Applied Cognition Raw Score 20   Applied Cognition Standardized Score 41.76   Barthel Index   Feeding 10   Bathing 0   Grooming Score 5   Dressing Score 5   Bladder Score 10   Bowels Score 10   Toilet Use Score 5   Transfers (Bed/Chair) Score 10   Mobility (Level Surface) Score 0   Stairs Score 0   Barthel Index Score 55   End of Consult   Education Provided Yes   Patient Position at End of Consult Supine;Bed/Chair alarm activated; All needs within reach   Nurse Communication Nurse aware of consult   Licensure   [address] [place] [address] Number  Izzy Ybarra, OTR/L [id number]          The patient's raw score on the AM-PAC Daily Activity Inpatient Short Form is 20. A raw score of greater than or equal to 19 suggests the patient may benefit from discharge to home. Please refer to the recommendation of the Occupational Therapist for safe discharge planning.        Pt goals to be met by 11/26/23 to max I w/ ADLs and improve engagement to return home includes:     -Pt will complete bed mobility supine <> sit w/ mod I in preparation for ADLs PROGRESSING     -Pt will complete LBD w/ mod I for + time demonstrating good recall tech, body mechanics PROGRESSING     -Pt will complete functional transfers to bed, chair, and toilet using LRAD, DME as needed w/ mod I PROGRESSING     -Pt will consistently engage in functional mobility using LRAD w/ mod I household distances using LRAD to max I to return home alone PROGRESSING     -Pt will complete functional tub/shower transfer using LRAD, DME as needed w/ S to max I w/ bathing      -Pt will complete UBD independently PROGRESSING     -Pt will demonstrate improved functional standing tolerance for at least 10 minutes w/ at least fair balance using LRAD as needed while engaged in grooming/ UB bathing w/ mod I PROGRESSING     -Pt will demonstrate improved functional activity, sitting tolerance OOB in chair for all meals [id number], OTR/L  [id number]  [id number]

## 2023-11-17 NOTE — CASE MANAGEMENT
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    SVT (supraventricular tachycardia) 2023    Osteoarthritis of right knee 2023    Elevated liver enzymes 2023    Type 2 diabetes mellitus, without long-term current use of insulin ([address]) 2023    Dyspepsia 2022    Frequent falls 2022    Localized swelling of right lower leg 2022    Anxiety 2022    Gastroesophageal reflux disease without esophagitis 10/12/2022    Nocturnal hypoxemia 2021    Acquired hypothyroidism 2021    Thyroid nodule 2021    Palpitations 2021    Tremor 2021    History of transient ischemic attack (TIA) 2021    Essential hypertension 2021    Dyslipidemia 2021    Seasonal allergic rhinitis due to pollen 2021    Chronic hypoxemic respiratory failure ([address]) 2020    Dizziness 2020    Shortness of breath on exertion       LOS (days): 2  Geometric Mean LOS (GMLOS) (days): 2.40  Days to GMLOS:0.1     OBJECTIVE:  Risk of Unplanned Readmission Score: 12.31     Current admission status: Inpatient   Preferred Pharmacy:   [address] [place] [id number]  Phone: [phone] Fax: [phone]    Primary Care Provider: Angela Estrella MD    Primary Insurance: MEDICARE  Secondary Insurance: AARP    DISCHARGE DETAILS:    Discharge planning discussed with[de-identified] Patient  Freedom of Choice: Yes  Comments - Freedom of Choice: CM met with patient bedside to present list of accepting facilities. Patient stated that it is important for the STR facility to be most convienent for her daughter who lives in [facility]. [facility]. CM contacted family/caregiver?: Yes    Contacts  Patient Contacts: Suzanna Mares  Relationship to Patient[de-identified] Family  Contact Method: Phone  Phone Number: [phone]  Reason/Outcome: Emergency Contact, Discharge Planning    Other Referral/Resources/Interventions Provided:  Referral Comments: CM [facility] [address] [facility]. IMM Given (Date):: 11/17/23 (IMM presented and reviewed with patient bedside. Patient verbalized understanding, signed, and was provided a copy. Copy also placed in scan bin for patient's chart.)    CM met with patient at bedside to present list of accepting STR facilities and confirm choice. [facility] stating it is the most convenient for her daughter. CM [facility] in [address] [facility] and [facility] at the facility to confirm who the contact is for the facility tomorrow. CM called and spoke with patient's Daughter Gadiel Medina to make her aware of facility choice and anticipated discharge tomorrow. CM also made Vivi aware of recommended DME for patient-bedside commode and shower chair with back. Gadiel Medina agreed that both are needed and was in agreement for both to be ordered. CM ordered bedside commode and shower chair with back in [place].  Gadiel Medina requested both items be delivered to her home at-  [address] [facility], [address] [employer]

## 2023-11-17 NOTE — ASSESSMENT & PLAN NOTE
Frequency urgency urgency incontinence possible overactive bladder  Admits to having incontinence for years and figured it was due to aging  Retention protocol  Consult urology    Urology started tamsulosin 0.4 mg  States she's urinating better  Outpatient urology follow up

## 2023-11-17 NOTE — PLAN OF CARE
Problem: Potential for Falls  Goal: Patient will remain free of falls  Description: INTERVENTIONS:  - Educate patient/family on patient safety including physical limitations  - Instruct patient to call for assistance with activity   - Consult OT/PT to assist with strengthening/mobility   - Keep Call bell within reach  - Keep bed low and locked with side rails adjusted as appropriate  - Keep care items and personal belongings within reach  - Initiate and maintain comfort rounds  - Make Fall Risk Sign visible to staff  - Offer Toileting every 2 Hours, in advance of need  - Initiate/Maintain bed alarm  - Obtain necessary fall risk management equipment:  call bell within reach. Phone and table by bedside. Bed alarm, walker as needed.   - Apply yellow socks and bracelet for high fall risk patients  - Consider moving patient to room near nurses station  Outcome: Progressing     Problem: PAIN - ADULT  Goal: Verbalizes/displays adequate comfort level or baseline comfort level  Description: Interventions:  - Encourage patient to monitor pain and request assistance  - Assess pain using appropriate pain scale  - Administer analgesics based on type and severity of pain and evaluate response  - Implement non-pharmacological measures as appropriate and evaluate response  - Consider cultural and social influences on pain and pain management  - Notify physician/advanced practitioner if interventions unsuccessful or patient reports new pain  Outcome: Progressing     Problem: INFECTION - ADULT  Goal: Absence or prevention of progression during hospitalization  Description: INTERVENTIONS:  - Assess and monitor for signs and symptoms of infection  - Monitor lab/diagnostic results  - Monitor all insertion sites, i.e. indwelling lines, tubes, and drains  - Monitor endotracheal if appropriate and nasal secretions for changes in amount and color  - [place] appropriate cooling/warming therapies per order  - Administer medications as ordered  - Instruct and encourage patient and family to use good hand hygiene technique  - Identify and instruct in appropriate isolation precautions for identified infection/condition  Outcome: Progressing     Problem: SAFETY ADULT  Goal: Patient will remain free of falls  Description: INTERVENTIONS:  - Educate patient/family on patient safety including physical limitations  - Instruct patient to call for assistance with activity   - Consult OT/PT to assist with strengthening/mobility   - Keep Call bell within reach  - Keep bed low and locked with side rails adjusted as appropriate  - Keep care items and personal belongings within reach  - Initiate and maintain comfort rounds  - Make Fall Risk Sign visible to staff  - Offer Toileting every 2 Hours, in advance of need  - Initiate/Maintain bed alarm  - Apply yellow socks and bracelet for high fall risk patients  - Consider moving patient to room near nurses station  Outcome: Progressing  Goal: Maintain or return to baseline ADL function  Description: INTERVENTIONS:  -  Assess patient's ability to carry out ADLs; assess patient's baseline for ADL function and identify physical deficits which impact ability to perform ADLs (bathing, care of mouth/teeth, toileting, grooming, dressing, etc.)  - Assess/evaluate cause of self-care deficits   - Assess range of motion  - Assess patient's mobility; develop plan if impaired  - Assess patient's need for assistive devices and provide as appropriate  - Encourage maximum independence but intervene and supervise when necessary  - Involve family in performance of ADLs  - Assess for home care needs following discharge   - Consider OT consult to assist with ADL evaluation and planning for discharge  - Provide patient education as appropriate  Outcome: Progressing     Problem: DISCHARGE PLANNING  Goal: Discharge to home or other facility with appropriate resources  Description: INTERVENTIONS:  - Identify barriers to discharge w/patient and caregiver  - Arrange for needed discharge resources and transportation as appropriate  - Identify discharge learning needs (meds, wound care, etc.)  - Arrange for interpretive services to assist at discharge as needed  - Refer to Case Management Department for coordinating discharge planning if the patient needs post-hospital services based on physician/advanced practitioner order or complex needs related to functional status, cognitive ability, or social support system  Outcome: Progressing     Problem: NEUROSENSORY - ADULT  Goal: Achieves stable or improved neurological status  Description: INTERVENTIONS  - Monitor and report changes in neurological status  - Monitor vital signs such as temperature, blood pressure, glucose, and any other labs ordered   - Initiate measures to prevent increased intracranial pressure  - Monitor for seizure activity and implement precautions if appropriate      Outcome: Progressing     Problem: CARDIOVASCULAR - ADULT  Goal: Maintains optimal cardiac output and hemodynamic stability  Description: INTERVENTIONS:  - Monitor I/O, vital signs and rhythm  - Monitor for S/S and trends of decreased cardiac output  - Administer and titrate ordered vasoactive medications to optimize hemodynamic stability  - Assess quality of pulses, skin color and temperature  - Assess for signs of decreased coronary artery perfusion  - Instruct patient to report change in severity of symptoms  Outcome: Progressing  Goal: Absence of cardiac dysrhythmias or at baseline rhythm  Description: INTERVENTIONS:  - Continuous cardiac monitoring, vital signs, obtain 12 lead EKG if ordered  - Administer antiarrhythmic and heart rate control medications as ordered  - Monitor electrolytes and administer replacement therapy as ordered  Outcome: Progressing

## 2023-11-17 NOTE — PROGRESS NOTES
[address]  Progress Note  Name: Franklin Campos  MRN: [id number]  Unit/Bed#: [address] I Date of Admission: 11/15/2023   Date of Service: 11/17/2023 I Hospital Day: 2    Assessment/Plan   * Syncope, cardiogenic  Assessment & Plan  While reaching to get her gown she fell backwards and next thing she remembers is being on hallway  Unable to tell if she had syncopayl episode but does nto remember what happened  Admits to having previous episodes of falling where she describes it as her muscles giving out on her but unable to further clarify. States she denies having syncopy but does not remember what happened and how she got to hallway as she lives alone. CT head and cervical spine negative for acute findings    Denies any prodromal symptoms  Telemetry  Obtain echo  Orthostatic vitals positive on day of admission  Give 1 L bolus and NS @ 100ml/hr for 24 hours  History of SVT follows with Dr. Raghav Petty at the time was advised to switch Bystolic to an alternate betablocker but patient did not want any changes   Consult cardio  11/17/23 TTE : Left Ventricle: Left ventricular cavity size is normal. Wall thickness is mildly increased.  The left ventricular ejection fraction is 58% by single dimension measurement   Amlodipine and bystolic discontinued  Pt started on bisoprolol 2.5 mg daily  F/U carotid US    Admits to having low back pain, thoracolumbar xrays: No acute osseous abnormality     SVT (supraventricular tachycardia)  Assessment & Plan  Patient follows with Dr. Raghav Petty  - At the time had episodes of palpitations lasting for several seconds, pt was recommended to switch to an alternate beta blocker from [facility] [address] but patient did not want any medication changes at the time  Cardiology consulted  Amlodipine and bystolic discontinued  Pt started on bisoprolol 2.5 mg daily    Overflow incontinence of urine  Assessment & Plan  Frequency urgency urgency incontinence possible overactive bladder  Admits to having incontinence for years and figured it was due to aging  Retention protocol  Consult urology    Urology started tamsulosin 0.4 mg  States she's urinating better    Non-traumatic rhabdomyolysis-resolved as of 11/16/2023  Assessment & Plan  Patient states that she was laying on the ground for about 24 hours as she was unable to get to her phone  She states that she finally was able to reach her phone and dial 911  CK elevated in the ER 1085  Cr 0.7  Given 1L bolus and place on NS @ 100ml/hr for 24 hours  CK down to 310      Recurrent major depressive disorder, in remission ([address])  Assessment & Plan  Resume home duloxetine 60 mg daily    Acquired hypothyroidism  Assessment & Plan  Continue levothyroxine    History of transient ischemic attack (TIA)  Assessment & Plan  Continue statin and plavix    Dyslipidemia  Assessment & Plan  Continue statin    Essential hypertension  Assessment & Plan  Amlodipine and bystolic discontinued  Pt started on bisoprolol 2.5 mg daily                   VTE Pharmacologic Prophylaxis: VTE Score: 3 Moderate Risk (Score 3-4) - Pharmacological DVT Prophylaxis Ordered: enoxaparin (Lovenox). Mobility:   Basic Mobility Inpatient Raw Score: 13  -HLM Goal: 4: Move to chair/commode  JH-HLM Achieved: 5: Stand (1 or more minutes)  HLM Goal achieved. Continue to encourage appropriate mobility. Patient Centered Rounds: I performed bedside rounds with nursing staff today. Discussions with Specialists or Other Care Team Provider: cardio, case management    Education and Discussions with Family / Patient: Patient declined call to . Total Time Spent on Date of Encounter in care of patient: 35 mins.  This time was spent on one or more of the following: performing physical exam; counseling and coordination of care; obtaining or reviewing history; documenting in the medical record; reviewing/ordering tests, medications or procedures; communicating with other healthcare professionals and discussing with patient's family/caregivers. Current Length of Stay: 2 day(s)  Current Patient Status: Inpatient   Certification Statement: The patient will continue to require additional inpatient hospital stay due to medication adjustment positive orthostatics  Discharge Plan: Anticipate discharge in 24-48 hrs to rehab facility. Code Status: Level 3 - DNAR and DNI    Subjective:   Patient seen and examined at bedside. Patient states that she is feeling much better. She states that she was up all night urinating. She states that she now has sensation when she has to go urinate. Objective:     Vitals:   Temp (24hrs), Av °F (36.7 °C), Min:97.5 °F (36.4 °C), Max:98.4 °F (36.9 °C)    Temp:  [97.5 °F (36.4 °C)-98.4 °F (36.9 °C)] 97.6 °F (36.4 °C)  HR:  [65-92] 92  Resp:  [16-18] 17  BP: (129-157)/(48-59) 129/55  SpO2:  [90 %-97 %] 90 %  Body mass index is 27.61 kg/m². Input and Output Summary (last 24 hours):   No intake or output data in the 24 hours ending 23 1034      Physical Exam:   Physical Exam  Vitals and nursing note reviewed. Constitutional:       General: She is not in acute distress. Appearance: She is well-developed. HENT:      Head: Normocephalic and atraumatic. Eyes:      Conjunctiva/sclera: Conjunctivae normal.   Cardiovascular:      Rate and Rhythm: Normal rate and regular rhythm. Heart sounds: No murmur heard. Pulmonary:      Effort: Pulmonary effort is normal. No respiratory distress. Breath sounds: Normal breath sounds. Abdominal:      Palpations: Abdomen is soft. Tenderness: There is no abdominal tenderness. Musculoskeletal:         General: No swelling. Cervical back: Neck supple. Skin:     General: Skin is warm and dry. Capillary Refill: Capillary refill takes less than 2 seconds. Neurological:      Mental Status: She is alert. Mental status is at baseline.    Psychiatric:         Mood and Affect: Mood normal.          Additional Data:     Labs:  Results from last 7 days   Lab Units 11/17/23  0806 11/16/23  0557 11/15/23  0421   WBC Thousand/uL 6.64   < > 9.69   HEMOGLOBIN g/dL 12.2   < > 14.7   HEMATOCRIT % 37.2   < > 44.4   PLATELETS Thousands/uL 159   < > 192   NEUTROS PCT %  --   --  79*   LYMPHS PCT %  --   --  10*   MONOS PCT %  --   --  8   EOS PCT %  --   --  1    < > = values in this interval not displayed. Results from last 7 days   Lab Units 11/17/23  0806 11/16/23  0557 11/15/23  0421   SODIUM mmol/L 140   < > 138   POTASSIUM mmol/L 4.0   < > 3.9   CHLORIDE mmol/L 107   < > 102   CO2 mmol/L 27   < > 25   BUN mg/dL 11   < > 20   CREATININE mg/dL 0.60   < > 0.73   ANION GAP mmol/L 6   < > 11   CALCIUM mg/dL 9.2   < > 10.1   ALBUMIN g/dL 3.5   < > 4.1   TOTAL BILIRUBIN mg/dL  --   --  0.93   ALK PHOS U/L  --   --  71   ALT U/L  --   --  25   AST U/L  --   --  69*   GLUCOSE RANDOM mg/dL 100   < > 87    < > = values in this interval not displayed.                          Lines/Drains:  Invasive Devices       Peripheral Intravenous Line  Duration             Peripheral IV 11/15/23 Right Antecubital 2 days                      Telemetry:  Telemetry Orders (From admission, onward)               24 Hour Telemetry Monitoring  Continuous x 24 Hours (Telem)        Question:  Reason for 24 Hour Telemetry  Answer:  TIA/Suspected CVA/ Confirmed CVA                     Telemetry Reviewed:  no significant arrhythmias  Indication for Continued Telemetry Use: Arrthymias requiring medical therapy             Imaging: Reviewed radiology reports from this admission including: xray(s)    Recent Cultures (last 7 days):         Last 24 Hours Medication List:   Current Facility-Administered Medications   Medication Dose Route Frequency Provider Last Rate    acetaminophen  650 mg Oral Q8H [address] Pandi Todhe, DO      atorvastatin  80 mg Oral HS Pandi Todhe, DO      bisoprolol  2.5 mg Oral Daily Sofía Dunaway PA-C      calcium carbonate-vitamin D  2 tablet Oral Daily With Breakfast Mari Topiotr, DO      cholecalciferol  2,000 Units Oral Daily Ananyai Topiotr, DO      clonazePAM  0.5 mg Oral HS PRN Ananyai Topiotr, DO      clopidogrel  75 mg Oral Daily Ananyai Todhe, DO      DULoxetine  60 mg Oral Daily Pandi Topiotr, DO      enoxaparin  40 mg Subcutaneous Daily Ananyai Topiotr, DO      gabapentin  100 mg Oral TID Ananyai Topiotr, DO      levothyroxine  88 mcg Oral Early Morning Ananyai Topiotr, DO      methocarbamol  750 mg Oral Q8H [address] Pandbridgett Topiotr, DO      pantoprazole  40 mg Oral Daily Ananyai Topiotr, DO      primidone  50 mg Oral Daily Ananyai Topiotr, DO      tamsulosin  0.4 mg Oral Daily With Shabana Smith MD          Today, Patient Was Seen By: Kt Josue DO    **Please Note: This note may have been constructed using a voice recognition system. **

## 2023-11-17 NOTE — ASSESSMENT & PLAN NOTE
Amlodipine and bystolic discontinued  91/16 Pt started on bisoprolol 2.5 mg daily  Orthostatics positive today.  Given 500 cc bolus  Cardio started on midodrine 5 mg bid with breakfast and lunch

## 2023-11-17 NOTE — PLAN OF CARE
Problem: OCCUPATIONAL THERAPY ADULT  Goal: Performs self-care activities at highest level of function for planned discharge setting. See evaluation for individualized goals. Description: Treatment Interventions: ADL retraining, Functional transfer training, Endurance training, Patient/family training, Equipment evaluation/education, Compensatory technique education, Continued evaluation, Energy conservation, Activityengagement  Equipment Recommended: Bedside commode, Shower/Tub chair with back ($)       See flowsheet documentation for full assessment, interventions and recommendations. Outcome: Progressing  Note: Limitation: Decreased ADL status, Decreased endurance, Decreased self-care trans, Decreased high-level ADLs (impaired activity tolerance, standing tolerance, pain, forward functional reach, generalized weakness)     Assessment: Pt seen for skilled OT tx session focusing on activity engagement. Pt agreeable and motivated to participate reporting she needs to use the bathroom. Pt demonstrated improved stability and balane w/ use of RW. Engaged in functional mobility to bathroom and completed toileting w/ S. Pt engaged in grooming standing at sink. From an OT perspective recommend level III rehab resource intensity when medically stable for discharge from acute care pend medical optimization.  Will continue to follow     Rehab Resource Intensity Level, OT: III (Minimum Resource Intensity)

## 2023-11-17 NOTE — ASSESSMENT & PLAN NOTE
Patient follows with Dr. Tiffanie Lopez  - At the time had episodes of palpitations lasting for several seconds, pt was recommended to switch to an alternate beta blocker from [facility] but patient did not want any medication changes at the time  Cardiology consulted  Amlodipine and bystolic discontinued  Pt started on bisoprolol 2.5 mg daily

## 2023-11-17 NOTE — PLAN OF CARE
Problem: Potential for Falls  Goal: Patient will remain free of falls  Description: INTERVENTIONS:  - Educate patient/family on patient safety including physical limitations  - Instruct patient to call for assistance with activity   - Consult OT/PT to assist with strengthening/mobility   - Keep Call bell within reach  - Keep bed low and locked with side rails adjusted as appropriate  - Keep care items and personal belongings within reach  - Initiate and maintain comfort rounds  - Make Fall Risk Sign visible to staff  - Offer Toileting every  Hours, in advance of need  - Initiate/Maintain alarm  - Obtain necessary fall risk management equipment:   - Apply yellow socks and bracelet for high fall risk patients  - Consider moving patient to room near nurses station  Outcome: Progressing     Problem: PAIN - ADULT  Goal: Verbalizes/displays adequate comfort level or baseline comfort level  Description: Interventions:  - Encourage patient to monitor pain and request assistance  - Assess pain using appropriate pain scale  - Administer analgesics based on type and severity of pain and evaluate response  - Implement non-pharmacological measures as appropriate and evaluate response  - Consider cultural and social influences on pain and pain management  - Notify physician/advanced practitioner if interventions unsuccessful or patient reports new pain  Outcome: Progressing     Problem: INFECTION - ADULT  Goal: Absence or prevention of progression during hospitalization  Description: INTERVENTIONS:  - Assess and monitor for signs and symptoms of infection  - Monitor lab/diagnostic results  - Monitor all insertion sites, i.e. indwelling lines, tubes, and drains  - Monitor endotracheal if appropriate and nasal secretions for changes in amount and color  - [place] appropriate cooling/warming therapies per order  - Administer medications as ordered  - Instruct and encourage patient and family to use good hand hygiene technique  - Identify and instruct in appropriate isolation precautions for identified infection/condition  Outcome: Progressing     Problem: SAFETY ADULT  Goal: Patient will remain free of falls  Description: INTERVENTIONS:  - Educate patient/family on patient safety including physical limitations  - Instruct patient to call for assistance with activity   - Consult OT/PT to assist with strengthening/mobility   - Keep Call bell within reach  - Keep bed low and locked with side rails adjusted as appropriate  - Keep care items and personal belongings within reach  - Initiate and maintain comfort rounds  - Make Fall Risk Sign visible to staff  - Offer Toileting every  Hours, in advance of need  - Initiate/Maintain alarm  - Obtain necessary fall risk management equipment  - Apply yellow socks and bracelet for high fall risk patients  - Consider moving patient to room near nurses station  Outcome: Progressing  Goal: Maintain or return to baseline ADL function  Description: INTERVENTIONS:  -  Assess patient's ability to carry out ADLs; assess patient's baseline for ADL function and identify physical deficits which impact ability to perform ADLs (bathing, care of mouth/teeth, toileting, grooming, dressing, etc.)  - Assess/evaluate cause of self-care deficits   - Assess range of motion  - Assess patient's mobility; develop plan if impaired  - Assess patient's need for assistive devices and provide as appropriate  - Encourage maximum independence but intervene and supervise when necessary  - Involve family in performance of ADLs  - Assess for home care needs following discharge   - Consider OT consult to assist with ADL evaluation and planning for discharge  - Provide patient education as appropriate  Outcome: Progressing     Problem: DISCHARGE PLANNING  Goal: Discharge to home or other facility with appropriate resources  Description: INTERVENTIONS:  - Identify barriers to discharge w/patient and caregiver  - Arrange for needed discharge resources and transportation as appropriate  - Identify discharge learning needs (meds, wound care, etc.)  - Arrange for interpretive services to assist at discharge as needed  - Refer to Case Management Department for coordinating discharge planning if the patient needs post-hospital services based on physician/advanced practitioner order or complex needs related to functional status, cognitive ability, or social support system  Outcome: Progressing     Problem: NEUROSENSORY - ADULT  Goal: Achieves stable or improved neurological status  Description: INTERVENTIONS  - Monitor and report changes in neurological status  - Monitor vital signs such as temperature, blood pressure, glucose, and any other labs ordered   - Initiate measures to prevent increased intracranial pressure  - Monitor for seizure activity and implement precautions if appropriate      Outcome: Progressing     Problem: CARDIOVASCULAR - ADULT  Goal: Maintains optimal cardiac output and hemodynamic stability  Description: INTERVENTIONS:  - Monitor I/O, vital signs and rhythm  - Monitor for S/S and trends of decreased cardiac output  - Administer and titrate ordered vasoactive medications to optimize hemodynamic stability  - Assess quality of pulses, skin color and temperature  - Assess for signs of decreased coronary artery perfusion  - Instruct patient to report change in severity of symptoms  Outcome: Progressing  Goal: Absence of cardiac dysrhythmias or at baseline rhythm  Description: INTERVENTIONS:  - Continuous cardiac monitoring, vital signs, obtain 12 lead EKG if ordered  - Administer antiarrhythmic and heart rate control medications as ordered  - Monitor electrolytes and administer replacement therapy as ordered  Outcome: Progressing

## 2023-11-18 VITALS
RESPIRATION RATE: 18 BRPM | OXYGEN SATURATION: 91 % | WEIGHT: 168.43 LBS | HEART RATE: 84 BPM | SYSTOLIC BLOOD PRESSURE: 164 MMHG | TEMPERATURE: 98.1 F | DIASTOLIC BLOOD PRESSURE: 86 MMHG | HEIGHT: 66 IN | BODY MASS INDEX: 27.07 KG/M2

## 2023-11-18 PROBLEM — E04.9 ENLARGED THYROID: Status: ACTIVE | Noted: 2023-11-18

## 2023-11-18 LAB
ALBUMIN SERPL BCP-MCNC: 3.5 G/DL (ref 3.5–5)
ANION GAP SERPL CALCULATED.3IONS-SCNC: 6 MMOL/L
BUN SERPL-MCNC: 12 MG/DL (ref 5–25)
CALCIUM SERPL-MCNC: 9.2 MG/DL (ref 8.4–10.2)
CHLORIDE SERPL-SCNC: 107 MMOL/L (ref 96–108)
CO2 SERPL-SCNC: 28 MMOL/L (ref 21–32)
CREAT SERPL-MCNC: 0.6 MG/DL (ref 0.6–1.3)
ERYTHROCYTE [DISTWIDTH] IN BLOOD BY AUTOMATED COUNT: 15.3 % (ref 11.6–15.1)
GFR SERPL CREATININE-BSD FRML MDRD: 83 ML/MIN/1.73SQ M
GLUCOSE SERPL-MCNC: 105 MG/DL (ref 65–140)
GLUCOSE SERPL-MCNC: 94 MG/DL (ref 65–140)
HCT VFR BLD AUTO: 36.9 % (ref 34.8–46.1)
HGB BLD-MCNC: 11.9 G/DL (ref 11.5–15.4)
MAGNESIUM SERPL-MCNC: 2 MG/DL (ref 1.9–2.7)
MCH RBC QN AUTO: 30.7 PG (ref 26.8–34.3)
MCHC RBC AUTO-ENTMCNC: 32.2 G/DL (ref 31.4–37.4)
MCV RBC AUTO: 95 FL (ref 82–98)
PHOSPHATE SERPL-MCNC: 3.6 MG/DL (ref 2.3–4.1)
PLATELET # BLD AUTO: 176 THOUSANDS/UL (ref 149–390)
PMV BLD AUTO: 11.3 FL (ref 8.9–12.7)
POTASSIUM SERPL-SCNC: 4.1 MMOL/L (ref 3.5–5.3)
RBC # BLD AUTO: 3.88 MILLION/UL (ref 3.81–5.12)
SODIUM SERPL-SCNC: 141 MMOL/L (ref 135–147)
WBC # BLD AUTO: 6.56 THOUSAND/UL (ref 4.31–10.16)

## 2023-11-18 PROCEDURE — 99239 HOSP IP/OBS DSCHRG MGMT >30: CPT | Performed by: STUDENT IN AN ORGANIZED HEALTH CARE EDUCATION/TRAINING PROGRAM

## 2023-11-18 PROCEDURE — 80069 RENAL FUNCTION PANEL: CPT | Performed by: STUDENT IN AN ORGANIZED HEALTH CARE EDUCATION/TRAINING PROGRAM

## 2023-11-18 PROCEDURE — 82948 REAGENT STRIP/BLOOD GLUCOSE: CPT

## 2023-11-18 PROCEDURE — 83735 ASSAY OF MAGNESIUM: CPT | Performed by: STUDENT IN AN ORGANIZED HEALTH CARE EDUCATION/TRAINING PROGRAM

## 2023-11-18 PROCEDURE — 85027 COMPLETE CBC AUTOMATED: CPT | Performed by: STUDENT IN AN ORGANIZED HEALTH CARE EDUCATION/TRAINING PROGRAM

## 2023-11-18 PROCEDURE — 99232 SBSQ HOSP IP/OBS MODERATE 35: CPT | Performed by: INTERNAL MEDICINE

## 2023-11-18 RX ORDER — BISOPROLOL FUMARATE 5 MG/1
2.5 TABLET, FILM COATED ORAL DAILY
Qty: 45 TABLET | Refills: 0 | Status: SHIPPED | OUTPATIENT
Start: 2023-11-19 | End: 2024-02-17

## 2023-11-18 RX ORDER — MIDODRINE HYDROCHLORIDE 5 MG/1
5 TABLET ORAL
Qty: 180 TABLET | Refills: 0 | Status: SHIPPED | OUTPATIENT
Start: 2023-11-18 | End: 2024-02-16

## 2023-11-18 RX ORDER — MIDODRINE HYDROCHLORIDE 5 MG/1
5 TABLET ORAL
Status: DISCONTINUED | OUTPATIENT
Start: 2023-11-18 | End: 2023-11-18 | Stop reason: HOSPADM

## 2023-11-18 RX ORDER — TAMSULOSIN HYDROCHLORIDE 0.4 MG/1
0.4 CAPSULE ORAL
Qty: 30 CAPSULE | Refills: 0 | Status: SHIPPED | OUTPATIENT
Start: 2023-11-18 | End: 2023-12-18

## 2023-11-18 RX ADMIN — ACETAMINOPHEN 650 MG: 325 TABLET ORAL at 05:44

## 2023-11-18 RX ADMIN — ENOXAPARIN SODIUM 40 MG: 40 INJECTION SUBCUTANEOUS at 08:37

## 2023-11-18 RX ADMIN — MIDODRINE HYDROCHLORIDE 5 MG: 5 TABLET ORAL at 11:41

## 2023-11-18 RX ADMIN — PANTOPRAZOLE SODIUM 40 MG: 40 TABLET, DELAYED RELEASE ORAL at 05:44

## 2023-11-18 RX ADMIN — SODIUM CHLORIDE, SODIUM LACTATE, POTASSIUM CHLORIDE, AND CALCIUM CHLORIDE 500 ML: .6; .31; .03; .02 INJECTION, SOLUTION INTRAVENOUS at 08:37

## 2023-11-18 RX ADMIN — CLOPIDOGREL BISULFATE 75 MG: 75 TABLET ORAL at 08:37

## 2023-11-18 RX ADMIN — Medication 2 TABLET: at 08:37

## 2023-11-18 RX ADMIN — LEVOTHYROXINE SODIUM 88 MCG: 88 TABLET ORAL at 05:44

## 2023-11-18 RX ADMIN — PRIMIDONE 50 MG: 50 TABLET ORAL at 08:37

## 2023-11-18 RX ADMIN — GABAPENTIN 100 MG: 100 CAPSULE ORAL at 08:37

## 2023-11-18 RX ADMIN — METHOCARBAMOL 750 MG: 500 TABLET ORAL at 05:43

## 2023-11-18 RX ADMIN — Medication 2000 UNITS: at 08:37

## 2023-11-18 RX ADMIN — DULOXETINE HYDROCHLORIDE 60 MG: 60 CAPSULE, DELAYED RELEASE ORAL at 08:37

## 2023-11-18 NOTE — PLAN OF CARE
Problem: Potential for Falls  Goal: Patient will remain free of falls  Description: INTERVENTIONS:  - Educate patient/family on patient safety including physical limitations  - Instruct patient to call for assistance with activity   - Consult OT/PT to assist with strengthening/mobility   - Keep Call bell within reach  - Keep bed low and locked with side rails adjusted as appropriate  - Keep care items and personal belongings within reach  - Initiate and maintain comfort rounds  - Make Fall Risk Sign visible to staff  - Offer Toileting every  Hours, in advance of need  - Initiate/Maintain alarm  - Obtain necessary fall risk management equipment:   - Apply yellow socks and bracelet for high fall risk patients  - Consider moving patient to room near nurses station  11/18/2023 1146 by Jorge Cook RN  Outcome: Completed  11/18/2023 1005 by Jorge Cook RN  Outcome: Progressing     Problem: PAIN - ADULT  Goal: Verbalizes/displays adequate comfort level or baseline comfort level  Description: Interventions:  - Encourage patient to monitor pain and request assistance  - Assess pain using appropriate pain scale  - Administer analgesics based on type and severity of pain and evaluate response  - Implement non-pharmacological measures as appropriate and evaluate response  - Consider cultural and social influences on pain and pain management  - Notify physician/advanced practitioner if interventions unsuccessful or patient reports new pain  11/18/2023 1146 by Jorge Cook RN  Outcome: Completed  11/18/2023 1005 by Jorge Cook RN  Outcome: Progressing     Problem: INFECTION - ADULT  Goal: Absence or prevention of progression during hospitalization  Description: INTERVENTIONS:  - Assess and monitor for signs and symptoms of infection  - Monitor lab/diagnostic results  - Monitor all insertion sites, i.e. indwelling lines, tubes, and drains  - Monitor endotracheal if appropriate and nasal secretions for changes in amount and color  - [place] appropriate cooling/warming therapies per order  - Administer medications as ordered  - Instruct and encourage patient and family to use good hand hygiene technique  - Identify and instruct in appropriate isolation precautions for identified infection/condition  11/18/2023 1146 by Curt Horne RN  Outcome: Completed  11/18/2023 1005 by Curt Horne RN  Outcome: Progressing     Problem: SAFETY ADULT  Goal: Patient will remain free of falls  Description: INTERVENTIONS:  - Educate patient/family on patient safety including physical limitations  - Instruct patient to call for assistance with activity   - Consult OT/PT to assist with strengthening/mobility   - Keep Call bell within reach  - Keep bed low and locked with side rails adjusted as appropriate  - Keep care items and personal belongings within reach  - Initiate and maintain comfort rounds  - Make Fall Risk Sign visible to staff  - Offer Toileting every  Hours, in advance of need  - Initiate/Maintain alarm  - Obtain necessary fall risk management equipment  - Apply yellow socks and bracelet for high fall risk patients  - Consider moving patient to room near nurses station  11/18/2023 1146 by Curt Horne RN  Outcome: Completed  11/18/2023 1005 by Curt Horne RN  Outcome: Progressing  Goal: Maintain or return to baseline ADL function  Description: INTERVENTIONS:  -  Assess patient's ability to carry out ADLs; assess patient's baseline for ADL function and identify physical deficits which impact ability to perform ADLs (bathing, care of mouth/teeth, toileting, grooming, dressing, etc.)  - Assess/evaluate cause of self-care deficits   - Assess range of motion  - Assess patient's mobility; develop plan if impaired  - Assess patient's need for assistive devices and provide as appropriate  - Encourage maximum independence but intervene and supervise when necessary  - Involve family in performance of ADLs  - Assess for home care needs following discharge   - Consider OT consult to assist with ADL evaluation and planning for discharge  - Provide patient education as appropriate  11/18/2023 1146 by Lorena Mary RN  Outcome: Completed  11/18/2023 1005 by Lorena Mary RN  Outcome: Progressing     Problem: DISCHARGE PLANNING  Goal: Discharge to home or other facility with appropriate resources  Description: INTERVENTIONS:  - Identify barriers to discharge w/patient and caregiver  - Arrange for needed discharge resources and transportation as appropriate  - Identify discharge learning needs (meds, wound care, etc.)  - Arrange for interpretive services to assist at discharge as needed  - Refer to Case Management Department for coordinating discharge planning if the patient needs post-hospital services based on physician/advanced practitioner order or complex needs related to functional status, cognitive ability, or social support system  11/18/2023 1146 by Lorena Mary RN  Outcome: Completed  11/18/2023 1005 by Lorena Mary RN  Outcome: Progressing     Problem: NEUROSENSORY - ADULT  Goal: Achieves stable or improved neurological status  Description: INTERVENTIONS  - Monitor and report changes in neurological status  - Monitor vital signs such as temperature, blood pressure, glucose, and any other labs ordered   - Initiate measures to prevent increased intracranial pressure  - Monitor for seizure activity and implement precautions if appropriate      11/18/2023 1146 by Lorena Mary RN  Outcome: Completed  11/18/2023 1005 by Lorena Mary RN  Outcome: Progressing     Problem: CARDIOVASCULAR - ADULT  Goal: Maintains optimal cardiac output and hemodynamic stability  Description: INTERVENTIONS:  - Monitor I/O, vital signs and rhythm  - Monitor for S/S and trends of decreased cardiac output  - Administer and titrate ordered vasoactive medications to optimize hemodynamic stability  - Assess quality of pulses, skin color and temperature  - Assess for signs of decreased coronary artery perfusion  - Instruct patient to report change in severity of symptoms  11/18/2023 1146 by Elliot Estrella RN  Outcome: Completed  11/18/2023 1005 by Elliot Estrella RN  Outcome: Progressing  Goal: Absence of cardiac dysrhythmias or at baseline rhythm  Description: INTERVENTIONS:  - Continuous cardiac monitoring, vital signs, obtain 12 lead EKG if ordered  - Administer antiarrhythmic and heart rate control medications as ordered  - Monitor electrolytes and administer replacement therapy as ordered  11/18/2023 1146 by Elliot Estrella RN  Outcome: Completed  11/18/2023 1005 by Elliot Estrella RN  Outcome: Progressing

## 2023-11-18 NOTE — INCIDENTAL FINDINGS
The following findings require follow up:  Radiographic finding   Finding:   CT spine cervical without contrast: No cervical spine fracture or traumatic malalignment., Minimal compression deformity of T1 superior endplate, age-indeterminate but new since chest CTA on 6/24/2021. Correlate clinically. , Stable dominant right thyroid nodule.     Follow up required: Neurosurgery   Follow up should be done within 1 month(s)    Please notify the following clinician to assist with the follow up:   Dr. Denisha Olmedo (PCP)

## 2023-11-18 NOTE — PLAN OF CARE
Problem: Potential for Falls  Goal: Patient will remain free of falls  Description: INTERVENTIONS:  - Educate patient/family on patient safety including physical limitations  - Instruct patient to call for assistance with activity   - Consult OT/PT to assist with strengthening/mobility   - Keep Call bell within reach  - Keep bed low and locked with side rails adjusted as appropriate  - Keep care items and personal belongings within reach  - Initiate and maintain comfort rounds  - Make Fall Risk Sign visible to staff  - Offer Toileting every  Hours, in advance of need  - Initiate/Maintain alarm  - Obtain necessary fall risk management equipment:   - Apply yellow socks and bracelet for high fall risk patients  - Consider moving patient to room near nurses station  Outcome: Progressing     Problem: PAIN - ADULT  Goal: Verbalizes/displays adequate comfort level or baseline comfort level  Description: Interventions:  - Encourage patient to monitor pain and request assistance  - Assess pain using appropriate pain scale  - Administer analgesics based on type and severity of pain and evaluate response  - Implement non-pharmacological measures as appropriate and evaluate response  - Consider cultural and social influences on pain and pain management  - Notify physician/advanced practitioner if interventions unsuccessful or patient reports new pain  Outcome: Progressing     Problem: INFECTION - ADULT  Goal: Absence or prevention of progression during hospitalization  Description: INTERVENTIONS:  - Assess and monitor for signs and symptoms of infection  - Monitor lab/diagnostic results  - Monitor all insertion sites, i.e. indwelling lines, tubes, and drains  - Monitor endotracheal if appropriate and nasal secretions for changes in amount and color  - [place] appropriate cooling/warming therapies per order  - Administer medications as ordered  - Instruct and encourage patient and family to use good hand hygiene technique  - Identify and instruct in appropriate isolation precautions for identified infection/condition  Outcome: Progressing     Problem: SAFETY ADULT  Goal: Patient will remain free of falls  Description: INTERVENTIONS:  - Educate patient/family on patient safety including physical limitations  - Instruct patient to call for assistance with activity   - Consult OT/PT to assist with strengthening/mobility   - Keep Call bell within reach  - Keep bed low and locked with side rails adjusted as appropriate  - Keep care items and personal belongings within reach  - Initiate and maintain comfort rounds  - Make Fall Risk Sign visible to staff  - Offer Toileting every  Hours, in advance of need  - Initiate/Maintain alarm  - Obtain necessary fall risk management equipment:  - Apply yellow socks and bracelet for high fall risk patients  - Consider moving patient to room near nurses station  Outcome: Progressing  Goal: Maintain or return to baseline ADL function  Description: INTERVENTIONS:  -  Assess patient's ability to carry out ADLs; assess patient's baseline for ADL function and identify physical deficits which impact ability to perform ADLs (bathing, care of mouth/teeth, toileting, grooming, dressing, etc.)  - Assess/evaluate cause of self-care deficits   - Assess range of motion  - Assess patient's mobility; develop plan if impaired  - Assess patient's need for assistive devices and provide as appropriate  - Encourage maximum independence but intervene and supervise when necessary  - Involve family in performance of ADLs  - Assess for home care needs following discharge   - Consider OT consult to assist with ADL evaluation and planning for discharge  - Provide patient education as appropriate  Outcome: Progressing     Problem: DISCHARGE PLANNING  Goal: Discharge to home or other facility with appropriate resources  Description: INTERVENTIONS:  - Identify barriers to discharge w/patient and caregiver  - Arrange for needed discharge resources and transportation as appropriate  - Identify discharge learning needs (meds, wound care, etc.)  - Arrange for interpretive services to assist at discharge as needed  - Refer to Case Management Department for coordinating discharge planning if the patient needs post-hospital services based on physician/advanced practitioner order or complex needs related to functional status, cognitive ability, or social support system  Outcome: Progressing     Problem: NEUROSENSORY - ADULT  Goal: Achieves stable or improved neurological status  Description: INTERVENTIONS  - Monitor and report changes in neurological status  - Monitor vital signs such as temperature, blood pressure, glucose, and any other labs ordered   - Initiate measures to prevent increased intracranial pressure  - Monitor for seizure activity and implement precautions if appropriate      Outcome: Progressing     Problem: CARDIOVASCULAR - ADULT  Goal: Maintains optimal cardiac output and hemodynamic stability  Description: INTERVENTIONS:  - Monitor I/O, vital signs and rhythm  - Monitor for S/S and trends of decreased cardiac output  - Administer and titrate ordered vasoactive medications to optimize hemodynamic stability  - Assess quality of pulses, skin color and temperature  - Assess for signs of decreased coronary artery perfusion  - Instruct patient to report change in severity of symptoms  Outcome: Progressing  Goal: Absence of cardiac dysrhythmias or at baseline rhythm  Description: INTERVENTIONS:  - Continuous cardiac monitoring, vital signs, obtain 12 lead EKG if ordered  - Administer antiarrhythmic and heart rate control medications as ordered  - Monitor electrolytes and administer replacement therapy as ordered  Outcome: Progressing

## 2023-11-18 NOTE — ASSESSMENT & PLAN NOTE
TSH WNL 1.18    Noted for enlarged thyroid on carotid duplex.   Outpatient follow up with PCP for thyroid US

## 2023-11-18 NOTE — CASE MANAGEMENT
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    SVT (supraventricular tachycardia) 2023    Osteoarthritis of right knee 2023    Elevated liver enzymes 2023    Type 2 diabetes mellitus, without long-term current use of insulin ([address]) 2023    Dyspepsia 2022    Frequent falls 2022    Localized swelling of right lower leg 2022    Anxiety 2022    Gastroesophageal reflux disease without esophagitis 10/12/2022    Nocturnal hypoxemia 2021    Acquired hypothyroidism 2021    Thyroid nodule 2021    Palpitations 2021    Tremor 2021    History of transient ischemic attack (TIA) 2021    Essential hypertension 2021    Dyslipidemia 2021    Seasonal allergic rhinitis due to pollen 2021    Chronic hypoxemic respiratory failure ([address]) 2020    Dizziness 2020    Shortness of breath on exertion       LOS (days): 3  Geometric Mean LOS (GMLOS) (days): 2.40  Days to GMLOS:-0.7     OBJECTIVE:  Risk of Unplanned Readmission Score: 12.33         Current admission status: Inpatient   Preferred Pharmacy:   [address] [address]  Phone: [phone] Fax: [phone]    Primary Care Provider: Kimberli Adler MD    Primary Insurance: MEDICARE  Secondary Insurance: AARP    DISCHARGE DETAILS:  Family notified[de-identified] CM contacted pt's daughter via phone call and confirmed [facility] Pt's daughter agreeable with d/c plan and location. Pt's daughter declined having any further questions or concerns at this time.

## 2023-11-18 NOTE — [place] UNIVERSAL TRANSFER FORM
[place] UNIVERSAL TRANSFER FORM  (ALL ITEMS MUST BE COMPLETED)    1. TRANSFER FROM: [address] [place] [address] [address]      TRANSFER TO: [facility]     2. DATE OF TRANSFER: 11/18/2023                        TIME OF TRANSFER: 1230    3. PATIENT NAME: HANDY Ortiz      YOB: 1940                             GENDER: female    4. LANGUAGE:   English    5. PHYSICIAN NAME:  Jeremy Kauffman DO                   PHONE: [phone]    6. CODE STATUS: Level 3 - DNAR and DNI        Out of Hospital DNR Attached: No    7. :                                      :  Extended Emergency Contact Information  Primary Emergency Contact: Vivi Gonzales  Address: [address], [place] [address] [place] Phone: [phone]  Relation: Daughter  Secondary Emergency Contact: Teri Kenney [facility]  Mobile Phone: [phone]  Relation: Daughter           [address] [facility] [address] [facility] [address] Representative/Proxy:  Yes           Legal Guardian:  No             NAME OF:           HEALTH CARE REPRESENTATIVE/PROXY:                                         OR           LEGAL GUARDIAN, IF NOT :                                               PHONE:  (Day)           (Night)                        (Cell)    8. REASON FOR TRANSFER: (Must include brief medical history and recent changes in physical function or cognition.) weakness            V/S: BP (!) 97/42   Pulse 84   Temp 98.1 °F (36.7 °C)   Resp 18   Ht 5' 6" (1.676 m)   Wt 76.4 kg (168 lb 6.9 oz)   SpO2 91%   BMI 27.19 kg/m²           PAIN: None    9. PRIMARY DIAGNOSIS: Syncope, cardiogenic      Secondary Diagnosis:         Pacemaker: No      Internal Defib: No          Mental Health Diagnosis (if Applicable):    10. RESTRAINTS: No     11. RESPIRATORY NEEDS: None    12. ISOLATION/PRECAUTION: None    13. ALLERGY: Penicillins    14.  SENSORY:       Vision Glasses, Hearing Poor, and Speech Clear    15. SKIN CONDITION: No Wounds    16. DIET: Regular    17. IV ACCESS: None    18. PERSONAL ITEMS SENT WITH PATIENT: Glasses and Dentures Upper Partial and Lower Partial    19. ATTACHED DOCUMENTS: MUST ATTACH CURRENT MEDICATION INFORMATION Face Sheet and Discharge Summary    20. AT RISK ALERTS:Falls        HARM TO: N/A    21. WEIGHT BEARING STATUS:         Left Leg: Full        Right Leg: Full    22. MENTAL STATUS:Alert and Oriented    23. FUNCTION:        Walk: With Help        Transfer: With Help        Toilet: With Help        Feed: Self    24. IMMUNIZATIONS/SCREENING:     Immunization History   Administered Date(s) Administered    COVID-19 MODERNA VACC 0.25 ML IM BOOSTER 12/10/2021    COVID-19 MODERNA VACC 0.5 ML IM 03/02/2021, 03/30/2021, 12/10/2021    INFLUENZA 10/12/2022    Influenza, high dose seasonal 0.7 mL 10/12/2022, 10/04/2023       25. BOWEL: Continent    26. BLADDER: Incontinent    27.  SENDING FACILITY CONTACT: Hilary Arteaga                  Title: RN        Unit: 2S        Phone: [phone] [address] (if known):        Title:        Unit:         Phone:         FORM PREFILLED BY (if applicable)       Title:       Unit:        Phone:         FORM COMPLETED BY Hilary Arteaga RN      Title:       Phone:

## 2023-11-18 NOTE — DISCHARGE SUMMARY
[address]  Discharge- Julia Pa 1940, 78 y.o. female MRN: [id number]  Unit/Bed#: 2 [place] [address] Encounter: [id number]  Primary Care Provider: Cindi Medel MD   Date and time admitted to hospital: 11/15/2023  4:09 AM    * Syncope, cardiogenic  Assessment & Plan  While reaching to get her gown she fell backwards and next thing she remembers is being on hallway  Unable to tell if she had syncopayl episode but does nto remember what happened  Admits to having previous episodes of falling where she describes it as her muscles giving out on her but unable to further clarify. States she denies having syncopy but does not remember what happened and how she got to hallway as she lives alone. CT head negative for acute findings  CT Cervical spine: No cervical spine fracture or traumatic malalignment. Minimal compression deformity of T1 superior endplate, age-indeterminate but new since chest CTA on 6/24/2021. Correlate clinically. Patient denies any neck/head pain. Admitted to having low back pain on admission, thoracolumbar xrays: No acute osseous abnormality   Outpatient neurosurgery referral made on d/c    Denies any prodromal symptoms  Telemetry  Orthostatic vitals positive on day of admission  Give 1 L bolus and NS @ 100ml/hr for 24 hours  History of SVT follows with Dr. Chalo Rain at the time was advised to switch Bystolic to an alternate betablocker but patient did not want any changes   Consult cardio  11/16/23 TTE : Left Ventricle: Left ventricular cavity size is normal. Wall thickness is mildly increased. The left ventricular ejection fraction is 58% by single dimension measurement     Carotid US  RIGHT:  There is <50% stenosis noted in the internal carotid artery. Plaque is  heterogenous and irregular. Vertebral artery flow is antegrade, however, doppler flow pattern suggests  distal stenosis. There is no significant subclavian artery disease.      LEFT:  There is <50% stenosis noted in the internal carotid artery. Plaque is  heterogenous and irregular. Vertebral artery flow is antegrade. There is no significant subclavian artery disease      11/17 Amlodipine and bystolic discontinued. Pt started on bisoprolol 2.5 mg daily  11/18 orthostatic vitals remain positive today  Given 500 cc bolus and cardio starting on midodrine 5 mg with breakfast and lunch  Continue wearing GAGE stockings, check orthostatics at rehab        SVT (supraventricular tachycardia)  Assessment & Plan  Patient follows with Dr. Tenzin Perze  - At the time had episodes of palpitations lasting for several seconds, pt was recommended to switch to an alternate beta blocker from Bystolic but patient did not want any medication changes at the time  Cardiology consulted  Amlodipine and bystolic discontinued  Pt started on bisoprolol 2.5 mg daily      Overflow incontinence of urine  Assessment & Plan  Frequency urgency urgency incontinence possible overactive bladder  Admits to having incontinence for years and figured it was due to aging  Retention protocol  Consult urology    Urology started tamsulosin 0.4 mg  States she's urinating better  Outpatient urology follow up    Non-traumatic rhabdomyolysis-resolved as of 11/16/2023  Assessment & Plan  Patient states that she was laying on the ground for about 24 hours as she was unable to get to her phone  She states that she finally was able to reach her phone and dial 911  CK elevated in the ER 1085  Cr 0.7  Given 1L bolus and place on NS @ 100ml/hr for 24 hours  CK down to 310      Enlarged thyroid  Assessment & Plan  TSH WNL 1.18    Noted for enlarged thyroid on carotid duplex.   Outpatient follow up with PCP for thyroid US         Recurrent major depressive disorder, in remission [facility])  Assessment & Plan  Resume home duloxetine 60 mg daily    Acquired hypothyroidism  Assessment & Plan  Continue levothyroxine    History of transient ischemic attack (TIA)  Assessment & Plan  Continue statin and plavix    Dyslipidemia  Assessment & Plan  Continue statin    Essential hypertension  Assessment & Plan  Amlodipine and bystolic discontinued  28/92 Pt started on bisoprolol 2.5 mg daily  Orthostatics positive today. Given 500 cc bolus  Cardio started on midodrine 5 mg bid with breakfast and lunch            Medical Problems       Resolved Problems  Date Reviewed: 11/18/2023            Resolved    Non-traumatic rhabdomyolysis 11/16/2023     Resolved by  Binu Ramos DO        Discharging Physician / Practitioner: Binu Ramos DO  PCP: Radha Muro MD  Admission Date:   Admission Orders (From admission, onward)       Ordered        11/15/23 0711  INPATIENT ADMISSION  Once                          Discharge Date: 11/18/23    Consultations During Hospital Stay:  Cardiology    Procedures Performed:   none    Significant Findings / Test Results:     CT Cervical spine: No cervical spine fracture or traumatic malalignment. Minimal compression deformity of T1 superior endplate, age-indeterminate but new since chest CTA on 6/24/2021. Correlate clinically. Patient denies any neck/head pain. Enlarged thyroid on carotid duplex us  Orthostatic hypotension    Incidental Findings:   See above   I reviewed the above mentioned incidental findings with the patient and/or family and they expressed understanding. Test Results Pending at Discharge (will require follow up):   none     Outpatient Tests Requested:  Check for Orthostatic hypotension    Complications:  none    Reason for Admission: Fall at home    Hospital Course:   So Hutchinson is a 78 y.o. female patient who originally presented to the hospital on 11/15/2023 due to falls at home and rhabdomyolysis. Patient fell at home and was on the ground for 24 hours prior to calling 911. CT head was negative for acute findings, CT cervical spine noted for compression deformity of the T1 age-indeterminate but new since imaging in June 2021.   On admission patient complained of having low back pain and thoracolumbar x-rays were performed showing no acute findings. CK on admission was elevated 1085 with creatinine at baseline 0.7. Patient was given IV fluids with CK trending down to 310. Cardiology was consulted for evaluation of SVT and orthostatic hypotension as a possible cause of patient's falling. Patient's amlodipine and Bystolic have been discontinued and cardiology started patient on bisoprolol 2.5 mg daily. Orthostatic vitals on day of discharge were positive again. Patient was given 500 cc bolus and cardiology recommended adding midodrine 5 mg twice daily with breakfast and lunch to patient's regiment on discharge to rehab. Urology also evaluated patient and started flomax 0.4 with dinner for hypo contractile bladder. Please see above list of diagnoses and related plan for additional information. Condition at Discharge: good    Discharge Day Visit / Exam:   Subjective: Patient seen examined at bedside. Currently denies feeling dizzy but admitted to having slight dizziness when they were checking orthostatics. States that she feels well for discharge. Vitals: Blood Pressure: (!) 97/42 (11/18/23 0746)  Pulse: 84 (11/18/23 0746)  Temperature: 98.1 °F (36.7 °C) (11/18/23 0743)  Temp Source: Oral (11/16/23 2049)  Respirations: 18 (11/18/23 0741)  Height: 5' 6" (167.6 cm) (11/15/23 1335)  Weight - Scale: 76.4 kg (168 lb 6.9 oz) (11/18/23 0600)  SpO2: 91 % (11/18/23 0746)  Exam:   Physical Exam  Vitals and nursing note reviewed. Constitutional:       General: She is not in acute distress. Appearance: She is well-developed. HENT:      Head: Normocephalic and atraumatic. Eyes:      Conjunctiva/sclera: Conjunctivae normal.   Cardiovascular:      Rate and Rhythm: Normal rate and regular rhythm. Heart sounds: No murmur heard. Pulmonary:      Effort: Pulmonary effort is normal. No respiratory distress.       Breath sounds: Normal breath sounds. Abdominal:      Palpations: Abdomen is soft. Tenderness: There is no abdominal tenderness. Musculoskeletal:         General: No swelling. Cervical back: Neck supple. Skin:     General: Skin is warm and dry. Capillary Refill: Capillary refill takes less than 2 seconds. Neurological:      Mental Status: She is alert. Psychiatric:         Mood and Affect: Mood normal.          Discussion with Family: Patient declined call to . Discharge instructions/Information to patient and family:   See after visit summary for information provided to patient and family. Provisions for Follow-Up Care:  See after visit summary for information related to follow-up care and any pertinent home health orders. Mobility at time of Discharge:   Basic Mobility Inpatient Raw Score: 13  JH-HLM Goal: 4: Move to chair/commode  JH-HLM Achieved: 5: Stand (1 or more minutes)  HLM Goal achieved. Continue to encourage appropriate mobility. Dis[facility] [place]    Planned Readmission: no     Discharge Statement:  I spent 55 minutes discharging the patient. This time was spent on the day of discharge. I had direct contact with the patient on the day of discharge. Greater than 50% of the total time was spent examining patient, answering all patient questions, arranging and discussing plan of care with patient as well as directly providing post-discharge instructions. Additional time then spent on discharge activities. Discharge Medications:  See after visit summary for reconciled discharge medications provided to patient and/or family.       **Please Note: This note may have been constructed using a voice recognition system**

## 2023-11-18 NOTE — DISCHARGE INSTR - AVS FIRST PAGE
You were hospitalized for syncopy due to orthostatic hypotension    On discharge stop taking amlodipine 2.5 mg, bystolic 5 mg and potassium chloride 20 meq    Start taking taking zebeta 5 mg daily for blood pressure control  Start taking midodrine 5 mg with breakfast and lunch to prevent low blood pressure      Urology started you on flomax 0.4 mg at night with dinner for urinary incontinence    Please follow up with cardiology and urology on discharge

## 2023-11-18 NOTE — PROGRESS NOTES
Progress Note - Urology      Patient: Cem Doll   : 1940 Sex: female   MRN: [id number]     CSN: [id number]  Unit/Bed#: [address] [place] [address]     SUBJECTIVE:   Patient seen on morning rounds  Now on tamsulosin  Take 3 days to work      Objective   Vitals: /55   Pulse 92   Temp 97.6 °F (36.4 °C)   Resp 17   Ht 5' 6" (1.676 m)   Wt 77.6 kg (171 lb 1.2 oz)   SpO2 90%   BMI 27.61 kg/m²     No intake/output data recorded.       Physical Exam:   General Alert awake   Normocephalic atraumatic PERRLA  Lungs clear bilaterally  Cardiac normal S1 normal S2  Abdomen soft, flank pain  Extremities no edema      Lab Results: CBC:   Lab Results   Component Value Date    WBC 6.64 2023    HGB 12.2 2023    HCT 37.2 2023    MCV 96 2023     2023    ADJUSTEDWBC 10.60 2016    RBC 3.86 2023    MCH 31.6 2023    MCHC 32.8 2023    RDW 15.8 (H) 2023    MPV 10.4 2023    NRBC 0 11/15/2023     CMP:   Lab Results   Component Value Date     2023    CO2 27 2023    BUN 11 2023    CREATININE 0.60 2023    CALCIUM 9.2 2023    AST 69 (H) 11/15/2023    ALT 25 11/15/2023    ALKPHOS 71 11/15/2023    EGFR 83 2023     Urinalysis:   Lab Results   Component Value Date    COLORU Dk Yellow 11/15/2023    CLARITYU Clear 11/15/2023    SPECGRAV 1.025 11/15/2023    PHUR 5.5 11/15/2023    PHUR 5.5 2018    LEUKOCYTESUR Trace (A) 11/15/2023    NITRITE Negative 11/15/2023    GLUCOSEU Negative 11/15/2023    KETONESU 15 (1+) (A) 11/15/2023    BILIRUBINUR Small (A) 11/15/2023    BLOODU Negative 11/15/2023     Urine Culture:   Lab Results   Component Value Date    URINECX >100,000 cfu/ml 10/19/2021     PSA: No results found for: "PSA"      Assessment/ Plan:  Hypocontractile bladder  On tamsulosin  Continue antibiotics          Bassam Ojeda MD

## 2023-11-18 NOTE — PROGRESS NOTES
Progress Note - Cardiology   [facility]     Justin Lewis 78 y.o. female MRN: [id number]  : 1940  Unit/Bed#: [address] [place] [address] Encounter: [id number]    Assessment and Plan:   1. Orthostatic hypotension with concomitant hypertension: Further syncopal episodes,    -   Bystolic was discontinued and transition to Zebeta 2.5 mg daily with improvement in both resting and orthostatic vital signs.    -   Continue Zebeta 2.5 mg daily    -   Continue monitor vital signs    -   We will add midodrine 5 mg twice a day for blood pressure support while awake. 2.  Rhabdomyolysis in the setting of fall with unknown downtime: Initial CK was 1085 with repeat of 310     3. Dyslipidemia: Continue Lipitor 80 mg daily     4. Diabetes type 2: Managed per primary team    Subjective / Objective:   Patient seen and examined. Still experiencing decrease in systolic blood pressure with position changes. This morning with upright positioning blood pressure was 97/42 and patient did complain of lightheadedness. Of note she was not wearing her compression stockings. We will initiate midodrine 5 mg twice a day for blood pressure support. Follow-up in the outpatient setting. Vitals: Blood pressure (!) 97/42, pulse 84, temperature 98.1 °F (36.7 °C), resp. rate 18, height 5' 6" (1.676 m), weight 76.4 kg (168 lb 6.9 oz), SpO2 91 %. Vitals:    23 0600 23 0600   Weight: 77.6 kg (171 lb 1.2 oz) 76.4 kg (168 lb 6.9 oz)     Body mass index is 27.19 kg/m².   BP Readings from Last 3 Encounters:   23 (!) 97/42   23 135/64   23 164/64     Orthostatic Blood Pressures      Flowsheet Row Most Recent Value   Blood Pressure 97/42 filed at 2023 0746   Patient Position - Orthostatic VS Standing - Orthostatic VS filed at 2023 0746          I/O          0701   0700  0701   0700  07 0700    Urine (mL/kg/hr)       Total Output       Net Invasive Devices       Peripheral Intravenous Line  Duration             Peripheral IV 11/17/23 Distal;Left;Dorsal (posterior) Forearm <1 day    Peripheral IV 11/17/23 Left;Ventral (anterior) Forearm <1 day                    No intake or output data in the 24 hours ending 11/18/23 1011      Physical Exam:   Physical Exam  Vitals and nursing note reviewed. Constitutional:       General: She is not in acute distress. Appearance: Normal appearance. She is normal weight. HENT:      Right Ear: External ear normal.      Left Ear: External ear normal.   Eyes:      General: No scleral icterus. Right eye: No discharge. Left eye: No discharge. Cardiovascular:      Rate and Rhythm: Normal rate and regular rhythm. Occasional Extrasystoles (PVCs) are present. Pulses: Normal pulses. Heart sounds: Normal heart sounds. Pulmonary:      Effort: Pulmonary effort is normal. No respiratory distress. Breath sounds: Normal breath sounds. Abdominal:      General: Bowel sounds are normal. There is no distension. Palpations: Abdomen is soft. Musculoskeletal:      Right lower leg: No edema. Left lower leg: No edema. Skin:     General: Skin is warm and dry. Capillary Refill: Capillary refill takes less than 2 seconds. Neurological:      General: No focal deficit present. Mental Status: She is alert and oriented to person, place, and time. Mental status is at baseline.    Psychiatric:         Mood and Affect: Mood normal.              Medications/ Allergies:     Current Facility-Administered Medications   Medication Dose Route Frequency Provider Last Rate    acetaminophen  650 mg Oral Q8H [address] Mari Emery, DO      atorvastatin  80 mg Oral HS Mari Emery, DO      bisoprolol  2.5 mg Oral Daily Yusuf Mayberry PA-C      calcium carbonate-vitamin D  2 tablet Oral Daily With Breakfast Mari Emery, DO      cholecalciferol  2,000 Units Oral Daily Mari Emery, DO      clonazePAM  0.5 mg Oral HS PRN Pandi Todhe, DO      clopidogrel  75 mg Oral Daily Pandi Todhe, DO      DULoxetine  60 mg Oral Daily Pandi Todhe, DO      enoxaparin  40 mg Subcutaneous Daily Pandi Todhe, DO      gabapentin  100 mg Oral TID Pandi Todhe, DO      lactated ringers  500 mL Intravenous Once Pandi Todhe,  mL (11/18/23 3086)    levothyroxine  88 mcg Oral Early Morning Pandi Todhe, DO      methocarbamol  750 mg Oral Q8H [facility] Pandi Todhe, DO      pantoprazole  40 mg Oral Daily Pandi Todhe, DO      primidone  50 mg Oral Daily Pandi Todhe, DO      tamsulosin  0.4 mg Oral Daily With Kerri Watts MD       clonazePAM, 0.5 mg, HS PRN      Allergies   Allergen Reactions    Penicillins Swelling       VTE Pharmacologic Prophylaxis:   Sequential compression device (Venodyne)     Labs:   Troponins:  Results from last 7 days   Lab Units 11/16/23  0557 11/15/23  0421   CK TOTAL U/L 310* 1,085*     CBC with diff:  Results from last 7 days   Lab Units 11/18/23  0600 11/17/23  0806 11/16/23  0557 11/15/23  0421   WBC Thousand/uL 6.56 6.64 6.69 9.69   HEMOGLOBIN g/dL 11.9 12.2 11.8 14.7   HEMATOCRIT % 36.9 37.2 37.6 44.4   MCV fL 95 96 97 95   PLATELETS Thousands/uL 176 159 147* 192   RBC Million/uL 3.88 3.86 3.89 4.66   MCH pg 30.7 31.6 30.3 31.5   MCHC g/dL 32.2 32.8 31.4 33.1   RDW % 15.3* 15.8* 15.9* 15.9*   MPV fL 11.3 10.4 10.8 11.1   NRBC AUTO /100 WBCs  --   --   --  0     CMP:  Results from last 7 days   Lab Units 11/18/23  0600 11/17/23  0806 11/16/23  0557 11/15/23  0421   SODIUM mmol/L 141 140 140 138   POTASSIUM mmol/L 4.1 4.0 3.8 3.9   CHLORIDE mmol/L 107 107 108 102   CO2 mmol/L 28 27 26 25   ANION GAP mmol/L 6 6 6 11   BUN mg/dL 12 11 17 20   CREATININE mg/dL 0.60 0.60 0.66 0.73   CALCIUM mg/dL 9.2 9.2 9.2 10.1   AST U/L  --   --   --  69*   ALT U/L  --   --   --  25   ALK PHOS U/L  --   --   --  71   TOTAL PROTEIN g/dL  --   --   --  7.2   ALBUMIN g/dL 3.5 3.5 3.4* 4.1   TOTAL BILIRUBIN mg/dL  --   --   -- 0.93   EGFR ml/min/1.73sq m 83 83 80 75     Magnesium:  Results from last 7 days   Lab Units 11/18/23  0600 11/17/23  0806 11/16/23  0557   MAGNESIUM mg/dL 2.0 1.7* 1.5*     Coags:    TSH:  Results from last 7 days   Lab Units 11/15/23  0421   TSH 3RD GENERATON uIU/mL 1.180       Imaging & Testing   I have personally reviewed pertinent reports. VAS carotid complete study    Result Date: 11/17/2023  Narrative:  [facility] REPORT CLINICAL: Indications: Patient presents with recent episode of syncope. Operative History: No prior cardiovascular surgeries. Risk Factors: The patient has history of HTN, Hyperlipidemia and previous smoking (quit >10yrs ago). Clinical: Left Pressure:  129/55 mm Hg. FINDINGS:  Right        Impression  PSV  EDV (cm/s)  Direction of Flow  Ratio  Dist. ICA                 52          13                      0.50  Mid. ICA                  97          18                      0.95  Prox. ICA    1 - 49%     106          13                      1.04  Dist CCA                  93          14                            Mid CCA                  102           7                      0.83  Prox CCA                 123          11                      1.09  Ext Carotid              166           0                      1.62  Prox Vert                 44           0  Antegrade                 Subclavian               161           6                            Innominate               113          13                             Left         Impression  PSV  EDV (cm/s)  Direction of Flow  Ratio  Dist. ICA                 52          11                      0.39  Mid. ICA                  50          16                      0.38  Prox.  ICA    1 - 49%      88          16                      0.67  Dist CCA                  99           9                            Mid CCA                  131          13                      1.20  Prox CCA                 110           6 Ext Carotid              114           0                      0.87  Prox Vert                 69          17  Antegrade                 Subclavian               142           0                               CONCLUSION:  Impression RIGHT: There is <50% stenosis noted in the internal carotid artery. Plaque is heterogenous and irregular. Vertebral artery flow is antegrade, however, doppler flow pattern suggests distal stenosis. There is no significant subclavian artery disease. LEFT: There is <50% stenosis noted in the internal carotid artery. Plaque is heterogenous and irregular. Vertebral artery flow is antegrade. There is no significant subclavian artery disease. Tech Note: Enlarged right thyroid. SIGNATURE: Electronically Signed by: Dann Manning on 2023-11-17 09:24:09 AM    XR spine thoracolumbar 2 vw    Result Date: 11/16/2023  Narrative: THORACOLUMBAR SPINE INDICATION:   back pain after fall. COMPARISON: 5/9/2023 VIEWS:  XR SPINE THORACOLUMBAR 2 VW FINDINGS: There is no new acute fracture or pathologic bone lesion. T12 mild anterosuperior chronic wedge deformity. Thoracic vertebral alignment is within normal limits. Lower thoracic mild spondylosis. L2-L3 moderate/marked degenerative disc changes. There is no displacement of the paraspinal line. The pedicles appear intact. Impression: No acute osseous abnormality. Workstation performed: [id number]     Echo complete w/ contrast if indicated    Result Date: 11/15/2023  Narrative:   Left Ventricle: Left ventricular cavity size is normal. Wall thickness is mildly increased. The left ventricular ejection fraction is 58% by single dimension measurement. Wall motion is normal. Diastolic function is mildly abnormal, consistent with grade I (abnormal) relaxation. Left atrial filling pressure is elevated. Right Ventricle: Right ventricular cavity size is normal.   Left Atrium: The atrium is mildly dilated. Aortic Valve:  There is aortic valve sclerosis. CT spine cervical without contrast    Result Date: 11/15/2023  Narrative: CT CERVICAL SPINE - WITHOUT CONTRAST INDICATION:   Fall unwitnessed on plavix. COMPARISON: CTA chest on 6/24/2021. TECHNIQUE:  CT examination of the cervical spine was performed without intravenous contrast.  Contiguous axial images were obtained. Multiplanar 2D reformatted images were created from the source data. Radiation dose length product (DLP) for this visit:  473.17 mGy-cm . This examination, like all CT scans performed in the [facility], was performed utilizing techniques to minimize radiation dose exposure, including the use of iterative  reconstruction and automated exposure control. IMAGE QUALITY:  Diagnostic. FINDINGS: ALIGNMENT:  Normal alignment of the cervical spine. No subluxation. VERTEBRAE:  No fracture in the cervical spine. Minimal compression deformity of T1 superior endplate, age-indeterminate but new since 2021. DEGENERATIVE CHANGES: Mild multilevel cervical degenerative changes are noted without critical central canal stenosis. PREVERTEBRAL AND PARASPINAL SOFT TISSUES: Unremarkable THORACIC INLET: Redemonstration of a dominant right thyroid nodule measuring 3.1 cm. This was previously characterized with ultrasound and is stable in size when remeasured similarly. Impression: No cervical spine fracture or traumatic malalignment. Minimal compression deformity of T1 superior endplate, age-indeterminate but new since chest CTA on 6/24/2021. Correlate clinically. Stable dominant right thyroid nodule. The study was marked in [facility] for immediate notification. Workstation performed: [id number]     CT head without contrast    Result Date: 11/15/2023  Narrative: CT BRAIN - WITHOUT CONTRAST INDICATION:   Head trauma, moderate-severe Fall unwitnessed on plavix. COMPARISON: CTA head and neck on 3/4/2023. TECHNIQUE:  CT examination of the brain was performed.   Multiplanar 2D reformatted images were created from the source data. Radiation dose length product (DLP) for this visit:  974.83 mGy-cm . This examination, like all CT scans performed in the [facility], was performed utilizing techniques to minimize radiation dose exposure, including the use of iterative  reconstruction and automated exposure control. IMAGE QUALITY:  Diagnostic. FINDINGS: PARENCHYMA: Decreased attenuation is noted in periventricular and subcortical white matter demonstrating an appearance that is statistically most likely to represent mild microangiopathic change; this appearance is similar when compared to most recent prior examination. No CT signs of acute infarction. No intracranial mass, mass effect or midline shift. No acute parenchymal hemorrhage. VENTRICLES AND EXTRA-AXIAL SPACES:  Normal for the patient's age. VISUALIZED ORBITS: Normal visualized orbits. PARANASAL SINUSES: Mild mucosal thickening of the visualized paranasal sinuses. CALVARIUM AND EXTRACRANIAL SOFT TISSUES:  Normal.     Impression: No acute intracranial abnormality. Workstation performed: [id number]        EKG / Monitor: Personally reviewed. Sinus rhythm with PVCs, multifocal        [address] [facility]  Cardiology      "This note was completed in part utilizing [employer] direct voice recognition software. Grammatical errors, random word insertion, spelling mistakes, and incomplete sentences may be an occasional consequence of the system secondary to software limitations, ambient noise and hardware issues. Please read the chart carefully and recognize, using context, where substitutions have occurred.   If you have any questions or concerns about the context, text or information contained within the body of this dictation, please contact myself, the provider, for further clarification."

## 2023-11-21 LAB
DME PARACHUTE DELIVERY DATE ACTUAL: NORMAL
DME PARACHUTE DELIVERY DATE REQUESTED: NORMAL
DME PARACHUTE ITEM DESCRIPTION: NORMAL
DME PARACHUTE ITEM DESCRIPTION: NORMAL
DME PARACHUTE ORDER STATUS: NORMAL
DME PARACHUTE SUPPLIER NAME: NORMAL
DME PARACHUTE SUPPLIER PHONE: NORMAL

## 2023-12-07 ENCOUNTER — TELEPHONE (OUTPATIENT)
Age: 83
End: 2023-12-07

## 2023-12-07 NOTE — TELEPHONE ENCOUNTER
Anmol Sarabia from  [facility] call to ask if Dr. Duy Dumont  can sign some home [address] with the MR at the office she report the order can be fax to be sign before he go today

## 2023-12-14 NOTE — TELEPHONE ENCOUNTER
Sheldon Lowry from [facility] called to say she did a med rec with the pt and she is taking atorvastatin. There is a concern as the pt has rhabdomyolysis. Please advise. Pt is getting home nursing and PT.

## 2023-12-18 ENCOUNTER — OFFICE VISIT (OUTPATIENT)
Dept: NEUROSURGERY | Facility: CLINIC | Age: 83
End: 2023-12-18
Payer: MEDICARE

## 2023-12-18 VITALS
HEIGHT: 66 IN | RESPIRATION RATE: 16 BRPM | SYSTOLIC BLOOD PRESSURE: 110 MMHG | BODY MASS INDEX: 27 KG/M2 | DIASTOLIC BLOOD PRESSURE: 62 MMHG | WEIGHT: 168 LBS | TEMPERATURE: 97.8 F | OXYGEN SATURATION: 94 % | HEART RATE: 92 BPM

## 2023-12-18 DIAGNOSIS — S22.010D CLOSED WEDGE COMPRESSION FRACTURE OF T1 VERTEBRA WITH ROUTINE HEALING: Primary | ICD-10-CM

## 2023-12-18 DIAGNOSIS — S22.019A CLOSED T1 FRACTURE (HCC): ICD-10-CM

## 2023-12-18 PROCEDURE — 99203 OFFICE O/P NEW LOW 30 MIN: CPT | Performed by: PHYSICIAN ASSISTANT

## 2023-12-18 NOTE — ASSESSMENT & PLAN NOTE
Pt presents for consultation for age indeterminate, mild SEP T1 compression fx noted on recent CT s/p fall.   Pt denies acute pain in the cervicothoracic junction and no TTP in the cervicothoracic region on exam.    Imaging reviewed personally. Final results below discussed with the patient.  CT Cervical spine 11/15/23: Minimal compression deformity of T1 superior endplate, age-indeterminate but new since chest CTA on 6/24/2021.   Plan    Recommend pt continues safety precautions to avoid falls or injury in the future.   Given pt has no acute pain the cervicothoracic junction, this indicated the fx is likely age indeterminate/chronic. No cervical brace is required.   At this time, no neurosurgical intervention is warranted. No further follow up is required, pt could follow up on as needed basis.   Patient made aware to contact neurosurgery with any questions or concerns.

## 2023-12-18 NOTE — PROGRESS NOTES
Neurosurgery Office Note  Danyelle Martinez 83 y.o. female MRN: [id number]      Assessment/Plan     Closed wedge compression fracture of T1 vertebra with routine healing  Pt presents for consultation for age indeterminate, mild SEP T1 compression fx noted on recent CT s/p fall.   Pt denies acute pain in the cervicothoracic junction and no TTP in the cervicothoracic region on exam.    Imaging reviewed personally. Final results below discussed with the patient.  CT Cervical spine 11/15/23: Minimal compression deformity of T1 superior endplate, age-indeterminate but new since chest CTA on 6/24/2021.   Plan    Recommend pt continues safety precautions to avoid falls or injury in the future.   Given pt has no acute pain the cervicothoracic junction, this indicated the fx is likely age indeterminate/chronic. No cervical brace is required.   At this time, no neurosurgical intervention is warranted. No further follow up is required, pt could follow up on as needed basis.   Patient made aware to contact neurosurgery with any questions or concerns.       Diagnoses and all orders for this visit:    Closed wedge compression fracture of T1 vertebra with routine healing    Closed T1 fracture (HCC)  -     Ambulatory Referral to Neurosurgery          I have spent a total time of 40 minutes on 12/18/23 in caring for this patient including Diagnostic results, Instructions for management, Patient and family education, Risk factor reductions, Impressions, Counseling / Coordination of care, Documenting in the medical record, Reviewing / ordering tests, medicine, procedures  , Obtaining or reviewing history  , and Communicating with other healthcare professionals .      CHIEF COMPLAINT    Chief Complaint   Patient presents with    New Patient Visit     Closed T1 FX       HISTORY    History of Present Illness     83 y.o. year old female     Who presents to the neurosurgery office for consultation for age-indeterminate T1 compression  fracture noted on CT during recent visit to the ED status post fall on 11/15/2023.  Per report, patient fell and was on the ground for over 24 hours prior to presenting to the ED.  Patient reports she has a history of chronic mid back pain with certain activities such as when she leans over the sink when washing dishes.  Patient denies any pain in the cervical thoracic spine junction at this time.  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  Patient denies any new or recent falls since November. Pt denies radicular pain in bilateral upper extremities.  She denies any new numbness, tingling, or weakness in bilateral upper or lower extremities.  Denies any new changes in her bowel or bladder function.  Patient reports a history of gait instability.  She reports that she typically uses a cane for mobility but will be getting a walker soon.    Patient was accompanied by her daughter to this visit.        REVIEW OF SYSTEMS    Review of Systems   Gastrointestinal:  Positive for diarrhea. Negative for constipation, nausea and vomiting.   Genitourinary:         Incontinence.  Retention --chronic    Musculoskeletal:  Positive for back pain and gait problem. Negative for myalgias.        Non radiating mid back pain. Pt reports it's an intense pain that feels like someone is pushing directly in that spot.     Neurological:  Positive for dizziness, weakness and light-headedness.   Psychiatric/Behavioral:  Negative for confusion and decreased concentration.        ROS obtained by MA. Reviewed. See HPI.     Meds/Allergies     Current Outpatient Medications   Medication Sig Dispense Refill    atorvastatin (LIPITOR) 80 mg tablet TAKE 1 TABLET BY MOUTH DAILY AT BEDTIME (Patient taking differently: Taking at morning) 90 tablet 1    bisoprolol (ZEBETA) 5 mg tablet Take 0.5 tablets (2.5 mg total) by mouth daily Do not start before November 19, 2023. 45 tablet 0    Calcium Carbonate-Vit D-Min  (CALCIUM 1200 PO) Take 1,200 mg by mouth daily      cetirizine (ZyrTEC) 10 mg tablet Take 10 mg by mouth daily      clonazePAM (KlonoPIN) 0.5 mg tablet TAKE 1 TABLET(0.5 MG) BY MOUTH DAILY AT BEDTIME AS NEEDED FOR SEIZURES 30 tablet 1    clopidogrel (PLAVIX) 75 mg tablet TAKE 1 TABLET BY MOUTH EVERY DAY 90 tablet 1    DULoxetine (CYMBALTA) 60 mg delayed release capsule TAKE 1 CAPSULE(60 MG) BY MOUTH DAILY 90 capsule 1    levothyroxine 88 mcg tablet TAKE 1 TABLET(88 MCG) BY MOUTH DAILY 90 tablet 0    mometasone (NASONEX) 50 mcg/act nasal spray 1 spray into each nostril daily        pantoprazole (PROTONIX) 40 mg tablet TAKE 1 TABLET(40 MG) BY MOUTH EVERY MORNING 90 tablet 1    primidone (MYSOLINE) 50 mg tablet TAKE 1 TABLET(50 MG) BY MOUTH DAILY 90 tablet 1    tamsulosin (FLOMAX) 0.4 mg Take 1 capsule (0.4 mg total) by mouth daily with dinner 30 capsule 0    triamcinolone (KENALOG) 0.5 % cream Apply topically 2 (two) times a day 30 g 0    chlorhexidine (PERIDEX) 0.12 % solution RINSE FOR 30 SECONDS WITH 1/2OZ AFTER BREAKFAST AND AT BEDTIME. STARTING 2 DAYS PRIOR TO VISIT AND 1 HOUR PRIOR TO APPOINTENT (Patient not taking: Reported on 12/18/2023)      Cholecalciferol (D3-1000 PO) Take 2,000 Units by mouth daily   (Patient not taking: Reported on 12/18/2023)      clobetasol (TEMOVATE) 0.05 % cream Apply 1 application topically as needed To affected area (Patient not taking: Reported on 12/18/2023)      midodrine (PROAMATINE) 5 mg tablet Take 1 tablet (5 mg total) by mouth 2 (two) times a day before meals Take at breakfast and lunch (Patient not taking: Reported on 12/18/2023) 180 tablet 0     No current facility-administered medications for this visit.       Allergies   Allergen Reactions    Penicillins Swelling       PAST HISTORY    Past Medical History:   Diagnosis Date    Anxiety     Arthritis     r knee and shoulders    Blind left eye     Deaf, left     Depression     Diabetes mellitus (HCC)     Disease of thyroid  "gland     DVT complicating pregnancy, unspecified trimester (HCC) postpartum    Hearing loss     LEFT EAR    History of shingles 2007    fACIAL     Hyperlipidemia     Hypertension     Liver disease     Lyme disease     Meniere's disease     Obesity     Orthostatic hypotension     Psychiatric problem     Sinus congestion     Sleep apnea     Stroke (HCC)        Past Surgical History:   Procedure Laterality Date    APPENDECTOMY      BREAST BIOPSY Left 2010     SECTION      x2    DXA PROCEDURE (HISTORICAL)  10/28/2020    EYE SURGERY      HAND SURGERY Left     HERNIA REPAIR      HYSTERECTOMY      ROTATOR CUFF REPAIR Left     TONSILLECTOMY      TYMPANOSTOMY TUBE PLACEMENT         Social History     Tobacco Use    Smoking status: Former     Current packs/day: 0.00     Average packs/day: 0.8 packs/day for 44.0 years (33.0 ttl pk-yrs)     Types: Cigarettes     Start date: 1946     Quit date: 1990     Years since quittin.9     Passive exposure: Past    Smokeless tobacco: Never   Vaping Use    Vaping status: Never Used   Substance Use Topics    Alcohol use: Never    Drug use: Never       Family History   Problem Relation Age of Onset    Depression Mother     Hypertension Mother     Obesity Mother     Depression Father     Alcohol abuse Father     Stroke Father     Breast cancer Sister 68    Ovarian cancer Daughter 53    BRCA1 Negative Daughter     BRCA2 Negative Daughter          Above history personally reviewed.       EXAM    Vitals:Blood pressure 110/62, pulse 92, temperature 97.8 °F (36.6 °C), temperature source Tympanic, resp. rate 16, height 5' 6\" (1.676 m), weight 76.2 kg (168 lb), SpO2 94%.,Body mass index is 27.12 kg/m².     Physical Exam  Constitutional:       General: She is not in acute distress.     Appearance: She is well-developed.      Comments: Mobilizes with a cane.    HENT:      Head: Normocephalic and atraumatic.   Eyes:      Extraocular Movements: Extraocular movements intact.      " Pupils: Pupils are equal, round, and reactive to light.   Neck:      Trachea: No tracheal deviation.   Cardiovascular:      Rate and Rhythm: Normal rate.   Pulmonary:      Effort: Pulmonary effort is normal.   Abdominal:      Palpations: Abdomen is soft.      Tenderness: There is no abdominal tenderness. There is no guarding.   Musculoskeletal:         General: No tenderness.      Cervical back: Normal range of motion and neck supple.      Comments: No TTP of the cervical or upper thoracic spine midline or paraspinal.   Pt points to her mid back- about T10-T12 region as the area she occasionally gets mid-back pain with certain activities.   Skin:     General: Skin is warm and dry.      Coloration: Skin is not pale.   Neurological:      Mental Status: She is alert and oriented to person, place, and time.      Comments: GCS 15, Awake, Alert, Oriented x 3    Motor: SHAH, strength 5-/5 throughout    Sensation:  intact to LT X 4     Reflexes: 2+ and symmetric, no malloy's or clonus     Coordination: no drift bilateral upper extremities.          Psychiatric:         Behavior: Behavior normal.         Neurologic Exam     Mental Status   Oriented to person, place, and time.     Cranial Nerves     CN III, IV, VI   Pupils are equal, round, and reactive to light.        MEDICAL DECISION MAKING    Imaging Studies:    I have personally reviewed pertinent reports.   and I have personally reviewed pertinent films in PACS

## 2023-12-28 ENCOUNTER — OFFICE VISIT (OUTPATIENT)
Dept: FAMILY MEDICINE CLINIC | Facility: CLINIC | Age: 83
End: 2023-12-28
Payer: MEDICARE

## 2023-12-28 VITALS
HEIGHT: 66 IN | OXYGEN SATURATION: 97 % | WEIGHT: 166 LBS | HEART RATE: 61 BPM | DIASTOLIC BLOOD PRESSURE: 66 MMHG | SYSTOLIC BLOOD PRESSURE: 110 MMHG | BODY MASS INDEX: 26.68 KG/M2

## 2023-12-28 DIAGNOSIS — R29.6 FREQUENT FALLS: ICD-10-CM

## 2023-12-28 DIAGNOSIS — I47.10 SVT (SUPRAVENTRICULAR TACHYCARDIA): ICD-10-CM

## 2023-12-28 DIAGNOSIS — E78.5 DYSLIPIDEMIA: ICD-10-CM

## 2023-12-28 DIAGNOSIS — E11.9 TYPE 2 DIABETES MELLITUS WITHOUT COMPLICATION, WITHOUT LONG-TERM CURRENT USE OF INSULIN (HCC): ICD-10-CM

## 2023-12-28 DIAGNOSIS — I10 ESSENTIAL HYPERTENSION: ICD-10-CM
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
## 2023-12-28 DIAGNOSIS — S22.010D CLOSED WEDGE COMPRESSION FRACTURE OF T1 VERTEBRA WITH ROUTINE HEALING: ICD-10-CM

## 2023-12-28 DIAGNOSIS — F41.9 ANXIETY: ICD-10-CM

## 2023-12-28 DIAGNOSIS — E04.1 THYROID NODULE: ICD-10-CM

## 2023-12-28 DIAGNOSIS — E03.9 ACQUIRED HYPOTHYROIDISM: ICD-10-CM

## 2023-12-28 DIAGNOSIS — K21.9 GASTROESOPHAGEAL REFLUX DISEASE WITHOUT ESOPHAGITIS: ICD-10-CM

## 2023-12-28 DIAGNOSIS — I95.1 ORTHOSTATIC HYPOTENSION: Primary | ICD-10-CM

## 2023-12-28 PROCEDURE — 99214 OFFICE O/P EST MOD 30 MIN: CPT | Performed by: INTERNAL MEDICINE

## 2023-12-28 RX ORDER — MIDODRINE HYDROCHLORIDE 10 MG/1
TABLET ORAL
Qty: 60 TABLET | Refills: 2 | Status: SHIPPED | OUTPATIENT
Start: 2023-12-28

## 2023-12-28 RX ORDER — LEVOTHYROXINE SODIUM 88 UG/1
88 TABLET ORAL DAILY
Qty: 90 TABLET | Refills: 1 | Status: SHIPPED | OUTPATIENT
Start: 2023-12-28

## 2023-12-28 NOTE — PROGRESS NOTES
Name: Danyelle Martinez      : 1940      MRN: [id number]  Encounter Provider: Bladimir Caraballo MD  Encounter Date: 2023   Encounter department: [facility]    Assessment & Plan     1. Orthostatic hypotension  -     midodrine (PROAMATINE) 10 MG tablet; Midodrine 10 mg 1 tablet at 8 AM and 1 tablet at 2 PM    2. Acquired hypothyroidism  Assessment & Plan:  Patient with acquired hypothyroidism continue with the current doses of levothyroxine 88 mcg daily follow-up with repeat TSH level later.    Orders:  -     levothyroxine 88 mcg tablet; Take 1 tablet (88 mcg total) by mouth daily    3. Thyroid nodule  Assessment & Plan:  Biopsy was recommended for the thyroid nodule but not done yet    Orders:  -     levothyroxine 88 mcg tablet; Take 1 tablet (88 mcg total) by mouth daily    4. Anxiety  Assessment & Plan:  Patient with a history of anxiety and depression on duloxetine 60 mg which appears to have been helping her a lot she has been also under stress in the recent past we will continue the duloxetine and clonazepam as needed      5. Closed wedge compression fracture of T1 vertebra with routine healing  Assessment & Plan:  Reviewed the notes from the neurosurgery no need for any kind of intervention at this time patient had an indeterminate T1 compression fracture noted on the CT scan patient has no pain no discomfort in the cervical thoracic region on exam will monitor for now.  Recommend very strongly to be careful while walking and to use a cane or walker if necessary      6. Essential hypertension  Assessment & Plan:  As mentioned above patient blood pressure is now running on the lower side with orthostatic changes amlodipine and nebivolol has been discontinued patient has been placed on bisoprolol 2.5 mg daily and also placed on midodrine      7. Fall, subsequent encounter    8. Frequent falls  Assessment & Plan:  Patient with frequent falls attributed to orthostatic blood pressure  changes recommend patient again to drink more fluids continue midodrine dosage of which was increased to 10 mg twice a day when she was in the nursing home for short-term rehab.      9. Gastroesophageal reflux disease without esophagitis    10. SVT (supraventricular tachycardia)  Assessment & Plan:  Hospital records reviewed patient with a history of SVT currently patient is not taking bisoprolol 2.5 mg daily amlodipine and Bystolic have been discontinued patient also with orthostatic changes in the blood pressure      11. Type 2 diabetes mellitus without complication, without long-term current use of insulin ([facility])  Assessment & Plan:    Lab Results   Component Value Date    HGBA1C 6.7 (H) 05/30/2023   Discussed with the patient regarding diet and cutting back on carbohydrate intake will follow-up with repeat hemoglobin A1c      12. Dyslipidemia  Assessment & Plan:  Continue atorvastatin follow-up with a lipid profile adjust the dose if necessary currently on atorvastatin 80 mg at bedtime        BMI Counseling: Body mass index is 26.79 kg/m². The BMI is above normal. Nutrition recommendations include decreasing portion sizes, limiting drinks that contain sugar and moderation in carbohydrate intake. Exercise recommendations include exercising 3-5 times per week. Rationale for BMI follow-up plan is due to patient being overweight or obese.         Subjective      Patient is coming here for a follow-up evaluation with regards to an episode of syncope at 4:00 in the morning while she was at home she is not sure exactly how long she was on the floor appears to be about 22 hours subsequently she was taken to the emergency room at [facility] diagnosed having rhabdomyolysis.  CT scan of the head was negative however she was noted to have a closed wedge compression fracture of T1 vertebrae with routine healing.  Reviewed the reports from the neurosurgical office there was minimal compression deformity of T1 superior  endplate age-indeterminate but new since noted from the CTA compared to the old 1 on June 24.  Conservative management.  I reviewed all the reports from the hospital patient with history of orthostatic blood pressures causing recurrent falls on midodrine the doses of which was increased to 10 mg while she was in the nursing home where she was admitted for short-term rehab.    Patient also had problems with bladder showing overflow incontinence of urine urology was consulted and was placed on tamsulosin 0.4 mg but according to her it was causing side effects and she did not take it.  However patient's midodrine dosage has been increased to 10 mg twice a day can also cause urinary retention.  Recommend patient to make an appointment with the urologist as soon as possible.    Recommend patient to bring all the papers of discharge from the nursing home for us to review.  I also recommend patient to get the lab work done which have ordered in the past she has those papers and get it done.  Discussed with the patient and the daughter at length.      Review of Systems   Constitutional:  Negative for chills and fever.   HENT:  Negative for ear pain and sore throat.    Eyes:  Negative for pain and visual disturbance.   Respiratory:  Negative for cough and shortness of breath.    Cardiovascular:  Negative for chest pain and palpitations.   Gastrointestinal:  Negative for abdominal pain and vomiting.   Genitourinary:  Negative for dysuria and hematuria.   Musculoskeletal:  Negative for arthralgias and back pain.   Skin:  Negative for color change and rash.   Neurological:  Negative for seizures and syncope.   All other systems reviewed and are negative.      Current Outpatient Medications on File Prior to Visit   Medication Sig    atorvastatin (LIPITOR) 80 mg tablet TAKE 1 TABLET BY MOUTH DAILY AT BEDTIME (Patient taking differently: Taking at morning)    bisoprolol (ZEBETA) 5 mg tablet Take 0.5 tablets (2.5 mg total) by mouth  "daily Do not start before November 19, 2023.    Calcium Carbonate-Vit D-Min (CALCIUM 1200 PO) Take 1,200 mg by mouth daily    cetirizine (ZyrTEC) 10 mg tablet Take 10 mg by mouth daily    clonazePAM (KlonoPIN) 0.5 mg tablet TAKE 1 TABLET(0.5 MG) BY MOUTH DAILY AT BEDTIME AS NEEDED FOR SEIZURES    clopidogrel (PLAVIX) 75 mg tablet TAKE 1 TABLET BY MOUTH EVERY DAY    DULoxetine (CYMBALTA) 60 mg delayed release capsule TAKE 1 CAPSULE(60 MG) BY MOUTH DAILY    mometasone (NASONEX) 50 mcg/act nasal spray 1 spray into each nostril daily      pantoprazole (PROTONIX) 40 mg tablet TAKE 1 TABLET(40 MG) BY MOUTH EVERY MORNING    primidone (MYSOLINE) 50 mg tablet TAKE 1 TABLET(50 MG) BY MOUTH DAILY    triamcinolone (KENALOG) 0.5 % cream Apply topically 2 (two) times a day    tamsulosin (FLOMAX) 0.4 mg Take 1 capsule (0.4 mg total) by mouth daily with dinner       Objective     /66 (BP Location: Right arm, Patient Position: Sitting, Cuff Size: Standard)   Pulse 61   Ht 5' 6\" (1.676 m)   Wt 75.3 kg (166 lb)   SpO2 97%   BMI 26.79 kg/m²     Physical Exam  Vitals and nursing note reviewed.   Constitutional:       Appearance: Normal appearance.   HENT:      Head: Normocephalic and atraumatic.   Cardiovascular:      Rate and Rhythm: Normal rate and regular rhythm.      Heart sounds: Normal heart sounds.   Pulmonary:      Effort: Pulmonary effort is normal.      Breath sounds: Normal breath sounds.   Abdominal:      General: Abdomen is flat. There is no distension.      Palpations: Abdomen is soft.      Tenderness: There is no abdominal tenderness.   Musculoskeletal:      Cervical back: Normal range of motion and neck supple. No tenderness.      Right lower leg: No edema.      Left lower leg: No edema.   Skin:     General: Skin is warm and dry.   Neurological:      Mental Status: She is alert and oriented to person, place, and time.   Psychiatric:         Mood and Affect: Mood normal.         Behavior: Behavior normal. "       Recent Results (from the past 1344 hour(s))   CBC and differential    Collection Time: 11/15/23  4:21 AM   Result Value Ref Range    WBC 9.69 4.31 - 10.16 Thousand/uL    RBC 4.66 3.81 - 5.12 Million/uL    Hemoglobin 14.7 11.5 - 15.4 g/dL    Hematocrit 44.4 34.8 - 46.1 %    MCV 95 82 - 98 fL    MCH 31.5 26.8 - 34.3 pg    MCHC 33.1 31.4 - 37.4 g/dL    RDW 15.9 (H) 11.6 - 15.1 %    MPV 11.1 8.9 - 12.7 fL    Platelets 192 149 - 390 Thousands/uL    nRBC 0 /100 WBCs    Neutrophils Relative 79 (H) 43 - 75 %    Immat GRANS % 1 0 - 2 %    Lymphocytes Relative 10 (L) 14 - 44 %    Monocytes Relative 8 4 - 12 %    Eosinophils Relative 1 0 - 6 %    Basophils Relative 1 0 - 1 %    Neutrophils Absolute 7.76 (H) 1.85 - 7.62 Thousands/µL    Immature Grans Absolute 0.05 0.00 - 0.20 Thousand/uL    Lymphocytes Absolute 0.92 0.60 - 4.47 Thousands/µL    Monocytes Absolute 0.80 0.17 - 1.22 Thousand/µL    Eosinophils Absolute 0.08 0.00 - 0.61 Thousand/µL    Basophils Absolute 0.08 0.00 - 0.10 Thousands/µL   Comprehensive metabolic panel    Collection Time: 11/15/23  4:21 AM   Result Value Ref Range    Sodium 138 135 - 147 mmol/L    Potassium 3.9 3.5 - 5.3 mmol/L    Chloride 102 96 - 108 mmol/L    CO2 25 21 - 32 mmol/L    ANION GAP 11 mmol/L    BUN 20 5 - 25 mg/dL    Creatinine 0.73 0.60 - 1.30 mg/dL    Glucose 87 65 - 140 mg/dL    Calcium 10.1 8.4 - 10.2 mg/dL    AST 69 (H) 13 - 39 U/L    ALT 25 7 - 52 U/L    Alkaline Phosphatase 71 34 - 104 U/L    Total Protein 7.2 6.4 - 8.4 g/dL    Albumin 4.1 3.5 - 5.0 g/dL    Total Bilirubin 0.93 0.20 - 1.00 mg/dL    eGFR 75 ml/min/1.73sq m   CK    Collection Time: 11/15/23  4:21 AM   Result Value Ref Range    Total CK 1,085 (H) 26 - 192 U/L   TSH, 3rd generation with Free T4 reflex    Collection Time: 11/15/23  4:21 AM   Result Value Ref Range    TSH 3RD GENERATON 1.180 0.450 - 4.500 uIU/mL   COVID only    Collection Time: 11/15/23  6:35 AM    Specimen: Nose; Nares   Result Value Ref Range     SARS-CoV-2 Negative Negative   UA w Reflex to Microscopic w Reflex to Culture    Collection Time: 11/15/23  1:19 PM    Specimen: Urine, Clean Catch   Result Value Ref Range    Color, UA Dk Yellow     Clarity, UA Clear     Specific Gravity, UA 1.025 1.000 - 1.030    pH, UA 5.5 5.0, 5.5, 6.0, 6.5, 7.0, 7.5, 8.0, 8.5, 9.0    Leukocytes, UA Trace (A) Negative    Nitrite, UA Negative Negative    Protein, UA Trace (A) Negative mg/dl    Glucose, UA Negative Negative mg/dl    Ketones, UA 15 (1+) (A) Negative mg/dl    Urobilinogen, UA 1.0 0.2, 1.0 E.U./dl E.U./dl    Bilirubin, UA Small (A) Negative    Occult Blood, UA Negative Negative   Urine Microscopic    Collection Time: 11/15/23  1:19 PM   Result Value Ref Range    RBC, UA 0-1 None Seen, 0-1, 1-2, 2-4, 0-5 /hpf    WBC, UA 2-4 None Seen, 0-1, 1-2, 0-5, 2-4 /hpf    Epithelial Cells Occasional None Seen, Occasional /hpf    Bacteria, UA Occasional None Seen, Occasional /hpf    Hyaline Casts, UA 2-4 (A) (none) /lpf    MUCUS THREADS Moderate (A) None Seen   Echo complete w/ contrast if indicated    Collection Time: 11/15/23  2:05 PM   Result Value Ref Range    Triscuspid Valve Regurgitation Peak Gradient 32.0 mmHg    RAA A4C 13.2 cm2    LA Volume Index (BP) 43.8 mL/m2    MV Peak A Urban 1.23 m/s    MV stenosis pressure 1/2 time 72 ms    MV Peak E Urban 106 cm/s    AV peak gradient 21 mmHg    LVOT stroke volume 99.85     Ao VTI 41.72 cm    Aortic valve peak velocity 2.3 m/s    LVOT peak VTI 31.8 cm    LVOT peak urban 1.37 m/s    LVOT diameter 2.0 cm    E wave deceleration time 250 ms    E/A ratio 0.86     MV valve area p 1/2 method 3.06     AV LVOT peak gradient 7 mmHg    AV mean gradient 10 mmHg    TR Peak Urban 2.8 m/s    AV area peak urban 1.9 cm2    AV area by cont VTI 2.4 cm2    LVOT mn grad 4.0 mmHg    RVID d 3.2 cm    A4C EF 61 %    Aortic valve mean velocity 14.20 m/s    Tricuspid valve peak regurgitation velocity 2.81 m/s    Left ventricular stroke volume (2D) 54.00 mL    IVSd  1.20 cm    Tricuspid annular plane systolic excursion 2.10 cm    Ao root 3.70 cm    LVPWd 1.10 cm    LA size 3.6 cm    LA volume (BP) 81 mL    FS 31 28 - 44    LVIDS 3.10 cm    IVS 1.2 cm    LVIDd 4.50 cm    LA length (A2C) 5.20 cm    LEFT VENTRICLE SYSTOLIC VOLUME (MOD BIPLANE) 2D 39 mL    LV DIASTOLIC VOLUME (MOD BIPLANE) 2D 93 mL    LVOT Cardiac Index 4.15 l/min/m2    LVOT stroke volume index 54.60 ml/m2    LVOT Cardiac Output 7.67 l/min    Left Atrium Area-systolic Four Chamber 23.7 cm2    Left Atrium Area-systolic Apical Two Chamber 23.3 cm2    MV E' Tissue Velocity Septal 5 cm/s    LVSV, 2D 54 mL    LVOT area 3.14 cm2    DVI 0.60     AV valve area 2.39 cm2    LV EF 58    ECG 12 lead    Collection Time: 11/15/23  3:35 PM   Result Value Ref Range    Ventricular Rate 77 BPM    Atrial Rate 78 BPM    WY Interval 126 ms    QRSD Interval 86 ms    QT Interval 406 ms    QTC Interval 459 ms    P Axis 62 degrees    QRS Axis 58 degrees    T Wave Axis 65 degrees   CBC    Collection Time: 11/16/23  5:57 AM   Result Value Ref Range    WBC 6.69 4.31 - 10.16 Thousand/uL    RBC 3.89 3.81 - 5.12 Million/uL    Hemoglobin 11.8 11.5 - 15.4 g/dL    Hematocrit 37.6 34.8 - 46.1 %    MCV 97 82 - 98 fL    MCH 30.3 26.8 - 34.3 pg    MCHC 31.4 31.4 - 37.4 g/dL    RDW 15.9 (H) 11.6 - 15.1 %    Platelets 147 (L) 149 - 390 Thousands/uL    MPV 10.8 8.9 - 12.7 fL   Renal function panel    Collection Time: 11/16/23  5:57 AM   Result Value Ref Range    Albumin 3.4 (L) 3.5 - 5.0 g/dL    Calcium 9.2 8.4 - 10.2 mg/dL    Corrected Calcium 9.7 8.3 - 10.1 mg/dL    Phosphorus 2.7 2.3 - 4.1 mg/dL    Glucose 82 65 - 140 mg/dL    BUN 17 5 - 25 mg/dL    Creatinine 0.66 0.60 - 1.30 mg/dL    Sodium 140 135 - 147 mmol/L    Potassium 3.8 3.5 - 5.3 mmol/L    Chloride 108 96 - 108 mmol/L    CO2 26 21 - 32 mmol/L    ANION GAP 6 mmol/L    eGFR 80 ml/min/1.73sq m   Magnesium    Collection Time: 11/16/23  5:57 AM   Result Value Ref Range    Magnesium 1.5 (L) 1.9 -  2.7 mg/dL   CK    Collection Time: 11/16/23  5:57 AM   Result Value Ref Range    Total  (H) 26 - 192 U/L   CBC    Collection Time: 11/17/23  8:06 AM   Result Value Ref Range    WBC 6.64 4.31 - 10.16 Thousand/uL    RBC 3.86 3.81 - 5.12 Million/uL    Hemoglobin 12.2 11.5 - 15.4 g/dL    Hematocrit 37.2 34.8 - 46.1 %    MCV 96 82 - 98 fL    MCH 31.6 26.8 - 34.3 pg    MCHC 32.8 31.4 - 37.4 g/dL    RDW 15.8 (H) 11.6 - 15.1 %    Platelets 159 149 - 390 Thousands/uL    MPV 10.4 8.9 - 12.7 fL   Renal function panel    Collection Time: 11/17/23  8:06 AM   Result Value Ref Range    Albumin 3.5 3.5 - 5.0 g/dL    Calcium 9.2 8.4 - 10.2 mg/dL    Phosphorus 2.6 2.3 - 4.1 mg/dL    Glucose 100 65 - 140 mg/dL    BUN 11 5 - 25 mg/dL    Creatinine 0.60 0.60 - 1.30 mg/dL    Sodium 140 135 - 147 mmol/L    Potassium 4.0 3.5 - 5.3 mmol/L    Chloride 107 96 - 108 mmol/L    CO2 27 21 - 32 mmol/L    ANION GAP 6 mmol/L    eGFR 83 ml/min/1.73sq m   Magnesium    Collection Time: 11/17/23  8:06 AM   Result Value Ref Range    Magnesium 1.7 (L) 1.9 - 2.7 mg/dL   Shower Chair with Back [$] + 1 more item    Collection Time: 11/17/23  2:40 PM   Result Value Ref Range    Supplier Name [employer]     Supplier Phone Number [phone]     Order Status Delivery Successful     Delivery Note      Delivery Request Date 11/17/2023     Date Delivered  11/21/2023     Item Description Shower Chair (With Back) [$]     Item Description 3 in 1 Commode    CBC    Collection Time: 11/18/23  6:00 AM   Result Value Ref Range    WBC 6.56 4.31 - 10.16 Thousand/uL    RBC 3.88 3.81 - 5.12 Million/uL    Hemoglobin 11.9 11.5 - 15.4 g/dL    Hematocrit 36.9 34.8 - 46.1 %    MCV 95 82 - 98 fL    MCH 30.7 26.8 - 34.3 pg    MCHC 32.2 31.4 - 37.4 g/dL    RDW 15.3 (H) 11.6 - 15.1 %    Platelets 176 149 - 390 Thousands/uL    MPV 11.3 8.9 - 12.7 fL   Renal function panel    Collection Time: 11/18/23  6:00 AM   Result Value Ref Range    Albumin 3.5 3.5  - 5.0 g/dL    Calcium 9.2 8.4 - 10.2 mg/dL    Phosphorus 3.6 2.3 - 4.1 mg/dL    Glucose 94 65 - 140 mg/dL    BUN 12 5 - 25 mg/dL    Creatinine 0.60 0.60 - 1.30 mg/dL    Sodium 141 135 - 147 mmol/L    Potassium 4.1 3.5 - 5.3 mmol/L    Chloride 107 96 - 108 mmol/L    CO2 28 21 - 32 mmol/L    ANION GAP 6 mmol/L    eGFR 83 ml/min/1.73sq m   Magnesium    Collection Time: 11/18/23  6:00 AM   Result Value Ref Range    Magnesium 2.0 1.9 - 2.7 mg/dL   Fingerstick Glucose (POCT)    Collection Time: 11/18/23  7:17 AM   Result Value Ref Range    POC Glucose 105 65 - 140 mg/dl      Bladimir Caraballo MD

## 2024-01-01 NOTE — ASSESSMENT & PLAN NOTE
Patient with history of dizziness on and off workup in the past was negative however she has significant orthostatic changes now Hospital records reviewed amlodipine and nebivolol has been discontinued patient started on midodrine and bisoprolol 2.5 mg daily.  Dizziness now is attributed to the orthostatic changes in the blood pressure.

## 2024-01-01 NOTE — ASSESSMENT & PLAN NOTE
Patient with overflow incontinence seen by the urologist in the hospital and was started on tamsulosin 0.4 mg daily however the doses of the midodrine was increased to 10 mg twice a day which has caused some urinary retention problem patient has stopped taking the tamsulosin recommend patient be seen by the urologist as soon as possible.

## 2024-01-01 NOTE — ASSESSMENT & PLAN NOTE
As mentioned above patient blood pressure is now running on the lower side with orthostatic changes amlodipine and nebivolol has been discontinued patient has been placed on bisoprolol 2.5 mg daily and also placed on midodrine

## 2024-01-01 NOTE — ASSESSMENT & PLAN NOTE
Patient with acquired hypothyroidism continue with the current doses of levothyroxine 88 mcg daily follow-up with repeat TSH level later.

## 2024-01-01 NOTE — ASSESSMENT & PLAN NOTE
No episodes of syncope after she got discharged from the hospital.  Hospital records reviewed.  In the nursing home patient blood pressure was still running on the lower side hence the doses of the midodrine has been increased to 10 mg twice a day

## 2024-01-01 NOTE — ASSESSMENT & PLAN NOTE
Reviewed the notes from the neurosurgery no need for any kind of intervention at this time patient had an indeterminate T1 compression fracture noted on the CT scan patient has no pain no discomfort in the cervical thoracic region on exam will monitor for now.  Recommend very strongly to be careful while walking and to use a cane or walker if necessary

## 2024-01-01 NOTE — ASSESSMENT & PLAN NOTE
Lab Results   Component Value Date    HGBA1C 6.7 (H) 05/30/2023   Discussed with the patient regarding diet and cutting back on carbohydrate intake will follow-up with repeat hemoglobin A1c

## 2024-01-01 NOTE — ASSESSMENT & PLAN NOTE
Patient with frequent falls attributed to orthostatic blood pressure changes recommend patient again to drink more fluids continue midodrine dosage of which was increased to 10 mg twice a day when she was in the nursing home for short-term rehab.

## 2024-01-01 NOTE — ASSESSMENT & PLAN NOTE
Patient with a history of anxiety and depression on duloxetine 60 mg which appears to have been helping her a lot she has been also under stress in the recent past we will continue the duloxetine and clonazepam as needed

## 2024-01-01 NOTE — ASSESSMENT & PLAN NOTE
Hospital records reviewed patient with a history of SVT currently patient is not taking bisoprolol 2.5 mg daily amlodipine and Bystolic have been discontinued patient also with orthostatic changes in the blood pressure

## 2024-01-01 NOTE — ASSESSMENT & PLAN NOTE
Continue atorvastatin follow-up with a lipid profile adjust the dose if necessary currently on atorvastatin 80 mg at bedtime

## 2024-01-10 DIAGNOSIS — I63.9 CVA (CEREBRAL VASCULAR ACCIDENT) (HCC): ICD-10-CM

## 2024-01-10 RX ORDER — CLOPIDOGREL BISULFATE 75 MG/1
75 TABLET ORAL DAILY
Qty: 30 TABLET | Refills: 5 | Status: SHIPPED | OUTPATIENT
Start: 2024-01-10

## 2024-01-12 ENCOUNTER — TELEPHONE (OUTPATIENT)
Age: 84
End: 2024-01-12

## 2024-01-12 NOTE — TELEPHONE ENCOUNTER
Jerilyn called asking if plan of care orders and please be faxed to their office at [phone]. I do not see any orders in her media. I did ask that she refax the orders in the case that office has not received the orders yet.

## 2024-01-22 ENCOUNTER — APPOINTMENT (OUTPATIENT)
Dept: LAB | Facility: HOSPITAL | Age: 84
End: 2024-01-22
Payer: MEDICARE

## 2024-01-22 DIAGNOSIS — Z13.0 SCREENING FOR DEFICIENCY ANEMIA: ICD-10-CM

## 2024-01-22 DIAGNOSIS — E03.9 ACQUIRED HYPOTHYROIDISM: ICD-10-CM

## 2024-01-22 DIAGNOSIS — E11.9 TYPE 2 DIABETES MELLITUS WITHOUT COMPLICATION, WITHOUT LONG-TERM CURRENT USE OF INSULIN (HCC): ICD-10-CM

## 2024-01-22 DIAGNOSIS — E78.5 DYSLIPIDEMIA: ICD-10-CM

## 2024-01-22 LAB
ALBUMIN SERPL BCP-MCNC: 3.9 G/DL (ref 3.5–5)
ALP SERPL-CCNC: 112 U/L (ref 34–104)
ALT SERPL W P-5'-P-CCNC: 39 U/L (ref 7–52)
ANION GAP SERPL CALCULATED.3IONS-SCNC: 7 MMOL/L
AST SERPL W P-5'-P-CCNC: 51 U/L (ref 13–39)
BASOPHILS # BLD AUTO: 0.08 THOUSANDS/ÂΜL (ref 0–0.1)
BASOPHILS NFR BLD AUTO: 1 % (ref 0–1)
BILIRUB SERPL-MCNC: 0.59 MG/DL (ref 0.2–1)
BUN SERPL-MCNC: 24 MG/DL (ref 5–25)
CALCIUM SERPL-MCNC: 9.9 MG/DL (ref 8.4–10.2)
CHLORIDE SERPL-SCNC: 105 MMOL/L (ref 96–108)
CHOLEST SERPL-MCNC: 135 MG/DL
CO2 SERPL-SCNC: 26 MMOL/L (ref 21–32)
CREAT SERPL-MCNC: 0.65 MG/DL (ref 0.6–1.3)
EOSINOPHIL # BLD AUTO: 0.78 THOUSAND/ÂΜL (ref 0–0.61)
EOSINOPHIL NFR BLD AUTO: 5 % (ref 0–6)
ERYTHROCYTE [DISTWIDTH] IN BLOOD BY AUTOMATED COUNT: 15.5 % (ref 11.6–15.1)
GFR SERPL CREATININE-BSD FRML MDRD: 81 ML/MIN/1.73SQ M
GLUCOSE P FAST SERPL-MCNC: 134 MG/DL (ref 65–99)
HCT VFR BLD AUTO: 44 % (ref 34.8–46.1)
HDLC SERPL-MCNC: 30 MG/DL
HGB BLD-MCNC: 13.8 G/DL (ref 11.5–15.4)
IMM GRANULOCYTES # BLD AUTO: 0.09 THOUSAND/UL (ref 0–0.2)
IMM GRANULOCYTES NFR BLD AUTO: 1 % (ref 0–2)
LDLC SERPL CALC-MCNC: 71 MG/DL (ref 0–100)
LYMPHOCYTES # BLD AUTO: 2.08 THOUSANDS/ÂΜL (ref 0.6–4.47)
LYMPHOCYTES NFR BLD AUTO: 14 % (ref 14–44)
MCH RBC QN AUTO: 29.7 PG (ref 26.8–34.3)
MCHC RBC AUTO-ENTMCNC: 31.4 G/DL (ref 31.4–37.4)
MCV RBC AUTO: 95 FL (ref 82–98)
MONOCYTES # BLD AUTO: 0.91 THOUSAND/ÂΜL (ref 0.17–1.22)
MONOCYTES NFR BLD AUTO: 6 % (ref 4–12)
NEUTROPHILS # BLD AUTO: 11.31 THOUSANDS/ÂΜL (ref 1.85–7.62)
NEUTS SEG NFR BLD AUTO: 73 % (ref 43–75)
NONHDLC SERPL-MCNC: 105 MG/DL
NRBC BLD AUTO-RTO: 0 /100 WBCS
PLATELET # BLD AUTO: 285 THOUSANDS/UL (ref 149–390)
PMV BLD AUTO: 10.9 FL (ref 8.9–12.7)
POTASSIUM SERPL-SCNC: 4 MMOL/L (ref 3.5–5.3)
PROT SERPL-MCNC: 6.9 G/DL (ref 6.4–8.4)
RBC # BLD AUTO: 4.64 MILLION/UL (ref 3.81–5.12)
SODIUM SERPL-SCNC: 138 MMOL/L (ref 135–147)
TRIGL SERPL-MCNC: 171 MG/DL
TSH SERPL DL<=0.05 MIU/L-ACNC: 2.56 UIU/ML (ref 0.45–4.5)
WBC # BLD AUTO: 15.25 THOUSAND/UL (ref 4.31–10.16)

## 2024-01-22 PROCEDURE — 80061 LIPID PANEL: CPT

## 2024-01-22 PROCEDURE — 85025 COMPLETE CBC W/AUTO DIFF WBC: CPT

## 2024-01-22 PROCEDURE — 80053 COMPREHEN METABOLIC PANEL: CPT

## 2024-01-22 PROCEDURE — 36415 COLL VENOUS BLD VENIPUNCTURE: CPT

## 2024-01-22 PROCEDURE — 84443 ASSAY THYROID STIM HORMONE: CPT

## 2024-01-22 PROCEDURE — 83036 HEMOGLOBIN GLYCOSYLATED A1C: CPT

## 2024-01-23 LAB
EST. AVERAGE GLUCOSE BLD GHB EST-MCNC: 128 MG/DL
HBA1C MFR BLD: 6.1 %

## 2024-01-26 ENCOUNTER — HOSPITAL ENCOUNTER (INPATIENT)
Facility: HOSPITAL | Age: 84
LOS: 4 days | Discharge: HOME HEALTH | DRG: 291 | End: 2024-01-30
Attending: STUDENT IN AN ORGANIZED HEALTH CARE EDUCATION/TRAINING PROGRAM | Admitting: FAMILY MEDICINE
Payer: MEDICARE

## 2024-01-26 ENCOUNTER — APPOINTMENT (EMERGENCY)
Dept: RADIOLOGY | Facility: HOSPITAL | Age: 84
DRG: 291 | End: 2024-01-26
Payer: MEDICARE

## 2024-01-26 DIAGNOSIS — R79.89 ELEVATED PROCALCITONIN: ICD-10-CM

## 2024-01-26 DIAGNOSIS — R33.9 URINARY RETENTION: ICD-10-CM

## 2024-01-26 DIAGNOSIS — I50.9 CHF (CONGESTIVE HEART FAILURE) (HCC): ICD-10-CM

## 2024-01-26 DIAGNOSIS — J40 BRONCHITIS: ICD-10-CM

## 2024-01-26 DIAGNOSIS — J96.01 ACUTE HYPOXEMIC RESPIRATORY FAILURE (HCC): ICD-10-CM

## 2024-01-26 DIAGNOSIS — R09.02 HYPOXIA: Primary | ICD-10-CM

## 2024-01-26 DIAGNOSIS — I50.33 ACUTE ON CHRONIC DIASTOLIC (CONGESTIVE) HEART FAILURE (HCC): ICD-10-CM

## 2024-01-26 DIAGNOSIS — J81.1 PULMONARY EDEMA: ICD-10-CM

## 2024-01-26 PROBLEM — D72.829 LEUKOCYTOSIS: Status: ACTIVE | Noted: 2024-01-26

## 2024-01-26 LAB
2HR DELTA HS TROPONIN: -3 NG/L
ALBUMIN SERPL BCP-MCNC: 3.8 G/DL (ref 3.5–5)
ALP SERPL-CCNC: 100 U/L (ref 34–104)
ALT SERPL W P-5'-P-CCNC: 31 U/L (ref 7–52)
ANION GAP SERPL CALCULATED.3IONS-SCNC: 7 MMOL/L
AST SERPL W P-5'-P-CCNC: 41 U/L (ref 13–39)
BACTERIA UR QL AUTO: ABNORMAL /HPF
BASE EX.OXY STD BLDV CALC-SCNC: 39 % (ref 60–80)
BASE EXCESS BLDV CALC-SCNC: 0.3 MMOL/L
BASOPHILS # BLD AUTO: 0.07 THOUSANDS/ÂΜL (ref 0–0.1)
BASOPHILS NFR BLD AUTO: 1 % (ref 0–1)
BILIRUB SERPL-MCNC: 0.62 MG/DL (ref 0.2–1)
BILIRUB UR QL STRIP: NEGATIVE
BNP SERPL-MCNC: 250 PG/ML (ref 0–100)
BUN SERPL-MCNC: 21 MG/DL (ref 5–25)
CALCIUM SERPL-MCNC: 10.4 MG/DL (ref 8.4–10.2)
CAOX CRY URNS QL MICRO: ABNORMAL /HPF
CARDIAC TROPONIN I PNL SERPL HS: 5 NG/L
CARDIAC TROPONIN I PNL SERPL HS: 8 NG/L
CHLORIDE SERPL-SCNC: 102 MMOL/L (ref 96–108)
CLARITY UR: ABNORMAL
CO2 SERPL-SCNC: 26 MMOL/L (ref 21–32)
COLOR UR: YELLOW
CREAT SERPL-MCNC: 0.65 MG/DL (ref 0.6–1.3)
D DIMER PPP FEU-MCNC: 1.05 UG/ML FEU
EOSINOPHIL # BLD AUTO: 0.48 THOUSAND/ÂΜL (ref 0–0.61)
EOSINOPHIL NFR BLD AUTO: 4 % (ref 0–6)
ERYTHROCYTE [DISTWIDTH] IN BLOOD BY AUTOMATED COUNT: 15.7 % (ref 11.6–15.1)
FLUAV RNA RESP QL NAA+PROBE: NEGATIVE
FLUBV RNA RESP QL NAA+PROBE: NEGATIVE
GFR SERPL CREATININE-BSD FRML MDRD: 81 ML/MIN/1.73SQ M
GLUCOSE SERPL-MCNC: 126 MG/DL (ref 65–140)
GLUCOSE UR STRIP-MCNC: NEGATIVE MG/DL
HCO3 BLDV-SCNC: 26.7 MMOL/L (ref 24–30)
HCT VFR BLD AUTO: 41 % (ref 34.8–46.1)
HGB BLD-MCNC: 13.4 G/DL (ref 11.5–15.4)
HGB UR QL STRIP.AUTO: ABNORMAL
IMM GRANULOCYTES # BLD AUTO: 0.08 THOUSAND/UL (ref 0–0.2)
IMM GRANULOCYTES NFR BLD AUTO: 1 % (ref 0–2)
KETONES UR STRIP-MCNC: NEGATIVE MG/DL
LEUKOCYTE ESTERASE UR QL STRIP: NEGATIVE
LYMPHOCYTES # BLD AUTO: 1.56 THOUSANDS/ÂΜL (ref 0.6–4.47)
LYMPHOCYTES NFR BLD AUTO: 13 % (ref 14–44)
MCH RBC QN AUTO: 30.4 PG (ref 26.8–34.3)
MCHC RBC AUTO-ENTMCNC: 32.7 G/DL (ref 31.4–37.4)
MCV RBC AUTO: 93 FL (ref 82–98)
MONOCYTES # BLD AUTO: 0.65 THOUSAND/ÂΜL (ref 0.17–1.22)
MONOCYTES NFR BLD AUTO: 5 % (ref 4–12)
MUCOUS THREADS UR QL AUTO: ABNORMAL
NEUTROPHILS # BLD AUTO: 9.42 THOUSANDS/ÂΜL (ref 1.85–7.62)
NEUTS SEG NFR BLD AUTO: 76 % (ref 43–75)
NITRITE UR QL STRIP: NEGATIVE
NON-SQ EPI CELLS URNS QL MICRO: ABNORMAL /HPF
NRBC BLD AUTO-RTO: 0 /100 WBCS
O2 CT BLDV-SCNC: 8.3 ML/DL
PCO2 BLDV: 49.8 MM HG (ref 42–50)
PH BLDV: 7.35 [PH] (ref 7.3–7.4)
PH UR STRIP.AUTO: 5.5 [PH]
PLATELET # BLD AUTO: 217 THOUSANDS/UL (ref 149–390)
PLATELET # BLD AUTO: 233 THOUSANDS/UL (ref 149–390)
PMV BLD AUTO: 10.6 FL (ref 8.9–12.7)
PMV BLD AUTO: 10.9 FL (ref 8.9–12.7)
PO2 BLDV: 26.2 MM HG (ref 35–45)
POTASSIUM SERPL-SCNC: 4 MMOL/L (ref 3.5–5.3)
PROCALCITONIN SERPL-MCNC: 0.26 NG/ML
PROT SERPL-MCNC: 6.9 G/DL (ref 6.4–8.4)
PROT UR STRIP-MCNC: ABNORMAL MG/DL
RBC # BLD AUTO: 4.41 MILLION/UL (ref 3.81–5.12)
RBC #/AREA URNS AUTO: ABNORMAL /HPF
RSV RNA RESP QL NAA+PROBE: NEGATIVE
SARS-COV-2 RNA RESP QL NAA+PROBE: NEGATIVE
SODIUM SERPL-SCNC: 135 MMOL/L (ref 135–147)
SP GR UR STRIP.AUTO: >=1.03 (ref 1–1.03)
UROBILINOGEN UR QL STRIP.AUTO: 0.2 E.U./DL
WBC # BLD AUTO: 12.26 THOUSAND/UL (ref 4.31–10.16)
WBC #/AREA URNS AUTO: ABNORMAL /HPF

## 2024-01-26 PROCEDURE — 85049 AUTOMATED PLATELET COUNT: CPT | Performed by: FAMILY MEDICINE

## 2024-01-26 PROCEDURE — 99285 EMERGENCY DEPT VISIT HI MDM: CPT

## 2024-01-26 PROCEDURE — 36415 COLL VENOUS BLD VENIPUNCTURE: CPT | Performed by: STUDENT IN AN ORGANIZED HEALTH CARE EDUCATION/TRAINING PROGRAM

## 2024-01-26 PROCEDURE — 81001 URINALYSIS AUTO W/SCOPE: CPT | Performed by: STUDENT IN AN ORGANIZED HEALTH CARE EDUCATION/TRAINING PROGRAM

## 2024-01-26 PROCEDURE — 99285 EMERGENCY DEPT VISIT HI MDM: CPT | Performed by: STUDENT IN AN ORGANIZED HEALTH CARE EDUCATION/TRAINING PROGRAM

## 2024-01-26 PROCEDURE — 84145 PROCALCITONIN (PCT): CPT | Performed by: STUDENT IN AN ORGANIZED HEALTH CARE EDUCATION/TRAINING PROGRAM

## 2024-01-26 PROCEDURE — 85025 COMPLETE CBC W/AUTO DIFF WBC: CPT | Performed by: STUDENT IN AN ORGANIZED HEALTH CARE EDUCATION/TRAINING PROGRAM

## 2024-01-26 PROCEDURE — 71275 CT ANGIOGRAPHY CHEST: CPT

## 2024-01-26 PROCEDURE — 82805 BLOOD GASES W/O2 SATURATION: CPT | Performed by: STUDENT IN AN ORGANIZED HEALTH CARE EDUCATION/TRAINING PROGRAM

## 2024-01-26 PROCEDURE — 80053 COMPREHEN METABOLIC PANEL: CPT | Performed by: STUDENT IN AN ORGANIZED HEALTH CARE EDUCATION/TRAINING PROGRAM

## 2024-01-26 PROCEDURE — 71045 X-RAY EXAM CHEST 1 VIEW: CPT

## 2024-01-26 PROCEDURE — 83880 ASSAY OF NATRIURETIC PEPTIDE: CPT | Performed by: STUDENT IN AN ORGANIZED HEALTH CARE EDUCATION/TRAINING PROGRAM

## 2024-01-26 PROCEDURE — 99223 1ST HOSP IP/OBS HIGH 75: CPT | Performed by: FAMILY MEDICINE

## 2024-01-26 PROCEDURE — 93005 ELECTROCARDIOGRAM TRACING: CPT

## 2024-01-26 PROCEDURE — G1004 CDSM NDSC: HCPCS

## 2024-01-26 PROCEDURE — 84484 ASSAY OF TROPONIN QUANT: CPT | Performed by: STUDENT IN AN ORGANIZED HEALTH CARE EDUCATION/TRAINING PROGRAM

## 2024-01-26 PROCEDURE — 0241U HB NFCT DS VIR RESP RNA 4 TRGT: CPT | Performed by: STUDENT IN AN ORGANIZED HEALTH CARE EDUCATION/TRAINING PROGRAM

## 2024-01-26 PROCEDURE — 85379 FIBRIN DEGRADATION QUANT: CPT | Performed by: STUDENT IN AN ORGANIZED HEALTH CARE EDUCATION/TRAINING PROGRAM

## 2024-01-26 RX ORDER — ATORVASTATIN CALCIUM 80 MG/1
80 TABLET, FILM COATED ORAL
Status: DISCONTINUED | OUTPATIENT
Start: 2024-01-27 | End: 2024-01-30 | Stop reason: HOSPADM

## 2024-01-26 RX ORDER — LEVOTHYROXINE SODIUM 88 UG/1
88 TABLET ORAL
Status: DISCONTINUED | OUTPATIENT
Start: 2024-01-27 | End: 2024-01-30 | Stop reason: HOSPADM

## 2024-01-26 RX ORDER — FUROSEMIDE 10 MG/ML
20 INJECTION INTRAMUSCULAR; INTRAVENOUS ONCE
Status: COMPLETED | OUTPATIENT
Start: 2024-01-26 | End: 2024-01-26

## 2024-01-26 RX ORDER — FUROSEMIDE 40 MG/1
40 TABLET ORAL 2 TIMES DAILY
Status: DISCONTINUED | OUTPATIENT
Start: 2024-01-27 | End: 2024-01-28

## 2024-01-26 RX ORDER — CIPROFLOXACIN 250 MG/1
250 TABLET, FILM COATED ORAL EVERY 12 HOURS SCHEDULED
COMMUNITY
End: 2024-01-30

## 2024-01-26 RX ORDER — NITROFURANTOIN MACROCRYSTALS 100 MG/1
100 CAPSULE ORAL DAILY
COMMUNITY
End: 2024-01-30

## 2024-01-26 RX ORDER — CLOPIDOGREL BISULFATE 75 MG/1
75 TABLET ORAL DAILY
Status: DISCONTINUED | OUTPATIENT
Start: 2024-01-27 | End: 2024-01-30 | Stop reason: HOSPADM

## 2024-01-26 RX ORDER — CLONAZEPAM 0.5 MG/1
0.5 TABLET ORAL
Status: DISCONTINUED | OUTPATIENT
Start: 2024-01-26 | End: 2024-01-30 | Stop reason: HOSPADM

## 2024-01-26 RX ORDER — FLUTICASONE PROPIONATE 50 MCG
1 SPRAY, SUSPENSION (ML) NASAL DAILY
Status: DISCONTINUED | OUTPATIENT
Start: 2024-01-27 | End: 2024-01-30 | Stop reason: HOSPADM

## 2024-01-26 RX ORDER — PANTOPRAZOLE SODIUM 40 MG/1
40 TABLET, DELAYED RELEASE ORAL DAILY
Status: DISCONTINUED | OUTPATIENT
Start: 2024-01-27 | End: 2024-01-30 | Stop reason: HOSPADM

## 2024-01-26 RX ORDER — HEPARIN SODIUM 5000 [USP'U]/ML
5000 INJECTION, SOLUTION INTRAVENOUS; SUBCUTANEOUS EVERY 8 HOURS SCHEDULED
Status: DISCONTINUED | OUTPATIENT
Start: 2024-01-26 | End: 2024-01-30 | Stop reason: HOSPADM

## 2024-01-26 RX ORDER — DULOXETIN HYDROCHLORIDE 60 MG/1
60 CAPSULE, DELAYED RELEASE ORAL DAILY
Status: DISCONTINUED | OUTPATIENT
Start: 2024-01-27 | End: 2024-01-30 | Stop reason: HOSPADM

## 2024-01-26 RX ORDER — PRIMIDONE 50 MG/1
50 TABLET ORAL DAILY
Status: DISCONTINUED | OUTPATIENT
Start: 2024-01-27 | End: 2024-01-30 | Stop reason: HOSPADM

## 2024-01-26 RX ORDER — MIDODRINE HYDROCHLORIDE 5 MG/1
10 TABLET ORAL
Status: DISCONTINUED | OUTPATIENT
Start: 2024-01-27 | End: 2024-01-30 | Stop reason: HOSPADM

## 2024-01-26 RX ORDER — CALCIUM CARBONATE 500(1250)
1 TABLET ORAL
Status: DISCONTINUED | OUTPATIENT
Start: 2024-01-27 | End: 2024-01-30 | Stop reason: HOSPADM

## 2024-01-26 RX ORDER — LORATADINE 10 MG/1
10 TABLET ORAL DAILY
Status: DISCONTINUED | OUTPATIENT
Start: 2024-01-27 | End: 2024-01-30 | Stop reason: HOSPADM

## 2024-01-26 RX ORDER — BISOPROLOL FUMARATE 5 MG/1
2.5 TABLET, FILM COATED ORAL
Status: DISCONTINUED | OUTPATIENT
Start: 2024-01-27 | End: 2024-01-30 | Stop reason: HOSPADM

## 2024-01-26 RX ORDER — MIDODRINE HYDROCHLORIDE 5 MG/1
10 TABLET ORAL EVERY MORNING
Status: DISCONTINUED | OUTPATIENT
Start: 2024-01-27 | End: 2024-01-30 | Stop reason: HOSPADM

## 2024-01-26 RX ADMIN — FUROSEMIDE 20 MG: 10 INJECTION, SOLUTION INTRAMUSCULAR; INTRAVENOUS at 18:04

## 2024-01-26 RX ADMIN — HEPARIN SODIUM 5000 UNITS: 5000 INJECTION INTRAVENOUS; SUBCUTANEOUS at 18:04

## 2024-01-26 RX ADMIN — IOHEXOL 85 ML: 350 INJECTION, SOLUTION INTRAVENOUS at 13:22

## 2024-01-26 RX ADMIN — FUROSEMIDE 20 MG: 10 INJECTION, SOLUTION INTRAMUSCULAR; INTRAVENOUS at 15:57

## 2024-01-26 NOTE — Clinical Note
Case was discussed with  and the patient's admission status was agreed to be Admission Status: inpatient status to the service of Dr. Macias

## 2024-01-26 NOTE — CASE MANAGEMENT
"   Case Management Progress Note    Patient name Danyelle Martinez  Location [address] [place] [address] [place] [address] MRN [id number]  : 1940 Date 2024       LOS (days): 0  Geometric Mean LOS (GMLOS) (days):   Days to GMLOS:        OBJECTIVE:      Current admission status: Inpatient  Preferred Pharmacy:   [id number] [place], [place] - [address]  [address]  [facility] [address]  Phone: [phone] Fax: [phone]    Primary Care Provider: Bladimir Caraballo MD    Primary Insurance: MEDICARE  Secondary Insurance: A[employer]    PROGRESS NOTE:    APRIL noted that per ED provider's note, patient reported that her home O2 is \"broken\".  Per previous CM/pulmonology notes O2 supplier is [employer].  APRIL spoke with [employer] liaison Tee by phone to notify him of possible issue and he will have a representative look into it.  Patient's daughter Vivi's contact information was provided to [employer].  APRIL also provided Cait with [employer]'s main number [phone].        "

## 2024-01-26 NOTE — ED PROVIDER NOTES
History  Chief Complaint   Patient presents with    Shortness of Breath     Not feeling well all week, increasing shortness of breath. Wears oxygen at night but states it has been broken     Patient is an 83-year-old female, past medical history including diabetes, hypertension, and hyperlipidemia, who presents to the emergency department for shortness of breath, chills, and fatigue.  Patient states symptoms started earlier this week around Tuesday.  She states initially she had 2 episodes of uncontrollable shaking.  She states she has since developed weakness and fatigue and she feels like her sleep schedule has switched.  She has also slowly developed shortness of breath.  Constant.  Normally uses oxygen at night but not during the day.  On arrival here she was found to be hypoxic in the 80s which improved with supplemental oxygen.  Denies any sick contacts.  No other complaints or concerns.      Wt Readings from Last 3 Encounters:   01/26/24 75.8 kg (167 lb 1.7 oz)   12/28/23 75.3 kg (166 lb)   12/18/23 76.2 kg (168 lb)         Prior to Admission Medications   Prescriptions Last Dose Informant Patient Reported? Taking?   Calcium Carbonate-Vit D-Min (CALCIUM 1200 PO)  Self Yes No   Sig: Take 1,200 mg by mouth daily   DULoxetine (CYMBALTA) 60 mg delayed release capsule   No No   Sig: TAKE 1 CAPSULE(60 MG) BY MOUTH DAILY   atorvastatin (LIPITOR) 80 mg tablet   No No   Sig: TAKE 1 TABLET BY MOUTH DAILY AT BEDTIME   Patient taking differently: Taking at morning   bisoprolol (ZEBETA) 5 mg tablet   No No   Sig: Take 0.5 tablets (2.5 mg total) by mouth daily Do not start before November 19, 2023.   cetirizine (ZyrTEC) 10 mg tablet  Self Yes No   Sig: Take 10 mg by mouth daily   ciprofloxacin (CIPRO) 250 mg tablet   Yes Yes   Sig: Take 250 mg by mouth every 12 (twelve) hours   clonazePAM (KlonoPIN) 0.5 mg tablet   No No   Sig: TAKE 1 TABLET(0.5 MG) BY MOUTH DAILY AT BEDTIME AS NEEDED FOR SEIZURES   clopidogrel (PLAVIX) 75  mg tablet   No No   Sig: TAKE 1 TABLET(75 MG) BY MOUTH DAILY   levothyroxine 88 mcg tablet   No No   Sig: Take 1 tablet (88 mcg total) by mouth daily   midodrine (PROAMATINE) 10 MG tablet   No No   Sig: Midodrine 10 mg 1 tablet at 8 AM and 1 tablet at 2 PM   mometasone (NASONEX) 50 mcg/act nasal spray  Self Yes No   Si spray into each nostril daily     nitrofurantoin (MACRODANTIN) 100 mg capsule   Yes Yes   Sig: Take 100 mg by mouth in the morning   pantoprazole (PROTONIX) 40 mg tablet   No No   Sig: TAKE 1 TABLET(40 MG) BY MOUTH EVERY MORNING   primidone (MYSOLINE) 50 mg tablet   No No   Sig: TAKE 1 TABLET(50 MG) BY MOUTH DAILY   tamsulosin (FLOMAX) 0.4 mg   No No   Sig: Take 1 capsule (0.4 mg total) by mouth daily with dinner   triamcinolone (KENALOG) 0.5 % cream  Self No No   Sig: Apply topically 2 (two) times a day      Facility-Administered Medications: None       Past Medical History:   Diagnosis Date    Anxiety     Arthritis     r knee and shoulders    Blind left eye     Deaf, left     Depression     Diabetes mellitus (HCC)     Disease of thyroid gland     DVT complicating pregnancy, unspecified trimester (HCC) postpartum    Hearing loss     LEFT EAR    History of shingles     fACIAL     Hyperlipidemia     Hypertension     Liver disease     Lyme disease     Meniere's disease     Obesity     Orthostatic hypotension     Psychiatric problem     Sinus congestion     Sleep apnea     Stroke (HCC)        Past Surgical History:   Procedure Laterality Date    APPENDECTOMY      BREAST BIOPSY Left 2010     SECTION      x2    DXA PROCEDURE (HISTORICAL)  10/28/2020    EYE SURGERY      HAND SURGERY Left     HERNIA REPAIR      HYSTERECTOMY      ROTATOR CUFF REPAIR Left     TONSILLECTOMY      TYMPANOSTOMY TUBE PLACEMENT         Family History   Problem Relation Age of Onset    Depression Mother     Hypertension Mother     Obesity Mother     Depression Father     Alcohol abuse Father     Stroke Father      Breast cancer Sister 68    Ovarian cancer Daughter 53    BRCA1 Negative Daughter     BRCA2 Negative Daughter      I have reviewed and agree with the history as documented.    E-Cigarette/Vaping    E-Cigarette Use Never User      E-Cigarette/Vaping Substances    Nicotine No     THC No     CBD No     Flavoring No     Other No     Unknown No      Social History     Tobacco Use    Smoking status: Former     Current packs/day: 0.00     Average packs/day: 0.8 packs/day for 44.0 years (33.0 ttl pk-yrs)     Types: Cigarettes     Start date: 1946     Quit date: 1990     Years since quittin.0     Passive exposure: Past    Smokeless tobacco: Never   Vaping Use    Vaping status: Never Used   Substance Use Topics    Alcohol use: Never    Drug use: Never       Review of Systems   Constitutional:  Negative for chills and fever.   Respiratory:  Positive for cough and shortness of breath.    Cardiovascular:  Negative for chest pain.   All other systems reviewed and are negative.      Physical Exam  Physical Exam  Vitals and nursing note reviewed.   Constitutional:       General: She is not in acute distress.     Appearance: She is well-developed. She is not ill-appearing, toxic-appearing or diaphoretic.   HENT:      Head: Normocephalic and atraumatic.      Right Ear: External ear normal.      Left Ear: External ear normal.      Nose: Nose normal.   Eyes:      General: Lids are normal. No scleral icterus.  Cardiovascular:      Rate and Rhythm: Normal rate and regular rhythm.      Heart sounds: Normal heart sounds. No murmur heard.     No friction rub. No gallop.   Pulmonary:      Effort: Tachypnea present. No accessory muscle usage or respiratory distress.      Breath sounds: No wheezing or rales.   Abdominal:      Palpations: Abdomen is soft.      Tenderness: There is no abdominal tenderness. There is no guarding or rebound.   Musculoskeletal:         General: No deformity. Normal range of motion.      Cervical back:  Normal range of motion and neck supple.   Skin:     General: Skin is warm and dry.   Neurological:      General: No focal deficit present.      Mental Status: She is alert.   Psychiatric:         Mood and Affect: Mood normal.         Behavior: Behavior normal.         Vital Signs  ED Triage Vitals [01/26/24 1154]   Temperature Pulse Respirations Blood Pressure SpO2   97.6 °F (36.4 °C) 83 (!) 28 161/76 (!) 86 %      Temp src Heart Rate Source Patient Position - Orthostatic VS BP Location FiO2 (%)   -- Monitor Sitting Left arm --      Pain Score       No Pain           Vitals:    01/26/24 1154 01/26/24 1300 01/26/24 1421   BP: 161/76 (!) 161/111 170/72   Pulse: 83 82 69   Patient Position - Orthostatic VS: Sitting  Lying         Visual Acuity      ED Medications  Medications   furosemide (LASIX) injection 20 mg (has no administration in time range)   iohexol (OMNIPAQUE) 350 MG/ML injection (SINGLE-DOSE) 85 mL (85 mL Intravenous Given 1/26/24 1322)       Diagnostic Studies  Results Reviewed       Procedure Component Value Units Date/Time    HS Troponin I 2hr [[id number]] Collected: 01/26/24 1446    Lab Status: In process Specimen: Blood from Arm, Left Updated: 01/26/24 1449    Urine Microscopic [[id number]]  (Abnormal) Collected: 01/26/24 1344    Lab Status: Final result Specimen: Urine, Other Updated: 01/26/24 1438     RBC, UA 2-4 /hpf      WBC, UA 4-10 /hpf      Epithelial Cells Occasional /hpf      Bacteria, UA Occasional /hpf      Ca Oxalate Camryn, UA Innumerable /hpf      MUCUS THREADS Moderate    HS Troponin I 4hr [[id number]]     Lab Status: No result Specimen: Blood     UA w Reflex to Microscopic w Reflex to Culture [[id number]]  (Abnormal) Collected: 01/26/24 1344    Lab Status: Final result Specimen: Urine, Other Updated: 01/26/24 1404     Color, UA Yellow     Clarity, UA Slightly Cloudy     Specific Gravity, UA >=1.030     pH, UA 5.5     Leukocytes, UA Negative     Nitrite, UA Negative     Protein, UA Trace mg/dl       Glucose, UA Negative mg/dl      Ketones, UA Negative mg/dl      Urobilinogen, UA 0.2 E.U./dl      Bilirubin, UA Negative     Occult Blood, UA Trace-Intact    FLU/RSV/COVID - if FLU/RSV clinically relevant [[id number]]  (Normal) Collected: 01/26/24 1229    Lab Status: Final result Specimen: Nares from Nose Updated: 01/26/24 1323     SARS-CoV-2 Negative     INFLUENZA A PCR Negative     INFLUENZA B PCR Negative     RSV PCR Negative    Narrative:      FOR PEDIATRIC PATIENTS - copy/paste COVID Guidelines URL to browser: [email or web address]    SARS-CoV-2 assay is a Nucleic Acid Amplification assay intended for the  qualitative detection of nucleic acid from SARS-CoV-2 in nasopharyngeal  swabs. Results are for the presumptive identification of SARS-CoV-2 RNA.    Positive results are indicative of infection with SARS-CoV-2, the virus  causing COVID-19, but do not rule out bacterial infection or co-infection  with other viruses. Laboratories within the [place] and its  territories are required to report all positive results to the appropriate  public health authorities. Negative results do not preclude SARS-CoV-2  infection and should not be used as the sole basis for treatment or other  patient management decisions. Negative results must be combined with  clinical observations, patient history, and epidemiological information.  This test has not been FDA cleared or approved.    This test has been authorized by FDA under an Emergency Use Authorization  (EUA). This test is only authorized for the duration of time the  declaration that circumstances exist justifying the authorization of the  emergency use of an in vitro diagnostic tests for detection of SARS-CoV-2  virus and/or diagnosis of COVID-19 infection under section 564(b)(1) of  the Act, 21 U.S.C. 360bbb-3(b)(1), unless the authorization is terminated  or revoked sooner. The test has been validated but  independent review by FDA  and CLIA is pending.    Test performed using [employer] Gene[employer]pert: This RT-PCR assay targets N2,  a region unique to SARS-CoV-2. A conserved region in the E-gene was chosen  for pan-Sarbecovirus detection which includes SARS-CoV-2.    According to CMS-2020-01-R, this platform meets the definition of high-throughput technology.    Comprehensive metabolic panel [[id number]]  (Abnormal) Collected: 01/26/24 1229    Lab Status: Final result Specimen: Blood from Arm, Left Updated: 01/26/24 1317     Sodium 135 mmol/L      Potassium 4.0 mmol/L      Chloride 102 mmol/L      CO2 26 mmol/L      ANION GAP 7 mmol/L      BUN 21 mg/dL      Creatinine 0.65 mg/dL      Glucose 126 mg/dL      Calcium 10.4 mg/dL      AST 41 U/L      ALT 31 U/L      Alkaline Phosphatase 100 U/L      Total Protein 6.9 g/dL      Albumin 3.8 g/dL      Total Bilirubin 0.62 mg/dL      eGFR 81 ml/min/1.73sq m     Narrative:      National Kidney Disease Foundation guidelines for Chronic Kidney Disease (CKD):     Stage 1 with normal or high GFR (GFR > 90 mL/min/1.73 square meters)    Stage 2 Mild CKD (GFR = 60-89 mL/min/1.73 square meters)    Stage 3A Moderate CKD (GFR = 45-59 mL/min/1.73 square meters)    Stage 3B Moderate CKD (GFR = 30-44 mL/min/1.73 square meters)    Stage 4 Severe CKD (GFR = 15-29 mL/min/1.73 square meters)    Stage 5 End Stage CKD (GFR <15 mL/min/1.73 square meters)  Note: GFR calculation is accurate only with a steady state creatinine    D-dimer, quantitative [[id number]]  (Abnormal) Collected: 01/26/24 1229    Lab Status: Final result Specimen: Blood from Arm, Left Updated: 01/26/24 1308     D-Dimer, Quant 1.05 ug/ml FEU     Narrative:      In the evaluation for possible pulmonary embolism, in the appropriate (Well's Score of 4 or less) patient, the age adjusted d-dimer cutoff for this patient can be calculated as:    Age x 0.01 (in ug/mL) for Age-adjusted D-dimer exclusion threshold for a patient over 50 years.     Procalcitonin [[id number]]  (Abnormal) Collected: 01/26/24 1229    Lab Status: Final result Specimen: Blood from Arm, Left Updated: 01/26/24 1307     Procalcitonin 0.26 ng/ml     HS Troponin 0hr (reflex protocol) [[id number]]  (Normal) Collected: 01/26/24 1229    Lab Status: Final result Specimen: Blood from Arm, Left Updated: 01/26/24 1304     hs TnI 0hr 8 ng/L     B-Type Natriuretic Peptide(BNP) [[id number]]  (Abnormal) Collected: 01/26/24 1229    Lab Status: Final result Specimen: Blood from Arm, Left Updated: 01/26/24 1303      pg/mL     CBC and differential [[id number]]  (Abnormal) Collected: 01/26/24 1229    Lab Status: Final result Specimen: Blood from Arm, Left Updated: 01/26/24 1240     WBC 12.26 Thousand/uL      RBC 4.41 Million/uL      Hemoglobin 13.4 g/dL      Hematocrit 41.0 %      MCV 93 fL      MCH 30.4 pg      MCHC 32.7 g/dL      RDW 15.7 %      MPV 10.9 fL      Platelets 233 Thousands/uL      nRBC 0 /100 WBCs      Neutrophils Relative 76 %      Immat GRANS % 1 %      Lymphocytes Relative 13 %      Monocytes Relative 5 %      Eosinophils Relative 4 %      Basophils Relative 1 %      Neutrophils Absolute 9.42 Thousands/µL      Immature Grans Absolute 0.08 Thousand/uL      Lymphocytes Absolute 1.56 Thousands/µL      Monocytes Absolute 0.65 Thousand/µL      Eosinophils Absolute 0.48 Thousand/µL      Basophils Absolute 0.07 Thousands/µL     Blood gas, venous [[id number]]  (Abnormal) Collected: 01/26/24 1229    Lab Status: Final result Specimen: Blood from Arm, Left Updated: 01/26/24 1240     pH, Dameon 7.347     pCO2, Dameon 49.8 mm Hg      pO2, Dameon 26.2 mm Hg      HCO3, Dameon 26.7 mmol/L      Base Excess, Dameon 0.3 mmol/L      O2 Content, Dameon 8.3 ml/dL      O2 HGB, VENOUS 39.0 %                    CTA ED chest PE Study   Final Result by Hina Noriega MD (01/26 1356)      No pulmonary embolus.      Diffuse septal thickening, greatest in the lower lobes, due to pulmonary edema. Mild underlying pulmonary  fibrosis not excluded.      Nothing to indicate pneumonia.      Mild mediastinal and hilar lymphadenopathy, likely reactive.      Healed/healing sternal manubrial fracture.      Mildly nodular cirrhotic liver.      Pulmonary artery enlargement which can be seen with pulmonary hypertension.            Workstation performed: [id number]         XR chest 1 view portable   Final Result by Martha Mirza MD (01/26 1414)      Increased lung markings which may be due to hypoinflation or mild congestion   No acute airspace consolidation            Workstation performed: [id number]                    Procedures  ECG 12 Lead Documentation Only    Date/Time: 1/26/2024 2:53 PM    Performed by: Delroy Velasquez DO  Authorized by: Delroy Velasquez DO    ECG reviewed by me, the ED Provider: yes    Patient location:  ED  Interpretation:     Interpretation: normal    Rate:     ECG rate:  70    ECG rate assessment: normal    Rhythm:     Rhythm: sinus rhythm    Ectopy:     Ectopy: none    QRS:     QRS axis:  Normal  Conduction:     Conduction: normal    ST segments:     ST segments:  Normal  T waves:     T waves: normal             ED Course  ED Course as of 01/26/24 1457   Fri Jan 26, 2024   1308 D-Dimer, Quant(!): 1.05   1308 Procalcitonin(!): 0.26   1308 hs TnI 0hr: 8   1328 FLU/RSV/COVID - if FLU/RSV clinically relevant   1400 CTA ED chest PE Study  No pulmonary embolus.     Diffuse septal thickening, greatest in the lower lobes, due to pulmonary edema. Mild underlying pulmonary fibrosis not excluded.     Nothing to indicate pneumonia.     Mild mediastinal and hilar lymphadenopathy, likely reactive.     Healed/healing sternal manubrial fracture.     Mildly nodular cirrhotic liver.     Pulmonary artery enlargement which can be seen with pulmonary hypertension.     1429 Discussed with SLIM. Accepts admission                                             Medical Decision Making  Patient is a 83 y.o. female who presents to the ED  "for shortness of breath.  Patient is nontoxic and well-appearing.  She is hypoxic on room air and tachypneic but otherwise vitals are stable.  Physical exam is unremarkable.    Differential diagnosis for patient's shortness of breath includes viral illness, pneumonia, PE.  Doubt cardiac causes given no significant signs of volume overload.  Possible UTI given the rigors?  Presentation not consistent with non-cardiopulmonary causes including toxidromes, metabolic etiologies such as acidemia or electrolyte derangements.       Plan: supplemental O2, CXR, labs, troponin, close hemodynamic monitoring, serial reassessment.  Likely admit given oxygen requirements             Portions of the record may have been created with voice recognition software. Occasional wrong word or \"sound a like\" substitutions may have occurred due to the inherent limitations of voice recognition software. Read the chart carefully and recognize, using context, where substitutions have occurred.    Problems Addressed:  Hypoxia: acute illness or injury    Amount and/or Complexity of Data Reviewed  Labs: ordered. Decision-making details documented in ED Course.  Radiology: ordered. Decision-making details documented in ED Course.    Risk  Prescription drug management.  Decision regarding hospitalization.             Disposition  Final diagnoses:   Hypoxia   Pulmonary edema   Elevated procalcitonin     Time reflects when diagnosis was documented in both MDM as applicable and the Disposition within this note       Time User Action Codes Description Comment    1/26/2024  2:29 PM Delroy Velasquez Add [R09.02] Hypoxia     1/26/2024  2:56 PM Delroy Velasquez Add [J81.1] Pulmonary edema     1/26/2024  2:56 PM Delroy Velasquez Add [R79.89] Elevated procalcitonin           ED Disposition       ED Disposition   Admit    Condition   Stable    Date/Time   Fri Jan 26, 2024  2:56 PM    Comment   Case was discussed with MARTELL  and the patient's admission status was " agreed to be Admission Status: inpatient status to the service of Dr. Israel .               Follow-up Information    None         Patient's Medications   Discharge Prescriptions    No medications on file       No discharge procedures on file.    PDMP Review       None            ED Provider  Electronically Signed by             Delroy Velasquez DO  01/26/24 8801

## 2024-01-26 NOTE — PLAN OF CARE
Problem: Potential for Falls  Goal: Patient will remain free of falls  Description: INTERVENTIONS:  - Educate patient/family on patient safety including physical limitations  - Instruct patient to call for assistance with activity   - Consult OT/PT to assist with strengthening/mobility   - Keep Call bell within reach  - Keep bed low and locked with side rails adjusted as appropriate  - Keep care items and personal belongings within reach  - Initiate and maintain comfort rounds  - Make Fall Risk Sign visible to staff  - Offer Toileting every 2 Hours, in advance of need  - Initiate/Maintain bed alarm  - Obtain necessary fall risk management equipment: fall risk sign on door  - Apply yellow socks and bracelet for high fall risk patients  - Consider moving patient to room near nurses station  Outcome: Progressing     Problem: PAIN - ADULT  Goal: Verbalizes/displays adequate comfort level or baseline comfort level  Description: Interventions:  - Encourage patient to monitor pain and request assistance  - Assess pain using appropriate pain scale  - Administer analgesics based on type and severity of pain and evaluate response  - Implement non-pharmacological measures as appropriate and evaluate response  - Consider cultural and social influences on pain and pain management  - Notify physician/advanced practitioner if interventions unsuccessful or patient reports new pain  Outcome: Progressing     Problem: INFECTION - ADULT  Goal: Absence or prevention of progression during hospitalization  Description: INTERVENTIONS:  - Assess and monitor for signs and symptoms of infection  - Monitor lab/diagnostic results  - Monitor all insertion sites, i.e. indwelling lines, tubes, and drains  - Monitor endotracheal if appropriate and nasal secretions for changes in amount and color  - Rodney appropriate cooling/warming therapies per order  - Administer medications as ordered  - Instruct and encourage patient and family to use  good hand hygiene technique  - Identify and instruct in appropriate isolation precautions for identified infection/condition  Outcome: Progressing  Goal: Absence of fever/infection during neutropenic period  Description: INTERVENTIONS:  - Monitor WBC    Outcome: Progressing     Problem: SAFETY ADULT  Goal: Patient will remain free of falls  Description: INTERVENTIONS:  - Educate patient/family on patient safety including physical limitations  - Instruct patient to call for assistance with activity   - Consult OT/PT to assist with strengthening/mobility   - Keep Call bell within reach  - Keep bed low and locked with side rails adjusted as appropriate  - Keep care items and personal belongings within reach  - Initiate and maintain comfort rounds  - Make Fall Risk Sign visible to staff  - Offer Toileting every 2 Hours, in advance of need  - Initiate/Maintain bed alarm  - Obtain necessary fall risk management equipment: fall risk sign on door  - Apply yellow socks and bracelet for high fall risk patients  - Consider moving patient to room near nurses station  Outcome: Progressing  Goal: Maintain or return to baseline ADL function  Description: INTERVENTIONS:  -  Assess patient's ability to carry out ADLs; assess patient's baseline for ADL function and identify physical deficits which impact ability to perform ADLs (bathing, care of mouth/teeth, toileting, grooming, dressing, etc.)  - Assess/evaluate cause of self-care deficits   - Assess range of motion  - Assess patient's mobility; develop plan if impaired  - Assess patient's need for assistive devices and provide as appropriate  - Encourage maximum independence but intervene and supervise when necessary  - Involve family in performance of ADLs  - Assess for home care needs following discharge   - Consider OT consult to assist with ADL evaluation and planning for discharge  - Provide patient education as appropriate  Outcome: Progressing  Goal: Maintains/Returns to pre  admission functional level  Description: INTERVENTIONS:  - Perform AM-PAC 6 Click Basic Mobility/ Daily Activity assessment daily.  - Set and communicate daily mobility goal to care team and patient/family/caregiver.   - Collaborate with rehabilitation services on mobility goals if consulted  - Perform Range of Motion 2 times a day.  - Reposition patient every 2 hours.  - Dangle patient 2 times a day  - Stand patient 2 times a day  - Ambulate patient 2 times a day  - Out of bed to chair 3 times a day   - Out of bed for meals 3 times a day  - Out of bed for toileting  - Record patient progress and toleration of activity level   Outcome: Progressing     Problem: DISCHARGE PLANNING  Goal: Discharge to home or other facility with appropriate resources  Description: INTERVENTIONS:  - Identify barriers to discharge w/patient and caregiver  - Arrange for needed discharge resources and transportation as appropriate  - Identify discharge learning needs (meds, wound care, etc.)  - Arrange for interpretive services to assist at discharge as needed  - Refer to Case Management Department for coordinating discharge planning if the patient needs post-hospital services based on physician/advanced practitioner order or complex needs related to functional status, cognitive ability, or social support system  Outcome: Progressing     Problem: Knowledge Deficit  Goal: Patient/family/caregiver demonstrates understanding of disease process, treatment plan, medications, and discharge instructions  Description: Complete learning assessment and assess knowledge base.  Interventions:  - Provide teaching at level of understanding  - Provide teaching via preferred learning methods  Outcome: Progressing     Problem: NEUROSENSORY - ADULT  Goal: Achieves stable or improved neurological status  Description: INTERVENTIONS  - Monitor and report changes in neurological status  - Monitor vital signs such as temperature, blood pressure, glucose, and any  other labs ordered   - Initiate measures to prevent increased intracranial pressure  - Monitor for seizure activity and implement precautions if appropriate      Outcome: Progressing  Goal: Remains free of injury related to seizures activity  Description: INTERVENTIONS  - Maintain airway, patient safety  and administer oxygen as ordered  - Monitor patient for seizure activity, document and report duration and description of seizure to physician/advanced practitioner  - If seizure occurs,  ensure patient safety during seizure  - Reorient patient post seizure  - Seizure pads on all 4 side rails  - Instruct patient/family to notify RN of any seizure activity including if an aura is experienced  - Instruct patient/family to call for assistance with activity based on nursing assessment  - Administer anti-seizure medications if ordered    Outcome: Progressing  Goal: Achieves maximal functionality and self care  Description: INTERVENTIONS  - Monitor swallowing and airway patency with patient fatigue and changes in neurological status  - Encourage and assist patient to increase activity and self care.   - Encourage visually impaired, hearing impaired and aphasic patients to use assistive/communication devices  Outcome: Progressing     Problem: CARDIOVASCULAR - ADULT  Goal: Maintains optimal cardiac output and hemodynamic stability  Description: INTERVENTIONS:  - Monitor I/O, vital signs and rhythm  - Monitor for S/S and trends of decreased cardiac output  - Administer and titrate ordered vasoactive medications to optimize hemodynamic stability  - Assess quality of pulses, skin color and temperature  - Assess for signs of decreased coronary artery perfusion  - Instruct patient to report change in severity of symptoms  Outcome: Progressing  Goal: Absence of cardiac dysrhythmias or at baseline rhythm  Description: INTERVENTIONS:  - Continuous cardiac monitoring, vital signs, obtain 12 lead EKG if ordered  - Administer  antiarrhythmic and heart rate control medications as ordered  - Monitor electrolytes and administer replacement therapy as ordered  Outcome: Progressing     Problem: RESPIRATORY - ADULT  Goal: Achieves optimal ventilation and oxygenation  Description: INTERVENTIONS:  - Assess for changes in respiratory status  - Assess for changes in mentation and behavior  - Position to facilitate oxygenation and minimize respiratory effort  - Oxygen administered by appropriate delivery if ordered  - Initiate smoking cessation education as indicated  - Encourage broncho-pulmonary hygiene including cough, deep breathe, Incentive Spirometry  - Assess the need for suctioning and aspirate as needed  - Assess and instruct to report SOB or any respiratory difficulty  - Respiratory Therapy support as indicated  Outcome: Progressing     Problem: GASTROINTESTINAL - ADULT  Goal: Maintains adequate nutritional intake  Description: INTERVENTIONS:  - Monitor percentage of each meal consumed  - Identify factors contributing to decreased intake, treat as appropriate  - Assist with meals as needed  - Monitor I&O, weight, and lab values if indicated  - Obtain nutrition services referral as needed  Outcome: Progressing     Problem: METABOLIC, FLUID AND ELECTROLYTES - ADULT  Goal: Electrolytes maintained within normal limits  Description: INTERVENTIONS:  - Monitor labs and assess patient for signs and symptoms of electrolyte imbalances  - Administer electrolyte replacement as ordered  - Monitor response to electrolyte replacements, including repeat lab results as appropriate  - Instruct patient on fluid and nutrition as appropriate  Outcome: Progressing  Goal: Fluid balance maintained  Description: INTERVENTIONS:  - Monitor labs   - Monitor I/O and WT  - Instruct patient on fluid and nutrition as appropriate  - Assess for signs & symptoms of volume excess or deficit  Outcome: Progressing  Goal: Glucose maintained within target range  Description:  INTERVENTIONS:  - Monitor Blood Glucose as ordered  - Assess for signs and symptoms of hyperglycemia and hypoglycemia  - Administer ordered medications to maintain glucose within target range  - Assess nutritional intake and initiate nutrition service referral as needed  Outcome: Progressing     Problem: SKIN/TISSUE INTEGRITY - ADULT  Goal: Skin Integrity remains intact(Skin Breakdown Prevention)  Description: Assess:  -Perform Félix assessment every shift  -Clean and moisturize skin every day  -Inspect skin when repositioning, toileting, and assisting with ADLS  -Assess under medical devices such as oxygen every day  -Assess extremities for adequate circulation and sensation     Bed Management:  -Have minimal linens on bed & keep smooth, unwrinkled  -Change linens as needed when moist or perspiring  -Avoid sitting or lying in one position for more than 2 hours while in bed  -Keep HOB at 30 degrees     Toileting:  -Offer bedside commode  -Assess for incontinence every day  -Use incontinent care products after each incontinent episode such as barrier cream    Activity:  -Mobilize patient 2 times a day  -Encourage activity and walks on unit  -Encourage or provide ROM exercises   -Turn and reposition patient every 2 Hours  -Use appropriate equipment to lift or move patient in bed  -Instruct/ Assist with weight shifting every 30 minutes when out of bed in chair  -Consider limitation of chair time 2 hour intervals    Skin Care:  -Avoid use of baby powder, tape, friction and shearing, hot water or constrictive clothing  -Relieve pressure over bony prominences using foam wedges  -Do not massage red bony areas    Next Steps:  -Teach patient strategies to minimize risks such as weight shifting   -Consider consults to  interdisciplinary teams such as OT/PT  Outcome: Progressing     Problem: HEMATOLOGIC - ADULT  Goal: Maintains hematologic stability  Description: INTERVENTIONS  - Assess for signs and symptoms of bleeding or  hemorrhage  - Monitor labs  - Administer supportive blood products/factors as ordered and appropriate  Outcome: Progressing     Problem: Nutrition/Hydration-ADULT  Goal: Nutrient/Hydration intake appropriate for improving, restoring or maintaining nutritional needs  Description: Monitor and assess patient's nutrition/hydration status for malnutrition. Collaborate with interdisciplinary team and initiate plan and interventions as ordered.  Monitor patient's weight and dietary intake as ordered or per policy. Utilize nutrition screening tool and intervene as necessary. Determine patient's food preferences and provide high-protein, high-caloric foods as appropriate.     INTERVENTIONS:  - Monitor oral intake, urinary output, labs, and treatment plans  - Assess nutrition and hydration status and recommend course of action  - Evaluate amount of meals eaten  - Assist patient with eating if necessary   - Allow adequate time for meals  - Recommend/ encourage appropriate diets, oral nutritional supplements, and vitamin/mineral supplements  - Order, calculate, and assess calorie counts as needed  - Recommend, monitor, and adjust tube feedings and TPN/PPN based on assessed needs  - Assess need for intravenous fluids  - Provide specific nutrition/hydration education as appropriate  - Include patient/family/caregiver in decisions related to nutrition  Outcome: Progressing

## 2024-01-27 LAB
ALBUMIN SERPL BCP-MCNC: 3.7 G/DL (ref 3.5–5)
ALP SERPL-CCNC: 89 U/L (ref 34–104)
ALT SERPL W P-5'-P-CCNC: 25 U/L (ref 7–52)
ANION GAP SERPL CALCULATED.3IONS-SCNC: 7 MMOL/L
AST SERPL W P-5'-P-CCNC: 34 U/L (ref 13–39)
BILIRUB SERPL-MCNC: 0.54 MG/DL (ref 0.2–1)
BUN SERPL-MCNC: 20 MG/DL (ref 5–25)
CALCIUM SERPL-MCNC: 9.7 MG/DL (ref 8.4–10.2)
CHLORIDE SERPL-SCNC: 102 MMOL/L (ref 96–108)
CO2 SERPL-SCNC: 28 MMOL/L (ref 21–32)
CREAT SERPL-MCNC: 0.67 MG/DL (ref 0.6–1.3)
ERYTHROCYTE [DISTWIDTH] IN BLOOD BY AUTOMATED COUNT: 15.7 % (ref 11.6–15.1)
GFR SERPL CREATININE-BSD FRML MDRD: 80 ML/MIN/1.73SQ M
GLUCOSE SERPL-MCNC: 102 MG/DL (ref 65–140)
HCT VFR BLD AUTO: 39.8 % (ref 34.8–46.1)
HGB BLD-MCNC: 12.9 G/DL (ref 11.5–15.4)
MAGNESIUM SERPL-MCNC: 1.8 MG/DL (ref 1.9–2.7)
MCH RBC QN AUTO: 30.3 PG (ref 26.8–34.3)
MCHC RBC AUTO-ENTMCNC: 32.4 G/DL (ref 31.4–37.4)
MCV RBC AUTO: 93 FL (ref 82–98)
PLATELET # BLD AUTO: 218 THOUSANDS/UL (ref 149–390)
PMV BLD AUTO: 10.8 FL (ref 8.9–12.7)
POTASSIUM SERPL-SCNC: 4.2 MMOL/L (ref 3.5–5.3)
PROT SERPL-MCNC: 6.5 G/DL (ref 6.4–8.4)
RBC # BLD AUTO: 4.26 MILLION/UL (ref 3.81–5.12)
SODIUM SERPL-SCNC: 137 MMOL/L (ref 135–147)
WBC # BLD AUTO: 10.36 THOUSAND/UL (ref 4.31–10.16)

## 2024-01-27 PROCEDURE — 97535 SELF CARE MNGMENT TRAINING: CPT

## 2024-01-27 PROCEDURE — 97167 OT EVAL HIGH COMPLEX 60 MIN: CPT

## 2024-01-27 PROCEDURE — 97530 THERAPEUTIC ACTIVITIES: CPT

## 2024-01-27 PROCEDURE — 80053 COMPREHEN METABOLIC PANEL: CPT | Performed by: FAMILY MEDICINE

## 2024-01-27 PROCEDURE — 83735 ASSAY OF MAGNESIUM: CPT | Performed by: FAMILY MEDICINE

## 2024-01-27 PROCEDURE — 99232 SBSQ HOSP IP/OBS MODERATE 35: CPT | Performed by: STUDENT IN AN ORGANIZED HEALTH CARE EDUCATION/TRAINING PROGRAM

## 2024-01-27 PROCEDURE — 85027 COMPLETE CBC AUTOMATED: CPT | Performed by: FAMILY MEDICINE

## 2024-01-27 RX ORDER — MAGNESIUM SULFATE HEPTAHYDRATE 40 MG/ML
2 INJECTION, SOLUTION INTRAVENOUS ONCE
Status: COMPLETED | OUTPATIENT
Start: 2024-01-27 | End: 2024-01-27

## 2024-01-27 RX ORDER — FUROSEMIDE 10 MG/ML
20 INJECTION INTRAMUSCULAR; INTRAVENOUS ONCE
Status: COMPLETED | OUTPATIENT
Start: 2024-01-27 | End: 2024-01-27

## 2024-01-27 RX ADMIN — FUROSEMIDE 20 MG: 10 INJECTION, SOLUTION INTRAMUSCULAR; INTRAVENOUS at 09:54

## 2024-01-27 RX ADMIN — CALCIUM 1 TABLET: 500 TABLET ORAL at 08:42

## 2024-01-27 RX ADMIN — ATORVASTATIN CALCIUM 80 MG: 80 TABLET, FILM COATED ORAL at 22:08

## 2024-01-27 RX ADMIN — PRIMIDONE 50 MG: 50 TABLET ORAL at 08:40

## 2024-01-27 RX ADMIN — LORATADINE 10 MG: 10 TABLET ORAL at 08:41

## 2024-01-27 RX ADMIN — HEPARIN SODIUM 5000 UNITS: 5000 INJECTION INTRAVENOUS; SUBCUTANEOUS at 09:50

## 2024-01-27 RX ADMIN — PANTOPRAZOLE SODIUM 40 MG: 40 TABLET, DELAYED RELEASE ORAL at 08:40

## 2024-01-27 RX ADMIN — FUROSEMIDE 40 MG: 40 TABLET ORAL at 08:42

## 2024-01-27 RX ADMIN — CLOPIDOGREL 75 MG: 75 TABLET ORAL at 08:40

## 2024-01-27 RX ADMIN — HEPARIN SODIUM 5000 UNITS: 5000 INJECTION INTRAVENOUS; SUBCUTANEOUS at 17:15

## 2024-01-27 RX ADMIN — LEVOTHYROXINE SODIUM 88 MCG: 88 TABLET ORAL at 05:12

## 2024-01-27 RX ADMIN — MIDODRINE HYDROCHLORIDE 10 MG: 5 TABLET ORAL at 08:41

## 2024-01-27 RX ADMIN — BISOPROLOL FUMARATE 2.5 MG: 5 TABLET ORAL at 17:15

## 2024-01-27 RX ADMIN — FUROSEMIDE 40 MG: 40 TABLET ORAL at 17:15

## 2024-01-27 RX ADMIN — HEPARIN SODIUM 5000 UNITS: 5000 INJECTION INTRAVENOUS; SUBCUTANEOUS at 01:25

## 2024-01-27 RX ADMIN — MAGNESIUM SULFATE HEPTAHYDRATE 2 G: 40 INJECTION, SOLUTION INTRAVENOUS at 09:35

## 2024-01-27 RX ADMIN — DULOXETINE HYDROCHLORIDE 60 MG: 60 CAPSULE, DELAYED RELEASE ORAL at 08:40

## 2024-01-27 RX ADMIN — FLUTICASONE PROPIONATE 1 SPRAY: 50 SPRAY, METERED NASAL at 08:44

## 2024-01-27 RX ADMIN — MIDODRINE HYDROCHLORIDE 10 MG: 5 TABLET ORAL at 13:31

## 2024-01-27 NOTE — ASSESSMENT & PLAN NOTE
Wt Readings from Last 3 Encounters:   01/27/24 72.5 kg (159 lb 12.8 oz)   12/28/23 75.3 kg (166 lb)   12/18/23 76.2 kg (168 lb)     Presented with worsening SOB, chills, ROB. Elevated BNP, pulmonary edema on imaging consistent with volume overload.  Echo (11/2023): EF 58%, G1DD.   Initially started on IV Lasix 40 mg BID with good diuretic response  Continue Lasix 20 mg daily on discharge  Daily weights, I/Os, 1.8L fluid restriction

## 2024-01-27 NOTE — ASSESSMENT & PLAN NOTE
Presented with hypoxia, O2 in 80s at home with SpO2 86% on room air in ED. Chronically on 3 L NC O2 at bedtime, noted that patient reported her oxygen supplies were not working properly. No formal dx COPD, asthma in the past.   CTA chest showed no PE, diffuse septal thickening due to pulmonary edema but no pneumonia  Completed IV Lasix trial with good response, switched to PO Lasix  Started on PO prednisone, azithromycin for possible bronchitis  Currently stable on 3L NC O2, appears to be baseline  Continue to complete 5-day course of prednisone, 3-day course azithromycin & F/U with pulmonology outpatient  Case management consult for safe discharge planning to ensure home O2 supplies

## 2024-01-27 NOTE — PLAN OF CARE
Problem: CARDIOVASCULAR - ADULT  Goal: Maintains optimal cardiac output and hemodynamic stability  Description: INTERVENTIONS:  - Monitor I/O, vital signs and rhythm  - Monitor for S/S and trends of decreased cardiac output  - Administer and titrate ordered vasoactive medications to optimize hemodynamic stability  - Assess quality of pulses, skin color and temperature  - Assess for signs of decreased coronary artery perfusion  - Instruct patient to report change in severity of symptoms  Outcome: Progressing     Problem: CARDIOVASCULAR - ADULT  Goal: Absence of cardiac dysrhythmias or at baseline rhythm  Description: INTERVENTIONS:  - Continuous cardiac monitoring, vital signs, obtain 12 lead EKG if ordered  - Administer antiarrhythmic and heart rate control medications as ordered  - Monitor electrolytes and administer replacement therapy as ordered  Outcome: Progressing     Problem: RESPIRATORY - ADULT  Goal: Achieves optimal ventilation and oxygenation  Description: INTERVENTIONS:  - Assess for changes in respiratory status  - Assess for changes in mentation and behavior  - Position to facilitate oxygenation and minimize respiratory effort  - Oxygen administered by appropriate delivery if ordered  - Initiate smoking cessation education as indicated  - Encourage broncho-pulmonary hygiene including cough, deep breathe, Incentive Spirometry  - Assess the need for suctioning and aspirate as needed  - Assess and instruct to report SOB or any respiratory difficulty  - Respiratory Therapy support as indicated  Outcome: Progressing     Problem: METABOLIC, FLUID AND ELECTROLYTES - ADULT  Goal: Electrolytes maintained within normal limits  Description: INTERVENTIONS:  - Monitor labs and assess patient for signs and symptoms of electrolyte imbalances  - Administer electrolyte replacement as ordered  - Monitor response to electrolyte replacements, including repeat lab results as appropriate  - Instruct patient on fluid and  nutrition as appropriate  Outcome: Progressing     Problem: HEMATOLOGIC - ADULT  Goal: Maintains hematologic stability  Description: INTERVENTIONS  - Assess for signs and symptoms of bleeding or hemorrhage  - Monitor labs  - Administer supportive blood products/factors as ordered and appropriate  Outcome: Progressing

## 2024-01-27 NOTE — ASSESSMENT & PLAN NOTE
Patient reports that she was hypoxic to 80s at home on room air and 86% on room air in the ER.  Normally uses 3 L of oxygen at bedtime only  Continue supplemental oxygen and titrate off as tolerated

## 2024-01-27 NOTE — PROGRESS NOTES
INTERNAL MEDICINE PROGRESS NOTE     Name: Danyelle Martinez   Age & Sex: 83 y.o. female   MRN: [id number]  Unit/Bed#: [address] [place] [address]   Encounter: [id number]  Hospital Stay Days: 1      ASSESSMENT/PLAN     Principal Problem:    Acute on chronic diastolic (congestive) heart failure (HCC)  Active Problems:    Acute hypoxemic respiratory failure (HCC)    Essential hypertension    Acquired hypothyroidism    Type 2 diabetes mellitus, without long-term current use of insulin ([facility])    Recurrent major depressive disorder, in remission ([facility])    Leukocytosis      Leukocytosis  Assessment & Plan  Patient with mild leukocytosis on lab work but afebrile  States she has been taking an Macrodantin and Cipro for UTI as prescribed by urology.  Discussed with patient's daughter to verify meds as unclear why she is on 2 different antibiotics and per daughter patient was only supposed to be on Macrodantin for 2 weeks but patient states she is still on it  Asymptomatic    Recurrent major depressive disorder, in remission ([facility])  Assessment & Plan  Continue Cymbalta    Type 2 diabetes mellitus, without long-term current use of insulin ([facility])  Assessment & Plan  Lab Results   Component Value Date    HGBA1C 6.1 (H) 01/22/2024     Diet controlled.  Placed patient on diabetic diet          Acquired hypothyroidism  Assessment & Plan  Continue levothyroxine supplementation    Essential hypertension  Assessment & Plan  Continue Zebeta.  Patient with history of orthostatic hypotension and will continue midodrine as she uses at home    Acute hypoxemic respiratory failure (HCC)  Assessment & Plan  Patient reports that she was hypoxic to 80s at home on room air and 86% on room air in the ER.  Normally uses 3 L of oxygen at bedtime only  Continue supplemental oxygen and titrate off as tolerated    * Acute on chronic diastolic (congestive) heart failure (HCC)  Assessment & Plan  Wt Readings from Last 3 Encounters:   01/27/24 72.5 kg (159 lb 12.8 oz)    23 75.3 kg (166 lb)   23 76.2 kg (168 lb)     Patient presented to the ER with worsening shortness of breath, chills since . Today breathing significantly worsened even with minimal exertion  CTA was negative for PE but showed findings of pulmonary edema diffusely.  Mild underlying pulmonary fibrosis could not be ruled out.  No pneumonia was noted, mildly nodular cirrhotic liver was noted.  Pulmonary artery enlargement was noted  Patient received 1 dose of IV Lasix 20 mg in the ER.  Will give 1 more dose of 20 mg today and place patient on 40 mg twice daily starting tomorrow.  Most recent echo from 2023 showed normal EF with grade 1 diastolic dysfunction  Monitor I/Os, daily weights  Patient notes symptomatic improvement today 2024, continue IV diuretic therapy            VTE Pharmacologic Prophylaxis:   Pharmacologic: Heparin  Mechanical VTE Prophylaxis in Place: Yes    Patient Centered Rounds: I have performed bedside rounds with nursing staff today.    Discussions with Specialists or Other Care Team Provider: N/A    Education and Discussions with Family / Patient: Discussed with patient at bedside    Time Spent for Care: 45 minutes.  More than 50% of total time spent on counseling and coordination of care as described above.    Current Length of Stay: 1 day(s)    Current Patient Status: Inpatient   Certification Statement: The patient will continue to require additional inpatient hospital stay due to acute on chronic diastolic heart failure    Discharge Plan / Estimated Discharge Date: 24-48 hours    Code Status: Level 3 - DNAR and DNI    Subjective:     Patient seen and examined at bedside this morning, overall patient notes that symptoms have improved, still having some chest congestion symptoms.    Objective:   Vitals:   Temp (24hrs), Av.7 °F (36.5 °C), Min:97.4 °F (36.3 °C), Max:98 °F (36.7 °C)    Temp:  [97.4 °F (36.3 °C)-98 °F (36.7 °C)] 97.5 °F (36.4 °C)  HR:  [69-83]  76  Resp:  [16-28] 16  BP: (103-185)/() 138/73  SpO2:  [86 %-96 %] 92 %  Body mass index is 25.79 kg/m².     Input and Output Summary (last 24 hours):   No intake or output data in the 24 hours ending 01/27/24 0910  Physical Exam:   Physical Exam  Constitutional:       General: She is not in acute distress.  HENT:      Head: Normocephalic and atraumatic.   Eyes:      General: No scleral icterus.     Conjunctiva/sclera: Conjunctivae normal.   Cardiovascular:      Rate and Rhythm: Normal rate and regular rhythm.      Pulses: Normal pulses.      Heart sounds: Murmur heard.   Pulmonary:      Effort: Pulmonary effort is normal. No respiratory distress.      Breath sounds: Rales present. No wheezing or rhonchi.   Abdominal:      General: Bowel sounds are normal. There is no distension.      Palpations: Abdomen is soft.      Tenderness: There is no abdominal tenderness. There is no guarding.   Musculoskeletal:         General: Normal range of motion.      Right lower leg: Edema present.      Left lower leg: Edema present.   Skin:     General: Skin is warm and dry.      Capillary Refill: Capillary refill takes less than 2 seconds.      Coloration: Skin is not jaundiced.   Neurological:      General: No focal deficit present.      Mental Status: She is alert and oriented to person, place, and time. Mental status is at baseline.   Psychiatric:         Mood and Affect: Mood normal.         Behavior: Behavior normal.           Additional Data:   Basic Laboratory Review:   Results from last 7 days   Lab Units 01/27/24  0511 01/26/24  1229 01/22/24  1602   SODIUM mmol/L 137 135 138   POTASSIUM mmol/L 4.2 4.0 4.0   CHLORIDE mmol/L 102 102 105   CO2 mmol/L 28 26 26   BUN mg/dL 20 21 24   CREATININE mg/dL 0.67 0.65 0.65   GLUCOSE RANDOM mg/dL 102 126  --    CALCIUM mg/dL 9.7 10.4* 9.9   ALBUMIN g/dL 3.7 3.8 3.9   ALK PHOS U/L 89 100 112*   ALT U/L 25 31 39   AST U/L 34 41* 51*   EGFR ml/min/1.73sq m 80 81 81       Results from  last 7 days   Lab Units 01/27/24  0511 01/26/24  1752 01/26/24  1229 01/22/24  1602   WBC Thousand/uL 10.36*  --  12.26* 15.25*   HEMOGLOBIN g/dL 12.9  --  13.4 13.8   HEMATOCRIT % 39.8  --  41.0 44.0   PLATELETS Thousands/uL 218 217 233 285   NEUTROS PCT %  --   --  76* 73   LYMPHS PCT %  --   --  13* 14   MONOS PCT %  --   --  5 6   EOS PCT %  --   --  4 5   BASOS PCT %  --   --  1 1   NEUTROS ABS Thousands/µL  --   --  9.42* 11.31*   LYMPHS ABS Thousands/µL  --   --  1.56 2.08   MONOS ABS Thousand/µL  --   --  0.65 0.91   EOS ABS Thousand/µL  --   --  0.48 0.78*   BASOS ABS Thousands/µL  --   --  0.07 0.08         Results from last 7 days   Lab Units 01/26/24  1229   BNP pg/mL 250*       Additional Labs:  Results from last 7 days   Lab Units 01/27/24  0511   MAGNESIUM mg/dL 1.8*          Results from last 7 days   Lab Units 01/22/24  1602   HEMOGLOBIN A1C % 6.1*   TRIGLYCERIDES mg/dL 171*   HDL mg/dL 30*   LDL CALC mg/dL 71   TSH 3RD GENERATON uIU/mL 2.558       Recent Cultures (last 7 days):         * I Have Reviewed All Lab Data Listed Above.  * Additional Pertinent Lab Tests Reviewed: All Labs Within Last 24 Hours Reviewed    Imaging:  Prior imaging studies and reports reviewed    Last 24 Hours Medication List:   Current Facility-Administered Medications   Medication Dose Route Frequency Provider Last Rate    atorvastatin  80 mg Oral HS Letha Revankar, DO      bisoprolol  2.5 mg Oral Daily With Dinner Letha Revankar, DO      calcium carbonate  1 tablet Oral Daily With Breakfast Letha Revankar, DO      clonazePAM  0.5 mg Oral HS PRN Letha Revankar, DO      clopidogrel  75 mg Oral Daily Letha Revankar, DO      DULoxetine  60 mg Oral Daily Letha Revankar, DO      fluticasone  1 spray Each Nare Daily Letha Revankar, DO      furosemide  20 mg Intravenous Once Miles Quinones, DO      furosemide  40 mg Oral BID Letha Revankar, DO      heparin (porcine)  5,000 Units Subcutaneous Q8H JOHN Letha  Revgilmar, DO      levothyroxine  88 mcg Oral Early Morning Letha Revankar, DO      loratadine  10 mg Oral Daily Letha Revankar, DO      magnesium sulfate  2 g Intravenous Once Miles Quinones, DO      midodrine  10 mg Oral QAM Letha Revankar, DO      midodrine  10 mg Oral After Lunch Letha Revankar, DO      pantoprazole  40 mg Oral Daily Letha Revankar, DO      primidone  50 mg Oral Daily Letha Revankar, DO       Today, Patient Was Seen By: Miles Quinones DO

## 2024-01-27 NOTE — ASSESSMENT & PLAN NOTE
Patient with mild leukocytosis on lab work but afebrile  States she has been taking an Macrodantin and Cipro for UTI as prescribed by urology.  Discussed with patient's daughter to verify meds as unclear why she is on 2 different antibiotics and per daughter patient was only supposed to be on Macrodantin for 2 weeks but patient states she is still on it  Asymptomatic, hold off UTI treatment antibiotic therapy

## 2024-01-27 NOTE — ASSESSMENT & PLAN NOTE
Continue Zebeta  Patient with history of orthostatic hypotension and will continue midodrine as she uses at home

## 2024-01-27 NOTE — ASSESSMENT & PLAN NOTE
Continue Zebeta.  Patient with history of orthostatic hypotension and will continue midodrine as she uses at home

## 2024-01-27 NOTE — ASSESSMENT & PLAN NOTE
Lab Results   Component Value Date    HGBA1C 6.1 (H) 01/22/2024     Diet controlled.  Placed patient on diabetic diet

## 2024-01-27 NOTE — ASSESSMENT & PLAN NOTE
Patient with mild leukocytosis on lab work but afebrile  States she has been taking an Macrodantin and Cipro for UTI as prescribed by urology.  Discussed with patient's daughter to verify meds as unclear why she is on 2 different antibiotics and per daughter patient was only supposed to be on Macrodantin for 2 weeks but patient states she is still on it  UA here not convincing for UTI.  Repeat lab work in a.m.

## 2024-01-27 NOTE — PLAN OF CARE
Problem: Potential for Falls  Goal: Patient will remain free of falls  Description: INTERVENTIONS:  - Educate patient/family on patient safety including physical limitations  - Instruct patient to call for assistance with activity   - Consult OT/PT to assist with strengthening/mobility   - Keep Call bell within reach  - Keep bed low and locked with side rails adjusted as appropriate  - Keep care items and personal belongings within reach  - Initiate and maintain comfort rounds  - Make Fall Risk Sign visible to staff  - Offer Toileting every 2 Hours, in advance of need  - Initiate/Maintain bed alarm  - Obtain necessary fall risk management equipment: yes  - Apply yellow socks and bracelet for high fall risk patients  - Consider moving patient to room near nurses station  Outcome: Progressing     Problem: PAIN - ADULT  Goal: Verbalizes/displays adequate comfort level or baseline comfort level  Description: Interventions:  - Encourage patient to monitor pain and request assistance  - Assess pain using appropriate pain scale  - Administer analgesics based on type and severity of pain and evaluate response  - Implement non-pharmacological measures as appropriate and evaluate response  - Consider cultural and social influences on pain and pain management  - Notify physician/advanced practitioner if interventions unsuccessful or patient reports new pain  Outcome: Progressing     Problem: INFECTION - ADULT  Goal: Absence or prevention of progression during hospitalization  Description: INTERVENTIONS:  - Assess and monitor for signs and symptoms of infection  - Monitor lab/diagnostic results  - Monitor all insertion sites, i.e. indwelling lines, tubes, and drains  - Monitor endotracheal if appropriate and nasal secretions for changes in amount and color  - [place] appropriate cooling/warming therapies per order  - Administer medications as ordered  - Instruct and encourage patient and family to use good hand hygiene  technique  - Identify and instruct in appropriate isolation precautions for identified infection/condition  Outcome: Progressing  Goal: Absence of fever/infection during neutropenic period  Description: INTERVENTIONS:  - Monitor WBC    Outcome: Progressing     Problem: SAFETY ADULT  Goal: Patient will remain free of falls  Description: INTERVENTIONS:  - Educate patient/family on patient safety including physical limitations  - Instruct patient to call for assistance with activity   - Consult OT/PT to assist with strengthening/mobility   - Keep Call bell within reach  - Keep bed low and locked with side rails adjusted as appropriate  - Keep care items and personal belongings within reach  - Initiate and maintain comfort rounds  - Make Fall Risk Sign visible to staff  - Offer Toileting every 2 Hours, in advance of need  - Initiate/Maintain bed alarm  - Obtain necessary fall risk management equipment: yes  - Apply yellow socks and bracelet for high fall risk patients  - Consider moving patient to room near nurses station  Outcome: Progressing  Goal: Maintain or return to baseline ADL function  Description: INTERVENTIONS:  -  Assess patient's ability to carry out ADLs; assess patient's baseline for ADL function and identify physical deficits which impact ability to perform ADLs (bathing, care of mouth/teeth, toileting, grooming, dressing, etc.)  - Assess/evaluate cause of self-care deficits   - Assess range of motion  - Assess patient's mobility; develop plan if impaired  - Assess patient's need for assistive devices and provide as appropriate  - Encourage maximum independence but intervene and supervise when necessary  - Involve family in performance of ADLs  - Assess for home care needs following discharge   - Consider OT consult to assist with ADL evaluation and planning for discharge  - Provide patient education as appropriate  Outcome: Progressing  Goal: Maintains/Returns to pre admission functional  level  Description: INTERVENTIONS:  - Perform AM-PAC 6 Click Basic Mobility/ Daily Activity assessment daily.  - Set and communicate daily mobility goal to care team and patient/family/caregiver.   - Collaborate with rehabilitation services on mobility goals if consulted  - Perform Range of Motion 3 times a day.  - Reposition patient every 2 hours.  - Dangle patient 3 times a day  - Stand patient 3 times a day  - Ambulate patient 3 times a day  - Out of bed to chair 3 times a day   - Out of bed for meals 3 times a day  - Out of bed for toileting  - Record patient progress and toleration of activity level   Outcome: Progressing     Problem: DISCHARGE PLANNING  Goal: Discharge to home or other facility with appropriate resources  Description: INTERVENTIONS:  - Identify barriers to discharge w/patient and caregiver  - Arrange for needed discharge resources and transportation as appropriate  - Identify discharge learning needs (meds, wound care, etc.)  - Arrange for interpretive services to assist at discharge as needed  - Refer to Case Management Department for coordinating discharge planning if the patient needs post-hospital services based on physician/advanced practitioner order or complex needs related to functional status, cognitive ability, or social support system  Outcome: Progressing     Problem: Knowledge Deficit  Goal: Patient/family/caregiver demonstrates understanding of disease process, treatment plan, medications, and discharge instructions  Description: Complete learning assessment and assess knowledge base.  Interventions:  - Provide teaching at level of understanding  - Provide teaching via preferred learning methods  Outcome: Progressing     Problem: NEUROSENSORY - ADULT  Goal: Achieves stable or improved neurological status  Description: INTERVENTIONS  - Monitor and report changes in neurological status  - Monitor vital signs such as temperature, blood pressure, glucose, and any other labs ordered    - Initiate measures to prevent increased intracranial pressure  - Monitor for seizure activity and implement precautions if appropriate      Outcome: Progressing  Goal: Remains free of injury related to seizures activity  Description: INTERVENTIONS  - Maintain airway, patient safety  and administer oxygen as ordered  - Monitor patient for seizure activity, document and report duration and description of seizure to physician/advanced practitioner  - If seizure occurs,  ensure patient safety during seizure  - Reorient patient post seizure  - Seizure pads on all 4 side rails  - Instruct patient/family to notify RN of any seizure activity including if an aura is experienced  - Instruct patient/family to call for assistance with activity based on nursing assessment  - Administer anti-seizure medications if ordered    Outcome: Progressing  Goal: Achieves maximal functionality and self care  Description: INTERVENTIONS  - Monitor swallowing and airway patency with patient fatigue and changes in neurological status  - Encourage and assist patient to increase activity and self care.   - Encourage visually impaired, hearing impaired and aphasic patients to use assistive/communication devices  Outcome: Progressing     Problem: CARDIOVASCULAR - ADULT  Goal: Maintains optimal cardiac output and hemodynamic stability  Description: INTERVENTIONS:  - Monitor I/O, vital signs and rhythm  - Monitor for S/S and trends of decreased cardiac output  - Administer and titrate ordered vasoactive medications to optimize hemodynamic stability  - Assess quality of pulses, skin color and temperature  - Assess for signs of decreased coronary artery perfusion  - Instruct patient to report change in severity of symptoms  Outcome: Progressing  Goal: Absence of cardiac dysrhythmias or at baseline rhythm  Description: INTERVENTIONS:  - Continuous cardiac monitoring, vital signs, obtain 12 lead EKG if ordered  - Administer antiarrhythmic and heart  rate control medications as ordered  - Monitor electrolytes and administer replacement therapy as ordered  Outcome: Progressing     Problem: RESPIRATORY - ADULT  Goal: Achieves optimal ventilation and oxygenation  Description: INTERVENTIONS:  - Assess for changes in respiratory status  - Assess for changes in mentation and behavior  - Position to facilitate oxygenation and minimize respiratory effort  - Oxygen administered by appropriate delivery if ordered  - Initiate smoking cessation education as indicated  - Encourage broncho-pulmonary hygiene including cough, deep breathe, Incentive Spirometry  - Assess the need for suctioning and aspirate as needed  - Assess and instruct to report SOB or any respiratory difficulty  - Respiratory Therapy support as indicated  Outcome: Progressing     Problem: GASTROINTESTINAL - ADULT  Goal: Maintains adequate nutritional intake  Description: INTERVENTIONS:  - Monitor percentage of each meal consumed  - Identify factors contributing to decreased intake, treat as appropriate  - Assist with meals as needed  - Monitor I&O, weight, and lab values if indicated  - Obtain nutrition services referral as needed  Outcome: Progressing     Problem: METABOLIC, FLUID AND ELECTROLYTES - ADULT  Goal: Electrolytes maintained within normal limits  Description: INTERVENTIONS:  - Monitor labs and assess patient for signs and symptoms of electrolyte imbalances  - Administer electrolyte replacement as ordered  - Monitor response to electrolyte replacements, including repeat lab results as appropriate  - Instruct patient on fluid and nutrition as appropriate  Outcome: Progressing  Goal: Fluid balance maintained  Description: INTERVENTIONS:  - Monitor labs   - Monitor I/O and WT  - Instruct patient on fluid and nutrition as appropriate  - Assess for signs & symptoms of volume excess or deficit  Outcome: Progressing  Goal: Glucose maintained within target range  Description: INTERVENTIONS:  - Monitor  Blood Glucose as ordered  - Assess for signs and symptoms of hyperglycemia and hypoglycemia  - Administer ordered medications to maintain glucose within target range  - Assess nutritional intake and initiate nutrition service referral as needed  Outcome: Progressing     Problem: SKIN/TISSUE INTEGRITY - ADULT  Goal: Skin Integrity remains intact(Skin Breakdown Prevention)  Description: Assess:  -Perform Félix assessment every shift  -Clean and moisturize skin every shift  -Inspect skin when repositioning, toileting, and assisting with ADLS  -Assess under medical devices such as Masimo every shift  -Assess extremities for adequate circulation and sensation     Bed Management:  -Have minimal linens on bed & keep smooth, unwrinkled  -Change linens as needed when moist or perspiring  -Avoid sitting or lying in one position for more than 2  hours while in bed  -Keep HOB at 30 degrees     Toileting:  -Offer bedside commode  -Assess for incontinence every shift   -Use incontinent care products after each incontinent episode such as Remedy    Activity:  -Mobilize patient 2 times a day  -Encourage activity and walks on unit  -Encourage or provide ROM exercises   -Turn and reposition patient every 2 Hours  -Use appropriate equipment to lift or move patient in bed  -Instruct/ Assist with weight shifting every 1 hour  when out of bed in chair  -Consider limitation of chair time 2  hour intervals    Skin Care:  -Avoid use of baby powder, tape, friction and shearing, hot water or constrictive clothing  -Relieve pressure over bony prominences using   -Do not massage red bony areas    Next Steps:  -Teach patient strategies to minimize risks such as Weight shifting      -Consider consults to  interdisciplinary teams   Outcome: Progressing     Problem: HEMATOLOGIC - ADULT  Goal: Maintains hematologic stability  Description: INTERVENTIONS  - Assess for signs and symptoms of bleeding or hemorrhage  - Monitor labs  - Administer  supportive blood products/factors as ordered and appropriate  Outcome: Progressing     Problem: Nutrition/Hydration-ADULT  Goal: Nutrient/Hydration intake appropriate for improving, restoring or maintaining nutritional needs  Description: Monitor and assess patient's nutrition/hydration status for malnutrition. Collaborate with interdisciplinary team and initiate plan and interventions as ordered.  Monitor patient's weight and dietary intake as ordered or per policy. Utilize nutrition screening tool and intervene as necessary. Determine patient's food preferences and provide high-protein, high-caloric foods as appropriate.     INTERVENTIONS:  - Monitor oral intake, urinary output, labs, and treatment plans  - Assess nutrition and hydration status and recommend course of action  - Evaluate amount of meals eaten  - Assist patient with eating if necessary   - Allow adequate time for meals  - Recommend/ encourage appropriate diets, oral nutritional supplements, and vitamin/mineral supplements  - Order, calculate, and assess calorie counts as needed  - Recommend, monitor, and adjust tube feedings and TPN/PPN based on assessed needs  - Assess need for intravenous fluids  - Provide specific nutrition/hydration education as appropriate  - Include patient/family/caregiver in decisions related to nutrition  Outcome: Progressing

## 2024-01-27 NOTE — ASSESSMENT & PLAN NOTE
Wt Readings from Last 3 Encounters:   01/26/24 72.6 kg (160 lb)   12/28/23 75.3 kg (166 lb)   12/18/23 76.2 kg (168 lb)     Patient presented to the ER with worsening shortness of breath, chills since 1/23.  Today breathing significantly worsened even with minimal exertion  CTA was negative for PE but showed findings of pulmonary edema diffusely.  Mild underlying pulmonary fibrosis could not be ruled out.  No pneumonia was noted, mildly nodular cirrhotic liver was noted.  Pulmonary artery enlargement was noted  Patient received 1 dose of IV Lasix 20 mg in the ER.  Will give 1 more dose of 20 mg today and place patient on 40 mg twice daily starting tomorrow  Most recent echo from November 2023 showed normal EF with grade 1 diastolic dysfunction  Monitor I/Os, daily weights

## 2024-01-28 LAB
ALBUMIN SERPL BCP-MCNC: 3.7 G/DL (ref 3.5–5)
ALP SERPL-CCNC: 98 U/L (ref 34–104)
ALT SERPL W P-5'-P-CCNC: 24 U/L (ref 7–52)
ANION GAP SERPL CALCULATED.3IONS-SCNC: 7 MMOL/L
AST SERPL W P-5'-P-CCNC: 35 U/L (ref 13–39)
ATRIAL RATE: 70 BPM
BASOPHILS # BLD AUTO: 0.07 THOUSANDS/ÂΜL (ref 0–0.1)
BASOPHILS NFR BLD AUTO: 1 % (ref 0–1)
BILIRUB SERPL-MCNC: 0.59 MG/DL (ref 0.2–1)
BUN SERPL-MCNC: 24 MG/DL (ref 5–25)
CALCIUM SERPL-MCNC: 10.5 MG/DL (ref 8.4–10.2)
CHLORIDE SERPL-SCNC: 101 MMOL/L (ref 96–108)
CO2 SERPL-SCNC: 30 MMOL/L (ref 21–32)
CREAT SERPL-MCNC: 0.66 MG/DL (ref 0.6–1.3)
EOSINOPHIL # BLD AUTO: 0.41 THOUSAND/ÂΜL (ref 0–0.61)
EOSINOPHIL NFR BLD AUTO: 4 % (ref 0–6)
ERYTHROCYTE [DISTWIDTH] IN BLOOD BY AUTOMATED COUNT: 15.3 % (ref 11.6–15.1)
GFR SERPL CREATININE-BSD FRML MDRD: 80 ML/MIN/1.73SQ M
GLUCOSE SERPL-MCNC: 105 MG/DL (ref 65–140)
HCT VFR BLD AUTO: 40.6 % (ref 34.8–46.1)
HGB BLD-MCNC: 12.9 G/DL (ref 11.5–15.4)
IMM GRANULOCYTES # BLD AUTO: 0.07 THOUSAND/UL (ref 0–0.2)
IMM GRANULOCYTES NFR BLD AUTO: 1 % (ref 0–2)
LYMPHOCYTES # BLD AUTO: 1.69 THOUSANDS/ÂΜL (ref 0.6–4.47)
LYMPHOCYTES NFR BLD AUTO: 16 % (ref 14–44)
MAGNESIUM SERPL-MCNC: 2 MG/DL (ref 1.9–2.7)
MCH RBC QN AUTO: 29.6 PG (ref 26.8–34.3)
MCHC RBC AUTO-ENTMCNC: 31.8 G/DL (ref 31.4–37.4)
MCV RBC AUTO: 93 FL (ref 82–98)
MONOCYTES # BLD AUTO: 0.54 THOUSAND/ÂΜL (ref 0.17–1.22)
MONOCYTES NFR BLD AUTO: 5 % (ref 4–12)
NEUTROPHILS # BLD AUTO: 7.66 THOUSANDS/ÂΜL (ref 1.85–7.62)
NEUTS SEG NFR BLD AUTO: 73 % (ref 43–75)
NRBC BLD AUTO-RTO: 0 /100 WBCS
P AXIS: 38 DEGREES
PLATELET # BLD AUTO: 228 THOUSANDS/UL (ref 149–390)
PMV BLD AUTO: 10.4 FL (ref 8.9–12.7)
POTASSIUM SERPL-SCNC: 4 MMOL/L (ref 3.5–5.3)
PR INTERVAL: 176 MS
PROT SERPL-MCNC: 6.8 G/DL (ref 6.4–8.4)
QRS AXIS: 38 DEGREES
QRSD INTERVAL: 94 MS
QT INTERVAL: 444 MS
QTC INTERVAL: 479 MS
RBC # BLD AUTO: 4.36 MILLION/UL (ref 3.81–5.12)
SODIUM SERPL-SCNC: 138 MMOL/L (ref 135–147)
T WAVE AXIS: 41 DEGREES
VENTRICULAR RATE: 70 BPM
WBC # BLD AUTO: 10.44 THOUSAND/UL (ref 4.31–10.16)

## 2024-01-28 PROCEDURE — 94760 N-INVAS EAR/PLS OXIMETRY 1: CPT

## 2024-01-28 PROCEDURE — 99232 SBSQ HOSP IP/OBS MODERATE 35: CPT | Performed by: STUDENT IN AN ORGANIZED HEALTH CARE EDUCATION/TRAINING PROGRAM

## 2024-01-28 PROCEDURE — 94640 AIRWAY INHALATION TREATMENT: CPT

## 2024-01-28 PROCEDURE — 94664 DEMO&/EVAL PT USE INHALER: CPT

## 2024-01-28 PROCEDURE — 80053 COMPREHEN METABOLIC PANEL: CPT | Performed by: STUDENT IN AN ORGANIZED HEALTH CARE EDUCATION/TRAINING PROGRAM

## 2024-01-28 PROCEDURE — 83735 ASSAY OF MAGNESIUM: CPT | Performed by: STUDENT IN AN ORGANIZED HEALTH CARE EDUCATION/TRAINING PROGRAM

## 2024-01-28 PROCEDURE — 85025 COMPLETE CBC W/AUTO DIFF WBC: CPT | Performed by: STUDENT IN AN ORGANIZED HEALTH CARE EDUCATION/TRAINING PROGRAM

## 2024-01-28 PROCEDURE — 97163 PT EVAL HIGH COMPLEX 45 MIN: CPT

## 2024-01-28 PROCEDURE — 97530 THERAPEUTIC ACTIVITIES: CPT

## 2024-01-28 RX ORDER — PREDNISONE 20 MG/1
20 TABLET ORAL ONCE
Status: COMPLETED | OUTPATIENT
Start: 2024-01-28 | End: 2024-01-28

## 2024-01-28 RX ORDER — IPRATROPIUM BROMIDE AND ALBUTEROL SULFATE 2.5; .5 MG/3ML; MG/3ML
3 SOLUTION RESPIRATORY (INHALATION)
Status: DISCONTINUED | OUTPATIENT
Start: 2024-01-28 | End: 2024-01-28

## 2024-01-28 RX ORDER — IPRATROPIUM BROMIDE AND ALBUTEROL SULFATE 2.5; .5 MG/3ML; MG/3ML
3 SOLUTION RESPIRATORY (INHALATION)
Status: DISCONTINUED | OUTPATIENT
Start: 2024-01-28 | End: 2024-01-30 | Stop reason: HOSPADM

## 2024-01-28 RX ORDER — PREDNISONE 20 MG/1
20 TABLET ORAL DAILY
Qty: 5 TABLET | Refills: 0 | Status: CANCELLED | OUTPATIENT
Start: 2024-01-28

## 2024-01-28 RX ORDER — FUROSEMIDE 20 MG/1
20 TABLET ORAL DAILY
Status: DISCONTINUED | OUTPATIENT
Start: 2024-01-29 | End: 2024-01-30 | Stop reason: HOSPADM

## 2024-01-28 RX ORDER — FUROSEMIDE 20 MG/1
20 TABLET ORAL DAILY
Qty: 5 TABLET | Refills: 0 | Status: CANCELLED | OUTPATIENT
Start: 2024-01-28

## 2024-01-28 RX ORDER — PREDNISONE 20 MG/1
20 TABLET ORAL DAILY
Status: DISCONTINUED | OUTPATIENT
Start: 2024-01-29 | End: 2024-01-30 | Stop reason: HOSPADM

## 2024-01-28 RX ADMIN — FUROSEMIDE 40 MG: 40 TABLET ORAL at 08:50

## 2024-01-28 RX ADMIN — IPRATROPIUM BROMIDE AND ALBUTEROL SULFATE 3 ML: 2.5; .5 SOLUTION RESPIRATORY (INHALATION) at 14:01

## 2024-01-28 RX ADMIN — LEVOTHYROXINE SODIUM 88 MCG: 88 TABLET ORAL at 05:48

## 2024-01-28 RX ADMIN — HEPARIN SODIUM 5000 UNITS: 5000 INJECTION INTRAVENOUS; SUBCUTANEOUS at 02:05

## 2024-01-28 RX ADMIN — HEPARIN SODIUM 5000 UNITS: 5000 INJECTION INTRAVENOUS; SUBCUTANEOUS at 17:21

## 2024-01-28 RX ADMIN — IPRATROPIUM BROMIDE AND ALBUTEROL SULFATE 3 ML: 2.5; .5 SOLUTION RESPIRATORY (INHALATION) at 20:05

## 2024-01-28 RX ADMIN — PREDNISONE 20 MG: 20 TABLET ORAL at 11:45

## 2024-01-28 RX ADMIN — CALCIUM 1 TABLET: 500 TABLET ORAL at 08:50

## 2024-01-28 RX ADMIN — LORATADINE 10 MG: 10 TABLET ORAL at 08:50

## 2024-01-28 RX ADMIN — DULOXETINE HYDROCHLORIDE 60 MG: 60 CAPSULE, DELAYED RELEASE ORAL at 08:50

## 2024-01-28 RX ADMIN — MIDODRINE HYDROCHLORIDE 10 MG: 5 TABLET ORAL at 08:50

## 2024-01-28 RX ADMIN — BISOPROLOL FUMARATE 2.5 MG: 5 TABLET ORAL at 15:30

## 2024-01-28 RX ADMIN — ATORVASTATIN CALCIUM 80 MG: 80 TABLET, FILM COATED ORAL at 21:51

## 2024-01-28 RX ADMIN — PRIMIDONE 50 MG: 50 TABLET ORAL at 08:50

## 2024-01-28 RX ADMIN — MIDODRINE HYDROCHLORIDE 10 MG: 5 TABLET ORAL at 13:35

## 2024-01-28 RX ADMIN — FLUTICASONE PROPIONATE 1 SPRAY: 50 SPRAY, METERED NASAL at 08:51

## 2024-01-28 RX ADMIN — PANTOPRAZOLE SODIUM 40 MG: 40 TABLET, DELAYED RELEASE ORAL at 08:50

## 2024-01-28 RX ADMIN — HEPARIN SODIUM 5000 UNITS: 5000 INJECTION INTRAVENOUS; SUBCUTANEOUS at 09:06

## 2024-01-28 RX ADMIN — AZITHROMYCIN MONOHYDRATE 500 MG: 500 INJECTION, POWDER, LYOPHILIZED, FOR SOLUTION INTRAVENOUS at 14:03

## 2024-01-28 RX ADMIN — CLOPIDOGREL 75 MG: 75 TABLET ORAL at 08:50

## 2024-01-28 NOTE — PLAN OF CARE
Problem: Potential for Falls  Goal: Patient will remain free of falls  Description: INTERVENTIONS:  - Educate patient/family on patient safety including physical limitations  - Instruct patient to call for assistance with activity   - Consult OT/PT to assist with strengthening/mobility   - Keep Call bell within reach  - Keep bed low and locked with side rails adjusted as appropriate  - Keep care items and personal belongings within reach  - Initiate and maintain comfort rounds  - Make Fall Risk Sign visible to staff  - Offer Toileting every 2 Hours, in advance of need  - Initiate/Maintain bed alarm  - Apply yellow socks and bracelet for high fall risk patients  - Consider moving patient to room near nurses station  Outcome: Progressing     Problem: PAIN - ADULT  Goal: Verbalizes/displays adequate comfort level or baseline comfort level  Description: Interventions:  - Encourage patient to monitor pain and request assistance  - Assess pain using appropriate pain scale  - Administer analgesics based on type and severity of pain and evaluate response  - Implement non-pharmacological measures as appropriate and evaluate response  - Consider cultural and social influences on pain and pain management  - Notify physician/advanced practitioner if interventions unsuccessful or patient reports new pain  Outcome: Progressing     Problem: INFECTION - ADULT  Goal: Absence or prevention of progression during hospitalization  Description: INTERVENTIONS:  - Assess and monitor for signs and symptoms of infection  - Monitor lab/diagnostic results  - Monitor all insertion sites, i.e. indwelling lines, tubes, and drains  - Monitor endotracheal if appropriate and nasal secretions for changes in amount and color  - [place] appropriate cooling/warming therapies per order  - Administer medications as ordered  - Instruct and encourage patient and family to use good hand hygiene technique  - Identify and instruct in appropriate isolation  precautions for identified infection/condition  Outcome: Progressing  Goal: Absence of fever/infection during neutropenic period  Description: INTERVENTIONS:  - Monitor WBC    Outcome: Progressing     Problem: SAFETY ADULT  Goal: Patient will remain free of falls  Description: INTERVENTIONS:  - Educate patient/family on patient safety including physical limitations  - Instruct patient to call for assistance with activity   - Consult OT/PT to assist with strengthening/mobility   - Keep Call bell within reach  - Keep bed low and locked with side rails adjusted as appropriate  - Keep care items and personal belongings within reach  - Initiate and maintain comfort rounds  - Make Fall Risk Sign visible to staff  - Offer Toileting every 2 Hours, in advance of need  - Initiate/Maintain bed alarm    - Apply yellow socks and bracelet for high fall risk patients  - Consider moving patient to room near nurses station  Outcome: Progressing  Goal: Maintain or return to baseline ADL function  Description: INTERVENTIONS:  -  Assess patient's ability to carry out ADLs; assess patient's baseline for ADL function and identify physical deficits which impact ability to perform ADLs (bathing, care of mouth/teeth, toileting, grooming, dressing, etc.)  - Assess/evaluate cause of self-care deficits   - Assess range of motion  - Assess patient's mobility; develop plan if impaired  - Assess patient's need for assistive devices and provide as appropriate  - Encourage maximum independence but intervene and supervise when necessary  - Involve family in performance of ADLs  - Assess for home care needs following discharge   - Consider OT consult to assist with ADL evaluation and planning for discharge  - Provide patient education as appropriate  Outcome: Progressing  Goal: Maintains/Returns to pre admission functional level  Description: INTERVENTIONS:  - Perform AM-PAC 6 Click Basic Mobility/ Daily Activity assessment daily.  - Set and  communicate daily mobility goal to care team and patient/family/caregiver.   - Collaborate with rehabilitation services on mobility goals if consulted  - Perform Range of Motion 2 times a day.  - Reposition patient every 2 hours.  - Dangle patient 2 times a day  - Stand patient 2 times a day  - Ambulate patient 2 times a day  - Out of bed to chair 2 times a day   - Out of bed for meals 2 times a day  - Out of bed for toileting  - Record patient progress and toleration of activity level   Outcome: Progressing     Problem: DISCHARGE PLANNING  Goal: Discharge to home or other facility with appropriate resources  Description: INTERVENTIONS:  - Identify barriers to discharge w/patient and caregiver  - Arrange for needed discharge resources and transportation as appropriate  - Identify discharge learning needs (meds, wound care, etc.)  - Arrange for interpretive services to assist at discharge as needed  - Refer to Case Management Department for coordinating discharge planning if the patient needs post-hospital services based on physician/advanced practitioner order or complex needs related to functional status, cognitive ability, or social support system  Outcome: Progressing     Problem: Knowledge Deficit  Goal: Patient/family/caregiver demonstrates understanding of disease process, treatment plan, medications, and discharge instructions  Description: Complete learning assessment and assess knowledge base.  Interventions:  - Provide teaching at level of understanding  - Provide teaching via preferred learning methods  Outcome: Progressing     Problem: NEUROSENSORY - ADULT  Goal: Achieves stable or improved neurological status  Description: INTERVENTIONS  - Monitor and report changes in neurological status  - Monitor vital signs such as temperature, blood pressure, glucose, and any other labs ordered   - Initiate measures to prevent increased intracranial pressure  - Monitor for seizure activity and implement precautions  if appropriate      Outcome: Progressing  Goal: Remains free of injury related to seizures activity  Description: INTERVENTIONS  - Maintain airway, patient safety  and administer oxygen as ordered  - Monitor patient for seizure activity, document and report duration and description of seizure to physician/advanced practitioner  - If seizure occurs,  ensure patient safety during seizure  - Reorient patient post seizure  - Seizure pads on all 4 side rails  - Instruct patient/family to notify RN of any seizure activity including if an aura is experienced  - Instruct patient/family to call for assistance with activity based on nursing assessment  - Administer anti-seizure medications if ordered    Outcome: Progressing  Goal: Achieves maximal functionality and self care  Description: INTERVENTIONS  - Monitor swallowing and airway patency with patient fatigue and changes in neurological status  - Encourage and assist patient to increase activity and self care.   - Encourage visually impaired, hearing impaired and aphasic patients to use assistive/communication devices  Outcome: Progressing     Problem: CARDIOVASCULAR - ADULT  Goal: Maintains optimal cardiac output and hemodynamic stability  Description: INTERVENTIONS:  - Monitor I/O, vital signs and rhythm  - Monitor for S/S and trends of decreased cardiac output  - Administer and titrate ordered vasoactive medications to optimize hemodynamic stability  - Assess quality of pulses, skin color and temperature  - Assess for signs of decreased coronary artery perfusion  - Instruct patient to report change in severity of symptoms  Outcome: Progressing  Goal: Absence of cardiac dysrhythmias or at baseline rhythm  Description: INTERVENTIONS:  - Continuous cardiac monitoring, vital signs, obtain 12 lead EKG if ordered  - Administer antiarrhythmic and heart rate control medications as ordered  - Monitor electrolytes and administer replacement therapy as ordered  Outcome:  Progressing     Problem: RESPIRATORY - ADULT  Goal: Achieves optimal ventilation and oxygenation  Description: INTERVENTIONS:  - Assess for changes in respiratory status  - Assess for changes in mentation and behavior  - Position to facilitate oxygenation and minimize respiratory effort  - Oxygen administered by appropriate delivery if ordered  - Initiate smoking cessation education as indicated  - Encourage broncho-pulmonary hygiene including cough, deep breathe, Incentive Spirometry  - Assess the need for suctioning and aspirate as needed  - Assess and instruct to report SOB or any respiratory difficulty  - Respiratory Therapy support as indicated  Outcome: Progressing     Problem: GASTROINTESTINAL - ADULT  Goal: Maintains adequate nutritional intake  Description: INTERVENTIONS:  - Monitor percentage of each meal consumed  - Identify factors contributing to decreased intake, treat as appropriate  - Assist with meals as needed  - Monitor I&O, weight, and lab values if indicated  - Obtain nutrition services referral as needed  Outcome: Progressing     Problem: METABOLIC, FLUID AND ELECTROLYTES - ADULT  Goal: Electrolytes maintained within normal limits  Description: INTERVENTIONS:  - Monitor labs and assess patient for signs and symptoms of electrolyte imbalances  - Administer electrolyte replacement as ordered  - Monitor response to electrolyte replacements, including repeat lab results as appropriate  - Instruct patient on fluid and nutrition as appropriate  Outcome: Progressing  Goal: Fluid balance maintained  Description: INTERVENTIONS:  - Monitor labs   - Monitor I/O and WT  - Instruct patient on fluid and nutrition as appropriate  - Assess for signs & symptoms of volume excess or deficit  Outcome: Progressing  Goal: Glucose maintained within target range  Description: INTERVENTIONS:  - Monitor Blood Glucose as ordered  - Assess for signs and symptoms of hyperglycemia and hypoglycemia  - Administer ordered  medications to maintain glucose within target range  - Assess nutritional intake and initiate nutrition service referral as needed  Outcome: Progressing     Problem: SKIN/TISSUE INTEGRITY - ADULT  Goal: Skin Integrity remains intact(Skin Breakdown Prevention)  Description: Assess:  -Perform Félix assessment every shift  -Clean and moisturize skin every shift  -Inspect skin when repositioning, toileting, and assisting with ADLS  -Assess extremities for adequate circulation and sensation     Bed Management:  -Have minimal linens on bed & keep smooth, unwrinkled  -Change linens as needed when moist or perspiring  -Avoid sitting or lying in one position for more than 2 hours while in bed  -Keep HOB at 45 degrees     Toileting:  -Offer bedside commode  -Assess for incontinence every shift      Activity:  -Mobilize patient 2 times a day  -Encourage activity and walks on unit  -Encourage or provide ROM exercises   -Turn and reposition patient every 2 Hours  -Use appropriate equipment to lift or move patient in bed  -Instruct/ Assist with weight shifting every 2 when out of bed in chair  -Consider limitation of chair time 2 hour intervals    Skin Care:  -Avoid use of baby powder, tape, friction and shearing, hot water or constrictive clothing  -Do not massage red bony areas       Problem: HEMATOLOGIC - ADULT  Goal: Maintains hematologic stability  Description: INTERVENTIONS  - Assess for signs and symptoms of bleeding or hemorrhage  - Monitor labs  - Administer supportive blood products/factors as ordered and appropriate  Outcome: Progressing     Problem: Nutrition/Hydration-ADULT  Goal: Nutrient/Hydration intake appropriate for improving, restoring or maintaining nutritional needs  Description: Monitor and assess patient's nutrition/hydration status for malnutrition. Collaborate with interdisciplinary team and initiate plan and interventions as ordered.  Monitor patient's weight and dietary intake as ordered or per  policy. Utilize nutrition screening tool and intervene as necessary. Determine patient's food preferences and provide high-protein, high-caloric foods as appropriate.     INTERVENTIONS:  - Monitor oral intake, urinary output, labs, and treatment plans  - Assess nutrition and hydration status and recommend course of action  - Evaluate amount of meals eaten  - Assist patient with eating if necessary   - Allow adequate time for meals  - Recommend/ encourage appropriate diets, oral nutritional supplements, and vitamin/mineral supplements  - Order, calculate, and assess calorie counts as needed  - Recommend, monitor, and adjust tube feedings and TPN/PPN based on assessed needs  - Assess need for intravenous fluids  - Provide specific nutrition/hydration education as appropriate  - Include patient/family/caregiver in decisions related to nutrition  Outcome: Progressing

## 2024-01-28 NOTE — PLAN OF CARE
Problem: Potential for Falls  Goal: Patient will remain free of falls  Description: INTERVENTIONS:  - Educate patient/family on patient safety including physical limitations  - Instruct patient to call for assistance with activity   - Consult OT/PT to assist with strengthening/mobility   - Keep Call bell within reach  - Keep bed low and locked with side rails adjusted as appropriate  - Keep care items and personal belongings within reach  - Initiate and maintain comfort rounds  - Make Fall Risk Sign visible to staff  - Offer Toileting every 2 Hours, in advance of need  - Initiate/Maintain bed alarm  - Apply yellow socks and bracelet for high fall risk patients  - Consider moving patient to room near nurses station  1/28/2024 1229 by Yuliana Saravia RN  Outcome: Progressing  1/28/2024 1227 by Yuliana Saravia RN  Outcome: Progressing     Problem: PAIN - ADULT  Goal: Verbalizes/displays adequate comfort level or baseline comfort level  Description: Interventions:  - Encourage patient to monitor pain and request assistance  - Assess pain using appropriate pain scale  - Administer analgesics based on type and severity of pain and evaluate response  - Implement non-pharmacological measures as appropriate and evaluate response  - Consider cultural and social influences on pain and pain management  - Notify physician/advanced practitioner if interventions unsuccessful or patient reports new pain  1/28/2024 1229 by Yuliana Saravia RN  Outcome: Progressing  1/28/2024 1227 by Yuliana Saravia RN  Outcome: Progressing     Problem: INFECTION - ADULT  Goal: Absence or prevention of progression during hospitalization  Description: INTERVENTIONS:  - Assess and monitor for signs and symptoms of infection  - Monitor lab/diagnostic results  - Monitor all insertion sites, i.e. indwelling lines, tubes, and drains  - Monitor endotracheal if appropriate and nasal secretions for changes in amount and color  -  Burchard appropriate cooling/warming therapies per order  - Administer medications as ordered  - Instruct and encourage patient and family to use good hand hygiene technique  - Identify and instruct in appropriate isolation precautions for identified infection/condition  1/28/2024 1229 by Yuliana Saravia RN  Outcome: Progressing  1/28/2024 1227 by Yuliana Saravia RN  Outcome: Progressing  Goal: Absence of fever/infection during neutropenic period  Description: INTERVENTIONS:  - Monitor WBC    1/28/2024 1229 by Yuliana Saravia RN  Outcome: Progressing  1/28/2024 1227 by Yuliana Saravia RN  Outcome: Progressing     Problem: SAFETY ADULT  Goal: Patient will remain free of falls  Description: INTERVENTIONS:  - Educate patient/family on patient safety including physical limitations  - Instruct patient to call for assistance with activity   - Consult OT/PT to assist with strengthening/mobility   - Keep Call bell within reach  - Keep bed low and locked with side rails adjusted as appropriate  - Keep care items and personal belongings within reach  - Initiate and maintain comfort rounds  - Make Fall Risk Sign visible to staff  - Offer Toileting every 2 Hours, in advance of need  - Initiate/Maintain bed alarm  - Apply yellow socks and bracelet for high fall risk patients  - Consider moving patient to room near nurses station  1/28/2024 1229 by Yuliana Saravia RN  Outcome: Progressing  1/28/2024 1227 by Yuliana Saravia RN  Outcome: Progressing  Goal: Maintain or return to baseline ADL function  Description: INTERVENTIONS:  -  Assess patient's ability to carry out ADLs; assess patient's baseline for ADL function and identify physical deficits which impact ability to perform ADLs (bathing, care of mouth/teeth, toileting, grooming, dressing, etc.)  - Assess/evaluate cause of self-care deficits   - Assess range of motion  - Assess patient's mobility; develop plan if impaired  - Assess  patient's need for assistive devices and provide as appropriate  - Encourage maximum independence but intervene and supervise when necessary  - Involve family in performance of ADLs  - Assess for home care needs following discharge   - Consider OT consult to assist with ADL evaluation and planning for discharge  - Provide patient education as appropriate  1/28/2024 1229 by Yuliana Saravia RN  Outcome: Progressing  1/28/2024 1227 by Yuliana Saravia RN  Outcome: Progressing  Goal: Maintains/Returns to pre admission functional level  Outcome: Progressing       Problem: DISCHARGE PLANNING  Goal: Discharge to home or other facility with appropriate resources  Description: INTERVENTIONS:  - Identify barriers to discharge w/patient and caregiver  - Arrange for needed discharge resources and transportation as appropriate  - Identify discharge learning needs (meds, wound care, etc.)  - Arrange for interpretive services to assist at discharge as needed  - Refer to Case Management Department for coordinating discharge planning if the patient needs post-hospital services based on physician/advanced practitioner order or complex needs related to functional status, cognitive ability, or social support system  1/28/2024 1229 by Yuliana Saravia RN  Outcome: Progressing  1/28/2024 1227 by Yuliana Saravia RN  Outcome: Progressing     Problem: Knowledge Deficit  Goal: Patient/family/caregiver demonstrates understanding of disease process, treatment plan, medications, and discharge instructions  Description: Complete learning assessment and assess knowledge base.  Interventions:  - Provide teaching at level of understanding  - Provide teaching via preferred learning methods  1/28/2024 1229 by Yuliana Saravia RN  Outcome: Progressing  1/28/2024 1227 by Yuliana Saravia RN  Outcome: Progressing     Problem: NEUROSENSORY - ADULT  Goal: Achieves stable or improved neurological status  Description:  INTERVENTIONS  - Monitor and report changes in neurological status  - Monitor vital signs such as temperature, blood pressure, glucose, and any other labs ordered   - Initiate measures to prevent increased intracranial pressure  - Monitor for seizure activity and implement precautions if appropriate      1/28/2024 1229 by Yuliana Saravia RN  Outcome: Progressing  1/28/2024 1227 by Yuliana Saravia RN  Outcome: Progressing  Goal: Remains free of injury related to seizures activity  Description: INTERVENTIONS  - Maintain airway, patient safety  and administer oxygen as ordered  - Monitor patient for seizure activity, document and report duration and description of seizure to physician/advanced practitioner  - If seizure occurs,  ensure patient safety during seizure  - Reorient patient post seizure  - Seizure pads on all 4 side rails  - Instruct patient/family to notify RN of any seizure activity including if an aura is experienced  - Instruct patient/family to call for assistance with activity based on nursing assessment  - Administer anti-seizure medications if ordered    1/28/2024 1229 by Yuliana Saravia RN  Outcome: Progressing  1/28/2024 1227 by Yuliana Saravia RN  Outcome: Progressing  Goal: Achieves maximal functionality and self care  Description: INTERVENTIONS  - Monitor swallowing and airway patency with patient fatigue and changes in neurological status  - Encourage and assist patient to increase activity and self care.   - Encourage visually impaired, hearing impaired and aphasic patients to use assistive/communication devices  1/28/2024 1229 by Yuliana Saravia RN  Outcome: Progressing  1/28/2024 1227 by Yuliana Saravia RN  Outcome: Progressing     Problem: CARDIOVASCULAR - ADULT  Goal: Maintains optimal cardiac output and hemodynamic stability  Description: INTERVENTIONS:  - Monitor I/O, vital signs and rhythm  - Monitor for S/S and trends of decreased cardiac output  -  Administer and titrate ordered vasoactive medications to optimize hemodynamic stability  - Assess quality of pulses, skin color and temperature  - Assess for signs of decreased coronary artery perfusion  - Instruct patient to report change in severity of symptoms  1/28/2024 1229 by Yuliana Saravia RN  Outcome: Progressing  1/28/2024 1227 by Yuliana Saravia RN  Outcome: Progressing  Goal: Absence of cardiac dysrhythmias or at baseline rhythm  Description: INTERVENTIONS:  - Continuous cardiac monitoring, vital signs, obtain 12 lead EKG if ordered  - Administer antiarrhythmic and heart rate control medications as ordered  - Monitor electrolytes and administer replacement therapy as ordered  1/28/2024 1229 by Yuliana Saravia RN  Outcome: Progressing  1/28/2024 1227 by Yuliana Saravia RN  Outcome: Progressing     Problem: RESPIRATORY - ADULT  Goal: Achieves optimal ventilation and oxygenation  Description: INTERVENTIONS:  - Assess for changes in respiratory status  - Assess for changes in mentation and behavior  - Position to facilitate oxygenation and minimize respiratory effort  - Oxygen administered by appropriate delivery if ordered  - Initiate smoking cessation education as indicated  - Encourage broncho-pulmonary hygiene including cough, deep breathe, Incentive Spirometry  - Assess the need for suctioning and aspirate as needed  - Assess and instruct to report SOB or any respiratory difficulty  - Respiratory Therapy support as indicated  1/28/2024 1229 by Yuliana Saravia RN  Outcome: Progressing  1/28/2024 1227 by Yuliana Saravia RN  Outcome: Progressing     Problem: GASTROINTESTINAL - ADULT  Goal: Maintains adequate nutritional intake  Description: INTERVENTIONS:  - Monitor percentage of each meal consumed  - Identify factors contributing to decreased intake, treat as appropriate  - Assist with meals as needed  - Monitor I&O, weight, and lab values if indicated  - Obtain  nutrition services referral as needed  1/28/2024 1229 by Yuliana Saravia RN  Outcome: Progressing  1/28/2024 1227 by Yuliana Saravia RN  Outcome: Progressing     Problem: METABOLIC, FLUID AND ELECTROLYTES - ADULT  Goal: Electrolytes maintained within normal limits  Description: INTERVENTIONS:  - Monitor labs and assess patient for signs and symptoms of electrolyte imbalances  - Administer electrolyte replacement as ordered  - Monitor response to electrolyte replacements, including repeat lab results as appropriate  - Instruct patient on fluid and nutrition as appropriate  1/28/2024 1229 by Yuliana Saravia RN  Outcome: Progressing  1/28/2024 1227 by Yuliana Saravia RN  Outcome: Progressing  Goal: Fluid balance maintained  Description: INTERVENTIONS:  - Monitor labs   - Monitor I/O and WT  - Instruct patient on fluid and nutrition as appropriate  - Assess for signs & symptoms of volume excess or deficit  1/28/2024 1229 by Yuliana Saravia RN  Outcome: Progressing  1/28/2024 1227 by Yuliana Saravia RN  Outcome: Progressing  Goal: Glucose maintained within target range  Description: INTERVENTIONS:  - Monitor Blood Glucose as ordered  - Assess for signs and symptoms of hyperglycemia and hypoglycemia  - Administer ordered medications to maintain glucose within target range  - Assess nutritional intake and initiate nutrition service referral as needed  1/28/2024 1229 by Yuliana Saravia RN  Outcome: Progressing  1/28/2024 1227 by Yuliana Saravia RN  Outcome: Progressing     Problem: SKIN/TISSUE INTEGRITY - ADULT  Goal: Skin Integrity remains intact(Skin Breakdown Prevention)  Description: Assess:  -Perform Félix assessment every shift  -Clean and moisturize skin every shift  -Inspect skin when repositioning, toileting, and assisting with ADLS  -Assess extremities for adequate circulation and sensation           Problem: HEMATOLOGIC - ADULT  Goal: Maintains hematologic  stability  Description: INTERVENTIONS  - Assess for signs and symptoms of bleeding or hemorrhage  - Monitor labs  - Administer supportive blood products/factors as ordered and appropriate  1/28/2024 1229 by Yuliana Saravia RN  Outcome: Progressing  1/28/2024 1227 by Yuliana Saravia RN  Outcome: Progressing     Problem: Nutrition/Hydration-ADULT  Goal: Nutrient/Hydration intake appropriate for improving, restoring or maintaining nutritional needs  Description: Monitor and assess patient's nutrition/hydration status for malnutrition. Collaborate with interdisciplinary team and initiate plan and interventions as ordered.  Monitor patient's weight and dietary intake as ordered or per policy. Utilize nutrition screening tool and intervene as necessary. Determine patient's food preferences and provide high-protein, high-caloric foods as appropriate.     INTERVENTIONS:  - Monitor oral intake, urinary output, labs, and treatment plans  - Assess nutrition and hydration status and recommend course of action  - Evaluate amount of meals eaten  - Assist patient with eating if necessary   - Allow adequate time for meals  - Recommend/ encourage appropriate diets, oral nutritional supplements, and vitamin/mineral supplements  - Order, calculate, and assess calorie counts as needed  - Recommend, monitor, and adjust tube feedings and TPN/PPN based on assessed needs  - Assess need for intravenous fluids  - Provide specific nutrition/hydration education as appropriate  - Include patient/family/caregiver in decisions related to nutrition  1/28/2024 1229 by Yuliana Saravia RN  Outcome: Progressing  1/28/2024 1227 by Yuliana Saravia RN  Outcome: Progressing

## 2024-01-28 NOTE — PROGRESS NOTES
INTERNAL MEDICINE PROGRESS NOTE     Name: Danyelle Martinez   Age & Sex: 83 y.o. female   MRN: [id number]  Unit/Bed#: [address] [place] [address]   Encounter: [id number]  Hospital Stay Days: 2      ASSESSMENT/PLAN     Principal Problem:    Acute hypoxemic respiratory failure (HCC)  Active Problems:    Essential hypertension    Acquired hypothyroidism    Type 2 diabetes mellitus, without long-term current use of insulin (HCC)    Recurrent major depressive disorder, in remission (HCC)    Acute on chronic diastolic (congestive) heart failure (HCC)    Leukocytosis      Leukocytosis  Assessment & Plan  Patient with mild leukocytosis on lab work but afebrile  States she has been taking an Macrodantin and Cipro for UTI as prescribed by urology.  Discussed with patient's daughter to verify meds as unclear why she is on 2 different antibiotics and per daughter patient was only supposed to be on Macrodantin for 2 weeks but patient states she is still on it  Asymptomatic, hold off UTI treatment antibiotic therapy    Acute on chronic diastolic (congestive) heart failure (HCC)  Assessment & Plan  Wt Readings from Last 3 Encounters:   01/27/24 72.5 kg (159 lb 12.8 oz)   12/28/23 75.3 kg (166 lb)   12/18/23 76.2 kg (168 lb)     Patient presented to the ER with worsening shortness of breath, chills since 1/23. Today breathing significantly worsened even with minimal exertion  CTA was negative for PE but showed findings of pulmonary edema diffusely.  Mild underlying pulmonary fibrosis could not be ruled out.  No pneumonia was noted, mildly nodular cirrhotic liver was noted.  Pulmonary artery enlargement was noted  Patient received 1 dose of IV Lasix 20 mg in the ER.  Will give 1 more dose of 20 mg today and place patient on 40 mg twice daily starting tomorrow.  Most recent echo from November 2023 showed normal EF with grade 1 diastolic dysfunction  Monitor I/Os, daily weights  Patient notes symptomatic improvement, Lasix 20 mg  daily        Recurrent major depressive disorder, in remission (HCC)  Assessment & Plan  Continue Cymbalta    Type 2 diabetes mellitus, without long-term current use of insulin (HCC)  Assessment & Plan  Lab Results   Component Value Date    HGBA1C 6.1 (H) 01/22/2024     Diet controlled.  Placed patient on diabetic diet          Acquired hypothyroidism  Assessment & Plan  Continue levothyroxine supplementation    Essential hypertension  Assessment & Plan  Continue Zebeta.  Patient with history of orthostatic hypotension and will continue midodrine as she uses at home    * Acute hypoxemic respiratory failure (HCC)  Assessment & Plan  Patient reports that she was hypoxic to 80s at home on room air and 86% on room air in the ER.  Normally uses 3 L of oxygen at bedtime only  Continue supplemental oxygen and titrate off as tolerated  After Lasix trial, patient appears euvolemic on exam  Concern for mild bronchitis symptoms, mild work of breathing today while moving around the room  Recommend prednisone 20 mg x 5 days, DuoNeb trial  Azithromycin x 3 days recommended  Anticipate discharge tomorrow if symptomatic improvement        VTE Pharmacologic Prophylaxis:   Pharmacologic: Heparin  Mechanical VTE Prophylaxis in Place: No    Patient Centered Rounds: I have performed bedside rounds with nursing staff today.    Discussions with Specialists or Other Care Team Provider: N/A    Education and Discussions with Family / Patient: Discussed with patient at bedside    Time Spent for Care: 45 minutes.  More than 50% of total time spent on counseling and coordination of care as described above.    Current Length of Stay: 2 day(s)    Current Patient Status: Inpatient   Certification Statement: The patient will continue to require additional inpatient hospital stay due to acute hypoxic respiratory failure    Discharge Plan / Estimated Discharge Date: 24-48 hours    Code Status: Level 3 - DNAR and DNI    Subjective:     Patient seen  and examined at bedside this morning, overall feeling much improved, at that time was anticipating discharge.    Follow-up midday, patient notes dyspnea on exertion, still requiring nasal cannula oxygen support, mild wheezing on exam concern for bronchitis symptoms.    We discussed we will treat for underlying bronchitis with low-dose steroid, nebulizer treatments, azithromycin x 3 days, patient in agreement of plan.    If patient is feeling better tomorrow anticipate discharge.    Objective:   Vitals:   Temp (24hrs), Av.8 °F (36.6 °C), Min:97.5 °F (36.4 °C), Max:98.1 °F (36.7 °C)    Temp:  [97.5 °F (36.4 °C)-98.1 °F (36.7 °C)] 97.8 °F (36.6 °C)  HR:  [63-77] 74  Resp:  [16-18] 16  BP: (118-157)/(57-69) 156/69  SpO2:  [90 %-97 %] 94 %  Body mass index is 24.5 kg/m².     Input and Output Summary (last 24 hours):     Intake/Output Summary (Last 24 hours) at 2024 1237  Last data filed at 2024 0858  Gross per 24 hour   Intake 250 ml   Output 1400 ml   Net -1150 ml     Physical Exam:   Physical Exam  Constitutional:       General: She is not in acute distress.  HENT:      Head: Normocephalic and atraumatic.   Eyes:      General: No scleral icterus.     Conjunctiva/sclera: Conjunctivae normal.   Cardiovascular:      Rate and Rhythm: Normal rate and regular rhythm.      Pulses: Normal pulses.      Heart sounds: Murmur heard.   Pulmonary:      Effort: Pulmonary effort is normal. No respiratory distress.      Breath sounds: Wheezing and rhonchi present. No rales.   Chest:      Chest wall: No tenderness.   Abdominal:      General: Bowel sounds are normal. There is no distension.      Tenderness: There is no abdominal tenderness. There is no guarding.   Musculoskeletal:         General: No swelling. Normal range of motion.   Skin:     General: Skin is warm and dry.      Capillary Refill: Capillary refill takes less than 2 seconds.      Coloration: Skin is not jaundiced.   Neurological:      General: No focal  deficit present.      Mental Status: She is alert and oriented to person, place, and time. Mental status is at baseline.   Psychiatric:         Mood and Affect: Mood normal.         Behavior: Behavior normal.         Additional Data:   Basic Laboratory Review:   Results from last 7 days   Lab Units 01/28/24  0552 01/27/24  0511 01/26/24  1229   SODIUM mmol/L 138 137 135   POTASSIUM mmol/L 4.0 4.2 4.0   CHLORIDE mmol/L 101 102 102   CO2 mmol/L 30 28 26   BUN mg/dL 24 20 21   CREATININE mg/dL 0.66 0.67 0.65   GLUCOSE RANDOM mg/dL 105 102 126   CALCIUM mg/dL 10.5* 9.7 10.4*   ALBUMIN g/dL 3.7 3.7 3.8   ALK PHOS U/L 98 89 100   ALT U/L 24 25 31   AST U/L 35 34 41*   EGFR ml/min/1.73sq m 80 80 81       Results from last 7 days   Lab Units 01/28/24  0552 01/27/24  0511 01/26/24  1752 01/26/24  1229 01/22/24  1602   WBC Thousand/uL 10.44* 10.36*  --  12.26* 15.25*   HEMOGLOBIN g/dL 12.9 12.9  --  13.4 13.8   HEMATOCRIT % 40.6 39.8  --  41.0 44.0   PLATELETS Thousands/uL 228 218 217 233 285   NEUTROS PCT % 73  --   --  76* 73   LYMPHS PCT % 16  --   --  13* 14   MONOS PCT % 5  --   --  5 6   EOS PCT % 4  --   --  4 5   BASOS PCT % 1  --   --  1 1   NEUTROS ABS Thousands/µL 7.66*  --   --  9.42* 11.31*   LYMPHS ABS Thousands/µL 1.69  --   --  1.56 2.08   MONOS ABS Thousand/µL 0.54  --   --  0.65 0.91   EOS ABS Thousand/µL 0.41  --   --  0.48 0.78*   BASOS ABS Thousands/µL 0.07  --   --  0.07 0.08         Results from last 7 days   Lab Units 01/26/24  1229   BNP pg/mL 250*       Additional Labs:  Results from last 7 days   Lab Units 01/28/24  0552 01/27/24  0511   MAGNESIUM mg/dL 2.0 1.8*          Results from last 7 days   Lab Units 01/22/24  1602   HEMOGLOBIN A1C % 6.1*   TRIGLYCERIDES mg/dL 171*   HDL mg/dL 30*   LDL CALC mg/dL 71   TSH 3RD GENERATON uIU/mL 2.558       Recent Cultures (last 7 days):         * I Have Reviewed All Lab Data Listed Above.  * Additional Pertinent Lab Tests Reviewed: All Labs Within Last 24  Hours Reviewed    Imaging:  Prior imaging studies and reports reviewed    Last 24 Hours Medication List:   Current Facility-Administered Medications   Medication Dose Route Frequency Provider Last Rate    atorvastatin  80 mg Oral HS Letha Revankar, DO      azithromycin  500 mg Intravenous Q24H Miles Quinones DO      bisoprolol  2.5 mg Oral Daily With Dinner Letha Revankar, DO      calcium carbonate  1 tablet Oral Daily With Breakfast Letha Revankar, DO      clonazePAM  0.5 mg Oral HS PRN Letha Revankar, DO      clopidogrel  75 mg Oral Daily Letha Revankar, DO      DULoxetine  60 mg Oral Daily Letha Revankar, DO      fluticasone  1 spray Each Nare Daily Letha Revankar, DO      [START ON 1/29/2024] furosemide  20 mg Oral Daily Miles Quinones DO      heparin (porcine)  5,000 Units Subcutaneous Q8H [facility] Letha Revankar, DO      ipratropium-albuterol  3 mL Nebulization Q8H Miles Quinones DO      levothyroxine  88 mcg Oral Early Morning Letha Revankar, DO      loratadine  10 mg Oral Daily Letha Revankar, DO      midodrine  10 mg Oral QAM Letha Revankar, DO      midodrine  10 mg Oral After Lunch Letha Revankar, DO      pantoprazole  40 mg Oral Daily Letha Revankar, DO      [START ON 1/29/2024] predniSONE  20 mg Oral Daily Miles Quinones DO      primidone  50 mg Oral Daily Letha Revankar, DO       Today, Patient Was Seen By: Miles Quinones DO

## 2024-01-28 NOTE — PLAN OF CARE
Problem: PHYSICAL THERAPY ADULT  Goal: Performs mobility at highest level of function for planned discharge setting.  See evaluation for individualized goals.  Description: Treatment/Interventions: Therapeutic exercise, LE strengthening/ROM, Functional transfer training, ADL retraining, Gait training, Bed mobility, Equipment eval/education, Patient/family training, Endurance training  Equipment Recommended:  (Pt has a cane and walker)       See flowsheet documentation for full assessment, interventions and recommendations.  Note: Prognosis: Good  Problem List: Decreased endurance, Impaired balance, Decreased strength, Decreased mobility  Assessment: Patient is an 83 y.o. year old female seen for Physical Therapy evaluation. Patient admitted with Acute on chronic diastolic (congestive) heart failure (HCC).  Comorbidities affecting patient's physical performance include: DM, CHF, respiratory failure, tremor, falls, syncope.  Personal factors affecting patient at time of initial evaluation include: ambulating with assistive device, inability to navigate level surfaces without external assistance, inability to perform dynamic tasks in community, and positive fall history. Prior to admission, patient was independent with functional mobility with or without a cane and independent with ADLS.  Please find objective findings from Physical Therapy assessment regarding body systems outlined above with impairments and limitations including weakness, impaired balance, decreased endurance, decreased activity tolerance, decreased functional mobility tolerance, and fall risk.  The Barthel Index was used as a functional outcome tool presenting with a score of Barthel Index Score: 55 today indicating marked limitations of functional mobility and ADLS.  Patient's clinical presentation is currently unstable/unpredictable as seen in patient's presentation of vital sign response, increased fall risk, new onset of impairment of  functional mobility, decreased endurance, and new onset of weakness. Pt would benefit from continued Physical Therapy treatment to address deficits as defined above and maximize level of functional mobility. As demonstrated by objective findings, the assigned level of complexity for this evaluation is high.The patient's AM-[facility] Basic Mobility Inpatient Short Form Raw Score is 18. A Raw score of greater than 16 suggests the patient may benefit from discharge to home. Please also refer to the recommendation of the Physical Therapist for safe discharge planning.        Rehab Resource Intensity Level, PT: III (Minimum Resource Intensity)    See flowsheet documentation for full assessment.

## 2024-01-28 NOTE — OCCUPATIONAL THERAPY NOTE
Occupational Therapy Evaluation/Treatment     01/27/24 8146   Note Type   Note type Evaluation   Pain Assessment   Pain Assessment Tool 0-10   Pain Score No Pain   Restrictions/Precautions   Weight Bearing Precautions Per Order No   Other Precautions Chair Alarm;Bed Alarm;Telemetry;O2;Fall Risk;Fluid restriction;Hard of hearing;Visual impairment  ([place] L ear, blind L eye)   Home Living   Type of Home Apartment  (1st floor)   Home Layout One level;Performs ADLs on one level   Bathroom Shower/Tub Tub/shower unit   Bathroom Toilet Standard   Bathroom Equipment Shower chair   Bathroom Accessibility Accessible   Home Equipment Walker;Cane  (uses cane when outside)   Additional Comments Pt is an 83 y.o female presented to ER with worsening shortness of breath, chills. CTA was negative for PE but showed findings of pulmonary edema.   Prior Function   Level of [place] Independent with ADLs;Needs assistance with IADLS;Independent with functional mobility   Lives With Alone   Receives Help From Family;Other (Comment)  (has somebody who assist pt with cleaning/laundry service)   IADLs Family/Friend/Other provides transportation;Family/Friend/Other provides meals;Independent with medication management   Falls in the last 6 months 1 to 4   Vocational Retired   Comments Pt reports being indp with BADLs, indp with funtional mobility using cane/AD as needed and receives assistance for iADLs including laundry/cleaning from somebody/services, pt reported having Meals on Wheels and does online shopping/groceries. Pt has Life Alert and does not drive. Per chart, at home pt uses 3 L of oxygen at bedtime only.   Lifestyle   Intrinsic Gratification pt likes to read/listen to history books   Subjective   Subjective I feel discomfort in my chest   ADL   Eating Assistance 6  Modified independent   Grooming Assistance 5  Supervision/Setup   UB Bathing Assistance 5  Supervision/Setup   LB Bathing Assistance 4  Minimal Assistance   UB  Dressing Assistance 4  Minimal Assistance   LB Dressing Assistance 4  Minimal Assistance   LB Dressing Deficit Don/doff R sock;Increased time to complete  (limited knee flexion and difficluty reaching R foot)   Toileting Assistance  4  Minimal Assistance   Bed Mobility   Rolling R 5  Supervision   Rolling L 5  Supervision   Supine to Sit 4  Minimal assistance   Transfers   Sit to Stand 3  Moderate assistance   Additional items Assist x 1;Verbal cues   Stand to Sit 4  Minimal assistance   Additional items Assist x 1;Verbal cues   Stand pivot 4  Minimal assistance   Additional items Assist x 1;Verbal cues   Balance   Static Sitting Good   Dynamic Sitting Fair +   Static Standing Fair   Dynamic Standing Fair -   Activity Tolerance   Nurse Made Aware yes, [place]   RUE Assessment   RUE Assessment WFL   LUE Assessment   LUE Assessment WFL   Vision-Basic Assessment   Current Vision Wears glasses all the time   Cognition   Overall Cognitive Status WFL   Arousal/Participation Alert;Responsive;Cooperative   Orientation Level Oriented X4   Following Commands Follows multistep commands with increased time or repetition   Assessment   Limitation Decreased ADL status;Decreased UE strength;Decreased endurance;Decreased self-care trans;Decreased high-level ADLs   Prognosis Good   Assessment Patient evaluated by Occupational Therapy.  Patient admitted with Acute on chronic diastolic (congestive) heart failure (HCC).  The patients occupational profile, medical and therapy history includes a extensive additional review of physical, cognitive, or psychosocial history related to current functional performance.  Comorbidities affecting functional mobility and ADLS include: nxiety/depression, chronic hypoxemic respiratory failure, DM II, hx TIA, osteoarthritis R knee, SVT, deaf L ear, L rotator cuff repair, HTN .  Prior to admission, patient was independent with functional mobility with AD, independent with ADLS, requiring assist for  IADLS, ambulating household distance, and retired.  The evaluation identifies the following performance deficits: weakness, decreased endurance, decreased coordination, increased fall risk, decreased ADLS, decreased IADLS, decreased activity tolerance, SOB upon exertion, decreased strength, and visual deficits, that result in activity limitations and/or participation restrictions. This evaluation requires clinical decision making of high complexity, because the patient presents with comorbidites that affect occupational performance and required significant modification of tasks or assistance with consideration of multiple treatment options.  The Barthel Index was used as a functional outcome tool presenting with a score of Barthel Index Score: 40, indicating marked limitations of functional mobility and ADLS.  The patient's raw score on the AM-PAC Daily Activity Inpatient Short Form is 19. A raw score of greater than or equal to 19 suggests the patient may benefit from discharge to home. Please refer to the recommendation of the Occupational Therapist for safe discharge planning. Patient will benefit from skilled Occupational Therapy services to address above deficits and facilitate a safe return to prior level of function.   Goals   Patient Goals feel better and not fall   STG Time Frame   (1-7 days)   Short Term Goal  Goals established to promote Patient Goals: feel better and not fall:  Eating: independent; Grooming: independent seated; Bathing: supervision; Upper Body Dressing supervision; Lower Body Dressing: supervision; Toileting: supervision; Patient will increase ambulatory standard toilet transfer to supervision with rolling walker to increase performance and safety with ADLS and functional mobility; Patient will increase standing tolerance to 5 minutes during ADL task to decrease assistance level and decrease fall risk; Patient will increase bed mobility to supervision in preparation for ADLS and  transfers; Patient will increase functional mobility to and from bathroom with rolling walker with supervision to increase performance with ADLS and to use a toilet; Patient will tolerate 5 minutes of UE ROM/strengthening to increase general activity tolerance and performance in ADLS/IADLS; Patient will improve functional activity tolerance to 5 minutes of sustained functional tasks to increase participation in basic self-care and decrease assistance level;  Patient will be able to to verbalize understanding and perform energy conservation/proper body mechanics during ADLS and functional mobility at least 75% of the time with minimal cueing to decrease signs of fatigue and increase stamina to return to prior level of function; Patient will increase dynamic sitting balance to good to improve the ability to sit at edge of bed or on a chair for ADLS;  Patient will increase static standing balance to fair+ to improve postural stability and decrease fall risk during standing ADLS and transfers.   LTG Time Frame   (8-14)   Long Term Goal Grooming: independent standing at sink; Bathing: independent; Upper Body Dressing independent; Lower Body Dressing: independent; Toileting: independent; Patient will increase ambulatory standard toilet transfer to independent with rolling walker and assistive device to increase performance and safety with ADLS and functional mobility; Patient will increase standing tolerance to 10 minutes during ADL task to decrease assistance level and decrease fall risk; Patient will increase bed mobility to independent in preparation for ADLS and transfers; Patient will increase functional mobility to and from bathroom with assistive device independently to increase performance with ADLS and to use a toilet; Patient will tolerate 10 minutes of UE ROM/strengthening to increase general activity tolerance and performance in ADLS/IADLS; Patient will improve functional activity tolerance to 10 minutes of  sustained functional tasks to increase participation in basic self-care and decrease assistance level;  Patient will be able to to verbalize understanding and perform energy conservation/proper body mechanics during ADLS and functional mobility at least 90% of the time with no cueing to decrease signs of fatigue and increase stamina to return to prior level of function; Patient will increase dynamic standing balance to fair to improve postural stability and decrease fall risk during standing ADLS and transfers.  Pt will score >/= 21/24 on AM-PAC Daily Activity Inpatient scale to promote safe independence with ADLs and functional mobility; Pt will score >/= 75/100 on Barthel Index in order to decrease caregiver assistance needed and increase ability to perform ADLs and functional mobility.   Plan   Treatment Interventions ADL retraining;Functional transfer training;UE strengthening/ROM;Compensatory technique education;Continued evaluation;Energy conservation;Activityengagement   Goal Expiration Date 02/10/24   OT Frequency 3-5x/wk   Discharge Recommendation   Rehab Resource Intensity Level, OT III (Minimum Resource Intensity)   AM-PAC Daily Activity Inpatient   Lower Body Dressing 3   Bathing 3   Toileting 3   Upper Body Dressing 3   Grooming 3   Eating 4   Daily Activity Raw Score 19   Daily Activity Standardized Score (Calc for Raw Score >=11) 40.22   AM-PAC Applied Cognition Inpatient   Following a Speech/Presentation 4   Understanding Ordinary Conversation 4   Taking Medications 4   Remembering Where Things Are Placed or Put Away 4   Remembering List of 4-5 Errands 4   Taking Care of Complicated Tasks 4   Applied Cognition Raw Score 24   Applied Cognition Standardized Score 62.21   Barthel Index   Feeding 10   Bathing 0   Grooming Score 0   Dressing Score 5   Bladder Score 0   Bowels Score 10   Toilet Use Score 5   Transfers (Bed/Chair) Score 10   Mobility (Level Surface) Score 0   Stairs Score 0   Barthel Index  "Score 40   Additional Treatment Session   Start Time 1400   End Time 1434   Treatment Assessment S: \"I would like to feel better and not fall.\" O: Pt received while in bed and reported feeling tired with discomfort in chest area when coughing/moving. Pt has h/o orthostatic hypotension and reported that this has been reason for falls at home. BP supine in bed 129/58mmHg, while seated at EOB  133/60 mmHg, while in stance 126/64mmHg.  Pt completed self care tasks while seated at EOB, pt needed increased time as pt needed rest periods and reported fatigue. Pt completed grooming (mouth care) with set-up and Supervision and UB bathing/UB dressing tasks with set-up and S, LB dressing with min A . Encouraged pt to get out of bed and ambulate to bathroom but pt declined. Pt expressed not wanting to have 'accident' when purwick removed due to bladder incontinence. Pt able to do side steps towards head of bed with S using RW. Pt O2 saturation 86-90% at 3 L with activity and exertion. Towards end of session pt agreed to sit on bedside chair. T/f with min A with cues for safety and hand placement. A: Patient reported fatigue and needed increased time to carryover tasks but able to complete all ADLs and functional mobility without difficulty, at end of session patient seated in bedside chair with all needs met. P: Continue OT services as per POC and recommending Home OT vs STR depending on pt's medical and functional progress.   End of Consult   Education Provided Yes   Patient Position at End of Consult Bedside chair;Bed/Chair alarm activated;All needs within reach   Nurse Communication Nurse aware of consult   Licensure   [place] License Number  Mary Rose E. Blanes, MS, OTR/L [id number]     "

## 2024-01-29 LAB
ALBUMIN SERPL BCP-MCNC: 3.8 G/DL (ref 3.5–5)
ALP SERPL-CCNC: 93 U/L (ref 34–104)
ALT SERPL W P-5'-P-CCNC: 21 U/L (ref 7–52)
ANION GAP SERPL CALCULATED.3IONS-SCNC: 8 MMOL/L
AST SERPL W P-5'-P-CCNC: 29 U/L (ref 13–39)
BASOPHILS # BLD AUTO: 0.05 THOUSANDS/ÂΜL (ref 0–0.1)
BASOPHILS NFR BLD AUTO: 0 % (ref 0–1)
BILIRUB SERPL-MCNC: 0.46 MG/DL (ref 0.2–1)
BUN SERPL-MCNC: 28 MG/DL (ref 5–25)
CALCIUM SERPL-MCNC: 10.3 MG/DL (ref 8.4–10.2)
CHLORIDE SERPL-SCNC: 100 MMOL/L (ref 96–108)
CO2 SERPL-SCNC: 28 MMOL/L (ref 21–32)
CREAT SERPL-MCNC: 0.66 MG/DL (ref 0.6–1.3)
DME PARACHUTE DELIVERY DATE ACTUAL: NORMAL
DME PARACHUTE DELIVERY DATE EXPECTED: NORMAL
DME PARACHUTE DELIVERY DATE REQUESTED: NORMAL
DME PARACHUTE DELIVERY NOTE: NORMAL
DME PARACHUTE ITEM DESCRIPTION: NORMAL
DME PARACHUTE ITEM DESCRIPTION: NORMAL
DME PARACHUTE ORDER STATUS: NORMAL
DME PARACHUTE SUPPLIER NAME: NORMAL
DME PARACHUTE SUPPLIER PHONE: NORMAL
EOSINOPHIL # BLD AUTO: 0.12 THOUSAND/ÂΜL (ref 0–0.61)
EOSINOPHIL NFR BLD AUTO: 1 % (ref 0–6)
ERYTHROCYTE [DISTWIDTH] IN BLOOD BY AUTOMATED COUNT: 15.1 % (ref 11.6–15.1)
GFR SERPL CREATININE-BSD FRML MDRD: 80 ML/MIN/1.73SQ M
GLUCOSE SERPL-MCNC: 98 MG/DL (ref 65–140)
HCT VFR BLD AUTO: 37.9 % (ref 34.8–46.1)
HGB BLD-MCNC: 12.3 G/DL (ref 11.5–15.4)
IMM GRANULOCYTES # BLD AUTO: 0.06 THOUSAND/UL (ref 0–0.2)
IMM GRANULOCYTES NFR BLD AUTO: 1 % (ref 0–2)
LYMPHOCYTES # BLD AUTO: 1.45 THOUSANDS/ÂΜL (ref 0.6–4.47)
LYMPHOCYTES NFR BLD AUTO: 13 % (ref 14–44)
MCH RBC QN AUTO: 30 PG (ref 26.8–34.3)
MCHC RBC AUTO-ENTMCNC: 32.5 G/DL (ref 31.4–37.4)
MCV RBC AUTO: 92 FL (ref 82–98)
MONOCYTES # BLD AUTO: 0.48 THOUSAND/ÂΜL (ref 0.17–1.22)
MONOCYTES NFR BLD AUTO: 4 % (ref 4–12)
NEUTROPHILS # BLD AUTO: 9.29 THOUSANDS/ÂΜL (ref 1.85–7.62)
NEUTS SEG NFR BLD AUTO: 81 % (ref 43–75)
NRBC BLD AUTO-RTO: 0 /100 WBCS
PLATELET # BLD AUTO: 229 THOUSANDS/UL (ref 149–390)
PMV BLD AUTO: 10.6 FL (ref 8.9–12.7)
POTASSIUM SERPL-SCNC: 4.1 MMOL/L (ref 3.5–5.3)
PROT SERPL-MCNC: 6.2 G/DL (ref 6.4–8.4)
RBC # BLD AUTO: 4.1 MILLION/UL (ref 3.81–5.12)
SODIUM SERPL-SCNC: 136 MMOL/L (ref 135–147)
WBC # BLD AUTO: 11.45 THOUSAND/UL (ref 4.31–10.16)

## 2024-01-29 PROCEDURE — 85025 COMPLETE CBC W/AUTO DIFF WBC: CPT | Performed by: STUDENT IN AN ORGANIZED HEALTH CARE EDUCATION/TRAINING PROGRAM

## 2024-01-29 PROCEDURE — 80053 COMPREHEN METABOLIC PANEL: CPT | Performed by: STUDENT IN AN ORGANIZED HEALTH CARE EDUCATION/TRAINING PROGRAM

## 2024-01-29 PROCEDURE — 99239 HOSP IP/OBS DSCHRG MGMT >30: CPT | Performed by: INTERNAL MEDICINE

## 2024-01-29 PROCEDURE — 99232 SBSQ HOSP IP/OBS MODERATE 35: CPT | Performed by: PHYSICIAN ASSISTANT

## 2024-01-29 PROCEDURE — 94761 N-INVAS EAR/PLS OXIMETRY MLT: CPT

## 2024-01-29 PROCEDURE — 94640 AIRWAY INHALATION TREATMENT: CPT

## 2024-01-29 PROCEDURE — 94760 N-INVAS EAR/PLS OXIMETRY 1: CPT

## 2024-01-29 RX ORDER — AZITHROMYCIN 250 MG/1
500 TABLET, FILM COATED ORAL EVERY 24 HOURS
Qty: 4 TABLET | Refills: 0 | Status: DISCONTINUED | OUTPATIENT
Start: 2024-01-29 | End: 2024-01-30 | Stop reason: HOSPADM

## 2024-01-29 RX ADMIN — PREDNISONE 20 MG: 20 TABLET ORAL at 08:14

## 2024-01-29 RX ADMIN — IPRATROPIUM BROMIDE AND ALBUTEROL SULFATE 3 ML: 2.5; .5 SOLUTION RESPIRATORY (INHALATION) at 19:52

## 2024-01-29 RX ADMIN — IPRATROPIUM BROMIDE AND ALBUTEROL SULFATE 3 ML: 2.5; .5 SOLUTION RESPIRATORY (INHALATION) at 13:33

## 2024-01-29 RX ADMIN — PANTOPRAZOLE SODIUM 40 MG: 40 TABLET, DELAYED RELEASE ORAL at 08:14

## 2024-01-29 RX ADMIN — HEPARIN SODIUM 5000 UNITS: 5000 INJECTION INTRAVENOUS; SUBCUTANEOUS at 02:11

## 2024-01-29 RX ADMIN — CALCIUM 1 TABLET: 500 TABLET ORAL at 08:12

## 2024-01-29 RX ADMIN — LORATADINE 10 MG: 10 TABLET ORAL at 08:14

## 2024-01-29 RX ADMIN — ATORVASTATIN CALCIUM 80 MG: 80 TABLET, FILM COATED ORAL at 21:12

## 2024-01-29 RX ADMIN — HEPARIN SODIUM 5000 UNITS: 5000 INJECTION INTRAVENOUS; SUBCUTANEOUS at 09:20

## 2024-01-29 RX ADMIN — MIDODRINE HYDROCHLORIDE 10 MG: 5 TABLET ORAL at 13:28

## 2024-01-29 RX ADMIN — FUROSEMIDE 20 MG: 20 TABLET ORAL at 08:19

## 2024-01-29 RX ADMIN — LEVOTHYROXINE SODIUM 88 MCG: 88 TABLET ORAL at 05:00

## 2024-01-29 RX ADMIN — IPRATROPIUM BROMIDE AND ALBUTEROL SULFATE 3 ML: 2.5; .5 SOLUTION RESPIRATORY (INHALATION) at 07:30

## 2024-01-29 RX ADMIN — CLOPIDOGREL 75 MG: 75 TABLET ORAL at 08:14

## 2024-01-29 RX ADMIN — FLUTICASONE PROPIONATE 1 SPRAY: 50 SPRAY, METERED NASAL at 08:15

## 2024-01-29 RX ADMIN — MIDODRINE HYDROCHLORIDE 10 MG: 5 TABLET ORAL at 08:12

## 2024-01-29 RX ADMIN — BISOPROLOL FUMARATE 2.5 MG: 5 TABLET ORAL at 16:23

## 2024-01-29 RX ADMIN — DULOXETINE HYDROCHLORIDE 60 MG: 60 CAPSULE, DELAYED RELEASE ORAL at 08:14

## 2024-01-29 RX ADMIN — AZITHROMYCIN DIHYDRATE 500 MG: 250 TABLET ORAL at 13:28

## 2024-01-29 RX ADMIN — PRIMIDONE 50 MG: 50 TABLET ORAL at 08:14

## 2024-01-29 NOTE — CASE MANAGEMENT
Case Management Progress Note    Patient name Danyelle Martinez  Location [address] [address]/[address] [place] [address]-* MRN [id number]  : 1940 Date 2024       LOS (days): 3  Geometric Mean LOS (GMLOS) (days): 3.9  Days to GMLOS:0.9        OBJECTIVE:      Current admission status: Inpatient  Preferred Pharmacy:   [id number] - [place], [place] - [address]  [address]  [facility] [address]  Phone: [phone] Fax: [phone]    Primary Care Provider: Bladimir Caraballo MD    Primary Insurance: MEDICARE  Secondary Insurance: [employer]    PROGRESS NOTE:    APRIL followed up with [employer] liales Oliveira to inquire about timing of a representative going to patient's apartment to check her home O2 equipment.  He responded that someone will go between [id number].  SW requested that the visit happen earlier as patient's daughter needs to be present at patient's apartment for the representative and also needs to pick patient up from the hospital.  Liaison responded that someone will be at patient's apartment around 1700.  SW notified daughter Cait by phone and she will meet the [employer] representative at patient's apartment.

## 2024-01-29 NOTE — CASE MANAGEMENT
Case Management Assessment & Discharge Planning Note    Patient name Danyelle Martinez  Location [address] [place] [address]/[address]-* MRN [id number]  : 1940 Date 2024       Current Admission Date: 2024  Current Admission Diagnosis:Acute hypoxemic respiratory failure (HCC)   Patient Active Problem List    Diagnosis Date Noted    Acute on chronic diastolic (congestive) heart failure (HCC) 2024    Leukocytosis 2024    Closed wedge compression fracture of T1 vertebra with routine healing 2023    Enlarged thyroid 2023    Overflow incontinence of urine 2023    Fall 11/15/2023    Syncope, cardiogenic 11/15/2023    Recurrent major depressive disorder, in remission (HCC) 2023    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    SVT (supraventricular tachycardia) 2023    Osteoarthritis of right knee 2023    Elevated liver enzymes 2023    Type 2 diabetes mellitus, without long-term current use of insulin (HCC) 2023    Dyspepsia 2022    Frequent falls 2022    Localized swelling of right lower leg 2022    Anxiety 2022    Gastroesophageal reflux disease without esophagitis 10/12/2022    Nocturnal hypoxemia 2021    Acquired hypothyroidism 2021    Thyroid nodule 2021    Palpitations 2021    Tremor 2021    History of transient ischemic attack (TIA) 2021    Essential hypertension 2021    Dyslipidemia 2021    Seasonal allergic rhinitis due to pollen 2021    Chronic hypoxemic respiratory failure (HCC) 2020    Acute hypoxemic respiratory failure (HCC) 2020    Dizziness 2020    Shortness of breath on exertion       LOS (days): 3  Geometric Mean LOS (GMLOS) (days): 3.9  Days to GMLOS:1     OBJECTIVE:    Risk of Unplanned Readmission Score: 15.64       Current admission status: Inpatient  Referral Reason: Other (Discharge planning)    Preferred Pharmacy:   [id number] -  [place], [place] - [address]  [address]  [facility] [address]  Phone: [phone] Fax: [phone]    Primary Care Provider: Bladimir Caraballo MD    Primary Insurance: MEDICARE  Secondary Insurance: AARP    ASSESSMENT:  Active Health Care Proxies       Vivi Gonzales Health Care Agent - Daughter   Primary Phone: [phone] (Home)                    Readmission Root Cause  30 Day Readmission: No    Patient Information  Admitted from:: Home  Mental Status: Alert  During Assessment patient was accompanied by: Not accompanied during assessment  Assessment information provided by:: Patient  Primary Caregiver: Self  Support Systems: Daughter, Family members  County of Residence: [facility]  What [facility] do you live in?: [place]  Home entry access options. Select all that apply.: No steps to enter home  Type of Current Residence: Apartment  Floor Level: 1  Upon entering residence, is there a bedroom on the main floor (no further steps)?: Yes  Upon entering residence, is there a bathroom on the main floor (no further steps)?: Yes  Living Arrangements: Lives Alone    Activities of Daily Living Prior to Admission  Functional Status: Independent  Completes ADLs independently?: Yes  Ambulates independently?: Yes  Does patient use assisted devices?: Yes  Assisted Devices (DME) used: Walker, Straight Cane, Shower Chair, Home Oxygen concentrator  DME Company Name (respiratory supplies): [employer]  O2 Rate(s): 3L at night  Does patient currently own DME?: Yes  What DME does the patient currently own?: Home Oxygen concentrator, Shower Chair, Straight Cane, Walker  Does patient have a history of Outpatient Therapy (PT/OT)?: No  Does the patient have a history of Short-Term Rehab?: Yes ([facility])  Does patient have a history of HHC?: Yes ([facility])  Does patient currently have HHC?: Yes (Unclear, referral sent to [facility] to see if patient is active with their service)    Current Home Health Care  Current  Home Health Agency:: [facility]  Current Home Health Follow-Up Provider:: PCP    Patient Information Continued  Income Source: Pension/detention  Does patient have prescription coverage?: Yes  Does patient receive dialysis treatments?: No    Means of Transportation  Means of Transport to Appts:: Family transport      Housing Stability: Low Risk  (1/29/2024)    Housing Stability Vital Sign     Unable to Pay for Housing in the Last Year: No     Number of Places Lived in the Last Year: 1     Unstable Housing in the Last Year: No   Recent Concern: Housing Stability - High Risk (1/26/2024)    Housing Stability Vital Sign     Unable to Pay for Housing in the Last Year: Yes     Number of Places Lived in the Last Year: 1     Unstable Housing in the Last Year: No   Food Insecurity: No Food Insecurity (1/29/2024)    Hunger Vital Sign     Worried About Running Out of Food in the Last Year: Never true     Ran Out of Food in the Last Year: Never true   Transportation Needs: No Transportation Needs (1/29/2024)    PRAPARE - Transportation     Lack of Transportation (Medical): No     Lack of Transportation (Non-Medical): No   Utilities: Not At Risk (1/29/2024)    [facility] Utilities     Threatened with loss of utilities: No       DISCHARGE DETAILS:    Discharge planning discussed with:: Patient and daughter Cait  Freedom of Choice: Yes    Comments - Freedom of Choice: SW spoke with patient at bedside to introduce role of CM, conduct assessment and discuss discharge planning.  Patient's preference is to return home at discharge.  She lives alone in a 1st floor apartment and daughter is nearby and supportive.      Patient has O2 from [employer] but had reported on admission that it was not working properly.  SW had contacted [employer] liaison on 1/26 to request that the equipment be checked but daughter reported today that no one has contacted her from [employer].  APRIL notified [employer] liaison Tee again today and he  stated that a representative will go out today to inspect patient's equipment.  Patient's O2 needs have increased per RT and SW sent new script to [employer] via [employer].      Patient was recently in STR at [facility] and was active with [facility] upon discharge home.  SW sent referral for [facility] in [facility].  Patient is not interested in PT services but could benefit from SN visit to assess respiratory status.  Daughter will pick patient up when medically cleared.      CM contacted family/caregiver?: Yes  Were Treatment Team discharge recommendations reviewed with patient/caregiver?: Yes  Did patient/caregiver verbalize understanding of patient care needs?: Yes  Were patient/caregiver advised of the risks associated with not following Treatment Team discharge recommendations?: Yes    Contacts  Patient Contacts: Cait Gonzales (daughter)  Relationship to Patient:: Family  Contact Method: Phone  Phone Number: [phone]  Reason/Outcome: Emergency Contact, Discharge Planning    Requested Home Health Care         Is the patient interested in HHC at discharge?: Yes  Home Health Discipline requested:: Nursing, Physical Therapy  Home Health Agency Name:: [facility]  HHA External Referral Reason (only applicable if external HHA name selected): Services not provided in network or near patient location  Home Health Follow-Up Provider:: PCP  Home Health Services Needed:: Heart Failure Management, Evaluate Functional Status and Safety, Gait/ADL Training, Oxygen Via Nasal Cannula, Strengthening/Theraputic Exercises to Improve Function  Oxygen LPM Ordered (if applicable based on home health services needed):: 4 LPM  Homebound Criteria Met:: Uses an Assist Device (i.e. cane, walker, etc), Requires the Assistance of Another Person for Safe Ambulation or to Leave the Home  Supporting Clincal Findings:: Dyspnea with Exertion, Fatigues Easliy in Short Distances, Limited Endurance, Requires Oxygen    DME Referral  Provided  Referral made for DME?: Yes  DME referral completed for the following items:: Home Oxygen concentrator  DME Supplier Name:: [employer] (New script sent to [employer] for increased O2 needs)    Other Referral/Resources/Interventions Provided:  Interventions: DME, HHC  Programs:: CHF    Would you like to participate in our [facility] service program?  : No - Declined    Treatment Team Recommendation: Home with Home Health Care  Discharge Destination Plan:: Home with Home Health Care  Transport at Discharge : Family         IMM Given (Date):: 01/29/24  IMM Given to:: Patient (IMM reviewed with and signed by patient.  Patient is in agreement with discharge determination.  Copy given to patient and copy placed in scan bin for chart.)

## 2024-01-29 NOTE — PROGRESS NOTES
Patient:  ERIC JOHNS    MRN:  [id number]    Matilda Request ID:  [id number]    Level of care reserved:  Home Health Agency    Partner Reserved:  [facility], [facility], [place] [address] [phone]    Clinical needs requested:    Geography searched:  [id number]    Start of Service:    Request sent:  1:01pm EST on 1/29/2024 by Leesa Donovan    Partner reserved:  1:13pm EST on 1/29/2024 by Leesa Donovan    Choice list shared:  1:13pm EST on 1/29/2024 by Leesa Donovan

## 2024-01-29 NOTE — APP STUDENT NOTE
Assessment/Plan:  Acute hypoxemic respiratory failure  Pt presented with hypoxia with SpO2 in 80s at home and SpO2 86% on RA in ED.  On 3L NC O2 at bedtime, but pt reports that her oxygen supplies were not working correct.  No formal dx COPD, asthma in the past.  Given IV Lasix with good response, so switched to PO Lasix  CTA chest- no evidence of PE, diffuse septal thickening due to pulmonary edema, no pneumonia   Started on PO prednisone and azithromycin for possible bronchitis.  Continue 5 day course of prednisone and 3 day course of azithromycin.  Discharge on oxygen, arranged with case management  Follow up with pulmonology outpatient    Acute on chronic diastolic heart failure  Presented with COB, chills, ROB.   on admission.  CT chest revealed pulmonary edema.  Started on IV Lasix 40mg BID with improvement  Discharge on Lasix 20 mg PO daily   Daily weights, I/Os, fluid restriction  Follow up with outpatient PCP or cardiologist    Leukocytosis  Leukocytosis 12.26 in ER.  Afebrile in ER. States she has been taking an Macrodantin and Cipro for UTI as prescribed by urology.  Discussed with patient's daughter to verify meds as unclear why she is on 2 different antibiotics and per daughter patient was only supposed to be on Macrodantin for 2 weeks but patient states she is still on it   UTI is asymptomatic, so not treating.    WBC improved to 11.45  Pt on azithromycin for possible bronchitis    Essential Hypertension  Continue Zebeta  Hx of orthostatic hypotension, continue home midodrine as prescribed.    Type II DM  Most recent HGBA1C 6.1  Controlled on diet          Medical Problems       Resolved Problems  Date Reviewed: 1/29/2024   None       Discharging Physician / Practitioner: Estelita Gray  PCP: Bladimir Caraballo MD  Admission Date:   Admission Orders (From admission, onward)       Ordered        01/26/24 1429  INPATIENT ADMISSION  Once                          Discharge Date:  01/29/24    Consultations During Hospital Stay:  Respiratory Therapy  PT/OT  Case Management    Procedures Performed:   Home O2 eval    Significant Findings / Test Results:   WBC 12.26 (1/26), most recently 11.45  CTA chest revealed diffuse septal thickening from pulmonary edema with mild mediastinal and hilar lymphadenopathy    Incidental Findings:   None    Test Results Pending at Discharge (will require follow up):   None     Outpatient Tests Requested:  None    Complications:  None    Reason for Admission: shortness of breath and hypoxia    Hospital Course:   Danyelle Martinez is a 83 y.o. female patient with a PMH of CHF, diabetes, HTN, hypothyroidism, who originally presented to the hospital on 1/26/2024 due to shortness of breath, chills, and fatigue for about 4 days.  She was hypoxic in the 80s on RA on arrival, which improved with oxygen.  Her WBC count was elevated to 12.26 and her D-dimer was elevated at 1.05, however CTA chest revealed no evidence of PE, but did reveal pulmonary edema with septal thickening.  Pt was placed on Lasix with good diureses and will be discharged on 20mg Lasix PO daily.  Her WBC improved from 12.26 to 11.45 today and she remained afebrile.  She was started on PO prednisone x5 days and azithromycin x3 days for possible bronchitis and to help with SOB.  Pt will be discharged on home O2 and will follow up with pulmonology outpatient.        Please see above list of diagnoses and related plan for additional information.     Condition at Discharge: good    Discharge Day Visit / Exam:   Subjective:  Patient reports that she is feeling better today than yesterday.  She says she has some chest congestion and tightness around the middle of her chest, which is improved from yesterday.  She reports some cough still.  Otherwise, she denies headaches, lightheadedness, chest pain, abdominal pain, N/V, and lower extremity swelling/pain.  VSS.  Pt says she is ready to go home.    Vitals: Blood  "Pressure: 136/57 (01/29/24 1326)  Pulse: 72 (01/29/24 1326)  Temperature: 97.8 °F (36.6 °C) (01/29/24 0755)  Temp Source: Oral (01/28/24 2216)  Respirations: 18 (01/28/24 2216)  Height: 5' 6\" (167.6 cm) (01/26/24 1624)  Weight - Scale: 71.2 kg (157 lb) (01/29/24 0928)  SpO2: 94 % (01/29/24 1333)    Exam:   Physical Exam   General: Sitting comfortably in bed, in no acute distress.  HEENT: normocephalic, atraumatic.  Oropharynx clear, no tonsillar exudates.    Cardio: RRR, normal S1 & S2, no murmurs  Pulm: normal effort, mild rhonchi noted throughout.  Abdominal: soft, nontender, nondistended, normal BS x4  MSK: no lower extremity tenderness or edema  Skin: warm and dry  Neuro: A&O x3    Discussion with Family: Patient declined call to .     Discharge instructions/Information to patient and family:   See after visit summary for information provided to patient and family.      Provisions for Follow-Up Care:  See after visit summary for information related to follow-up care and any pertinent home health orders.      Mobility at time of Discharge:   Basic Mobility Inpatient Raw Score: 18  JH-HLM Goal: 6: Walk 10 steps or more  JH-HLM Achieved: 5: Stand (1 or more minutes)  HLM Goal achieved. Continue to encourage appropriate mobility.     Disposition:   Home    Planned Readmission: No    Discharge Medications:  See after visit summary for reconciled discharge medications provided to patient and/or family.      **Please Note: This note may have been constructed using a voice recognition system**  "

## 2024-01-29 NOTE — CASE MANAGEMENT
Case Management Discharge Planning Note    Patient name Danyelle Martinez  Location [address] [address] [address] MRN [id number]  : 1940 Date 2024       Current Admission Date: 2024  Current Admission Diagnosis:Acute hypoxemic respiratory failure (HCC)   Patient Active Problem List    Diagnosis Date Noted    Acute on chronic diastolic (congestive) heart failure (HCC) 2024    Leukocytosis 2024    Closed wedge compression fracture of T1 vertebra with routine healing 2023    Enlarged thyroid 2023    Overflow incontinence of urine 2023    Fall 11/15/2023    Syncope, cardiogenic 11/15/2023    Recurrent major depressive disorder, in remission (HCC) 2023    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    SVT (supraventricular tachycardia) 2023    Osteoarthritis of right knee 2023    Elevated liver enzymes 2023    Type 2 diabetes mellitus, without long-term current use of insulin (HCC) 2023    Dyspepsia 2022    Frequent falls 2022    Localized swelling of right lower leg 2022    Anxiety 2022    Gastroesophageal reflux disease without esophagitis 10/12/2022    Nocturnal hypoxemia 2021    Acquired hypothyroidism 2021    Thyroid nodule 2021    Palpitations 2021    Tremor 2021    History of transient ischemic attack (TIA) 2021    Essential hypertension 2021    Dyslipidemia 2021    Seasonal allergic rhinitis due to pollen 2021    Chronic hypoxemic respiratory failure (HCC) 2020    Acute hypoxemic respiratory failure (HCC) 2020    Dizziness 2020    Shortness of breath on exertion       LOS (days): 3  Geometric Mean LOS (GMLOS) (days): 3.9  Days to GMLOS:0.8     OBJECTIVE:  Risk of Unplanned Readmission Score: 15.67       Current admission status: Inpatient   Preferred Pharmacy:   [id number] - [place], [place] - [address]  [address]  [facility]  [address]  Phone: [phone] Fax: [phone]    Primary Care Provider: Bladimir Caraballo MD    Primary Insurance: MEDICARE  Secondary Insurance: AARP    DISCHARGE DETAILS:    Discharge planning discussed with:: Patient and daughter Cait LOMAS contacted family/caregiver?: Yes  Were Treatment Team discharge recommendations reviewed with patient/caregiver?: Yes  Did patient/caregiver verbalize understanding of patient care needs?: Yes  Were patient/caregiver advised of the risks associated with not following Treatment Team discharge recommendations?: Yes    Contacts  Patient Contacts: Cait Gonzales (daughter)  Relationship to Patient:: Family  Contact Method: Phone  Phone Number: [phone]  Reason/Outcome: Emergency Contact, Discharge Planning    DME Referral Provided  Referral made for DME?: Yes  DME referral completed for the following items:: Portable Oxygen tanks (Portable tank delivered to patient at bedside.  Signed delivery ticket uploaded to [employer] and original placed in [employer] liaison's inbox.)  DME Supplier Name:: [employer]    Other Referral/Resources/Interventions Provided:  Interventions: DME, HHC    Would you like to participate in our [facility] service program?  : No - Declined    Treatment Team Recommendation: Home with Home Health Care  Discharge Destination Plan:: Home with Home Health Care  Transport at Discharge : Family     SW received a call from daughter Cait stating that she had heard from [employer] and they will be coming between 9621-6900 this evening to check patient's home O2.  SW notified [employer] liaison and he stated that he would be in touch with his manager as he had requested that the check be done by 1700.  APRIL updated attending and nursing and portable tank was delivered to patient at bedside.

## 2024-01-29 NOTE — RESPIRATORY THERAPY NOTE
Home Oxygen Qualifying Test     Patient name: Danyelle Martinez        : 1940   Date of Test:  2024  Diagnosis:    Home Oxygen Test:    **Medicare Guidelines require item(s) 1-5 on all ambulatory patients or 1 and 2 on non-ambulatory patients.    1. Baseline SPO2 on Room Air at rest 87 %   If <= 88% on Room Air add O2 via NC to obtain SpO2 >=88%. If LPM needed, document LPM 3 needed to reach =>88%    SPO2 during exertion on Room Air N/A  %  During exertion monitor SPO2. If SPO2 increases >=89%, do not add supplemental oxygen    SPO2 on Oxygen at Rest 95 % at 3 LPM    SPO2 during exertion on Oxygen 91 % at 4 LPM    Test performed during exertion activity.      [x]  Supplemental Home Oxygen is indicated.    []  Client does not qualify for home oxygen.    Respiratory Additional Notes-     Ruby Sanderson, RT

## 2024-01-29 NOTE — PLAN OF CARE
Problem: PAIN - ADULT  Goal: Verbalizes/displays adequate comfort level or baseline comfort level  Description: Interventions:  - Encourage patient to monitor pain and request assistance  - Assess pain using appropriate pain scale  - Administer analgesics based on type and severity of pain and evaluate response  - Implement non-pharmacological measures as appropriate and evaluate response  - Consider cultural and social influences on pain and pain management  - Notify physician/advanced practitioner if interventions unsuccessful or patient reports new pain  Outcome: Progressing     Problem: INFECTION - ADULT  Goal: Absence or prevention of progression during hospitalization  Description: INTERVENTIONS:  - Assess and monitor for signs and symptoms of infection  - Monitor lab/diagnostic results  - Monitor all insertion sites, i.e. indwelling lines, tubes, and drains  - Monitor endotracheal if appropriate and nasal secretions for changes in amount and color  - [place] appropriate cooling/warming therapies per order  - Administer medications as ordered  - Instruct and encourage patient and family to use good hand hygiene technique  - Identify and instruct in appropriate isolation precautions for identified infection/condition  Outcome: Progressing     Problem: RESPIRATORY - ADULT  Goal: Achieves optimal ventilation and oxygenation  Description: INTERVENTIONS:  - Assess for changes in respiratory status  - Assess for changes in mentation and behavior  - Position to facilitate oxygenation and minimize respiratory effort  - Oxygen administered by appropriate delivery if ordered  - Initiate smoking cessation education as indicated  - Encourage broncho-pulmonary hygiene including cough, deep breathe, Incentive Spirometry  - Assess the need for suctioning and aspirate as needed  - Assess and instruct to report SOB or any respiratory difficulty  - Respiratory Therapy support as indicated  Outcome: Progressing     Problem:  METABOLIC, FLUID AND ELECTROLYTES - ADULT  Goal: Electrolytes maintained within normal limits  Description: INTERVENTIONS:  - Monitor labs and assess patient for signs and symptoms of electrolyte imbalances  - Administer electrolyte replacement as ordered  - Monitor response to electrolyte replacements, including repeat lab results as appropriate  - Instruct patient on fluid and nutrition as appropriate  Outcome: Progressing

## 2024-01-29 NOTE — DISCHARGE SUMMARY
[facility]  Discharge- Danyelle Martinez 1940, 83 y.o. female MRN: [id number]  Unit/Bed#: [address] [place] [address] Encounter: [id number]  Primary Care Provider: Bladimir Caraballo MD   Date and time admitted to hospital: 1/26/2024 12:01 PM    Leukocytosis  Assessment & Plan  Patient with mild leukocytosis on lab work but afebrile  States she has been taking an Macrodantin and Cipro for UTI as prescribed by urology.  Discussed with patient's daughter to verify meds as unclear why she is on 2 different antibiotics and per daughter patient was only supposed to be on Macrodantin for 2 weeks but patient states she is still on it  Asymptomatic, hold off UTI treatment antibiotic therapy    Acute on chronic diastolic (congestive) heart failure (HCC)  Assessment & Plan  Presented with worsening SOB, chills, ROB. Elevated BNP, pulmonary edema on imaging consistent with volume overload.  Echo (11/2023): EF 58%, G1DD.   Initially started on IV Lasix 40 mg BID with good diuretic response  Continue Lasix 20 mg daily on discharge  Daily weights, I/Os, 1.8L fluid restriction    Recurrent major depressive disorder, in remission (HCC)  Assessment & Plan  Continue Cymbalta    Type 2 diabetes mellitus, without long-term current use of insulin (HCC)  Assessment & Plan  Lab Results   Component Value Date    HGBA1C 6.1 (H) 01/22/2024     Diet controlled, placed patient on diabetic diet    Acquired hypothyroidism  Assessment & Plan  Continue levothyroxine supplementation    Essential hypertension  Assessment & Plan  Continue Zebeta  Patient with history of orthostatic hypotension and will continue midodrine as she uses at home    * Acute hypoxemic respiratory failure (HCC)  Assessment & Plan  Presented with hypoxia, O2 in 80s at home with SpO2 86% on room air in ED. Chronically on 3 L NC O2 at bedtime, noted that patient reported her oxygen supplies were not working properly. No formal dx COPD, asthma in the past.   CTA  chest showed no PE, diffuse septal thickening due to pulmonary edema but no pneumonia  Completed IV Lasix trial with good response, switched to PO Lasix  Will continue p.o. Lasix 20 mg daily on discharge  Treated with prednisone 20 mg daily while inpatient; will continue on discharge complete a 5-day course  Currently stable on 3L NC O2, appears to be baseline    Continue to complete 5-day course of prednisone, 3-day course azithromycin & F/U with pulmonology outpatient  Home O2 supplies delivered and secure with [facility]      Medical Problems       Resolved Problems  Date Reviewed: 1/29/2024   None       Discharging Physician / Practitioner: Vianney Virgen PA-C  PCP: Bladimir Caraballo MD  Admission Date:   Admission Orders (From admission, onward)       Ordered        01/26/24 1429  INPATIENT ADMISSION  Once                          Discharge Date: 01/30/24    Consultations During Hospital Stay:  PT/OT    Procedures Performed:   None    Significant Findings / Test Results:   CTA chest: No pulmonary embolus. Diffuse septal thickening, greatest in the lower lobes, due to pulmonary edema. Mild underlying pulmonary fibrosis not excluded. Nothing to indicate pneumonia. Mild mediastinal and hilar lymphadenopathy, likely reactive. Healed/healing sternal manubrial fracture. Mildly nodular cirrhotic liver. Pulmonary artery enlargement which can be seen with pulmonary hypertension.    Incidental Findings:   None    Test Results Pending at Discharge (will require follow up):   None     Outpatient Tests Requested:  Establish care with pulmonologist in 1 month    Complications: None    Reason for Admission: Acute respiratory failure    Hospital Course:   Danyelle Martinez is a 83 y.o. female patient, PMH chronic diastolic CHF, depression, DM, hypothyroidism, HTN, who originally presented to the hospital on 1/26/2024 due to respiratory failure, found to be hypoxic with SpO2 in 80s on room air while at home. Patient  "chronically on 3 L NC O2 at bedtime, however was reported that her home oxygen supplies were not working properly. On arrival to ED, imaging showed evidence of pulmonary edema, clinically volume overloaded and was started on IV Lasix. Patient responded well to diuresis, can continue daily oral Lasix for maintenance. There was also concern for mild bronchitis, to complete total 5-day course of prednisone and 3-day course of azithromycin. Patient was evaluated by respiratory, now will require 3-4 L NC O2 at home.  [facility] was able to supply home O2 and DME.  Patient cleared for discharge.  Will need to follow-up with PCP within 1 to 2 weeks of discharge, as well as with pulmonology.  She was feeling well at the time of discharge.  All questions and concerns addressed.  She was eager to return home.    Please see above list of diagnoses and related plan for additional information.     Condition at Discharge: good    Discharge Day Visit / Exam:   Subjective: Patient seen and examined on day of discharge.  Feeling well today.  Denies any shortness of breath or chest pain.  No new oxygen needs.  Afebrile.  Vitals: Blood Pressure: 141/57 (01/29/24 1623)  Pulse: 76 (01/29/24 1623)  Temperature: 97.8 °F (36.6 °C) (01/29/24 1447)  Temp Source: Oral (01/28/24 2216)  Respirations: 18 (01/29/24 1447)  Height: 5' 6\" (167.6 cm) (01/26/24 1624)  Weight - Scale: 71.2 kg (157 lb) (01/29/24 0928)  SpO2: 96 % (01/29/24 1623)    PHYSICAL EXAM:    Vitals signs reviewed  Constitutional   Awake and cooperative. NAD.   Head/Neck   Normocephalic. Atraumatic.   HEENT   No scleral icterus. EOMI.   Heart   Regular rate and rhythm. No murmers.   Lungs   Clear to auscultation bilaterally. Respirations unlaboured.   Abdomen   Soft. Nontender. Nondistended.    Skin   Skin color normal. No rashes.   Extremities   No deformities. No peripheral edema.   Neuro   Alert and oriented. No new deficits.   Psych   Mood stable. Affect normal. "         Discussion with Family: Patient declined call to .     Discharge instructions/Information to patient and family:   See after visit summary for information provided to patient and family.      Provisions for Follow-Up Care:  See after visit summary for information related to follow-up care and any pertinent home health orders.      Mobility at time of Discharge:   Basic Mobility Inpatient Raw Score: 18  JH-HLM Goal: 6: Walk 10 steps or more  JH-HLM Achieved: 6: Walk 10 steps or more  HLM Goal achieved. Continue to encourage appropriate mobility.     Disposition:   Home    Planned Readmission: None     Discharge Statement:  I spent 40 minutes discharging the patient. This time was spent on the day of discharge. I had direct contact with the patient on the day of discharge. Greater than 50% of the total time was spent examining patient, answering all patient questions, arranging and discussing plan of care with patient as well as directly providing post-discharge instructions.  Additional time then spent on discharge activities.    Discharge Medications:  See after visit summary for reconciled discharge medications provided to patient and/or family.      **Please Note: This note may have been constructed using a voice recognition system**

## 2024-01-29 NOTE — PROGRESS NOTES
[facility]  Progress Note  Name: Danyelle Martinez I  MRN: [id number]  Unit/Bed#: [address] I Date of Admission: 1/26/2024   Date of Service: 1/29/2024 I Hospital Day: 3    Assessment/Plan   * Acute hypoxemic respiratory failure (HCC)  Assessment & Plan  Presented with hypoxia, O2 in 80s at home with SpO2 86% on room air in ED. Chronically on 3 L NC O2 at bedtime, noted that patient reported her oxygen supplies were not working properly. No formal dx COPD, asthma in the past.   CTA chest showed no PE, diffuse septal thickening due to pulmonary edema but no pneumonia  Completed IV Lasix trial with good response, switched to PO Lasix  Started on PO prednisone, azithromycin for possible bronchitis  Currently stable on 3L NC O2, appears to be baseline  Continue to complete 5-day course of prednisone, 3-day course azithromycin & F/U with pulmonology outpatient  Case management consult for safe discharge planning to ensure home O2 supplies    Acute on chronic diastolic (congestive) heart failure (HCC)  Assessment & Plan  Wt Readings from Last 3 Encounters:   01/27/24 72.5 kg (159 lb 12.8 oz)   12/28/23 75.3 kg (166 lb)   12/18/23 76.2 kg (168 lb)     Presented with worsening SOB, chills, ROB. Elevated BNP, pulmonary edema on imaging consistent with volume overload.  Echo (11/2023): EF 58%, G1DD.   Initially started on IV Lasix 40 mg BID with good diuretic response  Continue Lasix 20 mg daily on discharge  Daily weights, I/Os, 1.8L fluid restriction    Leukocytosis  Assessment & Plan  Patient with mild leukocytosis on lab work but afebrile  States she has been taking an Macrodantin and Cipro for UTI as prescribed by urology.  Discussed with patient's daughter to verify meds as unclear why she is on 2 different antibiotics and per daughter patient was only supposed to be on Macrodantin for 2 weeks but patient states she is still on it  Asymptomatic, hold off UTI treatment antibiotic  therapy    Recurrent major depressive disorder, in remission (HCC)  Assessment & Plan  Continue Cymbalta    Type 2 diabetes mellitus, without long-term current use of insulin ([facility])  Assessment & Plan  Lab Results   Component Value Date    HGBA1C 6.1 (H) 01/22/2024     Diet controlled, placed patient on diabetic diet    Acquired hypothyroidism  Assessment & Plan  Continue levothyroxine supplementation    Essential hypertension  Assessment & Plan  Continue Zebeta  Patient with history of orthostatic hypotension and will continue midodrine as she uses at home           VTE Pharmacologic Prophylaxis: VTE Score: 4 Moderate Risk (Score 3-4) - Pharmacological DVT Prophylaxis Ordered: heparin.    Mobility:   Basic Mobility Inpatient Raw Score: 18  JH-HLM Goal: 6: Walk 10 steps or more  JH-HLM Achieved: 6: Walk 10 steps or more  HLM Goal achieved. Continue to encourage appropriate mobility.    Patient Centered Rounds: I performed bedside rounds with nursing staff today.   Discussions with Specialists or Other Care Team Provider: Case management    Education and Discussions with Family / Patient: Updated  (daughter) via phone.    Total Time Spent on Date of Encounter in care of patient: 45 mins. This time was spent on one or more of the following: performing physical exam; counseling and coordination of care; obtaining or reviewing history; documenting in the medical record; reviewing/ordering tests, medications or procedures; communicating with other healthcare professionals and discussing with patient's family/caregivers.    Current Length of Stay: 3 day(s)  Current Patient Status: Inpatient   Certification Statement: The patient will continue to require additional inpatient hospital stay due to pending safe discharge planning, awaiting confirmation of properly working home oxygen supplies  Discharge Plan: Anticipate discharge later today or tomorrow to home.    Code Status: Level 3 - DNAR and  DNI    Subjective:   Patient is seen at bedside this a.m., reports improvement in chest congestion and SOB, states she feels ready for discharge home.    Objective:     Vitals:   Temp (24hrs), Av.9 °F (36.6 °C), Min:97.8 °F (36.6 °C), Max:98 °F (36.7 °C)    Temp:  [97.8 °F (36.6 °C)-98 °F (36.7 °C)] 97.8 °F (36.6 °C)  HR:  [63-88] 76  Resp:  [18] 18  BP: (109-147)/(53-59) 141/57  SpO2:  [91 %-98 %] 96 %  Body mass index is 25.34 kg/m².     Input and Output Summary (last 24 hours):     Intake/Output Summary (Last 24 hours) at 2024 1746  Last data filed at 2024 0928  Gross per 24 hour   Intake 240 ml   Output 750 ml   Net -510 ml       Physical Exam:   Physical Exam  Constitutional:       General: She is not in acute distress.     Appearance: She is not ill-appearing, toxic-appearing or diaphoretic.   Cardiovascular:      Rate and Rhythm: Normal rate and regular rhythm.      Pulses: Normal pulses.      Heart sounds: Normal heart sounds.   Pulmonary:      Effort: Pulmonary effort is normal. No respiratory distress.      Breath sounds: Normal breath sounds.   Abdominal:      General: Bowel sounds are normal. There is no distension.      Palpations: Abdomen is soft.      Tenderness: There is no abdominal tenderness.   Skin:     General: Skin is warm.   Neurological:      General: No focal deficit present.      Mental Status: She is alert.   Psychiatric:         Mood and Affect: Mood normal.         Behavior: Behavior normal.          Additional Data:     Labs:  Results from last 7 days   Lab Units 24  0505   WBC Thousand/uL 11.45*   HEMOGLOBIN g/dL 12.3   HEMATOCRIT % 37.9   PLATELETS Thousands/uL 229   NEUTROS PCT % 81*   LYMPHS PCT % 13*   MONOS PCT % 4   EOS PCT % 1     Results from last 7 days   Lab Units 24  0505   SODIUM mmol/L 136   POTASSIUM mmol/L 4.1   CHLORIDE mmol/L 100   CO2 mmol/L 28   BUN mg/dL 28*   CREATININE mg/dL 0.66   ANION GAP mmol/L 8   CALCIUM mg/dL 10.3*   ALBUMIN g/dL  3.8   TOTAL BILIRUBIN mg/dL 0.46   ALK PHOS U/L 93   ALT U/L 21   AST U/L 29   GLUCOSE RANDOM mg/dL 98                 Results from last 7 days   Lab Units 01/26/24  1229   PROCALCITONIN ng/ml 0.26*       Lines/Drains:  Invasive Devices       Peripheral Intravenous Line  Duration             Peripheral IV 01/26/24 Left Antecubital 3 days              Drain  Duration             External Urinary Catheter 1 day                          Imaging: No pertinent imaging reviewed.    Recent Cultures (last 7 days):         Last 24 Hours Medication List:   Current Facility-Administered Medications   Medication Dose Route Frequency Provider Last Rate    atorvastatin  80 mg Oral HS Letha Revankar, DO      azithromycin  500 mg Oral Q24H Vianney Virgen PA-C      bisoprolol  2.5 mg Oral Daily With Dinner Letha Revankar, DO      calcium carbonate  1 tablet Oral Daily With Breakfast Letha Revankar, DO      clonazePAM  0.5 mg Oral HS PRN Letha Revankar, DO      clopidogrel  75 mg Oral Daily Letha Revankar, DO      DULoxetine  60 mg Oral Daily Letha Revankar, DO      fluticasone  1 spray Each Nare Daily Letha Revankar, DO      furosemide  20 mg Oral Daily Miles Jonah Volamy, DO      heparin (porcine)  5,000 Units Subcutaneous Q8H [facility] Letha Revankar, DO      ipratropium-albuterol  3 mL Nebulization TID Miles Quinones, DO      levothyroxine  88 mcg Oral Early Morning Letha Revankar, DO      loratadine  10 mg Oral Daily Letha Revankar, DO      midodrine  10 mg Oral QAM Letha Revankar, DO      midodrine  10 mg Oral After Lunch Letha Revankar, DO      pantoprazole  40 mg Oral Daily Letha Revankar, DO      predniSONE  20 mg Oral Daily Miles Jonah Quinones, DO      primidone  50 mg Oral Daily Letha Revankar, DO          Today, Patient Was Seen By: Vianney Virgen PA-C    **Please Note: This note may have been constructed using a voice recognition system.**

## 2024-01-30 VITALS
WEIGHT: 156.75 LBS | SYSTOLIC BLOOD PRESSURE: 133 MMHG | OXYGEN SATURATION: 97 % | TEMPERATURE: 97.9 F | HEART RATE: 65 BPM | BODY MASS INDEX: 25.19 KG/M2 | RESPIRATION RATE: 18 BRPM | DIASTOLIC BLOOD PRESSURE: 78 MMHG | HEIGHT: 66 IN

## 2024-01-30 PROCEDURE — 94640 AIRWAY INHALATION TREATMENT: CPT

## 2024-01-30 PROCEDURE — 94760 N-INVAS EAR/PLS OXIMETRY 1: CPT

## 2024-01-30 RX ORDER — ALBUTEROL SULFATE 90 UG/1
2 AEROSOL, METERED RESPIRATORY (INHALATION) EVERY 6 HOURS PRN
Qty: 8.5 G | Refills: 0 | Status: SHIPPED | OUTPATIENT
Start: 2024-01-30

## 2024-01-30 RX ORDER — PREDNISONE 20 MG/1
20 TABLET ORAL DAILY
Qty: 2 TABLET | Refills: 0 | Status: SHIPPED | OUTPATIENT
Start: 2024-01-31 | End: 2024-02-02

## 2024-01-30 RX ORDER — FUROSEMIDE 20 MG/1
20 TABLET ORAL DAILY
Qty: 30 TABLET | Refills: 0 | Status: SHIPPED | OUTPATIENT
Start: 2024-01-31

## 2024-01-30 RX ADMIN — CALCIUM 1 TABLET: 500 TABLET ORAL at 08:19

## 2024-01-30 RX ADMIN — DULOXETINE HYDROCHLORIDE 60 MG: 60 CAPSULE, DELAYED RELEASE ORAL at 08:20

## 2024-01-30 RX ADMIN — IPRATROPIUM BROMIDE AND ALBUTEROL SULFATE 3 ML: 2.5; .5 SOLUTION RESPIRATORY (INHALATION) at 13:45

## 2024-01-30 RX ADMIN — IPRATROPIUM BROMIDE AND ALBUTEROL SULFATE 3 ML: 2.5; .5 SOLUTION RESPIRATORY (INHALATION) at 07:44

## 2024-01-30 RX ADMIN — FLUTICASONE PROPIONATE 1 SPRAY: 50 SPRAY, METERED NASAL at 08:19

## 2024-01-30 RX ADMIN — LORATADINE 10 MG: 10 TABLET ORAL at 08:21

## 2024-01-30 RX ADMIN — MIDODRINE HYDROCHLORIDE 10 MG: 5 TABLET ORAL at 08:21

## 2024-01-30 RX ADMIN — CLOPIDOGREL 75 MG: 75 TABLET ORAL at 08:20

## 2024-01-30 RX ADMIN — HEPARIN SODIUM 5000 UNITS: 5000 INJECTION INTRAVENOUS; SUBCUTANEOUS at 09:54

## 2024-01-30 RX ADMIN — PREDNISONE 20 MG: 20 TABLET ORAL at 08:22

## 2024-01-30 RX ADMIN — LEVOTHYROXINE SODIUM 88 MCG: 88 TABLET ORAL at 05:40

## 2024-01-30 RX ADMIN — PRIMIDONE 50 MG: 50 TABLET ORAL at 08:23

## 2024-01-30 RX ADMIN — FUROSEMIDE 20 MG: 20 TABLET ORAL at 08:20

## 2024-01-30 RX ADMIN — PANTOPRAZOLE SODIUM 40 MG: 40 TABLET, DELAYED RELEASE ORAL at 08:22

## 2024-01-30 NOTE — ASSESSMENT & PLAN NOTE
Presented with hypoxia, O2 in 80s at home with SpO2 86% on room air in ED. Chronically on 3 L NC O2 at bedtime, noted that patient reported her oxygen supplies were not working properly. No formal dx COPD, asthma in the past.   CTA chest showed no PE, diffuse septal thickening due to pulmonary edema but no pneumonia  Completed IV Lasix trial with good response, switched to PO Lasix  Will continue p.o. Lasix 20 mg daily on discharge  Treated with prednisone 20 mg daily while inpatient; will continue on discharge complete a 5-day course  Currently stable on 3L NC O2, appears to be baseline    Continue to complete 5-day course of prednisone, 3-day course azithromycin & F/U with pulmonology outpatient  Home O2 supplies delivered and secure with [employer]

## 2024-01-30 NOTE — PLAN OF CARE
Problem: Potential for Falls  Goal: Patient will remain free of falls  Description: INTERVENTIONS:  - Educate patient/family on patient safety including physical limitations  - Instruct patient to call for assistance with activity   - Consult OT/PT to assist with strengthening/mobility   - Keep Call bell within reach  - Keep bed low and locked with side rails adjusted as appropriate  - Keep care items and personal belongings within reach  - Initiate and maintain comfort rounds  - Make Fall Risk Sign visible to staff  - Offer Toileting every 2 Hours, in advance of need  - Initiate/Maintain bed alarm  - Obtain necessary fall risk management equipment: yellow socks  - Apply yellow socks and bracelet for high fall risk patients  - Consider moving patient to room near nurses station  Outcome: Progressing     Problem: PAIN - ADULT  Goal: Verbalizes/displays adequate comfort level or baseline comfort level  Description: Interventions:  - Encourage patient to monitor pain and request assistance  - Assess pain using appropriate pain scale  - Administer analgesics based on type and severity of pain and evaluate response  - Implement non-pharmacological measures as appropriate and evaluate response  - Consider cultural and social influences on pain and pain management  - Notify physician/advanced practitioner if interventions unsuccessful or patient reports new pain  Outcome: Progressing     Problem: INFECTION - ADULT  Goal: Absence or prevention of progression during hospitalization  Description: INTERVENTIONS:  - Assess and monitor for signs and symptoms of infection  - Monitor lab/diagnostic results  - Monitor all insertion sites, i.e. indwelling lines, tubes, and drains  - Monitor endotracheal if appropriate and nasal secretions for changes in amount and color  - [place] appropriate cooling/warming therapies per order  - Administer medications as ordered  - Instruct and encourage patient and family to use good hand  hygiene technique  - Identify and instruct in appropriate isolation precautions for identified infection/condition  Outcome: Progressing  Goal: Absence of fever/infection during neutropenic period  Description: INTERVENTIONS:  - Monitor WBC    Outcome: Progressing     Problem: SAFETY ADULT  Goal: Patient will remain free of falls  Description: INTERVENTIONS:  - Educate patient/family on patient safety including physical limitations  - Instruct patient to call for assistance with activity   - Consult OT/PT to assist with strengthening/mobility   - Keep Call bell within reach  - Keep bed low and locked with side rails adjusted as appropriate  - Keep care items and personal belongings within reach  - Initiate and maintain comfort rounds  - Make Fall Risk Sign visible to staff  - Offer Toileting every 2 Hours, in advance of need  - Initiate/Maintain bed alarm  - Obtain necessary fall risk management equipment: yellow socks  - Apply yellow socks and bracelet for high fall risk patients  - Consider moving patient to room near nurses station  Outcome: Progressing  Goal: Maintain or return to baseline ADL function  Description: INTERVENTIONS:  -  Assess patient's ability to carry out ADLs; assess patient's baseline for ADL function and identify physical deficits which impact ability to perform ADLs (bathing, care of mouth/teeth, toileting, grooming, dressing, etc.)  - Assess/evaluate cause of self-care deficits   - Assess range of motion  - Assess patient's mobility; develop plan if impaired  - Assess patient's need for assistive devices and provide as appropriate  - Encourage maximum independence but intervene and supervise when necessary  - Involve family in performance of ADLs  - Assess for home care needs following discharge   - Consider OT consult to assist with ADL evaluation and planning for discharge  - Provide patient education as appropriate  Outcome: Progressing  Goal: Maintains/Returns to pre admission  functional level  Description: INTERVENTIONS:  - Perform AM-PAC 6 Click Basic Mobility/ Daily Activity assessment daily.  - Set and communicate daily mobility goal to care team and patient/family/caregiver.   - Collaborate with rehabilitation services on mobility goals if consulted  - Perform Range of Motion 4 times a day.  - Reposition patient every 2 hours.  - Dangle patient 3 times a day  - Stand patient 3 times a day  - Ambulate patient 3 times a day  - Out of bed to chair 3 times a day   - Out of bed for meals 3 times a day  - Out of bed for toileting  - Record patient progress and toleration of activity level   Outcome: Progressing     Problem: DISCHARGE PLANNING  Goal: Discharge to home or other facility with appropriate resources  Description: INTERVENTIONS:  - Identify barriers to discharge w/patient and caregiver  - Arrange for needed discharge resources and transportation as appropriate  - Identify discharge learning needs (meds, wound care, etc.)  - Arrange for interpretive services to assist at discharge as needed  - Refer to Case Management Department for coordinating discharge planning if the patient needs post-hospital services based on physician/advanced practitioner order or complex needs related to functional status, cognitive ability, or social support system  Outcome: Progressing     Problem: Knowledge Deficit  Goal: Patient/family/caregiver demonstrates understanding of disease process, treatment plan, medications, and discharge instructions  Description: Complete learning assessment and assess knowledge base.  Interventions:  - Provide teaching at level of understanding  - Provide teaching via preferred learning methods  Outcome: Progressing     Problem: NEUROSENSORY - ADULT  Goal: Achieves stable or improved neurological status  Description: INTERVENTIONS  - Monitor and report changes in neurological status  - Monitor vital signs such as temperature, blood pressure, glucose, and any other  labs ordered   - Initiate measures to prevent increased intracranial pressure  - Monitor for seizure activity and implement precautions if appropriate      Outcome: Progressing  Goal: Remains free of injury related to seizures activity  Description: INTERVENTIONS  - Maintain airway, patient safety  and administer oxygen as ordered  - Monitor patient for seizure activity, document and report duration and description of seizure to physician/advanced practitioner  - If seizure occurs,  ensure patient safety during seizure  - Reorient patient post seizure  - Seizure pads on all 4 side rails  - Instruct patient/family to notify RN of any seizure activity including if an aura is experienced  - Instruct patient/family to call for assistance with activity based on nursing assessment  - Administer anti-seizure medications if ordered    Outcome: Progressing  Goal: Achieves maximal functionality and self care  Description: INTERVENTIONS  - Monitor swallowing and airway patency with patient fatigue and changes in neurological status  - Encourage and assist patient to increase activity and self care.   - Encourage visually impaired, hearing impaired and aphasic patients to use assistive/communication devices  Outcome: Progressing     Problem: CARDIOVASCULAR - ADULT  Goal: Maintains optimal cardiac output and hemodynamic stability  Description: INTERVENTIONS:  - Monitor I/O, vital signs and rhythm  - Monitor for S/S and trends of decreased cardiac output  - Administer and titrate ordered vasoactive medications to optimize hemodynamic stability  - Assess quality of pulses, skin color and temperature  - Assess for signs of decreased coronary artery perfusion  - Instruct patient to report change in severity of symptoms  Outcome: Progressing  Goal: Absence of cardiac dysrhythmias or at baseline rhythm  Description: INTERVENTIONS:  - Continuous cardiac monitoring, vital signs, obtain 12 lead EKG if ordered  - Administer antiarrhythmic  and heart rate control medications as ordered  - Monitor electrolytes and administer replacement therapy as ordered  Outcome: Progressing     Problem: RESPIRATORY - ADULT  Goal: Achieves optimal ventilation and oxygenation  Description: INTERVENTIONS:  - Assess for changes in respiratory status  - Assess for changes in mentation and behavior  - Position to facilitate oxygenation and minimize respiratory effort  - Oxygen administered by appropriate delivery if ordered  - Initiate smoking cessation education as indicated  - Encourage broncho-pulmonary hygiene including cough, deep breathe, Incentive Spirometry  - Assess the need for suctioning and aspirate as needed  - Assess and instruct to report SOB or any respiratory difficulty  - Respiratory Therapy support as indicated  Outcome: Progressing     Problem: GASTROINTESTINAL - ADULT  Goal: Maintains adequate nutritional intake  Description: INTERVENTIONS:  - Monitor percentage of each meal consumed  - Identify factors contributing to decreased intake, treat as appropriate  - Assist with meals as needed  - Monitor I&O, weight, and lab values if indicated  - Obtain nutrition services referral as needed  Outcome: Progressing     Problem: METABOLIC, FLUID AND ELECTROLYTES - ADULT  Goal: Electrolytes maintained within normal limits  Description: INTERVENTIONS:  - Monitor labs and assess patient for signs and symptoms of electrolyte imbalances  - Administer electrolyte replacement as ordered  - Monitor response to electrolyte replacements, including repeat lab results as appropriate  - Instruct patient on fluid and nutrition as appropriate  Outcome: Progressing  Goal: Fluid balance maintained  Description: INTERVENTIONS:  - Monitor labs   - Monitor I/O and WT  - Instruct patient on fluid and nutrition as appropriate  - Assess for signs & symptoms of volume excess or deficit  Outcome: Progressing  Goal: Glucose maintained within target range  Description: INTERVENTIONS:  -  Monitor Blood Glucose as ordered  - Assess for signs and symptoms of hyperglycemia and hypoglycemia  - Administer ordered medications to maintain glucose within target range  - Assess nutritional intake and initiate nutrition service referral as needed  Outcome: Progressing     Problem: SKIN/TISSUE INTEGRITY - ADULT  Goal: Skin Integrity remains intact(Skin Breakdown Prevention)  Description: Assess:  -Perform Félix assessment   -Clean and moisturize skin   -Inspect skin when repositioning, toileting, and assisting with ADLS  -Assess under medical devices   -Assess extremities for adequate circulation and sensation     Bed Management:  -Have minimal linens on bed & keep smooth, unwrinkled  -Change linens as needed when moist or perspiring  -Avoid sitting or lying in one position for more than 2 hours while in bed  -Keep HOB at 45 degrees     Toileting:  -Offer bedside commode  -Assess for incontinence every 2 hours  -Use incontinent care products after each incontinent episode     Activity:  -Mobilize patient 4 times a day  -Encourage activity and walks on unit  -Encourage or provide ROM exercises   -Turn and reposition patient every 2 Hours  -Use appropriate equipment to lift or move patient in bed  -Instruct/ Assist with weight shifting every 30 minutes when out of bed in chair  -Consider limitation of chair time 2 hour intervals    Skin Care:  -Avoid use of baby powder, tape, friction and shearing, hot water or constrictive clothing  -Relieve pressure over bony prominences   -Do not massage red bony areas    Next Steps:  -Teach patient strategies to minimize risks    -Consider consults to  interdisciplinary teams  Outcome: Progressing     Problem: HEMATOLOGIC - ADULT  Goal: Maintains hematologic stability  Description: INTERVENTIONS  - Assess for signs and symptoms of bleeding or hemorrhage  - Monitor labs  - Administer supportive blood products/factors as ordered and appropriate  Outcome: Progressing      Problem: Nutrition/Hydration-ADULT  Goal: Nutrient/Hydration intake appropriate for improving, restoring or maintaining nutritional needs  Description: Monitor and assess patient's nutrition/hydration status for malnutrition. Collaborate with interdisciplinary team and initiate plan and interventions as ordered.  Monitor patient's weight and dietary intake as ordered or per policy. Utilize nutrition screening tool and intervene as necessary. Determine patient's food preferences and provide high-protein, high-caloric foods as appropriate.     INTERVENTIONS:  - Monitor oral intake, urinary output, labs, and treatment plans  - Assess nutrition and hydration status and recommend course of action  - Evaluate amount of meals eaten  - Assist patient with eating if necessary   - Allow adequate time for meals  - Recommend/ encourage appropriate diets, oral nutritional supplements, and vitamin/mineral supplements  - Order, calculate, and assess calorie counts as needed  - Recommend, monitor, and adjust tube feedings and TPN/PPN based on assessed needs  - Assess need for intravenous fluids  - Provide specific nutrition/hydration education as appropriate  - Include patient/family/caregiver in decisions related to nutrition  Outcome: Progressing

## 2024-01-30 NOTE — CASE MANAGEMENT
Case Management Progress Note    Patient name Danyelle Martinez  Location [address] [address]/[address] [place] [address]-* MRN [id number]  : 1940 Date 2024       LOS (days): 4  Geometric Mean LOS (GMLOS) (days): 3.9  Days to GMLOS:-0.1        OBJECTIVE:      Current admission status: Inpatient  Preferred Pharmacy:   [id number] - [place], [place] - [address]  [address]  [facility] [address]  Phone: [phone] Fax: [phone]    Primary Care Provider: Bladimir Caraballo MD    Primary Insurance: MEDICARE  Secondary Insurance: AARP    PROGRESS NOTE:    SW confirmed with nursing and daughter Cait that [employer] checked patient's home O2 setup last night and the concentrator is working correctly.  Patient had the wrong size tubing and this was corrected per daughter.  Patient has a portable tank at bedside and daughter will transport her home.

## 2024-01-30 NOTE — ASSESSMENT & PLAN NOTE
Lab Results   Component Value Date    HGBA1C 6.1 (H) 01/22/2024     Diet controlled, placed patient on diabetic diet

## 2024-01-30 NOTE — PLAN OF CARE
Problem: Potential for Falls  Goal: Patient will remain free of falls  Description: INTERVENTIONS:  - Educate patient/family on patient safety including physical limitations  - Instruct patient to call for assistance with activity   - Consult OT/PT to assist with strengthening/mobility   - Keep Call bell within reach  - Keep bed low and locked with side rails adjusted as appropriate  - Keep care items and personal belongings within reach  - Initiate and maintain comfort rounds  - Make Fall Risk Sign visible to staff  - Offer Toileting every 2 Hours, in advance of need  - Initiate/Maintain bed alarm  - Obtain necessary fall risk management equipment: fall risk sign  - Apply yellow socks and bracelet for high fall risk patients  - Consider moving patient to room near nurses station  Outcome: Progressing     Problem: RESPIRATORY - ADULT  Goal: Achieves optimal ventilation and oxygenation  Description: INTERVENTIONS:  - Assess for changes in respiratory status  - Assess for changes in mentation and behavior  - Position to facilitate oxygenation and minimize respiratory effort  - Oxygen administered by appropriate delivery if ordered  - Initiate smoking cessation education as indicated  - Encourage broncho-pulmonary hygiene including cough, deep breathe, Incentive Spirometry  - Assess the need for suctioning and aspirate as needed  - Assess and instruct to report SOB or any respiratory difficulty  - Respiratory Therapy support as indicated  Outcome: Progressing     Problem: METABOLIC, FLUID AND ELECTROLYTES - ADULT  Goal: Electrolytes maintained within normal limits  Description: INTERVENTIONS:  - Monitor labs and assess patient for signs and symptoms of electrolyte imbalances  - Administer electrolyte replacement as ordered  - Monitor response to electrolyte replacements, including repeat lab results as appropriate  - Instruct patient on fluid and nutrition as appropriate  Outcome: Progressing

## 2024-01-30 NOTE — ASSESSMENT & PLAN NOTE
Presented with worsening SOB, chills, ROB. Elevated BNP, pulmonary edema on imaging consistent with volume overload.  Echo (11/2023): EF 58%, G1DD.   Initially started on IV Lasix 40 mg BID with good diuretic response  Continue Lasix 20 mg daily on discharge  Daily weights, I/Os, 1.8L fluid restriction

## 2024-01-31 ENCOUNTER — TRANSITIONAL CARE MANAGEMENT (OUTPATIENT)
Dept: FAMILY MEDICINE CLINIC | Facility: CLINIC | Age: 84
End: 2024-01-31

## 2024-02-06 NOTE — PROGRESS NOTES
Assessment & Plan       No orders of the defined types were placed in this encounter.         Subjective     Transitional Care Management Review:   Danyelle Martinez is a 83 y.o. female here for TCM follow up.     During the TCM phone call patient stated:  TCM Call       Date and time call was made  2/2/2024  3:24 PM    Hospital care reviewed  Records reviewed    Patient was hospitialized at  [facility]    Date of Admission  01/26/24    Date of discharge  01/30/24    Diagnosis  Acute hypoxemic respiratory failure    Disposition  Home    Were the patients medications reviewed and updated  Yes  The pharmacy was out of the Lasix and she will call again to see if they have now    Current Symptoms  None  she is feeling good today and remarks that she thinks it is due to the prednisone          TCM Call       Post hospital issues  None    Should patient be enrolled in anticoag monitoring?  No    Scheduled for follow up?  Yes  Patient is scheduled for a TCM visit with Dr. Caraballo    Patients specialists  Pulmonlolgist    Pulmonologist name  [facility]    Pulmonologist contact #  [phone]    Endocrinologist name  [phone]    Did you obtain your prescribed medications  Yes  With the exception of lasix which is previously mentioned    Do you need help managing your prescriptions or medications  No  Her daughter is also helping    Is transportation to your appointment needed  No  Her daughter is driving her    I have advised the patient to call PCP with any new or worsening symptoms  Carla Valadez,     Living Arrangements  Alone    Support System  Family; Children; Friends    The type of support provided  Emotional    Do you have social support  Yes, as much as I need    Are you recieving any outpatient services  Yes    What type of services  Home nursing and Physical Therapy    Are you recieving home care services  No    Are you using any community resources  No     Current waiver services  No    Have you fallen in the last 12 months  Yes    How many times  1 or 2    Interperter language line needed  No    Counseling  Patient; Family    Counseling topics  Diagnostic results; Prognosis; Activities of daily living; instructions for management; patient and family education; Importance of RX compliance; Risk factor reduction; Home health agency benefits    Comments  Patient was extremely pleased with her hospital stay            Review of Systems   Constitutional:  Negative for chills and fever.   HENT:  Negative for ear pain and sore throat.    Eyes:  Negative for pain and visual disturbance.   Respiratory:  Negative for cough and shortness of breath.    Cardiovascular:  Negative for chest pain and palpitations.   Gastrointestinal:  Negative for abdominal pain and vomiting.   Genitourinary:  Negative for dysuria and hematuria.   Musculoskeletal:  Negative for arthralgias and back pain.   Skin:  Negative for color change and rash.   Neurological:  Negative for seizures and syncope.   All other systems reviewed and are negative.    Objective     There were no vitals taken for this visit.       Physical Exam  { Medications have NOT been reviewed by provider in current encounter. Once medications have been reviewed, please refresh your progress note so that you can sign the visit }

## 2024-02-07 ENCOUNTER — OFFICE VISIT (OUTPATIENT)
Dept: FAMILY MEDICINE CLINIC | Facility: CLINIC | Age: 84
End: 2024-02-07
Payer: MEDICARE

## 2024-02-07 VITALS
HEIGHT: 66 IN | SYSTOLIC BLOOD PRESSURE: 130 MMHG | OXYGEN SATURATION: 97 % | DIASTOLIC BLOOD PRESSURE: 70 MMHG | HEART RATE: 83 BPM | BODY MASS INDEX: 25.07 KG/M2 | WEIGHT: 156 LBS

## 2024-02-07 DIAGNOSIS — E03.9 ACQUIRED HYPOTHYROIDISM: Primary | ICD-10-CM

## 2024-02-07 DIAGNOSIS — J96.11 CHRONIC HYPOXEMIC RESPIRATORY FAILURE (HCC): Chronic | ICD-10-CM

## 2024-02-07 DIAGNOSIS — K21.9 GASTROESOPHAGEAL REFLUX DISEASE WITHOUT ESOPHAGITIS: ICD-10-CM

## 2024-02-07 DIAGNOSIS — E11.9 TYPE 2 DIABETES MELLITUS WITHOUT COMPLICATION, WITHOUT LONG-TERM CURRENT USE OF INSULIN (HCC): ICD-10-CM

## 2024-02-07 DIAGNOSIS — I10 ESSENTIAL HYPERTENSION: ICD-10-CM

## 2024-02-07 DIAGNOSIS — Z13.0 SCREENING FOR DEFICIENCY ANEMIA: ICD-10-CM

## 2024-02-07 DIAGNOSIS — I50.33 ACUTE ON CHRONIC DIASTOLIC (CONGESTIVE) HEART FAILURE (HCC): ICD-10-CM

## 2024-02-07 DIAGNOSIS — R42 DIZZINESS: ICD-10-CM

## 2024-02-07 DIAGNOSIS — E78.5 DYSLIPIDEMIA: ICD-10-CM

## 2024-02-07 DIAGNOSIS — J96.01 ACUTE HYPOXEMIC RESPIRATORY FAILURE (HCC): ICD-10-CM

## 2024-02-07 DIAGNOSIS — F41.9 ANXIETY: ICD-10-CM

## 2024-02-07 PROCEDURE — 99495 TRANSJ CARE MGMT MOD F2F 14D: CPT | Performed by: INTERNAL MEDICINE

## 2024-02-07 NOTE — ASSESSMENT & PLAN NOTE
Continue to monitor the sugars currently not on any medication diet controlled  Lab Results   Component Value Date    HGBA1C 6.1 (H) 01/22/2024

## 2024-02-07 NOTE — PROGRESS NOTES
Assessment & Plan       Orders Placed This Encounter   Procedures    Comprehensive metabolic panel    CBC and differential    Ambulatory referral to Cardiology      See the assessment and plan in detail in the problem list    Subjective   Patient has coming here for a follow-up evaluation with regards to symptoms of congestive heart failure diastolic.  She was admitted recently to the hospital with  shortness of breath and was noted to be in pulmonary edema patient was given intravenous Lasix subsequently sent home on Lasix 20 mg daily.  Currently patient is feeling better no shortness of breath she has to use oxygen 4 L in the daytime as well as in the nighttime in the past used to use only during the nighttime.  Patient denies any symptoms of chest pains palpitation shortness of breath now I reviewed all the reports from the hospital regarding her congestive heart failure all medications reviewed labs reviewed.  Transitional Care Management Review:   Danyelle Martinez is a 83 y.o. female here for TCM follow up.     During the TCM phone call patient stated:  TCM Call       Date and time call was made  2/2/2024  3:24 PM    Hospital care reviewed  Records reviewed    Patient was hospitialized at  [facility]    Date of Admission  01/26/24    Date of discharge  01/30/24    Diagnosis  Acute hypoxemic respiratory failure    Disposition  Home    Were the patients medications reviewed and updated  Yes  The pharmacy was out of the Lasix and she will call again to see if they have now    Current Symptoms  None  she is feeling good today and remarks that she thinks it is due to the prednisone          TCM Call       Post hospital issues  None    Should patient be enrolled in anticoag monitoring?  No    Scheduled for follow up?  Yes  Patient is scheduled for a TCM visit with Dr. Caraballo    Patients specialists  Pulmonlolgist    Pulmonologist name  [facility]    Pulmonologist contact #  [phone]     Endocrinologist name  [phone]    Did you obtain your prescribed medications  Yes  With the exception of lasix which is previously mentioned    Do you need help managing your prescriptions or medications  No  Her daughter is also helping    Is transportation to your appointment needed  No  Her daughter is driving her    I have advised the patient to call PCP with any new or worsening symptoms  Carla Valadez,     Living Arrangements  Alone    Support System  Family; Children; Friends    The type of support provided  Emotional    Do you have social support  Yes, as much as I need    Are you recieving any outpatient services  Yes    What type of services  Home nursing and Physical Therapy    Are you recieving home care services  No    Are you using any community resources  No    Current waiver services  No    Have you fallen in the last 12 months  Yes    How many times  1 or 2    Interperter language line needed  No    Counseling  Patient; Family    Counseling topics  Diagnostic results; Prognosis; Activities of daily living; instructions for management; patient and family education; Importance of RX compliance; Risk factor reduction; Home health agency benefits    Comments  Patient was extremely pleased with her hospital stay            Review of Systems   Constitutional:  Negative for chills and fever.   HENT:  Negative for ear pain and sore throat.    Eyes:  Negative for pain and visual disturbance.   Respiratory:  Negative for cough and shortness of breath.    Cardiovascular:  Negative for chest pain and palpitations.   Gastrointestinal:  Negative for abdominal pain and vomiting.   Genitourinary:  Negative for dysuria and hematuria.   Musculoskeletal:  Negative for arthralgias and back pain.   Skin:  Negative for color change and rash.   Neurological:  Negative for seizures and syncope.   All other systems reviewed and are negative.      Objective     /70 (BP Location: Right  "arm, Patient Position: Sitting, Cuff Size: Standard) Comment: 122/70 standing up and 130/70 laying down  Pulse 83   Ht 5' 6\" (1.676 m)   Wt 70.8 kg (156 lb)   SpO2 97%   BMI 25.18 kg/m²        Physical Exam  Vitals and nursing note reviewed.   Constitutional:       Appearance: Normal appearance.   Cardiovascular:      Rate and Rhythm: Normal rate and regular rhythm.      Heart sounds: Normal heart sounds.   Pulmonary:      Effort: Pulmonary effort is normal.      Breath sounds: Normal breath sounds.   Abdominal:      General: Abdomen is flat.      Palpations: Abdomen is soft.   Musculoskeletal:      Cervical back: Normal range of motion and neck supple.      Right lower leg: No edema.      Left lower leg: No edema.   Skin:     General: Skin is warm and dry.   Neurological:      Mental Status: She is alert and oriented to person, place, and time.   Psychiatric:         Mood and Affect: Mood normal.         Behavior: Behavior normal.       Medications have been reviewed by provider in current encounter        "

## 2024-02-07 NOTE — ASSESSMENT & PLAN NOTE
Patient is currently taking duloxetine 60 mg daily along with clonazepam 0.5 mg as needed will continue with the same.

## 2024-02-07 NOTE — ASSESSMENT & PLAN NOTE
Patient is currently on oxygen at bedtime he is also not had been using it during the daytime also especially when ambulating follow-up with the pulmonary

## 2024-02-07 NOTE — ASSESSMENT & PLAN NOTE
Wt Readings from Last 3 Encounters:   02/07/24 70.8 kg (156 lb)   01/30/24 71.1 kg (156 lb 12 oz)   12/28/23 75.3 kg (166 lb)   Patient admitted with shortness of breath workup in the emergency room showed pulmonary edema elevated BNP consistent with volume overload ejection fraction on echo was 58% patient should maintain her weight with checking every day and increasing weight 2-3 point should be reported also there is restriction of fluid of 1.8 L total per day will also recommend follow-up evaluation with the cardiology get lab work also done prior to the next visit

## 2024-02-07 NOTE — ASSESSMENT & PLAN NOTE
Frequent falls attributed to orthostatic blood pressures changes need to be careful while getting out of bed or chair

## 2024-02-07 NOTE — ASSESSMENT & PLAN NOTE
This has been attributed to significant orthostatic changes currently on amlodipine and nebivolol and also on midodrine and bisoprolol recommend to monitor the blood pressures if possible at home and bring the readings meanwhile continue with midodrine blood pressure today 130/70 lying down 122/70 standing up

## 2024-02-07 NOTE — ASSESSMENT & PLAN NOTE
Reviewed the reports from the hospital patient was hypoxic in 80s saturation when she was in the emergency room and was noted to be in pulmonary edema no pneumonia received Lasix got better intravenously currently on Lasix 20 mg daily.  Patient also received prednisone 5-day course currently stable.  Recommend follow-up with the pulmonary physician.

## 2024-02-07 NOTE — ASSESSMENT & PLAN NOTE
Patient with hypertension currently on bisoprolol we will continue with the same however need to monitor the blood pressures during standing up at home if possible she is also on midodrine

## 2024-02-08 DIAGNOSIS — I10 ESSENTIAL HYPERTENSION: ICD-10-CM

## 2024-02-08 DIAGNOSIS — F33.40 RECURRENT MAJOR DEPRESSIVE DISORDER, IN REMISSION (HCC): ICD-10-CM

## 2024-02-08 RX ORDER — DULOXETIN HYDROCHLORIDE 60 MG/1
CAPSULE, DELAYED RELEASE ORAL
Qty: 90 CAPSULE | Refills: 1 | Status: SHIPPED | OUTPATIENT
Start: 2024-02-08

## 2024-02-08 RX ORDER — BISOPROLOL FUMARATE 5 MG/1
2.5 TABLET, FILM COATED ORAL DAILY
Qty: 45 TABLET | Refills: 1 | Status: SHIPPED | OUTPATIENT
Start: 2024-02-08 | End: 2024-08-06

## 2024-02-08 NOTE — TELEPHONE ENCOUNTER
Reason for call: patient call this medication was prescribed inpatient.  [x] Refill   [] Prior Auth  [] Other:     Office:   [] PCP/Provider -   [x] Specialty/Provider -  inpatient provider  Mari Emery DO     medication  bisoprolol (ZEBETA) 5 mg tablet     Dose: 2.5 mg Route: Oral  Sig: Take 0.5 tablets (2.5 mg total) by mouth daily Do not start before November 19, 2023.  Dispense Quantity: 45 tabletFrequency: Daily    Does the patient have enough for 3 days?   [] Yes   [x] No - Send as HP to POD     Pharmacy  [id number] [place], [place]

## 2024-02-09 ENCOUNTER — APPOINTMENT (OUTPATIENT)
Dept: LAB | Facility: CLINIC | Age: 84
End: 2024-02-09
Payer: MEDICARE

## 2024-02-09 DIAGNOSIS — I50.33 ACUTE ON CHRONIC DIASTOLIC (CONGESTIVE) HEART FAILURE (HCC): ICD-10-CM

## 2024-02-09 DIAGNOSIS — Z13.0 SCREENING FOR DEFICIENCY ANEMIA: ICD-10-CM

## 2024-02-09 LAB
ALBUMIN SERPL BCP-MCNC: 3.9 G/DL (ref 3.5–5)
ALP SERPL-CCNC: 85 U/L (ref 34–104)
ALT SERPL W P-5'-P-CCNC: 29 U/L (ref 7–52)
ANION GAP SERPL CALCULATED.3IONS-SCNC: 6 MMOL/L
AST SERPL W P-5'-P-CCNC: 45 U/L (ref 13–39)
BASOPHILS # BLD AUTO: 0.08 THOUSANDS/ÂΜL (ref 0–0.1)
BASOPHILS NFR BLD AUTO: 1 % (ref 0–1)
BILIRUB SERPL-MCNC: 0.46 MG/DL (ref 0.2–1)
BUN SERPL-MCNC: 17 MG/DL (ref 5–25)
CALCIUM SERPL-MCNC: 10 MG/DL (ref 8.4–10.2)
CHLORIDE SERPL-SCNC: 100 MMOL/L (ref 96–108)
CO2 SERPL-SCNC: 31 MMOL/L (ref 21–32)
CREAT SERPL-MCNC: 0.57 MG/DL (ref 0.6–1.3)
EOSINOPHIL # BLD AUTO: 0.35 THOUSAND/ÂΜL (ref 0–0.61)
EOSINOPHIL NFR BLD AUTO: 3 % (ref 0–6)
ERYTHROCYTE [DISTWIDTH] IN BLOOD BY AUTOMATED COUNT: 15.6 % (ref 11.6–15.1)
GFR SERPL CREATININE-BSD FRML MDRD: 84 ML/MIN/1.73SQ M
GLUCOSE P FAST SERPL-MCNC: 105 MG/DL (ref 65–99)
HCT VFR BLD AUTO: 39.8 % (ref 34.8–46.1)
HGB BLD-MCNC: 12.4 G/DL (ref 11.5–15.4)
IMM GRANULOCYTES # BLD AUTO: 0.05 THOUSAND/UL (ref 0–0.2)
IMM GRANULOCYTES NFR BLD AUTO: 1 % (ref 0–2)
LYMPHOCYTES # BLD AUTO: 1.54 THOUSANDS/ÂΜL (ref 0.6–4.47)
LYMPHOCYTES NFR BLD AUTO: 14 % (ref 14–44)
MCH RBC QN AUTO: 30 PG (ref 26.8–34.3)
MCHC RBC AUTO-ENTMCNC: 31.2 G/DL (ref 31.4–37.4)
MCV RBC AUTO: 96 FL (ref 82–98)
MONOCYTES # BLD AUTO: 0.84 THOUSAND/ÂΜL (ref 0.17–1.22)
MONOCYTES NFR BLD AUTO: 8 % (ref 4–12)
NEUTROPHILS # BLD AUTO: 7.8 THOUSANDS/ÂΜL (ref 1.85–7.62)
NEUTS SEG NFR BLD AUTO: 73 % (ref 43–75)
NRBC BLD AUTO-RTO: 0 /100 WBCS
PLATELET # BLD AUTO: 200 THOUSANDS/UL (ref 149–390)
PMV BLD AUTO: 12.1 FL (ref 8.9–12.7)
POTASSIUM SERPL-SCNC: 4.7 MMOL/L (ref 3.5–5.3)
PROT SERPL-MCNC: 6.7 G/DL (ref 6.4–8.4)
RBC # BLD AUTO: 4.14 MILLION/UL (ref 3.81–5.12)
SODIUM SERPL-SCNC: 137 MMOL/L (ref 135–147)
WBC # BLD AUTO: 10.66 THOUSAND/UL (ref 4.31–10.16)

## 2024-02-09 PROCEDURE — 85025 COMPLETE CBC W/AUTO DIFF WBC: CPT

## 2024-02-09 PROCEDURE — 80053 COMPREHEN METABOLIC PANEL: CPT

## 2024-02-09 PROCEDURE — 36415 COLL VENOUS BLD VENIPUNCTURE: CPT

## 2024-02-13 ENCOUNTER — TELEPHONE (OUTPATIENT)
Dept: INPATIENT UNIT | Facility: HOSPITAL | Age: 84
End: 2024-02-13

## 2024-02-15 NOTE — PROGRESS NOTES
Assessment & Plan     1. Acquired hypothyroidism  Assessment & Plan:  Currently patient is taking levothyroxine 88 mcg daily we will continue the same      2. Acute hypoxemic respiratory failure (HCC)  Assessment & Plan:  Reviewed the reports from the hospital patient was hypoxic in 80s saturation when she was in the emergency room and was noted to be in pulmonary edema no pneumonia received Lasix got better intravenously currently on Lasix 20 mg daily.  Patient also received prednisone 5-day course currently stable.  Recommend follow-up with the pulmonary physician.      3. Acute on chronic diastolic (congestive) heart failure (HCC)  Assessment & Plan:  Wt Readings from Last 3 Encounters:   02/07/24 70.8 kg (156 lb)   01/30/24 71.1 kg (156 lb 12 oz)   12/28/23 75.3 kg (166 lb)   Patient admitted with shortness of breath workup in the emergency room showed pulmonary edema elevated BNP consistent with volume overload ejection fraction on echo was 58% patient should maintain her weight with checking every day and increasing weight 2-3 point should be reported also there is restriction of fluid of 1.8 L total per day will also recommend follow-up evaluation with the cardiology get lab work also done prior to the next visit            Orders:  -     Ambulatory referral to Cardiology; Future; Expected date: 02/14/2024  -     Comprehensive metabolic panel; Future    4. Anxiety  Assessment & Plan:  Patient is currently taking duloxetine 60 mg daily along with clonazepam 0.5 mg as needed will continue with the same.      5. Chronic hypoxemic respiratory failure (HCC)  Assessment & Plan:  Patient is currently on oxygen at bedtime he is also not had been using it during the daytime also especially when ambulating follow-up with the pulmonary      6. Dyslipidemia  Assessment & Plan:  Continue atorvastatin 80 mg at bedtime follow-up with repeat lipid profile later.      7. Essential hypertension  Assessment & Plan:  Patient  with hypertension currently on bisoprolol we will continue with the same however need to monitor the blood pressures during standing up at home if possible she is also on midodrine      8. Dizziness  Assessment & Plan:  This has been attributed to significant orthostatic changes currently on amlodipine and nebivolol and also on midodrine and bisoprolol recommend to monitor the blood pressures if possible at home and bring the readings meanwhile continue with midodrine blood pressure today 130/70 lying down 122/70 standing up      9. Gastroesophageal reflux disease without esophagitis  Assessment & Plan:  Continue with pantoprazole      10. Type 2 diabetes mellitus without complication, without long-term current use of insulin (HCC)  Assessment & Plan:  Continue to monitor the sugars currently not on any medication diet controlled  Lab Results   Component Value Date    HGBA1C 6.1 (H) 01/22/2024         11. Screening for deficiency anemia  -     CBC and differential; Future         Subjective     Transitional Care Management Review:   Danyelle Martinez is a 83 y.o. female here for TCM follow up.     During the TCM phone call patient stated:  TCM Call       Date and time call was made  2/2/2024  3:24 PM    Hospital care reviewed  Records reviewed    Patient was hospitialized at  [facility]    Date of Admission  01/26/24    Date of discharge  01/30/24    Diagnosis  Acute hypoxemic respiratory failure    Disposition  Home    Were the patients medications reviewed and updated  Yes  The pharmacy was out of the Lasix and she will call again to see if they have now    Current Symptoms  None  she is feeling good today and remarks that she thinks it is due to the prednisone          TCM Call       Post hospital issues  None    Should patient be enrolled in anticoag monitoring?  No    Scheduled for follow up?  Yes  Patient is scheduled for a TCM visit with Dr. Caraballo    Patients specialists  Pulmonlolgist    Pulmonologist  name  [facility] [place]    Pulmonologist contact #  [phone]    Endocrinologist name  [phone]    Did you obtain your prescribed medications  Yes  With the exception of lasix which is previously mentioned    Do you need help managing your prescriptions or medications  No  Her daughter is also helping    Is transportation to your appointment needed  No  Her daughter is driving her    I have advised the patient to call PCP with any new or worsening symptoms  Carla Valadez,     Living Arrangements  Alone    Support System  Family; Children; Friends    The type of support provided  Emotional    Do you have social support  Yes, as much as I need    Are you recieving any outpatient services  Yes    What type of services  Home nursing and Physical Therapy    Are you recieving home care services  No    Are you using any community resources  No    Current waiver services  No    Have you fallen in the last 12 months  Yes    How many times  1 or 2    Interperter language line needed  No    Counseling  Patient; Family    Counseling topics  Diagnostic results; Prognosis; Activities of daily living; instructions for management; patient and family education; Importance of RX compliance; Risk factor reduction; Home health agency benefits    Comments  Patient was extremely pleased with her hospital stay          XOCHITLDanyelle Martinez is a 83 y.o. female patient, PMH chronic diastolic CHF, depression, DM, hypothyroidism, HTN, who originally presented to the hospital on 1/26/2024 due to respiratory failure, found to be hypoxic with SpO2 in 80s on room air while at home. Patient chronically on 3 L NC O2 at bedtime, however was reported that her home oxygen supplies were not working properly. On arrival to ED, imaging showed evidence of pulmonary edema, clinically volume overloaded and was started on IV Lasix. Patient responded well to diuresis, can continue daily oral Lasix for  "maintenance. There was also concern for mild bronchitis, to complete total 5-day course of prednisone and 3-day course of azithromycin. Patient was evaluated by respiratory, now will require 3-4 L NC O2 at home.  [facility] was able to supply home O2 and DME   Review of Systems   Constitutional:  Negative for chills and fever.   HENT:  Negative for ear pain and sore throat.    Eyes:  Negative for pain and visual disturbance.   Respiratory:  Negative for cough and shortness of breath.    Cardiovascular:  Negative for chest pain and palpitations.   Gastrointestinal:  Negative for abdominal pain and vomiting.   Genitourinary:  Negative for dysuria and hematuria.   Musculoskeletal:  Negative for arthralgias and back pain.   Skin:  Negative for color change and rash.   Neurological:  Negative for seizures and syncope.   All other systems reviewed and are negative.      Objective     /70 (BP Location: Right arm, Patient Position: Sitting, Cuff Size: Standard) Comment: 122/70 standing up and 130/70 laying down  Pulse 83   Ht 5' 6\" (1.676 m)   Wt 70.8 kg (156 lb)   SpO2 97%   BMI 25.18 kg/m²      Physical Exam  Vitals and nursing note reviewed.   Constitutional:       General: She is not in acute distress.     Appearance: She is well-developed.   HENT:      Head: Normocephalic and atraumatic.   Eyes:      Conjunctiva/sclera: Conjunctivae normal.   Cardiovascular:      Rate and Rhythm: Normal rate and regular rhythm.      Heart sounds: No murmur heard.  Pulmonary:      Effort: Pulmonary effort is normal. No respiratory distress.      Breath sounds: Normal breath sounds.   Abdominal:      General: Abdomen is flat.      Palpations: Abdomen is soft.      Tenderness: There is no abdominal tenderness.   Musculoskeletal:         General: No swelling.      Cervical back: Neck supple.   Skin:     General: Skin is warm and dry.      Capillary Refill: Capillary refill takes less than 2 seconds.   Neurological:      Mental " Status: She is alert and oriented to person, place, and time.   Psychiatric:         Mood and Affect: Mood normal.       Recent Results (from the past 1344 hour(s))   CBC and differential    Collection Time: 01/22/24  4:02 PM   Result Value Ref Range    WBC 15.25 (H) 4.31 - 10.16 Thousand/uL    RBC 4.64 3.81 - 5.12 Million/uL    Hemoglobin 13.8 11.5 - 15.4 g/dL    Hematocrit 44.0 34.8 - 46.1 %    MCV 95 82 - 98 fL    MCH 29.7 26.8 - 34.3 pg    MCHC 31.4 31.4 - 37.4 g/dL    RDW 15.5 (H) 11.6 - 15.1 %    MPV 10.9 8.9 - 12.7 fL    Platelets 285 149 - 390 Thousands/uL    nRBC 0 /100 WBCs    Neutrophils Relative 73 43 - 75 %    Immat GRANS % 1 0 - 2 %    Lymphocytes Relative 14 14 - 44 %    Monocytes Relative 6 4 - 12 %    Eosinophils Relative 5 0 - 6 %    Basophils Relative 1 0 - 1 %    Neutrophils Absolute 11.31 (H) 1.85 - 7.62 Thousands/µL    Immature Grans Absolute 0.09 0.00 - 0.20 Thousand/uL    Lymphocytes Absolute 2.08 0.60 - 4.47 Thousands/µL    Monocytes Absolute 0.91 0.17 - 1.22 Thousand/µL    Eosinophils Absolute 0.78 (H) 0.00 - 0.61 Thousand/µL    Basophils Absolute 0.08 0.00 - 0.10 Thousands/µL   Comprehensive metabolic panel    Collection Time: 01/22/24  4:02 PM   Result Value Ref Range    Sodium 138 135 - 147 mmol/L    Potassium 4.0 3.5 - 5.3 mmol/L    Chloride 105 96 - 108 mmol/L    CO2 26 21 - 32 mmol/L    ANION GAP 7 mmol/L    BUN 24 5 - 25 mg/dL    Creatinine 0.65 0.60 - 1.30 mg/dL    Glucose, Fasting 134 (H) 65 - 99 mg/dL    Calcium 9.9 8.4 - 10.2 mg/dL    AST 51 (H) 13 - 39 U/L    ALT 39 7 - 52 U/L    Alkaline Phosphatase 112 (H) 34 - 104 U/L    Total Protein 6.9 6.4 - 8.4 g/dL    Albumin 3.9 3.5 - 5.0 g/dL    Total Bilirubin 0.59 0.20 - 1.00 mg/dL    eGFR 81 ml/min/1.73sq m   Hemoglobin A1C    Collection Time: 01/22/24  4:02 PM   Result Value Ref Range    Hemoglobin A1C 6.1 (H) Normal 4.0-5.6%; PreDiabetic 5.7-6.4%; Diabetic >=6.5%; Glycemic control for adults with diabetes <7.0% %     mg/dl    Lipid panel    Collection Time: 01/22/24  4:02 PM   Result Value Ref Range    Cholesterol 135 See Comment mg/dL    Triglycerides 171 (H) See Comment mg/dL    HDL, Direct 30 (L) >=50 mg/dL    LDL Calculated 71 0 - 100 mg/dL    Non-HDL-Chol (CHOL-HDL) 105 mg/dl   TSH, 3rd generation with Free T4 reflex    Collection Time: 01/22/24  4:02 PM   Result Value Ref Range    TSH 3RD GENERATON 2.558 0.450 - 4.500 uIU/mL   CBC and differential    Collection Time: 01/26/24 12:29 PM   Result Value Ref Range    WBC 12.26 (H) 4.31 - 10.16 Thousand/uL    RBC 4.41 3.81 - 5.12 Million/uL    Hemoglobin 13.4 11.5 - 15.4 g/dL    Hematocrit 41.0 34.8 - 46.1 %    MCV 93 82 - 98 fL    MCH 30.4 26.8 - 34.3 pg    MCHC 32.7 31.4 - 37.4 g/dL    RDW 15.7 (H) 11.6 - 15.1 %    MPV 10.9 8.9 - 12.7 fL    Platelets 233 149 - 390 Thousands/uL    nRBC 0 /100 WBCs    Neutrophils Relative 76 (H) 43 - 75 %    Immat GRANS % 1 0 - 2 %    Lymphocytes Relative 13 (L) 14 - 44 %    Monocytes Relative 5 4 - 12 %    Eosinophils Relative 4 0 - 6 %    Basophils Relative 1 0 - 1 %    Neutrophils Absolute 9.42 (H) 1.85 - 7.62 Thousands/µL    Immature Grans Absolute 0.08 0.00 - 0.20 Thousand/uL    Lymphocytes Absolute 1.56 0.60 - 4.47 Thousands/µL    Monocytes Absolute 0.65 0.17 - 1.22 Thousand/µL    Eosinophils Absolute 0.48 0.00 - 0.61 Thousand/µL    Basophils Absolute 0.07 0.00 - 0.10 Thousands/µL   Comprehensive metabolic panel    Collection Time: 01/26/24 12:29 PM   Result Value Ref Range    Sodium 135 135 - 147 mmol/L    Potassium 4.0 3.5 - 5.3 mmol/L    Chloride 102 96 - 108 mmol/L    CO2 26 21 - 32 mmol/L    ANION GAP 7 mmol/L    BUN 21 5 - 25 mg/dL    Creatinine 0.65 0.60 - 1.30 mg/dL    Glucose 126 65 - 140 mg/dL    Calcium 10.4 (H) 8.4 - 10.2 mg/dL    AST 41 (H) 13 - 39 U/L    ALT 31 7 - 52 U/L    Alkaline Phosphatase 100 34 - 104 U/L    Total Protein 6.9 6.4 - 8.4 g/dL    Albumin 3.8 3.5 - 5.0 g/dL    Total Bilirubin 0.62 0.20 - 1.00 mg/dL    eGFR 81  "ml/min/1.73sq m   HS Troponin 0hr (reflex protocol)    Collection Time: 01/26/24 12:29 PM   Result Value Ref Range    hs TnI 0hr 8 \"Refer to ACS Flowchart\"- see link ng/L   D-dimer, quantitative    Collection Time: 01/26/24 12:29 PM   Result Value Ref Range    D-Dimer, Quant 1.05 (H) <0.50 ug/ml FEU   FLU/RSV/COVID - if FLU/RSV clinically relevant    Collection Time: 01/26/24 12:29 PM    Specimen: Nose; Nares   Result Value Ref Range    SARS-CoV-2 Negative Negative    INFLUENZA A PCR Negative Negative    INFLUENZA B PCR Negative Negative    RSV PCR Negative Negative   Procalcitonin    Collection Time: 01/26/24 12:29 PM   Result Value Ref Range    Procalcitonin 0.26 (H) <=0.25 ng/ml   Blood gas, venous    Collection Time: 01/26/24 12:29 PM   Result Value Ref Range    pH, Dameon 7.347 7.300 - 7.400    pCO2, Dameon 49.8 42.0 - 50.0 mm Hg    pO2, Dameon 26.2 (L) 35.0 - 45.0 mm Hg    HCO3, Dameon 26.7 24 - 30 mmol/L    Base Excess, Dameon 0.3 mmol/L    O2 Content, Dameon 8.3 ml/dL    O2 HGB, VENOUS 39.0 (L) 60.0 - 80.0 %   B-Type Natriuretic Peptide(BNP)    Collection Time: 01/26/24 12:29 PM   Result Value Ref Range     (H) 0 - 100 pg/mL   UA w Reflex to Microscopic w Reflex to Culture    Collection Time: 01/26/24  1:44 PM    Specimen: Urine, Other   Result Value Ref Range    Color, UA Yellow     Clarity, UA Slightly Cloudy     Specific Gravity, UA >=1.030 1.000 - 1.030    pH, UA 5.5 5.0, 5.5, 6.0, 6.5, 7.0, 7.5, 8.0, 8.5, 9.0    Leukocytes, UA Negative Negative    Nitrite, UA Negative Negative    Protein, UA Trace (A) Negative mg/dl    Glucose, UA Negative Negative mg/dl    Ketones, UA Negative Negative mg/dl    Urobilinogen, UA 0.2 0.2, 1.0 E.U./dl E.U./dl    Bilirubin, UA Negative Negative    Occult Blood, UA Trace-Intact (A) Negative   Urine Microscopic    Collection Time: 01/26/24  1:44 PM   Result Value Ref Range    RBC, UA 2-4 None Seen, 0-1, 1-2, 2-4, 0-5 /hpf    WBC, UA 4-10 (A) None Seen, 0-1, 1-2, 0-5, 2-4 /hpf    " "Epithelial Cells Occasional None Seen, Occasional /hpf    Bacteria, UA Occasional None Seen, Occasional /hpf    Ca Oxalate Camryn, UA Innumerable (A) None Seen /hpf    MUCUS THREADS Moderate (A) None Seen   HS Troponin I 2hr    Collection Time: 01/26/24  2:46 PM   Result Value Ref Range    hs TnI 2hr 5 \"Refer to ACS Flowchart\"- see link ng/L    Delta 2hr hsTnI -3 <20 ng/L   ECG 12 lead    Collection Time: 01/26/24  2:46 PM   Result Value Ref Range    Ventricular Rate 70 BPM    Atrial Rate 70 BPM    IN Interval 176 ms    QRSD Interval 94 ms    QT Interval 444 ms    QTC Interval 479 ms    P Axis 38 degrees    QRS Axis 38 degrees    T Wave Axis 41 degrees   Platelet count    Collection Time: 01/26/24  5:52 PM   Result Value Ref Range    Platelets 217 149 - 390 Thousands/uL    MPV 10.6 8.9 - 12.7 fL   Comprehensive metabolic panel    Collection Time: 01/27/24  5:11 AM   Result Value Ref Range    Sodium 137 135 - 147 mmol/L    Potassium 4.2 3.5 - 5.3 mmol/L    Chloride 102 96 - 108 mmol/L    CO2 28 21 - 32 mmol/L    ANION GAP 7 mmol/L    BUN 20 5 - 25 mg/dL    Creatinine 0.67 0.60 - 1.30 mg/dL    Glucose 102 65 - 140 mg/dL    Calcium 9.7 8.4 - 10.2 mg/dL    AST 34 13 - 39 U/L    ALT 25 7 - 52 U/L    Alkaline Phosphatase 89 34 - 104 U/L    Total Protein 6.5 6.4 - 8.4 g/dL    Albumin 3.7 3.5 - 5.0 g/dL    Total Bilirubin 0.54 0.20 - 1.00 mg/dL    eGFR 80 ml/min/1.73sq m   CBC (With Platelets)    Collection Time: 01/27/24  5:11 AM   Result Value Ref Range    WBC 10.36 (H) 4.31 - 10.16 Thousand/uL    RBC 4.26 3.81 - 5.12 Million/uL    Hemoglobin 12.9 11.5 - 15.4 g/dL    Hematocrit 39.8 34.8 - 46.1 %    MCV 93 82 - 98 fL    MCH 30.3 26.8 - 34.3 pg    MCHC 32.4 31.4 - 37.4 g/dL    RDW 15.7 (H) 11.6 - 15.1 %    Platelets 218 149 - 390 Thousands/uL    MPV 10.8 8.9 - 12.7 fL   Magnesium    Collection Time: 01/27/24  5:11 AM   Result Value Ref Range    Magnesium 1.8 (L) 1.9 - 2.7 mg/dL   CBC and differential    Collection Time: " 01/28/24  5:52 AM   Result Value Ref Range    WBC 10.44 (H) 4.31 - 10.16 Thousand/uL    RBC 4.36 3.81 - 5.12 Million/uL    Hemoglobin 12.9 11.5 - 15.4 g/dL    Hematocrit 40.6 34.8 - 46.1 %    MCV 93 82 - 98 fL    MCH 29.6 26.8 - 34.3 pg    MCHC 31.8 31.4 - 37.4 g/dL    RDW 15.3 (H) 11.6 - 15.1 %    MPV 10.4 8.9 - 12.7 fL    Platelets 228 149 - 390 Thousands/uL    nRBC 0 /100 WBCs    Neutrophils Relative 73 43 - 75 %    Immat GRANS % 1 0 - 2 %    Lymphocytes Relative 16 14 - 44 %    Monocytes Relative 5 4 - 12 %    Eosinophils Relative 4 0 - 6 %    Basophils Relative 1 0 - 1 %    Neutrophils Absolute 7.66 (H) 1.85 - 7.62 Thousands/µL    Immature Grans Absolute 0.07 0.00 - 0.20 Thousand/uL    Lymphocytes Absolute 1.69 0.60 - 4.47 Thousands/µL    Monocytes Absolute 0.54 0.17 - 1.22 Thousand/µL    Eosinophils Absolute 0.41 0.00 - 0.61 Thousand/µL    Basophils Absolute 0.07 0.00 - 0.10 Thousands/µL   Comprehensive metabolic panel    Collection Time: 01/28/24  5:52 AM   Result Value Ref Range    Sodium 138 135 - 147 mmol/L    Potassium 4.0 3.5 - 5.3 mmol/L    Chloride 101 96 - 108 mmol/L    CO2 30 21 - 32 mmol/L    ANION GAP 7 mmol/L    BUN 24 5 - 25 mg/dL    Creatinine 0.66 0.60 - 1.30 mg/dL    Glucose 105 65 - 140 mg/dL    Calcium 10.5 (H) 8.4 - 10.2 mg/dL    AST 35 13 - 39 U/L    ALT 24 7 - 52 U/L    Alkaline Phosphatase 98 34 - 104 U/L    Total Protein 6.8 6.4 - 8.4 g/dL    Albumin 3.7 3.5 - 5.0 g/dL    Total Bilirubin 0.59 0.20 - 1.00 mg/dL    eGFR 80 ml/min/1.73sq m   Magnesium    Collection Time: 01/28/24  5:52 AM   Result Value Ref Range    Magnesium 2.0 1.9 - 2.7 mg/dL   CBC and differential    Collection Time: 01/29/24  5:05 AM   Result Value Ref Range    WBC 11.45 (H) 4.31 - 10.16 Thousand/uL    RBC 4.10 3.81 - 5.12 Million/uL    Hemoglobin 12.3 11.5 - 15.4 g/dL    Hematocrit 37.9 34.8 - 46.1 %    MCV 92 82 - 98 fL    MCH 30.0 26.8 - 34.3 pg    MCHC 32.5 31.4 - 37.4 g/dL    RDW 15.1 11.6 - 15.1 %    MPV 10.6  8.9 - 12.7 fL    Platelets 229 149 - 390 Thousands/uL    nRBC 0 /100 WBCs    Neutrophils Relative 81 (H) 43 - 75 %    Immat GRANS % 1 0 - 2 %    Lymphocytes Relative 13 (L) 14 - 44 %    Monocytes Relative 4 4 - 12 %    Eosinophils Relative 1 0 - 6 %    Basophils Relative 0 0 - 1 %    Neutrophils Absolute 9.29 (H) 1.85 - 7.62 Thousands/µL    Immature Grans Absolute 0.06 0.00 - 0.20 Thousand/uL    Lymphocytes Absolute 1.45 0.60 - 4.47 Thousands/µL    Monocytes Absolute 0.48 0.17 - 1.22 Thousand/µL    Eosinophils Absolute 0.12 0.00 - 0.61 Thousand/µL    Basophils Absolute 0.05 0.00 - 0.10 Thousands/µL   Comprehensive metabolic panel    Collection Time: 01/29/24  5:05 AM   Result Value Ref Range    Sodium 136 135 - 147 mmol/L    Potassium 4.1 3.5 - 5.3 mmol/L    Chloride 100 96 - 108 mmol/L    CO2 28 21 - 32 mmol/L    ANION GAP 8 mmol/L    BUN 28 (H) 5 - 25 mg/dL    Creatinine 0.66 0.60 - 1.30 mg/dL    Glucose 98 65 - 140 mg/dL    Calcium 10.3 (H) 8.4 - 10.2 mg/dL    AST 29 13 - 39 U/L    ALT 21 7 - 52 U/L    Alkaline Phosphatase 93 34 - 104 U/L    Total Protein 6.2 (L) 6.4 - 8.4 g/dL    Albumin 3.8 3.5 - 5.0 g/dL    Total Bilirubin 0.46 0.20 - 1.00 mg/dL    eGFR 80 ml/min/1.73sq m   Portability Revision for Existing O2 Patients    Collection Time: 01/29/24  1:14 PM   Result Value Ref Range    Supplier Name [employer]     Supplier Phone Number [phone]     Order Status Delivery Successful     Delivery Note       Patient requiring additional O2 at home, Rx is now at rest and on exertion.  New Rx attached    Delivery Request Date 01/29/2024     Date Delivered  01/29/2024     Supplier Name 01/29/2024     Item Description       O2 with Portability for Existing O2 Patients, Standard Liter Flow    Item Description New Portable Setup, Portable Gaseous Oxygen System    Comprehensive metabolic panel    Collection Time: 02/09/24 11:27 AM   Result Value Ref Range    Sodium 137 135 - 147 mmol/L     Potassium 4.7 3.5 - 5.3 mmol/L    Chloride 100 96 - 108 mmol/L    CO2 31 21 - 32 mmol/L    ANION GAP 6 mmol/L    BUN 17 5 - 25 mg/dL    Creatinine 0.57 (L) 0.60 - 1.30 mg/dL    Glucose, Fasting 105 (H) 65 - 99 mg/dL    Calcium 10.0 8.4 - 10.2 mg/dL    AST 45 (H) 13 - 39 U/L    ALT 29 7 - 52 U/L    Alkaline Phosphatase 85 34 - 104 U/L    Total Protein 6.7 6.4 - 8.4 g/dL    Albumin 3.9 3.5 - 5.0 g/dL    Total Bilirubin 0.46 0.20 - 1.00 mg/dL    eGFR 84 ml/min/1.73sq m   CBC and differential    Collection Time: 02/09/24 11:27 AM   Result Value Ref Range    WBC 10.66 (H) 4.31 - 10.16 Thousand/uL    RBC 4.14 3.81 - 5.12 Million/uL    Hemoglobin 12.4 11.5 - 15.4 g/dL    Hematocrit 39.8 34.8 - 46.1 %    MCV 96 82 - 98 fL    MCH 30.0 26.8 - 34.3 pg    MCHC 31.2 (L) 31.4 - 37.4 g/dL    RDW 15.6 (H) 11.6 - 15.1 %    MPV 12.1 8.9 - 12.7 fL    Platelets 200 149 - 390 Thousands/uL    nRBC 0 /100 WBCs    Neutrophils Relative 73 43 - 75 %    Immat GRANS % 1 0 - 2 %    Lymphocytes Relative 14 14 - 44 %    Monocytes Relative 8 4 - 12 %    Eosinophils Relative 3 0 - 6 %    Basophils Relative 1 0 - 1 %    Neutrophils Absolute 7.80 (H) 1.85 - 7.62 Thousands/µL    Immature Grans Absolute 0.05 0.00 - 0.20 Thousand/uL    Lymphocytes Absolute 1.54 0.60 - 4.47 Thousands/µL    Monocytes Absolute 0.84 0.17 - 1.22 Thousand/µL    Eosinophils Absolute 0.35 0.00 - 0.61 Thousand/µL    Basophils Absolute 0.08 0.00 - 0.10 Thousands/µL      Medications have been reviewed by provider in current encounter    Bladimir Caraballo MD

## 2024-02-21 ENCOUNTER — TELEPHONE (OUTPATIENT)
Dept: INPATIENT UNIT | Facility: HOSPITAL | Age: 84
End: 2024-02-21

## 2024-02-21 NOTE — ASSESSMENT & PLAN NOTE
Lab Results   Component Value Date    HGBA1C 6.1 (H) 01/22/2024     Diet controlled, placed patient on diabetic diet   [facility]

## 2024-02-22 ENCOUNTER — OFFICE VISIT (OUTPATIENT)
Dept: PULMONOLOGY | Facility: MEDICAL CENTER | Age: 84
End: 2024-02-22
Payer: MEDICARE

## 2024-02-22 VITALS
BODY MASS INDEX: 27.26 KG/M2 | SYSTOLIC BLOOD PRESSURE: 118 MMHG | WEIGHT: 169.6 LBS | HEART RATE: 79 BPM | RESPIRATION RATE: 12 BRPM | HEIGHT: 66 IN | DIASTOLIC BLOOD PRESSURE: 64 MMHG | OXYGEN SATURATION: 90 %

## 2024-02-22 DIAGNOSIS — F17.211 CIGARETTE NICOTINE DEPENDENCE IN REMISSION: ICD-10-CM

## 2024-02-22 DIAGNOSIS — I50.32 CHRONIC DIASTOLIC CHF (CONGESTIVE HEART FAILURE) (HCC): ICD-10-CM

## 2024-02-22 DIAGNOSIS — J96.01 ACUTE HYPOXEMIC RESPIRATORY FAILURE (HCC): ICD-10-CM

## 2024-02-22 DIAGNOSIS — J96.11 CHRONIC HYPOXEMIC RESPIRATORY FAILURE (HCC): Primary | Chronic | ICD-10-CM

## 2024-02-22 DIAGNOSIS — I50.33 ACUTE ON CHRONIC DIASTOLIC (CONGESTIVE) HEART FAILURE (HCC): ICD-10-CM

## 2024-02-22 PROCEDURE — 99214 OFFICE O/P EST MOD 30 MIN: CPT | Performed by: NURSE PRACTITIONER

## 2024-02-22 RX ORDER — FUROSEMIDE 20 MG/1
40 TABLET ORAL DAILY
Start: 2024-02-22 | End: 2024-02-26

## 2024-02-22 NOTE — PATIENT INSTRUCTIONS
Take Lasix 40 mg daily until seen by Lizett JONES at the cardiology office on Monday.    Get BMP labwork on Saturday.    Call Cardiology if blood pressure low or further weight gain.

## 2024-02-22 NOTE — ASSESSMENT & PLAN NOTE
Wt Readings from Last 3 Encounters:   02/22/24 76.9 kg (169 lb 9.6 oz)   02/07/24 70.8 kg (156 lb)   01/30/24 71.1 kg (156 lb 12 oz)     She does have weight gain since she left the hospital.  She does not have lower extremity edema, but never has.  She does have rales on exam.  I did speak with ROBERT Hill via telephone during the visit and Lasix will be increased at her direction to 40 mg daily until a follow-up visit that we scheduled on Monday at 4:20 pm in discussion with the patient.  Zoraida will have a BMP on Saturday to evaluate kidney function.  I did advise her to return call to her cardiologist if she has any fluctuation in blood pressure or further weight gain or new symptoms.

## 2024-02-22 NOTE — PROGRESS NOTES
Pulmonary Follow-Up Note   Danyelle Martinez 83 y.o. female MRN: [id number]  2/22/2024      Assessment/Plan:    Problem List Items Addressed This Visit          Respiratory    Chronic hypoxemic respiratory failure (HCC) - Primary (Chronic)     At her visit in our office 1 year ago, she was to wear 2 L of oxygen with exertion and 3 L of oxygen at bedtime.  She was not always consistent with this during the day per her report.  She now requires 4 L of oxygen during the day and 3 L at bedtime.  Will continue this for now and can reevaluate at her next appointment.  She does have a pulse oximeter at home.  She would like a portable concentrator evaluation and order for this was placed today.         Relevant Orders    Portable Concentrators       Cardiovascular and Mediastinum    Chronic diastolic CHF (congestive heart failure) (HCC)     Wt Readings from Last 3 Encounters:   02/22/24 76.9 kg (169 lb 9.6 oz)   02/07/24 70.8 kg (156 lb)   01/30/24 71.1 kg (156 lb 12 oz)     She does have weight gain since she left the hospital.  She does not have lower extremity edema, but never has.  She does have rales on exam.  I did speak with ROBERT Hill via telephone during the visit and Lasix will be increased at her direction to 40 mg daily until a follow-up visit that we scheduled on Monday at 4:20 pm in discussion with the patient.  Zoraida will have a BMP on Saturday to evaluate kidney function.  I did advise her to return call to her cardiologist if she has any fluctuation in blood pressure or further weight gain or new symptoms.                 Relevant Medications    furosemide (LASIX) 20 mg tablet    Other Relevant Orders    Basic metabolic panel    Portable Concentrators       Other    Cigarette nicotine dependence in remission     Quit in 1990.  Continued complete cessation.          Other Visit Diagnoses       Acute hypoxemic respiratory failure (HCC)        Acute on chronic diastolic (congestive) heart failure (HCC)  "       Relevant Medications    furosemide (LASIX) 20 mg tablet          Vaccines: Up to date on influenza vaccine    Return in about 3 months (around 5/22/2024), or if symptoms worsen or fail to improve.    All of Zoraida's questions were answered prior to leaving the office today. She will follow-up in 12 months or sooner should the need arise. She is aware to call our office with any further questions or concerns.    History of Present Illness   Reason for Visit: Hospital follow-up  Chief Complaint: \"I feel unsteady.\"  HPI: Danyelle Martinez is a 83 y.o. female who presents to the office today for follow-up after recent hospitalization for diastolic CHF and respiratory failure.  Overall, Zoraida reports that she sometimes feels unsteady, but reports she has fluctuations in her blood pressure that are chronic in nature.  She does not have any worsening shortness of breath and has never had any lower extremity edema.  Her battery on her scale broke; however, from her last weight and in the weight in the office today she has gained at least 5 pounds.  When comparing her weight today to her hospital discharge weight it is almost 12 pounds though there may be a difference in scales and time of day.  She continues to wear her supplemental oxygen at 4 L during the day and 3 L at bedtime.  She does have an albuterol MDI to use at home, but does not find significant benefit with it.  No other new symptoms or complaints.  She did have an appointment with cardiology after discharge from the hospital; however, it got canceled due to snow and she is rescheduled for the end of March.    Review of Systems   All other systems reviewed and are negative.  A full 12-point review of systems was completed and is negative except for those outlined in the HPI.    Historical Information   Past Medical History:   Diagnosis Date    Anxiety     Arthritis     r knee and shoulders    Blind left eye     Deaf, left     Depression     Diabetes " mellitus (HCC)     Disease of thyroid gland     DVT complicating pregnancy, unspecified trimester (HCC) postpartum    Hearing loss     LEFT EAR    History of shingles 2007    fACIAL     Hyperlipidemia     Hypertension     Liver disease     Lyme disease     Meniere's disease     Obesity     Orthostatic hypotension     Psychiatric problem     Sinus congestion     Sleep apnea     Stroke (HCC)      Past Surgical History:   Procedure Laterality Date    APPENDECTOMY      BREAST BIOPSY Left 2010     SECTION      x2    DXA PROCEDURE (HISTORICAL)  10/28/2020    EYE SURGERY      HAND SURGERY Left     HERNIA REPAIR      HYSTERECTOMY      ROTATOR CUFF REPAIR Left     TONSILLECTOMY      TYMPANOSTOMY TUBE PLACEMENT       Family History   Problem Relation Age of Onset    Depression Mother     Hypertension Mother     Obesity Mother     Depression Father     Alcohol abuse Father     Stroke Father     Breast cancer Sister 68    Ovarian cancer Daughter 53    BRCA1 Negative Daughter     BRCA2 Negative Daughter      Social History   Social History     Substance and Sexual Activity   Alcohol Use Never     Social History     Substance and Sexual Activity   Drug Use Never     Social History     Tobacco Use   Smoking Status Former    Current packs/day: 0.00    Average packs/day: 0.8 packs/day for 44.0 years (33.0 ttl pk-yrs)    Types: Cigarettes    Start date: 1946    Quit date: 1990    Years since quittin.0    Passive exposure: Past   Smokeless Tobacco Never     E-Cigarette/Vaping    E-Cigarette Use Never User      E-Cigarette/Vaping Substances    Nicotine No     THC No     CBD No     Flavoring No     Other No     Unknown No        Meds/Allergies     Current Outpatient Medications:     albuterol (ProAir HFA) 90 mcg/act inhaler, Inhale 2 puffs every 6 (six) hours as needed for wheezing, Disp: 8.5 g, Rfl: 0    atorvastatin (LIPITOR) 80 mg tablet, TAKE 1 TABLET BY MOUTH DAILY AT BEDTIME (Patient taking differently:  "Taking at morning), Disp: 90 tablet, Rfl: 1    bisoprolol (ZEBETA) 5 mg tablet, Take 0.5 tablets (2.5 mg total) by mouth daily, Disp: 45 tablet, Rfl: 1    Calcium Carbonate-Vit D-Min (CALCIUM 1200 PO), Take 1,200 mg by mouth daily, Disp: , Rfl:     cetirizine (ZyrTEC) 10 mg tablet, Take 10 mg by mouth daily, Disp: , Rfl:     clonazePAM (KlonoPIN) 0.5 mg tablet, TAKE 1 TABLET(0.5 MG) BY MOUTH DAILY AT BEDTIME AS NEEDED FOR SEIZURES, Disp: 30 tablet, Rfl: 1    clopidogrel (PLAVIX) 75 mg tablet, TAKE 1 TABLET(75 MG) BY MOUTH DAILY, Disp: 30 tablet, Rfl: 5    DULoxetine (CYMBALTA) 60 mg delayed release capsule, TAKE 1 CAPSULE(60 MG) BY MOUTH DAILY, Disp: 90 capsule, Rfl: 1    furosemide (LASIX) 20 mg tablet, Take 2 tablets (40 mg total) by mouth daily, Disp: , Rfl:     levothyroxine 88 mcg tablet, Take 1 tablet (88 mcg total) by mouth daily, Disp: 90 tablet, Rfl: 1    midodrine (PROAMATINE) 10 MG tablet, Midodrine 10 mg 1 tablet at 8 AM and 1 tablet at 2 PM, Disp: 60 tablet, Rfl: 2    pantoprazole (PROTONIX) 40 mg tablet, TAKE 1 TABLET(40 MG) BY MOUTH EVERY MORNING, Disp: 90 tablet, Rfl: 1    primidone (MYSOLINE) 50 mg tablet, TAKE 1 TABLET(50 MG) BY MOUTH DAILY, Disp: 90 tablet, Rfl: 1    mometasone (NASONEX) 50 mcg/act nasal spray, 1 spray into each nostril daily   (Patient not taking: Reported on 2/22/2024), Disp: , Rfl:     nitrofurantoin (MACROBID) 100 mg capsule, Take 100 mg by mouth daily Take with food, Disp: , Rfl:   Allergies   Allergen Reactions    Penicillins Swelling       Vitals: Blood pressure 118/64, pulse 79, resp. rate 12, height 5' 6\" (1.676 m), weight 76.9 kg (169 lb 9.6 oz), SpO2 90%. Body mass index is 27.37 kg/m². Oxygen Therapy  SpO2: 90 %    Physical Exam:  Physical Exam  Vitals reviewed.   Constitutional:       General: She is not in acute distress.     Appearance: She is well-developed. She is not toxic-appearing or diaphoretic.      Interventions: Nasal cannula in place.   HENT:      Head: " "Normocephalic and atraumatic.   Eyes:      General: No scleral icterus.     Extraocular Movements: Extraocular movements intact.   Neck:      Trachea: No tracheal deviation.   Cardiovascular:      Rate and Rhythm: Normal rate and regular rhythm.      Heart sounds: S1 normal and S2 normal. No murmur heard.     No friction rub. No gallop.   Pulmonary:      Effort: Pulmonary effort is normal. No tachypnea, accessory muscle usage or respiratory distress.      Breath sounds: No stridor. Examination of the right-lower field reveals rales. Examination of the left-lower field reveals rales. Rales present. No decreased breath sounds, wheezing or rhonchi.   Chest:      Chest wall: No tenderness.   Abdominal:      General: Bowel sounds are normal. There is no distension.      Palpations: Abdomen is soft.      Tenderness: There is no abdominal tenderness.   Musculoskeletal:      Cervical back: Neck supple.      Right lower leg: No edema.      Left lower leg: No edema.   Skin:     General: Skin is warm and dry.      Findings: No rash.   Neurological:      Mental Status: She is alert and oriented to person, place, and time.      GCS: GCS eye subscore is 4. GCS verbal subscore is 5. GCS motor subscore is 6.   Psychiatric:         Speech: Speech normal.         Behavior: Behavior normal. Behavior is cooperative.       Imaging and other studies: I have personally reviewed pertinent reports.   and CTA chest showed no PE or emphysematous changes.      ROBERT Gotti  [facility]        Portions of the record may have been created with voice recognition software.  Occasional wrong word or \"sound a like\" substitutions may have occurred due to the inherent limitations of voice recognition software.  Read the chart carefully and recognize, using context, where substitutions have occurred or contact the dictating provider.  "

## 2024-02-22 NOTE — ASSESSMENT & PLAN NOTE
At her visit in our office 1 year ago, she was to wear 2 L of oxygen with exertion and 3 L of oxygen at bedtime.  She was not always consistent with this during the day per her report.  She now requires 4 L of oxygen during the day and 3 L at bedtime.  Will continue this for now and can reevaluate at her next appointment.  She does have a pulse oximeter at home.  She would like a portable concentrator evaluation and order for this was placed today.

## 2024-02-23 LAB
DME PARACHUTE DELIVERY DATE REQUESTED: NORMAL
DME PARACHUTE ITEM DESCRIPTION: NORMAL
DME PARACHUTE ORDER STATUS: NORMAL
DME PARACHUTE SUPPLIER NAME: NORMAL
DME PARACHUTE SUPPLIER PHONE: NORMAL

## 2024-02-24 ENCOUNTER — APPOINTMENT (OUTPATIENT)
Dept: LAB | Facility: HOSPITAL | Age: 84
End: 2024-02-24
Payer: MEDICARE

## 2024-02-24 DIAGNOSIS — I50.32 CHRONIC DIASTOLIC CHF (CONGESTIVE HEART FAILURE) (HCC): ICD-10-CM

## 2024-02-24 LAB
ANION GAP SERPL CALCULATED.3IONS-SCNC: 6 MMOL/L
BUN SERPL-MCNC: 21 MG/DL (ref 5–25)
CALCIUM SERPL-MCNC: 9.8 MG/DL (ref 8.4–10.2)
CHLORIDE SERPL-SCNC: 100 MMOL/L (ref 96–108)
CO2 SERPL-SCNC: 33 MMOL/L (ref 21–32)
CREAT SERPL-MCNC: 0.63 MG/DL (ref 0.6–1.3)
GFR SERPL CREATININE-BSD FRML MDRD: 82 ML/MIN/1.73SQ M
GLUCOSE P FAST SERPL-MCNC: 116 MG/DL (ref 65–99)
POTASSIUM SERPL-SCNC: 4.3 MMOL/L (ref 3.5–5.3)
SODIUM SERPL-SCNC: 139 MMOL/L (ref 135–147)

## 2024-02-24 PROCEDURE — 36415 COLL VENOUS BLD VENIPUNCTURE: CPT

## 2024-02-24 PROCEDURE — 80048 BASIC METABOLIC PNL TOTAL CA: CPT

## 2024-02-26 ENCOUNTER — OFFICE VISIT (OUTPATIENT)
Dept: CARDIOLOGY CLINIC | Facility: CLINIC | Age: 84
End: 2024-02-26
Payer: MEDICARE

## 2024-02-26 VITALS
BODY MASS INDEX: 26.84 KG/M2 | DIASTOLIC BLOOD PRESSURE: 60 MMHG | HEIGHT: 66 IN | WEIGHT: 167 LBS | HEART RATE: 74 BPM | SYSTOLIC BLOOD PRESSURE: 120 MMHG | OXYGEN SATURATION: 95 %

## 2024-02-26 DIAGNOSIS — I50.32 CHRONIC DIASTOLIC CHF (CONGESTIVE HEART FAILURE) (HCC): ICD-10-CM

## 2024-02-26 DIAGNOSIS — E11.9 TYPE 2 DIABETES MELLITUS WITHOUT COMPLICATION, WITHOUT LONG-TERM CURRENT USE OF INSULIN (HCC): ICD-10-CM

## 2024-02-26 DIAGNOSIS — E78.5 DYSLIPIDEMIA: ICD-10-CM

## 2024-02-26 DIAGNOSIS — I10 ESSENTIAL HYPERTENSION: Primary | ICD-10-CM

## 2024-02-26 DIAGNOSIS — J96.11 CHRONIC HYPOXEMIC RESPIRATORY FAILURE (HCC): Chronic | ICD-10-CM

## 2024-02-26 DIAGNOSIS — I50.33 ACUTE ON CHRONIC DIASTOLIC (CONGESTIVE) HEART FAILURE (HCC): ICD-10-CM

## 2024-02-26 PROCEDURE — 99214 OFFICE O/P EST MOD 30 MIN: CPT | Performed by: NURSE PRACTITIONER

## 2024-02-26 RX ORDER — FUROSEMIDE 40 MG/1
40 TABLET ORAL DAILY
Qty: 90 TABLET | Refills: 1 | Status: SHIPPED | OUTPATIENT
Start: 2024-02-26

## 2024-02-26 RX ORDER — VIT A/VIT C/VIT E/ZINC/COPPER 2148-113
TABLET ORAL
COMMUNITY

## 2024-02-26 NOTE — PROGRESS NOTES
Progress Note - Cardiology Office  [facility]    Danyelle Martinez 83 y.o. female MRN: [id number]  : 1940  Encounter: [id number]      Assessment:     1. Essential hypertension    2. Chronic diastolic CHF (congestive heart failure) (HCC)    3. Type 2 diabetes mellitus without complication, without long-term current use of insulin (HCC)    4. Chronic hypoxemic respiratory failure (HCC)    5. Dyslipidemia        Discussion Summary and Plan:  1. Hypertension: with history of orthostatic hypotension with concomitant supine hypertension.    -   She takes her Zebeta 2.5 mg at bedtime    -   continue Midodrine 10 mg BID    2. Chronic hypoxic respiratory failure: follows with Dr. Das    -   patient states she is now compliant using her oxygen at the 4 L nasal cannula that is recommended    -   per pulmonology    3. Chronic diastolic heart failure: 11/15/2023 TTE: EF visibly estimated at 60% with grade 1 diastolic dysfunction, RV cavity was normal in size    -   patient noted with pulmonary edema on CAT scan    -   has improved with use of Lasix 40 mg daily and we will continue that dosage with close monitoring of her weights as she does have history of her ortho stasis and is on Midodrine.    -   Patient states she follows a low sodium diet due to previous diagnosis of Ménière's disease    -   recommend keeping track of daily weights    4. Dyslipidemia: continue Lipitor 80 mg daily    5. Diabetes: managed per primary team          Patient / Caretaker was advised and educated to call our office  immediately if  patient has any new symptoms of chest pain/shortness of breath, near-syncope, syncope, light headedness sustained palpitations  or any other cardiovascular symptoms before their scheduled follow-up appointment.  Office number was provided #[phone].    Please call [phone] if any questions.    Counseling :  A description of the counseling.  Goals and Barriers.  Patient's  ability to self care: Yes  Medication side effect reviewed with patient in detail and all their questions answered to their satisfaction.    HPI :     Danyelle Martinez is a 83 y.o. year old female who came for follow up. She was recently admitted to [facility] 1/26 through 1/30 2024 initially presenting with complaints of shortness of breath and generalized weakness. She does have history of COPD/hypoxic respiratory failure and had been told to use oxygen continuously, 2 L, which she had refused to do. At time of presentation she stated that her home oxygen device was broken. She had a CTA to rule out PE which showed findings of pulmonary edema diffusely with underlying pulmonary fibrosis. She was diuresis initially with IV Lasix and at time of discharge sent home with Lasix 20 mg PO once a day.    Last week she was seen by pulmonology who was concerned about her breathing and weight gain. They doubled her Lasix to 40 mg BID which seems to have improved her breathing. Her weights at home have been stable and she does not have any peripheral edema.    Review of Systems   Constitutional: Negative.  Negative for activity change and fatigue.   HENT: Negative.  Negative for congestion, ear discharge, nosebleeds and sinus pressure.    Eyes: Negative.  Negative for photophobia and visual disturbance.   Respiratory: Negative.  Negative for chest tightness and shortness of breath.    Cardiovascular: Negative.  Negative for chest pain, palpitations and leg swelling.   Gastrointestinal:  Negative for abdominal distention, constipation, diarrhea, nausea and vomiting.   Endocrine: Negative.  Negative for polydipsia, polyphagia and polyuria.   Genitourinary: Negative.  Negative for difficulty urinating.   Musculoskeletal:  Positive for gait problem.   Neurological:  Positive for dizziness and light-headedness. Negative for syncope and weakness.   Hematological: Negative.    Psychiatric/Behavioral: Negative.          Historical Information   Past Medical History:   Diagnosis Date    Anxiety     Arthritis     r knee and shoulders    Blind left eye     Deaf, left     Depression     Diabetes mellitus (HCC)     Disease of thyroid gland     DVT complicating pregnancy, unspecified trimester (HCC) postpartum    Hearing loss     LEFT EAR    History of shingles 2007    fACIAL     Hyperlipidemia     Hypertension     Liver disease     Lyme disease     Meniere's disease     Obesity     Orthostatic hypotension     Psychiatric problem     Sinus congestion     Sleep apnea     Stroke (HCC)      Past Surgical History:   Procedure Laterality Date    APPENDECTOMY      BREAST BIOPSY Left 2010     SECTION      x2    DXA PROCEDURE (HISTORICAL)  10/28/2020    EYE SURGERY      HAND SURGERY Left     HERNIA REPAIR      HYSTERECTOMY      ROTATOR CUFF REPAIR Left     TONSILLECTOMY      TYMPANOSTOMY TUBE PLACEMENT       Social History     Substance and Sexual Activity   Alcohol Use Never     Social History     Substance and Sexual Activity   Drug Use Never     Social History     Tobacco Use   Smoking Status Former    Current packs/day: 0.00    Average packs/day: 0.8 packs/day for 44.0 years (33.0 ttl pk-yrs)    Types: Cigarettes    Start date: 1946    Quit date: 1990    Years since quittin.0    Passive exposure: Past   Smokeless Tobacco Never     Family History:   Family History   Problem Relation Age of Onset    Depression Mother     Hypertension Mother     Obesity Mother     Depression Father     Alcohol abuse Father     Stroke Father     Breast cancer Sister 68    Ovarian cancer Daughter 53    BRCA1 Negative Daughter     BRCA2 Negative Daughter        Meds/Allergies     Allergies   Allergen Reactions    Penicillins Swelling       Current Outpatient Medications:     albuterol (ProAir HFA) 90 mcg/act inhaler, Inhale 2 puffs every 6 (six) hours as needed for wheezing, Disp: 8.5 g, Rfl: 0    atorvastatin (LIPITOR) 80 mg tablet,  "TAKE 1 TABLET BY MOUTH DAILY AT BEDTIME (Patient taking differently: Taking at morning), Disp: 90 tablet, Rfl: 1    bisoprolol (ZEBETA) 5 mg tablet, Take 0.5 tablets (2.5 mg total) by mouth daily, Disp: 45 tablet, Rfl: 1    Calcium Carbonate-Vit D-Min (CALCIUM 1200 PO), Take 1,200 mg by mouth daily, Disp: , Rfl:     cetirizine (ZyrTEC) 10 mg tablet, Take 10 mg by mouth daily, Disp: , Rfl:     clonazePAM (KlonoPIN) 0.5 mg tablet, TAKE 1 TABLET(0.5 MG) BY MOUTH DAILY AT BEDTIME AS NEEDED FOR SEIZURES, Disp: 30 tablet, Rfl: 1    clopidogrel (PLAVIX) 75 mg tablet, TAKE 1 TABLET(75 MG) BY MOUTH DAILY, Disp: 30 tablet, Rfl: 5    DULoxetine (CYMBALTA) 60 mg delayed release capsule, TAKE 1 CAPSULE(60 MG) BY MOUTH DAILY, Disp: 90 capsule, Rfl: 1    furosemide (LASIX) 20 mg tablet, Take 2 tablets (40 mg total) by mouth daily, Disp: , Rfl:     levothyroxine 88 mcg tablet, Take 1 tablet (88 mcg total) by mouth daily, Disp: 90 tablet, Rfl: 1    midodrine (PROAMATINE) 10 MG tablet, Midodrine 10 mg 1 tablet at 8 AM and 1 tablet at 2 PM, Disp: 60 tablet, Rfl: 2    Multiple Vitamins-Minerals (PreserVision AREDS) TABS, Take by mouth, Disp: , Rfl:     nitrofurantoin (MACROBID) 100 mg capsule, Take 100 mg by mouth daily Take with food, Disp: , Rfl:     pantoprazole (PROTONIX) 40 mg tablet, TAKE 1 TABLET(40 MG) BY MOUTH EVERY MORNING, Disp: 90 tablet, Rfl: 1    primidone (MYSOLINE) 50 mg tablet, TAKE 1 TABLET(50 MG) BY MOUTH DAILY, Disp: 90 tablet, Rfl: 1    mometasone (NASONEX) 50 mcg/act nasal spray, 1 spray into each nostril daily   (Patient not taking: Reported on 2/22/2024), Disp: , Rfl:     Vitals: Blood pressure 120/60, pulse 74, height 5' 6\" (1.676 m), weight 75.8 kg (167 lb), SpO2 95%.    Body mass index is 26.95 kg/m².  Wt Readings from Last 3 Encounters:   02/26/24 75.8 kg (167 lb)   02/22/24 76.9 kg (169 lb 9.6 oz)   02/07/24 70.8 kg (156 lb)     Vitals:    02/26/24 1631   Weight: 75.8 kg (167 lb)     BP Readings from Last 3 " Encounters:   02/26/24 120/60   02/22/24 118/64   02/07/24 130/70       Physical Exam:  Neurologic:  Alert & oriented x 3, no new focal deficits, Not in any acute distress,  Physical Exam  Vitals and nursing note reviewed.   Constitutional:       General: She is not in acute distress.     Appearance: Normal appearance. She is normal weight.   HENT:      Right Ear: External ear normal.      Left Ear: External ear normal.   Eyes:      General: No scleral icterus.        Right eye: No discharge.         Left eye: No discharge.   Cardiovascular:      Rate and Rhythm: Normal rate and regular rhythm.      Pulses: Normal pulses.   Pulmonary:      Effort: Pulmonary effort is normal. No accessory muscle usage or respiratory distress.      Comments: Dry rails at right base  Abdominal:      General: Bowel sounds are normal.      Palpations: Abdomen is soft.   Musculoskeletal:      Right lower leg: No edema.      Left lower leg: No edema.   Skin:     General: Skin is warm and dry.      Capillary Refill: Capillary refill takes less than 2 seconds.   Neurological:      General: No focal deficit present.      Mental Status: She is alert and oriented to person, place, and time. Mental status is at baseline.   Psychiatric:         Mood and Affect: Mood normal.           Lab Review   Lab Results   Component Value Date    WBC 10.66 (H) 02/09/2024    HGB 12.4 02/09/2024    HCT 39.8 02/09/2024    MCV 96 02/09/2024    RDW 15.6 (H) 02/09/2024     02/09/2024     BMP:  Lab Results   Component Value Date    SODIUM 139 02/24/2024    K 4.3 02/24/2024     02/24/2024    CO2 33 (H) 02/24/2024    BUN 21 02/24/2024    CREATININE 0.63 02/24/2024    GLUC 98 01/29/2024    GLUF 116 (H) 02/24/2024    CALCIUM 9.8 02/24/2024    CORRECTEDCA 9.7 11/16/2023    EGFR 82 02/24/2024    MG 2.0 01/28/2024     Troponins:    LFT:  Lab Results   Component Value Date    AST 45 (H) 02/09/2024    ALT 29 02/09/2024    ALKPHOS 85 02/09/2024    TP 6.7  "02/09/2024    ALB 3.9 02/09/2024      No components found for: \"TSH3\"  Lab Results   Component Value Date    EFA2CNKKNOQB 2.558 01/22/2024     Lab Results   Component Value Date    HGBA1C 6.1 (H) 01/22/2024     Lipid Profile:   Lab Results   Component Value Date    CHOLESTEROL 135 01/22/2024    HDL 30 (L) 01/22/2024    LDLCALC 71 01/22/2024    TRIG 171 (H) 01/22/2024     Lab Results   Component Value Date    CHOLESTEROL 135 01/22/2024    CHOLESTEROL 139 05/30/2023     Lab Results   Component Value Date    CKTOTAL 310 (H) 11/16/2023    TROPONINI <0.02 01/31/2020     Lab Results   Component Value Date    NTBNP 112 02/04/2020      Recent Results (from the past 672 hour(s))   Comprehensive metabolic panel    Collection Time: 02/09/24 11:27 AM   Result Value Ref Range    Sodium 137 135 - 147 mmol/L    Potassium 4.7 3.5 - 5.3 mmol/L    Chloride 100 96 - 108 mmol/L    CO2 31 21 - 32 mmol/L    ANION GAP 6 mmol/L    BUN 17 5 - 25 mg/dL    Creatinine 0.57 (L) 0.60 - 1.30 mg/dL    Glucose, Fasting 105 (H) 65 - 99 mg/dL    Calcium 10.0 8.4 - 10.2 mg/dL    AST 45 (H) 13 - 39 U/L    ALT 29 7 - 52 U/L    Alkaline Phosphatase 85 34 - 104 U/L    Total Protein 6.7 6.4 - 8.4 g/dL    Albumin 3.9 3.5 - 5.0 g/dL    Total Bilirubin 0.46 0.20 - 1.00 mg/dL    eGFR 84 ml/min/1.73sq m   CBC and differential    Collection Time: 02/09/24 11:27 AM   Result Value Ref Range    WBC 10.66 (H) 4.31 - 10.16 Thousand/uL    RBC 4.14 3.81 - 5.12 Million/uL    Hemoglobin 12.4 11.5 - 15.4 g/dL    Hematocrit 39.8 34.8 - 46.1 %    MCV 96 82 - 98 fL    MCH 30.0 26.8 - 34.3 pg    MCHC 31.2 (L) 31.4 - 37.4 g/dL    RDW 15.6 (H) 11.6 - 15.1 %    MPV 12.1 8.9 - 12.7 fL    Platelets 200 149 - 390 Thousands/uL    nRBC 0 /100 WBCs    Neutrophils Relative 73 43 - 75 %    Immat GRANS % 1 0 - 2 %    Lymphocytes Relative 14 14 - 44 %    Monocytes Relative 8 4 - 12 %    Eosinophils Relative 3 0 - 6 %    Basophils Relative 1 0 - 1 %    Neutrophils Absolute 7.80 (H) 1.85 - " "7.62 Thousands/µL    Immature Grans Absolute 0.05 0.00 - 0.20 Thousand/uL    Lymphocytes Absolute 1.54 0.60 - 4.47 Thousands/µL    Monocytes Absolute 0.84 0.17 - 1.22 Thousand/µL    Eosinophils Absolute 0.35 0.00 - 0.61 Thousand/µL    Basophils Absolute 0.08 0.00 - 0.10 Thousands/µL   Evaluate for Conserving Device / Pulse Dose    Collection Time: 02/23/24  1:40 PM   Result Value Ref Range    Supplier Name [employer]     Supplier Phone Number [phone]     Order Status Contacting Patient     Delivery Note      Delivery Request Date 02/23/2024     Item Description Evaluate for Conserving Device / Pulse Dose    Basic metabolic panel    Collection Time: 02/24/24 10:54 AM   Result Value Ref Range    Sodium 139 135 - 147 mmol/L    Potassium 4.3 3.5 - 5.3 mmol/L    Chloride 100 96 - 108 mmol/L    CO2 33 (H) 21 - 32 mmol/L    ANION GAP 6 mmol/L    BUN 21 5 - 25 mg/dL    Creatinine 0.63 0.60 - 1.30 mg/dL    Glucose, Fasting 116 (H) 65 - 99 mg/dL    Calcium 9.8 8.4 - 10.2 mg/dL    eGFR 82 ml/min/1.73sq kaykay JONES  Cardiology        \"This note was completed in part utilizing m-modal fluency direct voice recognition software.   Grammatical errors, random word insertion, spelling mistakes, and incomplete sentences may be an occasional consequence of the system secondary to software limitations, ambient noise and hardware issues.    Please read the chart carefully and recognize, using context, where substitutions have occurred.  If you have any questions or concerns about the context, text or information contained within the body of this dictation, please contact myself, the provider, for further clarification.\"  "

## 2024-02-29 ENCOUNTER — TELEPHONE (OUTPATIENT)
Dept: FAMILY MEDICINE CLINIC | Facility: CLINIC | Age: 84
End: 2024-02-29

## 2024-02-29 DIAGNOSIS — F41.9 ANXIETY: ICD-10-CM

## 2024-02-29 RX ORDER — CLONAZEPAM 0.5 MG/1
0.5 TABLET ORAL
Qty: 30 TABLET | Refills: 1 | Status: SHIPPED | OUTPATIENT
Start: 2024-02-29

## 2024-02-29 NOTE — TELEPHONE ENCOUNTER
Pt is requesting refill of clonazePAM (KlonoPIN) 0.5 mg tablet  to   [facility]  [address] [place] [phone] this is the new pharmacy she will be using going forward

## 2024-02-29 NOTE — TELEPHONE ENCOUNTER
Pt requested a refill for the clonazePAM (KlonoPIN) 0.5 mg tablet. Please send this med and every med thereafter to:       [facility]  [address] [place], [place] [id number]      Thank you for your help.

## 2024-03-01 ENCOUNTER — TELEPHONE (OUTPATIENT)
Dept: CARDIOLOGY CLINIC | Facility: CLINIC | Age: 84
End: 2024-03-01

## 2024-03-01 NOTE — TELEPHONE ENCOUNTER
Patient is going to have oral procedure for a bone growth removal in her upper pallet for which she will need a clearance for. Please advise.    Reason for Disposition   Large swelling or bruise > 2 inches (5 cm)    Answer Assessment - Initial Assessment Questions  1  MECHANISM: "How did the injury happen?" For falls, ask: "What height did you fall from?" and "What surface did you fall against?"       Walking, lost her balance, and fell  Patient hit her head but not sure what she hit her head on     2  ONSET: "When did the injury happen?" (Minutes or hours ago)       Today     3  NEUROLOGIC SYMPTOMS: "Was there any loss of consciousness?" "Are there any other neurological symptoms?"       Fatigue  Denies loss of consciousness    4  MENTAL STATUS: "Does the person know who he is, who you are, and where he is?"       Alert and oriented     5  LOCATION: "What part of the head was hit?"       Right hand side of the forehead  6  SCALP APPEARANCE: "What does the scalp look like? Is it bleeding now?" If so, ask: "Is it difficult to stop?"       " large goose egg on the forehead"    7  SIZE: For cuts, bruises, or swelling, ask: "How large is it?" (e g , inches or centimeters)       Swelling > 2 inches in size     8  PAIN: "Is there any pain?" If so, ask: "How bad is it?"  (e g , Scale 1-10; or mild, moderate, severe)      Mild pain where the swelling is    Answer Assessment - Initial Assessment Questions  1  COVID-19 DIAGNOSIS: "Who made your Coronavirus (COVID-19) diagnosis?" "Was it confirmed by a positive lab test?" If not diagnosed by a HCP, ask "Are there lots of cases (community spread) where you live?" (See public health department website, if unsure)      Tested positive on 3/11/21    2  COVID-19 EXPOSURE: "Was there any known exposure to COVID before the symptoms began?" CDC Definition of close contact: within 6 feet (2 meters) for a total of 15 minutes or more over a 24-hour period  Yes    3  ONSET: "When did the COVID-19 symptoms start?"       About a week ago     4   COUGH: "Do you have a cough?" If so, ask: "How bad is the cough?" Moderate productive cough    5  FEVER: "Do you have a fever?" If so, ask: "What is your temperature, how was it measured, and when did it start?"      Fever currently 101 2  Fever has been present for about 7 days    6  RESPIRATORY STATUS: "Describe your breathing?" (e g , shortness of breath, wheezing, unable to speak)       Oxygen fluctuating between 87-93%  Fatigue with exertion  Current O2 is 91%    7  BETTER-SAME-WORSE: "Are you getting better, staying the same or getting worse compared to yesterday?"  If getting worse, ask, "In what way?"      Worse     8  HIGH RISK DISEASE: "Do you have any chronic medical problems?" (e g , asthma, heart or lung disease, weak immune system, obesity, etc )      Asthma     9  OTHER SYMPTOMS: "Do you have any other symptoms?"  (e g , chills, fatigue, headache, loss of smell or taste, muscle pain, sore throat; new loss of smell or taste especially support the diagnosis of COVID-19)  Weakness, fatigue, loss of appetite, loss of balance      Protocols used: HEAD INJURY-ADULT-AH, CORONAVIRUS (COVID-19) DIAGNOSED OR SUSPECTED-ADULT-AH

## 2024-03-01 NOTE — TELEPHONE ENCOUNTER
Called pt daughter to clarify if procedure is scheduled. It is not. They need to know if she is cleared to have it first before they can schedule. Pls advise.

## 2024-03-04 NOTE — TELEPHONE ENCOUNTER
If done under local it she is ok and may proceed.  If general, may need other testing.  She didn't mention this procedure when she was in the office

## 2024-03-04 NOTE — TELEPHONE ENCOUNTER
She will clarify what kind of anesthesia it is, she is unsure. She would also like to know what stage heart failure her mom is.

## 2024-03-06 ENCOUNTER — TELEPHONE (OUTPATIENT)
Age: 84
End: 2024-03-06

## 2024-03-06 DIAGNOSIS — J96.11 CHRONIC HYPOXEMIC RESPIRATORY FAILURE (HCC): Primary | ICD-10-CM

## 2024-03-06 NOTE — TELEPHONE ENCOUNTER
"Vivi called on of behalf of the patient stating that she was told by [employer] that the order was not written correctly. Said said that [facility] needs the order to say \"portable conserving device\" and it must state that Danyelle is on 4L of oxygen. Please advise.   "

## 2024-03-13 NOTE — PROGRESS NOTES
Office Visit Note  24     Danyelle Martinez 83 y.o. female MRN: [id number]  : 1940    Assessment:     1. Altered mental status, unspecified altered mental status type  Assessment & Plan:  As mentioned in history of illness patient with symptoms of memory impairment generalized weakness in the recent past sleeping a lot which she has a problem with this in the past but more so now. No urinary symptoms. Neurological symptoms none. Get lab work done including urinalysis and culture rule out UTI also MRI if necessary     Orders:  -     MRI brain wo contrast; Future; Expected date: 2024    2. Acquired hypothyroidism  Assessment & Plan:  Patient with hypothyroidism currently taking levothyroxine 88 mcg daily we will continue with the same dose follow-up with repeat lab adjust the dose if necessary    Orders:  -     TSH, 3rd generation with Free T4 reflex; Future; Expected date: 2024    3. Anxiety  Assessment & Plan:  Patient with anxiety disorder takes clonazepam as needed and also she is on Cymbalta 60 mg daily will continue with the same discussed with patient and the daughter regarding anxiety      4. Chronic diastolic CHF (congestive heart failure) (HCC)  Assessment & Plan:  Wt Readings from Last 3 Encounters:   24 75.8 kg (167 lb)   24 75.8 kg (167 lb)   24 76.9 kg (169 lb 9.6 oz)   Patient is being followed by the cardiology whose note I appreciated the Lasix dosage has been increased to 40 mg daily.  Recommend patient to monitor her weights and follow-up with a BMP to check the potassium              5. Chronic hypoxemic respiratory failure (HCC)  Assessment & Plan:  Pulmonary note appreciated currently patient is using almost 4 L of oxygen during the daytime in the nighttime she does check her pulse oximetry at home she has a portable concentrator continue with the same treatment.  Oxygen saturation is 97% here today      6. Dyslipidemia  Assessment & Plan:  Continue with  80 mg of Lipitor lipid profile is ordered    Orders:  -     Lipid panel; Future    7. Essential hypertension  Assessment & Plan:  Patient with orthostatic blood pressure changes on bisoprolol and midodrine blood pressure today here is 118/66 continue to monitor blood pressures if possible at home 1 or 2 times a day      8. Recurrent major depressive disorder, in remission ([facility])  Assessment & Plan:  Patient on Cymbalta we will continue      9. Type 2 diabetes mellitus without complication, without long-term current use of insulin ([facility])  Assessment & Plan:    Lab Results   Component Value Date    HGBA1C 6.1 (H) 01/22/2024   Patient currently not on any medication we will follow-up with repeat A1c later    Orders:  -     Comprehensive metabolic panel; Future  -     Hemoglobin A1C; Future; Expected date: 03/14/2024  -     UA w Reflex to Microscopic w Reflex to Culture; Future; Expected date: 03/14/2024  -     Albumin / creatinine urine ratio; Future    10. Screening for deficiency anemia  -     CBC and differential; Future    11. Gastroesophageal reflux disease without esophagitis  Assessment & Plan:  Patient is on pantoprazole we will continue      12. History of transient ischemic attack (TIA)  Assessment & Plan:  With current presentation, impairment but no focal neuronal deficits noted at present time will check the urine continue Plavix and atorvastatin MRI if needed patient should go to ER if symptoms get worse                 Discussion Summary and Plan:  Today's care plan and medications were reviewed with patient in detail and all their questions answered to their satisfaction.    Chief Complaint   Patient presents with    Follow-up      Subjective:  Patient is coming for evaluation regarding symptoms of memory impairment mostly in the last 1 week time for getting also has been sleeping a lot increased tired feeling.  Denies any symptoms of headache nausea vomiting double vision blurred vision.  Patient with  chronic respiratory failure on oxygen.  Denies any symptoms suggestive of urinary tract infection.  I reviewed the notes from the pulmonary physician as well as a cardiologist.  Medication reviewed labs reviewed discussed with the patient and the daughter at length.        The following portions of the patient's history were reviewed and updated as appropriate: allergies, current medications, past family history, past medical history, past social history, past surgical history and problem list.    Review of Systems   Constitutional:  Negative for chills and fever.   HENT:  Negative for ear pain and sore throat.    Eyes:  Negative for pain and visual disturbance.   Respiratory:  Negative for cough and shortness of breath.    Cardiovascular:  Negative for chest pain and palpitations.   Gastrointestinal:  Negative for abdominal pain and vomiting.   Genitourinary:  Negative for dysuria and hematuria.   Musculoskeletal:  Negative for arthralgias and back pain.   Skin:  Negative for color change and rash.   Neurological:  Negative for seizures and syncope.   All other systems reviewed and are negative.        Historical Information   Patient Active Problem List   Diagnosis    Shortness of breath on exertion    Dizziness    Chronic hypoxemic respiratory failure (HCC)    Seasonal allergic rhinitis due to pollen    Essential hypertension    Dyslipidemia    History of transient ischemic attack (TIA)    Acquired hypothyroidism    Thyroid nodule    Palpitations    Tremor    Nocturnal hypoxemia    Gastroesophageal reflux disease without esophagitis    Localized swelling of right lower leg    Anxiety    Frequent falls    Dyspepsia    SVT (supraventricular tachycardia)    Osteoarthritis of right knee    Elevated liver enzymes    Type 2 diabetes mellitus, without long-term current use of insulin (HCC)    Asymmetrical hearing loss    Recurrent major depressive disorder, in remission ([facility])    Fall    Syncope, cardiogenic    Overflow  incontinence of urine    Enlarged thyroid    Closed wedge compression fracture of T1 vertebra with routine healing    Chronic diastolic CHF (congestive heart failure) (HCC)    Leukocytosis    Cigarette nicotine dependence in remission    Change in mental status     Past Medical History:   Diagnosis Date    Anxiety     Arthritis     r knee and shoulders    Blind left eye     Deaf, left     Depression     Diabetes mellitus (HCC)     Disease of thyroid gland     DVT complicating pregnancy, unspecified trimester (HCC) postpartum    Hearing loss     LEFT EAR    History of shingles 2007    fACIAL     Hyperlipidemia     Hypertension     Liver disease     Lyme disease     Meniere's disease     Obesity     Orthostatic hypotension     Psychiatric problem     Sinus congestion     Sleep apnea     Stroke (HCC)      Past Surgical History:   Procedure Laterality Date    APPENDECTOMY      BREAST BIOPSY Left 2010     SECTION      x2    DXA PROCEDURE (HISTORICAL)  10/28/2020    EYE SURGERY      HAND SURGERY Left     HERNIA REPAIR      HYSTERECTOMY      ROTATOR CUFF REPAIR Left     TONSILLECTOMY      TYMPANOSTOMY TUBE PLACEMENT       Social History     Substance and Sexual Activity   Alcohol Use Never     Social History     Substance and Sexual Activity   Drug Use Never     Social History     Tobacco Use   Smoking Status Former    Current packs/day: 0.00    Average packs/day: 0.8 packs/day for 44.0 years (33.0 ttl pk-yrs)    Types: Cigarettes    Start date: 1946    Quit date: 1990    Years since quittin.1    Passive exposure: Past   Smokeless Tobacco Never     Family History   Problem Relation Age of Onset    Depression Mother     Hypertension Mother     Obesity Mother     Depression Father     Alcohol abuse Father     Stroke Father     Breast cancer Sister 68    Ovarian cancer Daughter 53    BRCA1 Negative Daughter     BRCA2 Negative Daughter      Health Maintenance Due   Topic    Pneumococcal Vaccine: 65+  Years (1 of 2 - PCV)    DM Eye Exam     Depression Follow-up Plan     Hepatitis A Vaccine (1 of 2 - Risk 2-dose series)    Zoster Vaccine (1 of 2)    COVID-19 Vaccine (5 - 2023-24 season)    Diabetic Foot Exam     Fall Risk     Medicare Annual Wellness Visit (AWV)       Meds/Allergies       Current Outpatient Medications:     albuterol (ProAir HFA) 90 mcg/act inhaler, Inhale 2 puffs every 6 (six) hours as needed for wheezing, Disp: 8.5 g, Rfl: 0    atorvastatin (LIPITOR) 80 mg tablet, TAKE 1 TABLET BY MOUTH DAILY AT BEDTIME (Patient taking differently: Taking at morning), Disp: 90 tablet, Rfl: 1    bisoprolol (ZEBETA) 5 mg tablet, Take 0.5 tablets (2.5 mg total) by mouth daily, Disp: 45 tablet, Rfl: 1    Calcium Carbonate-Vit D-Min (CALCIUM 1200 PO), Take 1,200 mg by mouth daily, Disp: , Rfl:     cetirizine (ZyrTEC) 10 mg tablet, Take 10 mg by mouth daily, Disp: , Rfl:     clonazePAM (KlonoPIN) 0.5 mg tablet, Take 1 tablet (0.5 mg total) by mouth daily at bedtime, Disp: 30 tablet, Rfl: 1    clopidogrel (PLAVIX) 75 mg tablet, TAKE 1 TABLET(75 MG) BY MOUTH DAILY, Disp: 30 tablet, Rfl: 5    DULoxetine (CYMBALTA) 60 mg delayed release capsule, TAKE 1 CAPSULE(60 MG) BY MOUTH DAILY, Disp: 90 capsule, Rfl: 1    furosemide (LASIX) 40 mg tablet, Take 1 tablet (40 mg total) by mouth daily, Disp: 90 tablet, Rfl: 1    levothyroxine 88 mcg tablet, Take 1 tablet (88 mcg total) by mouth daily, Disp: 90 tablet, Rfl: 1    midodrine (PROAMATINE) 10 MG tablet, Midodrine 10 mg 1 tablet at 8 AM and 1 tablet at 2 PM, Disp: 60 tablet, Rfl: 2    Multiple Vitamins-Minerals (PreserVision AREDS) TABS, Take by mouth, Disp: , Rfl:     nitrofurantoin (MACROBID) 100 mg capsule, Take 100 mg by mouth daily Take with food, Disp: , Rfl:     pantoprazole (PROTONIX) 40 mg tablet, TAKE 1 TABLET(40 MG) BY MOUTH EVERY MORNING, Disp: 90 tablet, Rfl: 1    primidone (MYSOLINE) 50 mg tablet, TAKE 1 TABLET(50 MG) BY MOUTH DAILY, Disp: 90 tablet, Rfl: 1     "mometasone (NASONEX) 50 mcg/act nasal spray, 1 spray into each nostril daily   (Patient not taking: Reported on 2/22/2024), Disp: , Rfl:       Objective:    Vitals:   /66 (BP Location: Right arm, Patient Position: Sitting, Cuff Size: Standard)   Pulse 75   Ht 5' 6\" (1.676 m)   Wt 75.8 kg (167 lb)   SpO2 97%   BMI 26.95 kg/m²   Body mass index is 26.95 kg/m².  Vitals:    03/14/24 1426   Weight: 75.8 kg (167 lb)       Physical Exam  Vitals and nursing note reviewed.   Constitutional:       Appearance: Normal appearance.   Cardiovascular:      Rate and Rhythm: Normal rate and regular rhythm.      Heart sounds: Normal heart sounds.   Pulmonary:      Effort: Pulmonary effort is normal.      Breath sounds: Normal breath sounds.   Abdominal:      Palpations: Abdomen is soft.   Musculoskeletal:      Cervical back: Normal range of motion and neck supple.      Right lower leg: No edema.      Left lower leg: No edema.   Skin:     General: Skin is warm and dry.   Neurological:      Mental Status: She is alert and oriented to person, place, and time.         Lab Review   Orders Only on 03/08/2024   Component Date Value Ref Range Status    Supplier Name 03/08/2024 [employer]   In process    Supplier Phone Number 03/08/2024 [phone]   In process    Order Status 03/08/2024 Contacting Patient   In process    Delivery Request Date 03/08/2024 03/08/2024   In process    Item Description 03/08/2024 O2 with Portability for Existing O2 Patients, Standard Liter Flow   In process    Comment: Qty: 1  Nasal Cannula: 4 LPM 24 hours a day      Item Description 03/08/2024 New Portable Setup, Portable Oxygen Concentrator   In process    Comment: Qty: 1  Nasal Cannula: 4 LPM 24 hours a day      Item Description 03/08/2024 No Portable Contents   In process    Qty: 1   Appointment on 02/24/2024   Component Date Value Ref Range Status    Sodium 02/24/2024 139  135 - 147 mmol/L Final    Potassium 02/24/2024 4.3 "  3.5 - 5.3 mmol/L Final    Chloride 02/24/2024 100  96 - 108 mmol/L Final    CO2 02/24/2024 33 (H)  21 - 32 mmol/L Final    ANION GAP 02/24/2024 6  mmol/L Final    BUN 02/24/2024 21  5 - 25 mg/dL Final    Creatinine 02/24/2024 0.63  0.60 - 1.30 mg/dL Final    Standardized to IDMS reference method    Glucose, Fasting 02/24/2024 116 (H)  65 - 99 mg/dL Final    Calcium 02/24/2024 9.8  8.4 - 10.2 mg/dL Final    eGFR 02/24/2024 82  ml/min/1.73sq m Final   Appointment on 02/09/2024   Component Date Value Ref Range Status    Sodium 02/09/2024 137  135 - 147 mmol/L Final    Potassium 02/09/2024 4.7  3.5 - 5.3 mmol/L Final    Chloride 02/09/2024 100  96 - 108 mmol/L Final    CO2 02/09/2024 31  21 - 32 mmol/L Final    ANION GAP 02/09/2024 6  mmol/L Final    BUN 02/09/2024 17  5 - 25 mg/dL Final    Creatinine 02/09/2024 0.57 (L)  0.60 - 1.30 mg/dL Final    Standardized to IDMS reference method    Glucose, Fasting 02/09/2024 105 (H)  65 - 99 mg/dL Final    Calcium 02/09/2024 10.0  8.4 - 10.2 mg/dL Final    AST 02/09/2024 45 (H)  13 - 39 U/L Final    ALT 02/09/2024 29  7 - 52 U/L Final    Specimen collection should occur prior to Sulfasalazine administration due to the potential for falsely depressed results.     Alkaline Phosphatase 02/09/2024 85  34 - 104 U/L Final    Total Protein 02/09/2024 6.7  6.4 - 8.4 g/dL Final    Albumin 02/09/2024 3.9  3.5 - 5.0 g/dL Final    Total Bilirubin 02/09/2024 0.46  0.20 - 1.00 mg/dL Final    Use of this assay is not recommended for patients undergoing treatment with eltrombopag due to the potential for falsely elevated results.  N-acetyl-p-benzoquinone imine (metabolite of Acetaminophen) will generate erroneously low results in samples for patients that have taken an overdose of Acetaminophen.    eGFR 02/09/2024 84  ml/min/1.73sq m Final    WBC 02/09/2024 10.66 (H)  4.31 - 10.16 Thousand/uL Final    RBC 02/09/2024 4.14  3.81 - 5.12 Million/uL Final    Hemoglobin 02/09/2024 12.4  11.5 - 15.4  g/dL Final    Hematocrit 02/09/2024 39.8  34.8 - 46.1 % Final    MCV 02/09/2024 96  82 - 98 fL Final    MCH 02/09/2024 30.0  26.8 - 34.3 pg Final    MCHC 02/09/2024 31.2 (L)  31.4 - 37.4 g/dL Final    RDW 02/09/2024 15.6 (H)  11.6 - 15.1 % Final    MPV 02/09/2024 12.1  8.9 - 12.7 fL Final    Platelets 02/09/2024 200  149 - 390 Thousands/uL Final    nRBC 02/09/2024 0  /100 WBCs Final    Neutrophils Relative 02/09/2024 73  43 - 75 % Final    Immature Grans % 02/09/2024 1  0 - 2 % Final    Lymphocytes Relative 02/09/2024 14  14 - 44 % Final    Monocytes Relative 02/09/2024 8  4 - 12 % Final    Eosinophils Relative 02/09/2024 3  0 - 6 % Final    Basophils Relative 02/09/2024 1  0 - 1 % Final    Neutrophils Absolute 02/09/2024 7.80 (H)  1.85 - 7.62 Thousands/µL Final    Absolute Immature Grans 02/09/2024 0.05  0.00 - 0.20 Thousand/uL Final    Absolute Lymphocytes 02/09/2024 1.54  0.60 - 4.47 Thousands/µL Final    Absolute Monocytes 02/09/2024 0.84  0.17 - 1.22 Thousand/µL Final    Eosinophils Absolute 02/09/2024 0.35  0.00 - 0.61 Thousand/µL Final    Basophils Absolute 02/09/2024 0.08  0.00 - 0.10 Thousands/µL Final   Admission on 01/26/2024, Discharged on 01/30/2024   Component Date Value Ref Range Status    WBC 01/26/2024 12.26 (H)  4.31 - 10.16 Thousand/uL Final    RBC 01/26/2024 4.41  3.81 - 5.12 Million/uL Final    Hemoglobin 01/26/2024 13.4  11.5 - 15.4 g/dL Final    Hematocrit 01/26/2024 41.0  34.8 - 46.1 % Final    MCV 01/26/2024 93  82 - 98 fL Final    MCH 01/26/2024 30.4  26.8 - 34.3 pg Final    MCHC 01/26/2024 32.7  31.4 - 37.4 g/dL Final    RDW 01/26/2024 15.7 (H)  11.6 - 15.1 % Final    MPV 01/26/2024 10.9  8.9 - 12.7 fL Final    Platelets 01/26/2024 233  149 - 390 Thousands/uL Final    nRBC 01/26/2024 0  /100 WBCs Final    Neutrophils Relative 01/26/2024 76 (H)  43 - 75 % Final    Immature Grans % 01/26/2024 1  0 - 2 % Final    Lymphocytes Relative 01/26/2024 13 (L)  14 - 44 % Final    Monocytes Relative  01/26/2024 5  4 - 12 % Final    Eosinophils Relative 01/26/2024 4  0 - 6 % Final    Basophils Relative 01/26/2024 1  0 - 1 % Final    Neutrophils Absolute 01/26/2024 9.42 (H)  1.85 - 7.62 Thousands/µL Final    Absolute Immature Grans 01/26/2024 0.08  0.00 - 0.20 Thousand/uL Final    Absolute Lymphocytes 01/26/2024 1.56  0.60 - 4.47 Thousands/µL Final    Absolute Monocytes 01/26/2024 0.65  0.17 - 1.22 Thousand/µL Final    Eosinophils Absolute 01/26/2024 0.48  0.00 - 0.61 Thousand/µL Final    Basophils Absolute 01/26/2024 0.07  0.00 - 0.10 Thousands/µL Final    Sodium 01/26/2024 135  135 - 147 mmol/L Final    Potassium 01/26/2024 4.0  3.5 - 5.3 mmol/L Final    Chloride 01/26/2024 102  96 - 108 mmol/L Final    CO2 01/26/2024 26  21 - 32 mmol/L Final    ANION GAP 01/26/2024 7  mmol/L Final    BUN 01/26/2024 21  5 - 25 mg/dL Final    Creatinine 01/26/2024 0.65  0.60 - 1.30 mg/dL Final    Standardized to IDMS reference method    Glucose 01/26/2024 126  65 - 140 mg/dL Final    If the patient is fasting, the ADA then defines impaired fasting glucose as > 100 mg/dL and diabetes as > or equal to 123 mg/dL.    Calcium 01/26/2024 10.4 (H)  8.4 - 10.2 mg/dL Final    AST 01/26/2024 41 (H)  13 - 39 U/L Final    ALT 01/26/2024 31  7 - 52 U/L Final    Specimen collection should occur prior to Sulfasalazine administration due to the potential for falsely depressed results.     Alkaline Phosphatase 01/26/2024 100  34 - 104 U/L Final    Total Protein 01/26/2024 6.9  6.4 - 8.4 g/dL Final    Albumin 01/26/2024 3.8  3.5 - 5.0 g/dL Final    Total Bilirubin 01/26/2024 0.62  0.20 - 1.00 mg/dL Final    Use of this assay is not recommended for patients undergoing treatment with eltrombopag due to the potential for falsely elevated results.  N-acetyl-p-benzoquinone imine (metabolite of Acetaminophen) will generate erroneously low results in samples for patients that have taken an overdose of Acetaminophen.    eGFR 01/26/2024 81  ml/min/1.73sq m  "Final    hs TnI 0hr 01/26/2024 8  \"Refer to ACS Flowchart\"- see link ng/L Final    Comment:                                              Initial (time 0) result  If >=50 ng/L, Myocardial injury suggested ;  Type of myocardial injury and treatment strategy  to be determined.  If 5-49 ng/L, a delta result at 2 hours and or 4 hours will be needed to further evaluate.  If <4 ng/L, and chest pain has been >3 hours since onset, patient may qualify for discharge based on the HEART score in the ED.  If <5 ng/L and <3hours since onset of chest pain, a delta result at 2 hours will be needed to further evaluate.    HS Troponin 99th Percentile URL of a Health Population=12 ng/L with a 95% Confidence Interval of 8-18 ng/L.    Second Troponin (time 2 hours)  If calculated delta >= 20 ng/L,  Myocardial injury suggested ; Type of myocardial injury and treatment strategy to be determined.  If 5-49 ng/L and the calculated delta is 5-19 ng/L, consult medical service for evaluation.  Continue evaluation for ischemia on ecg and other possible etiology and repeat hs troponin at 4 hours.  If delta                            is <5 ng/L at 2 hours, consider discharge based on risk stratification via the HEART score (if in ED), or TYLER risk score in IP/Observation.    HS Troponin 99th Percentile URL of a Health Population=12 ng/L with a 95% Confidence Interval of 8-18 ng/L.    D-Dimer, Quant 01/26/2024 1.05 (H)  <0.50 ug/ml FEU Final    Reference and upper limits to exclude DVT and PE are the same.  Do not use to exclude if clinical symptoms are present.  Pregnant women:  1st trimester:  <0.22 - 1.06 ug/ml FEU  2nd trimester:  <0.22 - 1.88 ug/ml FEU  3rd trimester:   0.24 - 3.28 ug/ml FEU    Note: Normal ranges may not apply to patients who are transgender, non-binary, or whose legal sex, sex at birth, and gender identity differ.      SARS-CoV-2 01/26/2024 Negative  Negative Final    INFLUENZA A PCR 01/26/2024 Negative  Negative Final    " INFLUENZA B PCR 01/26/2024 Negative  Negative Final    RSV PCR 01/26/2024 Negative  Negative Final    Procalcitonin 01/26/2024 0.26 (H)  <=0.25 ng/ml Final    Comment: Suspected Lower Respiratory Tract Infection (LRTI):  - LESS than or EQUAL to 0.25 ng/mL:   low likelihood for bacterial LRTI; antibiotics DISCOURAGED.  - GREATER than 0.25 ng/mL:   increased likelihood for bacterial LRTI; antibiotics ENCOURAGED.    Suspected Sepsis:  - Strongly consider initiating antibiotics in ALL UNSTABLE patients.  - LESS than or EQUAL to 0.5 ng/mL:   low likelihood for bacterial sepsis; antibiotics DISCOURAGED.  - GREATER than 0.5 ng/mL:   increased likelihood for bacterial sepsis; antibiotics ENCOURAGED.  - GREATER than 2 ng/mL:   high risk for severe sepsis / septic shock; antibiotics strongly ENCOURAGED.    Decisions on antibiotic use should not be based solely on Procalcitonin (PCT) levels. If PCT is low but uncertainty exists with stopping antibiotics, repeat PCT in 6-24 hours to confirm the low level. If antibiotics are administered (regardless if initial PCT was high or low), repeat PCT every 1-2 days to consider early antibiotic cessation (when GREATER                            than 80% decrease from the peak OR when PCT drops below designated cutoffs, whichever comes first), so long as the infection is NOT one that typically requires prolonged treatment durations (e.g., bone/joint infections, endocarditis, Staph. aureus bacteremia).    Situations of FALSE-POSITIVE Procalcitonin values:  1) Newborns < 72 hours old  2) Massive stress from severe trauma / burns, major surgery, acute pancreatitis, cardiogenic / hemorrhagic shock, sickle cell crisis, or other organ perfusion abnormalities  3) Malaria and some Candidal infections  4) Treatment with agents that stimulate cytokines (e.g., OKT3, anti-lymphocyte globulins, alemtuzumab, IL-2, granulocyte transfusion [NOT GCSFs])  5) Chronic renal disease causes elevated baseline  "levels (consider GREATER than 0.75 ng/mL as an abnormal cut-off); initiating HD/CRRT may cause transient decreases  6) Paraneoplastic syndromes from medullary thyroid or SCLC, some forms of vasculitis, and acute [id number]                            disease    Situations of FALSE-NEGATIVE Procalcitonin values:  1) Too early in clinical course for PCT to have reached its peak (may repeat in 6-24 hours to confirm low level)  2) Localized infection WITHOUT systemic (SIRS / sepsis) response (e.g., an abscess, osteomyelitis, cystitis)  3) Mycobacteria (e.g., Tuberculosis, MAC)  4) Cystic fibrosis exacerbations      pH, Dameon 01/26/2024 7.347  7.300 - 7.400 Final    pCO2, Dameon 01/26/2024 49.8  42.0 - 50.0 mm Hg Final    pO2, Dameon 01/26/2024 26.2 (L)  35.0 - 45.0 mm Hg Final    HCO3, Dameon 01/26/2024 26.7  24 - 30 mmol/L Final    Base Excess, Dameon 01/26/2024 0.3  mmol/L Final    O2 Content, Dameon 01/26/2024 8.3  ml/dL Final    O2 HGB, VENOUS 01/26/2024 39.0 (L)  60.0 - 80.0 % Final    BNP 01/26/2024 250 (H)  0 - 100 pg/mL Final    Color, UA 01/26/2024 Yellow   Final    Clarity, UA 01/26/2024 Slightly Cloudy   Final    Specific Gravity, UA 01/26/2024 >=1.030  1.000 - 1.030 Final    pH, UA 01/26/2024 5.5  5.0, 5.5, 6.0, 6.5, 7.0, 7.5, 8.0, 8.5, 9.0 Final    Leukocytes, UA 01/26/2024 Negative  Negative Final    Nitrite, UA 01/26/2024 Negative  Negative Final    Protein, UA 01/26/2024 Trace (A)  Negative mg/dl Final    Glucose, UA 01/26/2024 Negative  Negative mg/dl Final    Ketones, UA 01/26/2024 Negative  Negative mg/dl Final    Urobilinogen, UA 01/26/2024 0.2  0.2, 1.0 E.U./dl E.U./dl Final    Bilirubin, UA 01/26/2024 Negative  Negative Final    Occult Blood, UA 01/26/2024 Trace-Intact (A)  Negative Final    hs TnI 2hr 01/26/2024 5  \"Refer to ACS Flowchart\"- see link ng/L Final    Comment:                                              Initial (time 0) result  If >=50 ng/L, Myocardial injury suggested ;  Type of myocardial injury " and treatment strategy  to be determined.  If 5-49 ng/L, a delta result at 2 hours and or 4 hours will be needed to further evaluate.  If <4 ng/L, and chest pain has been >3 hours since onset, patient may qualify for discharge based on the HEART score in the ED.  If <5 ng/L and <3hours since onset of chest pain, a delta result at 2 hours will be needed to further evaluate.    HS Troponin 99th Percentile URL of a Health Population=12 ng/L with a 95% Confidence Interval of 8-18 ng/L.    Second Troponin (time 2 hours)  If calculated delta >= 20 ng/L,  Myocardial injury suggested ; Type of myocardial injury and treatment strategy to be determined.  If 5-49 ng/L and the calculated delta is 5-19 ng/L, consult medical service for evaluation.  Continue evaluation for ischemia on ecg and other possible etiology and repeat hs troponin at 4 hours.  If delta                            is <5 ng/L at 2 hours, consider discharge based on risk stratification via the HEART score (if in ED), or TYLER risk score in IP/Observation.    HS Troponin 99th Percentile URL of a Health Population=12 ng/L with a 95% Confidence Interval of 8-18 ng/L.    Delta 2hr hsTnI 01/26/2024 -3  <20 ng/L Final    RBC, UA 01/26/2024 2-4  None Seen, 0-1, 1-2, 2-4, 0-5 /hpf Final    WBC, UA 01/26/2024 4-10 (A)  None Seen, 0-1, 1-2, 0-5, 2-4 /hpf Final    Epithelial Cells 01/26/2024 Occasional  None Seen, Occasional /hpf Final    Bacteria, UA 01/26/2024 Occasional  None Seen, Occasional /hpf Final    Ca Oxalate Camryn, UA 01/26/2024 Innumerable (A)  None Seen /hpf Final    MUCUS THREADS 01/26/2024 Moderate (A)  None Seen Final    Platelets 01/26/2024 217  149 - 390 Thousands/uL Final    MPV 01/26/2024 10.6  8.9 - 12.7 fL Final    Sodium 01/27/2024 137  135 - 147 mmol/L Final    Potassium 01/27/2024 4.2  3.5 - 5.3 mmol/L Final    Chloride 01/27/2024 102  96 - 108 mmol/L Final    CO2 01/27/2024 28  21 - 32 mmol/L Final    ANION GAP 01/27/2024 7  mmol/L Final    BUN  01/27/2024 20  5 - 25 mg/dL Final    Creatinine 01/27/2024 0.67  0.60 - 1.30 mg/dL Final    Standardized to IDMS reference method    Glucose 01/27/2024 102  65 - 140 mg/dL Final    If the patient is fasting, the ADA then defines impaired fasting glucose as > 100 mg/dL and diabetes as > or equal to 123 mg/dL.    Calcium 01/27/2024 9.7  8.4 - 10.2 mg/dL Final    AST 01/27/2024 34  13 - 39 U/L Final    ALT 01/27/2024 25  7 - 52 U/L Final    Specimen collection should occur prior to Sulfasalazine administration due to the potential for falsely depressed results.     Alkaline Phosphatase 01/27/2024 89  34 - 104 U/L Final    Total Protein 01/27/2024 6.5  6.4 - 8.4 g/dL Final    Albumin 01/27/2024 3.7  3.5 - 5.0 g/dL Final    Total Bilirubin 01/27/2024 0.54  0.20 - 1.00 mg/dL Final    Use of this assay is not recommended for patients undergoing treatment with eltrombopag due to the potential for falsely elevated results.  N-acetyl-p-benzoquinone imine (metabolite of Acetaminophen) will generate erroneously low results in samples for patients that have taken an overdose of Acetaminophen.    eGFR 01/27/2024 80  ml/min/1.73sq m Final    WBC 01/27/2024 10.36 (H)  4.31 - 10.16 Thousand/uL Final    RBC 01/27/2024 4.26  3.81 - 5.12 Million/uL Final    Hemoglobin 01/27/2024 12.9  11.5 - 15.4 g/dL Final    Hematocrit 01/27/2024 39.8  34.8 - 46.1 % Final    MCV 01/27/2024 93  82 - 98 fL Final    MCH 01/27/2024 30.3  26.8 - 34.3 pg Final    MCHC 01/27/2024 32.4  31.4 - 37.4 g/dL Final    RDW 01/27/2024 15.7 (H)  11.6 - 15.1 % Final    Platelets 01/27/2024 218  149 - 390 Thousands/uL Final    MPV 01/27/2024 10.8  8.9 - 12.7 fL Final    Magnesium 01/27/2024 1.8 (L)  1.9 - 2.7 mg/dL Final    WBC 01/28/2024 10.44 (H)  4.31 - 10.16 Thousand/uL Final    RBC 01/28/2024 4.36  3.81 - 5.12 Million/uL Final    Hemoglobin 01/28/2024 12.9  11.5 - 15.4 g/dL Final    Hematocrit 01/28/2024 40.6  34.8 - 46.1 % Final    MCV 01/28/2024 93  82 - 98 fL  Final    MCH 01/28/2024 29.6  26.8 - 34.3 pg Final    MCHC 01/28/2024 31.8  31.4 - 37.4 g/dL Final    RDW 01/28/2024 15.3 (H)  11.6 - 15.1 % Final    MPV 01/28/2024 10.4  8.9 - 12.7 fL Final    Platelets 01/28/2024 228  149 - 390 Thousands/uL Final    nRBC 01/28/2024 0  /100 WBCs Final    Neutrophils Relative 01/28/2024 73  43 - 75 % Final    Immature Grans % 01/28/2024 1  0 - 2 % Final    Lymphocytes Relative 01/28/2024 16  14 - 44 % Final    Monocytes Relative 01/28/2024 5  4 - 12 % Final    Eosinophils Relative 01/28/2024 4  0 - 6 % Final    Basophils Relative 01/28/2024 1  0 - 1 % Final    Neutrophils Absolute 01/28/2024 7.66 (H)  1.85 - 7.62 Thousands/µL Final    Absolute Immature Grans 01/28/2024 0.07  0.00 - 0.20 Thousand/uL Final    Absolute Lymphocytes 01/28/2024 1.69  0.60 - 4.47 Thousands/µL Final    Absolute Monocytes 01/28/2024 0.54  0.17 - 1.22 Thousand/µL Final    Eosinophils Absolute 01/28/2024 0.41  0.00 - 0.61 Thousand/µL Final    Basophils Absolute 01/28/2024 0.07  0.00 - 0.10 Thousands/µL Final    Sodium 01/28/2024 138  135 - 147 mmol/L Final    Potassium 01/28/2024 4.0  3.5 - 5.3 mmol/L Final    Chloride 01/28/2024 101  96 - 108 mmol/L Final    CO2 01/28/2024 30  21 - 32 mmol/L Final    ANION GAP 01/28/2024 7  mmol/L Final    BUN 01/28/2024 24  5 - 25 mg/dL Final    Creatinine 01/28/2024 0.66  0.60 - 1.30 mg/dL Final    Standardized to IDMS reference method    Glucose 01/28/2024 105  65 - 140 mg/dL Final    If the patient is fasting, the ADA then defines impaired fasting glucose as > 100 mg/dL and diabetes as > or equal to 123 mg/dL.    Calcium 01/28/2024 10.5 (H)  8.4 - 10.2 mg/dL Final    AST 01/28/2024 35  13 - 39 U/L Final    ALT 01/28/2024 24  7 - 52 U/L Final    Specimen collection should occur prior to Sulfasalazine administration due to the potential for falsely depressed results.     Alkaline Phosphatase 01/28/2024 98  34 - 104 U/L Final    Total Protein 01/28/2024 6.8  6.4 - 8.4 g/dL  Final    Albumin 01/28/2024 3.7  3.5 - 5.0 g/dL Final    Total Bilirubin 01/28/2024 0.59  0.20 - 1.00 mg/dL Final    Use of this assay is not recommended for patients undergoing treatment with eltrombopag due to the potential for falsely elevated results.  N-acetyl-p-benzoquinone imine (metabolite of Acetaminophen) will generate erroneously low results in samples for patients that have taken an overdose of Acetaminophen.    eGFR 01/28/2024 80  ml/min/1.73sq m Final    Magnesium 01/28/2024 2.0  1.9 - 2.7 mg/dL Final    Ventricular Rate 01/26/2024 70  BPM Final    Atrial Rate 01/26/2024 70  BPM Final    MA Interval 01/26/2024 176  ms Final    QRSD Interval 01/26/2024 94  ms Final    QT Interval 01/26/2024 444  ms Final    QTC Interval 01/26/2024 479  ms Final    P Axis 01/26/2024 38  degrees Final    QRS Axis 01/26/2024 38  degrees Final    T Wave Axis 01/26/2024 41  degrees Final    WBC 01/29/2024 11.45 (H)  4.31 - 10.16 Thousand/uL Final    RBC 01/29/2024 4.10  3.81 - 5.12 Million/uL Final    Hemoglobin 01/29/2024 12.3  11.5 - 15.4 g/dL Final    Hematocrit 01/29/2024 37.9  34.8 - 46.1 % Final    MCV 01/29/2024 92  82 - 98 fL Final    MCH 01/29/2024 30.0  26.8 - 34.3 pg Final    MCHC 01/29/2024 32.5  31.4 - 37.4 g/dL Final    RDW 01/29/2024 15.1  11.6 - 15.1 % Final    MPV 01/29/2024 10.6  8.9 - 12.7 fL Final    Platelets 01/29/2024 229  149 - 390 Thousands/uL Final    nRBC 01/29/2024 0  /100 WBCs Final    Neutrophils Relative 01/29/2024 81 (H)  43 - 75 % Final    Immature Grans % 01/29/2024 1  0 - 2 % Final    Lymphocytes Relative 01/29/2024 13 (L)  14 - 44 % Final    Monocytes Relative 01/29/2024 4  4 - 12 % Final    Eosinophils Relative 01/29/2024 1  0 - 6 % Final    Basophils Relative 01/29/2024 0  0 - 1 % Final    Neutrophils Absolute 01/29/2024 9.29 (H)  1.85 - 7.62 Thousands/µL Final    Absolute Immature Grans 01/29/2024 0.06  0.00 - 0.20 Thousand/uL Final    Absolute Lymphocytes 01/29/2024 1.45  0.60 - 4.47  Thousands/µL Final    Absolute Monocytes 01/29/2024 0.48  0.17 - 1.22 Thousand/µL Final    Eosinophils Absolute 01/29/2024 0.12  0.00 - 0.61 Thousand/µL Final    Basophils Absolute 01/29/2024 0.05  0.00 - 0.10 Thousands/µL Final    Sodium 01/29/2024 136  135 - 147 mmol/L Final    Potassium 01/29/2024 4.1  3.5 - 5.3 mmol/L Final    Chloride 01/29/2024 100  96 - 108 mmol/L Final    CO2 01/29/2024 28  21 - 32 mmol/L Final    ANION GAP 01/29/2024 8  mmol/L Final    BUN 01/29/2024 28 (H)  5 - 25 mg/dL Final    Creatinine 01/29/2024 0.66  0.60 - 1.30 mg/dL Final    Standardized to IDMS reference method    Glucose 01/29/2024 98  65 - 140 mg/dL Final    If the patient is fasting, the ADA then defines impaired fasting glucose as > 100 mg/dL and diabetes as > or equal to 123 mg/dL.    Calcium 01/29/2024 10.3 (H)  8.4 - 10.2 mg/dL Final    AST 01/29/2024 29  13 - 39 U/L Final    ALT 01/29/2024 21  7 - 52 U/L Final    Specimen collection should occur prior to Sulfasalazine administration due to the potential for falsely depressed results.     Alkaline Phosphatase 01/29/2024 93  34 - 104 U/L Final    Total Protein 01/29/2024 6.2 (L)  6.4 - 8.4 g/dL Final    Albumin 01/29/2024 3.8  3.5 - 5.0 g/dL Final    Total Bilirubin 01/29/2024 0.46  0.20 - 1.00 mg/dL Final    Use of this assay is not recommended for patients undergoing treatment with eltrombopag due to the potential for falsely elevated results.  N-acetyl-p-benzoquinone imine (metabolite of Acetaminophen) will generate erroneously low results in samples for patients that have taken an overdose of Acetaminophen.    eGFR 01/29/2024 80  ml/min/1.73sq m Final    Supplier Name 01/29/2024 [employer] [facility]   Final-Edited    Supplier Phone Number 01/29/2024 [phone]   Final-Edited    Order Status 01/29/2024 Delivery Successful   Final-Edited    Delivery Note 01/29/2024 Patient requiring additional O2 at home, Rx is now at rest and on exertion.  New Rx  attached   Final-Edited    Delivery Request Date 01/29/2024 01/29/2024   Final-Edited    Date Delivered  01/29/2024 01/29/2024   Final-Edited    Supplier Name 01/29/2024 01/29/2024   Final-Edited    Item Description 01/29/2024 O2 with Portability for Existing O2 Patients, Standard Liter Flow   Final-Edited    Comment: Qty: 1  Nasal Cannula: 4 LPM 24 hours a day      Item Description 01/29/2024 New Portable Setup, Portable Gaseous Oxygen System   Final-Edited    Comment: Qty: 1  Nasal Cannula: 4 LPM 24 hours a day     Appointment on 01/22/2024   Component Date Value Ref Range Status    WBC 01/22/2024 15.25 (H)  4.31 - 10.16 Thousand/uL Final    RBC 01/22/2024 4.64  3.81 - 5.12 Million/uL Final    Hemoglobin 01/22/2024 13.8  11.5 - 15.4 g/dL Final    Hematocrit 01/22/2024 44.0  34.8 - 46.1 % Final    MCV 01/22/2024 95  82 - 98 fL Final    MCH 01/22/2024 29.7  26.8 - 34.3 pg Final    MCHC 01/22/2024 31.4  31.4 - 37.4 g/dL Final    RDW 01/22/2024 15.5 (H)  11.6 - 15.1 % Final    MPV 01/22/2024 10.9  8.9 - 12.7 fL Final    Platelets 01/22/2024 285  149 - 390 Thousands/uL Final    nRBC 01/22/2024 0  /100 WBCs Final    Neutrophils Relative 01/22/2024 73  43 - 75 % Final    Immature Grans % 01/22/2024 1  0 - 2 % Final    Lymphocytes Relative 01/22/2024 14  14 - 44 % Final    Monocytes Relative 01/22/2024 6  4 - 12 % Final    Eosinophils Relative 01/22/2024 5  0 - 6 % Final    Basophils Relative 01/22/2024 1  0 - 1 % Final    Neutrophils Absolute 01/22/2024 11.31 (H)  1.85 - 7.62 Thousands/µL Final    Absolute Immature Grans 01/22/2024 0.09  0.00 - 0.20 Thousand/uL Final    Absolute Lymphocytes 01/22/2024 2.08  0.60 - 4.47 Thousands/µL Final    Absolute Monocytes 01/22/2024 0.91  0.17 - 1.22 Thousand/µL Final    Eosinophils Absolute 01/22/2024 0.78 (H)  0.00 - 0.61 Thousand/µL Final    Basophils Absolute 01/22/2024 0.08  0.00 - 0.10 Thousands/µL Final    Sodium 01/22/2024 138  135 - 147 mmol/L Final    Potassium 01/22/2024  4.0  3.5 - 5.3 mmol/L Final    Chloride 01/22/2024 105  96 - 108 mmol/L Final    CO2 01/22/2024 26  21 - 32 mmol/L Final    ANION GAP 01/22/2024 7  mmol/L Final    BUN 01/22/2024 24  5 - 25 mg/dL Final    Creatinine 01/22/2024 0.65  0.60 - 1.30 mg/dL Final    Standardized to IDMS reference method    Glucose, Fasting 01/22/2024 134 (H)  65 - 99 mg/dL Final    Calcium 01/22/2024 9.9  8.4 - 10.2 mg/dL Final    Verify by repeat    AST 01/22/2024 51 (H)  13 - 39 U/L Final    ALT 01/22/2024 39  7 - 52 U/L Final    Specimen collection should occur prior to Sulfasalazine administration due to the potential for falsely depressed results.     Alkaline Phosphatase 01/22/2024 112 (H)  34 - 104 U/L Final    Total Protein 01/22/2024 6.9  6.4 - 8.4 g/dL Final    Albumin 01/22/2024 3.9  3.5 - 5.0 g/dL Final    Total Bilirubin 01/22/2024 0.59  0.20 - 1.00 mg/dL Final    Use of this assay is not recommended for patients undergoing treatment with eltrombopag due to the potential for falsely elevated results.  N-acetyl-p-benzoquinone imine (metabolite of Acetaminophen) will generate erroneously low results in samples for patients that have taken an overdose of Acetaminophen.    eGFR 01/22/2024 81  ml/min/1.73sq m Final    Hemoglobin A1C 01/22/2024 6.1 (H)  Normal 4.0-5.6%; PreDiabetic 5.7-6.4%; Diabetic >=6.5%; Glycemic control for adults with diabetes <7.0% % Final    EAG 01/22/2024 128  mg/dl Final    Cholesterol 01/22/2024 135  See Comment mg/dL Final    Cholesterol:         Pediatric <18 Years        Desirable          <170 mg/dL      Borderline High    170-199 mg/dL      High               >=200 mg/dL        Adult >=18 Years            Desirable         <200 mg/dL      Borderline High   200-239 mg/dL      High              >239 mg/dL      Triglycerides 01/22/2024 171 (H)  See Comment mg/dL Final    Triglyceride:     0-9Y            <75mg/dL     10Y-17Y         <90 mg/dL       >=18Y     Normal          <150 mg/dL     Borderline  "High 150-199 mg/dL     High            200-499 mg/dL        Very High       >499 mg/dL    Specimen collection should occur prior to Metamizole administration due to the potential for falsely depressed results.    HDL, Direct 01/22/2024 30 (L)  >=50 mg/dL Final    LDL Calculated 01/22/2024 71  0 - 100 mg/dL Final    LDL Cholesterol:     Optimal           <100 mg/dl     Near Optimal      100-129 mg/dl     Above Optimal       Borderline High 130-159 mg/dl       High            160-189 mg/dl       Very High       >189 mg/dl         This screening LDL is a calculated result.   It does not have the accuracy of the Direct Measured LDL in the monitoring of patients with hyperlipidemia and/or statin therapy.   Direct Measure LDL (BME043) must be ordered separately in these patients.    Non-HDL-Chol (CHOL-HDL) 01/22/2024 105  mg/dl Final    TSH 3RD GENERATON 01/22/2024 2.558  0.450 - 4.500 uIU/mL Final    The recommended reference ranges for TSH during pregnancy are as follows:   First trimester 0.100 to 2.500 uIU/mL   Second trimester  0.200 to 3.000 uIU/mL   Third trimester 0.300 to 3.000 uIU/m    Note: Normal ranges may not apply to patients who are transgender, non-binary, or whose legal sex, sex at birth, and gender identity differ.  Adult TSH (3rd generation) reference range follows the recommended guidelines of the [employer], January, 2020.         Bladimir Caraballo MD        \"This note has been constructed using a voice recognition system.Therefore there may be syntax, spelling, and/or grammatical errors. Please call if you have any questions. \"  "

## 2024-03-14 ENCOUNTER — OFFICE VISIT (OUTPATIENT)
Dept: FAMILY MEDICINE CLINIC | Facility: CLINIC | Age: 84
End: 2024-03-14
Payer: MEDICARE

## 2024-03-14 VITALS
OXYGEN SATURATION: 97 % | DIASTOLIC BLOOD PRESSURE: 66 MMHG | HEIGHT: 66 IN | HEART RATE: 75 BPM | WEIGHT: 167 LBS | SYSTOLIC BLOOD PRESSURE: 118 MMHG | BODY MASS INDEX: 26.84 KG/M2

## 2024-03-14 DIAGNOSIS — J96.11 CHRONIC HYPOXEMIC RESPIRATORY FAILURE (HCC): Chronic | ICD-10-CM

## 2024-03-14 DIAGNOSIS — E78.5 DYSLIPIDEMIA: ICD-10-CM

## 2024-03-14 DIAGNOSIS — Z86.73 HISTORY OF TRANSIENT ISCHEMIC ATTACK (TIA): ICD-10-CM

## 2024-03-14 DIAGNOSIS — I50.32 CHRONIC DIASTOLIC CHF (CONGESTIVE HEART FAILURE) (HCC): ICD-10-CM

## 2024-03-14 DIAGNOSIS — E03.9 ACQUIRED HYPOTHYROIDISM: ICD-10-CM

## 2024-03-14 DIAGNOSIS — E11.9 TYPE 2 DIABETES MELLITUS WITHOUT COMPLICATION, WITHOUT LONG-TERM CURRENT USE OF INSULIN (HCC): ICD-10-CM

## 2024-03-14 DIAGNOSIS — F33.40 RECURRENT MAJOR DEPRESSIVE DISORDER, IN REMISSION (HCC): ICD-10-CM

## 2024-03-14 DIAGNOSIS — I10 ESSENTIAL HYPERTENSION: ICD-10-CM

## 2024-03-14 DIAGNOSIS — Z13.0 SCREENING FOR DEFICIENCY ANEMIA: ICD-10-CM

## 2024-03-14 DIAGNOSIS — K21.9 GASTROESOPHAGEAL REFLUX DISEASE WITHOUT ESOPHAGITIS: ICD-10-CM

## 2024-03-14 DIAGNOSIS — F41.9 ANXIETY: ICD-10-CM

## 2024-03-14 DIAGNOSIS — R41.82 ALTERED MENTAL STATUS, UNSPECIFIED ALTERED MENTAL STATUS TYPE: Primary | ICD-10-CM

## 2024-03-14 PROCEDURE — G2211 COMPLEX E/M VISIT ADD ON: HCPCS | Performed by: INTERNAL MEDICINE

## 2024-03-14 PROCEDURE — 99214 OFFICE O/P EST MOD 30 MIN: CPT | Performed by: INTERNAL MEDICINE

## 2024-03-14 NOTE — ASSESSMENT & PLAN NOTE
Lab Results   Component Value Date    HGBA1C 6.1 (H) 01/22/2024   Patient currently not on any medication we will follow-up with repeat A1c later

## 2024-03-14 NOTE — ASSESSMENT & PLAN NOTE
With current presentation, impairment but no focal neuronal deficits noted at present time will check the urine continue Plavix and atorvastatin MRI if needed patient should go to ER if symptoms get worse

## 2024-03-14 NOTE — ASSESSMENT & PLAN NOTE
Pulmonary note appreciated currently patient is using almost 4 L of oxygen during the daytime in the nighttime she does check her pulse oximetry at home she has a portable concentrator continue with the same treatment.  Oxygen saturation is 97% here today

## 2024-03-14 NOTE — ASSESSMENT & PLAN NOTE
As mentioned in history of illness patient with symptoms of memory impairment generalized weakness in the recent past sleeping a lot which she has a problem with this in the past but more so now. No urinary symptoms. Neurological symptoms none. Get lab work done including urinalysis and culture rule out UTI also MRI if necessary

## 2024-03-14 NOTE — ASSESSMENT & PLAN NOTE
Patient with orthostatic blood pressure changes on bisoprolol and midodrine blood pressure today here is 118/66 continue to monitor blood pressures if possible at home 1 or 2 times a day

## 2024-03-14 NOTE — ASSESSMENT & PLAN NOTE
Wt Readings from Last 3 Encounters:   03/14/24 75.8 kg (167 lb)   02/26/24 75.8 kg (167 lb)   02/22/24 76.9 kg (169 lb 9.6 oz)   Patient is being followed by the cardiology whose note I appreciated the Lasix dosage has been increased to 40 mg daily.  Recommend patient to monitor her weights and follow-up with a BMP to check the potassium

## 2024-03-14 NOTE — ASSESSMENT & PLAN NOTE
Patient with hypothyroidism currently taking levothyroxine 88 mcg daily we will continue with the same dose follow-up with repeat lab adjust the dose if necessary

## 2024-03-14 NOTE — ASSESSMENT & PLAN NOTE
Patient with anxiety disorder takes clonazepam as needed and also she is on Cymbalta 60 mg daily will continue with the same discussed with patient and the daughter regarding anxiety

## 2024-03-15 ENCOUNTER — APPOINTMENT (OUTPATIENT)
Dept: LAB | Facility: HOSPITAL | Age: 84
End: 2024-03-15
Payer: MEDICARE

## 2024-03-15 DIAGNOSIS — I50.32 CHRONIC DIASTOLIC CHF (CONGESTIVE HEART FAILURE) (HCC): ICD-10-CM

## 2024-03-15 DIAGNOSIS — I10 ESSENTIAL HYPERTENSION: ICD-10-CM

## 2024-03-15 DIAGNOSIS — Z13.0 SCREENING FOR DEFICIENCY ANEMIA: ICD-10-CM

## 2024-03-15 DIAGNOSIS — E78.5 DYSLIPIDEMIA: ICD-10-CM

## 2024-03-15 DIAGNOSIS — E11.9 TYPE 2 DIABETES MELLITUS WITHOUT COMPLICATION, WITHOUT LONG-TERM CURRENT USE OF INSULIN (HCC): ICD-10-CM

## 2024-03-15 DIAGNOSIS — E03.9 ACQUIRED HYPOTHYROIDISM: ICD-10-CM

## 2024-03-15 LAB
ALBUMIN SERPL BCP-MCNC: 3.9 G/DL (ref 3.5–5)
ALP SERPL-CCNC: 87 U/L (ref 34–104)
ALT SERPL W P-5'-P-CCNC: 25 U/L (ref 7–52)
AMORPH URATE CRY URNS QL MICRO: NORMAL /HPF
ANION GAP SERPL CALCULATED.3IONS-SCNC: 7 MMOL/L (ref 4–13)
AST SERPL W P-5'-P-CCNC: 40 U/L (ref 13–39)
BACTERIA UR QL AUTO: NORMAL /HPF
BASOPHILS # BLD AUTO: 0.08 THOUSANDS/ÂΜL (ref 0–0.1)
BASOPHILS NFR BLD AUTO: 1 % (ref 0–1)
BILIRUB SERPL-MCNC: 0.69 MG/DL (ref 0.2–1)
BILIRUB UR QL STRIP: NEGATIVE
BUN SERPL-MCNC: 26 MG/DL (ref 5–25)
CALCIUM SERPL-MCNC: 10 MG/DL (ref 8.4–10.2)
CHLORIDE SERPL-SCNC: 100 MMOL/L (ref 96–108)
CHOLEST SERPL-MCNC: 151 MG/DL
CLARITY UR: CLEAR
CO2 SERPL-SCNC: 30 MMOL/L (ref 21–32)
COLOR UR: YELLOW
CREAT SERPL-MCNC: 0.7 MG/DL (ref 0.6–1.3)
CREAT UR-MCNC: 72 MG/DL
EOSINOPHIL # BLD AUTO: 0.47 THOUSAND/ÂΜL (ref 0–0.61)
EOSINOPHIL NFR BLD AUTO: 5 % (ref 0–6)
ERYTHROCYTE [DISTWIDTH] IN BLOOD BY AUTOMATED COUNT: 16 % (ref 11.6–15.1)
EST. AVERAGE GLUCOSE BLD GHB EST-MCNC: 131 MG/DL
GFR SERPL CREATININE-BSD FRML MDRD: 79 ML/MIN/1.73SQ M
GLUCOSE P FAST SERPL-MCNC: 107 MG/DL (ref 65–99)
GLUCOSE UR STRIP-MCNC: NEGATIVE MG/DL
HBA1C MFR BLD: 6.2 %
HCT VFR BLD AUTO: 38.8 % (ref 34.8–46.1)
HDLC SERPL-MCNC: 32 MG/DL
HGB BLD-MCNC: 12.1 G/DL (ref 11.5–15.4)
HGB UR QL STRIP.AUTO: ABNORMAL
IMM GRANULOCYTES # BLD AUTO: 0.04 THOUSAND/UL (ref 0–0.2)
IMM GRANULOCYTES NFR BLD AUTO: 0 % (ref 0–2)
KETONES UR STRIP-MCNC: NEGATIVE MG/DL
LDLC SERPL CALC-MCNC: 86 MG/DL (ref 0–100)
LEUKOCYTE ESTERASE UR QL STRIP: ABNORMAL
LYMPHOCYTES # BLD AUTO: 1.75 THOUSANDS/ÂΜL (ref 0.6–4.47)
LYMPHOCYTES NFR BLD AUTO: 17 % (ref 14–44)
MCH RBC QN AUTO: 29 PG (ref 26.8–34.3)
MCHC RBC AUTO-ENTMCNC: 31.2 G/DL (ref 31.4–37.4)
MCV RBC AUTO: 93 FL (ref 82–98)
MICROALBUMIN UR-MCNC: 14 MG/L
MICROALBUMIN/CREAT 24H UR: 19 MG/G CREATININE (ref 0–30)
MONOCYTES # BLD AUTO: 0.67 THOUSAND/ÂΜL (ref 0.17–1.22)
MONOCYTES NFR BLD AUTO: 6 % (ref 4–12)
NEUTROPHILS # BLD AUTO: 7.4 THOUSANDS/ÂΜL (ref 1.85–7.62)
NEUTS SEG NFR BLD AUTO: 71 % (ref 43–75)
NITRITE UR QL STRIP: NEGATIVE
NON-SQ EPI CELLS URNS QL MICRO: NORMAL /HPF
NONHDLC SERPL-MCNC: 119 MG/DL
NRBC BLD AUTO-RTO: 0 /100 WBCS
OTHER STN SPEC: NORMAL
PH UR STRIP.AUTO: 6 [PH]
PLATELET # BLD AUTO: 226 THOUSANDS/UL (ref 149–390)
PMV BLD AUTO: 11.1 FL (ref 8.9–12.7)
POTASSIUM SERPL-SCNC: 4.2 MMOL/L (ref 3.5–5.3)
PROT SERPL-MCNC: 7 G/DL (ref 6.4–8.4)
PROT UR STRIP-MCNC: NEGATIVE MG/DL
RBC # BLD AUTO: 4.17 MILLION/UL (ref 3.81–5.12)
RBC #/AREA URNS AUTO: NORMAL /HPF
SODIUM SERPL-SCNC: 137 MMOL/L (ref 135–147)
SP GR UR STRIP.AUTO: 1.01 (ref 1–1.03)
TRIGL SERPL-MCNC: 167 MG/DL
TSH SERPL DL<=0.05 MIU/L-ACNC: 0.63 UIU/ML (ref 0.45–4.5)
UROBILINOGEN UR QL STRIP.AUTO: 0.2 E.U./DL
WBC # BLD AUTO: 10.41 THOUSAND/UL (ref 4.31–10.16)
WBC #/AREA URNS AUTO: NORMAL /HPF

## 2024-03-15 PROCEDURE — 36415 COLL VENOUS BLD VENIPUNCTURE: CPT

## 2024-03-15 PROCEDURE — 84443 ASSAY THYROID STIM HORMONE: CPT

## 2024-03-15 PROCEDURE — 82570 ASSAY OF URINE CREATININE: CPT

## 2024-03-15 PROCEDURE — 81001 URINALYSIS AUTO W/SCOPE: CPT

## 2024-03-15 PROCEDURE — 80053 COMPREHEN METABOLIC PANEL: CPT

## 2024-03-15 PROCEDURE — 85025 COMPLETE CBC W/AUTO DIFF WBC: CPT

## 2024-03-15 PROCEDURE — 83036 HEMOGLOBIN GLYCOSYLATED A1C: CPT

## 2024-03-15 PROCEDURE — 82043 UR ALBUMIN QUANTITATIVE: CPT

## 2024-03-15 PROCEDURE — 80061 LIPID PANEL: CPT

## 2024-03-18 ENCOUNTER — EPISODE CHANGES (OUTPATIENT)
Dept: CASE MANAGEMENT | Facility: OTHER | Age: 84
End: 2024-03-18

## 2024-03-18 ENCOUNTER — PATIENT OUTREACH (OUTPATIENT)
Dept: CASE MANAGEMENT | Facility: HOSPITAL | Age: 84
End: 2024-03-18

## 2024-03-18 NOTE — PROGRESS NOTES
Referral for [facility] received. APRIL LOMAS completed chart review and determined patient meets criteria for BYP. Outreach call scheduled to determine patient's interest in enrollment into the program.     Chart review completed for the following sections:  Recent Vital Signs  Allergies/Contradictions  Medication Review   History   [facility]   Problem List  Immunizations  Past hospitalizations and major procedures, including surgery  Significant past illnesses and treatment history including: Other provider notes  Relevant past medications related to the patient's condition

## 2024-03-20 ENCOUNTER — PATIENT OUTREACH (OUTPATIENT)
Dept: CASE MANAGEMENT | Facility: OTHER | Age: 84
End: 2024-03-20

## 2024-03-20 NOTE — PROGRESS NOTES
Outpatient Care Management Note:  Outreach call placed to  Ms. Martinez regarding outpatient care management services.  Reviewed program and services offered.     At this time, Ms. Martinez is not interested in outreach. She has completed needed appointments since hospitalization and has all needed medications.  Her daughter assists as needed.

## 2024-03-22 ENCOUNTER — TELEPHONE (OUTPATIENT)
Age: 84
End: 2024-03-22

## 2024-03-22 NOTE — TELEPHONE ENCOUNTER
Patient called and states was told to call if gaining weight patient states is gaining weight, retaining fluid in belly and is barely urinating. States usually gets up in the night and has not and is very dry. Would like a call back [phone]. Symptoms are for 2 days

## 2024-03-23 ENCOUNTER — APPOINTMENT (EMERGENCY)
Dept: RADIOLOGY | Facility: HOSPITAL | Age: 84
End: 2024-03-23
Payer: MEDICARE

## 2024-03-23 ENCOUNTER — HOSPITAL ENCOUNTER (EMERGENCY)
Facility: HOSPITAL | Age: 84
Discharge: HOME | End: 2024-03-24
Attending: EMERGENCY MEDICINE
Payer: MEDICARE

## 2024-03-23 DIAGNOSIS — R14.0 ABDOMINAL DISTENSION: ICD-10-CM

## 2024-03-23 DIAGNOSIS — R06.02 SHORTNESS OF BREATH: Primary | ICD-10-CM

## 2024-03-23 LAB
2HR DELTA HS TROPONIN: 0 NG/L
ALBUMIN SERPL BCP-MCNC: 4.3 G/DL (ref 3.5–5)
ALP SERPL-CCNC: 86 U/L (ref 34–104)
ALT SERPL W P-5'-P-CCNC: 27 U/L (ref 7–52)
ANION GAP SERPL CALCULATED.3IONS-SCNC: 7 MMOL/L (ref 4–13)
AST SERPL W P-5'-P-CCNC: 42 U/L (ref 13–39)
BASOPHILS # BLD AUTO: 0.08 THOUSANDS/ÂΜL (ref 0–0.1)
BASOPHILS NFR BLD AUTO: 1 % (ref 0–1)
BILIRUB SERPL-MCNC: 0.51 MG/DL (ref 0.2–1)
BNP SERPL-MCNC: 114 PG/ML (ref 0–100)
BUN SERPL-MCNC: 20 MG/DL (ref 5–25)
CALCIUM SERPL-MCNC: 10.8 MG/DL (ref 8.4–10.2)
CARDIAC TROPONIN I PNL SERPL HS: 5 NG/L
CARDIAC TROPONIN I PNL SERPL HS: 5 NG/L
CHLORIDE SERPL-SCNC: 102 MMOL/L (ref 96–108)
CO2 SERPL-SCNC: 33 MMOL/L (ref 21–32)
CREAT SERPL-MCNC: 0.67 MG/DL (ref 0.6–1.3)
EOSINOPHIL # BLD AUTO: 0.47 THOUSAND/ÂΜL (ref 0–0.61)
EOSINOPHIL NFR BLD AUTO: 5 % (ref 0–6)
ERYTHROCYTE [DISTWIDTH] IN BLOOD BY AUTOMATED COUNT: 16 % (ref 11.6–15.1)
FLUAV RNA RESP QL NAA+PROBE: NEGATIVE
FLUBV RNA RESP QL NAA+PROBE: NEGATIVE
GFR SERPL CREATININE-BSD FRML MDRD: 80 ML/MIN/1.73SQ M
GLUCOSE SERPL-MCNC: 128 MG/DL (ref 65–140)
HCT VFR BLD AUTO: 44 % (ref 34.8–46.1)
HGB BLD-MCNC: 13.6 G/DL (ref 11.5–15.4)
IMM GRANULOCYTES # BLD AUTO: 0.04 THOUSAND/UL (ref 0–0.2)
IMM GRANULOCYTES NFR BLD AUTO: 0 % (ref 0–2)
LYMPHOCYTES # BLD AUTO: 1.42 THOUSANDS/ÂΜL (ref 0.6–4.47)
LYMPHOCYTES NFR BLD AUTO: 15 % (ref 14–44)
MCH RBC QN AUTO: 29.4 PG (ref 26.8–34.3)
MCHC RBC AUTO-ENTMCNC: 30.9 G/DL (ref 31.4–37.4)
MCV RBC AUTO: 95 FL (ref 82–98)
MONOCYTES # BLD AUTO: 0.48 THOUSAND/ÂΜL (ref 0.17–1.22)
MONOCYTES NFR BLD AUTO: 5 % (ref 4–12)
NEUTROPHILS # BLD AUTO: 6.87 THOUSANDS/ÂΜL (ref 1.85–7.62)
NEUTS SEG NFR BLD AUTO: 74 % (ref 43–75)
NRBC BLD AUTO-RTO: 0 /100 WBCS
PLATELET # BLD AUTO: 259 THOUSANDS/UL (ref 149–390)
PMV BLD AUTO: 11.2 FL (ref 8.9–12.7)
POTASSIUM SERPL-SCNC: 4.1 MMOL/L (ref 3.5–5.3)
PROT SERPL-MCNC: 7.3 G/DL (ref 6.4–8.4)
RBC # BLD AUTO: 4.63 MILLION/UL (ref 3.81–5.12)
RSV RNA RESP QL NAA+PROBE: NEGATIVE
SARS-COV-2 RNA RESP QL NAA+PROBE: NEGATIVE
SODIUM SERPL-SCNC: 142 MMOL/L (ref 135–147)
WBC # BLD AUTO: 9.36 THOUSAND/UL (ref 4.31–10.16)

## 2024-03-23 PROCEDURE — 83880 ASSAY OF NATRIURETIC PEPTIDE: CPT | Performed by: EMERGENCY MEDICINE

## 2024-03-23 PROCEDURE — 84484 ASSAY OF TROPONIN QUANT: CPT | Performed by: EMERGENCY MEDICINE

## 2024-03-23 PROCEDURE — 85025 COMPLETE CBC W/AUTO DIFF WBC: CPT | Performed by: EMERGENCY MEDICINE

## 2024-03-23 PROCEDURE — 96374 THER/PROPH/DIAG INJ IV PUSH: CPT

## 2024-03-23 PROCEDURE — 0241U HB NFCT DS VIR RESP RNA 4 TRGT: CPT | Performed by: EMERGENCY MEDICINE

## 2024-03-23 PROCEDURE — 71260 CT THORAX DX C+: CPT

## 2024-03-23 PROCEDURE — 93005 ELECTROCARDIOGRAM TRACING: CPT

## 2024-03-23 PROCEDURE — 74177 CT ABD & PELVIS W/CONTRAST: CPT

## 2024-03-23 PROCEDURE — 71045 X-RAY EXAM CHEST 1 VIEW: CPT

## 2024-03-23 PROCEDURE — 80053 COMPREHEN METABOLIC PANEL: CPT | Performed by: EMERGENCY MEDICINE

## 2024-03-23 PROCEDURE — 99285 EMERGENCY DEPT VISIT HI MDM: CPT | Performed by: EMERGENCY MEDICINE

## 2024-03-23 PROCEDURE — 36415 COLL VENOUS BLD VENIPUNCTURE: CPT | Performed by: EMERGENCY MEDICINE

## 2024-03-23 PROCEDURE — 99285 EMERGENCY DEPT VISIT HI MDM: CPT

## 2024-03-23 RX ORDER — FUROSEMIDE 10 MG/ML
60 INJECTION INTRAMUSCULAR; INTRAVENOUS ONCE
Status: COMPLETED | OUTPATIENT
Start: 2024-03-23 | End: 2024-03-23

## 2024-03-23 RX ADMIN — IOHEXOL 100 ML: 350 INJECTION, SOLUTION INTRAVENOUS at 23:51

## 2024-03-23 RX ADMIN — FUROSEMIDE 60 MG: 10 INJECTION, SOLUTION INTRAVENOUS at 21:14

## 2024-03-24 VITALS
DIASTOLIC BLOOD PRESSURE: 62 MMHG | OXYGEN SATURATION: 98 % | TEMPERATURE: 97.8 F | RESPIRATION RATE: 18 BRPM | SYSTOLIC BLOOD PRESSURE: 132 MMHG | BODY MASS INDEX: 27.01 KG/M2 | HEART RATE: 66 BPM | WEIGHT: 167.33 LBS

## 2024-03-24 NOTE — ED PROVIDER NOTES
History  Chief Complaint   Patient presents with    Shortness of Breath     Pt reported hx of CHF and was told to come for increased weight gain of 3-4 lbs. Pt reported 8 lbs weight gain since Sunday. Is on oxygen 4lpm at home.      HPI  Patient is an 83-year-old female history of diabetes, CHF, hypertension, hyperlipidemia, CVA presenting for evaluation of abdominal distention, shortness of breath, approximately 6 pound weight gain.  Patient states that symptoms have been progressive over the course of the past week.  Patient feels that she is retaining fluid.  Patient denies abdominal pain, nausea, vomiting, diarrhea, constipation.  Patient feels that she has been urinating a bit less despite compliance with 40 mg daily oral Lasix.  Patient denies chest pain, states intermittent shortness of breath with activities at home but states that overall she has remained relatively active and denies significant fatigue.  Prior to Admission Medications   Prescriptions Last Dose Informant Patient Reported? Taking?   Calcium Carbonate-Vit D-Min (CALCIUM 1200 PO)  Self Yes No   Sig: Take 1,200 mg by mouth daily   DULoxetine (CYMBALTA) 60 mg delayed release capsule  Self No No   Sig: TAKE 1 CAPSULE(60 MG) BY MOUTH DAILY   Multiple Vitamins-Minerals (PreserVision AREDS) TABS   Yes No   Sig: Take by mouth   albuterol (ProAir HFA) 90 mcg/act inhaler  Self No No   Sig: Inhale 2 puffs every 6 (six) hours as needed for wheezing   atorvastatin (LIPITOR) 80 mg tablet  Self No No   Sig: TAKE 1 TABLET BY MOUTH DAILY AT BEDTIME   Patient taking differently: Taking at morning   bisoprolol (ZEBETA) 5 mg tablet  Self No No   Sig: Take 0.5 tablets (2.5 mg total) by mouth daily   cetirizine (ZyrTEC) 10 mg tablet  Self Yes No   Sig: Take 10 mg by mouth daily   clonazePAM (KlonoPIN) 0.5 mg tablet   No No   Sig: Take 1 tablet (0.5 mg total) by mouth daily at bedtime   clopidogrel (PLAVIX) 75 mg tablet  Self No No   Sig: TAKE 1 TABLET(75 MG) BY  MOUTH DAILY   furosemide (LASIX) 40 mg tablet   No No   Sig: Take 1 tablet (40 mg total) by mouth daily   levothyroxine 88 mcg tablet  Self No No   Sig: Take 1 tablet (88 mcg total) by mouth daily   midodrine (PROAMATINE) 10 MG tablet  Self No No   Sig: Midodrine 10 mg 1 tablet at 8 AM and 1 tablet at 2 PM   mometasone (NASONEX) 50 mcg/act nasal spray  Self Yes No   Si spray into each nostril daily     Patient not taking: Reported on 2024   nitrofurantoin (MACROBID) 100 mg capsule  Self Yes No   Sig: Take 100 mg by mouth daily Take with food   pantoprazole (PROTONIX) 40 mg tablet  Self No No   Sig: TAKE 1 TABLET(40 MG) BY MOUTH EVERY MORNING   primidone (MYSOLINE) 50 mg tablet  Self No No   Sig: TAKE 1 TABLET(50 MG) BY MOUTH DAILY      Facility-Administered Medications: None       Past Medical History:   Diagnosis Date    Anxiety     Arthritis     r knee and shoulders    Blind left eye     CHF (congestive heart failure) (HCC)     Deaf, left     Depression     Diabetes mellitus (HCC)     Disease of thyroid gland     DVT complicating pregnancy, unspecified trimester (HCC) postpartum    Hearing loss     LEFT EAR    History of shingles     fACIAL     Hyperlipidemia     Hypertension     Liver disease     Lyme disease     Meniere's disease     Obesity     Orthostatic hypotension     Psychiatric problem     Sinus congestion     Sleep apnea     Stroke (HCC)        Past Surgical History:   Procedure Laterality Date    APPENDECTOMY      BREAST BIOPSY Left      SECTION      x2    DXA PROCEDURE (HISTORICAL)  10/28/2020    EYE SURGERY      HAND SURGERY Left     HERNIA REPAIR      HYSTERECTOMY      ROTATOR CUFF REPAIR Left     TONSILLECTOMY      TYMPANOSTOMY TUBE PLACEMENT         Family History   Problem Relation Age of Onset    Depression Mother     Hypertension Mother     Obesity Mother     Depression Father     Alcohol abuse Father     Stroke Father     Breast cancer Sister 68    Ovarian cancer  Daughter 53    BRCA1 Negative Daughter     BRCA2 Negative Daughter      I have reviewed and agree with the history as documented.    E-Cigarette/Vaping    E-Cigarette Use Never User      E-Cigarette/Vaping Substances    Nicotine No     THC No     CBD No     Flavoring No     Other No     Unknown No      Social History     Tobacco Use    Smoking status: Former     Current packs/day: 0.00     Average packs/day: 0.8 packs/day for 44.0 years (33.0 ttl pk-yrs)     Types: Cigarettes     Start date: 1946     Quit date: 1990     Years since quittin.1     Passive exposure: Past    Smokeless tobacco: Never   Vaping Use    Vaping status: Never Used   Substance Use Topics    Alcohol use: Never    Drug use: Never       Review of Systems   Constitutional:  Negative for chills, fatigue and fever.   Respiratory:  Positive for shortness of breath.    Cardiovascular:  Negative for chest pain.   Gastrointestinal:  Positive for abdominal distention. Negative for abdominal pain, diarrhea, nausea and vomiting.   Musculoskeletal:  Negative for arthralgias and myalgias.   Neurological:  Negative for headaches.   Psychiatric/Behavioral:  Negative for confusion.    All other systems reviewed and are negative.      Physical Exam  Physical Exam  Vitals and nursing note reviewed.   Constitutional:       General: She is not in acute distress.     Appearance: Normal appearance. She is not ill-appearing, toxic-appearing or diaphoretic.      Comments: Chronically ill-appearing but nontoxic nondistressed   HENT:      Head: Normocephalic and atraumatic.      Right Ear: External ear normal.      Left Ear: External ear normal.   Eyes:      General:         Right eye: No discharge.         Left eye: No discharge.   Cardiovascular:      Comments: Regular rate and rhythm, no murmurs rubs or gallops.  Extremities warm and well-perfused without mottling  Pulmonary:      Effort: No respiratory distress.      Comments: No increased work of  breathing.  Speaking in complete sentences.  Lungs clear to auscultation bilaterally without wheezes, rales, rhonchi.  Satting 99% on 3 L nasal cannula.  Abdominal:      General: There is no distension.      Comments: Abdomen mildly distended.  Nontender without rigidity, rebound, guarding.   Musculoskeletal:         General: No deformity.      Cervical back: Normal range of motion.   Skin:     Findings: No lesion or rash.   Neurological:      Mental Status: She is alert and oriented to person, place, and time. Mental status is at baseline.      Comments: Awake, alert, pleasant, interactive   Psychiatric:         Mood and Affect: Mood and affect normal.         Vital Signs  ED Triage Vitals   Temperature Pulse Respirations Blood Pressure SpO2   03/23/24 2110 03/23/24 2106 03/23/24 2106 03/23/24 2106 03/23/24 2106   97.8 °F (36.6 °C) 70 22 169/70 99 %      Temp Source Heart Rate Source Patient Position - Orthostatic VS BP Location FiO2 (%)   03/23/24 2110 03/23/24 2106 03/23/24 2106 03/23/24 2106 --   Oral Monitor Sitting Left arm       Pain Score       03/23/24 2106       No Pain           Vitals:    03/23/24 2230 03/23/24 2245 03/23/24 2300 03/24/24 0154   BP: 160/72  128/60 132/62   Pulse: 59 60 63 66   Patient Position - Orthostatic VS:   Sitting Sitting         Visual Acuity      ED Medications  Medications   furosemide (LASIX) injection 60 mg (60 mg Intravenous Given 3/23/24 2114)   iohexol (OMNIPAQUE) 350 MG/ML injection (MULTI-DOSE) 100 mL (100 mL Intravenous Given 3/23/24 2351)       Diagnostic Studies  Results Reviewed       Procedure Component Value Units Date/Time    HS Troponin I 2hr [[id number]]  (Normal) Collected: 03/23/24 2331    Lab Status: Final result Specimen: Blood from Arm, Right Updated: 03/23/24 2359     hs TnI 2hr 5 ng/L      Delta 2hr hsTnI 0 ng/L     COVID/FLU/RSV [[id number]]  (Normal) Collected: 03/23/24 2113    Lab Status: Final result Specimen: Nares from Nose Updated: 03/23/24 2204      SARS-CoV-2 Negative     INFLUENZA A PCR Negative     INFLUENZA B PCR Negative     RSV PCR Negative    Narrative:      FOR PEDIATRIC PATIENTS - copy/paste COVID Guidelines URL to browser: [email or web address]    SARS-CoV-2 assay is a Nucleic Acid Amplification assay intended for the  qualitative detection of nucleic acid from SARS-CoV-2 in nasopharyngeal  swabs. Results are for the presumptive identification of SARS-CoV-2 RNA.    Positive results are indicative of infection with SARS-CoV-2, the virus  causing COVID-19, but do not rule out bacterial infection or co-infection  with other viruses. Laboratories within the [place] and its  territories are required to report all positive results to the appropriate  public health authorities. Negative results do not preclude SARS-CoV-2  infection and should not be used as the sole basis for treatment or other  patient management decisions. Negative results must be combined with  clinical observations, patient history, and epidemiological information.  This test has not been FDA cleared or approved.    This test has been authorized by FDA under an Emergency Use Authorization  (EUA). This test is only authorized for the duration of time the  declaration that circumstances exist justifying the authorization of the  emergency use of an in vitro diagnostic tests for detection of SARS-CoV-2  virus and/or diagnosis of COVID-19 infection under section 564(b)(1) of  the Act, 21 U.S.C. 360bbb-3(b)(1), unless the authorization is terminated  or revoked sooner. The test has been validated but independent review by FDA  and CLIA is pending.    Test performed using 1[employer]: This RT-PCR assay targets N2,  a region unique to SARS-CoV-2. A conserved region in the E-gene was chosen  for pan-Sarbecovirus detection which includes SARS-CoV-2.    According to CMS-2020-01-R, this platform meets the definition of high-throughput  technology.    Comprehensive metabolic panel [[id number]]  (Abnormal) Collected: 03/23/24 2113    Lab Status: Final result Specimen: Blood from Arm, Right Updated: 03/23/24 2157     Sodium 142 mmol/L      Potassium 4.1 mmol/L      Chloride 102 mmol/L      CO2 33 mmol/L      ANION GAP 7 mmol/L      BUN 20 mg/dL      Creatinine 0.67 mg/dL      Glucose 128 mg/dL      Calcium 10.8 mg/dL      AST 42 U/L      ALT 27 U/L      Alkaline Phosphatase 86 U/L      Total Protein 7.3 g/dL      Albumin 4.3 g/dL      Total Bilirubin 0.51 mg/dL      eGFR 80 ml/min/1.73sq m     Narrative:      National Kidney Disease Foundation guidelines for Chronic Kidney Disease (CKD):     Stage 1 with normal or high GFR (GFR > 90 mL/min/1.73 square meters)    Stage 2 Mild CKD (GFR = 60-89 mL/min/1.73 square meters)    Stage 3A Moderate CKD (GFR = 45-59 mL/min/1.73 square meters)    Stage 3B Moderate CKD (GFR = 30-44 mL/min/1.73 square meters)    Stage 4 Severe CKD (GFR = 15-29 mL/min/1.73 square meters)    Stage 5 End Stage CKD (GFR <15 mL/min/1.73 square meters)  Note: GFR calculation is accurate only with a steady state creatinine    HS Troponin 0hr (reflex protocol) [[id number]]  (Normal) Collected: 03/23/24 2113    Lab Status: Final result Specimen: Blood from Arm, Right Updated: 03/23/24 2150     hs TnI 0hr 5 ng/L     B-Type Natriuretic Peptide(BNP) [[id number]]  (Abnormal) Collected: 03/23/24 2113    Lab Status: Final result Specimen: Blood from Arm, Right Updated: 03/23/24 2149      pg/mL     CBC and differential [[id number]]  (Abnormal) Collected: 03/23/24 2113    Lab Status: Final result Specimen: Blood from Arm, Right Updated: 03/23/24 2126     WBC 9.36 Thousand/uL      RBC 4.63 Million/uL      Hemoglobin 13.6 g/dL      Hematocrit 44.0 %      MCV 95 fL      MCH 29.4 pg      MCHC 30.9 g/dL      RDW 16.0 %      MPV 11.2 fL      Platelets 259 Thousands/uL      nRBC 0 /100 WBCs      Neutrophils Relative 74 %      Immature Grans % 0 %       Lymphocytes Relative 15 %      Monocytes Relative 5 %      Eosinophils Relative 5 %      Basophils Relative 1 %      Neutrophils Absolute 6.87 Thousands/µL      Absolute Immature Grans 0.04 Thousand/uL      Absolute Lymphocytes 1.42 Thousands/µL      Absolute Monocytes 0.48 Thousand/µL      Eosinophils Absolute 0.47 Thousand/µL      Basophils Absolute 0.08 Thousands/µL                    CT chest abdomen pelvis w contrast   Final Result by Pérez Machuca MD (03/24 0124)   1.  Bilateral lower lobe and peripheral predominant scattered hazy groundglass opacities with subtle interlobular septal thickening are again seen, similar compared to the prior exam and may reflect mild interstitial edema, small airway disease, or    atypical infection. Clinical correlation recommended. No lobar airspace consolidation, pneumothorax, or pleural/pericardial effusions.   2.  Fatty hypertrophy of the intra-atrial septum incidentally seen. This could be associated with increased risk of cardiac arrhythmias.   3.  Moderate scattered calcific atherosclerosis of the thoracic aorta, coronary arteries, and proximal thoracic great vessels.   4.  1.5 cm nonspecific hypodense right thyroid nodule, recommend nonemergent thyroid ultrasound.   5.  Cirrhotic morphology of the liver without enhancing hepatic mass.   6.  Splenomegaly and stable nonspecific enhancing splenic lesion, likely benign given interval stability since 2021.   7.  Stable nonspecific 1.6 cm left adrenal nodule again seen, likely of benign etiology.   8.  Subtle diffuse wall thickening of the stomach could be due to under distention with nonspecific gastritis.   9.  Mild colonic constipation and extensive colonic diverticulosis without evidence for acute diverticulitis. No evidence for bowel obstruction, inflammation, obstructive uropathy, free air, or free fluid.   10.  Ventral abdominal wall hernia repair without recurrent hernia seen. Additional ancillary findings detailed  above.      Workstation performed: [id number]         XR chest 1 view portable    (Results Pending)              Procedures  Procedures         ED Course                               SBIRT 20yo+      Flowsheet Row Most Recent Value   Initial Alcohol Screen: US AUDIT-C     1. How often do you have a drink containing alcohol? 0 Filed at: 03/23/2024 2128   2. How many drinks containing alcohol do you have on a typical day you are drinking?  0 Filed at: 03/23/2024 2128   3b. FEMALE Any Age, or MALE 65+: How often do you have 4 or more drinks on one occassion? 0 Filed at: 03/23/2024 2128   Audit-C Score 0 Filed at: 03/23/2024 2128   KEYANA: How many times in the past year have you...    Used an illegal drug or used a prescription medication for non-medical reasons? Never Filed at: 03/23/2024 2128                      Medical Decision Making  I obtained history from the patient.  I reviewed external medical documentation.  Patient was evaluated by primary care provider on 3/14/2024, noted 40 mg Lasix dosage daily, chronic use of 4 L oxygen.  I ordered and reviewed lab work including CBC, CMP, troponin, BNP which were all grossly unremarkable.  I ordered and independently interpreted an EKG which demonstrates sinus bradycardia rate of 59 without ST or T wave abnormalities.  I ordered and reviewed a CT chest abdomen pelvis which demonstrated multiple incidental findings including a thyroid nodule of which patient is already aware and monitoring.  Patient with nonspecific findings in the lungs.  Treated with a dose of IV Lasix in the emergency department.  Patient urinating multiple times while waiting additional results.  Offered admission but at this point given reassuring lab and imaging findings, feel that patient can safely be discharged and patient and daughter are in agreement with this plan.  Instructed to continue oral Lasix, follow-up with primary care provider, discharged with return precautions.    Amount and/or  Complexity of Data Reviewed  Labs: ordered.  Radiology: ordered.    Risk  Prescription drug management.             Disposition  Final diagnoses:   Shortness of breath   Abdominal distension     Time reflects when diagnosis was documented in both MDM as applicable and the Disposition within this note       Time User Action Codes Description Comment    3/24/2024  1:28 AM Lloyd Gooedn Add [R06.02] Shortness of breath     3/24/2024  1:28 AM Lloyd Gooden Add [R14.0] Abdominal distension           ED Disposition       ED Disposition   Discharge    Condition   Stable    Date/Time   Sun Mar 24, 2024 0128    Comment   Danyelle HANDY Martinez discharge to home/self care.                   Follow-up Information    None         Discharge Medication List as of 3/24/2024  1:30 AM        CONTINUE these medications which have NOT CHANGED    Details   albuterol (ProAir HFA) 90 mcg/act inhaler Inhale 2 puffs every 6 (six) hours as needed for wheezing, Starting Tue 1/30/2024, Normal      atorvastatin (LIPITOR) 80 mg tablet TAKE 1 TABLET BY MOUTH DAILY AT BEDTIME, Normal      bisoprolol (ZEBETA) 5 mg tablet Take 0.5 tablets (2.5 mg total) by mouth daily, Starting Thu 2/8/2024, Until Tue 8/6/2024, Normal      Calcium Carbonate-Vit D-Min (CALCIUM 1200 PO) Take 1,200 mg by mouth daily, Historical Med      cetirizine (ZyrTEC) 10 mg tablet Take 10 mg by mouth daily, Historical Med      clonazePAM (KlonoPIN) 0.5 mg tablet Take 1 tablet (0.5 mg total) by mouth daily at bedtime, Starting Thu 2/29/2024, Normal      clopidogrel (PLAVIX) 75 mg tablet TAKE 1 TABLET(75 MG) BY MOUTH DAILY, Starting Wed 1/10/2024, Normal      DULoxetine (CYMBALTA) 60 mg delayed release capsule TAKE 1 CAPSULE(60 MG) BY MOUTH DAILY, Normal      furosemide (LASIX) 40 mg tablet Take 1 tablet (40 mg total) by mouth daily, Starting Mon 2/26/2024, Normal      levothyroxine 88 mcg tablet Take 1 tablet (88 mcg total) by mouth daily, Starting Thu 12/28/2023, Normal       midodrine (PROAMATINE) 10 MG tablet Midodrine 10 mg 1 tablet at 8 AM and 1 tablet at 2 PM, Normal      mometasone (NASONEX) 50 mcg/act nasal spray 1 spray into each nostril daily  , Historical Med      Multiple Vitamins-Minerals (PreserVision AREDS) TABS Take by mouth, Historical Med      nitrofurantoin (MACROBID) 100 mg capsule Take 100 mg by mouth daily Take with food, Starting Tue 1/9/2024, Historical Med      pantoprazole (PROTONIX) 40 mg tablet TAKE 1 TABLET(40 MG) BY MOUTH EVERY MORNING, Starting Tue 11/14/2023, Normal      primidone (MYSOLINE) 50 mg tablet TAKE 1 TABLET(50 MG) BY MOUTH DAILY, Starting Mon 11/13/2023, Normal             No discharge procedures on file.    PDMP Review       None            ED Provider  Electronically Signed by             Lloyd Gooden MD  03/24/24 0157

## 2024-03-24 NOTE — DISCHARGE INSTRUCTIONS
We have included the results of your CAT scan below.  CAT scan demonstrated multiple incidental findings.  You will need to follow-up in the next few months with an ultrasound of your thyroid nodule.  You can discuss this with your primary care provider.  It is possible that you are having a minor CHF exacerbation.  We have treated you with a dose of IV Lasix in the emergency department.  You can continue with your oral Lasix at home.  If you have any severe worsening shortness of breath, chest pain, palpitations, if you pass out, return to the emergency department immediately.  Follow-up with your primary care provider in the next week for reassessment.        IMPRESSION:  1.  Bilateral lower lobe and peripheral predominant scattered hazy groundglass opacities with subtle interlobular septal thickening are again seen, similar compared to the prior exam and may reflect mild interstitial edema, small airway disease, or   atypical infection. Clinical correlation recommended. No lobar airspace consolidation, pneumothorax, or pleural/pericardial effusions.  2.  Fatty hypertrophy of the intra-atrial septum incidentally seen. This could be associated with increased risk of cardiac arrhythmias.  3.  Moderate scattered calcific atherosclerosis of the thoracic aorta, coronary arteries, and proximal thoracic great vessels.  4.  1.5 cm nonspecific hypodense right thyroid nodule, recommend nonemergent thyroid ultrasound.  5.  Cirrhotic morphology of the liver without enhancing hepatic mass.  6.  Splenomegaly and stable nonspecific enhancing splenic lesion, likely benign given interval stability since 2021.  7.  Stable nonspecific 1.6 cm left adrenal nodule again seen, likely of benign etiology.  8.  Subtle diffuse wall thickening of the stomach could be due to under distention with nonspecific gastritis.  9.  Mild colonic constipation and extensive colonic diverticulosis without evidence for acute diverticulitis. No evidence  for bowel obstruction, inflammation, obstructive uropathy, free air, or free fluid.  10.  Ventral abdominal wall hernia repair without recurrent hernia seen. Additional ancillary findings detailed above.

## 2024-03-25 ENCOUNTER — HOSPITAL ENCOUNTER (EMERGENCY)
Facility: HOSPITAL | Age: 84
Discharge: HOME | End: 2024-03-25
Attending: EMERGENCY MEDICINE
Payer: MEDICARE

## 2024-03-25 ENCOUNTER — NURSE TRIAGE (OUTPATIENT)
Age: 84
End: 2024-03-25

## 2024-03-25 VITALS
SYSTOLIC BLOOD PRESSURE: 134 MMHG | BODY MASS INDEX: 26.84 KG/M2 | DIASTOLIC BLOOD PRESSURE: 61 MMHG | WEIGHT: 167 LBS | HEART RATE: 70 BPM | OXYGEN SATURATION: 96 % | TEMPERATURE: 97.9 F | HEIGHT: 66 IN | RESPIRATION RATE: 18 BRPM
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
## 2024-03-25 DIAGNOSIS — R60.9 FLUID RETENTION: ICD-10-CM

## 2024-03-25 LAB
ALBUMIN SERPL BCP-MCNC: 4 G/DL (ref 3.5–5)
ALP SERPL-CCNC: 80 U/L (ref 34–104)
ALT SERPL W P-5'-P-CCNC: 26 U/L (ref 7–52)
ANION GAP SERPL CALCULATED.3IONS-SCNC: 7 MMOL/L (ref 4–13)
AST SERPL W P-5'-P-CCNC: 38 U/L (ref 13–39)
ATRIAL RATE: 59 BPM
BASOPHILS # BLD AUTO: 0.07 THOUSANDS/ÂΜL (ref 0–0.1)
BASOPHILS NFR BLD AUTO: 1 % (ref 0–1)
BILIRUB SERPL-MCNC: 0.5 MG/DL (ref 0.2–1)
BUN SERPL-MCNC: 22 MG/DL (ref 5–25)
CALCIUM SERPL-MCNC: 10.4 MG/DL (ref 8.4–10.2)
CHLORIDE SERPL-SCNC: 99 MMOL/L (ref 96–108)
CK SERPL-CCNC: 129 U/L (ref 26–192)
CO2 SERPL-SCNC: 30 MMOL/L (ref 21–32)
CREAT SERPL-MCNC: 0.74 MG/DL (ref 0.6–1.3)
EOSINOPHIL # BLD AUTO: 0.42 THOUSAND/ÂΜL (ref 0–0.61)
EOSINOPHIL NFR BLD AUTO: 4 % (ref 0–6)
ERYTHROCYTE [DISTWIDTH] IN BLOOD BY AUTOMATED COUNT: 16 % (ref 11.6–15.1)
GFR SERPL CREATININE-BSD FRML MDRD: 74 ML/MIN/1.73SQ M
GLUCOSE SERPL-MCNC: 183 MG/DL (ref 65–140)
HCT VFR BLD AUTO: 38.6 % (ref 34.8–46.1)
HGB BLD-MCNC: 12.5 G/DL (ref 11.5–15.4)
IMM GRANULOCYTES # BLD AUTO: 0.04 THOUSAND/UL (ref 0–0.2)
IMM GRANULOCYTES NFR BLD AUTO: 0 % (ref 0–2)
LYMPHOCYTES # BLD AUTO: 1.99 THOUSANDS/ÂΜL (ref 0.6–4.47)
LYMPHOCYTES NFR BLD AUTO: 20 % (ref 14–44)
MCH RBC QN AUTO: 30 PG (ref 26.8–34.3)
MCHC RBC AUTO-ENTMCNC: 32.4 G/DL (ref 31.4–37.4)
MCV RBC AUTO: 93 FL (ref 82–98)
MONOCYTES # BLD AUTO: 0.64 THOUSAND/ÂΜL (ref 0.17–1.22)
MONOCYTES NFR BLD AUTO: 7 % (ref 4–12)
NEUTROPHILS # BLD AUTO: 6.75 THOUSANDS/ÂΜL (ref 1.85–7.62)
NEUTS SEG NFR BLD AUTO: 68 % (ref 43–75)
NRBC BLD AUTO-RTO: 0 /100 WBCS
P AXIS: 43 DEGREES
PLATELET # BLD AUTO: 250 THOUSANDS/UL (ref 149–390)
PMV BLD AUTO: 11.1 FL (ref 8.9–12.7)
POTASSIUM SERPL-SCNC: 4 MMOL/L (ref 3.5–5.3)
PR INTERVAL: 164 MS
PROT SERPL-MCNC: 7.2 G/DL (ref 6.4–8.4)
QRS AXIS: 27 DEGREES
QRSD INTERVAL: 90 MS
QT INTERVAL: 452 MS
QTC INTERVAL: 447 MS
RBC # BLD AUTO: 4.16 MILLION/UL (ref 3.81–5.12)
SODIUM SERPL-SCNC: 136 MMOL/L (ref 135–147)
T WAVE AXIS: 56 DEGREES
VENTRICULAR RATE: 59 BPM
WBC # BLD AUTO: 9.91 THOUSAND/UL (ref 4.31–10.16)

## 2024-03-25 PROCEDURE — 80053 COMPREHEN METABOLIC PANEL: CPT | Performed by: EMERGENCY MEDICINE

## 2024-03-25 PROCEDURE — 96374 THER/PROPH/DIAG INJ IV PUSH: CPT

## 2024-03-25 PROCEDURE — 36415 COLL VENOUS BLD VENIPUNCTURE: CPT | Performed by: EMERGENCY MEDICINE

## 2024-03-25 PROCEDURE — 99284 EMERGENCY DEPT VISIT MOD MDM: CPT

## 2024-03-25 PROCEDURE — 85025 COMPLETE CBC W/AUTO DIFF WBC: CPT | Performed by: EMERGENCY MEDICINE

## 2024-03-25 PROCEDURE — 93010 ELECTROCARDIOGRAM REPORT: CPT | Performed by: INTERNAL MEDICINE

## 2024-03-25 PROCEDURE — 82550 ASSAY OF CK (CPK): CPT | Performed by: EMERGENCY MEDICINE

## 2024-03-25 RX ORDER — FUROSEMIDE 10 MG/ML
40 INJECTION INTRAMUSCULAR; INTRAVENOUS ONCE
Status: COMPLETED | OUTPATIENT
Start: 2024-03-25 | End: 2024-03-25

## 2024-03-25 RX ADMIN — FUROSEMIDE 40 MG: 10 INJECTION, SOLUTION INTRAVENOUS at 15:57

## 2024-03-25 NOTE — DISCHARGE INSTRUCTIONS
Your blood work is ok including the muscle enzyme.  You should make appointment with your doctor and cardiologist for further evaluation and management.

## 2024-03-25 NOTE — TELEPHONE ENCOUNTER
"Pt calling.  She fell last night, she states she was rushing to the bathroom and got tangled in her oxygen and fell.  She denies any head strike or injury.   Pt states that she was on the floor for one hour, but then was able to get herself up and back to bed.  Pt states that she wants Dr. Caraballo made aware and would like his advice.  RN recommended ED d/t Plavix use and time on the ground.  Pt states that she would like to hear what Dr. Caraballo has to say before going to the hospital.  Please d/w Dr. Caraballo and advise.     Reason for Disposition   Unable to get up until help arrived and on the ground 1 hour or more    Answer Assessment - Initial Assessment Questions  1. MECHANISM: \"How did the fall happen?\"      I was rushing to the bathroom and I got tangled up in my oxygen and fell.   2. DOMESTIC VIOLENCE AND ELDER ABUSE SCREENING: \"Did you fall because someone pushed you or tried to hurt you?\" If Yes, ask: \"Are you safe now?\"      no  3. ONSET: \"When did the fall happen?\" (e.g., minutes, hours, or days ago)      Last night  4. LOCATION: \"What part of the body hit the ground?\" (e.g., back, buttocks, head, hips, knees, hands, head, stomach)      No head injury   5. INJURY: \"Did you hurt (injure) yourself when you fell?\" If Yes, ask: \"What did you injure? Tell me more about this?\" (e.g., body area; type of injury; pain severity)\"      no  6. PAIN: \"Is there any pain?\" If Yes, ask: \"How bad is the pain?\" (e.g., Scale 1-10; or mild,   moderate, severe)    - NONE (0): no pain    - MILD (1-3): doesn't interfere with normal activities     - MODERATE (4-7): interferes with normal activities or awakens from sleep     - SEVERE (8-10): excruciating pain, unable to do any normal activities       no  7. SIZE: For cuts, bruises, or swelling, ask: \"How large is it?\" (e.g., inches or centimeters)       no  8. PREGNANCY: \"Is there any chance you are pregnant?\" \"When was your last menstrual period?\"      no  9. OTHER SYMPTOMS: \"Do you " "have any other symptoms?\" (e.g., dizziness, fever, weakness; new onset or worsening).      Was on the floor for 1 hour  10. CAUSE: \"What do you think caused the fall (or falling)?\" (e.g., tripped, dizzy spell)        no    Protocols used: Falls and Falling-ADULT-[place]    "

## 2024-03-25 NOTE — ED PROVIDER NOTES
History  Chief Complaint   Patient presents with    Fall     States she fell (slid down a wall) last night. Has no complaints but was on the floor for an hour     82 yo female sent by PCP to be checked rhabdomyolysis.  Pt. Says she had woken up and went to bathroom last night at 2200, turned to hand oxygen up and lost her footing and fell.  She says she fell in a corner where the walls meet and slid down to the floor.  She did not hit head or pass out.  She was on the floor for an hour but was able to get herself up then and get back to bed.  She has history of rhabdomyolysis from a fall in the past.  No recent illness.  No pain anywhere.  She says she is filling with fluid again though, was seen here 2 days ago and got a shot.      History provided by:  Patient   used: No    Fall  Associated symptoms: no back pain, no chest pain, no headaches, no neck pain and no vomiting        Prior to Admission Medications   Prescriptions Last Dose Informant Patient Reported? Taking?   Calcium Carbonate-Vit D-Min (CALCIUM 1200 PO)  Self Yes No   Sig: Take 1,200 mg by mouth daily   DULoxetine (CYMBALTA) 60 mg delayed release capsule  Self No No   Sig: TAKE 1 CAPSULE(60 MG) BY MOUTH DAILY   Multiple Vitamins-Minerals (PreserVision AREDS) TABS   Yes No   Sig: Take by mouth   albuterol (ProAir HFA) 90 mcg/act inhaler  Self No No   Sig: Inhale 2 puffs every 6 (six) hours as needed for wheezing   atorvastatin (LIPITOR) 80 mg tablet  Self No No   Sig: TAKE 1 TABLET BY MOUTH DAILY AT BEDTIME   Patient taking differently: Taking at morning   bisoprolol (ZEBETA) 5 mg tablet  Self No No   Sig: Take 0.5 tablets (2.5 mg total) by mouth daily   cetirizine (ZyrTEC) 10 mg tablet  Self Yes No   Sig: Take 10 mg by mouth daily   clonazePAM (KlonoPIN) 0.5 mg tablet   No No   Sig: Take 1 tablet (0.5 mg total) by mouth daily at bedtime   clopidogrel (PLAVIX) 75 mg tablet  Self No No   Sig: TAKE 1 TABLET(75 MG) BY MOUTH DAILY    furosemide (LASIX) 40 mg tablet   No No   Sig: Take 1 tablet (40 mg total) by mouth daily   levothyroxine 88 mcg tablet  Self No No   Sig: Take 1 tablet (88 mcg total) by mouth daily   midodrine (PROAMATINE) 10 MG tablet  Self No No   Sig: Midodrine 10 mg 1 tablet at 8 AM and 1 tablet at 2 PM   mometasone (NASONEX) 50 mcg/act nasal spray  Self Yes No   Si spray into each nostril daily     Patient not taking: Reported on 2024   nitrofurantoin (MACROBID) 100 mg capsule  Self Yes No   Sig: Take 100 mg by mouth daily Take with food   pantoprazole (PROTONIX) 40 mg tablet  Self No No   Sig: TAKE 1 TABLET(40 MG) BY MOUTH EVERY MORNING   primidone (MYSOLINE) 50 mg tablet  Self No No   Sig: TAKE 1 TABLET(50 MG) BY MOUTH DAILY      Facility-Administered Medications: None       Past Medical History:   Diagnosis Date    Anxiety     Arthritis     r knee and shoulders    Blind left eye     CHF (congestive heart failure) (HCC)     Deaf, left     Depression     Diabetes mellitus (HCC)     Disease of thyroid gland     DVT complicating pregnancy, unspecified trimester (HCC) postpartum    Hearing loss     LEFT EAR    History of shingles     fACIAL     Hyperlipidemia     Hypertension     Liver disease     Lyme disease     Meniere's disease     Obesity     Orthostatic hypotension     Psychiatric problem     Sinus congestion     Sleep apnea     Stroke (HCC)        Past Surgical History:   Procedure Laterality Date    APPENDECTOMY      BREAST BIOPSY Left      SECTION      x2    DXA PROCEDURE (HISTORICAL)  10/28/2020    EYE SURGERY      HAND SURGERY Left     HERNIA REPAIR      HYSTERECTOMY      ROTATOR CUFF REPAIR Left     TONSILLECTOMY      TYMPANOSTOMY TUBE PLACEMENT         Family History   Problem Relation Age of Onset    Depression Mother     Hypertension Mother     Obesity Mother     Depression Father     Alcohol abuse Father     Stroke Father     Breast cancer Sister 68    Ovarian cancer Daughter 53     BRCA1 Negative Daughter     BRCA2 Negative Daughter      I have reviewed and agree with the history as documented.    E-Cigarette/Vaping    E-Cigarette Use Never User      E-Cigarette/Vaping Substances    Nicotine No     THC No     CBD No     Flavoring No     Other No     Unknown No      Social History     Tobacco Use    Smoking status: Former     Current packs/day: 0.00     Average packs/day: 0.8 packs/day for 44.0 years (33.0 ttl pk-yrs)     Types: Cigarettes     Start date: 1946     Quit date: 1990     Years since quittin.1     Passive exposure: Past    Smokeless tobacco: Never   Vaping Use    Vaping status: Never Used   Substance Use Topics    Alcohol use: Never    Drug use: Never       Review of Systems   Constitutional:  Negative for fever.   Respiratory:  Negative for cough.    Cardiovascular:  Negative for chest pain.   Gastrointestinal:  Positive for abdominal distention. Negative for diarrhea and vomiting.   Musculoskeletal:  Negative for back pain and neck pain.   Skin:  Negative for wound.   Neurological:  Negative for headaches.       Physical Exam  Physical Exam  Vitals and nursing note reviewed.   Constitutional:       General: She is not in acute distress.     Appearance: She is well-developed. She is not ill-appearing or diaphoretic.   HENT:      Head: Normocephalic and atraumatic.   Eyes:      Extraocular Movements: Extraocular movements intact.      Conjunctiva/sclera: Conjunctivae normal.      Pupils: Pupils are equal, round, and reactive to light.   Cardiovascular:      Rate and Rhythm: Normal rate and regular rhythm.      Heart sounds: Normal heart sounds. No murmur heard.  Pulmonary:      Effort: Pulmonary effort is normal. No respiratory distress.      Breath sounds: Normal breath sounds.   Abdominal:      General: Bowel sounds are normal. There is no distension.      Palpations: Abdomen is soft.      Tenderness: There is no abdominal tenderness.   Musculoskeletal:          General: No deformity. Normal range of motion.      Cervical back: Normal range of motion and neck supple. No tenderness.      Right lower leg: No edema.      Left lower leg: No edema.   Skin:     General: Skin is warm and dry.      Coloration: Skin is not pale.      Findings: No rash.   Neurological:      General: No focal deficit present.      Mental Status: She is alert and oriented to person, place, and time.      Cranial Nerves: No cranial nerve deficit.   Psychiatric:         Mood and Affect: Mood normal.         Behavior: Behavior normal.         Vital Signs  ED Triage Vitals   Temperature Pulse Respirations Blood Pressure SpO2   03/25/24 1417 03/25/24 1417 03/25/24 1417 03/25/24 1417 03/25/24 1417   97.9 °F (36.6 °C) 70 18 142/64 96 %      Temp src Heart Rate Source Patient Position - Orthostatic VS BP Location FiO2 (%)   -- -- -- -- --             Pain Score       03/25/24 1415       No Pain           Vitals:    03/25/24 1417   BP: 142/64   Pulse: 70         Visual Acuity      ED Medications  Medications   furosemide (LASIX) injection 40 mg (has no administration in time range)       Diagnostic Studies  Results Reviewed       Procedure Component Value Units Date/Time    Comprehensive metabolic panel [[id number]]  (Abnormal) Collected: 03/25/24 1448    Lab Status: Final result Specimen: Blood from Arm, Right Updated: 03/25/24 1525     Sodium 136 mmol/L      Potassium 4.0 mmol/L      Chloride 99 mmol/L      CO2 30 mmol/L      ANION GAP 7 mmol/L      BUN 22 mg/dL      Creatinine 0.74 mg/dL      Glucose 183 mg/dL      Calcium 10.4 mg/dL      AST 38 U/L      ALT 26 U/L      Alkaline Phosphatase 80 U/L      Total Protein 7.2 g/dL      Albumin 4.0 g/dL      Total Bilirubin 0.50 mg/dL      eGFR 74 ml/min/1.73sq m     Narrative:      National Kidney Disease Foundation guidelines for Chronic Kidney Disease (CKD):     Stage 1 with normal or high GFR (GFR > 90 mL/min/1.73 square meters)    Stage 2 Mild CKD (GFR = 60-89  mL/min/1.73 square meters)    Stage 3A Moderate CKD (GFR = 45-59 mL/min/1.73 square meters)    Stage 3B Moderate CKD (GFR = 30-44 mL/min/1.73 square meters)    Stage 4 Severe CKD (GFR = 15-29 mL/min/1.73 square meters)    Stage 5 End Stage CKD (GFR <15 mL/min/1.73 square meters)  Note: GFR calculation is accurate only with a steady state creatinine    CK [[id number]]  (Normal) Collected: 03/25/24 1448    Lab Status: Final result Specimen: Blood from Arm, Right Updated: 03/25/24 1525     Total  U/L     CBC and differential [[id number]]  (Abnormal) Collected: 03/25/24 1448    Lab Status: Final result Specimen: Blood from Arm, Right Updated: 03/25/24 1457     WBC 9.91 Thousand/uL      RBC 4.16 Million/uL      Hemoglobin 12.5 g/dL      Hematocrit 38.6 %      MCV 93 fL      MCH 30.0 pg      MCHC 32.4 g/dL      RDW 16.0 %      MPV 11.1 fL      Platelets 250 Thousands/uL      nRBC 0 /100 WBCs      Neutrophils Relative 68 %      Immature Grans % 0 %      Lymphocytes Relative 20 %      Monocytes Relative 7 %      Eosinophils Relative 4 %      Basophils Relative 1 %      Neutrophils Absolute 6.75 Thousands/µL      Absolute Immature Grans 0.04 Thousand/uL      Absolute Lymphocytes 1.99 Thousands/µL      Absolute Monocytes 0.64 Thousand/µL      Eosinophils Absolute 0.42 Thousand/µL      Basophils Absolute 0.07 Thousands/µL                    No orders to display              Procedures  Procedures         ED Course                               SBIRT 22yo+      Flowsheet Row Most Recent Value   Initial Alcohol Screen: US AUDIT-C     1. How often do you have a drink containing alcohol? 0 Filed at: 03/25/2024 1415   2. How many drinks containing alcohol do you have on a typical day you are drinking?  0 Filed at: 03/25/2024 1415   3a. Male UNDER 65: How often do you have five or more drinks on one occasion? 0 Filed at: 03/25/2024 1415   3b. FEMALE Any Age, or MALE 65+: How often do you have 4 or more drinks on one occassion? 0  Filed at: 03/25/2024 1415   Audit-C Score 0 Filed at: 03/25/2024 1415   KEYANA: How many times in the past year have you...    Used an illegal drug or used a prescription medication for non-medical reasons? Never Filed at: 03/25/2024 1415                      Medical Decision Making  Prior records reviewed from 2 days ago.  Seen here for SOB/CHF/weight gain.  Had CT chest/abomen/pelvis which showed mild interstitial edema, cirrhosis, constipation and ventral hernia.  She was given IV lasix.  1530 - evaluation negative.  Pt. Feels she is retaining fluid in her abdomen, likely from her CHF and cirrhosis.  She did feel the IV lasix helped the other day.  I will give IV lasix here and advised she should follow up with her doctors for further management.    Amount and/or Complexity of Data Reviewed  Labs: ordered.    Risk  Prescription drug management.             Disposition  Final diagnoses:   Fall, initial encounter   Fluid retention     Time reflects when diagnosis was documented in both MDM as applicable and the Disposition within this note       Time User Action Codes Description Comment    3/25/2024  3:40 PM Angelina Lemus Add [W19.XXXA] Fall, initial encounter     3/25/2024  3:40 PM Angelina Lemus Add [R60.9] Fluid retention           ED Disposition       ED Disposition   Discharge    Condition   Stable    Date/Time   Mon Mar 25, 2024 1540    Comment   Danyelle Martinez discharge to home/self care.                   Follow-up Information       Follow up With Specialties Details Why Contact Info    Bladimir Caraballo MD Internal Medicine   [address] [place] [address] [id number] [phone]              Patient's Medications   Discharge Prescriptions    No medications on file       No discharge procedures on file.    PDMP Review       None            ED Provider  Electronically Signed by             Angelina Lemus MD  03/25/24 1189

## 2024-03-27 ENCOUNTER — VBI (OUTPATIENT)
Dept: FAMILY MEDICINE CLINIC | Facility: CLINIC | Age: 84
End: 2024-03-27

## 2024-03-27 NOTE — TELEPHONE ENCOUNTER
03/27/24 10:25 AM    Patient contacted post ED visit, first outreach attempt made. Message was left for patient to return a call to the VBI Department at [facility]: Phone [phone].    Thank you.  Sarah Eddy  [facility]

## 2024-03-27 NOTE — TELEPHONE ENCOUNTER
03/27/24 10:35 AM    Patient contacted post ED visit, VBI department spoke with patient/caregiver and outreach was successful.    Thank you.  Sarah Eddy  [facility]

## 2024-03-30 ENCOUNTER — APPOINTMENT (EMERGENCY)
Dept: RADIOLOGY | Facility: HOSPITAL | Age: 84
DRG: 872 | End: 2024-03-30
Payer: MEDICARE

## 2024-03-30 ENCOUNTER — HOSPITAL ENCOUNTER (INPATIENT)
Facility: HOSPITAL | Age: 84
LOS: 5 days | Discharge: HOME | DRG: 872 | End: 2024-04-04
Attending: EMERGENCY MEDICINE | Admitting: INTERNAL MEDICINE
Payer: MEDICARE

## 2024-03-30 DIAGNOSIS — K11.21 PAROTITIS, ACUTE: ICD-10-CM

## 2024-03-30 DIAGNOSIS — K11.20 PAROTITIS: Primary | ICD-10-CM

## 2024-03-30 DIAGNOSIS — L03.211 FACIAL CELLULITIS: ICD-10-CM

## 2024-03-30 PROBLEM — A41.9 SEPSIS WITHOUT ACUTE ORGAN DYSFUNCTION (HCC): Status: ACTIVE | Noted: 2024-03-30

## 2024-03-30 LAB
ALBUMIN SERPL BCP-MCNC: 4.2 G/DL (ref 3.5–5)
ALP SERPL-CCNC: 99 U/L (ref 34–104)
ALT SERPL W P-5'-P-CCNC: 23 U/L (ref 7–52)
ANION GAP SERPL CALCULATED.3IONS-SCNC: 5 MMOL/L (ref 4–13)
APTT PPP: 34 SECONDS (ref 23–37)
AST SERPL W P-5'-P-CCNC: 35 U/L (ref 13–39)
BASOPHILS # BLD AUTO: 0.05 THOUSANDS/ÂΜL (ref 0–0.1)
BASOPHILS NFR BLD AUTO: 0 % (ref 0–1)
BILIRUB SERPL-MCNC: 1.27 MG/DL (ref 0.2–1)
BUN SERPL-MCNC: 17 MG/DL (ref 5–25)
CALCIUM SERPL-MCNC: 10.1 MG/DL (ref 8.4–10.2)
CHLORIDE SERPL-SCNC: 101 MMOL/L (ref 96–108)
CO2 SERPL-SCNC: 32 MMOL/L (ref 21–32)
CREAT SERPL-MCNC: 0.63 MG/DL (ref 0.6–1.3)
EOSINOPHIL # BLD AUTO: 0.13 THOUSAND/ÂΜL (ref 0–0.61)
EOSINOPHIL NFR BLD AUTO: 1 % (ref 0–6)
ERYTHROCYTE [DISTWIDTH] IN BLOOD BY AUTOMATED COUNT: 16 % (ref 11.6–15.1)
GFR SERPL CREATININE-BSD FRML MDRD: 82 ML/MIN/1.73SQ M
GLUCOSE SERPL-MCNC: 124 MG/DL (ref 65–140)
GLUCOSE SERPL-MCNC: 129 MG/DL (ref 65–140)
HCT VFR BLD AUTO: 40.8 % (ref 34.8–46.1)
HGB BLD-MCNC: 13 G/DL (ref 11.5–15.4)
IMM GRANULOCYTES # BLD AUTO: 0.08 THOUSAND/UL (ref 0–0.2)
IMM GRANULOCYTES NFR BLD AUTO: 1 % (ref 0–2)
INR PPP: 1.08 (ref 0.84–1.19)
LYMPHOCYTES # BLD AUTO: 0.92 THOUSANDS/ÂΜL (ref 0.6–4.47)
LYMPHOCYTES NFR BLD AUTO: 7 % (ref 14–44)
MCH RBC QN AUTO: 30 PG (ref 26.8–34.3)
MCHC RBC AUTO-ENTMCNC: 31.9 G/DL (ref 31.4–37.4)
MCV RBC AUTO: 94 FL (ref 82–98)
MONOCYTES # BLD AUTO: 1.21 THOUSAND/ÂΜL (ref 0.17–1.22)
MONOCYTES NFR BLD AUTO: 9 % (ref 4–12)
NEUTROPHILS # BLD AUTO: 11.13 THOUSANDS/ÂΜL (ref 1.85–7.62)
NEUTS SEG NFR BLD AUTO: 82 % (ref 43–75)
NRBC BLD AUTO-RTO: 0 /100 WBCS
PLATELET # BLD AUTO: 189 THOUSANDS/UL (ref 149–390)
PMV BLD AUTO: 11 FL (ref 8.9–12.7)
POTASSIUM SERPL-SCNC: 4.2 MMOL/L (ref 3.5–5.3)
PROT SERPL-MCNC: 7.5 G/DL (ref 6.4–8.4)
PROTHROMBIN TIME: 14.2 SECONDS (ref 11.6–14.5)
RBC # BLD AUTO: 4.34 MILLION/UL (ref 3.81–5.12)
SODIUM SERPL-SCNC: 138 MMOL/L (ref 135–147)
WBC # BLD AUTO: 13.52 THOUSAND/UL (ref 4.31–10.16)

## 2024-03-30 PROCEDURE — 82948 REAGENT STRIP/BLOOD GLUCOSE: CPT

## 2024-03-30 PROCEDURE — 36415 COLL VENOUS BLD VENIPUNCTURE: CPT | Performed by: EMERGENCY MEDICINE

## 2024-03-30 PROCEDURE — 85730 THROMBOPLASTIN TIME PARTIAL: CPT | Performed by: EMERGENCY MEDICINE

## 2024-03-30 PROCEDURE — 99284 EMERGENCY DEPT VISIT MOD MDM: CPT

## 2024-03-30 PROCEDURE — 85025 COMPLETE CBC W/AUTO DIFF WBC: CPT | Performed by: EMERGENCY MEDICINE

## 2024-03-30 PROCEDURE — 70491 CT SOFT TISSUE NECK W/DYE: CPT

## 2024-03-30 PROCEDURE — 99285 EMERGENCY DEPT VISIT HI MDM: CPT | Performed by: EMERGENCY MEDICINE

## 2024-03-30 PROCEDURE — 96374 THER/PROPH/DIAG INJ IV PUSH: CPT

## 2024-03-30 PROCEDURE — 85610 PROTHROMBIN TIME: CPT | Performed by: EMERGENCY MEDICINE

## 2024-03-30 PROCEDURE — 99223 1ST HOSP IP/OBS HIGH 75: CPT | Performed by: STUDENT IN AN ORGANIZED HEALTH CARE EDUCATION/TRAINING PROGRAM

## 2024-03-30 PROCEDURE — 80053 COMPREHEN METABOLIC PANEL: CPT | Performed by: EMERGENCY MEDICINE

## 2024-03-30 RX ORDER — MIDODRINE HYDROCHLORIDE 5 MG/1
10 TABLET ORAL
Status: DISCONTINUED | OUTPATIENT
Start: 2024-03-30 | End: 2024-03-30

## 2024-03-30 RX ORDER — CEFTRIAXONE 1 G/50ML
1000 INJECTION, SOLUTION INTRAVENOUS EVERY 24 HOURS
Status: DISCONTINUED | OUTPATIENT
Start: 2024-03-30 | End: 2024-04-04 | Stop reason: HOSPADM

## 2024-03-30 RX ORDER — ATORVASTATIN CALCIUM 40 MG/1
80 TABLET, FILM COATED ORAL
Status: DISCONTINUED | OUTPATIENT
Start: 2024-03-30 | End: 2024-04-04 | Stop reason: HOSPADM

## 2024-03-30 RX ORDER — DULOXETIN HYDROCHLORIDE 30 MG/1
60 CAPSULE, DELAYED RELEASE ORAL DAILY
Status: DISCONTINUED | OUTPATIENT
Start: 2024-03-31 | End: 2024-04-04 | Stop reason: HOSPADM

## 2024-03-30 RX ORDER — MIDODRINE HYDROCHLORIDE 5 MG/1
10 TABLET ORAL
Status: DISCONTINUED | OUTPATIENT
Start: 2024-03-31 | End: 2024-03-31

## 2024-03-30 RX ORDER — CLOPIDOGREL BISULFATE 75 MG/1
75 TABLET ORAL DAILY
Status: DISCONTINUED | OUTPATIENT
Start: 2024-03-31 | End: 2024-04-04 | Stop reason: HOSPADM

## 2024-03-30 RX ORDER — ONDANSETRON 2 MG/ML
4 INJECTION INTRAMUSCULAR; INTRAVENOUS ONCE
Status: COMPLETED | OUTPATIENT
Start: 2024-03-30 | End: 2024-03-30

## 2024-03-30 RX ORDER — PANTOPRAZOLE SODIUM 40 MG/1
40 TABLET, DELAYED RELEASE ORAL DAILY
Status: DISCONTINUED | OUTPATIENT
Start: 2024-03-31 | End: 2024-04-04 | Stop reason: HOSPADM

## 2024-03-30 RX ORDER — LEVOTHYROXINE SODIUM 88 UG/1
88 TABLET ORAL DAILY
Status: DISCONTINUED | OUTPATIENT
Start: 2024-03-31 | End: 2024-04-04 | Stop reason: HOSPADM

## 2024-03-30 RX ORDER — LEVOFLOXACIN 5 MG/ML
500 INJECTION, SOLUTION INTRAVENOUS EVERY 24 HOURS
Status: DISCONTINUED | OUTPATIENT
Start: 2024-03-30 | End: 2024-03-30

## 2024-03-30 RX ORDER — CLINDAMYCIN PHOSPHATE 600 MG/50ML
600 INJECTION, SOLUTION INTRAVENOUS EVERY 8 HOURS
Status: DISCONTINUED | OUTPATIENT
Start: 2024-03-30 | End: 2024-03-30

## 2024-03-30 RX ORDER — CEFAZOLIN SODIUM 2 G/50ML
2000 SOLUTION INTRAVENOUS ONCE
Status: COMPLETED | OUTPATIENT
Start: 2024-03-30 | End: 2024-03-30

## 2024-03-30 RX ORDER — ACETAMINOPHEN 325 MG/1
650 TABLET ORAL EVERY 6 HOURS PRN
Status: DISCONTINUED | OUTPATIENT
Start: 2024-03-30 | End: 2024-04-04 | Stop reason: HOSPADM

## 2024-03-30 RX ORDER — METRONIDAZOLE 500 MG/100ML
500 INJECTION, SOLUTION INTRAVENOUS EVERY 8 HOURS
Status: DISCONTINUED | OUTPATIENT
Start: 2024-03-30 | End: 2024-04-01

## 2024-03-30 RX ORDER — HEPARIN SODIUM 5000 [USP'U]/ML
5000 INJECTION, SOLUTION INTRAVENOUS; SUBCUTANEOUS EVERY 8 HOURS SCHEDULED
Status: DISCONTINUED | OUTPATIENT
Start: 2024-03-30 | End: 2024-04-04 | Stop reason: HOSPADM

## 2024-03-30 RX ORDER — BISOPROLOL FUMARATE 5 MG/1
2.5 TABLET, FILM COATED ORAL DAILY
Status: DISCONTINUED | OUTPATIENT
Start: 2024-03-31 | End: 2024-04-04 | Stop reason: HOSPADM

## 2024-03-30 RX ADMIN — CEFAZOLIN SODIUM 2000 MG: 2 SOLUTION INTRAVENOUS at 16:25

## 2024-03-30 RX ADMIN — ACETAMINOPHEN 650 MG: 325 TABLET ORAL at 21:18

## 2024-03-30 RX ADMIN — HEPARIN SODIUM 5000 UNITS: 5000 INJECTION, SOLUTION INTRAVENOUS; SUBCUTANEOUS at 21:18

## 2024-03-30 RX ADMIN — IOHEXOL 85 ML: 350 INJECTION, SOLUTION INTRAVENOUS at 14:34

## 2024-03-30 RX ADMIN — ATORVASTATIN CALCIUM 80 MG: 40 TABLET, FILM COATED ORAL at 21:18

## 2024-03-30 RX ADMIN — HEPARIN SODIUM 5000 UNITS: 5000 INJECTION, SOLUTION INTRAVENOUS; SUBCUTANEOUS at 18:20

## 2024-03-30 RX ADMIN — CEFTRIAXONE 1000 MG: 1 INJECTION, SOLUTION INTRAVENOUS at 21:18

## 2024-03-30 RX ADMIN — METRONIDAZOLE 500 MG: 500 INJECTION, SOLUTION INTRAVENOUS at 21:18

## 2024-03-30 RX ADMIN — ONDANSETRON 4 MG: 2 INJECTION INTRAMUSCULAR; INTRAVENOUS at 17:03

## 2024-03-30 NOTE — PLAN OF CARE
Problem: PAIN - ADULT  Goal: Verbalizes/displays adequate comfort level or baseline comfort level  Description: Interventions:  - Encourage patient to monitor pain and request assistance  - Assess pain using appropriate pain scale  - Administer analgesics based on type and severity of pain and evaluate response  - Implement non-pharmacological measures as appropriate and evaluate response  - Consider cultural and social influences on pain and pain management  - Notify physician/advanced practitioner if interventions unsuccessful or patient reports new pain  Outcome: Progressing     Problem: INFECTION - ADULT  Goal: Absence or prevention of progression during hospitalization  Description: INTERVENTIONS:  - Assess and monitor for signs and symptoms of infection  - Monitor lab/diagnostic results  - Monitor all insertion sites, i.e. indwelling lines, tubes, and drains  - Monitor endotracheal if appropriate and nasal secretions for changes in amount and color  - [place] appropriate cooling/warming therapies per order  - Administer medications as ordered  - Instruct and encourage patient and family to use good hand hygiene technique  - Identify and instruct in appropriate isolation precautions for identified infection/condition  Outcome: Progressing  Goal: Absence of fever/infection during neutropenic period  Description: INTERVENTIONS:  - Monitor WBC    Outcome: Progressing     Problem: SAFETY ADULT  Goal: Patient will remain free of falls  Description: INTERVENTIONS:  - Educate patient/family on patient safety including physical limitations  - Instruct patient to call for assistance with activity   - Consult OT/PT to assist with strengthening/mobility   - Keep Call bell within reach  - Keep bed low and locked with side rails adjusted as appropriate  - Keep care items and personal belongings within reach  - Initiate and maintain comfort rounds  - Make Fall Risk Sign visible to staff  - Offer Toileting every 2 Hours,  in advance of need  - Initiate/Maintain bed alarm  - Obtain necessary fall risk management equipment: non skid socks  - Apply yellow socks and bracelet for high fall risk patients  - Consider moving patient to room near nurses station  Outcome: Progressing  Goal: Maintain or return to baseline ADL function  Description: INTERVENTIONS:  -  Assess patient's ability to carry out ADLs; assess patient's baseline for ADL function and identify physical deficits which impact ability to perform ADLs (bathing, care of mouth/teeth, toileting, grooming, dressing, etc.)  - Assess/evaluate cause of self-care deficits   - Assess range of motion  - Assess patient's mobility; develop plan if impaired  - Assess patient's need for assistive devices and provide as appropriate  - Encourage maximum independence but intervene and supervise when necessary  - Involve family in performance of ADLs  - Assess for home care needs following discharge   - Consider OT consult to assist with ADL evaluation and planning for discharge  - Provide patient education as appropriate  Outcome: Progressing  Goal: Maintains/Returns to pre admission functional level  Description: INTERVENTIONS:  - Perform AM-PAC 6 Click Basic Mobility/ Daily Activity assessment daily.  - Set and communicate daily mobility goal to care team and patient/family/caregiver.   - Collaborate with rehabilitation services on mobility goals if consulted  - Perform Range of Motion 3 times a day.  - Reposition patient every 2 hours.  - Dangle patient 3 times a day  - Stand patient 3 times a day  - Ambulate patient 3 times a day  - Out of bed to chair 3 times a day   - Out of bed for meals 3 times a day  - Out of bed for toileting  - Record patient progress and toleration of activity level   Outcome: Progressing     Problem: DISCHARGE PLANNING  Goal: Discharge to home or other facility with appropriate resources  Description: INTERVENTIONS:  - Identify barriers to discharge w/patient and  caregiver  - Arrange for needed discharge resources and transportation as appropriate  - Identify discharge learning needs (meds, wound care, etc.)  - Arrange for interpretive services to assist at discharge as needed  - Refer to Case Management Department for coordinating discharge planning if the patient needs post-hospital services based on physician/advanced practitioner order or complex needs related to functional status, cognitive ability, or social support system  Outcome: Progressing     Problem: Knowledge Deficit  Goal: Patient/family/caregiver demonstrates understanding of disease process, treatment plan, medications, and discharge instructions  Description: Complete learning assessment and assess knowledge base.  Interventions:  - Provide teaching at level of understanding  - Provide teaching via preferred learning methods  Outcome: Progressing

## 2024-03-30 NOTE — PROGRESS NOTES
Patient arrived to [address] from ED @ 1725. Patient was oriented to room & call bailey. Masimo was placed. Admission assessment was completed. Patient is AxOx4 & is on 5LNC. Patient is [place] & VSS. Patient denies pain/discomfort. Patient is an assist x1 for care. Bed is in lowest position. All needs are within reach.

## 2024-03-30 NOTE — ASSESSMENT & PLAN NOTE
CT notes rght parotiditis and cellulitis. No abscess or adenopathy noted.  Pt was given a dose of Ancef in the ED.  Reports being allergic to penicillin and is hesitant to try cephalosporins.  Will start on IV Levaquin and IV Clindamycin for now and follow clinically. Will consider ID and ENT evaluation if no significant improvement

## 2024-03-30 NOTE — ASSESSMENT & PLAN NOTE
POA; as evidenced by leukocytosis, tachypnea and infection.  LA and blood cultures ordered.  Management outlined above

## 2024-03-30 NOTE — H&P
[facility]  H&P  Name: Danyelle Martinez 83 y.o. female I MRN: [id number]  Unit/Bed#: ED 09 I Date of Admission: 3/30/2024   Date of Service: 3/30/2024 I Hospital Day: 0      Assessment/Plan   * Parotitis, acute  Assessment & Plan  CT notes rght parotiditis and cellulitis. No abscess or adenopathy noted.  Pt was given a dose of Ancef in the ED.  Will start on IV Ceftriaxone and IV Flagyl for now and follow clinically. Will consider ID and ENT evaluation if no significant improvement     Sepsis without acute organ dysfunction (HCC)  Assessment & Plan  POA; as evidenced by leukocytosis, tachypnea and infection.  LA and blood cultures ordered.  Management outlined above     Facial cellulitis  Assessment & Plan  As above     Essential hypertension  Assessment & Plan  Resume Zebeta as BP permits.  Pt is also on Midodrine     Chronic hypoxemic respiratory failure (HCC)  Assessment & Plan  Pt reports using O2 due to hx of CHF. Currently at baseline respiratory status     Chronic diastolic CHF (congestive heart failure) (HCC)  Assessment & Plan  Wt Readings from Last 3 Encounters:   03/25/24 75.8 kg (167 lb)   03/23/24 75.9 kg (167 lb 5.3 oz)   03/14/24 75.8 kg (167 lb)     Will hold diuretic for now given sepsis and acute infection.  Will follow daily weight and I/Os          Thyroid nodule  Assessment & Plan  Stable thyroid nodules noted on CT imaging.  Known to pt     Acquired hypothyroidism  Assessment & Plan  On Synthroid     History of transient ischemic attack (TIA)  Assessment & Plan  Resume Plavix and Statin     Dyslipidemia  Assessment & Plan  On Statin            VTE Pharmacologic Prophylaxis: VTE Score: 6 High Risk (Score >/= 5) - Pharmacological DVT Prophylaxis Ordered: heparin. Sequential Compression Devices Ordered.  Code Status: Prior   Discussion with family: Updated  (daughter) at bedside.    Anticipated Length of Stay: Patient will be admitted on an inpatient basis  "with an anticipated length of stay of greater than 2 midnights secondary to acute infection requiring IV antibiotics .    Total Time Spent on Date of Encounter in care of patient: 55 mins. This time was spent on one or more of the following: performing physical exam; counseling and coordination of care; obtaining or reviewing history; documenting in the medical record; reviewing/ordering tests, medications or procedures; communicating with other healthcare professionals and discussing with patient's family/caregivers.    Chief Complaint: right facial swelling and pain     History of Present Illness:  Danyelle Martinez is a 83 y.o. female with a PMH of Diastolic CHF, HTN, HLD, TIA, Depression and Hypothyroidism who presents with right facial pain and swelling.  History was also obtained from family at bedside. Pt reports that 3 days ago she began to have pain in her upper right jaw as well as swelling.  As time progressed the pain and swelling intensified and she also noted erythema.  As her symptoms were not improving she came to the ED for evaluation.  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  She reports \"10%\" hearing loss in her right ear.  She reports pain in her upper right jaw but does not report any ear pain.  She reports a mild headache.  She denies any difficulty swallowing.  She denies any chest pain, SOB or abdominal pain.  Family at bedside deny any confusion.    Review of Systems:  Review of Systems   Constitutional:  Positive for fever.   HENT:  Positive for facial swelling. Negative for ear pain, sore throat, trouble swallowing and voice change.    Cardiovascular:  Negative for chest pain.   Gastrointestinal:  Positive for abdominal pain. Negative for blood in stool, nausea and vomiting.   Musculoskeletal:  Positive for arthralgias.   Psychiatric/Behavioral:  Negative for confusion.    All other systems reviewed and are negative.      Past Medical and Surgical History:   Past Medical " History:   Diagnosis Date    Anxiety     Arthritis     r knee and shoulders    Blind left eye     CHF (congestive heart failure) (HCC)     Deaf, left     Depression     Diabetes mellitus (HCC)     Disease of thyroid gland     DVT complicating pregnancy, unspecified trimester (HCC) postpartum    Hearing loss     LEFT EAR    History of shingles     fACIAL     Hyperlipidemia     Hypertension     Liver disease     Lyme disease     Meniere's disease     Obesity     Orthostatic hypotension     Psychiatric problem     Sinus congestion     Sleep apnea     Stroke (HCC)        Past Surgical History:   Procedure Laterality Date    APPENDECTOMY      BREAST BIOPSY Left 2010     SECTION      x2    DXA PROCEDURE (HISTORICAL)  10/28/2020    EYE SURGERY      HAND SURGERY Left     HERNIA REPAIR      HYSTERECTOMY      ROTATOR CUFF REPAIR Left     TONSILLECTOMY      TYMPANOSTOMY TUBE PLACEMENT         Meds/Allergies:  Prior to Admission medications    Medication Sig Start Date End Date Taking? Authorizing Provider   albuterol (ProAir HFA) 90 mcg/act inhaler Inhale 2 puffs every 6 (six) hours as needed for wheezing 24   [employer],    atorvastatin (LIPITOR) 80 mg tablet TAKE 1 TABLET BY MOUTH DAILY AT BEDTIME  Patient taking differently: Taking at morning 23   Bladimir Caraballo MD   bisoprolol (ZEBETA) 5 mg tablet Take 0.5 tablets (2.5 mg total) by mouth daily 24  Mari Emery,    Calcium Carbonate-Vit D-Min (CALCIUM 1200 PO) Take 1,200 mg by mouth daily    Historical Provider, MD   cetirizine (ZyrTEC) 10 mg tablet Take 10 mg by mouth daily    Historical Provider, MD   clonazePAM (KlonoPIN) 0.5 mg tablet Take 1 tablet (0.5 mg total) by mouth daily at bedtime 24   Bladimir Caraballo MD   clopidogrel (PLAVIX) 75 mg tablet TAKE 1 TABLET(75 MG) BY MOUTH DAILY 1/10/24   Bladimir Caraballo MD   DULoxetine (CYMBALTA) 60 mg delayed release capsule TAKE 1 CAPSULE(60 MG) BY MOUTH DAILY 24    Bladimir Caraballo MD   furosemide (LASIX) 40 mg tablet Take 1 tablet (40 mg total) by mouth daily 24   ROBERT Hill   levothyroxine 88 mcg tablet Take 1 tablet (88 mcg total) by mouth daily 23   Bladimir Caraballo MD   midodrine (PROAMATINE) 10 MG tablet Midodrine 10 mg 1 tablet at 8 AM and 1 tablet at 2 PM 23   Bladimir Caraballo MD   mometasone (NASONEX) 50 mcg/act nasal spray 1 spray into each nostril daily    Patient not taking: Reported on 2024    Historical Provider, MD   Multiple Vitamins-Minerals (PreserVision AREDS) TABS Take by mouth    Historical Provider, MD   nitrofurantoin (MACROBID) 100 mg capsule Take 100 mg by mouth daily Take with food 24   Historical Provider, MD   pantoprazole (PROTONIX) 40 mg tablet TAKE 1 TABLET(40 MG) BY MOUTH EVERY MORNING 23   Bladimir Caraballo MD   primidone (MYSOLINE) 50 mg tablet TAKE 1 TABLET(50 MG) BY MOUTH DAILY 23   Bladimir Caraballo MD     I have reviewed home medications with patient personally.    Allergies:   Allergies   Allergen Reactions    Penicillins Swelling       Social History:  Marital Status:    Patient Pre-hospital Living Situation: Home  Patient Pre-hospital Level of Mobility: walks  Patient Pre-hospital Diet Restrictions: cardiac diet   Substance Use History:   Social History     Substance and Sexual Activity   Alcohol Use Never     Social History     Tobacco Use   Smoking Status Former    Current packs/day: 0.00    Average packs/day: 0.8 packs/day for 44.0 years (33.0 ttl pk-yrs)    Types: Cigarettes    Start date: 1946    Quit date: 1990    Years since quittin.1    Passive exposure: Past   Smokeless Tobacco Never     Social History     Substance and Sexual Activity   Drug Use Never       Family History:  Family History   Problem Relation Age of Onset    Depression Mother     Hypertension Mother     Obesity Mother     Depression Father     Alcohol abuse Father     Stroke Father     Breast  cancer Sister 68    Ovarian cancer Daughter 53    BRCA1 Negative Daughter     BRCA2 Negative Daughter        Physical Exam:     Vitals:   Blood Pressure: (!) 197/81 (03/30/24 1700)  Pulse: 86 (03/30/24 1700)  Temperature: 100 °F (37.8 °C) (03/30/24 1332)  Temp Source: Tympanic (03/30/24 1332)  Respirations: 22 (03/30/24 1332)  SpO2: 100 % (03/30/24 1700)    Physical Exam   General: in no acute distress  HEENT: swelling, warmth and tenderness noted in right parotid region and upper jaw with surrounding erythema extending to neck  Skin: erythema along right side of face extending to neck  CVS: RRR, no murmurs appreciated  Lungs: CTAL, no wheezing or rales appreciated  Abdomen: soft, nondistended, bowel sounds normal, nontender upon palpation, no guarding or rebound tenderness  Extremities: compression stockings in place in both legs  Neuro: alert and oriented x3, no tremors   Psych: calm, cooperative      Additional Data:     Lab Results:  Results from last 7 days   Lab Units 03/30/24  1405   WBC Thousand/uL 13.52*   HEMOGLOBIN g/dL 13.0   HEMATOCRIT % 40.8   PLATELETS Thousands/uL 189   NEUTROS PCT % 82*   LYMPHS PCT % 7*   MONOS PCT % 9   EOS PCT % 1     Results from last 7 days   Lab Units 03/30/24  1405   SODIUM mmol/L 138   POTASSIUM mmol/L 4.2   CHLORIDE mmol/L 101   CO2 mmol/L 32   BUN mg/dL 17   CREATININE mg/dL 0.63   ANION GAP mmol/L 5   CALCIUM mg/dL 10.1   ALBUMIN g/dL 4.2   TOTAL BILIRUBIN mg/dL 1.27*   ALK PHOS U/L 99   ALT U/L 23   AST U/L 35   GLUCOSE RANDOM mg/dL 124     Results from last 7 days   Lab Units 03/30/24  1405   INR  1.08                   Lines/Drains:  Invasive Devices       Peripheral Intravenous Line  Duration             Peripheral IV 03/30/24 Right Antecubital <1 day                        Imaging: Reviewed radiology reports from this admission including: CT Neck   CT soft tissue neck   Final Result by E. Alec Schoenberger, MD (03/30 9521)      Right parotiditis and cellulitis. No  abscess or adenopathy.   Stable thyroid nodules.         Workstation performed: [id number]             EKG and Other Studies Reviewed on Admission:   EKG: No EKG obtained.    ** Please Note: This note has been constructed using a voice recognition system. **

## 2024-03-30 NOTE — ED PROVIDER NOTES
History  Chief Complaint   Patient presents with    Facial Swelling     Pain and R sided facial swelling for a couple of days. Area very red and swollen     Patient states she noted atraumatic swelling of the right side of her face 2 days prior to arrival.  She does have some dental problems but is not sure which from an odontogenic source.  The area has become rapidly red tender and swollen.  She has not had any drainage from the area.  Patient states pain is increased with mastication talking and smiling.  Has no sore throat or difficulty swallowing or breathing.  No fever or chills        Prior to Admission Medications   Prescriptions Last Dose Informant Patient Reported? Taking?   Calcium Carbonate-Vit D-Min (CALCIUM 1200 PO)  Self Yes No   Sig: Take 1,200 mg by mouth daily   DULoxetine (CYMBALTA) 60 mg delayed release capsule  Self No No   Sig: TAKE 1 CAPSULE(60 MG) BY MOUTH DAILY   Multiple Vitamins-Minerals (PreserVision AREDS) TABS   Yes No   Sig: Take by mouth   albuterol (ProAir HFA) 90 mcg/act inhaler  Self No No   Sig: Inhale 2 puffs every 6 (six) hours as needed for wheezing   atorvastatin (LIPITOR) 80 mg tablet  Self No No   Sig: TAKE 1 TABLET BY MOUTH DAILY AT BEDTIME   Patient taking differently: Taking at morning   bisoprolol (ZEBETA) 5 mg tablet  Self No No   Sig: Take 0.5 tablets (2.5 mg total) by mouth daily   cetirizine (ZyrTEC) 10 mg tablet  Self Yes No   Sig: Take 10 mg by mouth daily   clonazePAM (KlonoPIN) 0.5 mg tablet   No No   Sig: Take 1 tablet (0.5 mg total) by mouth daily at bedtime   clopidogrel (PLAVIX) 75 mg tablet  Self No No   Sig: TAKE 1 TABLET(75 MG) BY MOUTH DAILY   furosemide (LASIX) 40 mg tablet   No No   Sig: Take 1 tablet (40 mg total) by mouth daily   levothyroxine 88 mcg tablet  Self No No   Sig: Take 1 tablet (88 mcg total) by mouth daily   midodrine (PROAMATINE) 10 MG tablet  Self No No   Sig: Midodrine 10 mg 1 tablet at 8 AM and 1 tablet at 2 PM   mometasone (NASONEX)  50 mcg/act nasal spray  Self Yes No   Si spray into each nostril daily     Patient not taking: Reported on 2024   nitrofurantoin (MACROBID) 100 mg capsule  Self Yes No   Sig: Take 100 mg by mouth daily Take with food   pantoprazole (PROTONIX) 40 mg tablet  Self No No   Sig: TAKE 1 TABLET(40 MG) BY MOUTH EVERY MORNING   primidone (MYSOLINE) 50 mg tablet  Self No No   Sig: TAKE 1 TABLET(50 MG) BY MOUTH DAILY      Facility-Administered Medications: None       Past Medical History:   Diagnosis Date    Anxiety     Arthritis     r knee and shoulders    Blind left eye     CHF (congestive heart failure) (HCC)     Deaf, left     Depression     Diabetes mellitus (HCC)     Disease of thyroid gland     DVT complicating pregnancy, unspecified trimester (HCC) postpartum    Hearing loss     LEFT EAR    History of shingles     fACIAL     Hyperlipidemia     Hypertension     Liver disease     Lyme disease     Meniere's disease     Obesity     Orthostatic hypotension     Psychiatric problem     Sinus congestion     Sleep apnea     Stroke (HCC)        Past Surgical History:   Procedure Laterality Date    APPENDECTOMY      BREAST BIOPSY Left 2010     SECTION      x2    DXA PROCEDURE (HISTORICAL)  10/28/2020    EYE SURGERY      HAND SURGERY Left     HERNIA REPAIR      HYSTERECTOMY      ROTATOR CUFF REPAIR Left     TONSILLECTOMY      TYMPANOSTOMY TUBE PLACEMENT         Family History   Problem Relation Age of Onset    Depression Mother     Hypertension Mother     Obesity Mother     Depression Father     Alcohol abuse Father     Stroke Father     Breast cancer Sister 68    Ovarian cancer Daughter 53    BRCA1 Negative Daughter     BRCA2 Negative Daughter      I have reviewed and agree with the history as documented.    E-Cigarette/Vaping    E-Cigarette Use Never User      E-Cigarette/Vaping Substances    Nicotine No     THC No     CBD No     Flavoring No     Other No     Unknown No      Social History     Tobacco Use     Smoking status: Former     Current packs/day: 0.00     Average packs/day: 0.8 packs/day for 44.0 years (33.0 ttl pk-yrs)     Types: Cigarettes     Start date: 1946     Quit date: 1990     Years since quittin.1     Passive exposure: Past    Smokeless tobacco: Never   Vaping Use    Vaping status: Never Used   Substance Use Topics    Alcohol use: Never    Drug use: Never       Review of Systems   Constitutional:  Negative for chills and fever.   HENT:  Positive for facial swelling and hearing loss. Negative for congestion, sinus pain, trouble swallowing and voice change.    Eyes:  Negative for visual disturbance.   Respiratory:  Negative for cough.    Cardiovascular:  Negative for chest pain.   Gastrointestinal:  Negative for abdominal pain and vomiting.   Genitourinary:  Negative for dysuria.   Musculoskeletal:  Positive for neck pain. Negative for back pain.   Skin:  Positive for rash.   Neurological:  Positive for headaches. Negative for weakness.   Hematological:  Does not bruise/bleed easily.   Psychiatric/Behavioral:  Negative for confusion.    All other systems reviewed and are negative.      Physical Exam  Physical Exam  Vitals and nursing note reviewed.   Constitutional:       Appearance: Normal appearance.   HENT:      Head: Normocephalic.      Comments: There is tender swelling centered on the right parotid gland.  Is quite indurated.  Cellulitis extends from the face to the right side of the neck     Right Ear: External ear normal.      Left Ear: External ear normal.      Nose: Nose normal.      Mouth/Throat:      Mouth: Mucous membranes are moist.      Comments: Patient has several fractured and missing teeth but no gingival abscess present  Eyes:      Conjunctiva/sclera: Conjunctivae normal.   Neck:      Vascular: No carotid bruit.   Cardiovascular:      Rate and Rhythm: Normal rate and regular rhythm.      Pulses: Normal pulses.   Pulmonary:      Effort: Pulmonary effort is normal.    Abdominal:      Palpations: Abdomen is soft.      Tenderness: There is no abdominal tenderness.   Musculoskeletal:         General: Normal range of motion.      Cervical back: Normal range of motion. Tenderness present.   Skin:     General: Skin is warm and dry.      Capillary Refill: Capillary refill takes less than 2 seconds.   Neurological:      General: No focal deficit present.      Mental Status: She is alert and oriented to person, place, and time.   Psychiatric:         Mood and Affect: Mood normal.         Behavior: Behavior normal.         Vital Signs  ED Triage Vitals [03/30/24 1332]   Temperature Pulse Respirations Blood Pressure SpO2   100 °F (37.8 °C) 78 22 (!) 190/76 100 %      Temp Source Heart Rate Source Patient Position - Orthostatic VS BP Location FiO2 (%)   Tympanic Monitor Sitting Left arm --      Pain Score       10 - Worst Possible Pain           Vitals:    03/30/24 1332 03/30/24 1615   BP: (!) 190/76    Pulse: 78 88   Patient Position - Orthostatic VS: Sitting          Visual Acuity      ED Medications  Medications   ceFAZolin (ANCEF) IVPB (premix in dextrose) 2,000 mg 50 mL (2,000 mg Intravenous New Bag 3/30/24 1625)   iohexol (OMNIPAQUE) 350 MG/ML injection (MULTI-DOSE) 85 mL (85 mL Intravenous Given 3/30/24 1434)       Diagnostic Studies  Results Reviewed       Procedure Component Value Units Date/Time    Comprehensive metabolic panel [[id number]]  (Abnormal) Collected: 03/30/24 1405    Lab Status: Final result Specimen: Blood from Arm, Right Updated: 03/30/24 1434     Sodium 138 mmol/L      Potassium 4.2 mmol/L      Chloride 101 mmol/L      CO2 32 mmol/L      ANION GAP 5 mmol/L      BUN 17 mg/dL      Creatinine 0.63 mg/dL      Glucose 124 mg/dL      Calcium 10.1 mg/dL      AST 35 U/L      ALT 23 U/L      Alkaline Phosphatase 99 U/L      Total Protein 7.5 g/dL      Albumin 4.2 g/dL      Total Bilirubin 1.27 mg/dL      eGFR 82 ml/min/1.73sq m     Narrative:      National Kidney Disease  Foundation guidelines for Chronic Kidney Disease (CKD):     Stage 1 with normal or high GFR (GFR > 90 mL/min/1.73 square meters)    Stage 2 Mild CKD (GFR = 60-89 mL/min/1.73 square meters)    Stage 3A Moderate CKD (GFR = 45-59 mL/min/1.73 square meters)    Stage 3B Moderate CKD (GFR = 30-44 mL/min/1.73 square meters)    Stage 4 Severe CKD (GFR = 15-29 mL/min/1.73 square meters)    Stage 5 End Stage CKD (GFR <15 mL/min/1.73 square meters)  Note: GFR calculation is accurate only with a steady state creatinine    Protime-INR [[id number]]  (Normal) Collected: 03/30/24 1405    Lab Status: Final result Specimen: Blood from Arm, Right Updated: 03/30/24 1425     Protime 14.2 seconds      INR 1.08    APTT [[id number]]  (Normal) Collected: 03/30/24 1405    Lab Status: Final result Specimen: Blood from Arm, Right Updated: 03/30/24 1425     PTT 34 seconds     CBC and differential [[id number]]  (Abnormal) Collected: 03/30/24 1405    Lab Status: Final result Specimen: Blood from Arm, Right Updated: 03/30/24 1420     WBC 13.52 Thousand/uL      RBC 4.34 Million/uL      Hemoglobin 13.0 g/dL      Hematocrit 40.8 %      MCV 94 fL      MCH 30.0 pg      MCHC 31.9 g/dL      RDW 16.0 %      MPV 11.0 fL      Platelets 189 Thousands/uL      nRBC 0 /100 WBCs      Neutrophils Relative 82 %      Immature Grans % 1 %      Lymphocytes Relative 7 %      Monocytes Relative 9 %      Eosinophils Relative 1 %      Basophils Relative 0 %      Neutrophils Absolute 11.13 Thousands/µL      Absolute Immature Grans 0.08 Thousand/uL      Absolute Lymphocytes 0.92 Thousands/µL      Absolute Monocytes 1.21 Thousand/µL      Eosinophils Absolute 0.13 Thousand/µL      Basophils Absolute 0.05 Thousands/µL                    CT soft tissue neck   Final Result by E. Alec Schoenberger, MD (03/30 1551)      Right parotiditis and cellulitis. No abscess or adenopathy.   Stable thyroid nodules.         Workstation performed: [id number]                     Procedures  Procedures         ED Course                                             Medical Decision Making  Patient has apparent parotid inflammation possible infection with cellulitis.  CT scan rule out abscess    Amount and/or Complexity of Data Reviewed  Labs: ordered.  Radiology: ordered.    Risk  Prescription drug management.  Decision regarding hospitalization.             Disposition  Final diagnoses:   Parotitis   Facial cellulitis     Time reflects when diagnosis was documented in both MDM as applicable and the Disposition within this note       Time User Action Codes Description Comment    3/30/2024  4:29 PM Stefan Hector Add [K11.20] Parotitis     3/30/2024  4:29 PM Stefan Hector Add [L03.211] Facial cellulitis           ED Disposition       ED Disposition   Admit    Condition   Stable    Date/Time   Sat Mar 30, 2024  4:29 PM    Comment   Case was discussed with hospitalist and the patient's admission status was agreed to be Admission Status: inpatient status to the service of Dr. Hoang.               Follow-up Information    None         Patient's Medications   Discharge Prescriptions    No medications on file       No discharge procedures on file.    PDMP Review       None            ED Provider  Electronically Signed by             Stefan Hector MD  03/30/24 6471

## 2024-03-30 NOTE — ASSESSMENT & PLAN NOTE
Wt Readings from Last 3 Encounters:   03/25/24 75.8 kg (167 lb)   03/23/24 75.9 kg (167 lb 5.3 oz)   03/14/24 75.8 kg (167 lb)     Will hold diuretic for now given sepsis and acute infection.  Will follow daily weight and I/Os

## 2024-03-31 PROBLEM — A41.9 SEPSIS WITHOUT ACUTE ORGAN DYSFUNCTION (HCC): Status: RESOLVED | Noted: 2024-03-30 | Resolved: 2024-03-31

## 2024-03-31 LAB
ALBUMIN SERPL BCP-MCNC: 4.2 G/DL (ref 3.5–5)
ALP SERPL-CCNC: 98 U/L (ref 34–104)
ALT SERPL W P-5'-P-CCNC: 23 U/L (ref 7–52)
ANION GAP SERPL CALCULATED.3IONS-SCNC: 9 MMOL/L (ref 4–13)
AST SERPL W P-5'-P-CCNC: 36 U/L (ref 13–39)
BASOPHILS # BLD AUTO: 0.05 THOUSANDS/ÂΜL (ref 0–0.1)
BASOPHILS NFR BLD AUTO: 0 % (ref 0–1)
BILIRUB SERPL-MCNC: 1.12 MG/DL (ref 0.2–1)
BUN SERPL-MCNC: 12 MG/DL (ref 5–25)
CALCIUM SERPL-MCNC: 10.3 MG/DL (ref 8.4–10.2)
CHLORIDE SERPL-SCNC: 99 MMOL/L (ref 96–108)
CO2 SERPL-SCNC: 30 MMOL/L (ref 21–32)
CREAT SERPL-MCNC: 0.57 MG/DL (ref 0.6–1.3)
EOSINOPHIL # BLD AUTO: 0.06 THOUSAND/ÂΜL (ref 0–0.61)
EOSINOPHIL NFR BLD AUTO: 0 % (ref 0–6)
ERYTHROCYTE [DISTWIDTH] IN BLOOD BY AUTOMATED COUNT: 16.1 % (ref 11.6–15.1)
GFR SERPL CREATININE-BSD FRML MDRD: 84 ML/MIN/1.73SQ M
GLUCOSE SERPL-MCNC: 104 MG/DL (ref 65–140)
HCT VFR BLD AUTO: 41.3 % (ref 34.8–46.1)
HGB BLD-MCNC: 13.1 G/DL (ref 11.5–15.4)
IMM GRANULOCYTES # BLD AUTO: 0.13 THOUSAND/UL (ref 0–0.2)
IMM GRANULOCYTES NFR BLD AUTO: 1 % (ref 0–2)
LYMPHOCYTES # BLD AUTO: 1.08 THOUSANDS/ÂΜL (ref 0.6–4.47)
LYMPHOCYTES NFR BLD AUTO: 8 % (ref 14–44)
MAGNESIUM SERPL-MCNC: 2 MG/DL (ref 1.9–2.7)
MCH RBC QN AUTO: 30 PG (ref 26.8–34.3)
MCHC RBC AUTO-ENTMCNC: 31.7 G/DL (ref 31.4–37.4)
MCV RBC AUTO: 95 FL (ref 82–98)
MONOCYTES # BLD AUTO: 1.14 THOUSAND/ÂΜL (ref 0.17–1.22)
MONOCYTES NFR BLD AUTO: 9 % (ref 4–12)
NEUTROPHILS # BLD AUTO: 11 THOUSANDS/ÂΜL (ref 1.85–7.62)
NEUTS SEG NFR BLD AUTO: 82 % (ref 43–75)
NRBC BLD AUTO-RTO: 0 /100 WBCS
PLATELET # BLD AUTO: 202 THOUSANDS/UL (ref 149–390)
PMV BLD AUTO: 11.5 FL (ref 8.9–12.7)
POTASSIUM SERPL-SCNC: 3.9 MMOL/L (ref 3.5–5.3)
PROT SERPL-MCNC: 7.7 G/DL (ref 6.4–8.4)
RBC # BLD AUTO: 4.36 MILLION/UL (ref 3.81–5.12)
SODIUM SERPL-SCNC: 138 MMOL/L (ref 135–147)
WBC # BLD AUTO: 13.46 THOUSAND/UL (ref 4.31–10.16)

## 2024-03-31 PROCEDURE — 85025 COMPLETE CBC W/AUTO DIFF WBC: CPT | Performed by: STUDENT IN AN ORGANIZED HEALTH CARE EDUCATION/TRAINING PROGRAM

## 2024-03-31 PROCEDURE — 87040 BLOOD CULTURE FOR BACTERIA: CPT | Performed by: STUDENT IN AN ORGANIZED HEALTH CARE EDUCATION/TRAINING PROGRAM

## 2024-03-31 PROCEDURE — 99232 SBSQ HOSP IP/OBS MODERATE 35: CPT | Performed by: INTERNAL MEDICINE

## 2024-03-31 PROCEDURE — 80053 COMPREHEN METABOLIC PANEL: CPT | Performed by: STUDENT IN AN ORGANIZED HEALTH CARE EDUCATION/TRAINING PROGRAM

## 2024-03-31 PROCEDURE — 83735 ASSAY OF MAGNESIUM: CPT | Performed by: STUDENT IN AN ORGANIZED HEALTH CARE EDUCATION/TRAINING PROGRAM

## 2024-03-31 PROCEDURE — 36415 COLL VENOUS BLD VENIPUNCTURE: CPT | Performed by: STUDENT IN AN ORGANIZED HEALTH CARE EDUCATION/TRAINING PROGRAM

## 2024-03-31 RX ORDER — MIDODRINE HYDROCHLORIDE 5 MG/1
5 TABLET ORAL
Status: DISCONTINUED | OUTPATIENT
Start: 2024-04-01 | End: 2024-04-04 | Stop reason: HOSPADM

## 2024-03-31 RX ORDER — FUROSEMIDE 40 MG/1
40 TABLET ORAL DAILY
Status: DISCONTINUED | OUTPATIENT
Start: 2024-03-31 | End: 2024-03-31

## 2024-03-31 RX ADMIN — BISOPROLOL FUMARATE 2.5 MG: 5 TABLET ORAL at 11:10

## 2024-03-31 RX ADMIN — METRONIDAZOLE 500 MG: 500 INJECTION, SOLUTION INTRAVENOUS at 05:14

## 2024-03-31 RX ADMIN — MIDODRINE HYDROCHLORIDE 10 MG: 5 TABLET ORAL at 11:09

## 2024-03-31 RX ADMIN — FUROSEMIDE 40 MG: 40 TABLET ORAL at 11:09

## 2024-03-31 RX ADMIN — ACETAMINOPHEN 650 MG: 325 TABLET ORAL at 11:08

## 2024-03-31 RX ADMIN — LEVOTHYROXINE SODIUM 88 MCG: 88 TABLET ORAL at 11:09

## 2024-03-31 RX ADMIN — HEPARIN SODIUM 5000 UNITS: 5000 INJECTION, SOLUTION INTRAVENOUS; SUBCUTANEOUS at 05:13

## 2024-03-31 RX ADMIN — ACETAMINOPHEN 650 MG: 325 TABLET ORAL at 05:13

## 2024-03-31 RX ADMIN — CEFTRIAXONE 1000 MG: 1 INJECTION, SOLUTION INTRAVENOUS at 17:50

## 2024-03-31 RX ADMIN — METRONIDAZOLE 500 MG: 500 INJECTION, SOLUTION INTRAVENOUS at 18:17

## 2024-03-31 RX ADMIN — SODIUM CHLORIDE 1000 ML: 0.9 INJECTION, SOLUTION INTRAVENOUS at 13:07

## 2024-03-31 RX ADMIN — ACETAMINOPHEN 650 MG: 325 TABLET ORAL at 18:47

## 2024-03-31 RX ADMIN — DULOXETINE HYDROCHLORIDE 60 MG: 30 CAPSULE, DELAYED RELEASE ORAL at 11:08

## 2024-03-31 RX ADMIN — PANTOPRAZOLE SODIUM 40 MG: 40 TABLET, DELAYED RELEASE ORAL at 11:08

## 2024-03-31 RX ADMIN — HEPARIN SODIUM 5000 UNITS: 5000 INJECTION, SOLUTION INTRAVENOUS; SUBCUTANEOUS at 13:07

## 2024-03-31 RX ADMIN — METRONIDAZOLE 500 MG: 500 INJECTION, SOLUTION INTRAVENOUS at 12:04

## 2024-03-31 RX ADMIN — HEPARIN SODIUM 5000 UNITS: 5000 INJECTION, SOLUTION INTRAVENOUS; SUBCUTANEOUS at 20:54

## 2024-03-31 RX ADMIN — CLOPIDOGREL 75 MG: 75 TABLET ORAL at 11:09

## 2024-03-31 RX ADMIN — ATORVASTATIN CALCIUM 80 MG: 40 TABLET, FILM COATED ORAL at 20:53

## 2024-03-31 NOTE — ASSESSMENT & PLAN NOTE
Wt Readings from Last 3 Encounters:   03/31/24 72.1 kg (159 lb)   03/25/24 75.8 kg (167 lb)   03/23/24 75.9 kg (167 lb 5.3 oz)     Holding Lasix to avoid dehydration in setting of parotitis.  Will follow daily weight and I/Os

## 2024-03-31 NOTE — PHYSICAL THERAPY NOTE
PT Cancellation Note       03/31/24 1503   Note Type   Note type Cancelled Session   Cancel Reasons Refusal  (Attemtped to see pt this afternoon for PT evaluation. Pt reports she just returned to bed from bathroom and does not wish to participate at this time. Will follow-up as schedule allows.)   Licensure   [place] License Number  Ally Coker [id number]

## 2024-03-31 NOTE — CONSULTS
Consultation - OHN/ENT   Danyelle Martinez 83 y.o. female MRN: [id number]  Unit/Bed#: 2 [address] Encounter: [id number]        Assessment:  Acute right parotitis likely 2/2 dehydration due to significant recent lasix dosing. Discussed the below plan with the primary team including need for hydration in the setting of volumetric changes due to her CHF    Large 4.3 cm thyroid nodule: No previous US guided biopsy. Appears stable from previous.     Plan:  We discussed the diagnosis sialoadenitis and sialolithiasis.  We discussed the ongoing treatment as below.  We discussed the need for glandular massage to move any stagnant or infected fluid out of the gland.  We discussed the need for frequent massage at least 1 or 2 times per hour.  Recommend ongoing outpatient follow-up with primary care physician or if episodes or recurrent with Otolaryngology.     Sialadenitis protocol:  1. Warm compresses to the affected area. Sialogogues (sour candies, lemon, citrus, vinegar). NSAID such as Ibuprofen for pain relief and to reduce inflammation. Aggressive hydration; encourage PO intake and IVF as tolerated. Continue IV antibiotic with adequate staph aureus coverage     2. Parotitis: Massage the gland from angle of mandible working anteriorly along the cheek towards the oral commissure to facilitate drainage through the ductal opening      Thyroid nodules: Discussed [place] rating system, CLAUDE guidelines, and indications for further interventions.  Reviewed options for treatment including repeat ultrasound in one year, FNAB of the nodule, or surgical removal of thyroid (lobectomy versus total thyroidectomy).  Recommend outpatient US and FNAB.    Dispo: Per primary team. If there is any further need for OHN/ENT services while in house please reconsult our office.     History of Present Illness   Physician Requesting Consult: Drew Benítez MD  Reason for Consult / Principal Problem: parotitis  HPI: Danyelle Martinez is a 83 y.o. year  old female who presents with right sided facial swelling for the past 3 days. She has hx of CHF with worsening weight gain and edema recently. She was given multiple extra doses of Lasix in the past week due to increased weight gain with little urine production. She is finally having return of normal urinary function per her report. She has a hx of pcn allergy. No problems with cephalosporins.  Has a known bilateral thyroid nodules. No biopsy.    Review of systems:  10 Point ROS was performed and negative except as above or otherwise noted in the medical record.    Historical Information   Past Medical History:   Diagnosis Date    Anxiety     Arthritis     r knee and shoulders    Blind left eye     CHF (congestive heart failure) (HCC)     Deaf, left     Depression     Diabetes mellitus (HCC)     Disease of thyroid gland     DVT complicating pregnancy, unspecified trimester (HCC) postpartum    Hearing loss     LEFT EAR    History of shingles 2007    fACIAL     Hyperlipidemia     Hypertension     Liver disease     Lyme disease     Meniere's disease     Obesity     Orthostatic hypotension     Psychiatric problem     Sinus congestion     Sleep apnea     Stroke (HCC)      Past Surgical History:   Procedure Laterality Date    APPENDECTOMY      BREAST BIOPSY Left 2010     SECTION      x2    DXA PROCEDURE (HISTORICAL)  10/28/2020    EYE SURGERY      HAND SURGERY Left     HERNIA REPAIR      HYSTERECTOMY      ROTATOR CUFF REPAIR Left     TONSILLECTOMY      TYMPANOSTOMY TUBE PLACEMENT       Social History   Social History     Substance and Sexual Activity   Alcohol Use Not Currently     Social History     Substance and Sexual Activity   Drug Use Never     Social History     Tobacco Use   Smoking Status Former    Current packs/day: 0.00    Average packs/day: 0.8 packs/day for 44.0 years (33.0 ttl pk-yrs)    Types: Cigarettes    Start date: 1946    Quit date: 1990    Years since quittin.1    Passive  exposure: Past   Smokeless Tobacco Never     Family History:   Family History   Problem Relation Age of Onset    Depression Mother     Hypertension Mother     Obesity Mother     Depression Father     Alcohol abuse Father     Stroke Father     Breast cancer Sister 68    Ovarian cancer Daughter 53    BRCA1 Negative Daughter     BRCA2 Negative Daughter        Meds/Allergies   all current active meds have been reviewed, current meds:   Current Facility-Administered Medications   Medication Dose Route Frequency    acetaminophen (TYLENOL) tablet 650 mg  650 mg Oral Q6H PRN    atorvastatin (LIPITOR) tablet 80 mg  80 mg Oral HS    bisoprolol (ZEBETA) tablet 2.5 mg  2.5 mg Oral Daily    cefTRIAXone (ROCEPHIN) IVPB (premix in dextrose) 1,000 mg 50 mL  1,000 mg Intravenous Q24H    clopidogrel (PLAVIX) tablet 75 mg  75 mg Oral Daily    DULoxetine (CYMBALTA) delayed release capsule 60 mg  60 mg Oral Daily    heparin (porcine) subcutaneous injection 5,000 Units  5,000 Units Subcutaneous Q8H JOHN    levothyroxine tablet 88 mcg  88 mcg Oral Daily    metroNIDAZOLE (FLAGYL) IVPB (premix) 500 mg 100 mL  500 mg Intravenous Q8H    midodrine (PROAMATINE) tablet 10 mg  10 mg Oral BID AC    pantoprazole (PROTONIX) EC tablet 40 mg  40 mg Oral Daily    sodium chloride 0.9 % bolus 1,000 mL  1,000 mL Intravenous Once   , and PTA meds:   Prior to Admission Medications   Prescriptions Last Dose Informant Patient Reported? Taking?   Calcium Carbonate-Vit D-Min (CALCIUM 1200 PO) Past Week Self Yes Yes   Sig: Take 1,200 mg by mouth daily   DULoxetine (CYMBALTA) 60 mg delayed release capsule 3/29/2024 Self No Yes   Sig: TAKE 1 CAPSULE(60 MG) BY MOUTH DAILY   Multiple Vitamins-Minerals (PreserVision AREDS) TABS Past Week  Yes Yes   Sig: Take by mouth   albuterol (ProAir HFA) 90 mcg/act inhaler Not Taking Self No No   Sig: Inhale 2 puffs every 6 (six) hours as needed for wheezing   Patient not taking: Reported on 3/30/2024   atorvastatin (LIPITOR) 80  mg tablet 3/29/2024 Self No Yes   Sig: TAKE 1 TABLET BY MOUTH DAILY AT BEDTIME   Patient taking differently: Taking at morning   bisoprolol (ZEBETA) 5 mg tablet 3/29/2024 Self No Yes   Sig: Take 0.5 tablets (2.5 mg total) by mouth daily   cetirizine (ZyrTEC) 10 mg tablet 3/29/2024 Self Yes Yes   Sig: Take 10 mg by mouth daily   clonazePAM (KlonoPIN) 0.5 mg tablet 3/29/2024  No Yes   Sig: Take 1 tablet (0.5 mg total) by mouth daily at bedtime   clopidogrel (PLAVIX) 75 mg tablet 3/29/2024 Self No Yes   Sig: TAKE 1 TABLET(75 MG) BY MOUTH DAILY   furosemide (LASIX) 40 mg tablet 3/29/2024  No Yes   Sig: Take 1 tablet (40 mg total) by mouth daily   levothyroxine 88 mcg tablet 3/30/2024 Self No Yes   Sig: Take 1 tablet (88 mcg total) by mouth daily   midodrine (PROAMATINE) 10 MG tablet 3/30/2024 Self No Yes   Sig: Midodrine 10 mg 1 tablet at 8 AM and 1 tablet at 2 PM   mometasone (NASONEX) 50 mcg/act nasal spray Past Week Self Yes Yes   Si spray into each nostril daily   nitrofurantoin (MACROBID) 100 mg capsule Not Taking Self Yes No   Sig: Take 100 mg by mouth daily Take with food   Patient not taking: Reported on 3/30/2024   pantoprazole (PROTONIX) 40 mg tablet 3/29/2024 Self No Yes   Sig: TAKE 1 TABLET(40 MG) BY MOUTH EVERY MORNING   primidone (MYSOLINE) 50 mg tablet 3/29/2024 Self No Yes   Sig: TAKE 1 TABLET(50 MG) BY MOUTH DAILY      Facility-Administered Medications: None       Allergies   Allergen Reactions    Penicillins Swelling       Objective     Vitals:    24 0755   BP: 158/80   Pulse: 88   Resp: 16   Temp: 98.2 °F (36.8 °C)   SpO2: 94%         Physical Exam   Constitutional: Oriented to person, place, and time. Well-developed and well-nourished, no apparent distress, non-toxic appearance. Cooperative, able to hear and answer questions without difficulty.    Voice: Normal voice quality.  Head: Normocephalic, atraumatic.  No scars, masses or lesions.  Face: Symmetric, no edema, no sinus  tenderness.  Eyes: Vision grossly intact, extra-ocular movement intact.  Ears: External ears normal.  No post-auricular erythema or tenderness.  Nose: Septum intact, nares clear.  Mucosa moist, turbinates well appearing.  No crusting, polyps or discharge evident.  Oral cavity: Dentition intact.  Mucosa moist, lips without lesions or masses.  Tongue mobile, floor of mouth soft and flat.  Hard palate intact.  No masses or lesions.   Oropharynx: Uvula is midline, soft palate intact without lesion or mass.  Oropharyngeal inlet without obstruction.  Tonsils unremarkable.  Posterior pharyngeal wall clear. No masses or lesions.  Salivary glands:  Right parotid swelling and tenderness. Purulent drainage from the parotid duct with cheek massage.   Neck: Normal laryngeal elevation with swallow.  Trachea midline.  No masses or lesions. No palpable adenopathy.  Thyroid: Without tenderness or palpable nodules.  Pulmonary/Chest: Normal effort and rate. No respiratory distress. No stertor or stridor  Musculoskeletal: Normal range of motion.   Neurological: Cranial nerves 2-12 intact.  Skin: Skin is warm and dry.   Psychiatric: Normal mood and affect.    Intake/Output Summary (Last 24 hours) at 3/31/2024 1113  Last data filed at 3/31/2024 0900  Gross per 24 hour   Intake 830 ml   Output --   Net 830 ml       Invasive Devices       Peripheral Intravenous Line  Duration             Peripheral IV 03/30/24 Right Antecubital <1 day                    Lab Results: I have personally reviewed pertinent lab results.  , CBC:   Lab Results   Component Value Date    WBC 13.46 (H) 03/31/2024    HGB 13.1 03/31/2024    HCT 41.3 03/31/2024    MCV 95 03/31/2024     03/31/2024    RBC 4.36 03/31/2024    MCH 30.0 03/31/2024    MCHC 31.7 03/31/2024    RDW 16.1 (H) 03/31/2024    MPV 11.5 03/31/2024    NRBC 0 03/31/2024   , CMP:   Lab Results   Component Value Date    SODIUM 138 03/31/2024    K 3.9 03/31/2024    CL 99 03/31/2024    CO2 30  03/31/2024    BUN 12 03/31/2024    CREATININE 0.57 (L) 03/31/2024    CALCIUM 10.3 (H) 03/31/2024    AST 36 03/31/2024    ALT 23 03/31/2024    ALKPHOS 98 03/31/2024    EGFR 84 03/31/2024     Imaging Studies: I have personally reviewed pertinent reports.   and I have personally reviewed pertinent films in PACS  EKG, Pathology, and Other Studies: I have personally reviewed pertinent reports.      Code Status: Level 3 - DNAR and DNI  Advance Directive and Living Will:      Power of :    POLST:      None

## 2024-03-31 NOTE — ASSESSMENT & PLAN NOTE
CT notes rght parotiditis and cellulitis. No abscess or adenopathy noted.  Pt was given a dose of Ancef in the ED.  Reports being allergic to penicillin   Continue ceftriaxone/flagyl  ENT evaluation.  Follow up cultures

## 2024-03-31 NOTE — ASSESSMENT & PLAN NOTE
CT notes right parotiditis and cellulitis. No abscess or adenopathy noted.    Continue IV Ceftriaxone and IV Flagyl  ENT has evaluated the pt and recommend holding diuretics due to concern for dehydration being the cause.  Recommend hydration therefore pt received IV NS bolus yesterday

## 2024-03-31 NOTE — ASSESSMENT & PLAN NOTE
Wt Readings from Last 3 Encounters:   03/31/24 72.1 kg (159 lb)   03/25/24 75.8 kg (167 lb)   03/23/24 75.9 kg (167 lb 5.3 oz)   Continue lasix

## 2024-03-31 NOTE — PROGRESS NOTES
[facility]  Progress Note  Name: Danyelle Martinez I  MRN: [id number]  Unit/Bed#: 2 [address] I Date of Admission: 3/30/2024   Date of Service: 3/31/2024 I Hospital Day: 1    Assessment/Plan   * Parotitis, acute  Assessment & Plan  CT notes rght parotiditis and cellulitis. No abscess or adenopathy noted.  Pt was given a dose of Ancef in the ED.  Reports being allergic to penicillin   Continue ceftriaxone/flagyl  ENT evaluation.  Follow up cultures     Facial cellulitis  Assessment & Plan  As above     Chronic diastolic CHF (congestive heart failure) (HCC)  Assessment & Plan  Wt Readings from Last 3 Encounters:   03/31/24 72.1 kg (159 lb)   03/25/24 75.8 kg (167 lb)   03/23/24 75.9 kg (167 lb 5.3 oz)   Continue lasix           Acquired hypothyroidism  Assessment & Plan  On Synthroid     History of transient ischemic attack (TIA)  Assessment & Plan  Resume Plavix and Statin     Dyslipidemia  Assessment & Plan  On Statin     Essential hypertension  Assessment & Plan  Continue bisoprolol and lasix.   Pt is also on Midodrine    Chronic hypoxemic respiratory failure (HCC)  Assessment & Plan  Pt reports using O2 due to hx of CHF. Currently at baseline respiratory status     Sepsis without acute organ dysfunction (HCC)-resolved as of 3/31/2024  Assessment & Plan  POA; as evidenced by leukocytosis, tachypnea and infection.  LA and blood cultures ordered.  Management outlined above              VTE Pharmacologic Prophylaxis: VTE Score: 6 Moderate Risk (Score 3-4) - Pharmacological DVT Prophylaxis Ordered: heparin.    Mobility:   Basic Mobility Inpatient Raw Score: 20  JH-HLM Goal: 6: Walk 10 steps or more  JH-HLM Achieved: 6: Walk 10 steps or more  JH-HLM Goal achieved. Continue to encourage appropriate mobility.    Patient Centered Rounds: I performed bedside rounds with nursing staff today.   Discussions with Specialists or Other Care Team Provider:     Education and Discussions with Family /  Patient: Updated  (daughter) via phone.    Total Time Spent on Date of Encounter in care of patient: 35 mins. This time was spent on one or more of the following: performing physical exam; counseling and coordination of care; obtaining or reviewing history; documenting in the medical record; reviewing/ordering tests, medications or procedures; communicating with other healthcare professionals and discussing with patient's family/caregivers.    Current Length of Stay: 1 day(s)  Current Patient Status: Inpatient   Certification Statement: The patient will continue to require additional inpatient hospital stay due to IV Abx  Discharge Plan: Anticipate discharge in 48 hrs to home.    Code Status: Level 3 - DNAR and DNI    Subjective:   Right facial swelling/pain  Slight Pain with swallowing    Objective:     Vitals:   Temp (24hrs), Av.9 °F (37.2 °C), Min:98.2 °F (36.8 °C), Max:100 °F (37.8 °C)    Temp:  [98.2 °F (36.8 °C)-100 °F (37.8 °C)] 98.2 °F (36.8 °C)  HR:  [78-89] 88  Resp:  [16-22] 16  BP: (158-197)/(76-89) 158/80  SpO2:  [94 %-100 %] 94 %  Body mass index is 25.66 kg/m².     Input and Output Summary (last 24 hours):     Intake/Output Summary (Last 24 hours) at 3/31/2024 0953  Last data filed at 3/30/2024 2201  Gross per 24 hour   Intake 530 ml   Output --   Net 530 ml       Physical Exam:   Physical Exam  Vitals and nursing note reviewed.   Constitutional:       General: She is not in acute distress.     Appearance: She is well-developed.   HENT:      Head: Normocephalic and atraumatic.      Comments: Right sided facial swelling and tenderness   Eyes:      Conjunctiva/sclera: Conjunctivae normal.   Cardiovascular:      Rate and Rhythm: Normal rate and regular rhythm.      Heart sounds: No murmur heard.  Pulmonary:      Effort: Pulmonary effort is normal. No respiratory distress.      Breath sounds: Normal breath sounds.   Abdominal:      Palpations: Abdomen is soft.      Tenderness: There is  no abdominal tenderness.   Musculoskeletal:         General: No swelling.      Cervical back: Neck supple.   Skin:     General: Skin is warm and dry.      Capillary Refill: Capillary refill takes less than 2 seconds.   Neurological:      Mental Status: She is alert.   Psychiatric:         Mood and Affect: Mood normal.          Additional Data:     Labs:  Results from last 7 days   Lab Units 03/31/24  0456   WBC Thousand/uL 13.46*   HEMOGLOBIN g/dL 13.1   HEMATOCRIT % 41.3   PLATELETS Thousands/uL 202   NEUTROS PCT % 82*   LYMPHS PCT % 8*   MONOS PCT % 9   EOS PCT % 0     Results from last 7 days   Lab Units 03/31/24  0456   SODIUM mmol/L 138   POTASSIUM mmol/L 3.9   CHLORIDE mmol/L 99   CO2 mmol/L 30   BUN mg/dL 12   CREATININE mg/dL 0.57*   ANION GAP mmol/L 9   CALCIUM mg/dL 10.3*   ALBUMIN g/dL 4.2   TOTAL BILIRUBIN mg/dL 1.12*   ALK PHOS U/L 98   ALT U/L 23   AST U/L 36   GLUCOSE RANDOM mg/dL 104     Results from last 7 days   Lab Units 03/30/24  1405   INR  1.08     Results from last 7 days   Lab Units 03/30/24  2103   POC GLUCOSE mg/dl 129               Lines/Drains:  Invasive Devices       Peripheral Intravenous Line  Duration             Peripheral IV 03/30/24 Right Antecubital <1 day                          Imaging: Reviewed radiology reports from this admission including: ct neck    Recent Cultures (last 7 days):         Last 24 Hours Medication List:   Current Facility-Administered Medications   Medication Dose Route Frequency Provider Last Rate    acetaminophen  650 mg Oral Q6H PRN ROBERT Miguel      atorvastatin  80 mg Oral HS Everardo Jordan MD      bisoprolol  2.5 mg Oral Daily Everardo Jordan MD      cefTRIAXone  1,000 mg Intravenous Q24H Everardo Jordan MD 1,000 mg (03/30/24 2118)    clopidogrel  75 mg Oral Daily Everardo Jordan MD      DULoxetine  60 mg Oral Daily Everardo Jordan MD      furosemide  40 mg Oral Daily Drew Benítez MD      heparin (porcine)  5,000 Units Subcutaneous Q8H [facility]  Everardo Jordan MD      levothyroxine  88 mcg Oral Daily Everardo Jordan MD      metroNIDAZOLE  500 mg Intravenous Q8H Everardo Jordan  mg (03/31/24 0514)    midodrine  10 mg Oral BID AC Everardo Jordan MD      pantoprazole  40 mg Oral Daily Everardo Jordan MD          Today, Patient Was Seen By: Drew Benítez MD    **Please Note: This note may have been constructed using a voice recognition system.**

## 2024-03-31 NOTE — PLAN OF CARE
Problem: PAIN - ADULT  Goal: Verbalizes/displays adequate comfort level or baseline comfort level  Description: Interventions:  - Encourage patient to monitor pain and request assistance  - Assess pain using appropriate pain scale  - Administer analgesics based on type and severity of pain and evaluate response  - Implement non-pharmacological measures as appropriate and evaluate response  - Consider cultural and social influences on pain and pain management  - Notify physician/advanced practitioner if interventions unsuccessful or patient reports new pain  Outcome: Progressing     Problem: INFECTION - ADULT  Goal: Absence or prevention of progression during hospitalization  Description: INTERVENTIONS:  - Assess and monitor for signs and symptoms of infection  - Monitor lab/diagnostic results  - Monitor all insertion sites, i.e. indwelling lines, tubes, and drains  - Monitor endotracheal if appropriate and nasal secretions for changes in amount and color  - Dresher appropriate cooling/warming therapies per order  - Administer medications as ordered  - Instruct and encourage patient and family to use good hand hygiene technique  - Identify and instruct in appropriate isolation precautions for identified infection/condition  Outcome: Progressing  Goal: Absence of fever/infection during neutropenic period  Description: INTERVENTIONS:  - Monitor WBC    Outcome: Progressing     Problem: SAFETY ADULT  Goal: Patient will remain free of falls  Description: INTERVENTIONS:  - Educate patient/family on patient safety including physical limitations  - Instruct patient to call for assistance with activity   - Consult OT/PT to assist with strengthening/mobility   - Keep Call bell within reach  - Keep bed low and locked with side rails adjusted as appropriate  - Keep care items and personal belongings within reach  - Initiate and maintain comfort rounds  - Make Fall Risk Sign visible to staff  - Offer Toileting every 2 Hours,  in advance of need  - Initiate/Maintain bed/chair alarm  - Obtain necessary fall risk management equipment:   - Apply yellow socks and bracelet for high fall risk patients  - Consider moving patient to room near nurses station  Outcome: Progressing  Goal: Maintain or return to baseline ADL function  Description: INTERVENTIONS:  -  Assess patient's ability to carry out ADLs; assess patient's baseline for ADL function and identify physical deficits which impact ability to perform ADLs (bathing, care of mouth/teeth, toileting, grooming, dressing, etc.)  - Assess/evaluate cause of self-care deficits   - Assess range of motion  - Assess patient's mobility; develop plan if impaired  - Assess patient's need for assistive devices and provide as appropriate  - Encourage maximum independence but intervene and supervise when necessary  - Involve family in performance of ADLs  - Assess for home care needs following discharge   - Consider OT consult to assist with ADL evaluation and planning for discharge  - Provide patient education as appropriate  Outcome: Progressing  Goal: Maintains/Returns to pre admission functional level  Description: INTERVENTIONS:  - Perform AM-PAC 6 Click Basic Mobility/ Daily Activity assessment daily.  - Set and communicate daily mobility goal to care team and patient/family/caregiver.   - Collaborate with rehabilitation services on mobility goals if consulted  - Perform Range of Motion 3 times a day.  - Reposition patient every 2 hours.  - Dangle patient 3 times a day  - Stand patient 3 times a day  - Ambulate patient 3 times a day  - Out of bed to chair 3 times a day   - Out of bed for meals 3 times a day  - Out of bed for toileting  - Record patient progress and toleration of activity level   Outcome: Progressing     Problem: DISCHARGE PLANNING  Goal: Discharge to home or other facility with appropriate resources  Description: INTERVENTIONS:  - Identify barriers to discharge w/patient and  caregiver  - Arrange for needed discharge resources and transportation as appropriate  - Identify discharge learning needs (meds, wound care, etc.)  - Arrange for interpretive services to assist at discharge as needed  - Refer to Case Management Department for coordinating discharge planning if the patient needs post-hospital services based on physician/advanced practitioner order or complex needs related to functional status, cognitive ability, or social support system  Outcome: Progressing     Problem: Knowledge Deficit  Goal: Patient/family/caregiver demonstrates understanding of disease process, treatment plan, medications, and discharge instructions  Description: Complete learning assessment and assess knowledge base.  Interventions:  - Provide teaching at level of understanding  - Provide teaching via preferred learning methods  Outcome: Progressing

## 2024-04-01 LAB
ALBUMIN SERPL BCP-MCNC: 3.7 G/DL (ref 3.5–5)
ALP SERPL-CCNC: 88 U/L (ref 34–104)
ALT SERPL W P-5'-P-CCNC: 20 U/L (ref 7–52)
ANION GAP SERPL CALCULATED.3IONS-SCNC: 7 MMOL/L (ref 4–13)
AST SERPL W P-5'-P-CCNC: 38 U/L (ref 13–39)
BASOPHILS # BLD AUTO: 0.04 THOUSANDS/ÂΜL (ref 0–0.1)
BASOPHILS NFR BLD AUTO: 0 % (ref 0–1)
BILIRUB SERPL-MCNC: 0.59 MG/DL (ref 0.2–1)
BUN SERPL-MCNC: 10 MG/DL (ref 5–25)
CALCIUM SERPL-MCNC: 9.5 MG/DL (ref 8.4–10.2)
CHLORIDE SERPL-SCNC: 108 MMOL/L (ref 96–108)
CO2 SERPL-SCNC: 25 MMOL/L (ref 21–32)
CREAT SERPL-MCNC: 0.43 MG/DL (ref 0.6–1.3)
EOSINOPHIL # BLD AUTO: 0.14 THOUSAND/ÂΜL (ref 0–0.61)
EOSINOPHIL NFR BLD AUTO: 2 % (ref 0–6)
ERYTHROCYTE [DISTWIDTH] IN BLOOD BY AUTOMATED COUNT: 15.9 % (ref 11.6–15.1)
GFR SERPL CREATININE-BSD FRML MDRD: 93 ML/MIN/1.73SQ M
GLUCOSE SERPL-MCNC: 118 MG/DL (ref 65–140)
HCT VFR BLD AUTO: 38.1 % (ref 34.8–46.1)
HGB BLD-MCNC: 12 G/DL (ref 11.5–15.4)
IMM GRANULOCYTES # BLD AUTO: 0.04 THOUSAND/UL (ref 0–0.2)
IMM GRANULOCYTES NFR BLD AUTO: 0 % (ref 0–2)
LYMPHOCYTES # BLD AUTO: 1.01 THOUSANDS/ÂΜL (ref 0.6–4.47)
LYMPHOCYTES NFR BLD AUTO: 11 % (ref 14–44)
MCH RBC QN AUTO: 29.8 PG (ref 26.8–34.3)
MCHC RBC AUTO-ENTMCNC: 31.5 G/DL (ref 31.4–37.4)
MCV RBC AUTO: 95 FL (ref 82–98)
MONOCYTES # BLD AUTO: 0.68 THOUSAND/ÂΜL (ref 0.17–1.22)
MONOCYTES NFR BLD AUTO: 7 % (ref 4–12)
NEUTROPHILS # BLD AUTO: 7.66 THOUSANDS/ÂΜL (ref 1.85–7.62)
NEUTS SEG NFR BLD AUTO: 80 % (ref 43–75)
NRBC BLD AUTO-RTO: 0 /100 WBCS
PLATELET # BLD AUTO: 183 THOUSANDS/UL (ref 149–390)
PMV BLD AUTO: 11 FL (ref 8.9–12.7)
POTASSIUM SERPL-SCNC: 4 MMOL/L (ref 3.5–5.3)
PROT SERPL-MCNC: 6.8 G/DL (ref 6.4–8.4)
RBC # BLD AUTO: 4.03 MILLION/UL (ref 3.81–5.12)
SODIUM SERPL-SCNC: 140 MMOL/L (ref 135–147)
WBC # BLD AUTO: 9.57 THOUSAND/UL (ref 4.31–10.16)

## 2024-04-01 PROCEDURE — 97167 OT EVAL HIGH COMPLEX 60 MIN: CPT

## 2024-04-01 PROCEDURE — 97163 PT EVAL HIGH COMPLEX 45 MIN: CPT

## 2024-04-01 PROCEDURE — 99232 SBSQ HOSP IP/OBS MODERATE 35: CPT | Performed by: STUDENT IN AN ORGANIZED HEALTH CARE EDUCATION/TRAINING PROGRAM

## 2024-04-01 PROCEDURE — 97110 THERAPEUTIC EXERCISES: CPT

## 2024-04-01 PROCEDURE — 80053 COMPREHEN METABOLIC PANEL: CPT | Performed by: STUDENT IN AN ORGANIZED HEALTH CARE EDUCATION/TRAINING PROGRAM

## 2024-04-01 PROCEDURE — 85025 COMPLETE CBC W/AUTO DIFF WBC: CPT | Performed by: STUDENT IN AN ORGANIZED HEALTH CARE EDUCATION/TRAINING PROGRAM

## 2024-04-01 PROCEDURE — 97535 SELF CARE MNGMENT TRAINING: CPT

## 2024-04-01 RX ORDER — METRONIDAZOLE 500 MG/1
500 TABLET ORAL EVERY 8 HOURS SCHEDULED
Status: DISCONTINUED | OUTPATIENT
Start: 2024-04-01 | End: 2024-04-04 | Stop reason: HOSPADM

## 2024-04-01 RX ADMIN — DULOXETINE HYDROCHLORIDE 60 MG: 30 CAPSULE, DELAYED RELEASE ORAL at 08:59

## 2024-04-01 RX ADMIN — LEVOTHYROXINE SODIUM 88 MCG: 88 TABLET ORAL at 08:59

## 2024-04-01 RX ADMIN — HEPARIN SODIUM 5000 UNITS: 5000 INJECTION, SOLUTION INTRAVENOUS; SUBCUTANEOUS at 22:32

## 2024-04-01 RX ADMIN — PANTOPRAZOLE SODIUM 40 MG: 40 TABLET, DELAYED RELEASE ORAL at 08:59

## 2024-04-01 RX ADMIN — CEFTRIAXONE 1000 MG: 1 INJECTION, SOLUTION INTRAVENOUS at 18:08

## 2024-04-01 RX ADMIN — METRONIDAZOLE 500 MG: 500 TABLET ORAL at 16:10

## 2024-04-01 RX ADMIN — MIDODRINE HYDROCHLORIDE 5 MG: 5 TABLET ORAL at 08:59

## 2024-04-01 RX ADMIN — ACETAMINOPHEN 650 MG: 325 TABLET ORAL at 22:42

## 2024-04-01 RX ADMIN — CLOPIDOGREL 75 MG: 75 TABLET ORAL at 08:59

## 2024-04-01 RX ADMIN — ATORVASTATIN CALCIUM 80 MG: 40 TABLET, FILM COATED ORAL at 22:32

## 2024-04-01 RX ADMIN — METRONIDAZOLE 500 MG: 500 INJECTION, SOLUTION INTRAVENOUS at 01:45

## 2024-04-01 RX ADMIN — HEPARIN SODIUM 5000 UNITS: 5000 INJECTION, SOLUTION INTRAVENOUS; SUBCUTANEOUS at 05:36

## 2024-04-01 RX ADMIN — BISOPROLOL FUMARATE 2.5 MG: 5 TABLET ORAL at 08:59

## 2024-04-01 RX ADMIN — METRONIDAZOLE 500 MG: 500 INJECTION, SOLUTION INTRAVENOUS at 10:47

## 2024-04-01 RX ADMIN — ACETAMINOPHEN 650 MG: 325 TABLET ORAL at 09:00

## 2024-04-01 RX ADMIN — METRONIDAZOLE 500 MG: 500 TABLET ORAL at 22:32

## 2024-04-01 RX ADMIN — HEPARIN SODIUM 5000 UNITS: 5000 INJECTION, SOLUTION INTRAVENOUS; SUBCUTANEOUS at 13:46

## 2024-04-01 NOTE — PROGRESS NOTES
[facility]  Progress Note  Name: Danyelle Martinez I  MRN: [id number]  Unit/Bed#: 2 South 219 B I Date of Admission: 3/30/2024   Date of Service: 4/1/2024 I Hospital Day: 2    Assessment/Plan   * Parotitis, acute  Assessment & Plan  CT notes right parotiditis and cellulitis. No abscess or adenopathy noted.    Continue IV Ceftriaxone and IV Flagyl  ENT has evaluated the pt and recommend holding diuretics due to concern for dehydration being the cause.  Recommend hydration therefore pt received IV NS bolus yesterday     Facial cellulitis  Assessment & Plan  Currently on IV antibiotics as above.      Essential hypertension  Assessment & Plan  Continue Bisoprolol.   Pt is also on Midodrine    Chronic hypoxemic respiratory failure (HCC)  Assessment & Plan  Pt reports using O2 due to hx of CHF. Currently at baseline respiratory status     Chronic diastolic CHF (congestive heart failure) (HCC)  Assessment & Plan  Wt Readings from Last 3 Encounters:   03/31/24 72.1 kg (159 lb)   03/25/24 75.8 kg (167 lb)   03/23/24 75.9 kg (167 lb 5.3 oz)     Holding Lasix to avoid dehydration in setting of parotitis.  Will follow daily weight and I/Os          Thyroid nodule  Assessment & Plan  Stable thyroid nodules noted on CT imaging.  Known to pt     Acquired hypothyroidism  Assessment & Plan  On Synthroid     History of transient ischemic attack (TIA)  Assessment & Plan  Resume Plavix and Statin     Dyslipidemia  Assessment & Plan  On Statin            VTE Pharmacologic Prophylaxis: VTE Score: 6 High Risk (Score >/= 5) - Pharmacological DVT Prophylaxis Ordered: heparin. Sequential Compression Devices Ordered.    Mobility:   Basic Mobility Inpatient Raw Score: 20  JH-HLM Goal: 6: Walk 10 steps or more  JH-HLM Achieved: 6: Walk 10 steps or more  JH-HLM Goal NOT achieved. Continue with multidisciplinary rounding and encourage appropriate mobility to improve upon JH-HLM goals.    Patient Centered Rounds: I performed  bedside rounds with nursing staff today.   Discussions with Specialists or Other Care Team Provider: Case Management, PT    Education and Discussions with Family / Patient: Updated  (daughter) via phone.    Total Time Spent on Date of Encounter in care of patient: 35 mins. This time was spent on one or more of the following: performing physical exam; counseling and coordination of care; obtaining or reviewing history; documenting in the medical record; reviewing/ordering tests, medications or procedures; communicating with other healthcare professionals and discussing with patient's family/caregivers.    Current Length of Stay: 2 day(s)  Current Patient Status: Inpatient   Certification Statement: The patient will continue to require additional inpatient hospital stay due to treatment for cellulitis/parotitis   Discharge Plan: Anticipate discharge in 24-48 hrs to discharge location to be determined pending rehab evaluations.    Code Status: Level 3 - DNAR and DNI    Subjective:   Pt was seen and examined at bedside.  Reports pain when eating food.  Reports that her hearing loss on the right side is now resolved.  Does not report any chest pain or shortness of breath.       Objective:     Vitals:   Temp (24hrs), Av.3 °F (36.8 °C), Min:97.2 °F (36.2 °C), Max:99.1 °F (37.3 °C)    Temp:  [97.2 °F (36.2 °C)-99.1 °F (37.3 °C)] 98.8 °F (37.1 °C)  HR:  [70-92] 78  Resp:  [18-19] 18  BP: (156-169)/(70-76) 162/72  SpO2:  [93 %-98 %] 94 %  Body mass index is 26.68 kg/m².     Input and Output Summary (last 24 hours):     Intake/Output Summary (Last 24 hours) at 2024 0840  Last data filed at 2024 0700  Gross per 24 hour   Intake 900 ml   Output --   Net 900 ml       Physical Exam:   General: in no acute distress  HEENT: erythema and swelling noted in right parotid region   Skin: no jaundice  CVS: RRR, murmur present   Lungs: CTAL, no wheezing or rales appreciated  Abdomen: soft, nondistended, bowel  sounds normal, nontender upon palpation, no guarding or rebound tenderness  Extremities: no edema, no calf swelling or tenderness  Neuro: alert and oriented x3, no tremors   Psych: calm, cooperative      Additional Data:     Labs:  Results from last 7 days   Lab Units 04/01/24  0542   WBC Thousand/uL 9.57   HEMOGLOBIN g/dL 12.0   HEMATOCRIT % 38.1   PLATELETS Thousands/uL 183   NEUTROS PCT % 80*   LYMPHS PCT % 11*   MONOS PCT % 7   EOS PCT % 2     Results from last 7 days   Lab Units 04/01/24  0542   SODIUM mmol/L 140   POTASSIUM mmol/L 4.0   CHLORIDE mmol/L 108   CO2 mmol/L 25   BUN mg/dL 10   CREATININE mg/dL 0.43*   ANION GAP mmol/L 7   CALCIUM mg/dL 9.5   ALBUMIN g/dL 3.7   TOTAL BILIRUBIN mg/dL 0.59   ALK PHOS U/L 88   ALT U/L 20   AST U/L 38   GLUCOSE RANDOM mg/dL 118     Results from last 7 days   Lab Units 03/30/24  1405   INR  1.08     Results from last 7 days   Lab Units 03/30/24  2103   POC GLUCOSE mg/dl 129               Lines/Drains:  Invasive Devices       Peripheral Intravenous Line  Duration             Peripheral IV 03/30/24 Right Antecubital 1 day    Peripheral IV 03/31/24 Distal;Dorsal (posterior);Right Forearm <1 day                          Imaging: No pertinent imaging reviewed.    Recent Cultures (last 7 days):   Results from last 7 days   Lab Units 03/31/24  0709 03/31/24  0355   BLOOD CULTURE  Received in Microbiology Lab. Culture in Progress. Received in Microbiology Lab. Culture in Progress.       Last 24 Hours Medication List:   Current Facility-Administered Medications   Medication Dose Route Frequency Provider Last Rate    acetaminophen  650 mg Oral Q6H PRN ROBERT Miguel      atorvastatin  80 mg Oral HS Everardo Jrodan MD      bisoprolol  2.5 mg Oral Daily Everardo Jordan MD      cefTRIAXone  1,000 mg Intravenous Q24H Everardo Jordan MD 1,000 mg (03/31/24 1750)    clopidogrel  75 mg Oral Daily Everardo Jordan MD      DULoxetine  60 mg Oral Daily Everardo Jordan MD      heparin  (porcine)  5,000 Units Subcutaneous Q8H [facility] Everardo Jordan MD      levothyroxine  88 mcg Oral Daily Everardo Jordan MD      metroNIDAZOLE  500 mg Intravenous Q8H Everardo Jordan  mg (04/01/24 0145)    midodrine  5 mg Oral BID AC Drew Benítez MD      pantoprazole  40 mg Oral Daily Everardo Jordan MD          Today, Patient Was Seen By: Everardo Jordan MD    **Please Note: This note may have been constructed using a voice recognition system.**

## 2024-04-01 NOTE — PROGRESS NOTES
The metronidazole has / have been converted to Oral per [facility] IV-to-PO Auto-Conversion Protocol for Adults as approved by the Pharmacy and Therapeutics Committee. The patient met all eligible criteria:  1) Age = 18 years old   2) Received at least one dose of the IV form   3) Receiving at least one other scheduled oral/enteral medication   4) Tolerating an oral/enteral diet       and did not have any exclusions:   1) Critical care patient   2) Active GI bleed (IF assessing H2RAs or PPIs)   3) Continuous tube feeding (IF assessing cipro, doxycycline, levofloxacin, minocycline, rifampin, or voriconazole)   4) Receiving PO vancomycin (IF assessing metronidazole)   5) Persistent nausea and/or vomiting   6) Ileus or gastrointestinal obstruction   7) Serene/nasogastric tube set for continuous suction   8) Specific order not to automatically convert to PO (in the order's comments or if discussed in the most recent Infectious Disease or primary team's progress notes).

## 2024-04-01 NOTE — OCCUPATIONAL THERAPY NOTE
Occupational Therapy Evaluation     Patient Name: Danyelle Martinez  Today's Date: 2024  Problem List  Principal Problem:    Parotitis, acute  Active Problems:    Chronic hypoxemic respiratory failure (HCC)    Essential hypertension    Dyslipidemia    History of transient ischemic attack (TIA)    Acquired hypothyroidism    Thyroid nodule    Chronic diastolic CHF (congestive heart failure) (HCC)    Facial cellulitis    Past Medical History  Past Medical History:   Diagnosis Date    Anxiety     Arthritis     r knee and shoulders    Blind left eye     CHF (congestive heart failure) (HCC)     Deaf, left     Depression     Diabetes mellitus (HCC)     Disease of thyroid gland     DVT complicating pregnancy, unspecified trimester (HCC) postpartum    Hearing loss     LEFT EAR    History of shingles 2007    fACIAL     Hyperlipidemia     Hypertension     Liver disease     Lyme disease     Meniere's disease     Obesity     Orthostatic hypotension     Psychiatric problem     Sinus congestion     Sleep apnea     Stroke (HCC)      Past Surgical History  Past Surgical History:   Procedure Laterality Date    APPENDECTOMY      BREAST BIOPSY Left 2010     SECTION      x2    DXA PROCEDURE (HISTORICAL)  10/28/2020    EYE SURGERY      HAND SURGERY Left     HERNIA REPAIR      HYSTERECTOMY      ROTATOR CUFF REPAIR Left     TONSILLECTOMY      TYMPANOSTOMY TUBE PLACEMENT           24   OT Last Visit   OT Visit Date 24  (Monday)   Note Type   Note type Evaluation  (Eval 7079-5633, tx 6762-6052)   Pain Assessment   Pain Score 5   Pain Location/Orientation Orientation: Right  (face)   Effect of Pain on Daily Activities limits PO intake and activity tolerance during ADLs   Patient's Stated Pain Goal No pain   Hospital Pain Intervention(s) Repositioned;Ambulation/increased activity;Emotional support  (RN, June/ Aide MENDOSA medicated during eval)   Restrictions/Precautions   Weight Bearing Precautions Per Order No    Other Precautions Chair Alarm;Multiple lines;O2;Fall Risk;Pain;Hard of hearing;Visual impairment  (speak on pt's R)   Home Living   Type of Home Apartment;Other (Comment)  (2 JING)   Home Layout One level;Performs ADLs on one level;Able to live on main level with bedroom/bathroom   Bathroom Shower/Tub Tub/shower unit   Bathroom Toilet Standard   Bathroom Accessibility Accessible   Home Equipment Walker;Cane;Life alert;Other (Comment)  (O2; bed rail)   Additional Comments Pt reports living alone in 1st floor apt   Prior Function   Level of [place] Independent with ADLs;Independent with functional mobility;Needs assistance with IADLS   Lives With (S)  Alone   Receives Help From Family;Other (Comment)  (cleaning service, assist w/ IADLs)   IADLs   (cleaning service)   Falls in the last 6 months 1 to 4  (pt reports 3)   Vocational Retired   Comments Pt reports I w/ ADLs using walker past week on O2. Needs assistance w/ IADLs   Lifestyle   Autonomy Pt reports I w/ ADLs at baseline using cane vs walker for functional mobility   Reciprocal Relationships Pt reports living alone. Daughter lives in [place]. 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 Diagnosed w/ acute parotitis and ENT consulted   Family/Caregiver Present No   Additional General Comments Significant PMH impacting her occupational performance includes anxiety/depression, chronic hypoxemic respiratory failure, DM II, hx TIA, osteoarthritis R knee, SVT, deaf L ear, L rotator cuff repair, HTN, hx syncope. Personal and environmental factors impacting performance includes lives alone, advanced age, inability to complete IADLs, difficulty managing stairs, limited support / assist, fall history; supports / strengths include first floor set- up,  "independent at baseline, supportive family   Subjective   Subjective \"I was able to eat a meal for the firrst time in few days\"   ADL   Where Assessed Edge of bed   Eating Assistance 6  Modified independent   Eating Deficit Setup;Increased time to complete   Grooming Assistance 6  Modified Independent   Grooming Deficit Setup;Increased time to complete  (seated after set- up to comb hair, complete oral hygiene)   UB Bathing Assistance 5  Supervision/Setup   UB Bathing Deficit Setup;Increased time to complete   LB Bathing Assistance 4  Minimal Assistance   LB Bathing Deficit Setup;Steadying;Increased time to complete   UB Dressing Assistance 6  Modified independent   UB Dressing Deficit Setup;Increased time to complete   LB Dressing Assistance 5  Supervision/Setup   LB Dressing Deficit Setup;Increased time to complete;Verbal cueing;Supervision/safety;Requires assistive device for steadying   Toileting Assistance  Unable to assess  (denied need to void; anticipate S)   Additional Comments on 4L O2 sats >90% throughout session   Bed Mobility   Supine to Sit 6  Modified independent   Additional items Assist x 1;Bedrails;Increased time required  (to pt's R)   Sit to Supine Unable to assess   Additional Comments Pt seated OOB In chair post eval w/ needs met, call bell in reach and chair alarm activated   Transfers   Sit to Stand 5  Supervision   Additional items Assist x 1;Increased time required;Armrests;Bedrails   Stand to Sit 5  Supervision   Additional items Assist x 1;Bedrails;Armrests;Increased time required;Verbal cues   Additional Comments Pt performed sit <> stand 2X w/ S. Initially retropulsive w/ posterior LOB; requiring min A to recover   Functional Mobility   Functional Mobility 5  Supervision   Additional items Rolling walker   Balance   Static Sitting Good   Dynamic Sitting Fair   Static Standing Fair   Ambulatory Fair -   Activity Tolerance   Activity Tolerance Patient limited by fatigue;Patient limited by " pain   Medical Staff Made Aware spoke w/ PT   Nurse Made Aware spoke w/ RNJune and Aide MENDOSA   RUCHAZ Assessment   RUE Assessment   (observed during ADLs)   RUE Strength   RUE Overall Strength Within Functional Limits - able to perform ADL tasks with strength   LUE Assessment   LUE Assessment   (observed during ADLs)   LUE Strength   LUE Overall Strength Within Functional Limits - able to perform ADL tasks with strength   Hand Function   Gross Motor Coordination Functional   Fine Motor Coordination Functional   Vision-Basic Assessment   Current Vision Wears glasses all the time   Cognition   Overall Cognitive Status WFL  (+ [place]; hears better on R)   Arousal/Participation Alert;Cooperative   Attention Attends with cues to redirect   Orientation Level Oriented to person;Oriented to place;Oriented to situation   Memory Within functional limits   Following Commands Follows multistep commands with increased time or repetition   Comments Identified pt by full name and wristband. Alert, generally oriented and able to follow directions / communicate wants / needs w/ + time due to hard of hearing   Assessment   Limitation Decreased ADL status;Decreased endurance;Decreased self-care trans;Decreased high-level ADLs  (impaired pain, activity tolerance, standing tolerance, balance)   Assessment Pt is an 84yo female admitted to [facility] on 3/30/24 w/ R facial pain and swelling. Diagnosed w/ parotitis, acute and per ENT recommend glandular massage, warm compresses and aggressive hydration. Pt reports living alone in apt w/ 2 JING and I w/ ADLs using walker vs cane on O2. Pt needs assistance w/ IADLs. Upon eval, pt alert and generally oriented. Able to participate in conversation w/ + time due to hard of hearing. Pt reports hearing better on R. Pt required mod I for + time to complete bed mobility, S sit <> stand, S using RW for functional mobility, S LBD, mod I UBD, mod I grooming seated, and S <> min A for bathing on O2. Pt is  completing ADLs at / near baseline level of I w/ decreased activity tolerance, endurance, standing tolerance, balance. Pt would benefit from OT in acute care to maximize functional independence. Recommend level II rehab resource intensity when medically stable. Will continue to follow 2-3X week   Goals   Patient Goals Pt stated that she would like to feel better, be able to taste her food and return home   Plan   Treatment Interventions ADL retraining;Functional transfer training;Endurance training;UE strengthening/ROM;Patient/family training;Equipment evaluation/education;Compensatory technique education;Continued evaluation;Energy conservation;Activityengagement   Goal Expiration Date 04/11/24   OT Treatment Day 0  (Mon 4/1/24)   OT Frequency 2-3x/wk   Discharge Recommendation   Rehab Resource Intensity Level, OT III (Minimum Resource Intensity)   AM-PAC Daily Activity Inpatient   Lower Body Dressing 3   Bathing 3   Toileting 3   Upper Body Dressing 4   Grooming 4   Eating 4   Daily Activity Raw Score 21   Daily Activity Standardized Score (Calc for Raw Score >=11) 44.27   AM-PAC Applied Cognition Inpatient   Following a Speech/Presentation 3   Understanding Ordinary Conversation 4   Taking Medications 4   Remembering Where Things Are Placed or Put Away 4   Remembering List of 4-5 Errands 4   Taking Care of Complicated Tasks 3   Applied Cognition Raw Score 22   Applied Cognition Standardized Score 47.83   Barthel Index   Feeding 10   Bathing 0   Grooming Score 5   Dressing Score 5   Bladder Score 5   Bowels Score 10   Toilet Use Score 5   Transfers (Bed/Chair) Score 10   Mobility (Level Surface) Score 10   Stairs Score 5   Barthel Index Score 65   Additional Treatment Session   Start Time 0900   End Time 0917   Treatment Assessment Pt seen for skilled OT tx sessionm day 1 following eval focusing on activity engagement, challenging activity/standing tolerance. Pt agreeable and motviated to participate reporting she  would like to wash her hair and would love to walk in the [place]. Pt seated OOB In chair and engaged in UB bathing w/ mod I for + time seated after set- up using shower cap. Pt performed sit <> stand w/ consistent S and engaged in functional mobility greater than household distances using RW w/ S on O2. Continue to recommend level III rehab resource intensity when medically stable. Will continue to follow   End of Consult   Education Provided Yes   Patient Position at End of Consult Bedside chair;Bed/Chair alarm activated;All needs within reach   Nurse Communication Nurse aware of consult   Licensure   [place] License Number  Gita Jarrett, OTR/L [id number]        The patient's raw score on the AM-PAC Daily Activity Inpatient Short Form is 21. A raw score of greater than or equal to 19 suggests the patient may benefit from discharge to home. Please refer to the recommendation of the Occupational Therapist for safe discharge planning.    Pt goals to be met by 4/11/24 to max I w/ ADLs and improve engagement to return home alone to read includes:    -Pt will complete bed mobility supine <> sit independently    -Pt will complete UBD/LBD w/ mod I w/ out rest break or sign / symptoms of fatigue    -Pt will consistently engage in functional mobility household distances using LRAD Joanne    -Pt will demonstrate improved activity and functional standing tolerance for at least 15 minutes to max I w/ bathing and light IADLs to return home alone    -Pt will complete functional transfers to all surfaces independently using LRAD, DME as needed    STG to be met in 5 days by 4/6/24 to max I w/ ADLs includes:    -Pt will demonstrate improved activity and sitting tolerance OOB In chair for all meals    -Pt will complete UBD/LBD w/ S w/ out rest break or sign / symptoms of fatigue    -Pt will consistently engage in functional mobility household distances using LRAD w/ mod I     -Pt will demonstrate improved activity and functional  standing tolerance for at least 10 minutes to max I w/ bathing and light IADLs to return home alone    -Pt will complete functional transfers to all surfaces w/ mod I using LRAD, DME as needed    -Pt will demonstrate good attention and understanding EC tech to max I w/ ADLs and improve engagement      Gita Jarrett, OTR/L  [id number]  [id number]

## 2024-04-01 NOTE — PHYSICAL THERAPY NOTE
PHYSICAL THERAPY EVALUATION/TREATMENT     04/01/24 1425   PT Last Visit   PT Visit Date 04/01/24   Note Type   Note type Evaluation   Pain Assessment   Pain Assessment Tool Segura-Baker FACES   Segura-Baker FACES Pain Rating 0   Restrictions/Precautions   Other Precautions Chair Alarm;Bed Alarm;Fall Risk;Pain;Hard of hearing   Home Living   Type of Home Apartment   Home Layout One level;Stairs to enter with rails  (2 stairs to enter)   Home Equipment Walker;Cane   Additional Comments Patient reports using roller walker prior to admission due to recent falls   Prior Function   Level of [place] Independent with ADLs;Independent with functional mobility;Needs assistance with IADLS   Lives With Alone   Receives Help From Family   Falls in the last 6 months 1 to 4   Comments Patient reports several falls in the recent past   General   Additional Pertinent History Chart reviewed, patient admitted with parotitis/acute.  Patient also with history of falls and related gait dysfunction. patient states considering and BRANDI   Family/Caregiver Present No   Cognition   Overall Cognitive Status WFL   Arousal/Participation Cooperative   Attention Attends with cues to redirect   Orientation Level Oriented to person;Oriented to place;Oriented to situation   Following Commands Follows multistep commands with increased time or repetition   Comments Patient distracted at times as well as hard of hearing   Subjective   Subjective Patient reports just starting to feel better with less pain and improved gait patterning   RLE Assessment   RLE Assessment   (Range of motion within functional limits, strength 3+/4 -)   LLE Assessment   LLE Assessment   (Range of motion within functional limits, strength 3+/4-)   Coordination   Movements are Fluid and Coordinated 0   Coordination and Movement Description Decreased coordination with transfer and gait activity   Bed Mobility   Additional Comments Patient sitting out of bed in bedside chair  upon arrival by therapist   Transfers   Sit to Stand 5  Supervision   Stand to Sit 4  Minimal assistance   Additional items Verbal cues  (Cueing for proper hand placement for safety and fall prevention)   Ambulation/Elevation   Gait Assistance 4  Minimal assist   Additional items Assist x 1;Verbal cues;Tactile cues   Assistive Device Rolling walker   Distance 20 feet with change in direction, slow gait patterning and required verbal cues for safe walker usage   Balance   Static Sitting Fair +   Dynamic Sitting Fair   Static Standing Fair   Dynamic Standing Fair -   Ambulatory Fair -   Activity Tolerance   Activity Tolerance Patient tolerated treatment well   Nurse Made Aware Yes   Assessment   Prognosis Good   Problem List Decreased strength;Decreased range of motion;Decreased endurance;Impaired balance;Decreased mobility;Decreased coordination;Pain   Assessment Patient seen for Physical Therapy evaluation. Patient admitted with Parotitis, acute.  Comorbidities affecting patient's physical performance include: .  Personal factors affecting patient at time of initial evaluation include: ambulating with assistive device, inability to ambulate household distances, inability to navigate community distances, inability to navigate level surfaces without external assistance, inability to perform dynamic tasks in community, limited home support, positive fall history, inability to perform physical activity, inability to perform ADLS, and inability to perform IADLS . Prior to admission, patient was independent with functional mobility with walker, independent with ADLS, requiring assist for IADLS, living in a multi-level home, ambulating household distance, ambulating community distances, and home with family assist.  Please find objective findings from Physical Therapy assessment regarding body systems outlined above with impairments and limitations including weakness, decreased ROM, impaired balance, decreased endurance,  impaired coordination, gait deviations, pain, decreased activity tolerance, decreased functional mobility tolerance, and fall risk.  The Barthel Index was used as a functional outcome tool presenting with a score of Barthel Index Score: 55 today indicating marked limitations of functional mobility and ADLS.  Patient's clinical presentation is currently unstable/unpredictable as seen in patient's presentation of vital sign response, changing level of pain, increased fall risk, new onset of impairment of functional mobility, decreased endurance, and new onset of weakness. Pt would benefit from continued Physical Therapy treatment to address deficits as defined above and maximize level of functional mobility. As demonstrated by objective findings, the assigned level of complexity for this evaluation is high.The patient's [facility] Basic Mobility Inpatient Short Form Raw Score is 18. A Raw score of greater than 16 suggests the patient may benefit from discharge to home. Please also refer to the recommendation of the Physical Therapist for safe discharge planning.   Goals   Patient Goals To feel better and go home   STG Expiration Date 04/08/24   Short Term Goal #1 Transfers and gait with roller walker independently   Short Term Goal #2 Gait endurance to functional household distances   LTG Expiration Date 04/15/24   Long Term Goal #1 Return to independent transfers and gait with out assistive device for functional community distances   Plan   Treatment/Interventions Functional transfer training;LE strengthening/ROM;Therapeutic exercise;Endurance training;Patient/family training;Equipment eval/education;Bed mobility;Gait training;Compensatory technique education   PT Frequency 3-5x/wk   Discharge Recommendation   Rehab Resource Intensity Level, PT III (Minimum Resource Intensity)   AM-PAC Basic Mobility Inpatient   Turning in Flat Bed Without Bedrails 4   Lying on Back to Sitting on Edge of Flat Bed Without Bedrails 3    Moving Bed to Chair 3   Standing Up From Chair Using Arms 3   Walk in Room 3   Climb 3-5 Stairs With Railing 2   Basic Mobility Inpatient Raw Score 18   Basic Mobility Standardized Score 41.05   [facility] Highest Level Of Mobility   -HLM Goal 6: Walk 10 steps or more   -HLM Achieved 7: Walk 25 feet or more   Barthel Index   Feeding 10   Bathing 0   Grooming Score 5   Dressing Score 5   Bladder Score 5   Bowels Score 10   Toilet Use Score 5   Transfers (Bed/Chair) Score 10   Mobility (Level Surface) Score 0   Stairs Score 5   Barthel Index Score 55   Additional Treatment Session   Start Time 1410   End Time 1425   Treatment Assessment s: Patient reports just starting to have less pain this afternoon and her right face O: Bilateral lower extremity exercise completed as listed below.  Gait training with roller walker with supervision for 30 feet with numerous changes in direction A: Patient will benefit from increasing functional mobility with clinical staff and continued physical therapy with progression as tolerated to regain independent function   Exercises   Hip Flexion Sitting;10 reps;Bilateral  (Alternating)   Hip Abduction Sitting;10 reps;Bilateral  (Alternating)   Knee AROM Long Arc Quad Sitting;10 reps;Bilateral  (Alternating)   Ankle Pumps Sitting;10 reps;Bilateral   Balance training  Sidestepping and backward walking completed for balance and coordination   Licensure   [place] License Number  Airam Fong, PT 4 0 [id number]

## 2024-04-01 NOTE — PLAN OF CARE
Problem: OCCUPATIONAL THERAPY ADULT  Goal: Performs self-care activities at highest level of function for planned discharge setting.  See evaluation for individualized goals.  Description: Treatment Interventions: ADL retraining, Functional transfer training, Endurance training, UE strengthening/ROM, Patient/family training, Equipment evaluation/education, Compensatory technique education, Continued evaluation, Energy conservation, Activityengagement          See flowsheet documentation for full assessment, interventions and recommendations.   Note: Limitation: Decreased ADL status, Decreased endurance, Decreased self-care trans, Decreased high-level ADLs (impaired pain, activity tolerance, standing tolerance, balance)     Assessment: Pt is an 82yo female admitted to [facility]/30/24 w/ R facial pain and swelling. Diagnosed w/ parotitis, acute and per ENT recommend glandular massage, warm compresses and aggressive hydration. Pt reports living alone in apt w/ 2 JING and I w/ ADLs using walker vs cane on O2. Pt needs assistance w/ IADLs. Upon eval, pt alert and generally oriented. Able to participate in conversation w/ + time due to hard of hearing. Pt reports hearing better on R. Pt required mod I for + time to complete bed mobility, S sit <> stand, S using RW for functional mobility, S LBD, mod I UBD, mod I grooming seated, and S <> min A for bathing on O2. Pt is completing ADLs at / near baseline level of I w/ decreased activity tolerance, endurance, standing tolerance, balance. Pt would benefit from OT in acute care to maximize functional independence. Recommend level II rehab resource intensity when medically stable. Will continue to follow 2-3X week     Rehab Resource Intensity Level, OT: III (Minimum Resource Intensity)

## 2024-04-01 NOTE — CASE MANAGEMENT
Case Management Assessment & Discharge Planning Note    Patient name Danyelle Martinez  Location 2 [facility] 219/2 [facility] 219 B MRN [id number]  : 1940 Date 2024       Current Admission Date: 3/30/2024  Current Admission Diagnosis:Parotitis, acute   Patient Active Problem List    Diagnosis Date Noted    Parotitis, acute 2024    Facial cellulitis 2024    Change in mental status 2024    Cigarette nicotine dependence in remission 2024    Chronic diastolic CHF (congestive heart failure) (HCC) 2024    Leukocytosis 2024    Closed wedge compression fracture of T1 vertebra with routine healing 2023    Enlarged thyroid 2023    Overflow incontinence of urine 2023    Fall 11/15/2023    Syncope, cardiogenic 11/15/2023    Recurrent major depressive disorder, in remission ([facility]) 2023    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    SVT (supraventricular tachycardia) 2023    Osteoarthritis of right knee 2023    Elevated liver enzymes 2023    Type 2 diabetes mellitus, without long-term current use of insulin ([facility]) 2023    Dyspepsia 2022    Frequent falls 2022    Localized swelling of right lower leg 2022    Anxiety 2022    Gastroesophageal reflux disease without esophagitis 10/12/2022    Nocturnal hypoxemia 2021    Acquired hypothyroidism 2021    Thyroid nodule 2021    Palpitations 2021    Tremor 2021    History of transient ischemic attack (TIA) 2021    Essential hypertension 2021    Dyslipidemia 2021    Seasonal allergic rhinitis due to pollen 2021    Chronic hypoxemic respiratory failure (HCC) 2020    Dizziness 2020    Shortness of breath on exertion       LOS (days): 2  Geometric Mean LOS (GMLOS) (days): 3.6  Days to GMLOS:1.7     OBJECTIVE:    Risk of Unplanned Readmission Score: 16.46         Current admission status: Inpatient     Preferred Pharmacy:    [id number] - [address] [address]  Phone: [phone] Fax: [phone]    Primary Care Provider: Bladimir Caraballo MD    Primary Insurance: MEDICARE  Secondary Insurance: AARP    ASSESSMENT:  Active Health Care Proxies       Vivi Gonzales Health Care Agent - Daughter   Primary Phone: [phone] (Home)                 Readmission Root Cause  30 Day Readmission: No    Patient Information  Admitted from:: Home  Mental Status: Alert  During Assessment patient was accompanied by: Not accompanied during assessment  Assessment information provided by:: Patient  Primary Caregiver: Family  Caregiver's Name:: Cait (dtr)  Caregiver's Relationship to Patient:: Family Member  Caregiver's Telephone Number:: [phone]  Support Systems: Self, Family members  County of Residence: [place]  What city do you live in?: [place]  Home entry access options. Select all that apply.: Stairs  Number of steps to enter home.: 2  Do the steps have railings?: Yes  Type of Current Residence: Apartment  Floor Level: 1  Upon entering residence, is there a bedroom on the main floor (no further steps)?: Yes  Upon entering residence, is there a bathroom on the main floor (no further steps)?: Yes  Living Arrangements: Lives Alone    Activities of Daily Living Prior to Admission  Functional Status: Independent  Completes ADLs independently?: Yes  Ambulates independently?: Yes  Does patient use assisted devices?: Yes  Assisted Devices (DME) used: Straight Cane, Walker, Home Oxygen concentrator, Shower Chair, Bedside Commode  DME Company Name (respiratory supplies): [employer]  O2 Rate(s): 4LPM  Does patient currently own DME?: Yes  What DME does the patient currently own?: Bedside Commode, Straight Cane, Walker, Home Oxygen concentrator  Does the patient have a history of Short-Term Rehab?: Yes ([facility])  Does patient have a history of HHC?: Yes ([facility])  Does patient currently have HHC?: No     Patient Information Continued  Income Source: Pension/shelter  Does patient have prescription coverage?: Yes  Does patient receive dialysis treatments?: No     Means of Transportation  Means of Transport to Appts:: Family transport      Social Determinants of Health (SDOH)      Flowsheet Row Most Recent Value   Housing Stability    In the last 12 months, was there a time when you were not able to pay the mortgage or rent on time? N   In the last 12 months, how many places have you lived? 1   In the last 12 months, was there a time when you did not have a steady place to sleep or slept in a shelter (including now)? N   Transportation Needs    In the past 12 months, has lack of transportation kept you from medical appointments or from getting medications? no   In the past 12 months, has lack of transportation kept you from meetings, work, or from getting things needed for daily living? No   Food Insecurity    Within the past 12 months, you worried that your food would run out before you got the money to buy more. Never true   Within the past 12 months, the food you bought just didn't last and you didn't have money to get more. Never true   Utilities    In the past 12 months has the electric, gas, oil, or water company threatened to shut off services in your home? No            DISCHARGE DETAILS:    Discharge planning discussed with:: Patient  Freedom of Choice: Yes  Comments - Freedom of Choice: D/C home  CM contacted family/caregiver?: Yes (left vm for [place])  Were Treatment Team discharge recommendations reviewed with patient/caregiver?: Yes  Did patient/caregiver verbalize understanding of patient care needs?: Yes  Were patient/caregiver advised of the risks associated with not following Treatment Team discharge recommendations?: Yes    Contacts  Patient Contacts: Danyelle Quintana  Relationship to Patient:: Family  Contact Method: Other (Comment) (Left  voicemail)  Reason/Outcome: Emergency Contact, Discharge Planning    Requested Home Health Care         Is the patient interested in HHC at discharge?: No    DME Referral Provided  Referral made for DME?: No    Other Referral/Resources/Interventions Provided:  Interventions: Assisted Living  Referral Comments: CM spoke with patient at bedside, introduced self and role, conduct assessment and discuss discharge planning. Patient stated she lives alone  in a first level apt and uses cane/walker to ambulate and is on 4LPM O2. Patient stated her dtrs assist her with transportation to [facility] [place]. Discussed discharge planning and therapy recs for home rehab. Patient refused and stated she has all the visual copies of the exercises at home provided from last episode of home rehab and does not feel the need to have home rehab again. Patient also stated her dtrs have been looking into assisted living facility for her. CM offered assistance for finding assisted living facility and patient was agreeable. CM made patient aware that CM will be reaching out to her dtr and patient mentioned to call her dtr Danyelle. CM called Danyelle and left vm introducing self and role and to call back for any question or concerns requiring CM assistance. CM provided patient with information for [facility]  and will continue to assess patient until discharge.     Treatment Team Recommendation: Home with Home Health Care  Discharge Destination Plan:: Home  Transport at Discharge : Family

## 2024-04-01 NOTE — PLAN OF CARE
Problem: INFECTION - ADULT  Goal: Absence or prevention of progression during hospitalization  Description: INTERVENTIONS:  - Assess and monitor for signs and symptoms of infection  - Monitor lab/diagnostic results  - Monitor all insertion sites, i.e. indwelling lines, tubes, and drains  - Monitor endotracheal if appropriate and nasal secretions for changes in amount and color  - [place] appropriate cooling/warming therapies per order  - Administer medications as ordered  - Instruct and encourage patient and family to use good hand hygiene technique  - Identify and instruct in appropriate isolation precautions for identified infection/condition  Outcome: Progressing     Problem: Knowledge Deficit  Goal: Patient/family/caregiver demonstrates understanding of disease process, treatment plan, medications, and discharge instructions  Description: Complete learning assessment and assess knowledge base.  Interventions:  - Provide teaching at level of understanding  - Provide teaching via preferred learning methods  Outcome: Progressing

## 2024-04-02 LAB
ALBUMIN SERPL BCP-MCNC: 3.6 G/DL (ref 3.5–5)
ALP SERPL-CCNC: 89 U/L (ref 34–104)
ALT SERPL W P-5'-P-CCNC: 19 U/L (ref 7–52)
ANION GAP SERPL CALCULATED.3IONS-SCNC: 8 MMOL/L (ref 4–13)
AST SERPL W P-5'-P-CCNC: 30 U/L (ref 13–39)
BASOPHILS # BLD AUTO: 0.05 THOUSANDS/ÂΜL (ref 0–0.1)
BASOPHILS NFR BLD AUTO: 1 % (ref 0–1)
BILIRUB SERPL-MCNC: 0.4 MG/DL (ref 0.2–1)
BUN SERPL-MCNC: 13 MG/DL (ref 5–25)
CALCIUM SERPL-MCNC: 10.2 MG/DL (ref 8.4–10.2)
CHLORIDE SERPL-SCNC: 104 MMOL/L (ref 96–108)
CO2 SERPL-SCNC: 28 MMOL/L (ref 21–32)
CREAT SERPL-MCNC: 0.59 MG/DL (ref 0.6–1.3)
EOSINOPHIL # BLD AUTO: 0.23 THOUSAND/ÂΜL (ref 0–0.61)
EOSINOPHIL NFR BLD AUTO: 3 % (ref 0–6)
ERYTHROCYTE [DISTWIDTH] IN BLOOD BY AUTOMATED COUNT: 15.9 % (ref 11.6–15.1)
GFR SERPL CREATININE-BSD FRML MDRD: 83 ML/MIN/1.73SQ M
GLUCOSE SERPL-MCNC: 127 MG/DL (ref 65–140)
HCT VFR BLD AUTO: 38.9 % (ref 34.8–46.1)
HGB BLD-MCNC: 12.1 G/DL (ref 11.5–15.4)
IMM GRANULOCYTES # BLD AUTO: 0.02 THOUSAND/UL (ref 0–0.2)
IMM GRANULOCYTES NFR BLD AUTO: 0 % (ref 0–2)
LYMPHOCYTES # BLD AUTO: 1.03 THOUSANDS/ÂΜL (ref 0.6–4.47)
LYMPHOCYTES NFR BLD AUTO: 14 % (ref 14–44)
MCH RBC QN AUTO: 29.9 PG (ref 26.8–34.3)
MCHC RBC AUTO-ENTMCNC: 31.1 G/DL (ref 31.4–37.4)
MCV RBC AUTO: 96 FL (ref 82–98)
MONOCYTES # BLD AUTO: 0.52 THOUSAND/ÂΜL (ref 0.17–1.22)
MONOCYTES NFR BLD AUTO: 7 % (ref 4–12)
NEUTROPHILS # BLD AUTO: 5.46 THOUSANDS/ÂΜL (ref 1.85–7.62)
NEUTS SEG NFR BLD AUTO: 75 % (ref 43–75)
NRBC BLD AUTO-RTO: 0 /100 WBCS
PLATELET # BLD AUTO: 205 THOUSANDS/UL (ref 149–390)
PMV BLD AUTO: 11.3 FL (ref 8.9–12.7)
POTASSIUM SERPL-SCNC: 3.6 MMOL/L (ref 3.5–5.3)
PROT SERPL-MCNC: 6.8 G/DL (ref 6.4–8.4)
RBC # BLD AUTO: 4.05 MILLION/UL (ref 3.81–5.12)
SODIUM SERPL-SCNC: 140 MMOL/L (ref 135–147)
WBC # BLD AUTO: 7.31 THOUSAND/UL (ref 4.31–10.16)

## 2024-04-02 PROCEDURE — 80053 COMPREHEN METABOLIC PANEL: CPT | Performed by: STUDENT IN AN ORGANIZED HEALTH CARE EDUCATION/TRAINING PROGRAM

## 2024-04-02 PROCEDURE — 85025 COMPLETE CBC W/AUTO DIFF WBC: CPT | Performed by: STUDENT IN AN ORGANIZED HEALTH CARE EDUCATION/TRAINING PROGRAM

## 2024-04-02 PROCEDURE — 99232 SBSQ HOSP IP/OBS MODERATE 35: CPT | Performed by: FAMILY MEDICINE

## 2024-04-02 RX ADMIN — ACETAMINOPHEN 650 MG: 325 TABLET ORAL at 18:38

## 2024-04-02 RX ADMIN — ATORVASTATIN CALCIUM 80 MG: 40 TABLET, FILM COATED ORAL at 21:37

## 2024-04-02 RX ADMIN — PANTOPRAZOLE SODIUM 40 MG: 40 TABLET, DELAYED RELEASE ORAL at 08:27

## 2024-04-02 RX ADMIN — HEPARIN SODIUM 5000 UNITS: 5000 INJECTION, SOLUTION INTRAVENOUS; SUBCUTANEOUS at 21:37

## 2024-04-02 RX ADMIN — BISOPROLOL FUMARATE 2.5 MG: 5 TABLET ORAL at 08:25

## 2024-04-02 RX ADMIN — CLOPIDOGREL 75 MG: 75 TABLET ORAL at 08:27

## 2024-04-02 RX ADMIN — METRONIDAZOLE 500 MG: 500 TABLET ORAL at 21:37

## 2024-04-02 RX ADMIN — DULOXETINE HYDROCHLORIDE 60 MG: 30 CAPSULE, DELAYED RELEASE ORAL at 08:27

## 2024-04-02 RX ADMIN — CEFTRIAXONE 1000 MG: 1 INJECTION, SOLUTION INTRAVENOUS at 18:40

## 2024-04-02 RX ADMIN — METRONIDAZOLE 500 MG: 500 TABLET ORAL at 14:27

## 2024-04-02 RX ADMIN — MIDODRINE HYDROCHLORIDE 5 MG: 5 TABLET ORAL at 14:27

## 2024-04-02 RX ADMIN — HEPARIN SODIUM 5000 UNITS: 5000 INJECTION, SOLUTION INTRAVENOUS; SUBCUTANEOUS at 14:27

## 2024-04-02 RX ADMIN — METRONIDAZOLE 500 MG: 500 TABLET ORAL at 06:10

## 2024-04-02 RX ADMIN — LEVOTHYROXINE SODIUM 88 MCG: 88 TABLET ORAL at 08:27

## 2024-04-02 RX ADMIN — HEPARIN SODIUM 5000 UNITS: 5000 INJECTION, SOLUTION INTRAVENOUS; SUBCUTANEOUS at 06:10

## 2024-04-02 NOTE — ASSESSMENT & PLAN NOTE
CT noted right parotiditis and cellulitis. No abscess or adenopathy noted.    Continue IV Ceftriaxone and IV Flagyl  ENT evaluated the pt and recommend holding diuretics due to concern for dehydration being the cause.  Recommend hydration therefore pt received IV NS bolus yesterday     4/2 - slowly improving. Patient continued on IV Rocephin and Flagyl. Will continue to monitor.

## 2024-04-02 NOTE — ASSESSMENT & PLAN NOTE
Wt Readings from Last 3 Encounters:   04/02/24 79.3 kg (174 lb 14.4 oz)   03/25/24 75.8 kg (167 lb)   03/23/24 75.9 kg (167 lb 5.3 oz)     Holding Lasix to avoid dehydration in setting of parotitis.  Will follow daily weight and I/Os

## 2024-04-02 NOTE — ASSESSMENT & PLAN NOTE
Currently on IV antibiotics as above.      4/2 - Slowly improving. Continue IV Rocephin and Flagyl.

## 2024-04-02 NOTE — PROGRESS NOTES
[facility]  Progress Note  Name: Danyelle Martinez I  MRN: [id number]  Unit/Bed#: 2 [address] B I Date of Admission: 3/30/2024   Date of Service: 4/2/2024 I Hospital Day: 3    Assessment/Plan   Facial cellulitis  Assessment & Plan  Currently on IV antibiotics as above.      4/2 - Slowly improving. Continue IV Rocephin and Flagyl.    Chronic diastolic CHF (congestive heart failure) (HCC)  Assessment & Plan  Wt Readings from Last 3 Encounters:   04/02/24 79.3 kg (174 lb 14.4 oz)   03/25/24 75.8 kg (167 lb)   03/23/24 75.9 kg (167 lb 5.3 oz)     Holding Lasix to avoid dehydration in setting of parotitis.  Will follow daily weight and I/Os          Thyroid nodule  Assessment & Plan  Stable thyroid nodules noted on CT imaging.  Known to pt     Acquired hypothyroidism  Assessment & Plan  Continue Synthroid     History of transient ischemic attack (TIA)  Assessment & Plan  Continue Plavix and Statin     Dyslipidemia  Assessment & Plan  Continue Statin     Essential hypertension  Assessment & Plan  Continue Bisoprolol.   Pt is also on Midodrine    Chronic hypoxemic respiratory failure (HCC)  Assessment & Plan  Pt reports using O2 due to hx of CHF. Currently at baseline respiratory status     * Parotitis, acute  Assessment & Plan  CT noted right parotiditis and cellulitis. No abscess or adenopathy noted.    Continue IV Ceftriaxone and IV Flagyl  ENT evaluated the pt and recommend holding diuretics due to concern for dehydration being the cause.  Recommend hydration therefore pt received IV NS bolus yesterday     4/2 - slowly improving. Patient continued on IV Rocephin and Flagyl. Will continue to monitor.         VTE Pharmacologic Prophylaxis: VTE Score: 6 Heparin prophylaxis.    Mobility:   Basic Mobility Inpatient Raw Score: 18  JH-HLM Goal: 6: Walk 10 steps or more  JH-HLM Achieved: 7: Walk 25 feet or more    Patient Centered Rounds: I performed bedside rounds with nursing staff today.     Total  Time Spent on Date of Encounter in care of patient: 35 mins. This time was spent on one or more of the following: performing physical exam; counseling and coordination of care; obtaining or reviewing history; documenting in the medical record; reviewing/ordering tests, medications or procedures; communicating with other healthcare professionals and discussing with patient's family/caregivers.    Current Length of Stay: 3 day(s)  Current Patient Status: Inpatient   Certification Statement: The patient will continue to require additional inpatient hospital stay due to parotitis  Discharge Plan: Anticipate discharge in 24-48 hrs to home.    Code Status: Level 3 - DNAR and DNI    Subjective:   Patient was seen and examined at bedside. No acute events overnight. Patient reports that her facial pain and swelling is improving.    Objective:     Vitals:   Temp (24hrs), Av.9 °F (36.6 °C), Min:97.4 °F (36.3 °C), Max:98.4 °F (36.9 °C)    Temp:  [97.4 °F (36.3 °C)-98.4 °F (36.9 °C)] 97.8 °F (36.6 °C)  HR:  [72-76] 76  Resp:  [17-18] 18  BP: (151-163)/(63-70) 151/70  SpO2:  [94 %-99 %] 97 %  Body mass index is 28.23 kg/m².     Input and Output Summary (last 24 hours):   No intake or output data in the 24 hours ending 24 1318    Physical Exam:   Physical Exam  HENT:      Head: Normocephalic.      Mouth/Throat:      Mouth: Mucous membranes are moist.   Eyes:      Extraocular Movements: Extraocular movements intact.   Cardiovascular:      Rate and Rhythm: Normal rate.   Pulmonary:      Effort: Pulmonary effort is normal.   Abdominal:      Palpations: Abdomen is soft.      Tenderness: There is no abdominal tenderness.   Skin:     General: Skin is warm.      Comments: Improving erythema and swelling in the right parotid region   Neurological:      Mental Status: She is alert.   Psychiatric:         Mood and Affect: Mood normal.        Additional Data:     Labs:  Results from last 7 days   Lab Units 24  0543   WBC  Thousand/uL 7.31   HEMOGLOBIN g/dL 12.1   HEMATOCRIT % 38.9   PLATELETS Thousands/uL 205   NEUTROS PCT % 75   LYMPHS PCT % 14   MONOS PCT % 7   EOS PCT % 3     Results from last 7 days   Lab Units 04/02/24  0543   SODIUM mmol/L 140   POTASSIUM mmol/L 3.6   CHLORIDE mmol/L 104   CO2 mmol/L 28   BUN mg/dL 13   CREATININE mg/dL 0.59*   ANION GAP mmol/L 8   CALCIUM mg/dL 10.2   ALBUMIN g/dL 3.6   TOTAL BILIRUBIN mg/dL 0.40   ALK PHOS U/L 89   ALT U/L 19   AST U/L 30   GLUCOSE RANDOM mg/dL 127     Results from last 7 days   Lab Units 03/30/24  1405   INR  1.08     Results from last 7 days   Lab Units 03/30/24  2103   POC GLUCOSE mg/dl 129     Lines/Drains:  Invasive Devices       Peripheral Intravenous Line  Duration             Peripheral IV 03/30/24 Right Antecubital 2 days    Peripheral IV 03/31/24 Distal;Dorsal (posterior);Right Forearm 1 day                  Recent Cultures (last 7 days):   Results from last 7 days   Lab Units 03/31/24  0709 03/31/24  0355   BLOOD CULTURE  No Growth at 24 hrs. No Growth at 24 hrs.     Last 24 Hours Medication List:   Current Facility-Administered Medications   Medication Dose Route Frequency Provider Last Rate    acetaminophen  650 mg Oral Q6H PRN ROBERT Miguel      atorvastatin  80 mg Oral HS Everardo Jordan MD      bisoprolol  2.5 mg Oral Daily Everardo Jordan MD      cefTRIAXone  1,000 mg Intravenous Q24H Everardo Jordan MD 1,000 mg (04/01/24 1808)    clopidogrel  75 mg Oral Daily Everardo Jordan MD      DULoxetine  60 mg Oral Daily Everardo Jordan MD      heparin (porcine)  5,000 Units Subcutaneous Q8H [facility] Everardo Jordan MD      levothyroxine  88 mcg Oral Daily Everardo Jordan MD      metroNIDAZOLE  500 mg Oral Q8H [facility] Everardo Jordan MD      midodrine  5 mg Oral BID AC Drew Benítez MD      pantoprazole  40 mg Oral Daily Everardo Joradn MD       Today, Patient Was Seen By: Shelton Hogan MD    **Please Note: This note may have been constructed using a voice  recognition system.**

## 2024-04-02 NOTE — PLAN OF CARE
Problem: PAIN - ADULT  Goal: Verbalizes/displays adequate comfort level or baseline comfort level  Description: Interventions:  - Encourage patient to monitor pain and request assistance  - Assess pain using appropriate pain scale  - Administer analgesics based on type and severity of pain and evaluate response  - Implement non-pharmacological measures as appropriate and evaluate response  - Consider cultural and social influences on pain and pain management  - Notify physician/advanced practitioner if interventions unsuccessful or patient reports new pain  Outcome: Progressing     Problem: INFECTION - ADULT  Goal: Absence or prevention of progression during hospitalization  Description: INTERVENTIONS:  - Assess and monitor for signs and symptoms of infection  - Monitor lab/diagnostic results  - Monitor all insertion sites, i.e. indwelling lines, tubes, and drains  - Monitor endotracheal if appropriate and nasal secretions for changes in amount and color  - [place] appropriate cooling/warming therapies per order  - Administer medications as ordered  - Instruct and encourage patient and family to use good hand hygiene technique  - Identify and instruct in appropriate isolation precautions for identified infection/condition  Outcome: Progressing  Goal: Absence of fever/infection during neutropenic period  Description: INTERVENTIONS:  - Monitor WBC    Outcome: Progressing     Problem: SAFETY ADULT  Goal: Patient will remain free of falls  Description: INTERVENTIONS:  - Educate patient/family on patient safety including physical limitations  - Instruct patient to call for assistance with activity   - Consult OT/PT to assist with strengthening/mobility   - Keep Call bell within reach  - Keep bed low and locked with side rails adjusted as appropriate  - Keep care items and personal belongings within reach  - Initiate and maintain comfort rounds  - Make Fall Risk Sign visible to staff  - Offer Toileting every 2 Hours,  in advance of need  - Initiate/Maintain bed/chair alarm  - Obtain necessary fall risk management equipment: bed/chair alarm   - Apply yellow socks and bracelet for high fall risk patients  - Consider moving patient to room near nurses station  Outcome: Progressing  Goal: Maintain or return to baseline ADL function  Description: INTERVENTIONS:  -  Assess patient's ability to carry out ADLs; assess patient's baseline for ADL function and identify physical deficits which impact ability to perform ADLs (bathing, care of mouth/teeth, toileting, grooming, dressing, etc.)  - Assess/evaluate cause of self-care deficits   - Assess range of motion  - Assess patient's mobility; develop plan if impaired  - Assess patient's need for assistive devices and provide as appropriate  - Encourage maximum independence but intervene and supervise when necessary  - Involve family in performance of ADLs  - Assess for home care needs following discharge   - Consider OT consult to assist with ADL evaluation and planning for discharge  - Provide patient education as appropriate  Outcome: Progressing  Goal: Maintains/Returns to pre admission functional level  Description: INTERVENTIONS:  - Perform AM-PAC 6 Click Basic Mobility/ Daily Activity assessment daily.  - Set and communicate daily mobility goal to care team and patient/family/caregiver.   - Collaborate with rehabilitation services on mobility goals if consulted  - Perform Range of Motion 3 times a day.  - Reposition patient every 2 hours.  - Dangle patient 3 times a day  - Stand patient 3 times a day  - Ambulate patient 3 times a day  - Out of bed to chair 3 times a day   - Out of bed for meals 3 times a day  - Out of bed for toileting  - Record patient progress and toleration of activity level   Outcome: Progressing     Problem: DISCHARGE PLANNING  Goal: Discharge to home or other facility with appropriate resources  Description: INTERVENTIONS:  - Identify barriers to discharge  w/patient and caregiver  - Arrange for needed discharge resources and transportation as appropriate  - Identify discharge learning needs (meds, wound care, etc.)  - Arrange for interpretive services to assist at discharge as needed  - Refer to Case Management Department for coordinating discharge planning if the patient needs post-hospital services based on physician/advanced practitioner order or complex needs related to functional status, cognitive ability, or social support system  Outcome: Progressing     Problem: Knowledge Deficit  Goal: Patient/family/caregiver demonstrates understanding of disease process, treatment plan, medications, and discharge instructions  Description: Complete learning assessment and assess knowledge base.  Interventions:  - Provide teaching at level of understanding  - Provide teaching via preferred learning methods  Outcome: Progressing

## 2024-04-03 LAB
ANION GAP SERPL CALCULATED.3IONS-SCNC: 7 MMOL/L (ref 4–13)
BASOPHILS # BLD AUTO: 0.06 THOUSANDS/ÂΜL (ref 0–0.1)
BASOPHILS NFR BLD AUTO: 1 % (ref 0–1)
BUN SERPL-MCNC: 17 MG/DL (ref 5–25)
CALCIUM SERPL-MCNC: 10.6 MG/DL (ref 8.4–10.2)
CHLORIDE SERPL-SCNC: 104 MMOL/L (ref 96–108)
CO2 SERPL-SCNC: 29 MMOL/L (ref 21–32)
CREAT SERPL-MCNC: 0.67 MG/DL (ref 0.6–1.3)
EOSINOPHIL # BLD AUTO: 0.22 THOUSAND/ÂΜL (ref 0–0.61)
EOSINOPHIL NFR BLD AUTO: 3 % (ref 0–6)
ERYTHROCYTE [DISTWIDTH] IN BLOOD BY AUTOMATED COUNT: 15.9 % (ref 11.6–15.1)
GFR SERPL CREATININE-BSD FRML MDRD: 80 ML/MIN/1.73SQ M
GLUCOSE SERPL-MCNC: 122 MG/DL (ref 65–140)
HCT VFR BLD AUTO: 38.1 % (ref 34.8–46.1)
HGB BLD-MCNC: 11.6 G/DL (ref 11.5–15.4)
IMM GRANULOCYTES # BLD AUTO: 0.06 THOUSAND/UL (ref 0–0.2)
IMM GRANULOCYTES NFR BLD AUTO: 1 % (ref 0–2)
LYMPHOCYTES # BLD AUTO: 1.01 THOUSANDS/ÂΜL (ref 0.6–4.47)
LYMPHOCYTES NFR BLD AUTO: 14 % (ref 14–44)
MAGNESIUM SERPL-MCNC: 1.8 MG/DL (ref 1.9–2.7)
MCH RBC QN AUTO: 29.3 PG (ref 26.8–34.3)
MCHC RBC AUTO-ENTMCNC: 30.4 G/DL (ref 31.4–37.4)
MCV RBC AUTO: 96 FL (ref 82–98)
MONOCYTES # BLD AUTO: 0.42 THOUSAND/ÂΜL (ref 0.17–1.22)
MONOCYTES NFR BLD AUTO: 6 % (ref 4–12)
NEUTROPHILS # BLD AUTO: 5.42 THOUSANDS/ÂΜL (ref 1.85–7.62)
NEUTS SEG NFR BLD AUTO: 75 % (ref 43–75)
NRBC BLD AUTO-RTO: 0 /100 WBCS
PLATELET # BLD AUTO: 197 THOUSANDS/UL (ref 149–390)
PMV BLD AUTO: 10.8 FL (ref 8.9–12.7)
POTASSIUM SERPL-SCNC: 4.7 MMOL/L (ref 3.5–5.3)
RBC # BLD AUTO: 3.96 MILLION/UL (ref 3.81–5.12)
SODIUM SERPL-SCNC: 140 MMOL/L (ref 135–147)
WBC # BLD AUTO: 7.19 THOUSAND/UL (ref 4.31–10.16)

## 2024-04-03 PROCEDURE — 99232 SBSQ HOSP IP/OBS MODERATE 35: CPT | Performed by: FAMILY MEDICINE

## 2024-04-03 PROCEDURE — 83735 ASSAY OF MAGNESIUM: CPT | Performed by: FAMILY MEDICINE

## 2024-04-03 PROCEDURE — 85025 COMPLETE CBC W/AUTO DIFF WBC: CPT | Performed by: FAMILY MEDICINE

## 2024-04-03 PROCEDURE — 80048 BASIC METABOLIC PNL TOTAL CA: CPT | Performed by: FAMILY MEDICINE

## 2024-04-03 RX ORDER — FLUTICASONE PROPIONATE 50 MCG
1 SPRAY, SUSPENSION (ML) NASAL DAILY
Status: DISCONTINUED | OUTPATIENT
Start: 2024-04-03 | End: 2024-04-03

## 2024-04-03 RX ORDER — ECHINACEA PURPUREA EXTRACT 125 MG
1 TABLET ORAL
Status: DISCONTINUED | OUTPATIENT
Start: 2024-04-03 | End: 2024-04-04 | Stop reason: HOSPADM

## 2024-04-03 RX ADMIN — METRONIDAZOLE 500 MG: 500 TABLET ORAL at 21:51

## 2024-04-03 RX ADMIN — BISOPROLOL FUMARATE 2.5 MG: 5 TABLET ORAL at 08:19

## 2024-04-03 RX ADMIN — METRONIDAZOLE 500 MG: 500 TABLET ORAL at 05:40

## 2024-04-03 RX ADMIN — METRONIDAZOLE 500 MG: 500 TABLET ORAL at 14:40

## 2024-04-03 RX ADMIN — LEVOTHYROXINE SODIUM 88 MCG: 88 TABLET ORAL at 08:20

## 2024-04-03 RX ADMIN — ACETAMINOPHEN 650 MG: 325 TABLET ORAL at 21:58

## 2024-04-03 RX ADMIN — ATORVASTATIN CALCIUM 80 MG: 40 TABLET, FILM COATED ORAL at 21:51

## 2024-04-03 RX ADMIN — PANTOPRAZOLE SODIUM 40 MG: 40 TABLET, DELAYED RELEASE ORAL at 08:20

## 2024-04-03 RX ADMIN — CLOPIDOGREL 75 MG: 75 TABLET ORAL at 08:20

## 2024-04-03 RX ADMIN — HEPARIN SODIUM 5000 UNITS: 5000 INJECTION, SOLUTION INTRAVENOUS; SUBCUTANEOUS at 21:50

## 2024-04-03 RX ADMIN — CEFTRIAXONE 1000 MG: 1 INJECTION, SOLUTION INTRAVENOUS at 18:14

## 2024-04-03 RX ADMIN — HEPARIN SODIUM 5000 UNITS: 5000 INJECTION, SOLUTION INTRAVENOUS; SUBCUTANEOUS at 05:39

## 2024-04-03 RX ADMIN — SALINE NASAL SPRAY 1 SPRAY: 1.5 SOLUTION NASAL at 22:04

## 2024-04-03 RX ADMIN — HEPARIN SODIUM 5000 UNITS: 5000 INJECTION, SOLUTION INTRAVENOUS; SUBCUTANEOUS at 14:40

## 2024-04-03 RX ADMIN — DULOXETINE HYDROCHLORIDE 60 MG: 30 CAPSULE, DELAYED RELEASE ORAL at 08:20

## 2024-04-03 NOTE — ASSESSMENT & PLAN NOTE
Stable thyroid nodules noted on CT imaging.  Known to pt     4/4 - Will need ENT follow up on discharge for thyroid ultrasound and FNAB.

## 2024-04-03 NOTE — ASSESSMENT & PLAN NOTE
CT noted right parotiditis and cellulitis. No abscess or adenopathy noted.    Continue IV Ceftriaxone and IV Flagyl  ENT evaluated the pt and recommend holding diuretics due to concern for dehydration being the cause.  Recommend hydration therefore pt received IV NS bolus yesterday     4/2 - slowly improving. Patient continued on IV Rocephin and Flagyl. Will continue to monitor.    4/3 - continuing to slowly improve. Likely transition to oral antibiotics and discharge in 24-48 hours.    4/4 - Improved. Patient feeling much better. Will transition to oral cefuroxime and flagyl on discharge. Patient to follow up with ENT in the outpatient setting.

## 2024-04-03 NOTE — PLAN OF CARE
Problem: PAIN - ADULT  Goal: Verbalizes/displays adequate comfort level or baseline comfort level  Description: Interventions:  - Encourage patient to monitor pain and request assistance  - Assess pain using appropriate pain scale  - Administer analgesics based on type and severity of pain and evaluate response  - Implement non-pharmacological measures as appropriate and evaluate response  - Consider cultural and social influences on pain and pain management  - Notify physician/advanced practitioner if interventions unsuccessful or patient reports new pain  Outcome: Progressing     Problem: INFECTION - ADULT  Goal: Absence or prevention of progression during hospitalization  Description: INTERVENTIONS:  - Assess and monitor for signs and symptoms of infection  - Monitor lab/diagnostic results  - Monitor all insertion sites, i.e. indwelling lines, tubes, and drains  - Monitor endotracheal if appropriate and nasal secretions for changes in amount and color  - [place] appropriate cooling/warming therapies per order  - Administer medications as ordered  - Instruct and encourage patient and family to use good hand hygiene technique  - Identify and instruct in appropriate isolation precautions for identified infection/condition  Outcome: Progressing  Goal: Absence of fever/infection during neutropenic period  Description: INTERVENTIONS:  - Monitor WBC    Outcome: Progressing     Problem: SAFETY ADULT  Goal: Patient will remain free of falls  Description: INTERVENTIONS:  - Educate patient/family on patient safety including physical limitations  - Instruct patient to call for assistance with activity   - Consult OT/PT to assist with strengthening/mobility   - Keep Call bell within reach  - Keep bed low and locked with side rails adjusted as appropriate  - Keep care items and personal belongings within reach  - Initiate and maintain comfort rounds  - Make Fall Risk Sign visible to staff  - Offer Toileting every 2 Hours,  in advance of need  - Initiate/Maintain bed alarm  - Obtain necessary fall risk management equipment: socks  - Apply yellow socks and bracelet for high fall risk patients  - Consider moving patient to room near nurses station  Outcome: Progressing  Goal: Maintain or return to baseline ADL function  Description: INTERVENTIONS:  -  Assess patient's ability to carry out ADLs; assess patient's baseline for ADL function and identify physical deficits which impact ability to perform ADLs (bathing, care of mouth/teeth, toileting, grooming, dressing, etc.)  - Assess/evaluate cause of self-care deficits   - Assess range of motion  - Assess patient's mobility; develop plan if impaired  - Assess patient's need for assistive devices and provide as appropriate  - Encourage maximum independence but intervene and supervise when necessary  - Involve family in performance of ADLs  - Assess for home care needs following discharge   - Consider OT consult to assist with ADL evaluation and planning for discharge  - Provide patient education as appropriate  Outcome: Progressing  Goal: Maintains/Returns to pre admission functional level  Description: INTERVENTIONS:  - Perform AM-PAC 6 Click Basic Mobility/ Daily Activity assessment daily.  - Set and communicate daily mobility goal to care team and patient/family/caregiver.   - Collaborate with rehabilitation services on mobility goals if consulted  - Perform Range of Motion 3 times a day.  - Reposition patient every 2 hours.  - Dangle patient 3 times a day  - Stand patient 3 times a day  - Ambulate patient 3 times a day  - Out of bed to chair 3 times a day   - Out of bed for meals 3 times a day  - Out of bed for toileting  - Record patient progress and toleration of activity level   Outcome: Progressing     Problem: DISCHARGE PLANNING  Goal: Discharge to home or other facility with appropriate resources  Description: INTERVENTIONS:  - Identify barriers to discharge w/patient and  caregiver  - Arrange for needed discharge resources and transportation as appropriate  - Identify discharge learning needs (meds, wound care, etc.)  - Arrange for interpretive services to assist at discharge as needed  - Refer to Case Management Department for coordinating discharge planning if the patient needs post-hospital services based on physician/advanced practitioner order or complex needs related to functional status, cognitive ability, or social support system  Outcome: Progressing     Problem: Knowledge Deficit  Goal: Patient/family/caregiver demonstrates understanding of disease process, treatment plan, medications, and discharge instructions  Description: Complete learning assessment and assess knowledge base.  Interventions:  - Provide teaching at level of understanding  - Provide teaching via preferred learning methods  Outcome: Progressing

## 2024-04-03 NOTE — PROGRESS NOTES
[facility]  Progress Note  Name: Danyelle Martinez I  MRN: [id number]  Unit/Bed#: 2 [facility] 219 B I Date of Admission: 3/30/2024   Date of Service: 4/3/2024 I Hospital Day: 4    Assessment/Plan   Facial cellulitis  Assessment & Plan  Currently on IV antibiotics as above.      4/2 - Slowly improving. Continue IV Rocephin and Flagyl.    4/3 - continuing to slowly improve. Likely transition to oral antibiotics and discharge in 24-48 hours.     Chronic diastolic CHF (congestive heart failure) (HCC)  Assessment & Plan  Wt Readings from Last 3 Encounters:   04/02/24 79.3 kg (174 lb 14.4 oz)   03/25/24 75.8 kg (167 lb)   03/23/24 75.9 kg (167 lb 5.3 oz)     Holding Lasix to avoid dehydration in setting of parotitis.  Will follow daily weight and I/Os          Thyroid nodule  Assessment & Plan  Stable thyroid nodules noted on CT imaging.  Known to pt     Acquired hypothyroidism  Assessment & Plan  Continue Synthroid     History of transient ischemic attack (TIA)  Assessment & Plan  Continue Plavix and Statin     Dyslipidemia  Assessment & Plan  Continue Statin     Essential hypertension  Assessment & Plan  Continue Bisoprolol.   Pt is also on Midodrine    Chronic hypoxemic respiratory failure (HCC)  Assessment & Plan  Pt reports using chronic O2 due to hx of CHF. Currently at baseline respiratory status     * Parotitis, acute  Assessment & Plan  CT noted right parotiditis and cellulitis. No abscess or adenopathy noted.    Continue IV Ceftriaxone and IV Flagyl  ENT evaluated the pt and recommend holding diuretics due to concern for dehydration being the cause.  Recommend hydration therefore pt received IV NS bolus yesterday     4/2 - slowly improving. Patient continued on IV Rocephin and Flagyl. Will continue to monitor.    4/3 - continuing to slowly improve. Likely transition to oral antibiotics and discharge in 24-48 hours.         VTE Pharmacologic Prophylaxis: VTE Score: 6 Heparin  prophylaxis.    Mobility:   Basic Mobility Inpatient Raw Score: 20  -HLM Goal: 6: Walk 10 steps or more  JH-HLM Achieved: 7: Walk 25 feet or more    Patient Centered Rounds: I performed bedside rounds with nursing staff today.     Total Time Spent on Date of Encounter in care of patient: 35 mins. This time was spent on one or more of the following: performing physical exam; counseling and coordination of care; obtaining or reviewing history; documenting in the medical record; reviewing/ordering tests, medications or procedures; communicating with other healthcare professionals and discussing with patient's family/caregivers.    Current Length of Stay: 4 day(s)  Current Patient Status: Inpatient   Certification Statement: The patient will continue to require additional inpatient hospital stay due to parotitis  Discharge Plan: Anticipate discharge in 24-48 hrs to home.    Code Status: Level 3 - DNAR and DNI    Subjective:   Patient was seen and examined at bedside. No acute events overnight. No new complaints. Facial swelling improving.    Objective:     Vitals:   Temp (24hrs), Av.9 °F (36.6 °C), Min:97.6 °F (36.4 °C), Max:98.2 °F (36.8 °C)    Temp:  [97.6 °F (36.4 °C)-98.2 °F (36.8 °C)] 97.6 °F (36.4 °C)  HR:  [60-74] 74  Resp:  [18] 18  BP: (128-157)/(58-71) 157/71  SpO2:  [96 %-98 %] 96 %  Body mass index is 28.23 kg/m².     Input and Output Summary (last 24 hours):     Intake/Output Summary (Last 24 hours) at 4/3/2024 1436  Last data filed at 4/3/2024 1200  Gross per 24 hour   Intake 600 ml   Output --   Net 600 ml     Physical Exam:   Physical Exam  HENT:      Head: Normocephalic.      Mouth/Throat:      Mouth: Mucous membranes are moist.   Eyes:      Extraocular Movements: Extraocular movements intact.   Cardiovascular:      Rate and Rhythm: Normal rate.   Pulmonary:      Effort: Pulmonary effort is normal.   Abdominal:      Palpations: Abdomen is soft.      Tenderness: There is no abdominal tenderness.    Skin:     General: Skin is warm.      Comments: Improving erythema and swelling in the right parotid region   Neurological:      Mental Status: She is alert.   Psychiatric:         Mood and Affect: Mood normal.        Additional Data:     Labs:  Results from last 7 days   Lab Units 04/03/24  0509   WBC Thousand/uL 7.19   HEMOGLOBIN g/dL 11.6   HEMATOCRIT % 38.1   PLATELETS Thousands/uL 197   NEUTROS PCT % 75   LYMPHS PCT % 14   MONOS PCT % 6   EOS PCT % 3     Results from last 7 days   Lab Units 04/03/24  0509 04/02/24  0543   SODIUM mmol/L 140 140   POTASSIUM mmol/L 4.7 3.6   CHLORIDE mmol/L 104 104   CO2 mmol/L 29 28   BUN mg/dL 17 13   CREATININE mg/dL 0.67 0.59*   ANION GAP mmol/L 7 8   CALCIUM mg/dL 10.6* 10.2   ALBUMIN g/dL  --  3.6   TOTAL BILIRUBIN mg/dL  --  0.40   ALK PHOS U/L  --  89   ALT U/L  --  19   AST U/L  --  30   GLUCOSE RANDOM mg/dL 122 127     Results from last 7 days   Lab Units 03/30/24  1405   INR  1.08     Results from last 7 days   Lab Units 03/30/24  2103   POC GLUCOSE mg/dl 129     Lines/Drains:  Invasive Devices       Peripheral Intravenous Line  Duration             Peripheral IV 03/30/24 Right Antecubital 4 days    Peripheral IV 03/31/24 Distal;Dorsal (posterior);Right Forearm 2 days                  Recent Cultures (last 7 days):   Results from last 7 days   Lab Units 03/31/24  0709 03/31/24  0355   BLOOD CULTURE  No Growth at 48 hrs. No Growth at 48 hrs.     Last 24 Hours Medication List:   Current Facility-Administered Medications   Medication Dose Route Frequency Provider Last Rate    acetaminophen  650 mg Oral Q6H PRN ROBERT Miguel      atorvastatin  80 mg Oral HS Everardo Jordan MD      bisoprolol  2.5 mg Oral Daily Everardo Jordan MD      cefTRIAXone  1,000 mg Intravenous Q24H Everardo Jordan MD 1,000 mg (04/02/24 1840)    clopidogrel  75 mg Oral Daily Everardo Jordan MD      DULoxetine  60 mg Oral Daily Everardo Jordan MD      heparin (porcine)  5,000 Units Subcutaneous  Q8H JOHN Everardo Jordan MD      levothyroxine  88 mcg Oral Daily Everardo Jordan MD      metroNIDAZOLE  500 mg Oral Q8H JOHN Everardo Jordan MD      midodrine  5 mg Oral BID AC Drew Benítez MD      pantoprazole  40 mg Oral Daily Everardo Jordan MD      sodium chloride  1 spray Each Nare Q1H PRN Shelton Hogan MD       Today, Patient Was Seen By: Shelton Hogan MD    **Please Note: This note may have been constructed using a voice recognition system.**

## 2024-04-03 NOTE — ASSESSMENT & PLAN NOTE
Currently on IV antibiotics as above.      4/2 - Slowly improving. Continue IV Rocephin and Flagyl.    4/3 - continuing to slowly improve. Likely transition to oral antibiotics and discharge in 24-48 hours.     4/4 - Improved. Patient feeling much better. Will transition to oral cefuroxime and flagyl on discharge. Patient to follow up with ENT in the outpatient setting. Discussed in detail with patient's daughter Danyelle.

## 2024-04-04 VITALS
RESPIRATION RATE: 16 BRPM | WEIGHT: 162.8 LBS | TEMPERATURE: 98 F | DIASTOLIC BLOOD PRESSURE: 70 MMHG | SYSTOLIC BLOOD PRESSURE: 143 MMHG | HEIGHT: 66 IN | OXYGEN SATURATION: 99 % | BODY MASS INDEX: 26.16 KG/M2 | HEART RATE: 76 BPM

## 2024-04-04 LAB
ANION GAP SERPL CALCULATED.3IONS-SCNC: 5 MMOL/L (ref 4–13)
BASOPHILS # BLD AUTO: 0.03 THOUSANDS/ÂΜL (ref 0–0.1)
BASOPHILS NFR BLD AUTO: 1 % (ref 0–1)
BUN SERPL-MCNC: 14 MG/DL (ref 5–25)
CALCIUM SERPL-MCNC: 10.4 MG/DL (ref 8.4–10.2)
CHLORIDE SERPL-SCNC: 106 MMOL/L (ref 96–108)
CO2 SERPL-SCNC: 30 MMOL/L (ref 21–32)
CREAT SERPL-MCNC: 0.62 MG/DL (ref 0.6–1.3)
EOSINOPHIL # BLD AUTO: 0.21 THOUSAND/ÂΜL (ref 0–0.61)
EOSINOPHIL NFR BLD AUTO: 4 % (ref 0–6)
ERYTHROCYTE [DISTWIDTH] IN BLOOD BY AUTOMATED COUNT: 15.8 % (ref 11.6–15.1)
GFR SERPL CREATININE-BSD FRML MDRD: 82 ML/MIN/1.73SQ M
GLUCOSE SERPL-MCNC: 123 MG/DL (ref 65–140)
HCT VFR BLD AUTO: 37.6 % (ref 34.8–46.1)
HGB BLD-MCNC: 11.8 G/DL (ref 11.5–15.4)
IMM GRANULOCYTES # BLD AUTO: 0.03 THOUSAND/UL (ref 0–0.2)
IMM GRANULOCYTES NFR BLD AUTO: 1 % (ref 0–2)
LYMPHOCYTES # BLD AUTO: 0.86 THOUSANDS/ÂΜL (ref 0.6–4.47)
LYMPHOCYTES NFR BLD AUTO: 15 % (ref 14–44)
MAGNESIUM SERPL-MCNC: 1.6 MG/DL (ref 1.9–2.7)
MCH RBC QN AUTO: 29.9 PG (ref 26.8–34.3)
MCHC RBC AUTO-ENTMCNC: 31.4 G/DL (ref 31.4–37.4)
MCV RBC AUTO: 95 FL (ref 82–98)
MONOCYTES # BLD AUTO: 0.37 THOUSAND/ÂΜL (ref 0.17–1.22)
MONOCYTES NFR BLD AUTO: 6 % (ref 4–12)
NEUTROPHILS # BLD AUTO: 4.42 THOUSANDS/ÂΜL (ref 1.85–7.62)
NEUTS SEG NFR BLD AUTO: 73 % (ref 43–75)
NRBC BLD AUTO-RTO: 0 /100 WBCS
PLATELET # BLD AUTO: 181 THOUSANDS/UL (ref 149–390)
PMV BLD AUTO: 10.6 FL (ref 8.9–12.7)
POTASSIUM SERPL-SCNC: 3.7 MMOL/L (ref 3.5–5.3)
RBC # BLD AUTO: 3.94 MILLION/UL (ref 3.81–5.12)
SODIUM SERPL-SCNC: 141 MMOL/L (ref 135–147)
WBC # BLD AUTO: 5.92 THOUSAND/UL (ref 4.31–10.16)

## 2024-04-04 PROCEDURE — 99238 HOSP IP/OBS DSCHRG MGMT 30/<: CPT | Performed by: FAMILY MEDICINE

## 2024-04-04 PROCEDURE — 83735 ASSAY OF MAGNESIUM: CPT | Performed by: FAMILY MEDICINE

## 2024-04-04 PROCEDURE — 85025 COMPLETE CBC W/AUTO DIFF WBC: CPT | Performed by: FAMILY MEDICINE

## 2024-04-04 PROCEDURE — 80048 BASIC METABOLIC PNL TOTAL CA: CPT | Performed by: FAMILY MEDICINE

## 2024-04-04 RX ORDER — CEFUROXIME AXETIL 500 MG/1
500 TABLET ORAL EVERY 12 HOURS SCHEDULED
Qty: 18 TABLET | Refills: 0 | Status: SHIPPED | OUTPATIENT
Start: 2024-04-04 | End: 2024-04-13

## 2024-04-04 RX ORDER — MIDODRINE HYDROCHLORIDE 5 MG/1
5 TABLET ORAL
Qty: 60 TABLET | Refills: 0 | Status: SHIPPED | OUTPATIENT
Start: 2024-04-04 | End: 2024-05-04

## 2024-04-04 RX ORDER — METRONIDAZOLE 500 MG/1
500 TABLET ORAL EVERY 8 HOURS SCHEDULED
Qty: 27 TABLET | Refills: 0 | Status: SHIPPED | OUTPATIENT
Start: 2024-04-04 | End: 2024-04-13

## 2024-04-04 RX ADMIN — HEPARIN SODIUM 5000 UNITS: 5000 INJECTION, SOLUTION INTRAVENOUS; SUBCUTANEOUS at 05:37

## 2024-04-04 RX ADMIN — LEVOTHYROXINE SODIUM 88 MCG: 88 TABLET ORAL at 09:02

## 2024-04-04 RX ADMIN — BISOPROLOL FUMARATE 2.5 MG: 5 TABLET ORAL at 09:02

## 2024-04-04 RX ADMIN — METRONIDAZOLE 500 MG: 500 TABLET ORAL at 13:41

## 2024-04-04 RX ADMIN — MIDODRINE HYDROCHLORIDE 5 MG: 5 TABLET ORAL at 13:41

## 2024-04-04 RX ADMIN — DULOXETINE HYDROCHLORIDE 60 MG: 30 CAPSULE, DELAYED RELEASE ORAL at 09:02

## 2024-04-04 RX ADMIN — CLOPIDOGREL 75 MG: 75 TABLET ORAL at 09:02

## 2024-04-04 RX ADMIN — METRONIDAZOLE 500 MG: 500 TABLET ORAL at 05:37

## 2024-04-04 RX ADMIN — PANTOPRAZOLE SODIUM 40 MG: 40 TABLET, DELAYED RELEASE ORAL at 09:02

## 2024-04-04 RX ADMIN — HEPARIN SODIUM 5000 UNITS: 5000 INJECTION, SOLUTION INTRAVENOUS; SUBCUTANEOUS at 13:41

## 2024-04-04 NOTE — DISCHARGE SUMMARY
Critical access hospital  Discharge Summary  Name: Danyelle Martinez I  MRN: [id number]  Unit/Bed#: 2 [facility] 219 B I Date of Admission: 3/30/2024   Date of Service: 4/4/2024 I Hospital Day: 5    Assessment/Plan   Facial cellulitis  Assessment & Plan  Currently on IV antibiotics as above.      4/2 - Slowly improving. Continue IV Rocephin and Flagyl.    4/3 - continuing to slowly improve. Likely transition to oral antibiotics and discharge in 24-48 hours.     4/4 - Improved. Patient feeling much better. Will transition to oral cefuroxime and flagyl on discharge. Patient to follow up with ENT in the outpatient setting. Discussed in detail with patient's daughter Danyelle.    Chronic diastolic CHF (congestive heart failure) (HCC)  Assessment & Plan  Wt Readings from Last 3 Encounters:   04/02/24 79.3 kg (174 lb 14.4 oz)   03/25/24 75.8 kg (167 lb)   03/23/24 75.9 kg (167 lb 5.3 oz)     Holding Lasix to avoid dehydration in setting of parotitis.  Will follow daily weight and I/Os    4/4 - Patient with grade I diastolic dysfunction. Currently euvolemic. Will continue to hold lasix therapy for now. Patient instructed to restart lasix therapy if she develops any signs of volume overload (increased weight, lower extremity edema) and to let her cardiologist know.          Thyroid nodule  Assessment & Plan  Stable thyroid nodules noted on CT imaging.  Known to pt     4/4 - Will need ENT follow up on discharge for thyroid ultrasound and FNAB.    Acquired hypothyroidism  Assessment & Plan  Continue Synthroid     History of transient ischemic attack (TIA)  Assessment & Plan  Continue Plavix and Statin     Dyslipidemia  Assessment & Plan  Continue Statin     Essential hypertension  Assessment & Plan  Continue Bisoprolol.   Pt is also on Midodrine    Chronic hypoxemic respiratory failure (HCC)  Assessment & Plan  Pt reports using chronic O2 due to hx of CHF. Currently at baseline respiratory status     * Parotitis,  "acute  Assessment & Plan  CT noted right parotiditis and cellulitis. No abscess or adenopathy noted.    Continue IV Ceftriaxone and IV Flagyl  ENT evaluated the pt and recommend holding diuretics due to concern for dehydration being the cause.  Recommend hydration therefore pt received IV NS bolus yesterday     4/2 - slowly improving. Patient continued on IV Rocephin and Flagyl. Will continue to monitor.    4/3 - continuing to slowly improve. Likely transition to oral antibiotics and discharge in 24-48 hours.    4/4 - Improved. Patient feeling much better. Will transition to oral cefuroxime and flagyl on discharge. Patient to follow up with ENT in the outpatient setting.         Medical Problems       Resolved Problems  Date Reviewed: 3/31/2024            Resolved    Sepsis without acute organ dysfunction (HCC) 3/31/2024     Resolved by  Drew Benítez MD        Discharging Physician / Practitioner: Shelton oHgan MD  PCP: Bladimir Caraballo MD  Admission Date:   Admission Orders (From admission, onward)       Ordered        03/30/24 1629  INPATIENT ADMISSION  Once                          Discharge Date: 04/04/24    Consultations During Hospital Stay:  ENT    HPI: Danyelle Martinez is a 83 y.o. female with a PMH of Diastolic CHF, HTN, HLD, TIA, Depression and Hypothyroidism who presents with right facial pain and swelling.  History was also obtained from family at bedside. Pt reports that 3 days ago she began to have pain in her upper right jaw as well as swelling.  As time progressed the pain and swelling intensified and she also noted erythema.  As her symptoms were not improving she came to the ED for evaluation.  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  She reports \"10%\" hearing loss in her right ear.  She reports pain in her upper right jaw but does not report any ear pain.  She reports a mild headache.  She denies any difficulty swallowing.  She denies any chest pain, SOB or abdominal pain.  " "Family at bedside deny any confusion.     Hospital Course: Patient was seen and examined in the emergency department and was admitted to the hospital for further evaluation and management.  Patient presenting with evidence of acute right-sided parotitis likely secondary to dehydration from Lasix in the outpatient setting.  Patient also with evidence of facial cellulitis.  Patient was started on IV Rocephin and Flagyl which was ultimately transition to oral cefuroxime and Flagyl on discharge after patient showed significant improvement.  Patient is also on chronic home oxygen at 4 L which has been weaned down to 2 L during this hospitalization.  Patient and her daughter have been advised to continue to hold Lasix therapy at this time however Lasix should be resumed if she develops any signs of volume overload such as increased weight or lower extremity edema at which time her cardiologist should also be notified.  Patient will also need follow-up with ENT on discharge. At the time of discharge patient reports feeling significantly improved and currently denies any acute complaints or concerns including chest pain or shortness of breath.    Please see above list of diagnoses and related plan for additional information.     Condition at Discharge: stable    Discharge Day Visit / Exam:   Vitals: Blood Pressure: 143/70 (04/04/24 0809)  Pulse: 76 (04/04/24 0809)  Temperature: 98 °F (36.7 °C) (04/03/24 2228)  Temp Source: Oral (04/03/24 2045)  Respirations: 16 (04/04/24 0809)  Height: 5' 6\" (167.6 cm) (03/30/24 2000)  Weight - Scale: 73.8 kg (162 lb 12.8 oz) (04/04/24 0542)  SpO2: 99 % (04/04/24 0809)  Exam:   Physical Exam  HENT:      Head: Normocephalic.      Mouth/Throat:      Mouth: Mucous membranes are moist.   Eyes:      Extraocular Movements: Extraocular movements intact.   Cardiovascular:      Rate and Rhythm: Normal rate.   Pulmonary:      Effort: Pulmonary effort is normal. No respiratory distress.   Abdominal: "      Palpations: Abdomen is soft.      Tenderness: There is no abdominal tenderness.   Skin:     General: Skin is warm.      Comments: Improved right facial redness and swelling. No tenderness.   Neurological:      Mental Status: She is alert.   Psychiatric:         Mood and Affect: Mood normal.       Discharge instructions/Information to patient and family:   See after visit summary for information provided to patient and family.      Provisions for Follow-Up Care:  See after visit summary for information related to follow-up care and any pertinent home health orders.      Mobility at time of Discharge:   Basic Mobility Inpatient Raw Score: 22  -[facility] Goal: 7: Walk 25 feet or more  -HLM Achieved: 7: Walk 25 feet or more     Disposition:   Home    Discharge Statement:  I spent 35 minutes discharging the patient. This time was spent on the day of discharge. I had direct contact with the patient on the day of discharge. Greater than 50% of the total time was spent examining patient, answering all patient questions, arranging and discussing plan of care with patient as well as directly providing post-discharge instructions.  Additional time then spent on discharge activities.    Discharge Medications:  See after visit summary for reconciled discharge medications provided to patient and/or family.      **Please Note: This note may have been constructed using a voice recognition system**

## 2024-04-04 NOTE — DISCHARGE INSTR - AVS FIRST PAGE
Please continue taking all medications as prescribed. Please follow up with your primary medical doctor within 1 week of discharge. Please follow up with ENT in 1 to 2 weeks. You will also need follow up with cardiology. You would also benefit from evaluation with neurology in the outpatient setting. Diuretic therapy is currently on hold secondary to parotitis. If your lower extremity edema returns please resume your lasix therapy and let your cardiologist know. Please return to the nearest emergency department if you develop any signs or symptoms of worsening disease or infection, including but not limited to chest pain, shortness of breath, abdominal pain, lightheadedness, dizziness, palpitations, fevers or chills.     ENT is recommending outpatient ultrasound and biopsy of your thyroid nodules. Please follow up with ENT for this.

## 2024-04-04 NOTE — NURSING NOTE
Pt d/c to home. Pt transported via wheelchair to lobby. IV removed per policy and procedure, occlusive dressing applied. AVS reviewed with patient and daughter, all questions answered. Pt left with all belongings.

## 2024-04-04 NOTE — CASE MANAGEMENT
Case Management Discharge Planning Note    Patient name Danyelle Martinez  Location 2 [facility] 219/2 [facility] 219 B MRN [id number]  : 1940 Date 2024       Current Admission Date: 3/30/2024  Current Admission Diagnosis:Parotitis, acute   Patient Active Problem List    Diagnosis Date Noted    Parotitis, acute 2024    Facial cellulitis 2024    Change in mental status 2024    Cigarette nicotine dependence in remission 2024    Chronic diastolic CHF (congestive heart failure) (HCC) 2024    Leukocytosis 2024    Closed wedge compression fracture of T1 vertebra with routine healing 2023    Enlarged thyroid 2023    Overflow incontinence of urine 2023    Fall 11/15/2023    Syncope, cardiogenic 11/15/2023    Recurrent major depressive disorder, in remission ([facility]) 2023    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    SVT (supraventricular tachycardia) 2023    Osteoarthritis of right knee 2023    Elevated liver enzymes 2023    Type 2 diabetes mellitus, without long-term current use of insulin ([facility]) 2023    Dyspepsia 2022    Frequent falls 2022    Localized swelling of right lower leg 2022    Anxiety 2022    Gastroesophageal reflux disease without esophagitis 10/12/2022    Nocturnal hypoxemia 2021    Acquired hypothyroidism 2021    Thyroid nodule 2021    Palpitations 2021    Tremor 2021    History of transient ischemic attack (TIA) 2021    Essential hypertension 2021    Dyslipidemia 2021    Seasonal allergic rhinitis due to pollen 2021    Chronic hypoxemic respiratory failure (HCC) 2020    Dizziness 2020    Shortness of breath on exertion       LOS (days): 5  Geometric Mean LOS (GMLOS) (days): 3.6  Days to GMLOS:-1.2     OBJECTIVE:  Risk of Unplanned Readmission Score: 17.19         Current admission status: Inpatient   Preferred Pharmacy:   [facility] #[id number] - [place], [place] - [address] [place] [id number]  Phone: [phone] Fax: [phone]    Primary Care Provider: Bladimir Caraballo MD    Primary Insurance: MEDICARE  Secondary Insurance: AARP    DISCHARGE DETAILS:    Discharge planning discussed with:: Dtr Danyelle     Other Referral/Resources/Interventions Provided:  Referral Comments: CESILIA spoke with dtr Danyelle over phone, introduced self and role. CM discussed discharge planning and made Danyelle aware that CM offered home rehab to patient and she refused, Danyelle mentioned her mother has a strange sleep schedule and can sleep during daytime and therefore does not prefer to have anyone to visit. CM brought up the topic of BRANDI and mentioned that patient had mentioned about it and also informed that CM left a pamphlet with patient at bedside regarding the same and also provided Danyelle with the contact number for Katt  who is a Certified  for [facility]. Medicaid application was discussed and CM informed Danyelle that the Medicaid application can be filled online and would be an easier option than filling and mailing a paper application. CM met with patient at bedside to encourage home rehab, patient refused again and informed CM that her dtr arranged for someone named Marichuy to come to her house to care for her. Patient brought up the discussion about Medicaid and she was not sure if she really wants to give up her existing insurance plan which she is really happy with and replace it with Medicaid. CM encouraged atleast filling out the application to see if she qualifies for it, and she could probably benefit from more services in home if approved. Patient stated she will think about it. Danyelle has requested patient be evaluated for portable O2. CM spoke with GRAYSON Mckinney regarding the same and was informed that patient's Oxygen level was 92 % with ambulation. Tee at [facility] made aware and CM was  informed  that patient does not qualify for portable. Patient made aware.     Treatment Team Recommendation: Home with Home Health Care  Discharge Destination Plan:: Home  Transport at Discharge : Family      IMM Given (Date):: 04/03/24 (IMM was reviewed with patient, patient verbalized understanding of IMM and signed the IMM. A copy was provided to patient and a copy placed in scan bin for chart.)  IMM Given to:: Patient

## 2024-04-04 NOTE — CASE MANAGEMENT
Case Management Discharge Planning Note    Patient name Danyelle Martinez  Location 2 [facility] 219/2 [facility] 219 B MRN [id number]  : 1940 Date 2024       Current Admission Date: 3/30/2024  Current Admission Diagnosis:Parotitis, acute   Patient Active Problem List    Diagnosis Date Noted    Parotitis, acute 2024    Facial cellulitis 2024    Change in mental status 2024    Cigarette nicotine dependence in remission 2024    Chronic diastolic CHF (congestive heart failure) (HCC) 2024    Leukocytosis 2024    Closed wedge compression fracture of T1 vertebra with routine healing 2023    Enlarged thyroid 2023    Overflow incontinence of urine 2023    Fall 11/15/2023    Syncope, cardiogenic 11/15/2023    Recurrent major depressive disorder, in remission ([facility]) 2023    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    SVT (supraventricular tachycardia) 2023    Osteoarthritis of right knee 2023    Elevated liver enzymes 2023    Type 2 diabetes mellitus, without long-term current use of insulin ([facility]) 2023    Dyspepsia 2022    Frequent falls 2022    Localized swelling of right lower leg 2022    Anxiety 2022    Gastroesophageal reflux disease without esophagitis 10/12/2022    Nocturnal hypoxemia 2021    Acquired hypothyroidism 2021    Thyroid nodule 2021    Palpitations 2021    Tremor 2021    History of transient ischemic attack (TIA) 2021    Essential hypertension 2021    Dyslipidemia 2021    Seasonal allergic rhinitis due to pollen 2021    Chronic hypoxemic respiratory failure (HCC) 2020    Dizziness 2020    Shortness of breath on exertion       LOS (days): 5  Geometric Mean LOS (GMLOS) (days): 3.6  Days to GMLOS:-1.3     OBJECTIVE:  Risk of Unplanned Readmission Score: 17.19         Current admission status: Inpatient   Preferred Pharmacy:   [facility]  [facility]ORE #[id number] - [address] [address]  Phone: [phone] Fax: [phone]    Primary Care Provider: Bladimir Caraballo MD    Primary Insurance: MEDICARE  Secondary Insurance: AARP    DISCHARGE DETAILS:    Other Referral/Resources/Interventions Provided:  Referral Comments: CM spoje with patient this afternoon if she had any questions, concerns about her discharge and reviewed again her Medicare rights. Patient asked if she had to stay in the hospital until her medicaid application was completed. CM explained to her again that the Medicaid application requires patient's financial information which she and her family would have so it needs to be filled by her with her family  and that does not require patient to stay in the hospital. Patient did verbalize understanding. CM asked patient if she felt safe to go home or if prefered to go to a facility. Patient stated she feels safe to go home at the moment but will eventually need to go to assisted living. CM reminded patient about the information provided to her for [facility] and to contact them as needed.     Treatment Team Recommendation: Home with Home Health Care  Discharge Destination Plan:: Home (Patient refused home rehab)  Transport at Discharge : Family

## 2024-04-05 ENCOUNTER — TRANSITIONAL CARE MANAGEMENT (OUTPATIENT)
Dept: FAMILY MEDICINE CLINIC | Facility: CLINIC | Age: 84
End: 2024-04-05

## 2024-04-05 LAB
BACTERIA BLD CULT: NORMAL
BACTERIA BLD CULT: NORMAL

## 2024-04-08 NOTE — PROGRESS NOTES
Office Visit Note  24     Danyelle Martinez 83 y.o. female MRN: [id number]  : 1940    Assessment:     {There are no diagnoses linked to this encounter. (Refresh or delete this SmartLink)}           Discussion Summary and Plan:  Today's care plan and medications were reviewed with patient in detail and all their questions answered to their satisfaction.    No chief complaint on file.     Subjective:  HPI    The following portions of the patient's history were reviewed and updated as appropriate: allergies, current medications, past family history, past medical history, past social history, past surgical history and problem list.    Review of Systems   Constitutional:  Negative for chills and fever.   HENT:  Negative for ear pain and sore throat.    Eyes:  Negative for pain and visual disturbance.   Respiratory:  Negative for cough and shortness of breath.    Cardiovascular:  Negative for chest pain and palpitations.   Gastrointestinal:  Negative for abdominal pain and vomiting.   Genitourinary:  Negative for dysuria and hematuria.   Musculoskeletal:  Negative for arthralgias and back pain.   Skin:  Negative for color change and rash.   Neurological:  Negative for seizures and syncope.   All other systems reviewed and are negative.      Historical Information   Patient Active Problem List   Diagnosis   • Shortness of breath on exertion   • Dizziness   • Chronic hypoxemic respiratory failure (HCC)   • Seasonal allergic rhinitis due to pollen   • Essential hypertension   • Dyslipidemia   • History of transient ischemic attack (TIA)   • Acquired hypothyroidism   • Thyroid nodule   • Palpitations   • Tremor   • Nocturnal hypoxemia   • Gastroesophageal reflux disease without esophagitis   • Localized swelling of right lower leg   • Anxiety   • Frequent falls   • Dyspepsia   • SVT (supraventricular tachycardia)   • Osteoarthritis of right knee   • Elevated liver enzymes   • Type 2 diabetes mellitus, without  long-term current use of insulin ([facility])   • Asymmetrical hearing loss   • Recurrent major depressive disorder, in remission ([facility])   • Fall   • Syncope, cardiogenic   • Overflow incontinence of urine   • Enlarged thyroid   • Closed wedge compression fracture of T1 vertebra with routine healing   • Chronic diastolic CHF (congestive heart failure) ([facility])   • Leukocytosis   • Cigarette nicotine dependence in remission   • Change in mental status   • Parotitis, acute   • Facial cellulitis     Past Medical History:   Diagnosis Date   • Anxiety    • Arthritis     r knee and shoulders   • Blind left eye    • CHF (congestive heart failure) ([facility])    • Deaf, left    • Depression    • Diabetes mellitus ([facility])    • Disease of thyroid gland    • DVT complicating pregnancy, unspecified trimester ([facility]) postpartum   • Hearing loss     LEFT EAR   • History of shingles 2007    fACIAL    • Hyperlipidemia    • Hypertension    • Liver disease    • Lyme disease    • Meniere's disease    • Obesity    • Orthostatic hypotension    • Psychiatric problem    • Sinus congestion    • Sleep apnea    • Stroke ([facility])      Past Surgical History:   Procedure Laterality Date   • APPENDECTOMY     • BREAST BIOPSY Left 2010   •  SECTION      x2   • DXA PROCEDURE (HISTORICAL)  10/28/2020   • EYE SURGERY     • HAND SURGERY Left    • HERNIA REPAIR     • HYSTERECTOMY     • ROTATOR CUFF REPAIR Left    • TONSILLECTOMY     • TYMPANOSTOMY TUBE PLACEMENT       Social History     Substance and Sexual Activity   Alcohol Use Not Currently     Social History     Substance and Sexual Activity   Drug Use Never     Social History     Tobacco Use   Smoking Status Former   • Current packs/day: 0.00   • Average packs/day: 0.8 packs/day for 44.0 years (33.0 ttl pk-yrs)   • Types: Cigarettes   • Start date: 1946   • Quit date: 1990   • Years since quittin.2   • Passive exposure: Past   Smokeless Tobacco Never     Family History   Problem Relation Age of  Onset   • Depression Mother    • Hypertension Mother    • Obesity Mother    • Depression Father    • Alcohol abuse Father    • Stroke Father    • Breast cancer Sister 68   • Ovarian cancer Daughter 53   • BRCA1 Negative Daughter    • BRCA2 Negative Daughter      Health Maintenance Due   Topic   • Pneumococcal Vaccine: 65+ Years (1 of 2 - PCV)   • DM Eye Exam    • Depression Follow-up Plan    • Hepatitis A Vaccine (1 of 2 - Risk 2-dose series)   • Zoster Vaccine (1 of 2)   • COVID-19 Vaccine (5 - 2023-24 season)   • Diabetic Foot Exam    • Fall Risk    • Medicare Annual Wellness Visit (AWV)       Meds/Allergies       Current Outpatient Medications:   •  atorvastatin (LIPITOR) 80 mg tablet, TAKE 1 TABLET BY MOUTH DAILY AT BEDTIME (Patient taking differently: Taking at morning), Disp: 90 tablet, Rfl: 1  •  bisoprolol (ZEBETA) 5 mg tablet, Take 0.5 tablets (2.5 mg total) by mouth daily, Disp: 45 tablet, Rfl: 1  •  Calcium Carbonate-Vit D-Min (CALCIUM 1200 PO), Take 1,200 mg by mouth daily, Disp: , Rfl:   •  cefuroxime (CEFTIN) 500 mg tablet, Take 1 tablet (500 mg total) by mouth every 12 (twelve) hours for 9 days, Disp: 18 tablet, Rfl: 0  •  cetirizine (ZyrTEC) 10 mg tablet, Take 10 mg by mouth daily, Disp: , Rfl:   •  clonazePAM (KlonoPIN) 0.5 mg tablet, Take 1 tablet (0.5 mg total) by mouth daily at bedtime, Disp: 30 tablet, Rfl: 1  •  clopidogrel (PLAVIX) 75 mg tablet, TAKE 1 TABLET(75 MG) BY MOUTH DAILY, Disp: 30 tablet, Rfl: 5  •  DULoxetine (CYMBALTA) 60 mg delayed release capsule, TAKE 1 CAPSULE(60 MG) BY MOUTH DAILY, Disp: 90 capsule, Rfl: 1  •  levothyroxine 88 mcg tablet, Take 1 tablet (88 mcg total) by mouth daily, Disp: 90 tablet, Rfl: 1  •  metroNIDAZOLE (FLAGYL) 500 mg tablet, Take 1 tablet (500 mg total) by mouth every 8 (eight) hours for 9 days, Disp: 27 tablet, Rfl: 0  •  midodrine (PROAMATINE) 5 mg tablet, Take 1 tablet (5 mg total) by mouth 2 (two) times a day before meals, Disp: 60 tablet, Rfl: 0  •   mometasone (NASONEX) 50 mcg/act nasal spray, 1 spray into each nostril daily, Disp: , Rfl:   •  Multiple Vitamins-Minerals (PreserVision AREDS) TABS, Take by mouth, Disp: , Rfl:   •  pantoprazole (PROTONIX) 40 mg tablet, TAKE 1 TABLET(40 MG) BY MOUTH EVERY MORNING, Disp: 90 tablet, Rfl: 1  •  primidone (MYSOLINE) 50 mg tablet, TAKE 1 TABLET(50 MG) BY MOUTH DAILY, Disp: 90 tablet, Rfl: 1      Objective:    Vitals:   There were no vitals taken for this visit.  There is no height or weight on file to calculate BMI.  There were no vitals filed for this visit.    Physical Exam    Lab Review   No results displayed because visit has over 200 results.      Admission on 03/25/2024, Discharged on 03/25/2024   Component Date Value Ref Range Status   • Sodium 03/25/2024 136  135 - 147 mmol/L Final   • Potassium 03/25/2024 4.0  3.5 - 5.3 mmol/L Final   • Chloride 03/25/2024 99  96 - 108 mmol/L Final   • CO2 03/25/2024 30  21 - 32 mmol/L Final   • ANION GAP 03/25/2024 7  4 - 13 mmol/L Final   • BUN 03/25/2024 22  5 - 25 mg/dL Final   • Creatinine 03/25/2024 0.74  0.60 - 1.30 mg/dL Final    Standardized to IDMS reference method   • Glucose 03/25/2024 183 (H)  65 - 140 mg/dL Final    If the patient is fasting, the ADA then defines impaired fasting glucose as > 100 mg/dL and diabetes as > or equal to 123 mg/dL.   • Calcium 03/25/2024 10.4 (H)  8.4 - 10.2 mg/dL Final   • AST 03/25/2024 38  13 - 39 U/L Final   • ALT 03/25/2024 26  7 - 52 U/L Final    Specimen collection should occur prior to Sulfasalazine administration due to the potential for falsely depressed results.    • Alkaline Phosphatase 03/25/2024 80  34 - 104 U/L Final   • Total Protein 03/25/2024 7.2  6.4 - 8.4 g/dL Final   • Albumin 03/25/2024 4.0  3.5 - 5.0 g/dL Final   • Total Bilirubin 03/25/2024 0.50  0.20 - 1.00 mg/dL Final    Use of this assay is not recommended for patients undergoing treatment with eltrombopag due to the potential for falsely elevated  results.  N-acetyl-p-benzoquinone imine (metabolite of Acetaminophen) will generate erroneously low results in samples for patients that have taken an overdose of Acetaminophen.   • eGFR 03/25/2024 74  ml/min/1.73sq m Final   • WBC 03/25/2024 9.91  4.31 - 10.16 Thousand/uL Final   • RBC 03/25/2024 4.16  3.81 - 5.12 Million/uL Final   • Hemoglobin 03/25/2024 12.5  11.5 - 15.4 g/dL Final   • Hematocrit 03/25/2024 38.6  34.8 - 46.1 % Final   • MCV 03/25/2024 93  82 - 98 fL Final   • MCH 03/25/2024 30.0  26.8 - 34.3 pg Final   • MCHC 03/25/2024 32.4  31.4 - 37.4 g/dL Final   • RDW 03/25/2024 16.0 (H)  11.6 - 15.1 % Final   • MPV 03/25/2024 11.1  8.9 - 12.7 fL Final   • Platelets 03/25/2024 250  149 - 390 Thousands/uL Final   • nRBC 03/25/2024 0  /100 WBCs Final   • Neutrophils Relative 03/25/2024 68  43 - 75 % Final   • Immature Grans % 03/25/2024 0  0 - 2 % Final   • Lymphocytes Relative 03/25/2024 20  14 - 44 % Final   • Monocytes Relative 03/25/2024 7  4 - 12 % Final   • Eosinophils Relative 03/25/2024 4  0 - 6 % Final   • Basophils Relative 03/25/2024 1  0 - 1 % Final   • Neutrophils Absolute 03/25/2024 6.75  1.85 - 7.62 Thousands/µL Final   • Absolute Immature Grans 03/25/2024 0.04  0.00 - 0.20 Thousand/uL Final   • Absolute Lymphocytes 03/25/2024 1.99  0.60 - 4.47 Thousands/µL Final   • Absolute Monocytes 03/25/2024 0.64  0.17 - 1.22 Thousand/µL Final   • Eosinophils Absolute 03/25/2024 0.42  0.00 - 0.61 Thousand/µL Final   • Basophils Absolute 03/25/2024 0.07  0.00 - 0.10 Thousands/µL Final   • Total CK 03/25/2024 129  26 - 192 U/L Final   Admission on 03/23/2024, Discharged on 03/24/2024   Component Date Value Ref Range Status   • WBC 03/23/2024 9.36  4.31 - 10.16 Thousand/uL Final   • RBC 03/23/2024 4.63  3.81 - 5.12 Million/uL Final   • Hemoglobin 03/23/2024 13.6  11.5 - 15.4 g/dL Final   • Hematocrit 03/23/2024 44.0  34.8 - 46.1 % Final   • MCV 03/23/2024 95  82 - 98 fL Final   • MCH 03/23/2024 29.4  26.8 -  34.3 pg Final   • MCHC 03/23/2024 30.9 (L)  31.4 - 37.4 g/dL Final   • RDW 03/23/2024 16.0 (H)  11.6 - 15.1 % Final   • MPV 03/23/2024 11.2  8.9 - 12.7 fL Final   • Platelets 03/23/2024 259  149 - 390 Thousands/uL Final   • nRBC 03/23/2024 0  /100 WBCs Final   • Neutrophils Relative 03/23/2024 74  43 - 75 % Final   • Immature Grans % 03/23/2024 0  0 - 2 % Final   • Lymphocytes Relative 03/23/2024 15  14 - 44 % Final   • Monocytes Relative 03/23/2024 5  4 - 12 % Final   • Eosinophils Relative 03/23/2024 5  0 - 6 % Final   • Basophils Relative 03/23/2024 1  0 - 1 % Final   • Neutrophils Absolute 03/23/2024 6.87  1.85 - 7.62 Thousands/µL Final   • Absolute Immature Grans 03/23/2024 0.04  0.00 - 0.20 Thousand/uL Final   • Absolute Lymphocytes 03/23/2024 1.42  0.60 - 4.47 Thousands/µL Final   • Absolute Monocytes 03/23/2024 0.48  0.17 - 1.22 Thousand/µL Final   • Eosinophils Absolute 03/23/2024 0.47  0.00 - 0.61 Thousand/µL Final   • Basophils Absolute 03/23/2024 0.08  0.00 - 0.10 Thousands/µL Final   • Sodium 03/23/2024 142  135 - 147 mmol/L Final   • Potassium 03/23/2024 4.1  3.5 - 5.3 mmol/L Final   • Chloride 03/23/2024 102  96 - 108 mmol/L Final   • CO2 03/23/2024 33 (H)  21 - 32 mmol/L Final   • ANION GAP 03/23/2024 7  4 - 13 mmol/L Final   • BUN 03/23/2024 20  5 - 25 mg/dL Final   • Creatinine 03/23/2024 0.67  0.60 - 1.30 mg/dL Final    Standardized to IDMS reference method   • Glucose 03/23/2024 128  65 - 140 mg/dL Final    If the patient is fasting, the ADA then defines impaired fasting glucose as > 100 mg/dL and diabetes as > or equal to 123 mg/dL.   • Calcium 03/23/2024 10.8 (H)  8.4 - 10.2 mg/dL Final   • AST 03/23/2024 42 (H)  13 - 39 U/L Final   • ALT 03/23/2024 27  7 - 52 U/L Final    Specimen collection should occur prior to Sulfasalazine administration due to the potential for falsely depressed results.    • Alkaline Phosphatase 03/23/2024 86  34 - 104 U/L Final   • Total Protein 03/23/2024 7.3  6.4 -  "8.4 g/dL Final   • Albumin 03/23/2024 4.3  3.5 - 5.0 g/dL Final   • Total Bilirubin 03/23/2024 0.51  0.20 - 1.00 mg/dL Final    Use of this assay is not recommended for patients undergoing treatment with eltrombopag due to the potential for falsely elevated results.  N-acetyl-p-benzoquinone imine (metabolite of Acetaminophen) will generate erroneously low results in samples for patients that have taken an overdose of Acetaminophen.   • eGFR 03/23/2024 80  ml/min/1.73sq m Final   • hs TnI 0hr 03/23/2024 5  \"Refer to ACS Flowchart\"- see link ng/L Final    Comment:                                              Initial (time 0) result  If >=50 ng/L, Myocardial injury suggested ;  Type of myocardial injury and treatment strategy  to be determined.  If 5-49 ng/L, a delta result at 2 hours and or 4 hours will be needed to further evaluate.  If <4 ng/L, and chest pain has been >3 hours since onset, patient may qualify for discharge based on the HEART score in the ED.  If <5 ng/L and <3hours since onset of chest pain, a delta result at 2 hours will be needed to further evaluate.    HS Troponin 99th Percentile URL of a Health Population=12 ng/L with a 95% Confidence Interval of 8-18 ng/L.    Second Troponin (time 2 hours)  If calculated delta >= 20 ng/L,  Myocardial injury suggested ; Type of myocardial injury and treatment strategy to be determined.  If 5-49 ng/L and the calculated delta is 5-19 ng/L, consult medical service for evaluation.  Continue evaluation for ischemia on ecg and other possible etiology and repeat hs troponin at 4 hours.  If delta                            is <5 ng/L at 2 hours, consider discharge based on risk stratification via the HEART score (if in ED), or TYLER risk score in IP/Observation.    HS Troponin 99th Percentile URL of a Health Population=12 ng/L with a 95% Confidence Interval of 8-18 ng/L.   • BNP 03/23/2024 114 (H)  0 - 100 pg/mL Final   • SARS-CoV-2 03/23/2024 Negative  Negative Final " "  • INFLUENZA A PCR 03/23/2024 Negative  Negative Final   • INFLUENZA B PCR 03/23/2024 Negative  Negative Final   • RSV PCR 03/23/2024 Negative  Negative Final   • hs TnI 2hr 03/23/2024 5  \"Refer to ACS Flowchart\"- see link ng/L Final    Comment:                                              Initial (time 0) result  If >=50 ng/L, Myocardial injury suggested ;  Type of myocardial injury and treatment strategy  to be determined.  If 5-49 ng/L, a delta result at 2 hours and or 4 hours will be needed to further evaluate.  If <4 ng/L, and chest pain has been >3 hours since onset, patient may qualify for discharge based on the HEART score in the ED.  If <5 ng/L and <3hours since onset of chest pain, a delta result at 2 hours will be needed to further evaluate.    HS Troponin 99th Percentile URL of a Health Population=12 ng/L with a 95% Confidence Interval of 8-18 ng/L.    Second Troponin (time 2 hours)  If calculated delta >= 20 ng/L,  Myocardial injury suggested ; Type of myocardial injury and treatment strategy to be determined.  If 5-49 ng/L and the calculated delta is 5-19 ng/L, consult medical service for evaluation.  Continue evaluation for ischemia on ecg and other possible etiology and repeat hs troponin at 4 hours.  If delta                            is <5 ng/L at 2 hours, consider discharge based on risk stratification via the HEART score (if in ED), or TYLER risk score in IP/Observation.    HS Troponin 99th Percentile URL of a Health Population=12 ng/L with a 95% Confidence Interval of 8-18 ng/L.   • Delta 2hr hsTnI 03/23/2024 0  <20 ng/L Final   • Ventricular Rate 03/23/2024 59  BPM Final   • Atrial Rate 03/23/2024 59  BPM Final   • CO Interval 03/23/2024 164  ms Final   • QRSD Interval 03/23/2024 90  ms Final   • QT Interval 03/23/2024 452  ms Final   • QTC Interval 03/23/2024 447  ms Final   • P Axis 03/23/2024 43  degrees Final   • QRS Axis 03/23/2024 27  degrees Final   • T Wave Axis 03/23/2024 56  degrees " Final   Appointment on 03/15/2024   Component Date Value Ref Range Status   • WBC 03/15/2024 10.41 (H)  4.31 - 10.16 Thousand/uL Final   • RBC 03/15/2024 4.17  3.81 - 5.12 Million/uL Final   • Hemoglobin 03/15/2024 12.1  11.5 - 15.4 g/dL Final   • Hematocrit 03/15/2024 38.8  34.8 - 46.1 % Final   • MCV 03/15/2024 93  82 - 98 fL Final   • MCH 03/15/2024 29.0  26.8 - 34.3 pg Final   • MCHC 03/15/2024 31.2 (L)  31.4 - 37.4 g/dL Final   • RDW 03/15/2024 16.0 (H)  11.6 - 15.1 % Final   • MPV 03/15/2024 11.1  8.9 - 12.7 fL Final   • Platelets 03/15/2024 226  149 - 390 Thousands/uL Final   • nRBC 03/15/2024 0  /100 WBCs Final   • Neutrophils Relative 03/15/2024 71  43 - 75 % Final   • Immature Grans % 03/15/2024 0  0 - 2 % Final   • Lymphocytes Relative 03/15/2024 17  14 - 44 % Final   • Monocytes Relative 03/15/2024 6  4 - 12 % Final   • Eosinophils Relative 03/15/2024 5  0 - 6 % Final   • Basophils Relative 03/15/2024 1  0 - 1 % Final   • Neutrophils Absolute 03/15/2024 7.40  1.85 - 7.62 Thousands/µL Final   • Absolute Immature Grans 03/15/2024 0.04  0.00 - 0.20 Thousand/uL Final   • Absolute Lymphocytes 03/15/2024 1.75  0.60 - 4.47 Thousands/µL Final   • Absolute Monocytes 03/15/2024 0.67  0.17 - 1.22 Thousand/µL Final   • Eosinophils Absolute 03/15/2024 0.47  0.00 - 0.61 Thousand/µL Final   • Basophils Absolute 03/15/2024 0.08  0.00 - 0.10 Thousands/µL Final   • Sodium 03/15/2024 137  135 - 147 mmol/L Final   • Potassium 03/15/2024 4.2  3.5 - 5.3 mmol/L Final   • Chloride 03/15/2024 100  96 - 108 mmol/L Final   • CO2 03/15/2024 30  21 - 32 mmol/L Final   • ANION GAP 03/15/2024 7  4 - 13 mmol/L Final   • BUN 03/15/2024 26 (H)  5 - 25 mg/dL Final   • Creatinine 03/15/2024 0.70  0.60 - 1.30 mg/dL Final    Standardized to IDMS reference method   • Glucose, Fasting 03/15/2024 107 (H)  65 - 99 mg/dL Final   • Calcium 03/15/2024 10.0  8.4 - 10.2 mg/dL Final   • AST 03/15/2024 40 (H)  13 - 39 U/L Final   • ALT 03/15/2024 25  7  - 52 U/L Final    Specimen collection should occur prior to Sulfasalazine administration due to the potential for falsely depressed results.    • Alkaline Phosphatase 03/15/2024 87  34 - 104 U/L Final   • Total Protein 03/15/2024 7.0  6.4 - 8.4 g/dL Final   • Albumin 03/15/2024 3.9  3.5 - 5.0 g/dL Final   • Total Bilirubin 03/15/2024 0.69  0.20 - 1.00 mg/dL Final    Use of this assay is not recommended for patients undergoing treatment with eltrombopag due to the potential for falsely elevated results.  N-acetyl-p-benzoquinone imine (metabolite of Acetaminophen) will generate erroneously low results in samples for patients that have taken an overdose of Acetaminophen.   • eGFR 03/15/2024 79  ml/min/1.73sq m Final   • Hemoglobin A1C 03/15/2024 6.2 (H)  Normal 4.0-5.6%; PreDiabetic 5.7-6.4%; Diabetic >=6.5%; Glycemic control for adults with diabetes <7.0% % Final   • EAG 03/15/2024 131  mg/dl Final   • Cholesterol 03/15/2024 151  See Comment mg/dL Final    Cholesterol:         Pediatric <18 Years        Desirable          <170 mg/dL      Borderline High    170-199 mg/dL      High               >=200 mg/dL        Adult >=18 Years            Desirable         <200 mg/dL      Borderline High   200-239 mg/dL      High              >239 mg/dL     • Triglycerides 03/15/2024 167 (H)  See Comment mg/dL Final    Triglyceride:     0-9Y            <75mg/dL     10Y-17Y         <90 mg/dL       >=18Y     Normal          <150 mg/dL     Borderline High 150-199 mg/dL     High            200-499 mg/dL        Very High       >499 mg/dL    Specimen collection should occur prior to Metamizole administration due to the potential for falsely depressed results.   • HDL, Direct 03/15/2024 32 (L)  >=50 mg/dL Final   • LDL Calculated 03/15/2024 86  0 - 100 mg/dL Final    LDL Cholesterol:     Optimal           <100 mg/dl     Near Optimal      100-129 mg/dl     Above Optimal       Borderline High 130-159 mg/dl       High            160-189 mg/dl        Very High       >189 mg/dl         This screening LDL is a calculated result.   It does not have the accuracy of the Direct Measured LDL in the monitoring of patients with hyperlipidemia and/or statin therapy.   Direct Measure LDL (GTE717) must be ordered separately in these patients.   • Non-HDL-Chol (CHOL-HDL) 03/15/2024 119  mg/dl Final   • TSH 3RD GENERATON 03/15/2024 0.633  0.450 - 4.500 uIU/mL Final    The recommended reference ranges for TSH during pregnancy are as follows:   First trimester 0.100 to 2.500 uIU/mL   Second trimester  0.200 to 3.000 uIU/mL   Third trimester 0.300 to 3.000 uIU/m    Note: Normal ranges may not apply to patients who are transgender, non-binary, or whose legal sex, sex at birth, and gender identity differ.  Adult TSH (3rd generation) reference range follows the recommended guidelines of the American Thy[employer] Association, January, 2020.   • Color, UA 03/15/2024 Yellow   Final   • Clarity, UA 03/15/2024 Clear   Final   • Specific Gravity, UA 03/15/2024 1.015  1.000 - 1.030 Final   • pH, UA 03/15/2024 6.0  5.0, 5.5, 6.0, 6.5, 7.0, 7.5, 8.0, 8.5, 9.0 Final   • Leukocytes, UA 03/15/2024 Moderate (A)  Negative Final   • Nitrite, UA 03/15/2024 Negative  Negative Final   • Protein, UA 03/15/2024 Negative  Negative mg/dl Final   • Glucose, UA 03/15/2024 Negative  Negative mg/dl Final   • Ketones, UA 03/15/2024 Negative  Negative mg/dl Final   • Urobilinogen, UA 03/15/2024 0.2  0.2, 1.0 E.U./dl E.U./dl Final   • Bilirubin, UA 03/15/2024 Negative  Negative Final   • Occult Blood, UA 03/15/2024 Trace-Intact (A)  Negative Final   • Creatinine, Ur 03/15/2024 72.0  Reference range not established. mg/dL Final   • Albumin,U,Random 03/15/2024 14.0  <20.0 mg/L Final   • Albumin Creat Ratio 03/15/2024 19  0 - 30 mg/g creatinine Final   • RBC, UA 03/15/2024 0-1  None Seen, 0-1, 1-2, 2-4, 0-5 /hpf Final   • WBC, UA 03/15/2024 1-2  None Seen, 0-1, 1-2, 0-5, 2-4 /hpf Final   • Epithelial Cells  "03/15/2024 Occasional  None Seen, Occasional /hpf Final   • Bacteria, UA 03/15/2024 Occasional  None Seen, Occasional /hpf Final   • AMORPH URATES 03/15/2024 Occasional  /hpf Final   • OTHER OBSERVATIONS 03/15/2024 Transitional Epithelial Cells   Final    SEE OTHER OBSERVATIONS RESULTS!!!   Orders Only on 03/08/2024   Component Date Value Ref Range Status   • Supplier Name 03/08/2024 [employer]   In process   • Supplier Phone Number 03/08/2024 [phone]   In process   • Order Status 03/08/2024 Contacting Patient   In process   • Delivery Request Date 03/08/2024 03/08/2024   In process   • Item Description 03/08/2024 O2 with Portability for Existing O2 Patients, Standard Liter Flow   In process    Comment: Qty: 1  Nasal Cannula: 4 LPM 24 hours a day     • Item Description 03/08/2024 New Portable Setup, Portable Oxygen Concentrator   In process    Comment: Qty: 1  Nasal Cannula: 4 LPM 24 hours a day     • Item Description 03/08/2024 No Portable Contents   In process    Qty: 1   Appointment on 02/24/2024   Component Date Value Ref Range Status   • Sodium 02/24/2024 139  135 - 147 mmol/L Final   • Potassium 02/24/2024 4.3  3.5 - 5.3 mmol/L Final   • Chloride 02/24/2024 100  96 - 108 mmol/L Final   • CO2 02/24/2024 33 (H)  21 - 32 mmol/L Final   • ANION GAP 02/24/2024 6  mmol/L Final   • BUN 02/24/2024 21  5 - 25 mg/dL Final   • Creatinine 02/24/2024 0.63  0.60 - 1.30 mg/dL Final    Standardized to IDMS reference method   • Glucose, Fasting 02/24/2024 116 (H)  65 - 99 mg/dL Final   • Calcium 02/24/2024 9.8  8.4 - 10.2 mg/dL Final   • eGFR 02/24/2024 82  ml/min/1.73sq m Final         Lorene Kingsley        \"This note has been constructed using a voice recognition system.Therefore there may be syntax, spelling, and/or grammatical errors. Please call if you have any questions. \"  "

## 2024-04-09 ENCOUNTER — OFFICE VISIT (OUTPATIENT)
Dept: FAMILY MEDICINE CLINIC | Facility: CLINIC | Age: 84
End: 2024-04-09
Payer: MEDICARE

## 2024-04-09 VITALS
OXYGEN SATURATION: 94 % | HEART RATE: 77 BPM | WEIGHT: 165.4 LBS | DIASTOLIC BLOOD PRESSURE: 70 MMHG | SYSTOLIC BLOOD PRESSURE: 136 MMHG | HEIGHT: 66 IN | BODY MASS INDEX: 26.58 KG/M2

## 2024-04-09 DIAGNOSIS — E04.1 THYROID NODULE: ICD-10-CM

## 2024-04-09 DIAGNOSIS — E78.5 DYSLIPIDEMIA: ICD-10-CM

## 2024-04-09 DIAGNOSIS — G47.34 NOCTURNAL HYPOXEMIA: ICD-10-CM

## 2024-04-09 DIAGNOSIS — Z86.73 HISTORY OF TRANSIENT ISCHEMIC ATTACK (TIA): ICD-10-CM

## 2024-04-09 DIAGNOSIS — E03.9 ACQUIRED HYPOTHYROIDISM: ICD-10-CM

## 2024-04-09 DIAGNOSIS — J96.11 CHRONIC HYPOXEMIC RESPIRATORY FAILURE (HCC): Chronic | ICD-10-CM

## 2024-04-09 DIAGNOSIS — K11.21 PAROTITIS, ACUTE: ICD-10-CM

## 2024-04-09 DIAGNOSIS — K21.9 GASTROESOPHAGEAL REFLUX DISEASE WITHOUT ESOPHAGITIS: ICD-10-CM

## 2024-04-09 DIAGNOSIS — F33.40 RECURRENT MAJOR DEPRESSIVE DISORDER, IN REMISSION (HCC): ICD-10-CM

## 2024-04-09 DIAGNOSIS — R55 SYNCOPE, CARDIOGENIC: ICD-10-CM

## 2024-04-09 DIAGNOSIS — I10 ESSENTIAL HYPERTENSION: ICD-10-CM

## 2024-04-09 DIAGNOSIS — R25.1 TREMOR: ICD-10-CM

## 2024-04-09 DIAGNOSIS — F41.9 ANXIETY: ICD-10-CM

## 2024-04-09 DIAGNOSIS — E11.9 TYPE 2 DIABETES MELLITUS WITHOUT COMPLICATION, WITHOUT LONG-TERM CURRENT USE OF INSULIN (HCC): ICD-10-CM

## 2024-04-09 DIAGNOSIS — I50.32 CHRONIC DIASTOLIC CHF (CONGESTIVE HEART FAILURE) (HCC): ICD-10-CM

## 2024-04-09 DIAGNOSIS — L03.211 FACIAL CELLULITIS: Primary | ICD-10-CM

## 2024-04-09 DIAGNOSIS — I47.10 SVT (SUPRAVENTRICULAR TACHYCARDIA): ICD-10-CM

## 2024-04-09 PROCEDURE — 99496 TRANSJ CARE MGMT HIGH F2F 7D: CPT | Performed by: INTERNAL MEDICINE

## 2024-04-09 NOTE — ASSESSMENT & PLAN NOTE
Patient with peritonitis and cellulitis no abscess formation CT findings noted ENT findings and recommendations noted

## 2024-04-09 NOTE — ASSESSMENT & PLAN NOTE
Lab Results   Component Value Date    HGBA1C 6.2 (H) 03/15/2024   Diet controlled not on any medication last A1c 6.2 recommend cut back on carbohydrate intake

## 2024-04-09 NOTE — ASSESSMENT & PLAN NOTE
Wt Readings from Last 3 Encounters:   04/09/24 75 kg (165 lb 6.4 oz)   04/04/24 73.8 kg (162 lb 12.8 oz)   03/25/24 75.8 kg (167 lb)   Patient had gained 3 pounds since she got discharged from the hospital.  Lasix was on hold as she was clinically noticed to have dehydration and was causing additional problems with peritonitis and patient received IV fluids while she was in the hospital however there was always a concern about volume overload.  Patient weight has gone up by 3 pounds I am recommending patient to take 20 mg of Lasix daily to check the blood pressures daily and if the weight goes up by 2 pounds to 8 take an extra 20 mg that is total of 40 mg on that day.

## 2024-04-09 NOTE — ASSESSMENT & PLAN NOTE
Patient cellulitis has resolved currently taking Ceftin and Flagyl orally for the next 4-1/2 days she was on Rocephin and Flagyl while she was in the hospital.  Examination is unremarkable no redness or swelling obviously seen at this time patient has responded well to the antibiotics.

## 2024-04-09 NOTE — ASSESSMENT & PLAN NOTE
Patient with episodes of syncope she does have blood pressure fluctuations and is on midodrine the dosage of which has been adjusted to 5 mg twice a day now she was on 10 mg before.

## 2024-04-09 NOTE — PROGRESS NOTES
Assessment & Plan     1. Facial cellulitis  Assessment & Plan:  Patient cellulitis has resolved currently taking Ceftin and Flagyl orally for the next 4-1/2 days she was on Rocephin and Flagyl while she was in the hospital.  Examination is unremarkable no redness or swelling obviously seen at this time patient has responded well to the antibiotics.      2. Parotitis, acute  Assessment & Plan:  Patient with peritonitis and cellulitis no abscess formation CT findings noted ENT findings and recommendations noted      3. Chronic diastolic CHF (congestive heart failure) ([facility])  Assessment & Plan:  Wt Readings from Last 3 Encounters:   04/09/24 75 kg (165 lb 6.4 oz)   04/04/24 73.8 kg (162 lb 12.8 oz)   03/25/24 75.8 kg (167 lb)   Patient had gained 3 pounds since she got discharged from the hospital.  Lasix was on hold as she was clinically noticed to have dehydration and was causing additional problems with peritonitis and patient received IV fluids while she was in the hospital however there was always a concern about volume overload.  Patient weight has gone up by 3 pounds I am recommending patient to take 20 mg of Lasix daily to check the blood pressures daily and if the weight goes up by 2 pounds to 8 take an extra 20 mg that is total of 40 mg on that day.              4. Syncope, cardiogenic  Assessment & Plan:  Patient with episodes of syncope she does have blood pressure fluctuations and is on midodrine the dosage of which has been adjusted to 5 mg twice a day now she was on 10 mg before.      5. Essential hypertension    6. SVT (supraventricular tachycardia)    7. Acquired hypothyroidism  Assessment & Plan:  Patient is on Synthroid that is levothyroxine 88 mcg daily will continue the same dose      8. Chronic hypoxemic respiratory failure (HCC)  Assessment & Plan:  Oxygen level has been reduced to 2 L continuous urine in the night also she was taking before 4 L      9. Dyslipidemia  Assessment & Plan:  Continue  statin atorvastatin 80 mg at bedtime      10. Gastroesophageal reflux disease without esophagitis  Assessment & Plan:  Continue pantoprazole      11. Nocturnal hypoxemia    12. Anxiety  Assessment & Plan:  Patient is currently taking Cymbalta 60 mg daily and she also has clonazepam at bedtime as needed      13. History of transient ischemic attack (TIA)  Assessment & Plan:  Continue Plavix and statin atorvastatin      14. Recurrent major depressive disorder, in remission (HCC)  Assessment & Plan:  Continue Cymbalta 60 mg daily      15. Thyroid nodule  Assessment & Plan:  Stable      16. Tremor  Assessment & Plan:  Patient on primidone      17. Type 2 diabetes mellitus without complication, without long-term current use of insulin ([facility])  Assessment & Plan:    Lab Results   Component Value Date    HGBA1C 6.2 (H) 03/15/2024   Diet controlled not on any medication last A1c 6.2 recommend cut back on carbohydrate intake           Subjective     Transitional Care Management Review:   Danyelle Martinez is a 83 y.o. female here for TCM follow up.     During the TCM phone call patient stated:  TCM Call       Date and time call was made  4/5/2024 10:03 AM    Hospital care reviewed  Records reviewed    Patient was hospitialized at  [facility]    Date of Admission  03/30/24    Date of discharge  04/04/24    Diagnosis  Parotitis,acute    Disposition  Home    Were the patients medications reviewed and updated  Yes    Current Symptoms  Swelling  She had an infection in Right side Salvary gland, which she was treated for in hospital and is currently continuing meds at home          TCM Call       Clinical progress swelling  Improving    Post hospital issues  None    Should patient be enrolled in anticoag monitoring?  No    Scheduled for follow up?  Yes  TCM appointment scheduled for 4/9/2024    Patients specialists  Neurologist; Other (comment)    Pulmonologist name  [facility]    Pulmonologist contact  #  [phone]    Endocrinologist name  [phone]    Neurologist name  Robyn Young MD    Neurologist contact #  [phone]    Other specialists names  Garretlin Santamariar    Other specialists contcat #  [phone]    Did you obtain your prescribed medications  Yes    Do you need help managing your prescriptions or medications  No    Is transportation to your appointment needed  No    I have advised the patient to call PCP with any new or worsening symptoms  Carla Valadez,     Living Arrangements  Alone    Support System  Children; Friends    The type of support provided  Emotional; Physical    Do you have social support  Yes, as much as I need    Are you recieving any outpatient services  No    What type of services  Home nursing and Physical Therapy    Are you recieving home care services  No    Are you using any community resources  No    Current waiver services  No    Have you fallen in the last 12 months  Yes    How many times  3    Interperter language line needed  No    Counseling  Patient; Family    Counseling topics  Diagnostic results; Prognosis; Activities of daily living; instructions for management; patient and family education; Importance of RX compliance; Risk factor reduction; Home health agency benefits    Comments  No comments at this time          Patient is coming here for a follow-up evaluation after she got discharged from the hospital on April 4 after she was treated for acute parotitis and cellulitis on the right side of the face.  She had received IV antibiotic Rocephin and Flagyl and subsequently sent home on cefuroxime and and metronidazole.  Furosemide was kept on hold as they felt it was causing some dehydration adding to the problem however she was recommended to monitor her weight at home and resume after I have seen her in the office based on the findings.  Medications reviewed labs reviewed all the records from the hospital reviewed.      Review of  "Systems   Constitutional:  Negative for chills and fever.   HENT:  Negative for ear pain and sore throat.    Eyes:  Negative for pain and visual disturbance.   Respiratory:  Negative for cough and shortness of breath.    Cardiovascular:  Negative for chest pain and palpitations.   Gastrointestinal:  Negative for abdominal pain and vomiting.   Genitourinary:  Negative for dysuria and hematuria.   Musculoskeletal:  Negative for arthralgias and back pain.   Skin:  Negative for color change and rash.   Neurological:  Negative for seizures and syncope.   All other systems reviewed and are negative.      Objective     /70 (BP Location: Right arm, Patient Position: Sitting, Cuff Size: Standard)   Pulse 77   Ht 5' 6\" (1.676 m)   Wt 75 kg (165 lb 6.4 oz)   SpO2 94%   BMI 26.70 kg/m²      Physical Exam  Vitals and nursing note reviewed.   Constitutional:       General: She is not in acute distress.     Appearance: She is well-developed.   HENT:      Head: Normocephalic and atraumatic.      Comments: No redness noted in the parotid gland area on the right side  Eyes:      Conjunctiva/sclera: Conjunctivae normal.   Neck:      Thyroid: No thyroid mass or thyromegaly.   Cardiovascular:      Rate and Rhythm: Normal rate and regular rhythm.      Heart sounds: No murmur heard.  Pulmonary:      Effort: Pulmonary effort is normal. No respiratory distress.      Breath sounds: Normal breath sounds.   Abdominal:      Palpations: Abdomen is soft.      Tenderness: There is no abdominal tenderness.   Musculoskeletal:         General: No swelling.      Cervical back: Full passive range of motion without pain and neck supple.   Skin:     General: Skin is warm and dry.      Capillary Refill: Capillary refill takes less than 2 seconds.   Neurological:      Mental Status: She is alert.   Psychiatric:         Mood and Affect: Mood normal.       Recent Results (from the past 1344 hour(s))   Basic metabolic panel    Collection Time: " 02/24/24 10:54 AM   Result Value Ref Range    Sodium 139 135 - 147 mmol/L    Potassium 4.3 3.5 - 5.3 mmol/L    Chloride 100 96 - 108 mmol/L    CO2 33 (H) 21 - 32 mmol/L    ANION GAP 6 mmol/L    BUN 21 5 - 25 mg/dL    Creatinine 0.63 0.60 - 1.30 mg/dL    Glucose, Fasting 116 (H) 65 - 99 mg/dL    Calcium 9.8 8.4 - 10.2 mg/dL    eGFR 82 ml/min/1.73sq m   Portability Revision for Existing O2 Patients    Collection Time: 03/08/24  3:36 PM   Result Value Ref Range    Supplier Name [employer]     Supplier Phone Number [phone]     Order Status Contacting Patient     Delivery Note      Delivery Request Date 03/08/2024     Item Description       O2 with Portability for Existing O2 Patients, Standard Liter Flow    Item Description New Portable Setup, Portable Oxygen Concentrator     Item Description No Portable Contents    CBC and differential    Collection Time: 03/15/24  2:31 PM   Result Value Ref Range    WBC 10.41 (H) 4.31 - 10.16 Thousand/uL    RBC 4.17 3.81 - 5.12 Million/uL    Hemoglobin 12.1 11.5 - 15.4 g/dL    Hematocrit 38.8 34.8 - 46.1 %    MCV 93 82 - 98 fL    MCH 29.0 26.8 - 34.3 pg    MCHC 31.2 (L) 31.4 - 37.4 g/dL    RDW 16.0 (H) 11.6 - 15.1 %    MPV 11.1 8.9 - 12.7 fL    Platelets 226 149 - 390 Thousands/uL    nRBC 0 /100 WBCs    Segmented % 71 43 - 75 %    Immature Grans % 0 0 - 2 %    Lymphocytes % 17 14 - 44 %    Monocytes % 6 4 - 12 %    Eosinophils Relative 5 0 - 6 %    Basophils Relative 1 0 - 1 %    Absolute Neutrophils 7.40 1.85 - 7.62 Thousands/µL    Absolute Immature Grans 0.04 0.00 - 0.20 Thousand/uL    Absolute Lymphocytes 1.75 0.60 - 4.47 Thousands/µL    Absolute Monocytes 0.67 0.17 - 1.22 Thousand/µL    Eosinophils Absolute 0.47 0.00 - 0.61 Thousand/µL    Basophils Absolute 0.08 0.00 - 0.10 Thousands/µL   Comprehensive metabolic panel    Collection Time: 03/15/24  2:31 PM   Result Value Ref Range    Sodium 137 135 - 147 mmol/L    Potassium 4.2 3.5 - 5.3 mmol/L     Chloride 100 96 - 108 mmol/L    CO2 30 21 - 32 mmol/L    ANION GAP 7 4 - 13 mmol/L    BUN 26 (H) 5 - 25 mg/dL    Creatinine 0.70 0.60 - 1.30 mg/dL    Glucose, Fasting 107 (H) 65 - 99 mg/dL    Calcium 10.0 8.4 - 10.2 mg/dL    AST 40 (H) 13 - 39 U/L    ALT 25 7 - 52 U/L    Alkaline Phosphatase 87 34 - 104 U/L    Total Protein 7.0 6.4 - 8.4 g/dL    Albumin 3.9 3.5 - 5.0 g/dL    Total Bilirubin 0.69 0.20 - 1.00 mg/dL    eGFR 79 ml/min/1.73sq m   Hemoglobin A1C    Collection Time: 03/15/24  2:31 PM   Result Value Ref Range    Hemoglobin A1C 6.2 (H) Normal 4.0-5.6%; PreDiabetic 5.7-6.4%; Diabetic >=6.5%; Glycemic control for adults with diabetes <7.0% %     mg/dl   Lipid panel    Collection Time: 03/15/24  2:31 PM   Result Value Ref Range    Cholesterol 151 See Comment mg/dL    Triglycerides 167 (H) See Comment mg/dL    HDL, Direct 32 (L) >=50 mg/dL    LDL Calculated 86 0 - 100 mg/dL    Non-HDL-Chol (CHOL-HDL) 119 mg/dl   TSH, 3rd generation with Free T4 reflex    Collection Time: 03/15/24  2:31 PM   Result Value Ref Range    TSH 3RD GENERATON 0.633 0.450 - 4.500 uIU/mL   UA w Reflex to Microscopic w Reflex to Culture    Collection Time: 03/15/24  2:31 PM    Specimen: Urine, Clean Catch   Result Value Ref Range    Color, UA Yellow     Clarity, UA Clear     Specific Gravity, UA 1.015 1.000 - 1.030    pH, UA 6.0 5.0, 5.5, 6.0, 6.5, 7.0, 7.5, 8.0, 8.5, 9.0    Leukocytes, UA Moderate (A) Negative    Nitrite, UA Negative Negative    Protein, UA Negative Negative mg/dl    Glucose, UA Negative Negative mg/dl    Ketones, UA Negative Negative mg/dl    Urobilinogen, UA 0.2 0.2, 1.0 E.U./dl E.U./dl    Bilirubin, UA Negative Negative    Occult Blood, UA Trace-Intact (A) Negative   Albumin / creatinine urine ratio    Collection Time: 03/15/24  2:31 PM   Result Value Ref Range    Creatinine, Ur 72.0 Reference range not established. mg/dL    Albumin,U,Random 14.0 <20.0 mg/L    Albumin Creat Ratio 19 0 - 30 mg/g creatinine   Urine  Microscopic    Collection Time: 03/15/24  2:31 PM   Result Value Ref Range    RBC, UA 0-1 None Seen, 0-1, 1-2, 2-4, 0-5 /hpf    WBC, UA 1-2 None Seen, 0-1, 1-2, 0-5, 2-4 /hpf    Epithelial Cells Occasional None Seen, Occasional /hpf    Bacteria, UA Occasional None Seen, Occasional /hpf    AMORPH URATES Occasional /hpf    OTHER OBSERVATIONS Transitional Epithelial Cells    ECG 12 lead    Collection Time: 03/23/24  9:11 PM   Result Value Ref Range    Ventricular Rate 59 BPM    Atrial Rate 59 BPM    AK Interval 164 ms    QRSD Interval 90 ms    QT Interval 452 ms    QTC Interval 447 ms    P Axis 43 degrees    QRS Axis 27 degrees    T Wave Axis 56 degrees   CBC and differential    Collection Time: 03/23/24  9:13 PM   Result Value Ref Range    WBC 9.36 4.31 - 10.16 Thousand/uL    RBC 4.63 3.81 - 5.12 Million/uL    Hemoglobin 13.6 11.5 - 15.4 g/dL    Hematocrit 44.0 34.8 - 46.1 %    MCV 95 82 - 98 fL    MCH 29.4 26.8 - 34.3 pg    MCHC 30.9 (L) 31.4 - 37.4 g/dL    RDW 16.0 (H) 11.6 - 15.1 %    MPV 11.2 8.9 - 12.7 fL    Platelets 259 149 - 390 Thousands/uL    nRBC 0 /100 WBCs    Segmented % 74 43 - 75 %    Immature Grans % 0 0 - 2 %    Lymphocytes % 15 14 - 44 %    Monocytes % 5 4 - 12 %    Eosinophils Relative 5 0 - 6 %    Basophils Relative 1 0 - 1 %    Absolute Neutrophils 6.87 1.85 - 7.62 Thousands/µL    Absolute Immature Grans 0.04 0.00 - 0.20 Thousand/uL    Absolute Lymphocytes 1.42 0.60 - 4.47 Thousands/µL    Absolute Monocytes 0.48 0.17 - 1.22 Thousand/µL    Eosinophils Absolute 0.47 0.00 - 0.61 Thousand/µL    Basophils Absolute 0.08 0.00 - 0.10 Thousands/µL   Comprehensive metabolic panel    Collection Time: 03/23/24  9:13 PM   Result Value Ref Range    Sodium 142 135 - 147 mmol/L    Potassium 4.1 3.5 - 5.3 mmol/L    Chloride 102 96 - 108 mmol/L    CO2 33 (H) 21 - 32 mmol/L    ANION GAP 7 4 - 13 mmol/L    BUN 20 5 - 25 mg/dL    Creatinine 0.67 0.60 - 1.30 mg/dL    Glucose 128 65 - 140 mg/dL    Calcium 10.8 (H) 8.4 -  "10.2 mg/dL    AST 42 (H) 13 - 39 U/L    ALT 27 7 - 52 U/L    Alkaline Phosphatase 86 34 - 104 U/L    Total Protein 7.3 6.4 - 8.4 g/dL    Albumin 4.3 3.5 - 5.0 g/dL    Total Bilirubin 0.51 0.20 - 1.00 mg/dL    eGFR 80 ml/min/1.73sq m   HS Troponin 0hr (reflex protocol)    Collection Time: 03/23/24  9:13 PM   Result Value Ref Range    hs TnI 0hr 5 \"Refer to ACS Flowchart\"- see link ng/L   B-Type Natriuretic Peptide(BNP)    Collection Time: 03/23/24  9:13 PM   Result Value Ref Range     (H) 0 - 100 pg/mL   COVID/FLU/RSV    Collection Time: 03/23/24  9:13 PM    Specimen: Nose; Nares   Result Value Ref Range    SARS-CoV-2 Negative Negative    INFLUENZA A PCR Negative Negative    INFLUENZA B PCR Negative Negative    RSV PCR Negative Negative   HS Troponin I 2hr    Collection Time: 03/23/24 11:31 PM   Result Value Ref Range    hs TnI 2hr 5 \"Refer to ACS Flowchart\"- see link ng/L    Delta 2hr hsTnI 0 <20 ng/L   Comprehensive metabolic panel    Collection Time: 03/25/24  2:48 PM   Result Value Ref Range    Sodium 136 135 - 147 mmol/L    Potassium 4.0 3.5 - 5.3 mmol/L    Chloride 99 96 - 108 mmol/L    CO2 30 21 - 32 mmol/L    ANION GAP 7 4 - 13 mmol/L    BUN 22 5 - 25 mg/dL    Creatinine 0.74 0.60 - 1.30 mg/dL    Glucose 183 (H) 65 - 140 mg/dL    Calcium 10.4 (H) 8.4 - 10.2 mg/dL    AST 38 13 - 39 U/L    ALT 26 7 - 52 U/L    Alkaline Phosphatase 80 34 - 104 U/L    Total Protein 7.2 6.4 - 8.4 g/dL    Albumin 4.0 3.5 - 5.0 g/dL    Total Bilirubin 0.50 0.20 - 1.00 mg/dL    eGFR 74 ml/min/1.73sq m   CBC and differential    Collection Time: 03/25/24  2:48 PM   Result Value Ref Range    WBC 9.91 4.31 - 10.16 Thousand/uL    RBC 4.16 3.81 - 5.12 Million/uL    Hemoglobin 12.5 11.5 - 15.4 g/dL    Hematocrit 38.6 34.8 - 46.1 %    MCV 93 82 - 98 fL    MCH 30.0 26.8 - 34.3 pg    MCHC 32.4 31.4 - 37.4 g/dL    RDW 16.0 (H) 11.6 - 15.1 %    MPV 11.1 8.9 - 12.7 fL    Platelets 250 149 - 390 Thousands/uL    nRBC 0 /100 WBCs    Segmented " % 68 43 - 75 %    Immature Grans % 0 0 - 2 %    Lymphocytes % 20 14 - 44 %    Monocytes % 7 4 - 12 %    Eosinophils Relative 4 0 - 6 %    Basophils Relative 1 0 - 1 %    Absolute Neutrophils 6.75 1.85 - 7.62 Thousands/µL    Absolute Immature Grans 0.04 0.00 - 0.20 Thousand/uL    Absolute Lymphocytes 1.99 0.60 - 4.47 Thousands/µL    Absolute Monocytes 0.64 0.17 - 1.22 Thousand/µL    Eosinophils Absolute 0.42 0.00 - 0.61 Thousand/µL    Basophils Absolute 0.07 0.00 - 0.10 Thousands/µL   CK    Collection Time: 03/25/24  2:48 PM   Result Value Ref Range    Total  26 - 192 U/L   CBC and differential    Collection Time: 03/30/24  2:05 PM   Result Value Ref Range    WBC 13.52 (H) 4.31 - 10.16 Thousand/uL    RBC 4.34 3.81 - 5.12 Million/uL    Hemoglobin 13.0 11.5 - 15.4 g/dL    Hematocrit 40.8 34.8 - 46.1 %    MCV 94 82 - 98 fL    MCH 30.0 26.8 - 34.3 pg    MCHC 31.9 31.4 - 37.4 g/dL    RDW 16.0 (H) 11.6 - 15.1 %    MPV 11.0 8.9 - 12.7 fL    Platelets 189 149 - 390 Thousands/uL    nRBC 0 /100 WBCs    Segmented % 82 (H) 43 - 75 %    Immature Grans % 1 0 - 2 %    Lymphocytes % 7 (L) 14 - 44 %    Monocytes % 9 4 - 12 %    Eosinophils Relative 1 0 - 6 %    Basophils Relative 0 0 - 1 %    Absolute Neutrophils 11.13 (H) 1.85 - 7.62 Thousands/µL    Absolute Immature Grans 0.08 0.00 - 0.20 Thousand/uL    Absolute Lymphocytes 0.92 0.60 - 4.47 Thousands/µL    Absolute Monocytes 1.21 0.17 - 1.22 Thousand/µL    Eosinophils Absolute 0.13 0.00 - 0.61 Thousand/µL    Basophils Absolute 0.05 0.00 - 0.10 Thousands/µL   Protime-INR    Collection Time: 03/30/24  2:05 PM   Result Value Ref Range    Protime 14.2 11.6 - 14.5 seconds    INR 1.08 0.84 - 1.19   APTT    Collection Time: 03/30/24  2:05 PM   Result Value Ref Range    PTT 34 23 - 37 seconds   Comprehensive metabolic panel    Collection Time: 03/30/24  2:05 PM   Result Value Ref Range    Sodium 138 135 - 147 mmol/L    Potassium 4.2 3.5 - 5.3 mmol/L    Chloride 101 96 - 108 mmol/L     CO2 32 21 - 32 mmol/L    ANION GAP 5 4 - 13 mmol/L    BUN 17 5 - 25 mg/dL    Creatinine 0.63 0.60 - 1.30 mg/dL    Glucose 124 65 - 140 mg/dL    Calcium 10.1 8.4 - 10.2 mg/dL    AST 35 13 - 39 U/L    ALT 23 7 - 52 U/L    Alkaline Phosphatase 99 34 - 104 U/L    Total Protein 7.5 6.4 - 8.4 g/dL    Albumin 4.2 3.5 - 5.0 g/dL    Total Bilirubin 1.27 (H) 0.20 - 1.00 mg/dL    eGFR 82 ml/min/1.73sq m   Fingerstick Glucose (POCT)    Collection Time: 03/30/24  9:03 PM   Result Value Ref Range    POC Glucose 129 65 - 140 mg/dl   Blood culture    Collection Time: 03/31/24  3:55 AM    Specimen: Blood   Result Value Ref Range    Blood Culture No Growth After 5 Days.    Comprehensive metabolic panel    Collection Time: 03/31/24  4:56 AM   Result Value Ref Range    Sodium 138 135 - 147 mmol/L    Potassium 3.9 3.5 - 5.3 mmol/L    Chloride 99 96 - 108 mmol/L    CO2 30 21 - 32 mmol/L    ANION GAP 9 4 - 13 mmol/L    BUN 12 5 - 25 mg/dL    Creatinine 0.57 (L) 0.60 - 1.30 mg/dL    Glucose 104 65 - 140 mg/dL    Calcium 10.3 (H) 8.4 - 10.2 mg/dL    AST 36 13 - 39 U/L    ALT 23 7 - 52 U/L    Alkaline Phosphatase 98 34 - 104 U/L    Total Protein 7.7 6.4 - 8.4 g/dL    Albumin 4.2 3.5 - 5.0 g/dL    Total Bilirubin 1.12 (H) 0.20 - 1.00 mg/dL    eGFR 84 ml/min/1.73sq m   Magnesium    Collection Time: 03/31/24  4:56 AM   Result Value Ref Range    Magnesium 2.0 1.9 - 2.7 mg/dL   CBC and differential    Collection Time: 03/31/24  4:56 AM   Result Value Ref Range    WBC 13.46 (H) 4.31 - 10.16 Thousand/uL    RBC 4.36 3.81 - 5.12 Million/uL    Hemoglobin 13.1 11.5 - 15.4 g/dL    Hematocrit 41.3 34.8 - 46.1 %    MCV 95 82 - 98 fL    MCH 30.0 26.8 - 34.3 pg    MCHC 31.7 31.4 - 37.4 g/dL    RDW 16.1 (H) 11.6 - 15.1 %    MPV 11.5 8.9 - 12.7 fL    Platelets 202 149 - 390 Thousands/uL    nRBC 0 /100 WBCs    Segmented % 82 (H) 43 - 75 %    Immature Grans % 1 0 - 2 %    Lymphocytes % 8 (L) 14 - 44 %    Monocytes % 9 4 - 12 %    Eosinophils Relative 0 0 - 6 %     Basophils Relative 0 0 - 1 %    Absolute Neutrophils 11.00 (H) 1.85 - 7.62 Thousands/µL    Absolute Immature Grans 0.13 0.00 - 0.20 Thousand/uL    Absolute Lymphocytes 1.08 0.60 - 4.47 Thousands/µL    Absolute Monocytes 1.14 0.17 - 1.22 Thousand/µL    Eosinophils Absolute 0.06 0.00 - 0.61 Thousand/µL    Basophils Absolute 0.05 0.00 - 0.10 Thousands/µL   Blood culture    Collection Time: 03/31/24  7:09 AM    Specimen: Arm, Left; Blood   Result Value Ref Range    Blood Culture No Growth After 5 Days.    Comprehensive metabolic panel    Collection Time: 04/01/24  5:42 AM   Result Value Ref Range    Sodium 140 135 - 147 mmol/L    Potassium 4.0 3.5 - 5.3 mmol/L    Chloride 108 96 - 108 mmol/L    CO2 25 21 - 32 mmol/L    ANION GAP 7 4 - 13 mmol/L    BUN 10 5 - 25 mg/dL    Creatinine 0.43 (L) 0.60 - 1.30 mg/dL    Glucose 118 65 - 140 mg/dL    Calcium 9.5 8.4 - 10.2 mg/dL    AST 38 13 - 39 U/L    ALT 20 7 - 52 U/L    Alkaline Phosphatase 88 34 - 104 U/L    Total Protein 6.8 6.4 - 8.4 g/dL    Albumin 3.7 3.5 - 5.0 g/dL    Total Bilirubin 0.59 0.20 - 1.00 mg/dL    eGFR 93 ml/min/1.73sq m   CBC and differential    Collection Time: 04/01/24  5:42 AM   Result Value Ref Range    WBC 9.57 4.31 - 10.16 Thousand/uL    RBC 4.03 3.81 - 5.12 Million/uL    Hemoglobin 12.0 11.5 - 15.4 g/dL    Hematocrit 38.1 34.8 - 46.1 %    MCV 95 82 - 98 fL    MCH 29.8 26.8 - 34.3 pg    MCHC 31.5 31.4 - 37.4 g/dL    RDW 15.9 (H) 11.6 - 15.1 %    MPV 11.0 8.9 - 12.7 fL    Platelets 183 149 - 390 Thousands/uL    nRBC 0 /100 WBCs    Segmented % 80 (H) 43 - 75 %    Immature Grans % 0 0 - 2 %    Lymphocytes % 11 (L) 14 - 44 %    Monocytes % 7 4 - 12 %    Eosinophils Relative 2 0 - 6 %    Basophils Relative 0 0 - 1 %    Absolute Neutrophils 7.66 (H) 1.85 - 7.62 Thousands/µL    Absolute Immature Grans 0.04 0.00 - 0.20 Thousand/uL    Absolute Lymphocytes 1.01 0.60 - 4.47 Thousands/µL    Absolute Monocytes 0.68 0.17 - 1.22 Thousand/µL    Eosinophils Absolute  0.14 0.00 - 0.61 Thousand/µL    Basophils Absolute 0.04 0.00 - 0.10 Thousands/µL   Comprehensive metabolic panel    Collection Time: 04/02/24  5:43 AM   Result Value Ref Range    Sodium 140 135 - 147 mmol/L    Potassium 3.6 3.5 - 5.3 mmol/L    Chloride 104 96 - 108 mmol/L    CO2 28 21 - 32 mmol/L    ANION GAP 8 4 - 13 mmol/L    BUN 13 5 - 25 mg/dL    Creatinine 0.59 (L) 0.60 - 1.30 mg/dL    Glucose 127 65 - 140 mg/dL    Calcium 10.2 8.4 - 10.2 mg/dL    AST 30 13 - 39 U/L    ALT 19 7 - 52 U/L    Alkaline Phosphatase 89 34 - 104 U/L    Total Protein 6.8 6.4 - 8.4 g/dL    Albumin 3.6 3.5 - 5.0 g/dL    Total Bilirubin 0.40 0.20 - 1.00 mg/dL    eGFR 83 ml/min/1.73sq m   CBC and differential    Collection Time: 04/02/24  5:43 AM   Result Value Ref Range    WBC 7.31 4.31 - 10.16 Thousand/uL    RBC 4.05 3.81 - 5.12 Million/uL    Hemoglobin 12.1 11.5 - 15.4 g/dL    Hematocrit 38.9 34.8 - 46.1 %    MCV 96 82 - 98 fL    MCH 29.9 26.8 - 34.3 pg    MCHC 31.1 (L) 31.4 - 37.4 g/dL    RDW 15.9 (H) 11.6 - 15.1 %    MPV 11.3 8.9 - 12.7 fL    Platelets 205 149 - 390 Thousands/uL    nRBC 0 /100 WBCs    Segmented % 75 43 - 75 %    Immature Grans % 0 0 - 2 %    Lymphocytes % 14 14 - 44 %    Monocytes % 7 4 - 12 %    Eosinophils Relative 3 0 - 6 %    Basophils Relative 1 0 - 1 %    Absolute Neutrophils 5.46 1.85 - 7.62 Thousands/µL    Absolute Immature Grans 0.02 0.00 - 0.20 Thousand/uL    Absolute Lymphocytes 1.03 0.60 - 4.47 Thousands/µL    Absolute Monocytes 0.52 0.17 - 1.22 Thousand/µL    Eosinophils Absolute 0.23 0.00 - 0.61 Thousand/µL    Basophils Absolute 0.05 0.00 - 0.10 Thousands/µL   Basic metabolic panel    Collection Time: 04/03/24  5:09 AM   Result Value Ref Range    Sodium 140 135 - 147 mmol/L    Potassium 4.7 3.5 - 5.3 mmol/L    Chloride 104 96 - 108 mmol/L    CO2 29 21 - 32 mmol/L    ANION GAP 7 4 - 13 mmol/L    BUN 17 5 - 25 mg/dL    Creatinine 0.67 0.60 - 1.30 mg/dL    Glucose 122 65 - 140 mg/dL    Calcium 10.6 (H) 8.4  - 10.2 mg/dL    eGFR 80 ml/min/1.73sq m   CBC and differential    Collection Time: 04/03/24  5:09 AM   Result Value Ref Range    WBC 7.19 4.31 - 10.16 Thousand/uL    RBC 3.96 3.81 - 5.12 Million/uL    Hemoglobin 11.6 11.5 - 15.4 g/dL    Hematocrit 38.1 34.8 - 46.1 %    MCV 96 82 - 98 fL    MCH 29.3 26.8 - 34.3 pg    MCHC 30.4 (L) 31.4 - 37.4 g/dL    RDW 15.9 (H) 11.6 - 15.1 %    MPV 10.8 8.9 - 12.7 fL    Platelets 197 149 - 390 Thousands/uL    nRBC 0 /100 WBCs    Segmented % 75 43 - 75 %    Immature Grans % 1 0 - 2 %    Lymphocytes % 14 14 - 44 %    Monocytes % 6 4 - 12 %    Eosinophils Relative 3 0 - 6 %    Basophils Relative 1 0 - 1 %    Absolute Neutrophils 5.42 1.85 - 7.62 Thousands/µL    Absolute Immature Grans 0.06 0.00 - 0.20 Thousand/uL    Absolute Lymphocytes 1.01 0.60 - 4.47 Thousands/µL    Absolute Monocytes 0.42 0.17 - 1.22 Thousand/µL    Eosinophils Absolute 0.22 0.00 - 0.61 Thousand/µL    Basophils Absolute 0.06 0.00 - 0.10 Thousands/µL   Magnesium    Collection Time: 04/03/24  5:09 AM   Result Value Ref Range    Magnesium 1.8 (L) 1.9 - 2.7 mg/dL   Basic metabolic panel    Collection Time: 04/04/24  5:32 AM   Result Value Ref Range    Sodium 141 135 - 147 mmol/L    Potassium 3.7 3.5 - 5.3 mmol/L    Chloride 106 96 - 108 mmol/L    CO2 30 21 - 32 mmol/L    ANION GAP 5 4 - 13 mmol/L    BUN 14 5 - 25 mg/dL    Creatinine 0.62 0.60 - 1.30 mg/dL    Glucose 123 65 - 140 mg/dL    Calcium 10.4 (H) 8.4 - 10.2 mg/dL    eGFR 82 ml/min/1.73sq m   CBC and differential    Collection Time: 04/04/24  5:32 AM   Result Value Ref Range    WBC 5.92 4.31 - 10.16 Thousand/uL    RBC 3.94 3.81 - 5.12 Million/uL    Hemoglobin 11.8 11.5 - 15.4 g/dL    Hematocrit 37.6 34.8 - 46.1 %    MCV 95 82 - 98 fL    MCH 29.9 26.8 - 34.3 pg    MCHC 31.4 31.4 - 37.4 g/dL    RDW 15.8 (H) 11.6 - 15.1 %    MPV 10.6 8.9 - 12.7 fL    Platelets 181 149 - 390 Thousands/uL    nRBC 0 /100 WBCs    Segmented % 73 43 - 75 %    Immature Grans % 1 0 - 2 %     Lymphocytes % 15 14 - 44 %    Monocytes % 6 4 - 12 %    Eosinophils Relative 4 0 - 6 %    Basophils Relative 1 0 - 1 %    Absolute Neutrophils 4.42 1.85 - 7.62 Thousands/µL    Absolute Immature Grans 0.03 0.00 - 0.20 Thousand/uL    Absolute Lymphocytes 0.86 0.60 - 4.47 Thousands/µL    Absolute Monocytes 0.37 0.17 - 1.22 Thousand/µL    Eosinophils Absolute 0.21 0.00 - 0.61 Thousand/µL    Basophils Absolute 0.03 0.00 - 0.10 Thousands/µL   Magnesium    Collection Time: 04/04/24  5:32 AM   Result Value Ref Range    Magnesium 1.6 (L) 1.9 - 2.7 mg/dL      Medications have been reviewed by provider in current encounter    Bladimir Caraballo MD

## 2024-04-12 ENCOUNTER — TELEPHONE (OUTPATIENT)
Age: 84
End: 2024-04-12

## 2024-04-19 ENCOUNTER — TELEPHONE (OUTPATIENT)
Age: 84
End: 2024-04-19

## 2024-04-19 NOTE — TELEPHONE ENCOUNTER
Pt called to say when she was in the hospital they decreased her O2 from 4L to 2L. The pt is feeling out of breath when doing anything around the house. She was folding laundry and had to stop. She is ok as long as she doesn't do anything. She was on 3L for a long time then was increased to 4L. Pt has an appt with pulmonary on 5/23/24. She wants to know if she should go back up to 3L. Please advise.

## 2024-04-19 NOTE — TELEPHONE ENCOUNTER
Pt returned our call. Providers message given. Pt aware and understands. No further action needed.

## 2024-04-23 ENCOUNTER — NURSE TRIAGE (OUTPATIENT)
Age: 84
End: 2024-04-23

## 2024-04-23 ENCOUNTER — APPOINTMENT (EMERGENCY)
Dept: RADIOLOGY | Facility: HOSPITAL | Age: 84
End: 2024-04-23
Payer: MEDICARE

## 2024-04-23 ENCOUNTER — APPOINTMENT (EMERGENCY)
Dept: NON INVASIVE DIAGNOSTICS | Facility: HOSPITAL | Age: 84
End: 2024-04-23
Payer: MEDICARE

## 2024-04-23 ENCOUNTER — HOSPITAL ENCOUNTER (EMERGENCY)
Facility: HOSPITAL | Age: 84
End: 2024-04-23
Attending: EMERGENCY MEDICINE | Admitting: EMERGENCY MEDICINE
Payer: MEDICARE

## 2024-04-23 ENCOUNTER — HOSPITAL ENCOUNTER (INPATIENT)
Facility: HOSPITAL | Age: 84
LOS: 4 days | Discharge: HOME | DRG: 164 | End: 2024-04-27
Attending: INTERNAL MEDICINE | Admitting: EMERGENCY MEDICINE
Payer: MEDICARE

## 2024-04-23 VITALS
TEMPERATURE: 98.6 F | WEIGHT: 165 LBS | SYSTOLIC BLOOD PRESSURE: 121 MMHG | DIASTOLIC BLOOD PRESSURE: 60 MMHG | HEART RATE: 104 BPM | HEIGHT: 66 IN | RESPIRATION RATE: 22 BRPM | OXYGEN SATURATION: 94 % | BODY MASS INDEX: 26.52 KG/M2

## 2024-04-23 DIAGNOSIS — E78.5 HYPERLIPIDEMIA, UNSPECIFIED HYPERLIPIDEMIA TYPE: ICD-10-CM

## 2024-04-23 DIAGNOSIS — I26.99 PULMONARY EMBOLI (HCC): Primary | ICD-10-CM

## 2024-04-23 DIAGNOSIS — I26.99 PULMONARY EMBOLISM (HCC): Primary | ICD-10-CM

## 2024-04-23 LAB
2HR DELTA HS TROPONIN: -243 NG/L
2HR DELTA HS TROPONIN: 40 NG/L
4HR DELTA HS TROPONIN: 41 NG/L
ALBUMIN SERPL BCP-MCNC: 3.7 G/DL (ref 3.5–5)
ALBUMIN SERPL BCP-MCNC: 4 G/DL (ref 3.5–5)
ALP SERPL-CCNC: 82 U/L (ref 34–104)
ALP SERPL-CCNC: 87 U/L (ref 34–104)
ALT SERPL W P-5'-P-CCNC: 23 U/L (ref 7–52)
ALT SERPL W P-5'-P-CCNC: 24 U/L (ref 7–52)
AMORPH URATE CRY URNS QL MICRO: ABNORMAL
ANION GAP SERPL CALCULATED.3IONS-SCNC: 8 MMOL/L (ref 4–13)
ANION GAP SERPL CALCULATED.3IONS-SCNC: 9 MMOL/L (ref 4–13)
AORTIC ROOT: 3.9 CM
APTT PPP: 26 SECONDS (ref 23–37)
APTT PPP: 54 SECONDS (ref 23–37)
APTT PPP: >210 SECONDS (ref 23–37)
AST SERPL W P-5'-P-CCNC: 32 U/L (ref 13–39)
AST SERPL W P-5'-P-CCNC: 40 U/L (ref 13–39)
AV LVOT PEAK GRADIENT: 4 MMHG
AV PEAK GRADIENT: 12 MMHG
BACTERIA UR QL AUTO: ABNORMAL /HPF
BASOPHILS # BLD AUTO: 0.04 THOUSANDS/ÂΜL (ref 0–0.1)
BASOPHILS # BLD AUTO: 0.06 THOUSANDS/ÂΜL (ref 0–0.1)
BASOPHILS NFR BLD AUTO: 0 % (ref 0–1)
BASOPHILS NFR BLD AUTO: 1 % (ref 0–1)
BILIRUB SERPL-MCNC: 0.37 MG/DL (ref 0.2–1)
BILIRUB SERPL-MCNC: 0.45 MG/DL (ref 0.2–1)
BILIRUB UR QL STRIP: NEGATIVE
BNP SERPL-MCNC: 118 PG/ML (ref 0–100)
BNP SERPL-MCNC: 174 PG/ML (ref 0–100)
BSA FOR ECHO PROCEDURE: 1.84 M2
BUN SERPL-MCNC: 19 MG/DL (ref 5–25)
BUN SERPL-MCNC: 19 MG/DL (ref 5–25)
CALCIUM SERPL-MCNC: 9.7 MG/DL (ref 8.4–10.2)
CALCIUM SERPL-MCNC: 9.8 MG/DL (ref 8.4–10.2)
CARDIAC TROPONIN I PNL SERPL HS: 487 NG/L
CARDIAC TROPONIN I PNL SERPL HS: 730 NG/L
CARDIAC TROPONIN I PNL SERPL HS: 768 NG/L
CARDIAC TROPONIN I PNL SERPL HS: 808 NG/L
CARDIAC TROPONIN I PNL SERPL HS: 809 NG/L
CHLORIDE SERPL-SCNC: 102 MMOL/L (ref 96–108)
CHLORIDE SERPL-SCNC: 103 MMOL/L (ref 96–108)
CLARITY UR: ABNORMAL
CO2 SERPL-SCNC: 26 MMOL/L (ref 21–32)
CO2 SERPL-SCNC: 27 MMOL/L (ref 21–32)
COLOR UR: YELLOW
CREAT SERPL-MCNC: 0.59 MG/DL (ref 0.6–1.3)
CREAT SERPL-MCNC: 0.59 MG/DL (ref 0.6–1.3)
D DIMER PPP FEU-MCNC: 7.82 UG/ML FEU
DOP CALC LVOT AREA: 3.14 CM2
DOP CALC LVOT DIAMETER: 2 CM
E WAVE DECELERATION TIME: 119 MS
E/A RATIO: 0.62
EOSINOPHIL # BLD AUTO: 0.26 THOUSAND/ÂΜL (ref 0–0.61)
EOSINOPHIL # BLD AUTO: 0.31 THOUSAND/ÂΜL (ref 0–0.61)
EOSINOPHIL NFR BLD AUTO: 3 % (ref 0–6)
EOSINOPHIL NFR BLD AUTO: 3 % (ref 0–6)
ERYTHROCYTE [DISTWIDTH] IN BLOOD BY AUTOMATED COUNT: 16.3 % (ref 11.6–15.1)
ERYTHROCYTE [DISTWIDTH] IN BLOOD BY AUTOMATED COUNT: 16.4 % (ref 11.6–15.1)
FLUAV RNA RESP QL NAA+PROBE: NEGATIVE
FLUBV RNA RESP QL NAA+PROBE: NEGATIVE
FRACTIONAL SHORTENING: 26 (ref 28–44)
GFR SERPL CREATININE-BSD FRML MDRD: 83 ML/MIN/1.73SQ M
GFR SERPL CREATININE-BSD FRML MDRD: 83 ML/MIN/1.73SQ M
GLUCOSE SERPL-MCNC: 106 MG/DL (ref 65–140)
GLUCOSE SERPL-MCNC: 115 MG/DL (ref 65–140)
GLUCOSE UR STRIP-MCNC: NEGATIVE MG/DL
HCT VFR BLD AUTO: 36.8 % (ref 34.8–46.1)
HCT VFR BLD AUTO: 40.1 % (ref 34.8–46.1)
HGB BLD-MCNC: 11.6 G/DL (ref 11.5–15.4)
HGB BLD-MCNC: 12.4 G/DL (ref 11.5–15.4)
HGB UR QL STRIP.AUTO: ABNORMAL
HYALINE CASTS #/AREA URNS LPF: ABNORMAL /LPF
IMM GRANULOCYTES # BLD AUTO: 0.06 THOUSAND/UL (ref 0–0.2)
IMM GRANULOCYTES # BLD AUTO: 0.06 THOUSAND/UL (ref 0–0.2)
IMM GRANULOCYTES NFR BLD AUTO: 1 % (ref 0–2)
IMM GRANULOCYTES NFR BLD AUTO: 1 % (ref 0–2)
INR PPP: 1.06 (ref 0.84–1.19)
INR PPP: 1.28 (ref 0.84–1.19)
INR PPP: 1.3 (ref 0.84–1.19)
INTERVENTRICULAR SEPTUM IN DIASTOLE (PARASTERNAL SHORT AXIS VIEW): 1.2 CM
INTERVENTRICULAR SEPTUM: 1.2 CM (ref 0.6–1.1)
KETONES UR STRIP-MCNC: NEGATIVE MG/DL
LAAS-AP2: 16.5 CM2
LAAS-AP4: 16.2 CM2
LEFT ATRIUM AREA SYSTOLE SINGLE PLANE A4C: 14.1 CM2
LEFT ATRIUM SIZE: 3.8 CM
LEFT ATRIUM VOLUME (MOD BIPLANE): 39 ML
LEFT ATRIUM VOLUME INDEX (MOD BIPLANE): 21.2 ML/M2
LEFT INTERNAL DIMENSION IN SYSTOLE: 2.6 CM (ref 2.1–4)
LEFT VENTRICULAR INTERNAL DIMENSION IN DIASTOLE: 3.5 CM (ref 3.5–6)
LEFT VENTRICULAR POSTERIOR WALL IN END DIASTOLE: 1.1 CM
LEFT VENTRICULAR STROKE VOLUME: 28 ML
LEUKOCYTE ESTERASE UR QL STRIP: ABNORMAL
LVSV (TEICH): 28 ML
LYMPHOCYTES # BLD AUTO: 1.56 THOUSANDS/ÂΜL (ref 0.6–4.47)
LYMPHOCYTES # BLD AUTO: 1.61 THOUSANDS/ÂΜL (ref 0.6–4.47)
LYMPHOCYTES NFR BLD AUTO: 15 % (ref 14–44)
LYMPHOCYTES NFR BLD AUTO: 16 % (ref 14–44)
MAGNESIUM SERPL-MCNC: 1.8 MG/DL (ref 1.9–2.7)
MCH RBC QN AUTO: 29.6 PG (ref 26.8–34.3)
MCH RBC QN AUTO: 29.7 PG (ref 26.8–34.3)
MCHC RBC AUTO-ENTMCNC: 30.9 G/DL (ref 31.4–37.4)
MCHC RBC AUTO-ENTMCNC: 31.5 G/DL (ref 31.4–37.4)
MCV RBC AUTO: 94 FL (ref 82–98)
MCV RBC AUTO: 96 FL (ref 82–98)
MONOCYTES # BLD AUTO: 0.67 THOUSAND/ÂΜL (ref 0.17–1.22)
MONOCYTES # BLD AUTO: 0.76 THOUSAND/ÂΜL (ref 0.17–1.22)
MONOCYTES NFR BLD AUTO: 7 % (ref 4–12)
MONOCYTES NFR BLD AUTO: 7 % (ref 4–12)
MV E'TISSUE VEL-LAT: 7 CM/S
MV E'TISSUE VEL-SEP: 7 CM/S
MV PEAK A VEL: 1.34 M/S
MV PEAK E VEL: 83 CM/S
MV STENOSIS PRESSURE HALF TIME: 35 MS
MV VALVE AREA P 1/2 METHOD: 6.29
NEUTROPHILS # BLD AUTO: 7.32 THOUSANDS/ÂΜL (ref 1.85–7.62)
NEUTROPHILS # BLD AUTO: 7.67 THOUSANDS/ÂΜL (ref 1.85–7.62)
NEUTS SEG NFR BLD AUTO: 73 % (ref 43–75)
NEUTS SEG NFR BLD AUTO: 73 % (ref 43–75)
NITRITE UR QL STRIP: NEGATIVE
NON-SQ EPI CELLS URNS QL MICRO: ABNORMAL /HPF
NRBC BLD AUTO-RTO: 0 /100 WBCS
NRBC BLD AUTO-RTO: 0 /100 WBCS
PH UR STRIP.AUTO: 6.5 [PH]
PHOSPHATE SERPL-MCNC: 2.9 MG/DL (ref 2.3–4.1)
PLATELET # BLD AUTO: 194 THOUSANDS/UL (ref 149–390)
PLATELET # BLD AUTO: 194 THOUSANDS/UL (ref 149–390)
PMV BLD AUTO: 11.2 FL (ref 8.9–12.7)
PMV BLD AUTO: 11.6 FL (ref 8.9–12.7)
POTASSIUM SERPL-SCNC: 4.2 MMOL/L (ref 3.5–5.3)
POTASSIUM SERPL-SCNC: 4.4 MMOL/L (ref 3.5–5.3)
PROT SERPL-MCNC: 6.8 G/DL (ref 6.4–8.4)
PROT SERPL-MCNC: 7.3 G/DL (ref 6.4–8.4)
PROT UR STRIP-MCNC: ABNORMAL MG/DL
PROTHROMBIN TIME: 14 SECONDS (ref 11.6–14.5)
PROTHROMBIN TIME: 15.8 SECONDS (ref 11.6–14.5)
PROTHROMBIN TIME: 16 SECONDS (ref 11.6–14.5)
PV PEAK GRADIENT: 2 MMHG
RBC # BLD AUTO: 3.91 MILLION/UL (ref 3.81–5.12)
RBC # BLD AUTO: 4.19 MILLION/UL (ref 3.81–5.12)
RBC #/AREA URNS AUTO: ABNORMAL /HPF
RIGHT ATRIUM AREA SYSTOLE A4C: 15.2 CM2
RIGHT VENTRICLE ID DIMENSION: 3.6 CM
RSV RNA RESP QL NAA+PROBE: NEGATIVE
SARS-COV-2 RNA RESP QL NAA+PROBE: NEGATIVE
SL CV LEFT ATRIUM LENGTH A2C: 5.5 CM
SL CV LV EF: 65
SL CV PED ECHO LEFT VENTRICLE DIASTOLIC VOLUME (MOD BIPLANE) 2D: 51 ML
SL CV PED ECHO LEFT VENTRICLE SYSTOLIC VOLUME (MOD BIPLANE) 2D: 24 ML
SODIUM SERPL-SCNC: 137 MMOL/L (ref 135–147)
SODIUM SERPL-SCNC: 138 MMOL/L (ref 135–147)
SP GR UR STRIP.AUTO: 1.02 (ref 1–1.03)
TR MAX PG: 37 MMHG
TR PEAK VELOCITY: 3 M/S
TRICUSPID ANNULAR PLANE SYSTOLIC EXCURSION: 1.3 CM
TRICUSPID VALVE PEAK REGURGITATION VELOCITY: 3.04 M/S
UROBILINOGEN UR STRIP-ACNC: <2 MG/DL
WBC # BLD AUTO: 10.47 THOUSAND/UL (ref 4.31–10.16)
WBC # BLD AUTO: 9.91 THOUSAND/UL (ref 4.31–10.16)
WBC #/AREA URNS AUTO: ABNORMAL /HPF

## 2024-04-23 PROCEDURE — NC001 PR NO CHARGE: Performed by: EMERGENCY MEDICINE

## 2024-04-23 PROCEDURE — 85610 PROTHROMBIN TIME: CPT | Performed by: EMERGENCY MEDICINE

## 2024-04-23 PROCEDURE — 0241U HB NFCT DS VIR RESP RNA 4 TRGT: CPT | Performed by: EMERGENCY MEDICINE

## 2024-04-23 PROCEDURE — 99291 CRITICAL CARE FIRST HOUR: CPT | Performed by: EMERGENCY MEDICINE

## 2024-04-23 PROCEDURE — 93005 ELECTROCARDIOGRAM TRACING: CPT

## 2024-04-23 PROCEDURE — 96375 TX/PRO/DX INJ NEW DRUG ADDON: CPT

## 2024-04-23 PROCEDURE — 96365 THER/PROPH/DIAG IV INF INIT: CPT

## 2024-04-23 PROCEDURE — 84100 ASSAY OF PHOSPHORUS: CPT

## 2024-04-23 PROCEDURE — 84484 ASSAY OF TROPONIN QUANT: CPT | Performed by: EMERGENCY MEDICINE

## 2024-04-23 PROCEDURE — 83735 ASSAY OF MAGNESIUM: CPT

## 2024-04-23 PROCEDURE — 96366 THER/PROPH/DIAG IV INF ADDON: CPT

## 2024-04-23 PROCEDURE — 99285 EMERGENCY DEPT VISIT HI MDM: CPT

## 2024-04-23 PROCEDURE — 83880 ASSAY OF NATRIURETIC PEPTIDE: CPT | Performed by: EMERGENCY MEDICINE

## 2024-04-23 PROCEDURE — 85025 COMPLETE CBC W/AUTO DIFF WBC: CPT

## 2024-04-23 PROCEDURE — 81001 URINALYSIS AUTO W/SCOPE: CPT | Performed by: EMERGENCY MEDICINE

## 2024-04-23 PROCEDURE — 83880 ASSAY OF NATRIURETIC PEPTIDE: CPT

## 2024-04-23 PROCEDURE — 80053 COMPREHEN METABOLIC PANEL: CPT

## 2024-04-23 PROCEDURE — 85379 FIBRIN DEGRADATION QUANT: CPT | Performed by: EMERGENCY MEDICINE

## 2024-04-23 PROCEDURE — 80053 COMPREHEN METABOLIC PANEL: CPT | Performed by: EMERGENCY MEDICINE

## 2024-04-23 PROCEDURE — 85730 THROMBOPLASTIN TIME PARTIAL: CPT | Performed by: EMERGENCY MEDICINE

## 2024-04-23 PROCEDURE — 85025 COMPLETE CBC W/AUTO DIFF WBC: CPT | Performed by: EMERGENCY MEDICINE

## 2024-04-23 PROCEDURE — 84484 ASSAY OF TROPONIN QUANT: CPT

## 2024-04-23 PROCEDURE — 93306 TTE W/DOPPLER COMPLETE: CPT

## 2024-04-23 PROCEDURE — 85730 THROMBOPLASTIN TIME PARTIAL: CPT

## 2024-04-23 PROCEDURE — 71275 CT ANGIOGRAPHY CHEST: CPT

## 2024-04-23 PROCEDURE — 36415 COLL VENOUS BLD VENIPUNCTURE: CPT | Performed by: EMERGENCY MEDICINE

## 2024-04-23 PROCEDURE — 85610 PROTHROMBIN TIME: CPT

## 2024-04-23 PROCEDURE — 71045 X-RAY EXAM CHEST 1 VIEW: CPT

## 2024-04-23 PROCEDURE — 93306 TTE W/DOPPLER COMPLETE: CPT | Performed by: INTERNAL MEDICINE

## 2024-04-23 RX ORDER — CHLORHEXIDINE GLUCONATE ORAL RINSE 1.2 MG/ML
15 SOLUTION DENTAL EVERY 12 HOURS SCHEDULED
Status: DISCONTINUED | OUTPATIENT
Start: 2024-04-23 | End: 2024-04-27 | Stop reason: HOSPADM

## 2024-04-23 RX ORDER — HEPARIN SODIUM 1000 [USP'U]/ML
2800 INJECTION, SOLUTION INTRAVENOUS; SUBCUTANEOUS EVERY 6 HOURS PRN
Status: DISCONTINUED | OUTPATIENT
Start: 2024-04-23 | End: 2024-04-26

## 2024-04-23 RX ORDER — LORATADINE 10 MG/1
10 TABLET ORAL DAILY
Status: DISCONTINUED | OUTPATIENT
Start: 2024-04-24 | End: 2024-04-27 | Stop reason: HOSPADM

## 2024-04-23 RX ORDER — PANTOPRAZOLE SODIUM 40 MG/1
40 TABLET, DELAYED RELEASE ORAL
Status: DISCONTINUED | OUTPATIENT
Start: 2024-04-24 | End: 2024-04-27 | Stop reason: HOSPADM

## 2024-04-23 RX ORDER — CLOPIDOGREL BISULFATE 75 MG/1
75 TABLET ORAL DAILY
Status: DISCONTINUED | OUTPATIENT
Start: 2024-04-24 | End: 2024-04-27 | Stop reason: HOSPADM

## 2024-04-23 RX ORDER — CALCIUM CARBONATE 500(1250)
2 TABLET ORAL
Status: DISCONTINUED | OUTPATIENT
Start: 2024-04-24 | End: 2024-04-27 | Stop reason: HOSPADM

## 2024-04-23 RX ORDER — HEPARIN SODIUM 1000 [USP'U]/ML
3000 INJECTION, SOLUTION INTRAVENOUS; SUBCUTANEOUS EVERY 6 HOURS PRN
Status: DISCONTINUED | OUTPATIENT
Start: 2024-04-23 | End: 2024-04-23 | Stop reason: HOSPADM

## 2024-04-23 RX ORDER — PRIMIDONE 50 MG/1
50 TABLET ORAL DAILY
Status: DISCONTINUED | OUTPATIENT
Start: 2024-04-24 | End: 2024-04-27 | Stop reason: HOSPADM

## 2024-04-23 RX ORDER — HEPARIN SODIUM 10000 [USP'U]/100ML
3-30 INJECTION, SOLUTION INTRAVENOUS
Status: DISCONTINUED | OUTPATIENT
Start: 2024-04-23 | End: 2024-04-26

## 2024-04-23 RX ORDER — ASPIRIN 81 MG/1
324 TABLET, CHEWABLE ORAL ONCE
Status: COMPLETED | OUTPATIENT
Start: 2024-04-23 | End: 2024-04-23

## 2024-04-23 RX ORDER — HEPARIN SODIUM 1000 [USP'U]/ML
6000 INJECTION, SOLUTION INTRAVENOUS; SUBCUTANEOUS EVERY 6 HOURS PRN
Status: DISCONTINUED | OUTPATIENT
Start: 2024-04-23 | End: 2024-04-23 | Stop reason: HOSPADM

## 2024-04-23 RX ORDER — FLUTICASONE PROPIONATE 50 MCG
1 SPRAY, SUSPENSION (ML) NASAL 2 TIMES DAILY
Status: DISCONTINUED | OUTPATIENT
Start: 2024-04-23 | End: 2024-04-27 | Stop reason: HOSPADM

## 2024-04-23 RX ORDER — HEPARIN SODIUM 1000 [USP'U]/ML
6000 INJECTION, SOLUTION INTRAVENOUS; SUBCUTANEOUS ONCE
Status: COMPLETED | OUTPATIENT
Start: 2024-04-23 | End: 2024-04-23

## 2024-04-23 RX ORDER — LEVOTHYROXINE SODIUM 88 UG/1
88 TABLET ORAL
Status: DISCONTINUED | OUTPATIENT
Start: 2024-04-24 | End: 2024-04-27 | Stop reason: HOSPADM

## 2024-04-23 RX ORDER — ATORVASTATIN CALCIUM 80 MG/1
80 TABLET, FILM COATED ORAL
Status: DISCONTINUED | OUTPATIENT
Start: 2024-04-23 | End: 2024-04-27 | Stop reason: HOSPADM

## 2024-04-23 RX ORDER — CLONAZEPAM 0.5 MG/1
0.5 TABLET ORAL
Status: DISCONTINUED | OUTPATIENT
Start: 2024-04-23 | End: 2024-04-24

## 2024-04-23 RX ORDER — DULOXETIN HYDROCHLORIDE 60 MG/1
60 CAPSULE, DELAYED RELEASE ORAL DAILY
Status: DISCONTINUED | OUTPATIENT
Start: 2024-04-24 | End: 2024-04-27 | Stop reason: HOSPADM

## 2024-04-23 RX ORDER — HEPARIN SODIUM 10000 [USP'U]/100ML
3-30 INJECTION, SOLUTION INTRAVENOUS
Status: DISCONTINUED | OUTPATIENT
Start: 2024-04-23 | End: 2024-04-23 | Stop reason: HOSPADM

## 2024-04-23 RX ORDER — HEPARIN SODIUM 1000 [USP'U]/ML
5600 INJECTION, SOLUTION INTRAVENOUS; SUBCUTANEOUS EVERY 6 HOURS PRN
Status: DISCONTINUED | OUTPATIENT
Start: 2024-04-23 | End: 2024-04-26

## 2024-04-23 RX ADMIN — HEPARIN SODIUM 18 UNITS/KG/HR: 10000 INJECTION, SOLUTION INTRAVENOUS at 15:35

## 2024-04-23 RX ADMIN — ATORVASTATIN CALCIUM 80 MG: 80 TABLET, FILM COATED ORAL at 21:13

## 2024-04-23 RX ADMIN — CLONAZEPAM 0.5 MG: 0.5 TABLET ORAL at 21:13

## 2024-04-23 RX ADMIN — HEPARIN SODIUM 18 UNITS/KG/HR: 10000 INJECTION, SOLUTION INTRAVENOUS at 20:28

## 2024-04-23 RX ADMIN — ASPIRIN 81 MG CHEWABLE TABLET 324 MG: 81 TABLET CHEWABLE at 14:41

## 2024-04-23 RX ADMIN — HEPARIN SODIUM 2800 UNITS: 1000 INJECTION INTRAVENOUS; SUBCUTANEOUS at 22:11

## 2024-04-23 RX ADMIN — HEPARIN SODIUM 6000 UNITS: 1000 INJECTION INTRAVENOUS; SUBCUTANEOUS at 15:35

## 2024-04-23 RX ADMIN — IOHEXOL 85 ML: 350 INJECTION, SOLUTION INTRAVENOUS at 14:31

## 2024-04-23 RX ADMIN — CHLORHEXIDINE GLUCONATE 0.12% ORAL RINSE 15 ML: 1.2 LIQUID ORAL at 21:13

## 2024-04-23 NOTE — TELEPHONE ENCOUNTER
Patient calls in complaining of decreased oxygen levels and SOB at rest.  Audible SOB at rest while on the phone. Patient states this is not her usual.  Oxygen level went down to the 60's earlier today .  Patient's pulse 102 pulse . Oxygen while on the phone 87 percent while on 3 L NC .  Patient also complains of mild chest pressure that started today along with a headache which is constant . The pressure is constant and this is also new for the patient.  Patient denies S/S of cough or cold, fever, swelling in legs or unusual weight gain, weakness , numbness, tingling on one side of body and not the other, difficulties with speech, dizziness.  Patient denies having COPD or lung isiue, patel snot use inhaler or nebulizer only the oxygen.      Patient is speaking in short phrases, single words and able to hear breathing slightly labored. I recommended ER evaluation now.  Patient states her daughter is on the way.  I offered to call 911 .  Patient declined.  Patient states she feels comfortable disconnecting the phone. Encouraged patient  to call 911 if necessary while waiting  for daughter and /or if she is unable to get into the car when daughter arrives. Patent verbalized understanding and agreed. Patient will be heading to [facility] asap.

## 2024-04-23 NOTE — EMTALA/ACUTE CARE TRANSFER
[facility] EMERGENCY DEPARTMENT  [address] [address]  Dept: [phone]      EMTALA TRANSFER CONSENT    NAME Danyelle Martinez                                         1940                              MRN [id number]    I have been informed of my rights regarding examination, treatment, and transfer   by Dr. Berry Blake DO    Benefits: Specialized equipment and/or services available at the receiving facility (Include comment)________________________    Risks: Potential for delay in receiving treatment, Potential deterioration of medical condition, Loss of IV, Possible worsening of condition or death during transfer, Increased discomfort during transfer      Consent for Transfer:  I acknowledge that my medical condition has been evaluated and explained to me by the emergency department physician or other qualified medical person and/or my attending physician, who has recommended that I be transferred to the service of  Accepting Physician: Dr. Huff at Accepting Facility Name, City & State : [facility]. The above potential benefits of such transfer, the potential risks associated with such transfer, and the probable risks of not being transferred have been explained to me, and I fully understand them.  The doctor has explained that, in my case, the benefits of transfer outweigh the risks.  I agree to be transferred.    I authorize the performance of emergency medical procedures and treatments upon me in both transit and upon arrival at the receiving facility.  Additionally, I authorize the release of any and all medical records to the receiving facility and request they be transported with me, if possible.  I understand that the safest mode of transportation during a medical emergency is an ambulance and that the Hospital advocates the use of this mode of transport. Risks of traveling to the receiving facility by car, including absence of medical control, life sustaining  equipment, such as oxygen, and medical personnel has been explained to me and I fully understand them.    (SAL CORRECT BOX BELOW)  [  ]  I consent to the stated transfer and to be transported by ambulance/helicopter.  [  ]  I consent to the stated transfer, but refuse transportation by ambulance and accept full responsibility for my transportation by car.  I understand the risks of non-ambulance transfers and I exonerate the Hospital and its staff from any deterioration in my condition that results from this refusal.    X___________________________________________    DATE  24  TIME________  Signature of patient or legally responsible individual signing on patient behalf           RELATIONSHIP TO PATIENT_________________________          Provider Certification    NAME Danyelle Martinez                                        [facility] 1940                              MRN [id number]    A medical screening exam was performed on the above named patient.  Based on the examination:    Condition Necessitating Transfer The encounter diagnosis was Pulmonary embolism (HCC).    Patient Condition: The patient has been stabilized such that within reasonable medical probability, no material deterioration of the patient condition or the condition of the unborn child(sumi) is likely to result from the transfer    Reason for Transfer: Level of Care needed not available at this facility    Transfer Requirements: Facility B   Space available and qualified personnel available for treatment as acknowledged by    Agreed to accept transfer and to provide appropriate medical treatment as acknowledged by       Dr. Huff  Appropriate medical records of the examination and treatment of the patient are provided at the time of transfer   STAFF INITIAL WHEN COMPLETED _______  Transfer will be performed by qualified personnel from    and appropriate transfer equipment as required, including the use of necessary and appropriate life  support measures.    Provider Certification: I have examined the patient and explained the following risks and benefits of being transferred/refusing transfer to the patient/family:  General risk, such as traffic hazards, adverse weather conditions, rough terrain or turbulence, possible failure of equipment (including vehicle or aircraft), or consequences of actions of persons outside the control of the transport personnel      Based on these reasonable risks and benefits to the patient and/or the unborn child(sumi), and based upon the information available at the time of the patient’s examination, I certify that the medical benefits reasonably to be expected from the provision of appropriate medical treatments at another medical facility outweigh the increasing risks, if any, to the individual’s medical condition, and in the case of labor to the unborn child, from effecting the transfer.    X____________________________________________ DATE 04/23/24        TIME_______      ORIGINAL - SEND TO MEDICAL RECORDS   COPY - SEND WITH PATIENT DURING TRANSFER

## 2024-04-23 NOTE — TELEPHONE ENCOUNTER
Regarding: OT keeps flutuating shortness of breath  ----- Message from Sarah Solares sent at 4/23/2024 10:30 AM EDT -----  Zoraida called in stated that she wanted Dr Caraballo to know that her Oxigine has been fluctuating up and down out of no reason. She is concern as it has gone all the way down to 60-62 and rising back to 70-96 her current heart rate is at 101. She stated she does have an appointment with the ENT for follow up from previous ER visit. Please Advise Thank you

## 2024-04-23 NOTE — ED PROVIDER NOTES
History  Chief Complaint   Patient presents with    Shortness of Breath     Called EMS for SOB that started at 8am this morning. Normally on 2L O2 daily and had to increase to 4L. Hx of CHF     Brought in by EMS from home for evaluation of shortness of breath.  Patient states started at 8 AM this morning after using the bathroom.  She is normally on 3 L of nasal cannula oxygen daily given her history of CHF and hypoxic respiratory failure.  She states she normally takes off the oxygen while she uses the bathroom.  When she stood back up she became acutely short of breath and when she went back and checked her oxygen level was in the 60s.  Patient reports shortness of breath is improving now but feeling fatigued.  Denies any chest pain.      History provided by:  Patient   used: No    Shortness of Breath      Prior to Admission Medications   Prescriptions Last Dose Informant Patient Reported? Taking?   Calcium Carbonate-Vit D-Min (CALCIUM 1200 PO)  Self Yes No   Sig: Take 1,200 mg by mouth daily   DULoxetine (CYMBALTA) 60 mg delayed release capsule  Self No No   Sig: TAKE 1 CAPSULE(60 MG) BY MOUTH DAILY   Multiple Vitamins-Minerals (PreserVision AREDS) TABS   Yes No   Sig: Take by mouth   atorvastatin (LIPITOR) 80 mg tablet  Self No No   Sig: TAKE 1 TABLET BY MOUTH DAILY AT BEDTIME   Patient taking differently: Taking at morning   bisoprolol (ZEBETA) 5 mg tablet  Self No No   Sig: Take 0.5 tablets (2.5 mg total) by mouth daily   cetirizine (ZyrTEC) 10 mg tablet  Self Yes No   Sig: Take 10 mg by mouth daily   clonazePAM (KlonoPIN) 0.5 mg tablet   No No   Sig: Take 1 tablet (0.5 mg total) by mouth daily at bedtime   clopidogrel (PLAVIX) 75 mg tablet  Self No No   Sig: TAKE 1 TABLET(75 MG) BY MOUTH DAILY   levothyroxine 88 mcg tablet  Self No No   Sig: Take 1 tablet (88 mcg total) by mouth daily   midodrine (PROAMATINE) 5 mg tablet   No No   Sig: Take 1 tablet (5 mg total) by mouth 2 (two) times a  day before meals   mometasone (NASONEX) 50 mcg/act nasal spray  Self Yes No   Si spray into each nostril daily   pantoprazole (PROTONIX) 40 mg tablet  Self No No   Sig: TAKE 1 TABLET(40 MG) BY MOUTH EVERY MORNING   primidone (MYSOLINE) 50 mg tablet  Self No No   Sig: TAKE 1 TABLET(50 MG) BY MOUTH DAILY      Facility-Administered Medications: None       Past Medical History:   Diagnosis Date    Anxiety     Arthritis     r knee and shoulders    Blind left eye     CHF (congestive heart failure) (HCC)     Deaf, left     Depression     Diabetes mellitus (HCC)     Disease of thyroid gland     DVT complicating pregnancy, unspecified trimester (HCC) postpartum    Hearing loss     LEFT EAR    History of shingles     fACIAL     Hyperlipidemia     Hypertension     Liver disease     Lyme disease     Meniere's disease     Obesity     Orthostatic hypotension     Psychiatric problem     Sinus congestion     Sleep apnea     Stroke (HCC)        Past Surgical History:   Procedure Laterality Date    APPENDECTOMY      BREAST BIOPSY Left 2010     SECTION      x2    DXA PROCEDURE (HISTORICAL)  10/28/2020    EYE SURGERY      HAND SURGERY Left     HERNIA REPAIR      HYSTERECTOMY      ROTATOR CUFF REPAIR Left     TONSILLECTOMY      TYMPANOSTOMY TUBE PLACEMENT         Family History   Problem Relation Age of Onset    Depression Mother     Hypertension Mother     Obesity Mother     Depression Father     Alcohol abuse Father     Stroke Father     Breast cancer Sister 68    Ovarian cancer Daughter 53    BRCA1 Negative Daughter     BRCA2 Negative Daughter      I have reviewed and agree with the history as documented.    E-Cigarette/Vaping    E-Cigarette Use Never User      E-Cigarette/Vaping Substances    Nicotine No     THC No     CBD No     Flavoring No     Other No     Unknown No      Social History     Tobacco Use    Smoking status: Former     Current packs/day: 0.00     Average packs/day: 0.8 packs/day for 44.0 years (33.0  ttl pk-yrs)     Types: Cigarettes     Start date: 1946     Quit date: 1990     Years since quittin.2     Passive exposure: Past    Smokeless tobacco: Never   Vaping Use    Vaping status: Never Used   Substance Use Topics    Alcohol use: Not Currently    Drug use: Never       Review of Systems   Respiratory:  Positive for shortness of breath.    All other systems reviewed and are negative.      Physical Exam  Physical Exam  Vitals and nursing note reviewed.   Constitutional:       General: She is in acute distress.      Appearance: She is ill-appearing.   Cardiovascular:      Rate and Rhythm: Regular rhythm. Tachycardia present.   Pulmonary:      Effort: Respiratory distress present.      Comments: Decreased breath sounds bilateral bases  Abdominal:      Tenderness: There is no abdominal tenderness.   Musculoskeletal:         General: No deformity. Normal range of motion.   Skin:     Capillary Refill: Capillary refill takes less than 2 seconds.      Findings: No rash.   Neurological:      General: No focal deficit present.      Mental Status: She is alert and oriented to person, place, and time.         Vital Signs  ED Triage Vitals   Temperature Pulse Respirations Blood Pressure SpO2   24 1332 24 1331 24 1331 24 1332 24 1331   97.5 °F (36.4 °C) (!) 107 (!) 26 137/63 95 %      Temp Source Heart Rate Source Patient Position - Orthostatic VS BP Location FiO2 (%)   24 1443 24 1443 24 1443 24 1530 --   Temporal Monitor Lying Left arm       Pain Score       24 1443       No Pain           Vitals:    24 1700 24 1730 24 1800 24 1830   BP: 132/62 122/72 138/76 121/60   Pulse: (!) 108 104 (!) 111 104   Patient Position - Orthostatic VS: Lying Lying Lying Lying         Visual Acuity      ED Medications  Medications   aspirin chewable tablet 324 mg (324 mg Oral Given 24 1441)   iohexol (OMNIPAQUE) 350 MG/ML injection  (MULTI-DOSE) 85 mL (85 mL Intravenous Given 4/23/24 1431)   heparin (porcine) injection 6,000 Units (6,000 Units Intravenous Given 4/23/24 1535)       Diagnostic Studies  Results Reviewed       Procedure Component Value Units Date/Time    Urine Microscopic [[id number]]  (Abnormal) Collected: 04/23/24 1810    Lab Status: Final result Specimen: Urine, Clean Catch Updated: 04/23/24 1843     RBC, UA 1-2 /hpf      WBC, UA 4-10 /hpf      Epithelial Cells Occasional /hpf      Bacteria, UA Moderate /hpf      Hyaline Casts, UA 0-1 /lpf      Amorphous Crystals, UA Occasional    HS Troponin I 4hr [[id number]]  (Abnormal) Collected: 04/23/24 1810    Lab Status: Final result Specimen: Blood from Hand, Left Updated: 04/23/24 1843     hs TnI 4hr 809 ng/L      Delta 4hr hsTnI 41 ng/L     UA (URINE) with reflex to Scope [[id number]]  (Abnormal) Collected: 04/23/24 1810    Lab Status: Final result Specimen: Urine, Clean Catch Updated: 04/23/24 1831     Color, UA Yellow     Clarity, UA Slightly Cloudy     Specific Gravity, UA 1.025     pH, UA 6.5     Leukocytes, UA Moderate     Nitrite, UA Negative     Protein, UA Trace mg/dl      Glucose, UA Negative mg/dl      Ketones, UA Negative mg/dl      Urobilinogen, UA <2.0 mg/dl      Bilirubin, UA Negative     Occult Blood, UA Trace    HS Troponin I 2hr [[id number]]  (Abnormal) Collected: 04/23/24 1541    Lab Status: Final result Specimen: Blood from Arm, Right Updated: 04/23/24 1610     hs TnI 2hr 808 ng/L      Delta 2hr hsTnI 40 ng/L     FLU/RSV/COVID - if FLU/RSV clinically relevant [[id number]]  (Normal) Collected: 04/23/24 1342    Lab Status: Final result Specimen: Nares from Nose Updated: 04/23/24 1426     SARS-CoV-2 Negative     INFLUENZA A PCR Negative     INFLUENZA B PCR Negative     RSV PCR Negative    Narrative:      FOR PEDIATRIC PATIENTS - copy/paste COVID Guidelines URL to browser: [email or web address]    SARS-CoV-2 assay is a  Nucleic Acid Amplification assay intended for the  qualitative detection of nucleic acid from SARS-CoV-2 in nasopharyngeal  swabs. Results are for the presumptive identification of SARS-CoV-2 RNA.    Positive results are indicative of infection with SARS-CoV-2, the virus  causing COVID-19, but do not rule out bacterial infection or co-infection  with other viruses. Laboratories within the [place] and its  territories are required to report all positive results to the appropriate  public health authorities. Negative results do not preclude SARS-CoV-2  infection and should not be used as the sole basis for treatment or other  patient management decisions. Negative results must be combined with  clinical observations, patient history, and epidemiological information.  This test has not been FDA cleared or approved.    This test has been authorized by FDA under an Emergency Use Authorization  (EUA). This test is only authorized for the duration of time the  declaration that circumstances exist justifying the authorization of the  emergency use of an in vitro diagnostic tests for detection of SARS-CoV-2  virus and/or diagnosis of COVID-19 infection under section 564(b)(1) of  the Act, 21 U.S.C. 360bbb-3(b)(1), unless the authorization is terminated  or revoked sooner. The test has been validated but independent review by FDA  and CLIA is pending.    Test performed using [employer] GeneXpert: This RT-PCR assay targets N2,  a region unique to SARS-CoV-2. A conserved region in the E-gene was chosen  for pan-Sarbecovirus detection which includes SARS-CoV-2.    According to CMS-2020-01-R, this platform meets the definition of high-throughput technology.    B-Type Natriuretic Peptide(BNP) [[id number]]  (Abnormal) Collected: 04/23/24 1342    Lab Status: Final result Specimen: Blood from Arm, Right Updated: 04/23/24 1416      pg/mL     HS Troponin 0hr (reflex protocol) [[id number]]  (Abnormal) Collected: 04/23/24 1342     Lab Status: Final result Specimen: Blood from Arm, Right Updated: 04/23/24 1415     hs TnI 0hr 768 ng/L     D-Dimer [[id number]]  (Abnormal) Collected: 04/23/24 1342    Lab Status: Final result Specimen: Blood from Arm, Right Updated: 04/23/24 1413     D-Dimer, Quant 7.82 ug/ml FEU     Narrative:      In the evaluation for possible pulmonary embolism, in the appropriate (Well's Score of 4 or less) patient, the age adjusted d-dimer cutoff for this patient can be calculated as:    Age x 0.01 (in ug/mL) for Age-adjusted D-dimer exclusion threshold for a patient over 50 years.    Comprehensive metabolic panel [[id number]]  (Abnormal) Collected: 04/23/24 1342    Lab Status: Final result Specimen: Blood from Arm, Right Updated: 04/23/24 1407     Sodium 137 mmol/L      Potassium 4.4 mmol/L      Chloride 102 mmol/L      CO2 26 mmol/L      ANION GAP 9 mmol/L      BUN 19 mg/dL      Creatinine 0.59 mg/dL      Glucose 106 mg/dL      Calcium 9.8 mg/dL      AST 40 U/L      ALT 24 U/L      Alkaline Phosphatase 82 U/L      Total Protein 7.3 g/dL      Albumin 4.0 g/dL      Total Bilirubin 0.45 mg/dL      eGFR 83 ml/min/1.73sq m     Narrative:      National Kidney Disease Foundation guidelines for Chronic Kidney Disease (CKD):     Stage 1 with normal or high GFR (GFR > 90 mL/min/1.73 square meters)    Stage 2 Mild CKD (GFR = 60-89 mL/min/1.73 square meters)    Stage 3A Moderate CKD (GFR = 45-59 mL/min/1.73 square meters)    Stage 3B Moderate CKD (GFR = 30-44 mL/min/1.73 square meters)    Stage 4 Severe CKD (GFR = 15-29 mL/min/1.73 square meters)    Stage 5 End Stage CKD (GFR <15 mL/min/1.73 square meters)  Note: GFR calculation is accurate only with a steady state creatinine    Protime-INR [[id number]]  (Normal) Collected: 04/23/24 1342    Lab Status: Final result Specimen: Blood from Arm, Right Updated: 04/23/24 1402     Protime 14.0 seconds      INR 1.06    APTT [[id number]]  (Normal) Collected: 04/23/24 1342    Lab Status: Final  "result Specimen: Blood from Arm, Right Updated: 04/23/24 1402     PTT 26 seconds     CBC and differential [[id number]]  (Abnormal) Collected: 04/23/24 1342    Lab Status: Final result Specimen: Blood from Arm, Right Updated: 04/23/24 1351     WBC 10.47 Thousand/uL      RBC 4.19 Million/uL      Hemoglobin 12.4 g/dL      Hematocrit 40.1 %      MCV 96 fL      MCH 29.6 pg      MCHC 30.9 g/dL      RDW 16.3 %      MPV 11.2 fL      Platelets 194 Thousands/uL      nRBC 0 /100 WBCs      Segmented % 73 %      Immature Grans % 1 %      Lymphocytes % 15 %      Monocytes % 7 %      Eosinophils Relative 3 %      Basophils Relative 1 %      Absolute Neutrophils 7.67 Thousands/µL      Absolute Immature Grans 0.06 Thousand/uL      Absolute Lymphocytes 1.61 Thousands/µL      Absolute Monocytes 0.76 Thousand/µL      Eosinophils Absolute 0.31 Thousand/µL      Basophils Absolute 0.06 Thousands/µL                    CTA ED chest PE study   Final Result by Nona King MD (04/23 2882)      Findings consistent with acute saddle pulmonary embolism. Overall clot burden is large.      The calculated ratio of right ventricular to left ventricular diameter (RV/LV ratio) is greater than 0.9 consistent with right heart strain.      There is a critical finding of acute PE with RV/LV ratio >0.9. Based on PERT algorithm recommendations:    \"  Order STAT biomarkers including troponin, NT-BNP or BNP    \"  Calculate PESI score:   o  If PESI score falls in I-III, with negative biomarkers, please order a PERT priority ECHO.   o  If PESI score falls in IV-V or with positive biomarkers, please order STAT ECHO and alert the [facility] PERT via PAC at [phone].      Mild pulmonary edema.      Other chronic findings as above.      I personally discussed this study with DR. CARINE SHAH on 4/23/2024 3:06 PM.               Workstation performed: [id number]         XR chest 1 view portable   Final Result by Shad Martines MD (04/24 12)    "   Nonspecific interstitial prominence in the lungs. Consider viral syndrome or perhaps mild interstitial edema            Workstation performed: [id number]                    Procedures  ECG 12 Lead Documentation Only    Date/Time: 4/23/2024 1:38 PM    Performed by: Berry Blake DO  Authorized by: Berry Blake DO    ECG reviewed by me, the ED Provider: yes    Patient location:  ED  Interpretation:     Interpretation: non-specific    Rate:     ECG rate:  103    ECG rate assessment: tachycardic    Rhythm:     Rhythm: sinus rhythm    Ectopy:     Ectopy: none    ST segments:     ST segments:  Normal  T waves:     T waves: normal    CriticalCare Time    Date/Time: 4/23/2024 11:00 PM    Performed by: Berry Balke DO  Authorized by: Berry Blake DO    Critical care provider statement:     Critical care time (minutes):  40    Critical care time was exclusive of:  Separately billable procedures and treating other patients and teaching time    Critical care was necessary to treat or prevent imminent or life-threatening deterioration of the following conditions:  Respiratory failure (Saddle pulmonary embolism)    Critical care was time spent personally by me on the following activities:  Blood draw for specimens, obtaining history from patient or surrogate, development of treatment plan with patient or surrogate, discussions with consultants, evaluation of patient's response to treatment, examination of patient, review of old charts, re-evaluation of patient's condition, ordering and review of radiographic studies, ordering and review of laboratory studies, ordering and performing treatments and interventions and interpretation of cardiac output measurements           ED Course                               SBIRT 22yo+      Flowsheet Row Most Recent Value   Initial Alcohol Screen: US AUDIT-C     1. How often do you have a drink containing alcohol? 0 Filed at: 04/23/2024 7255   2. How many drinks containing alcohol do  you have on a typical day you are drinking?  0 Filed at: 2024 1330   3a. Male UNDER 65: How often do you have five or more drinks on one occasion? 0 Filed at: 2024 1330   3b. FEMALE Any Age, or MALE 65+: How often do you have 4 or more drinks on one occassion? 0 Filed at: 2024 1330   Audit-C Score 0 Filed at: 2024 1330   KEYANA: How many times in the past year have you...    Used an illegal drug or used a prescription medication for non-medical reasons? Never Filed at: 2024 1330                      Medical Decision Making  Pulse ox 93% on 3 L nasal cannula indicating adequate oxygenation.  CXR: NAD as read by me    Differential diagnosis include but not limited to arrhythmia, ACS, congestive heart failure, pneumonia, pulmonary embolism    Radiologist called and advised of this finding of saddle PE with large clot burden and right heart strain.  As per network protocol critical care attending was contacted and case discussed.  A stat echocardiogram was ordered and IV heparin was ordered stat as well.  Dr. Lanza came to bedside to examine the patient.  Given the patient's PESI score and large clot burden with elevated biomarkers decision made to activate PERT.  PERT activated.  Case discussed with ICU attending at [facility] as well as interventional radiologist.  Decision was made for patient to be transferred emergently to the [facility] for possible catheter directed tPA.  Discussed findings and treatment plan with patient who is agreeable.    Amount and/or Complexity of Data Reviewed  Labs: ordered.  Radiology: ordered and independent interpretation performed.  ECG/medicine tests: ordered and independent interpretation performed.    Risk  OTC drugs.  Prescription drug management.        PESI  Age: 83 years old  Sex: 0  History of Cancer: 0  History of Heart Failure: 10  History of Chronic Lung Disease: 0  Heart rate greater than or equal to 110: 20  Systolic BP < 100 mmH  Respiratory  rate greater than or equal to 30: 0  Temperature <36°C/96.8°F: 0  Altered Mental Status (Disorientation, lethargy, stupor, or coma): 0  O2 saturation <90%: 20  PESI Score Results: 135        Disposition  Final diagnoses:   Pulmonary embolism (HCC)     Time reflects when diagnosis was documented in both MDM as applicable and the Disposition within this note       Time User Action Codes Description Comment    4/23/2024  3:53 PM Berry Blake Add [I26.99] Pulmonary embolism (HCC)           ED Disposition       ED Disposition   Transfer to Another Facility-In Network    Condition   --    Date/Time   Tue Apr 23, 2024 1609    Comment   Danyelle Martinez should be transferred out to [facility].               MD Documentation      Flowsheet Row Most Recent Value   Patient Condition The patient has been stabilized such that within reasonable medical probability, no material deterioration of the patient condition or the condition of the unborn child(sumi) is likely to result from the transfer   Reason for Transfer Level of Care needed not available at this facility   Benefits of Transfer Specialized equipment and/or services available at the receiving facility (Include comment)________________________   Risks of Transfer Potential for delay in receiving treatment, Potential deterioration of medical condition, Loss of IV, Possible worsening of condition or death during transfer, Increased discomfort during transfer   Accepting Physician Dr. Huff   Accepting Facility Name, [facility]   Sending MD Dr. Blake   Provider Certification General risk, such as traffic hazards, adverse weather conditions, rough terrain or turbulence, possible failure of equipment (including vehicle or aircraft), or consequences of actions of persons outside the control of the transport personnel          RN Documentation      Flowsheet Row Most Recent Value   Accepting Facility Name, [facility]   Bed Assignment MICU   Report Given to GRAYSON Regalado           Follow-up Information    None         Discharge Medication List as of 4/23/2024  6:53 PM        CONTINUE these medications which have NOT CHANGED    Details   atorvastatin (LIPITOR) 80 mg tablet TAKE 1 TABLET BY MOUTH DAILY AT BEDTIME, Normal      bisoprolol (ZEBETA) 5 mg tablet Take 0.5 tablets (2.5 mg total) by mouth daily, Starting Thu 2/8/2024, Until Tue 8/6/2024, Normal      Calcium Carbonate-Vit D-Min (CALCIUM 1200 PO) Take 1,200 mg by mouth daily, Historical Med      cetirizine (ZyrTEC) 10 mg tablet Take 10 mg by mouth daily, Historical Med      clonazePAM (KlonoPIN) 0.5 mg tablet Take 1 tablet (0.5 mg total) by mouth daily at bedtime, Starting Thu 2/29/2024, Normal      clopidogrel (PLAVIX) 75 mg tablet TAKE 1 TABLET(75 MG) BY MOUTH DAILY, Starting Wed 1/10/2024, Normal      DULoxetine (CYMBALTA) 60 mg delayed release capsule TAKE 1 CAPSULE(60 MG) BY MOUTH DAILY, Normal      levothyroxine 88 mcg tablet Take 1 tablet (88 mcg total) by mouth daily, Starting Thu 12/28/2023, Normal      midodrine (PROAMATINE) 5 mg tablet Take 1 tablet (5 mg total) by mouth 2 (two) times a day before meals, Starting Thu 4/4/2024, Until Sat 5/4/2024, Normal      mometasone (NASONEX) 50 mcg/act nasal spray 1 spray into each nostril daily, Historical Med      Multiple Vitamins-Minerals (PreserVision AREDS) TABS Take by mouth, Historical Med      pantoprazole (PROTONIX) 40 mg tablet TAKE 1 TABLET(40 MG) BY MOUTH EVERY MORNING, Starting Tue 11/14/2023, Normal      primidone (MYSOLINE) 50 mg tablet TAKE 1 TABLET(50 MG) BY MOUTH DAILY, Starting Mon 11/13/2023, Normal             No discharge procedures on file.    PDMP Review       None            ED Provider  Electronically Signed by             Berry Blake DO  04/24/24 9606

## 2024-04-23 NOTE — TELEPHONE ENCOUNTER
"Reason for Disposition  • [1] MODERATE difficulty breathing (e.g., speaks in phrases, SOB even at rest, pulse 100 - 120) AND [2] new-onset or worse than normal  • Oxygen level (e.g., pulse oximetry) 90 percent or lower    Answer Assessment - Initial Assessment Questions  1. MAIN CONCERN OR SYMPTOM: \"What's your main concern?\" (e.g., low oxygen level, breathing difficulty) \"What question do you have?\"        January oxygen 24/7 3 L      SOB noticed this week     SOB at rest       Mild pressure in chest usually not there , just started today       Mild headache /     2. ONSET: \"When did the    start?\"         1 week or so         3. OXYGEN THERAPY:     - \"Do you currently use home oxygen?\"     - If Yes, ask: \"What is your oxygen source?\" (e.g., O2 tank, O2 concentrator).     - If Yes, ask: \"How do you get the oxygen?\" (e.g., nasal prongs, face mask).     - If Yes, ask: \"How much oxygen are you supposed to use?\" (e.g., 1-2 L NC)          3 L NC        4.  OXYGEN EQUIPMENT:  \"Are you having any trouble with your oxygen equipment?\"  (e.g., cannula, mask, tubing, tank, concentrator)        Nasal cannula     5. PULSE OXIMETER:     - \"Do you have a pulse oximeter (pulse ox)?\"      - If Yes, ask: \"Where do you place the probe?\" (e.g., fingertip, ear lobe)        87 percent  3 L      Dropping to 60's       6. O2 MONITORING: \"What is the oxygen level (pulse ox reading)?\" (e.g., %)        87 percent fluctuates         7. VSS MONITORING: \"Do you monitor/measure your oxygen level or vital signs?\" (e.g., yes, no, measurements are automatically sent to provider/call center). Document CURRENT and NORMAL BASELINE values if available.      -  P: \"What is your pulse rate per minute?\"    -  RR: \"What is your respiratory rate per minute?\"          SOB audible      8. BREATHING DIFFICULTY: \"Are you having any difficulty breathing?\" If Yes, ask: \"How bad is it?\"  (e.g., none, mild, moderate, severe)     - MILD: No SOB at rest, mild " "SOB with walking, speaks normally in sentences, able to lie down, no retractions, pulse < 100.     - MODERATE: SOB at rest, SOB with minimal exertion and prefers to sit, cannot lie down flat, speaks in phrases, mild retractions, audible wheezing, pulse 100-120.     - SEVERE: Very SOB at rest, speaks in single words, struggling to breathe, sitting hunched forward, retractions, pulse > 120           SOB at rest        9. OTHER SYMPTOMS: \"Do you have any other symptoms?\" (e.g., fever, change in sputum)          Denies fever or cold symptoms       10. SMOKING: \"Do you smoke currently?\" \"Is there anyone that smokes around you?\"  (Note: smoking around oxygen is dangerous!)    Protocols used: Oxygen Monitoring and Hypoxia-ADULT-    "

## 2024-04-24 ENCOUNTER — ANESTHESIA (INPATIENT)
Dept: RADIOLOGY | Facility: HOSPITAL | Age: 84
DRG: 164 | End: 2024-04-24
Payer: MEDICARE

## 2024-04-24 ENCOUNTER — APPOINTMENT (INPATIENT)
Dept: RADIOLOGY | Facility: HOSPITAL | Age: 84
DRG: 164 | End: 2024-04-24
Payer: MEDICARE

## 2024-04-24 ENCOUNTER — DOCUMENTATION (OUTPATIENT)
Dept: OTHER | Facility: HOSPITAL | Age: 84
End: 2024-04-24

## 2024-04-24 ENCOUNTER — ANESTHESIA (OUTPATIENT)
Dept: ANESTHESIOLOGY | Facility: HOSPITAL | Age: 84
End: 2024-04-24

## 2024-04-24 ENCOUNTER — APPOINTMENT (INPATIENT)
Dept: NON INVASIVE DIAGNOSTICS | Facility: HOSPITAL | Age: 84
DRG: 164 | End: 2024-04-24
Payer: MEDICARE

## 2024-04-24 ENCOUNTER — ANESTHESIA EVENT (OUTPATIENT)
Dept: ANESTHESIOLOGY | Facility: HOSPITAL | Age: 84
End: 2024-04-24

## 2024-04-24 ENCOUNTER — ANESTHESIA EVENT (INPATIENT)
Dept: RADIOLOGY | Facility: HOSPITAL | Age: 84
DRG: 164 | End: 2024-04-24
Payer: MEDICARE

## 2024-04-24 LAB
4HR DELTA HS TROPONIN: -225 NG/L
ALBUMIN SERPL BCP-MCNC: 3.7 G/DL (ref 3.5–5)
ALP SERPL-CCNC: 88 U/L (ref 34–104)
ALT SERPL W P-5'-P-CCNC: 21 U/L (ref 7–52)
ANION GAP SERPL CALCULATED.3IONS-SCNC: 8 MMOL/L (ref 4–13)
APTT PPP: 105 SECONDS (ref 23–37)
APTT PPP: 67 SECONDS (ref 23–37)
APTT PPP: 90 SECONDS (ref 23–37)
AST SERPL W P-5'-P-CCNC: 31 U/L (ref 13–39)
ATRIAL RATE: 103 BPM
BASOPHILS # BLD AUTO: 0.05 THOUSANDS/ÂΜL (ref 0–0.1)
BASOPHILS NFR BLD AUTO: 1 % (ref 0–1)
BILIRUB SERPL-MCNC: 0.62 MG/DL (ref 0.2–1)
BUN SERPL-MCNC: 18 MG/DL (ref 5–25)
CALCIUM SERPL-MCNC: 9.5 MG/DL (ref 8.4–10.2)
CARDIAC TROPONIN I PNL SERPL HS: 505 NG/L
CHLORIDE SERPL-SCNC: 103 MMOL/L (ref 96–108)
CO2 SERPL-SCNC: 27 MMOL/L (ref 21–32)
CREAT SERPL-MCNC: 0.58 MG/DL (ref 0.6–1.3)
EOSINOPHIL # BLD AUTO: 0.25 THOUSAND/ÂΜL (ref 0–0.61)
EOSINOPHIL NFR BLD AUTO: 2 % (ref 0–6)
ERYTHROCYTE [DISTWIDTH] IN BLOOD BY AUTOMATED COUNT: 16.6 % (ref 11.6–15.1)
GFR SERPL CREATININE-BSD FRML MDRD: 84 ML/MIN/1.73SQ M
GLUCOSE SERPL-MCNC: 120 MG/DL (ref 65–140)
GLUCOSE SERPL-MCNC: 120 MG/DL (ref 65–140)
GLUCOSE SERPL-MCNC: 138 MG/DL (ref 65–140)
HCT VFR BLD AUTO: 36.8 % (ref 34.8–46.1)
HGB BLD-MCNC: 10.8 G/DL (ref 11.5–15.4)
HGB BLD-MCNC: 11.7 G/DL (ref 11.5–15.4)
IMM GRANULOCYTES # BLD AUTO: 0.03 THOUSAND/UL (ref 0–0.2)
IMM GRANULOCYTES NFR BLD AUTO: 0 % (ref 0–2)
KCT BLD-ACNC: 171 SEC (ref 89–137)
LACTATE SERPL-SCNC: 0.8 MMOL/L (ref 0.5–2)
LYMPHOCYTES # BLD AUTO: 1.73 THOUSANDS/ÂΜL (ref 0.6–4.47)
LYMPHOCYTES NFR BLD AUTO: 17 % (ref 14–44)
MAGNESIUM SERPL-MCNC: 1.9 MG/DL (ref 1.9–2.7)
MCH RBC QN AUTO: 30.1 PG (ref 26.8–34.3)
MCHC RBC AUTO-ENTMCNC: 31.8 G/DL (ref 31.4–37.4)
MCV RBC AUTO: 95 FL (ref 82–98)
MONOCYTES # BLD AUTO: 0.69 THOUSAND/ÂΜL (ref 0.17–1.22)
MONOCYTES NFR BLD AUTO: 7 % (ref 4–12)
NEUTROPHILS # BLD AUTO: 7.74 THOUSANDS/ÂΜL (ref 1.85–7.62)
NEUTS SEG NFR BLD AUTO: 73 % (ref 43–75)
NRBC BLD AUTO-RTO: 0 /100 WBCS
P AXIS: 21 DEGREES
PHOSPHATE SERPL-MCNC: 3 MG/DL (ref 2.3–4.1)
PLATELET # BLD AUTO: 175 THOUSANDS/UL (ref 149–390)
PMV BLD AUTO: 11.5 FL (ref 8.9–12.7)
POTASSIUM SERPL-SCNC: 4.2 MMOL/L (ref 3.5–5.3)
PR INTERVAL: 186 MS
PROT SERPL-MCNC: 6.8 G/DL (ref 6.4–8.4)
QRS AXIS: 70 DEGREES
QRSD INTERVAL: 88 MS
QT INTERVAL: 352 MS
QTC INTERVAL: 461 MS
RBC # BLD AUTO: 3.89 MILLION/UL (ref 3.81–5.12)
SODIUM SERPL-SCNC: 138 MMOL/L (ref 135–147)
SPECIMEN SOURCE: ABNORMAL
T WAVE AXIS: 36 DEGREES
VENTRICULAR RATE: 103 BPM
WBC # BLD AUTO: 10.49 THOUSAND/UL (ref 4.31–10.16)

## 2024-04-24 PROCEDURE — 85730 THROMBOPLASTIN TIME PARTIAL: CPT | Performed by: STUDENT IN AN ORGANIZED HEALTH CARE EDUCATION/TRAINING PROGRAM

## 2024-04-24 PROCEDURE — 93005 ELECTROCARDIOGRAM TRACING: CPT

## 2024-04-24 PROCEDURE — 82948 REAGENT STRIP/BLOOD GLUCOSE: CPT

## 2024-04-24 PROCEDURE — 37187 VENOUS MECH THROMBECTOMY: CPT

## 2024-04-24 PROCEDURE — C1894 INTRO/SHEATH, NON-LASER: HCPCS

## 2024-04-24 PROCEDURE — C1769 GUIDE WIRE: HCPCS

## 2024-04-24 PROCEDURE — 83605 ASSAY OF LACTIC ACID: CPT | Performed by: INTERNAL MEDICINE

## 2024-04-24 PROCEDURE — 99291 CRITICAL CARE FIRST HOUR: CPT | Performed by: INTERNAL MEDICINE

## 2024-04-24 PROCEDURE — 36015 PLACE CATHETER IN ARTERY: CPT | Performed by: RADIOLOGY

## 2024-04-24 PROCEDURE — 85018 HEMOGLOBIN: CPT | Performed by: PHYSICIAN ASSISTANT

## 2024-04-24 PROCEDURE — C1757 CATH, THROMBECTOMY/EMBOLECT: HCPCS

## 2024-04-24 PROCEDURE — 84100 ASSAY OF PHOSPHORUS: CPT

## 2024-04-24 PROCEDURE — 83735 ASSAY OF MAGNESIUM: CPT

## 2024-04-24 PROCEDURE — 93970 EXTREMITY STUDY: CPT | Performed by: SURGERY

## 2024-04-24 PROCEDURE — 85347 COAGULATION TIME ACTIVATED: CPT

## 2024-04-24 PROCEDURE — 76937 US GUIDE VASCULAR ACCESS: CPT | Performed by: RADIOLOGY

## 2024-04-24 PROCEDURE — 93010 ELECTROCARDIOGRAM REPORT: CPT | Performed by: INTERNAL MEDICINE

## 2024-04-24 PROCEDURE — 80053 COMPREHEN METABOLIC PANEL: CPT

## 2024-04-24 PROCEDURE — 76937 US GUIDE VASCULAR ACCESS: CPT

## 2024-04-24 PROCEDURE — 37184 PRIM ART M-THRMBC 1ST VSL: CPT | Performed by: RADIOLOGY

## 2024-04-24 PROCEDURE — 93970 EXTREMITY STUDY: CPT

## 2024-04-24 PROCEDURE — 37185 PRIM ART M-THRMBC SBSQ VSL: CPT | Performed by: RADIOLOGY

## 2024-04-24 PROCEDURE — NC001 PR NO CHARGE: Performed by: PHYSICIAN ASSISTANT

## 2024-04-24 PROCEDURE — 85025 COMPLETE CBC W/AUTO DIFF WBC: CPT

## 2024-04-24 PROCEDURE — 85730 THROMBOPLASTIN TIME PARTIAL: CPT | Performed by: INTERNAL MEDICINE

## 2024-04-24 PROCEDURE — 84484 ASSAY OF TROPONIN QUANT: CPT

## 2024-04-24 RX ORDER — CLONAZEPAM 0.5 MG/1
0.5 TABLET ORAL 2 TIMES DAILY PRN
Status: DISCONTINUED | OUTPATIENT
Start: 2024-04-24 | End: 2024-04-27 | Stop reason: HOSPADM

## 2024-04-24 RX ORDER — CLONAZEPAM 0.5 MG/1
0.5 TABLET ORAL
Status: DISCONTINUED | OUTPATIENT
Start: 2024-04-24 | End: 2024-04-24

## 2024-04-24 RX ORDER — MIDAZOLAM HYDROCHLORIDE 2 MG/2ML
INJECTION, SOLUTION INTRAMUSCULAR; INTRAVENOUS AS NEEDED
Status: DISCONTINUED | OUTPATIENT
Start: 2024-04-24 | End: 2024-04-24

## 2024-04-24 RX ORDER — HEPARIN SODIUM 1000 [USP'U]/ML
INJECTION, SOLUTION INTRAVENOUS; SUBCUTANEOUS AS NEEDED
Status: DISCONTINUED | OUTPATIENT
Start: 2024-04-24 | End: 2024-04-24

## 2024-04-24 RX ORDER — CLONAZEPAM 0.5 MG/1
0.5 TABLET ORAL 2 TIMES DAILY
Status: DISCONTINUED | OUTPATIENT
Start: 2024-04-24 | End: 2024-04-24

## 2024-04-24 RX ORDER — FENTANYL CITRATE 50 UG/ML
INJECTION, SOLUTION INTRAMUSCULAR; INTRAVENOUS AS NEEDED
Status: DISCONTINUED | OUTPATIENT
Start: 2024-04-24 | End: 2024-04-24

## 2024-04-24 RX ORDER — SODIUM CHLORIDE, SODIUM LACTATE, POTASSIUM CHLORIDE, CALCIUM CHLORIDE 600; 310; 30; 20 MG/100ML; MG/100ML; MG/100ML; MG/100ML
INJECTION, SOLUTION INTRAVENOUS CONTINUOUS PRN
Status: DISCONTINUED | OUTPATIENT
Start: 2024-04-24 | End: 2024-04-24

## 2024-04-24 RX ADMIN — MIDAZOLAM 0.25 MG: 1 INJECTION INTRAMUSCULAR; INTRAVENOUS at 15:15

## 2024-04-24 RX ADMIN — CHLORHEXIDINE GLUCONATE 0.12% ORAL RINSE 15 ML: 1.2 LIQUID ORAL at 08:32

## 2024-04-24 RX ADMIN — CLONAZEPAM 0.5 MG: 0.5 TABLET ORAL at 21:25

## 2024-04-24 RX ADMIN — CHLORHEXIDINE GLUCONATE 0.12% ORAL RINSE 15 ML: 1.2 LIQUID ORAL at 21:22

## 2024-04-24 RX ADMIN — HEPARIN SODIUM 3000 UNITS: 1000 INJECTION INTRAVENOUS; SUBCUTANEOUS at 15:58

## 2024-04-24 RX ADMIN — MIDAZOLAM 0.25 MG: 1 INJECTION INTRAMUSCULAR; INTRAVENOUS at 15:12

## 2024-04-24 RX ADMIN — IOHEXOL 100 ML: 350 INJECTION, SOLUTION INTRAVENOUS at 16:56

## 2024-04-24 RX ADMIN — Medication 1 TABLET: at 08:33

## 2024-04-24 RX ADMIN — PRIMIDONE 50 MG: 50 TABLET ORAL at 10:00

## 2024-04-24 RX ADMIN — HEPARIN SODIUM 20 UNITS/KG/HR: 10000 INJECTION, SOLUTION INTRAVENOUS at 10:09

## 2024-04-24 RX ADMIN — FLUTICASONE PROPIONATE 1 SPRAY: 50 SPRAY, METERED NASAL at 18:58

## 2024-04-24 RX ADMIN — LORATADINE 10 MG: 10 TABLET ORAL at 08:33

## 2024-04-24 RX ADMIN — PANTOPRAZOLE SODIUM 40 MG: 40 TABLET, DELAYED RELEASE ORAL at 08:33

## 2024-04-24 RX ADMIN — SODIUM CHLORIDE, SODIUM LACTATE, POTASSIUM CHLORIDE, AND CALCIUM CHLORIDE: .6; .31; .03; .02 INJECTION, SOLUTION INTRAVENOUS at 15:11

## 2024-04-24 RX ADMIN — Medication 2 TABLET: at 08:33

## 2024-04-24 RX ADMIN — FENTANYL CITRATE 37.5 MCG: 50 INJECTION INTRAMUSCULAR; INTRAVENOUS at 16:37

## 2024-04-24 RX ADMIN — CLOPIDOGREL BISULFATE 75 MG: 75 TABLET ORAL at 08:33

## 2024-04-24 RX ADMIN — DULOXETINE HYDROCHLORIDE 60 MG: 60 CAPSULE, DELAYED RELEASE ORAL at 08:33

## 2024-04-24 RX ADMIN — FLUTICASONE PROPIONATE 1 SPRAY: 50 SPRAY, METERED NASAL at 10:00

## 2024-04-24 RX ADMIN — LEVOTHYROXINE SODIUM 88 MCG: 88 TABLET ORAL at 05:52

## 2024-04-24 RX ADMIN — ATORVASTATIN CALCIUM 80 MG: 80 TABLET, FILM COATED ORAL at 21:22

## 2024-04-24 RX ADMIN — FENTANYL CITRATE 12.5 MCG: 50 INJECTION INTRAMUSCULAR; INTRAVENOUS at 15:12

## 2024-04-24 NOTE — ANESTHESIA POSTPROCEDURE EVALUATION
Post-Op Assessment Note    CV Status:  Stable    Pain management: adequate       Mental Status:  Alert and awake   Hydration Status:  Euvolemic   PONV Controlled:  Controlled   Airway Patency:  Patent     Post Op Vitals Reviewed: Yes    No anethesia notable event occurred.    Staff: CRNA               BP   146/65   Temp      Pulse 88   Resp 21   SpO2 92

## 2024-04-24 NOTE — PROCEDURES
POC Cardiac US    Date/Time: 4/23/2024 9:07 PM    Performed by: Krzysztof Conte DO  Authorized by: Krzysztof Conte DO    Patient location:  ICU  Other Assisting Provider: No    Procedure details:     Exam Type:  Diagnostic    Indications comment:  Pulmonary embolus    Assessment / Evaluation for: cardiac function      Assessment / Evaluation for comment:  RV strain in setting of PE    Exam Type: initial exam      Image quality: limited diagnostic    Patient Details:     Cardiac Rhythm:  Regular    Mechanical ventilation: No    Cardiac findings:     Echo technique: limited 2D      Views obtained: parasternal long axis, parasternal short axis, subcostal and apical      Views obtained comment:  Parasternal short axis view showing D shaped LV. TAPSE measured = 1.7cm.    Pericardial effusion: absent      Tamponade physiology: absent      LV systolic function: normal      RV dilation: moderate    Pulmonary findings:     B-lines: no B-lines present

## 2024-04-24 NOTE — CONSULTS
e-Consult (IPC)  - Interventional Radiology  Danyelle Martinez 83 y.o. female MRN: [id number]  [facility]: [id number]          Interventional Radiology has been consulted to evaluate Danyelle Martinez    We were consulted concerning this patient with saddle PE.    Inpatient Consult to IR  Consult performed by: Lynette Kitchen PA-C  Consult ordered by: Yariel Mansfield DO        04/24/24    Assessment/Recommendation:   Patient is an 83-year-old female with a history of DM, HTN, HLD, prior TIA, CHF, chronic hypoxic respiratory failure on 3 L NC baseline who presented to the ED yesterday for evaluation of shortness of breath with hypoxia with SpO2 in the 60s at home.  Patient found to be tachycardic and tachypneic on arrival to the ED hemodynamically stable.  Patient CT PE study demonstrated acute saddle PE with RV LV greater than 0.9, echo confirmed right heart strain, patient noted to have elevated troponin and BNP.  Patient was determined to be intermediate high risk PE and was transferred from [place] to [facility] for higher level of care and possible thrombectomy.  Patient has remained hemodynamically stable on heparin, still with tachypnea, maintaining O2 sat above 90% on 2 L NC.  Pulmonology evaluated patient and determined patient to be eligible for PEERLESS II trial.  Patient consented and was enrolled, randomized to the thrombectomy arm.    -Plan for IR PE thrombectomy today  -Please keep patient NPO  -Appreciate anesthesia assistance  -Remainder of care per primary team    11-20 minutes, >50% of the total time devoted to medical consultative verbal/EMR discussion between providers. Written report will be generated in the EMR.     Thank you for allowing Interventional Radiology to participate in the care of Danyelle Martinez. Please don't hesitate to call or TigerText us with any questions.     Lynette Kitchen PA-C

## 2024-04-24 NOTE — SEDATION DOCUMENTATION
PE thrombectomy completed by Dr. Hernandez, anesthesia present for procedure. Right groin puncture site closed with a flowstasis, site dressed with guaze and tegaderm. Bedrest start time 170. Report given to MICU RN.

## 2024-04-24 NOTE — H&P
[facility]  H&P: Critical Care  Name: Danyelle Martinez 83 y.o. female I MRN: [id number]  Unit/Bed#: MICU 02 I Date of Admission: 4/23/2024   Date of Service: 4/23/2024 I Hospital Day: 0      Assessment/Plan   Neuro:   Diagnosis: sedation/delirium/pain  Plan: delirium precautions  Sleep wake cycle  Diagnosis: hx TIA  Plan: continue plavix and statin    CV:   Diagnosis: CHF  Plan: ECHO 4/23/2024 shows EF 65%. LV D-shaped with flattening of septum. LV:RV 0.9-1.0. PA pressure 45 mmHg.  Monitor daily weight - weight 4/23/2024 is 74.8kg  Diagnosis: HLD  Plan: continue home statin  Diagnosis: HTN  Plan: hold BP meds for now    Pulm:  Diagnosis: saddle PE  Plan: on hep gtt  On 2L NC at baseline  Keep o2 sat >92%  D/w IR about possible thrombectomy tonight vs tomorrow?  Unclear hx DVT will obtain doppler BLE    GI:   Diagnosis: no active issues  Plan: mentioned possible hemorrhoids - no signs of bleeding but given hep gtt monitor closely    :   Diagnosis: no active issues  Plan: Is & Os    F/E/N:    F: n/a  E: replete prn  N: npo sips with meds for now    Heme/Onc:   Diagnosis: DVT hx?  Plan: see PE    Endo:   Diagnosis: hypothyroid  Plan: continue home levothyroxine  Thyroid nodule stable per last admission note 4/4/2024    ID:   Diagnosis: no active issues  Plan: n/a    MSK/Skin:   Diagnosis: precautions  Plan: frequent turning   OOB    Disposition: Critical care       History of Present Illness     HPI: Danyelle Martinez is a 83 y.o. who presents with B/L PE. Pt seen and evaluated at [facility] for x3 days worsening hx ROB and fatigue. Wears 2L NC at baseline. Found to have saddle PE with LV:Ratio 1, elevated biomarkers. Was PERT alert. Transferred here for further management. C/o now of some SOB. Noted hx of DVT in past was on AC but cannot recall what or when. UTD on most cancer screenings no hx cancer immobilizations recent surgeries long travel. Denying any other complaints at  this time.     History obtained from chart review and the patient.  Review of Systems   Constitutional:  Positive for fatigue. Negative for chills.   Respiratory:  Positive for shortness of breath. Negative for cough.    Cardiovascular:  Positive for leg swelling (resolved). Negative for chest pain.   Gastrointestinal:  Positive for anal bleeding. Negative for abdominal pain, nausea and vomiting.   Neurological:  Negative for syncope.   All other systems reviewed and are negative.     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)  No date: Deaf, left  No date: Depression  No date: Diabetes mellitus ([facility])  No date: Disease of thyroid gland  postpartum: DVT complicating pregnancy, unspecified trimester ([facility])  No date: Hearing loss      Comment:  LEFT EAR  : History of shingles      Comment:  fACIAL   No date: Hyperlipidemia  No date: Hypertension  No date: Liver disease  No date: Lyme disease  No date: Meniere's disease  No date: Obesity  No date: Orthostatic hypotension  No date: Psychiatric problem  No date: Sinus congestion  No date: Sleep apnea  No date: Stroke ([facility]) Past Surgical History:  No date: APPENDECTOMY  2010: BREAST BIOPSY; Left  No date:  SECTION      Comment:  x2  10/28/2020: DXA PROCEDURE (HISTORICAL)  No date: EYE SURGERY  No date: HAND SURGERY; Left  No date: HERNIA REPAIR  No date: HYSTERECTOMY  No date: ROTATOR CUFF REPAIR; Left  No date: TONSILLECTOMY  No date: TYMPANOSTOMY TUBE PLACEMENT   Current Outpatient Medications   Medication Instructions    atorvastatin (LIPITOR) 80 mg tablet TAKE 1 TABLET BY MOUTH DAILY AT BEDTIME    bisoprolol (ZEBETA) 2.5 mg, Oral, Daily    Calcium Carbonate-Vit D-Min (CALCIUM 1200 PO) 1,200 mg, Oral, Daily    cetirizine (ZYRTEC) 10 mg, Oral, Daily    clonazePAM (KLONOPIN) 0.5 mg, Oral, Daily at bedtime     clopidogrel (PLAVIX) 75 mg, Oral, Daily    DULoxetine (CYMBALTA) 60 mg delayed release capsule TAKE 1 CAPSULE(60 MG) BY MOUTH DAILY    levothyroxine 88 mcg, Oral, Daily    midodrine (PROAMATINE) 5 mg, Oral, 2 times daily before meals    mometasone (NASONEX) 50 mcg/act nasal spray 1 spray, Nasal, Daily    Multiple Vitamins-Minerals (PreserVision AREDS) TABS Oral    pantoprazole (PROTONIX) 40 mg, Oral, Daily, Morning    primidone (MYSOLINE) 50 mg, Oral, Daily    Allergies   Allergen Reactions    Penicillins Swelling      Social History     Tobacco Use    Smoking status: Former     Current packs/day: 0.00     Average packs/day: 0.8 packs/day for 44.0 years (33.0 ttl pk-yrs)     Types: Cigarettes     Start date: 1946     Quit date: 1990     Years since quittin.2     Passive exposure: Past    Smokeless tobacco: Never   Vaping Use    Vaping status: Never Used   Substance Use Topics    Alcohol use: Not Currently    Drug use: Never    Family History   Problem Relation Age of Onset    Depression Mother     Hypertension Mother     Obesity Mother     Depression Father     Alcohol abuse Father     Stroke Father     Breast cancer Sister 68    Ovarian cancer Daughter 53    BRCA1 Negative Daughter     BRCA2 Negative Daughter           Objective                            Vitals I/O      Most Recent Min/Max in 24hrs   Temp   Temp  Min: 97.5 °F (36.4 °C)  Max: 98.6 °F (37 °C)   Pulse   Pulse  Min: 104  Max: 111   Resp   Resp  Min: 22  Max: 26   BP   BP  Min: 121/60  Max: 151/73   O2 Sat   SpO2  Min: 91 %  Max: 95 %    No intake or output data in the 24 hours ending 24    Diet NPO; Sips with meds    Invasive Monitoring           Physical Exam   Physical Exam  Eyes:      Extraocular Movements: Extraocular movements intact.   Skin:     General: Skin is warm and dry.   HENT:      Head: Normocephalic and atraumatic.   Neck:      Vascular: No JVD.   Cardiovascular:      Rate and Rhythm: Regular rhythm.  Tachycardia present.   Musculoskeletal:         General: Normal range of motion.      Right lower leg: No edema.      Left lower leg: No edema.   Abdominal:      Palpations: Abdomen is soft.      Tenderness: There is no abdominal tenderness.   Constitutional:       General: She is awake. She is not in acute distress.     Appearance: She is not ill-appearing.      Interventions: Nasal cannula in place.   Pulmonary:      Effort: Tachypnea present. No respiratory distress.      Breath sounds: No wheezing, rhonchi or rales.   Neurological:      General: No focal deficit present.      Mental Status: She is alert and oriented to person, place and time.            Diagnostic Studies      EKG: n/a  Imaging: n/a      Medications:  Scheduled PRN   atorvastatin, 80 mg, HS  [START ON 4/24/2024] calcium carbonate, 2 tablet, Daily With Breakfast  chlorhexidine, 15 mL, Q12H JOHN  clonazePAM, 0.5 mg, HS  [START ON 4/24/2024] clopidogrel, 75 mg, Daily  [START ON 4/24/2024] DULoxetine, 60 mg, Daily  fluticasone, 1 spray, BID  [START ON 4/24/2024] levothyroxine, 88 mcg, Early Morning  [START ON 4/24/2024] loratadine, 10 mg, Daily  [START ON 4/24/2024] multivitamin-minerals, 1 tablet, Daily  [START ON 4/24/2024] pantoprazole, 40 mg, Daily Before Breakfast  [START ON 4/24/2024] primidone, 50 mg, Daily          Continuous          Labs:    CBC    Recent Labs     04/23/24  1342   WBC 10.47*   HGB 12.4   HCT 40.1        BMP    Recent Labs     04/23/24  1342   SODIUM 137   K 4.4      CO2 26   AGAP 9   BUN 19   CREATININE 0.59*   CALCIUM 9.8       Coags    Recent Labs     04/23/24  1342   INR 1.06   PTT 26        Additional Electrolytes  No recent results       Blood Gas    No recent results  No recent results LFTs  Recent Labs     04/23/24  1342   ALT 24   AST 40*   ALKPHOS 82   ALB 4.0   TBILI 0.45       Infectious  No recent results  Glucose  Recent Labs     04/23/24  1342   GLUC 106               Shelton Mercedes,

## 2024-04-24 NOTE — ANESTHESIA PREPROCEDURE EVALUATION
Procedure:  PRE-OP ONLY    Relevant Problems   ANESTHESIA (within normal limits)      CARDIO   (+) Essential hypertension   (+) SVT (supraventricular tachycardia)   (+) Shortness of breath on exertion      ENDO   (+) Acquired hypothyroidism   (+) Type 2 diabetes mellitus, without long-term current use of insulin (HCC)      GI/HEPATIC   (+) Gastroesophageal reflux disease without esophagitis      MUSCULOSKELETAL   (+) Osteoarthritis of right knee      NEURO/PSYCH   (+) Anxiety   (+) Recurrent major depressive disorder, in remission (HCC)      PULMONARY   (+) Chronic hypoxemic respiratory failure (HCC)   (+) MARY LOU (obstructive sleep apnea) (Resolved)   (+) Shortness of breath on exertion      Other   (+) Dyslipidemia       TTE 4/23/2024    Left Ventricle: Left ventricular cavity size is normal. Wall thickness is moderately increased. There is mild to moderate concentric hypertrophy. The left ventricular ejection fraction is 65% by visual estimation. Systolic function is normal.  LV is D-shaped consistent with flattening of interventricular septum due to RV pressure overload due to pulmonary embolism. Wall motion is normal.    IVS: There is systolic flattening of the interventricular septum consistent with right ventricle pressure overload.    Right Ventricle: Right ventricular cavity size is mild to moderately dilated.  LV RV ratio appears to close 0.9-1.0.  Visually and in measurements Systolic function is mildly reduced.    Mitral Valve: There is trace to mild regurgitation.    Tricuspid Valve: There is mild to moderate regurgitation.  Pulmonary artery pressure around 45 mmHg.           Anesthesia Plan  ASA Score- 4 Emergent    Anesthesia Type- IV sedation with anesthesia with ASA Monitors.         Additional Monitors:     Airway Plan:            Plan Factors-Exercise tolerance (METS): >4 METS.    Chart reviewed. EKG reviewed.  Existing labs reviewed. Patient summary reviewed.    Patient is not a current smoker.               Induction- intravenous.    Postoperative Plan-     Informed Consent- Anesthetic plan and risks discussed with patient.  I personally reviewed this patient with the CRNA. Discussed and agreed on the Anesthesia Plan with the CRNA..

## 2024-04-24 NOTE — PROGRESS NOTES
Documentation of Informed Consent Process for Clinical Research Study      Study title:   [place] [facility]    Patient Name:  Danyelle Martinez                                   YOB: 1940     This signed and dated document shall serve as certification that all of the below listed required elements of informed consent were provided to the subject or legally authorized representative signing the actual Informed Consent Document, both in written and verbal format.     The HIPAA consent is contained within the Informed Consent document, and has also been discussed.    A copy of the actual signed and dated Informed Consent has also been provided to the subject or legally authorized representative, and the original signed document shall be maintained in the research shadow chart.          Element of Informed Consent Discussed Date Initials/ Person obtaining consent   A statement that the study involves research, an explanation of the purposes of the research and the expected duration of the subject's participation, a description of the procedures to be followed, and identification of any procedures which are experimental 04/24/24   Anitha Saul    A description of any reasonably foreseeable risks or discomforts to the subject. 04/24/24   Anitha Saul    A description of any benefits to the subject or to others which may reasonably be expected from the research. 04/24/24   Anitha Saul    A disclosure of appropriate alternative procedures or courses of treatment, if any, that might be advantageous to the subject. 04/24/24   Anitha Saul    A statement describing the extent, if any, to which confidentiality of records identifying the subject will be maintained and that notes the possibility that the Food and Drug Administration may inspect the records 04/24/24   Anitha Saul    For research involving more than minimal risk, an explanation as to whether any compensation and an  explanation as to whether any medical treatments are available if injury occurs and, if so, what they consist of, or where further information may be obtained. 04/24/24   Anitha Saul    An explanation of whom to contact for answers to pertinent questions about the research and research subjects' rights, and whom to contact in the event of a research-related injury to the subject. 04/24/24   Anitha Saul    A statement that participation is voluntary, that refusal to participate will involve no penalty or loss of benefits to which the subject is otherwise entitled, and that the subject may discontinue participation at any time without penalty or loss of benefits to which the subject is otherwise entitled. 04/24/24   Anitha Saul    A statement that the particular treatment or procedure may involve risks to the subject (or to the embryo or fetus, if the subject is or may become pregnant) which are currently unforeseeable 04/24/24   Anitha Saul    Anticipated circumstances under which the subject's participation may be terminated by the investigator without regard to the subject's consent. 04/24/24   Anitha Saul    Any additional costs to the subject that may result from participation in the research 04/24/24   Anitha Saul    The consequences of a subjects' decision to withdraw from the research and procedures for orderly termination of participation by the subject. 04/24/24   Anitha Saul    A statement that significant new findings developed during the course of the research which may relate to the subject's willingness to continue participation will be provided to the subject. 04/24/24   Anitha Saul    The approximate number of subjects involved in the study. 04/24/24   Anitha Saul      The patient speaks, reads and understands English?   [x] Yes  []No     If NO, Name of :___________________________________      speaks,  reads, and understands English?  [x]Yes  []No  []N/A     Process utilized to obtain consent:_______________________________________________   _______________________________________________    Subject meets eligibility criteria as outline in the current protocol? [x]Yes  []No      [] N/A - Reason: ___________________________________________________________    The protocol consent was reviewed with the patient and all questions were addressed and answered?  [x]Yes  []No       The subject agreed to participate and the consent was signed and dated?  [x]Yes  []No        Consent was obtained prior to any research procedures being performed?  [x]Yes  []No           Date & Time ICF signed:  04/24/24 /0945        Certification of Person Conducting the Consent Process:                                                                                Anitha Saul    04/24/24    Printed Name    Date

## 2024-04-24 NOTE — PLAN OF CARE
Problem: Prexisting or High Potential for Compromised Skin Integrity  Goal: Skin integrity is maintained or improved  Description: INTERVENTIONS:  - Identify patients at risk for skin breakdown  - Assess and monitor skin integrity  - Assess and monitor nutrition and hydration status  - Monitor labs   - Assess for incontinence   - Turn and reposition patient  - Assist with mobility/ambulation  - Relieve pressure over bony prominences  - Avoid friction and shearing  - Provide appropriate hygiene as needed including keeping skin clean and dry  - Evaluate need for skin moisturizer/barrier cream  - Collaborate with interdisciplinary team   - Patient/family teaching  - Consider wound care consult   Outcome: Progressing     Problem: PAIN - ADULT  Goal: Verbalizes/displays adequate comfort level or baseline comfort level  Description: Interventions:  - Encourage patient to monitor pain and request assistance  - Assess pain using appropriate pain scale  - Administer analgesics based on type and severity of pain and evaluate response  - Implement non-pharmacological measures as appropriate and evaluate response  - Consider cultural and social influences on pain and pain management  - Notify physician/advanced practitioner if interventions unsuccessful or patient reports new pain  Outcome: Progressing     Problem: INFECTION - ADULT  Goal: Absence or prevention of progression during hospitalization  Description: INTERVENTIONS:  - Assess and monitor for signs and symptoms of infection  - Monitor lab/diagnostic results  - Monitor all insertion sites, i.e. indwelling lines, tubes, and drains  - Monitor endotracheal if appropriate and nasal secretions for changes in amount and color  - Dell appropriate cooling/warming therapies per order  - Administer medications as ordered  - Instruct and encourage patient and family to use good hand hygiene technique  - Identify and instruct in appropriate isolation precautions for  identified infection/condition  Outcome: Progressing  Goal: Absence of fever/infection during neutropenic period  Description: INTERVENTIONS:  - Monitor WBC    Outcome: Progressing     Problem: SAFETY ADULT  Goal: Patient will remain free of falls  Description: INTERVENTIONS:  - Educate patient/family on patient safety including physical limitations  - Instruct patient to call for assistance with activity   - Consult OT/PT to assist with strengthening/mobility   - Keep Call bell within reach  - Keep bed low and locked with side rails adjusted as appropriate  - Keep care items and personal belongings within reach  - Initiate and maintain comfort rounds  - Make Fall Risk Sign visible to staff  - Apply yellow socks and bracelet for high fall risk patients  - Consider moving patient to room near nurses station  Outcome: Progressing  Goal: Maintain or return to baseline ADL function  Description: INTERVENTIONS:  -  Assess patient's ability to carry out ADLs; assess patient's baseline for ADL function and identify physical deficits which impact ability to perform ADLs (bathing, care of mouth/teeth, toileting, grooming, dressing, etc.)  - Assess/evaluate cause of self-care deficits   - Assess range of motion  - Assess patient's mobility; develop plan if impaired  - Assess patient's need for assistive devices and provide as appropriate  - Encourage maximum independence but intervene and supervise when necessary  - Involve family in performance of ADLs  - Assess for home care needs following discharge   - Consider OT consult to assist with ADL evaluation and planning for discharge  - Provide patient education as appropriate  Outcome: Progressing  Goal: Maintains/Returns to pre admission functional level  Description: INTERVENTIONS:  - Perform AM-PAC 6 Click Basic Mobility/ Daily Activity assessment daily.  - Set and communicate daily mobility goal to care team and patient/family/caregiver.   - Collaborate with rehabilitation  services on mobility goals if consulted  - Out of bed for toileting  - Record patient progress and toleration of activity level   Outcome: Progressing

## 2024-04-24 NOTE — ANESTHESIA PREPROCEDURE EVALUATION
Procedure:  IR PE ENDOVASCULAR THERAPY    Relevant Problems   ANESTHESIA (within normal limits)      CARDIO   (+) Essential hypertension   (+) SVT (supraventricular tachycardia)   (+) Shortness of breath on exertion      ENDO   (+) Acquired hypothyroidism   (+) Type 2 diabetes mellitus, without long-term current use of insulin (HCC)      GI/HEPATIC   (+) Gastroesophageal reflux disease without esophagitis      MUSCULOSKELETAL   (+) Osteoarthritis of right knee      NEURO/PSYCH   (+) Anxiety   (+) Recurrent major depressive disorder, in remission (HCC)      PULMONARY   (+) Chronic hypoxemic respiratory failure (HCC)   (+) Shortness of breath on exertion       TTE 4/23/2024    Left Ventricle: Left ventricular cavity size is normal. Wall thickness is moderately increased. There is mild to moderate concentric hypertrophy. The left ventricular ejection fraction is 65% by visual estimation. Systolic function is normal.  LV is D-shaped consistent with flattening of interventricular septum due to RV pressure overload due to pulmonary embolism. Wall motion is normal.    IVS: There is systolic flattening of the interventricular septum consistent with right ventricle pressure overload.    Right Ventricle: Right ventricular cavity size is mild to moderately dilated.  LV RV ratio appears to close 0.9-1.0.  Visually and in measurements Systolic function is mildly reduced.    Mitral Valve: There is trace to mild regurgitation.    Tricuspid Valve: There is mild to moderate regurgitation.  Pulmonary artery pressure around 45 mmHg.      Physical Exam    Airway    Mallampati score: II  TM Distance: <3 FB  Neck ROM: full     Dental   Comment: Partial upper denture, denies loose teeth, admits to several fragile teeth     Cardiovascular      Pulmonary      Other Findings  post-pubertal.      Anesthesia Plan  ASA Score- 4 Emergent    Anesthesia Type- IV sedation with anesthesia with ASA Monitors.         Additional Monitors:     Airway  Plan:     Comment: On 3lpm at home at baseline.       Plan Factors-Exercise tolerance (METS): <4 METS.    Chart reviewed. EKG reviewed.  Existing labs reviewed. Patient summary reviewed.    Patient is not a current smoker.              Induction- intravenous.    Postoperative Plan-     Informed Consent- Anesthetic plan and risks discussed with patient.  I personally reviewed this patient with the CRNA. Discussed and agreed on the Anesthesia Plan with the CRNA..

## 2024-04-24 NOTE — PROCEDURES
POC DVT/PE US    Date/Time: 4/24/2024 12:15 AM    Performed by: Shelton Mercedes DO  Authorized by: Shelton Mercedes DO    Patient location:  ED  Performed by:  Resident  Other Assisting Provider: Yes (comment) (Dr. JAGUAR Jean)    Procedure details:     Exam Type:  Diagnostic    Indications comment:  Known PE    Assessment for:  DVT    Views obtained: common femoral vein and popliteal vein      Image quality: diagnostic      Image availability:  Not saved  Findings:     Extremities evaluated:  Left lower extremity and right lower extremity    Right common femoral vein:  Not compressible    Right popliteal vein:  Compressible    Left common femoral vein:  Compressible    Left popliteal vein:  Compressible  Interpretation:     DVT location:  Right lower extremity

## 2024-04-24 NOTE — BRIEF OP NOTE (RAD/CATH)
INTERVENTIONAL RADIOLOGY PROCEDURE NOTE    Date: 4/24/2024    Procedure: IR PE ENDOVASCULAR THERAPY     Preoperative diagnosis:   1. Pulmonary emboli (HCC)       Postoperative diagnosis: Same.    Surgeon: Paul Hernandez MD     Assistant: None. No qualified resident was available.    Blood loss: minimal    Specimens: none    Findings: Successful PE thrombectomy.  Plan:  Continue heparin and transition to oral anticoagulation.  Bedrest 4 hours.  IR to remove purse string woggle tomorrow morning.    Complications: None immediate.    Anesthesia: MAC sedation

## 2024-04-24 NOTE — PROGRESS NOTES
[facility]  Progress Note: Critical Care  Name: Danyelle Martinez 83 y.o. female I MRN: [id number]  Unit/Bed#: MICU 02 I Date of Admission: 4/23/2024   Date of Service: 4/24/2024 I Hospital Day: 1    Assessment/Plan   Neuro:   Diagnosis: History of tremor  Continue home clonazepam, primidone  Diagnosis: hx TIA  Plan: continue plavix and statin     CV:   Diagnosis: CHF  Plan: ECHO 4/23/2024 shows EF 65%. LV D-shaped with flattening of septum. LV:RV 0.9-1.0. PA pressure 45 mmHg.  Monitor daily weight  Diagnosis: HLD  Plan: continue home statin  Diagnosis: HTN  Plan: hold BP meds for now     Pulm:  Diagnosis: saddle PE with evidence of right heart strain  Plan: on hep gtt  On 2L NC at baseline  Keep o2 sat >92%  D/w IR about possible thrombectomy  POCUS R fem DVT; f/u US doppler B/L LE  Diagnosis: History of chronic hypoxic respiratory failure  Continue home oxygen supplementation 2 L/min via nasal cannula     GI:   Diagnosis: no active issues     :   Diagnosis: no active issues  Plan: Is & Os     F/E/N:    F: Begin maintenance fluids  E: replete prn  N: npo sips with meds for now     Heme/Onc:   Diagnosis: DVT hx  Plan: see PE     Endo:   Diagnosis: Hypothyroidism  Plan: continue home levothyroxine  Thyroid nodule stable per last admission note 4/4/2024     ID:   No acute issues     MSK/Skin:   Diagnosis: precautions  Plan: frequent turning   OOB    Disposition: Critical care    ICU Core Measures     A: Assess, Prevent, and Manage Pain Has pain been assessed? Yes  Need for changes to pain regimen? No   B: Both SAT/SAT  N/A   C: Choice of Sedation RASS Goal: 0 Alert and Calm  Need for changes to sedation or analgesia regimen? No   D: Delirium CAM-ICU: Negative   E: Early Mobility  Plan for early mobility? Yes   F: Family Engagement Plan for family engagement today? Yes         Prophylaxis:  VTE VTE covered by:  heparin (porcine), Intravenous, 20 Units/kg/hr at 04/23/24  "2207  heparin (porcine), Intravenous, 2,800 Units at 04/23/24 2211  heparin (porcine), Intravenous       Stress Ulcer  covered bypantoprazole (PROTONIX) 40 mg tablet [[id number]] (Long-Term Med), pantoprazole (PROTONIX) EC tablet 40 mg [[id number]]        Significant 24hr Events     24hr events: Patient transferred from outside [facility] for submassive saddle pulmonary embolism as PERT alert.     Subjective     Review of Systems   Constitutional:  Negative for chills and fever.   Respiratory:  Positive for shortness of breath (\"Chest heaviness\").    Cardiovascular:  Negative for chest pain.   Gastrointestinal:  Negative for abdominal pain, nausea and vomiting.   Neurological:  Negative for light-headedness and headaches.        Objective                            Vitals I/O      Most Recent Min/Max in 24hrs   Temp 98.8 °F (37.1 °C) Temp  Min: 97.5 °F (36.4 °C)  Max: 98.8 °F (37.1 °C)   Pulse 93 Pulse  Min: 93  Max: 111   Resp (!) 26 Resp  Min: 21  Max: 45   /63 BP  Min: 109/58  Max: 166/81   O2 Sat 93 % SpO2  Min: 91 %  Max: 96 %      Intake/Output Summary (Last 24 hours) at 4/24/2024 0726  Last data filed at 4/24/2024 0600  Gross per 24 hour   Intake 117.51 ml   Output 80 ml   Net 37.51 ml       Diet NPO; Sips with meds    Invasive Monitoring           Physical Exam   Physical Exam  Eyes:      Extraocular Movements: Extraocular movements intact.      Pupils: Pupils are equal, round, and reactive to light.   Skin:     General: Skin is warm and dry.      Capillary Refill: Capillary refill takes less than 2 seconds.   HENT:      Head: Normocephalic.      Mouth/Throat:      Mouth: Mucous membranes are moist.   Cardiovascular:      Rate and Rhythm: Normal rate and regular rhythm.      Pulses: Normal pulses.   Musculoskeletal:      Right lower leg: No edema.      Left lower leg: No edema.   Abdominal: General: There is no distension.      Palpations: Abdomen is soft.      Tenderness: There is no " abdominal tenderness.   Constitutional:       General: She is not in acute distress.     Appearance: She is not toxic-appearing.   Pulmonary:      Effort: Tachypnea present.      Breath sounds: No wheezing, rhonchi or rales.   Neurological:      General: No focal deficit present.      Mental Status: Mental status is at baseline.            Diagnostic Studies      Imaging:  I have personally reviewed pertinent reports.   and I have personally reviewed pertinent films in PACS     Medications:  Scheduled PRN   atorvastatin, 80 mg, HS  calcium carbonate, 2 tablet, Daily With Breakfast  chlorhexidine, 15 mL, Q12H JOHN  clonazePAM, 0.5 mg, HS  clopidogrel, 75 mg, Daily  DULoxetine, 60 mg, Daily  fluticasone, 1 spray, BID  levothyroxine, 88 mcg, Early Morning  loratadine, 10 mg, Daily  multivitamin-minerals, 1 tablet, Daily  pantoprazole, 40 mg, Daily Before Breakfast  primidone, 50 mg, Daily      heparin (porcine), 2,800 Units, Q6H PRN  heparin (porcine), 5,600 Units, Q6H PRN       Continuous    heparin (porcine), 3-30 Units/kg/hr (Order-Specific), Last Rate: 20 Units/kg/hr (04/23/24 2207)         Labs:    CBC    Recent Labs     04/23/24 2010 04/24/24  0450   WBC 9.91 10.49*   HGB 11.6 11.7   HCT 36.8 36.8    175     BMP    Recent Labs     04/23/24 2010 04/24/24  0548   SODIUM 138 138   K 4.2 4.2    103   CO2 27 27   AGAP 8 8   BUN 19 18   CREATININE 0.59* 0.58*   CALCIUM 9.7 9.5       Coags    Recent Labs     04/23/24 2010 04/23/24  2122 04/24/24  0548   INR 1.30* 1.28*  --    PTT >210* 54* 67*        Additional Electrolytes  Recent Labs     04/23/24 2010 04/24/24  0548   MG 1.8* 1.9   PHOS 2.9 3.0          Blood Gas    No recent results  No recent results LFTs  Recent Labs     04/23/24 2010 04/24/24  0548   ALT 23 21   AST 32 31   ALKPHOS 87 88   ALB 3.7 3.7   TBILI 0.37 0.62       Infectious  No recent results  Glucose  Recent Labs     04/23/24  1342 04/23/24 2010 04/24/24  0548   GLUC 106 115 204                Levy Perkins, DO

## 2024-04-25 LAB
ANION GAP SERPL CALCULATED.3IONS-SCNC: 8 MMOL/L (ref 4–13)
APTT PPP: 67 SECONDS (ref 23–37)
APTT PPP: 71 SECONDS (ref 23–37)
ATRIAL RATE: 87 BPM
BASOPHILS # BLD AUTO: 0.03 THOUSANDS/ÂΜL (ref 0–0.1)
BASOPHILS NFR BLD AUTO: 0 % (ref 0–1)
BUN SERPL-MCNC: 18 MG/DL (ref 5–25)
CALCIUM SERPL-MCNC: 9.4 MG/DL (ref 8.4–10.2)
CHLORIDE SERPL-SCNC: 103 MMOL/L (ref 96–108)
CO2 SERPL-SCNC: 27 MMOL/L (ref 21–32)
CREAT SERPL-MCNC: 0.62 MG/DL (ref 0.6–1.3)
DME PARACHUTE DELIVERY DATE REQUESTED: NORMAL
DME PARACHUTE DELIVERY DATE REQUESTED: NORMAL
DME PARACHUTE DELIVERY NOTE: NORMAL
DME PARACHUTE DELIVERY NOTE: NORMAL
DME PARACHUTE ITEM DESCRIPTION: NORMAL
DME PARACHUTE ORDER STATUS: NORMAL
DME PARACHUTE ORDER STATUS: NORMAL
DME PARACHUTE SUPPLIER NAME: NORMAL
DME PARACHUTE SUPPLIER NAME: NORMAL
DME PARACHUTE SUPPLIER PHONE: NORMAL
DME PARACHUTE SUPPLIER PHONE: NORMAL
EOSINOPHIL # BLD AUTO: 0.22 THOUSAND/ÂΜL (ref 0–0.61)
EOSINOPHIL NFR BLD AUTO: 2 % (ref 0–6)
ERYTHROCYTE [DISTWIDTH] IN BLOOD BY AUTOMATED COUNT: 16.7 % (ref 11.6–15.1)
GFR SERPL CREATININE-BSD FRML MDRD: 82 ML/MIN/1.73SQ M
GLUCOSE SERPL-MCNC: 106 MG/DL (ref 65–140)
GLUCOSE SERPL-MCNC: 108 MG/DL (ref 65–140)
GLUCOSE SERPL-MCNC: 110 MG/DL (ref 65–140)
GLUCOSE SERPL-MCNC: 115 MG/DL (ref 65–140)
GLUCOSE SERPL-MCNC: 97 MG/DL (ref 65–140)
HCT VFR BLD AUTO: 34.5 % (ref 34.8–46.1)
HGB BLD-MCNC: 10.7 G/DL (ref 11.5–15.4)
IMM GRANULOCYTES # BLD AUTO: 0.06 THOUSAND/UL (ref 0–0.2)
IMM GRANULOCYTES NFR BLD AUTO: 1 % (ref 0–2)
LYMPHOCYTES # BLD AUTO: 1.15 THOUSANDS/ÂΜL (ref 0.6–4.47)
LYMPHOCYTES NFR BLD AUTO: 12 % (ref 14–44)
MCH RBC QN AUTO: 30.4 PG (ref 26.8–34.3)
MCHC RBC AUTO-ENTMCNC: 31 G/DL (ref 31.4–37.4)
MCV RBC AUTO: 98 FL (ref 82–98)
MONOCYTES # BLD AUTO: 0.66 THOUSAND/ÂΜL (ref 0.17–1.22)
MONOCYTES NFR BLD AUTO: 7 % (ref 4–12)
NEUTROPHILS # BLD AUTO: 7.76 THOUSANDS/ÂΜL (ref 1.85–7.62)
NEUTS SEG NFR BLD AUTO: 78 % (ref 43–75)
NRBC BLD AUTO-RTO: 0 /100 WBCS
P AXIS: 49 DEGREES
PLATELET # BLD AUTO: 163 THOUSANDS/UL (ref 149–390)
PMV BLD AUTO: 11.8 FL (ref 8.9–12.7)
POTASSIUM SERPL-SCNC: 3.9 MMOL/L (ref 3.5–5.3)
PR INTERVAL: 166 MS
QRS AXIS: 73 DEGREES
QRSD INTERVAL: 94 MS
QT INTERVAL: 412 MS
QTC INTERVAL: 495 MS
RBC # BLD AUTO: 3.52 MILLION/UL (ref 3.81–5.12)
SODIUM SERPL-SCNC: 138 MMOL/L (ref 135–147)
T WAVE AXIS: 53 DEGREES
VENTRICULAR RATE: 87 BPM
WBC # BLD AUTO: 9.88 THOUSAND/UL (ref 4.31–10.16)

## 2024-04-25 PROCEDURE — 97167 OT EVAL HIGH COMPLEX 60 MIN: CPT

## 2024-04-25 PROCEDURE — 85730 THROMBOPLASTIN TIME PARTIAL: CPT | Performed by: STUDENT IN AN ORGANIZED HEALTH CARE EDUCATION/TRAINING PROGRAM

## 2024-04-25 PROCEDURE — 02CR3ZZ EXTIRPATION OF MATTER FROM LEFT PULMONARY ARTERY, PERCUTANEOUS APPROACH: ICD-10-PCS | Performed by: RADIOLOGY

## 2024-04-25 PROCEDURE — 99232 SBSQ HOSP IP/OBS MODERATE 35: CPT | Performed by: PHYSICIAN ASSISTANT

## 2024-04-25 PROCEDURE — 85025 COMPLETE CBC W/AUTO DIFF WBC: CPT | Performed by: STUDENT IN AN ORGANIZED HEALTH CARE EDUCATION/TRAINING PROGRAM

## 2024-04-25 PROCEDURE — 97163 PT EVAL HIGH COMPLEX 45 MIN: CPT

## 2024-04-25 PROCEDURE — 82948 REAGENT STRIP/BLOOD GLUCOSE: CPT

## 2024-04-25 PROCEDURE — 02CQ3ZZ EXTIRPATION OF MATTER FROM RIGHT PULMONARY ARTERY, PERCUTANEOUS APPROACH: ICD-10-PCS | Performed by: RADIOLOGY

## 2024-04-25 PROCEDURE — NC001 PR NO CHARGE: Performed by: INTERNAL MEDICINE

## 2024-04-25 PROCEDURE — 93010 ELECTROCARDIOGRAM REPORT: CPT | Performed by: INTERNAL MEDICINE

## 2024-04-25 PROCEDURE — 80048 BASIC METABOLIC PNL TOTAL CA: CPT | Performed by: STUDENT IN AN ORGANIZED HEALTH CARE EDUCATION/TRAINING PROGRAM

## 2024-04-25 PROCEDURE — 99233 SBSQ HOSP IP/OBS HIGH 50: CPT | Performed by: INTERNAL MEDICINE

## 2024-04-25 RX ORDER — ATORVASTATIN CALCIUM 80 MG/1
TABLET, FILM COATED ORAL
Status: ON HOLD | COMMUNITY
End: 2024-04-27 | Stop reason: CLARIF

## 2024-04-25 RX ORDER — CHOLECALCIFEROL (VITAMIN D3) 125 MCG
CAPSULE ORAL
COMMUNITY

## 2024-04-25 RX ORDER — CETIRIZINE HYDROCHLORIDE 10 MG/1
TABLET ORAL
Status: ON HOLD | COMMUNITY
End: 2024-04-27 | Stop reason: CLARIF

## 2024-04-25 RX ORDER — AMLODIPINE BESYLATE 5 MG/1
TABLET ORAL
COMMUNITY
End: 2024-04-29 | Stop reason: SDUPTHER

## 2024-04-25 RX ORDER — LABETALOL HYDROCHLORIDE 5 MG/ML
5 INJECTION, SOLUTION INTRAVENOUS ONCE
Status: COMPLETED | OUTPATIENT
Start: 2024-04-25 | End: 2024-04-25

## 2024-04-25 RX ORDER — NEBIVOLOL 5 MG/1
TABLET ORAL
COMMUNITY
End: 2024-04-29 | Stop reason: SDUPTHER

## 2024-04-25 RX ORDER — LEVOTHYROXINE SODIUM 0.1 MG/1
TABLET ORAL
Status: ON HOLD | COMMUNITY
End: 2024-04-27 | Stop reason: CLARIF

## 2024-04-25 RX ORDER — TRIAMCINOLONE ACETONIDE 55 UG/1
SPRAY, METERED NASAL
COMMUNITY

## 2024-04-25 RX ORDER — POTASSIUM BICARBONATE 782 MG/1
TABLET, EFFERVESCENT ORAL
COMMUNITY
End: 2024-05-07 | Stop reason: SDUPTHER

## 2024-04-25 RX ADMIN — CHLORHEXIDINE GLUCONATE 0.12% ORAL RINSE 15 ML: 1.2 LIQUID ORAL at 09:05

## 2024-04-25 RX ADMIN — CHLORHEXIDINE GLUCONATE 0.12% ORAL RINSE 15 ML: 1.2 LIQUID ORAL at 21:36

## 2024-04-25 RX ADMIN — LEVOTHYROXINE SODIUM 88 MCG: 88 TABLET ORAL at 05:26

## 2024-04-25 RX ADMIN — ATORVASTATIN CALCIUM 80 MG: 80 TABLET, FILM COATED ORAL at 21:35

## 2024-04-25 RX ADMIN — HEPARIN SODIUM 18 UNITS/KG/HR: 10000 INJECTION, SOLUTION INTRAVENOUS at 04:57

## 2024-04-25 RX ADMIN — LABETALOL HYDROCHLORIDE 5 MG: 5 INJECTION, SOLUTION INTRAVENOUS at 05:25

## 2024-04-25 RX ADMIN — Medication 1 TABLET: at 09:05

## 2024-04-25 RX ADMIN — PRIMIDONE 50 MG: 50 TABLET ORAL at 09:05

## 2024-04-25 RX ADMIN — Medication 2 TABLET: at 09:04

## 2024-04-25 RX ADMIN — HEPARIN SODIUM 18 UNITS/KG/HR: 10000 INJECTION, SOLUTION INTRAVENOUS at 23:52

## 2024-04-25 RX ADMIN — CLOPIDOGREL BISULFATE 75 MG: 75 TABLET ORAL at 09:05

## 2024-04-25 RX ADMIN — FLUTICASONE PROPIONATE 1 SPRAY: 50 SPRAY, METERED NASAL at 17:29

## 2024-04-25 RX ADMIN — DULOXETINE HYDROCHLORIDE 60 MG: 60 CAPSULE, DELAYED RELEASE ORAL at 09:05

## 2024-04-25 RX ADMIN — LORATADINE 10 MG: 10 TABLET ORAL at 09:05

## 2024-04-25 RX ADMIN — PANTOPRAZOLE SODIUM 40 MG: 40 TABLET, DELAYED RELEASE ORAL at 09:05

## 2024-04-25 RX ADMIN — LABETALOL HYDROCHLORIDE 5 MG: 5 INJECTION, SOLUTION INTRAVENOUS at 02:52

## 2024-04-25 RX ADMIN — CLONAZEPAM 0.5 MG: 0.5 TABLET ORAL at 21:42

## 2024-04-25 RX ADMIN — FLUTICASONE PROPIONATE 1 SPRAY: 50 SPRAY, METERED NASAL at 09:07

## 2024-04-25 NOTE — PROGRESS NOTES
Critical Care Interval Transfer Note:    Please refer to progress note from earlier today for full details.     ICU course: 82 yo female with history of hypothyroidism, prior TIA, tremor, chronic hypoxic respiratory failure (2-3 L/min baseline) who presented to outside hospital for respiratory distress and hypoxia. She was found to have submassive saddle PE as well as R-sided DVT. She was started on heparin drip and transferred to [facility] for thrombectomy. She underwent successful thrombectomy and has been saturating well on her home oxygen and is asymptomatic. PE is considered provoked due to recent hospital stay for facial cellulitis/parotitis. She remains on heparin drip and needs to be transitioned to DOAC. She also will be seen by PT/OT.     Barriers to discharge:   Transition from heparin drip to DOAC for DVT and PE  PT/OT evaluations  Clearance by IR     Consults: IP CONSULT TO CASE MANAGEMENT  INPATIENT CONSULT TO IR    Recommended to review admission imaging for incidental findings and document in discharge navigator: Chart reviewed, no known incidental findings noted at this time.      Discharge Plan: Anticipate discharge in 24-48 hrs to home.            Patient seen and evaluated by Critical Care today and deemed to be appropriate for transfer to Med Surg. Spoke to Dr. Menon from [facility] to accept transfer. Critical care can be contacted via [facility] with any questions or concerns.

## 2024-04-25 NOTE — CASE MANAGEMENT
Case Management Assessment & Discharge Planning Note    Patient name Danyelle Martinez  [facility] MRN [id number]  : 1940 Date 2024       Current Admission Date: 2024  Current Admission Diagnosis:PE (pulmonary thromboembolism) ([facility])   Patient Active Problem List    Diagnosis Date Noted    Parotitis, acute 2024    Facial cellulitis 2024    Change in mental status 2024    Cigarette nicotine dependence in remission 2024    Chronic diastolic CHF (congestive heart failure) ([facility]) 2024    Leukocytosis 2024    Closed wedge compression fracture of T1 vertebra with routine healing 2023    Enlarged thyroid 2023    Overflow incontinence of urine 2023    Fall 11/15/2023    Syncope, cardiogenic 11/15/2023    Recurrent major depressive disorder, in remission ([facility]) 2023    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    SVT (supraventricular tachycardia) 2023    Osteoarthritis of right knee 2023    Elevated liver enzymes 2023    Type 2 diabetes mellitus, without long-term current use of insulin ([facility]) 2023    Dyspepsia 2022    Frequent falls 2022    Localized swelling of right lower leg 2022    Anxiety 2022    Gastroesophageal reflux disease without esophagitis 10/12/2022    Nocturnal hypoxemia 2021    Acquired hypothyroidism 2021    Thyroid nodule 2021    Palpitations 2021    Tremor 2021    History of transient ischemic attack (TIA) 2021    Essential hypertension 2021    Dyslipidemia 2021    Seasonal allergic rhinitis due to pollen 2021    Chronic hypoxemic respiratory failure (HCC) 2020    Dizziness 2020    Shortness of breath on exertion       LOS (days): 2  Geometric Mean LOS (GMLOS) (days): 3.7  Days to GMLOS:2.1     OBJECTIVE:  PATIENT READMITTED TO HOSPITAL  Risk of Unplanned Readmission Score: 26.09         Current admission  status: Inpatient       Preferred Pharmacy:   [id number] - [address] [place] [address] [address] [place] [id number]  Phone: [phone] Fax: [phone]    Primary Care Provider: Bladimir Caraballo MD    Primary Insurance: MEDICARE  Secondary Insurance: AARP    ASSESSMENT:  Active Health Care Proxies       Vivi Gonzales Health Care Agent - Daughter   Primary Phone: [phone] (Home)                   Readmission Root Cause  30 Day Readmission: Yes  Who directed you to return to the hospital?: Family  Did you understand whom to contact if you had questions or problems?: Yes  Did you get your prescriptions before you left the hospital?: Yes  Were you able to get your prescriptions filled when you left the hospital?: Yes  Did you take your medications as prescribed?: Yes  Were you able to get to your follow-up appointments?: Yes  Patient was readmitted due to: pulmonary thromboembolism  Action Plan: TBD    Patient Information  Admitted from:: Facility  Mental Status: Alert  During Assessment patient was accompanied by: Not accompanied during assessment  Assessment information provided by:: Patient  Primary Caregiver: Self  Support Systems: Self, Family members  County of Residence: Other (specify in comment box) ([place])  What city do you live in?: [place]  Home entry access options. Select all that apply.: Stairs  Number of steps to enter home.: 2  Do the steps have railings?: Yes  Type of Current Residence: Apartment  Floor Level: 1  Upon entering residence, is there a bedroom on the main floor (no further steps)?: Yes  Upon entering residence, is there a bathroom on the main floor (no further steps)?: Yes  Living Arrangements: Lives Alone  Is patient a ?: No    Activities of Daily Living Prior to Admission  Functional Status: Independent  Completes ADLs independently?: Yes  Ambulates independently?: Yes  Does patient use assisted devices?: Yes  Assisted Devices (DME)  used: Home Oxygen concentrator, Shower Chair, Straight Cane, Walker  Does patient currently own DME?: Yes  What DME does the patient currently own?: Home Oxygen concentrator, Shower Chair, Straight Cane, Walker  Does patient have a history of Outpatient Therapy (PT/OT)?: Yes  Does the patient have a history of Short-Term Rehab?: Yes (Pt could not remember name of STR)  Does patient have a history of HHC?: Yes (Pt could not remember name of agency)  Does patient currently have HHC?: No    Patient Information Continued  Income Source: Pension/custodial  Does patient have prescription coverage?: Yes  Does patient receive dialysis treatments?: No  Does patient have a history of substance abuse?: No  Does patient have a history of Mental Health Diagnosis?: Yes (pt declined to answer)  Is patient receiving treatment for mental health?: Yes  Has patient received inpatient treatment related to mental health in the last 2 years?: No      Means of Transportation  Means of Transport to Appts:: Family transport      Social Determinants of Health (SDOH)      Flowsheet Row Most Recent Value   Housing Stability    In the last 12 months, was there a time when you were not able to pay the mortgage or rent on time? N   In the last 12 months, how many places have you lived? 1   In the last 12 months, was there a time when you did not have a steady place to sleep or slept in a shelter (including now)? N   Transportation Needs    In the past 12 months, has lack of transportation kept you from medical appointments or from getting medications? no   In the past 12 months, has lack of transportation kept you from meetings, work, or from getting things needed for daily living? No   Food Insecurity    Within the past 12 months, you worried that your food would run out before you got the money to buy more. Never true   Within the past 12 months, the food you bought just didn't last and you didn't have money to get more. Never true   Utilities     In the past 12 months has the electric, gas, oil, or water company threatened to shut off services in your home? No            DISCHARGE DETAILS:    Discharge planning discussed with:: patient  Freedom of Choice: Yes     CM contacted family/caregiver?: No- see comments  Were Treatment Team discharge recommendations reviewed with patient/caregiver?: Yes  Did patient/caregiver verbalize understanding of patient care needs?: Yes  Were patient/caregiver advised of the risks associated with not following Treatment Team discharge recommendations?: Yes    Contacts  Patient Contacts: Vivi Gonzales (Daughter)  [phone]  Relationship to Patient:: Family  Contact Method: Phone  Phone Number: [phone]  Reason/Outcome: Continuity of Care, Emergency Contact, Discharge Planning       Additional Comments: CM met w/ pt at bedside to introduce role and complete assessment. Pt is 30 day re-admit and lives alone in 1st floor apartment. Pt reported ambulating/completing adls independently w/ walker or cane. Pt is on 02 at baseline. Pt reported hx of OP PT/OT, STR, and [facility] for PT/OT and nursing. Pt has no hx of D&A. Pt has HX of MH but declined to disclose what DX- pt is currently seen by therapist and has not been hospitalized in prior years due to MH DX. Pt requested CM to call pt's daughter Vivi to discuss possibility of ordering rollator or portable 02. CM to dicuss and follow up.    CM reviewed d/c planning process including the following: identifying help at home, patient preference for d/c planning needs, Discharge Lounge, [facility] Meds to Bed program, availability of treatment team to discuss questions or concerns patient and/or family may have regarding understanding medications and recognizing signs and symptoms once discharged.  CM also encouraged patient to follow up with all recommended appointments after discharge. Patient advised of importance for patient and family to participate in managing patient’s  medical well being.       Addendum 10:11- CM called pt's daughter Vivi ([phone]) pt is on 3L/min 02 at baseline. Vivi requested CM inquire about portable 02 concentrator for pt. CM to inquire and follow.

## 2024-04-25 NOTE — PHYSICAL THERAPY NOTE
Physical Therapy Evaluation     Patient's Name: Danyelle Martinez    Admitting Diagnosis  PE (pulmonary thromboembolism) ([facility]) [I26.99]    Problem List  Patient Active Problem List   Diagnosis    Shortness of breath on exertion    Dizziness    Chronic hypoxemic respiratory failure ([facility])    Seasonal allergic rhinitis due to pollen    Essential hypertension    Dyslipidemia    History of transient ischemic attack (TIA)    Acquired hypothyroidism    Thyroid nodule    Palpitations    Tremor    Nocturnal hypoxemia    Gastroesophageal reflux disease without esophagitis    Localized swelling of right lower leg    Anxiety    Frequent falls    Dyspepsia    SVT (supraventricular tachycardia)    Osteoarthritis of right knee    Elevated liver enzymes    Type 2 diabetes mellitus, without long-term current use of insulin ([facility])    Asymmetrical hearing loss    Recurrent major depressive disorder, in remission ([facility])    Fall    Syncope, cardiogenic    Overflow incontinence of urine    Enlarged thyroid    Closed wedge compression fracture of T1 vertebra with routine healing    Chronic diastolic CHF (congestive heart failure) ([facility])    Leukocytosis    Cigarette nicotine dependence in remission    Change in mental status    Parotitis, acute    Facial cellulitis       Past Medical History  Past Medical History:   Diagnosis Date    Anxiety     Arthritis     r knee and shoulders    Blind left eye     CHF (congestive heart failure) ([facility])     Deaf, left     Depression     Diabetes mellitus ([facility])     Disease of thyroid gland     DVT complicating pregnancy, unspecified trimester ([facility]) postpartum    Hearing loss     LEFT EAR    History of shingles 2007    fACIAL     Hyperlipidemia     Hypertension     Liver disease     Lyme disease     Meniere's disease     Obesity     Orthostatic hypotension     Psychiatric problem     Sinus congestion     Sleep apnea     Stroke ([facility])        Past Surgical History  Past Surgical History:   Procedure Laterality Date  "   APPENDECTOMY      BREAST BIOPSY Left 2010     SECTION      x2    DXA PROCEDURE (HISTORICAL)  10/28/2020    EYE SURGERY      HAND SURGERY Left     HERNIA REPAIR      HYSTERECTOMY      ROTATOR CUFF REPAIR Left     TONSILLECTOMY      TYMPANOSTOMY TUBE PLACEMENT          24 1104   PT Last Visit   PT Visit Date 24   Note Type   Note type Evaluation   Pain Assessment   Pain Assessment Tool 0-10   Pain Score No Pain   Hospital Pain Intervention(s) Ambulation/increased activity;Repositioned   Restrictions/Precautions   Weight Bearing Precautions Per Order No   Other Precautions Chair Alarm;Bed Alarm;Multiple lines;Telemetry;Fall Risk;Pain   Home Living   Type of Home Apartment   Home Layout One level;Able to live on main level with bedroom/bathroom   Bathroom Shower/Tub Tub/shower unit   Bathroom Toilet Standard   Bathroom Equipment Shower chair   Home Equipment Walker   Additional Comments Pt reports she has been discussing with her daughters moving into a new place that she should not be living alone anymore   Prior Function   Level of Marc[place] Independent with functional mobility   Lives With Alone   Receives Help From Family;Other (Comment)   Falls in the last 6 months 1 to 4  (2)   Vocational Retired   Cognition   Orientation Level Oriented X4   Subjective   Subjective Pt willing and agreeable to PT session. \" I want to move, I just will not move until I have something on to hold the purewick in place\"   RLE Assessment   RLE Assessment WFL   LLE Assessment   LLE Assessment WFL   Coordination   Movements are Fluid and Coordinated 0   Bed Mobility   Supine to Sit 5  Supervision   Additional items HOB elevated   Sit to Supine Unable to assess   Transfers   Sit to Stand 3  Moderate assistance   Additional items Assist x 1;Increased time required   Stand to Sit 3  Moderate assistance   Additional items Assist x 1;Increased time required   Ambulation/Elevation   Gait pattern Excessively slow;Foward " flexed;Shuffling;Decreased foot clearance;Forward Flexion   Gait Assistance 3  Moderate assist   Additional items Assist x 1   Assistive Device Other (Comment)  (HHA)   Distance 3'   Balance   Static Sitting Fair   Dynamic Sitting Fair -   Static Standing Poor +   Dynamic Standing Poor   Ambulatory Poor   Endurance Deficit   Endurance Deficit No   Activity Tolerance   Activity Tolerance Patient limited by fatigue;Patient limited by pain   Medical Staff Made Aware OT for D/C planning   Nurse Made Aware yes, nsg gave clearance to work with pt   Assessment   Prognosis Good   Problem List Decreased strength;Decreased range of motion;Decreased endurance;Impaired balance;Decreased mobility;Decreased coordination;Decreased cognition;Impaired judgement;Decreased safety awareness;Pain   Assessment Pt is 83 y.o. female seen for PT evaluation s/p admit to [facility] on 4/23/2024 w/ saddle PE. PT consulted to assess pt's functional mobility and d/c needs. Order placed for PT eval and tx, w/ up w/ A order. Comorbidities affecting pt's physical performance at time of assessment include:  has a past medical history of Anxiety, Arthritis, Blind left eye, CHF (congestive heart failure) ([facility]), Deaf, left, Depression, Diabetes mellitus (HCC), Disease of thyroid gland, DVT complicating pregnancy, unspecified trimester (HCC), Hearing loss, History of shingles, Hyperlipidemia, Hypertension, Liver disease, Lyme disease, Meniere's disease, Obesity, Orthostatic hypotension, Psychiatric problem, Sinus congestion, Sleep apnea, and Stroke ([facility]). PTA, pt was ambulates community distances and elevations and lives alone in single level apartment . Personal factors affecting pt at time of IE include: inaccessible home environment, stairs to enter home, limited home support, decreased initiation and engagement, unable to perform physical activity, inability to perform IADLs, and inability to perform ADLs. Please find objective findings  from PT assessment regarding body systems outlined above with impairments and limitations including weakness, impaired balance, decreased endurance, gait deviations, pain, decreased activity tolerance, decreased functional mobility tolerance, decreased safety awareness, impaired judgement, and fall risk. The following objective measures performed on IE also reveal limitations: The patient's AM-PAC Basic Mobility Inpatient Short Form Raw Score is 16, Standardized Score is 38.32. A standardized score less than 42.9 suggests the patient may benefit from discharge to post-acute rehabilitation services. Please also refer to the recommendation of the Physical Therapist for safe discharge planning. Pt's clinical presentation is currently unstable/unpredictable seen in pt's presentation of critical care monitoring. Pt to benefit from continued PT tx to address deficits as defined above and maximize level of functional independent mobility and consistency. From PT/mobility standpoint, recommendation at time of d/c would be level II pending progress in order to facilitate return to PLOF.   Barriers to Discharge Decreased caregiver support;Inaccessible home environment   Goals   Patient Goals To be more independent   STG Expiration Date 05/07/24   Short Term Goal #1 1. Complete bed mobility and transfers I to decrease need for caregiver in home. 2. Ambulate 300' I to complete household and community mobility without A. 3. Improve dynamic balance to good to decrease need for UE support during ambulation. 4. Be educated & demonstate 2 steps to be able to enter home without A.   Plan   Treatment/Interventions OT;Spoke to case management;Spoke to nursing;Gait training;Bed mobility;Patient/family training;Endurance training;LE strengthening/ROM;Functional transfer training   PT Frequency 3-5x/wk   AM-PAC Basic Mobility Inpatient   Turning in Flat Bed Without Bedrails 3   Lying on Back to Sitting on Edge of Flat Bed Without Bedrails  3   Moving Bed to Chair 3   Standing Up From Chair Using Arms 3   Walk in Room 2   Climb 3-5 Stairs With Railing 2   Basic Mobility Inpatient Raw Score 16   Basic Mobility Standardized Score 38.32   [facility] Highest Level Of Mobility   [facility] Goal 5: Stand one or more mins           Neelima Lee, PT

## 2024-04-25 NOTE — PROGRESS NOTES
[facility]  Progress Note: Critical Care  Name: Danyelle Martinez 83 y.o. female I MRN: [id number]  Unit/Bed#: MICU 02 I Date of Admission: 4/23/2024   Date of Service: 4/25/2024 I Hospital Day: 2    Assessment/Plan   Neuro:   Diagnosis: History of tremor  Continue home clonazepam, primidone  Diagnosis: hx TIA  Plan: continue plavix and statin     CV:   Diagnosis: CHF  Plan: ECHO 4/23/2024 shows EF 65%. LV D-shaped with flattening of septum. LV:RV 0.9-1.0. PA pressure 45 mmHg.  Monitor daily weight  Diagnosis: HLD  Plan: continue home statin  Diagnosis: HTN  Hold home bisoprolol     Pulm:  Diagnosis: saddle PE with evidence of right heart strain  DVT ultrasound shows extensive but none occlusive right-sided DVT  Status post thrombectomy 4/24 with IR  On 2L NC at baseline  Keep o2 sat >92%  Continue heparin drip  Diagnosis: History of chronic hypoxic respiratory failure  Continue home oxygen supplementation 2 L/min via nasal cannula     GI:   Diagnosis: no active issues     :   Diagnosis: no active issues  Plan: Is & Os     F/E/N:    F: Patient tolerating p.o.  E: replete prn  N: Cardiac diet     Heme/Onc:   Diagnosis: DVT hx  Plan: see PE     Endo:   Diagnosis: Hypothyroidism  Plan: continue home levothyroxine  Thyroid nodule stable per last admission note 4/4/2024     ID:   No acute issues     MSK/Skin:   Diagnosis: precautions  Plan: frequent turning   OOB    Disposition: Stepdown Level 2    ICU Core Measures     A: Assess, Prevent, and Manage Pain Has pain been assessed? Yes  Need for changes to pain regimen? No   B: Both SAT/SAT  N/A   C: Choice of Sedation RASS Goal: 0 Alert and Calm  Need for changes to sedation or analgesia regimen? No   D: Delirium CAM-ICU: Negative   E: Early Mobility  Plan for early mobility? Yes   F: Family Engagement Plan for family engagement today? Yes         Prophylaxis:  VTE VTE covered by:  heparin (porcine), Intravenous, 18 Units/kg/hr at  04/25/24 0457  heparin (porcine), Intravenous, 2,800 Units at 04/23/24 2211  heparin (porcine), Intravenous       Stress Ulcer  covered bypantoprazole (PROTONIX) 40 mg tablet [[id number]] (Long-Term Med), pantoprazole (PROTONIX) EC tablet 40 mg [[id number]]        Significant 24hr Events     24hr events: Received thrombectomy with IR     Subjective     Review of Systems   Constitutional:  Negative for chills and fever.   Respiratory:  Negative for shortness of breath.    Cardiovascular:  Negative for chest pain.   Gastrointestinal:  Negative for abdominal pain, nausea and vomiting.   Neurological:  Negative for light-headedness and headaches.        Objective                            Vitals I/O      Most Recent Min/Max in 24hrs   Temp 98.8 °F (37.1 °C) Temp  Min: 97.7 °F (36.5 °C)  Max: 98.8 °F (37.1 °C)   Pulse 82 Pulse  Min: 79  Max: 98   Resp 22 Resp  Min: 20  Max: 46   /64 BP  Min: 111/60  Max: 207/150   O2 Sat 95 % SpO2  Min: 92 %  Max: 97 %      Intake/Output Summary (Last 24 hours) at 4/25/2024 0731  Last data filed at 4/25/2024 0415  Gross per 24 hour   Intake 1250.17 ml   Output 390 ml   Net 860.17 ml       Diet Regular; Regular House    Invasive Monitoring           Physical Exam   Physical Exam  Eyes:      Extraocular Movements: Extraocular movements intact.      Pupils: Pupils are equal, round, and reactive to light.   Skin:     General: Skin is warm and dry.      Capillary Refill: Capillary refill takes less than 2 seconds.   HENT:      Head: Normocephalic.      Mouth/Throat:      Mouth: Mucous membranes are moist.   Cardiovascular:      Rate and Rhythm: Normal rate and regular rhythm.      Pulses: Normal pulses.   Musculoskeletal:      Right lower leg: No edema.      Left lower leg: No edema.   Abdominal: General: There is no distension.      Palpations: Abdomen is soft.      Tenderness: There is no abdominal tenderness.   Constitutional:       General: She is not in acute distress.      Appearance: She is not toxic-appearing.   Pulmonary:      Effort: Pulmonary effort is normal.      Breath sounds: Normal breath sounds.   Neurological:      General: No focal deficit present.      Mental Status: Mental status is at baseline.            Diagnostic Studies      Imaging:  I have personally reviewed pertinent reports.   and I have personally reviewed pertinent films in PACS     Medications:  Scheduled PRN   atorvastatin, 80 mg, HS  calcium carbonate, 2 tablet, Daily With Breakfast  chlorhexidine, 15 mL, Q12H JOHN  clopidogrel, 75 mg, Daily  DULoxetine, 60 mg, Daily  fluticasone, 1 spray, BID  levothyroxine, 88 mcg, Early Morning  loratadine, 10 mg, Daily  multivitamin-minerals, 1 tablet, Daily  pantoprazole, 40 mg, Daily Before Breakfast  primidone, 50 mg, Daily      clonazePAM, 0.5 mg, BID PRN  heparin (porcine), 2,800 Units, Q6H PRN  heparin (porcine), 5,600 Units, Q6H PRN       Continuous    heparin (porcine), 3-30 Units/kg/hr (Order-Specific), Last Rate: 18 Units/kg/hr (04/25/24 0457)         Labs:    CBC    Recent Labs     04/24/24  0450 04/24/24  1459 04/25/24  0444   WBC 10.49*  --  9.88   HGB 11.7 10.8* 10.7*   HCT 36.8  --  34.5*     --  163     BMP    Recent Labs     04/24/24  0548 04/25/24  0444   SODIUM 138 138   K 4.2 3.9    103   CO2 27 27   AGAP 8 8   BUN 18 18   CREATININE 0.58* 0.62   CALCIUM 9.5 9.4       Coags    Recent Labs     04/23/24 2010 04/23/24  2122 04/24/24  0548 04/25/24  0001 04/25/24  0444   INR 1.30* 1.28*  --   --   --    PTT >210* 54*   < > 71* 67*    < > = values in this interval not displayed.        Additional Electrolytes  Recent Labs     04/23/24 2010 04/24/24  0548   MG 1.8* 1.9   PHOS 2.9 3.0          Blood Gas    No recent results  No recent results LFTs  Recent Labs     04/23/24 2010 04/24/24  0548   ALT 23 21   AST 32 31   ALKPHOS 87 88   ALB 3.7 3.7   TBILI 0.37 0.62       Infectious  No recent results  Glucose  Recent Labs     04/23/24  1342  04/23/24 2010 04/24/24  0548 04/25/24  0444   GLUC 106 115 120 115               Levy Perkins, DO

## 2024-04-25 NOTE — PROGRESS NOTES
Progress Note -Interventional Radiology  Danyelle Martinez 83 y.o. female MRN: [id number]  [facility]: [id number]    Assessment/Plan:  Patient is an 83-year-old female with a history of DM, HTN, HLD, prior TIA, CHF, chronic hypoxic respiratory failure on 3 L NC baseline who presented to the ED yesterday for evaluation of shortness of breath with hypoxia with SpO2 in the 60s at home.  Patient found to be tachycardic and tachypneic on arrival to the ED hemodynamically stable.  Patient CT PE study demonstrated acute saddle PE with RV LV greater than 0.9, echo confirmed right heart strain, patient noted to have elevated troponin and BNP.  Patient was determined to be intermediate high risk PE and was transferred from [place] to [facility] for higher level of care. Pulmonology evaluated patient and determined patient to be eligible for PEERLESS II trial.  Patient consented, enrolled, and randomized to the thrombectomy arm. Patient s/p IR b/l PE thrombectomy yesterday.    -Patient reports feeling much better today, offers no complaints.  She denies any chest pain, SOB, groin pain.  -Right groin site with dressing in place, CDI.  Mild tenderness to palpation.  Woggle in place.  No surrounding swelling, erythema, ecchymosis or expanding hematoma noted.    -Woggle successfully removed at bedside, hemostasis noted.  Gauze and Tegaderm applied.  -Patient to remain in bed for 1 hour post removal, communicated to nursing.  After that activity as per primary team  -Patient has been afebrile, intermittent tachypnea, but otherwise VSS, on baseline 2 L NC.  Hemoglobin from 11.7->10.8->10.7, WBC WNL.  -Continue to monitor right groin site for any increased pain, swelling, expanding hematoma  -Continue to monitor hemodynamics and respiratory status  -Remainder of care per primary team  -Please reach out to IR with any questions/concerns      Patient Active Problem List   Diagnosis    Shortness of breath on exertion     "Dizziness    Chronic hypoxemic respiratory failure ([facility])    Seasonal allergic rhinitis due to pollen    Essential hypertension    Dyslipidemia    History of transient ischemic attack (TIA)    Acquired hypothyroidism    Thyroid nodule    Palpitations    Tremor    Nocturnal hypoxemia    Gastroesophageal reflux disease without esophagitis    Localized swelling of right lower leg    Anxiety    Frequent falls    Dyspepsia    SVT (supraventricular tachycardia)    Osteoarthritis of right knee    Elevated liver enzymes    Type 2 diabetes mellitus, without long-term current use of insulin ([facility])    Asymmetrical hearing loss    Recurrent major depressive disorder, in remission ([facility])    Fall    Syncope, cardiogenic    Overflow incontinence of urine    Enlarged thyroid    Closed wedge compression fracture of T1 vertebra with routine healing    Chronic diastolic CHF (congestive heart failure) (HCC)    Leukocytosis    Cigarette nicotine dependence in remission    Change in mental status    Parotitis, acute    Facial cellulitis          Subjective: Danyelle aMrtinez is a 83 y.o. female who presented to the ED yesterday for evaluation of shortness of breath with hypoxia with SpO2 in the 60s at home.  Patient found to be tachycardic and tachypneic on arrival to the ED hemodynamically stable.  Patient CT PE study demonstrated acute saddle PE with RV LV greater than 0.9, echo confirmed right heart strain, patient noted to have elevated troponin and BNP. Patient s/p IR b/l PE thrombectomy yesterday. Patient reports feeling much better today, offers no complaints.  She denies any chest pain, SOB, groin pain, headache, dizziness, lightheadedness, abdominal pain, numbness, tingling, weakness of the legs.    Objective:    Vitals:  /58   Pulse 81   Temp 98.1 °F (36.7 °C) (Oral)   Resp (!) 28   Ht 5' 6\" (1.676 m)   Wt 74 kg (163 lb 2.3 oz)   SpO2 98%   BMI 26.33 kg/m²   Body mass index is 26.33 kg/m².  Weight (last 2 days)       " "Date/Time Weight    04/25/24 0800 74 (163.14)    04/25/24 0531 74.1 (163.36)    04/24/24 0537 73.9 (162.92)            I/Os:    Intake/Output Summary (Last 24 hours) at 4/25/2024 1052  Last data filed at 4/25/2024 0800  Gross per 24 hour   Intake 1181.42 ml   Output 290 ml   Net 891.42 ml       Invasive Devices       Peripheral Intravenous Line  Duration             Peripheral IV 04/23/24 Right Antecubital 1 day    Peripheral IV 04/24/24 Dorsal (posterior);Left Forearm 1 day              Drain  Duration             External Urinary Catheter 1 day                    Physical Exam:  /58   Pulse 81   Temp 98.1 °F (36.7 °C) (Oral)   Resp (!) 28   Ht 5' 6\" (1.676 m)   Wt 74 kg (163 lb 2.3 oz)   SpO2 98%   BMI 26.33 kg/m²   General appearance: alert and oriented, in no acute distress  Head: Normocephalic, without obvious abnormality  Lungs: clear to auscultation bilaterally and equal chest rise bilaterally, no work of breathing.  Heart: regular rate and rhythm  Abdomen:  Soft, nontender, nondistended  Right groin site with dressing in place, CDI.  Mild tenderness to palpation.  Woggle in place.  No surrounding swelling, erythema, ecchymosis or expanding hematoma noted.  Neurovascular intact, 5/5 strength in bilateral lower extremities.  Woggle successfully removed at bedside, hemostasis noted.  Gauze and Tegaderm applied.            Lab Results and Cultures:   CBC with diff:   Lab Results   Component Value Date    WBC 9.88 04/25/2024    HGB 10.7 (L) 04/25/2024    HCT 34.5 (L) 04/25/2024    MCV 98 04/25/2024     04/25/2024    ADJUSTEDWBC 10.60 04/06/2016    RBC 3.52 (L) 04/25/2024    MCH 30.4 04/25/2024    MCHC 31.0 (L) 04/25/2024    RDW 16.7 (H) 04/25/2024    MPV 11.8 04/25/2024    NRBC 0 04/25/2024      BMP/CMP:  Lab Results   Component Value Date    K 3.9 04/25/2024     04/25/2024    CO2 27 04/25/2024    BUN 18 04/25/2024    CREATININE 0.62 04/25/2024    CALCIUM 9.4 04/25/2024    AST 31 " "04/24/2024    ALT 21 04/24/2024    ALKPHOS 88 04/24/2024    EGFR 82 04/25/2024   ,     Coags:   Lab Results   Component Value Date    PTT 67 (H) 04/25/2024    INR 1.28 (H) 04/23/2024   ,   Results from last 7 days   Lab Units 04/25/24  0444 04/25/24  0001 04/24/24  1904 04/24/24  1227 04/24/24  0548 04/23/24  2122 04/23/24 2010 04/23/24  1342   PTT seconds 67* 71* 90* 105* 67* 54* >210* 26   INR   --   --   --   --   --  1.28* 1.30* 1.06        Lipid Panel: No results found for: \"CHOL\"  Lab Results   Component Value Date    HDL 32 (L) 03/15/2024     Lab Results   Component Value Date    HDL 32 (L) 03/15/2024     Lab Results   Component Value Date    LDLCALC 86 03/15/2024     Lab Results   Component Value Date    TRIG 167 (H) 03/15/2024       HgbA1c:   Lab Results   Component Value Date    HGBA1C 6.2 (H) 03/15/2024    HGBA1C 6.1 (H) 01/22/2024    HGBA1C 6.7 (H) 05/30/2023       Blood Culture:   Lab Results   Component Value Date    BLOODCX No Growth After 5 Days. 03/31/2024   ,   Urinalysis:   Lab Results   Component Value Date    COLORU Yellow 04/23/2024    CLARITYU Slightly Cloudy 04/23/2024    SPECGRAV 1.025 04/23/2024    PHUR 6.5 04/23/2024    PHUR 5.5 06/28/2018    LEUKOCYTESUR Moderate (A) 04/23/2024    NITRITE Negative 04/23/2024    GLUCOSEU Negative 04/23/2024    KETONESU Negative 04/23/2024    BILIRUBINUR Negative 04/23/2024    BLOODU Trace (A) 04/23/2024   ,   Urine Culture:   Lab Results   Component Value Date    URINECX >100,000 cfu/ml 10/19/2021   ,   Wound Culure:  No results found for: \"WOUNDCULT\"    VTE Pharmacologic Prophylaxis: Heparin      Thank you for allowing me to participate in the care of Danyelle Martinez. Please don't hesitate to call, text, email, or TigerText with any questions.     This text is generated with voice recognition software. There may be translation, syntax,  or grammatical errors. If you have any questions, please contact the dictating provider.    "

## 2024-04-25 NOTE — PLAN OF CARE
Problem: OCCUPATIONAL THERAPY ADULT  Goal: Performs self-care activities at highest level of function for planned discharge setting.  See evaluation for individualized goals.  Description: Treatment Interventions: ADL retraining, Functional transfer training, UE strengthening/ROM, Endurance training, Patient/family training, Equipment evaluation/education, Compensatory technique education, Continued evaluation, Energy conservation, Activityengagement          See flowsheet documentation for full assessment, interventions and recommendations.   Note: Limitation: Decreased ADL status, Decreased endurance, Decreased self-care trans, Decreased high-level ADLs  Prognosis: Fair  Assessment: Pt is a 84 y/o female seen for OT eval s/p adm to [facility] as a transfer from [facility] where sh presented w/ 3 day hx of worsening ROB and fatigue. Pt wears 2L O2 at baseline. Pt dx'd w/ saddle PE s/p thrombectomy. Pt  has a past medical history of Anxiety, Arthritis, Blind left eye, CHF (congestive heart failure) ([facility]), Deaf, left, Depression, Diabetes mellitus ([facility]), Disease of thyroid gland, DVT complicating pregnancy, unspecified trimester ([facility]) (postpartum), Hearing loss, History of shingles (2007), Hyperlipidemia, Hypertension, Liver disease, Lyme disease, Meniere's disease, Obesity, Orthostatic hypotension, Psychiatric problem, Sinus congestion, Sleep apnea, and Stroke ([facility]).   Pt with active OT orders and activity as tolerated orders. Pt lives alone in 1st floor apt w/ 2 JING. Pt was I w/ ADLS and has assist for IADLS, does not drive & required use of DME PTA including RW. Pt is currently demonstrating the following occupational deficits: S UB ADLS, Min A LB ADLS, S bed mobility, Mod A transfers and functional mobility w/ HHA. These deficits that are impacting pt's baseline areas of occupation are a result of the following impairments: endurance, activity tolerance, functional mobility, forward functional reach, balance,  functional standing tolerance, unsupportive home environment, decreased I w/ ADLS/IADLS, and strength.The following Occupational Performance Areas to address include: bathing/shower, toilet hygiene, dressing, functional mobility, community mobility, clothing management, and household maintenance. Recommend moderate resource intensity, upon D/C. Pt to continue to benefit from acute immediate OT services to address the following goals 2-3x/week to  w/in 10-14 days:     Rehab Resource Intensity Level, OT: II (Moderate Resource Intensity)     Vicki Dockery MS, OTR/L

## 2024-04-25 NOTE — OCCUPATIONAL THERAPY NOTE
Occupational Therapy Evaluation     Patient Name: Danyelle Martinez  Today's Date: 2024  Problem List  Active Problems:  There are no active Hospital Problems.    Past Medical History  Past Medical History:   Diagnosis Date    Anxiety     Arthritis     r knee and shoulders    Blind left eye     CHF (congestive heart failure) (HCC)     Deaf, left     Depression     Diabetes mellitus (HCC)     Disease of thyroid gland     DVT complicating pregnancy, unspecified trimester (HCC) postpartum    Hearing loss     LEFT EAR    History of shingles 2007    fACIAL     Hyperlipidemia     Hypertension     Liver disease     Lyme disease     Meniere's disease     Obesity     Orthostatic hypotension     Psychiatric problem     Sinus congestion     Sleep apnea     Stroke (HCC)      Past Surgical History  Past Surgical History:   Procedure Laterality Date    APPENDECTOMY      BREAST BIOPSY Left 2010     SECTION      x2    DXA PROCEDURE (HISTORICAL)  10/28/2020    EYE SURGERY      HAND SURGERY Left     HERNIA REPAIR      HYSTERECTOMY      ROTATOR CUFF REPAIR Left     TONSILLECTOMY      TYMPANOSTOMY TUBE PLACEMENT             24 1103   OT Last Visit   OT Visit Date 24   Note Type   Note type Evaluation   Pain Assessment   Pain Assessment Tool 0-10   Pain Score No Pain   Restrictions/Precautions   Weight Bearing Precautions Per Order No   Other Precautions Multiple lines;Telemetry;Fall Risk;Pain;O2  (2-3L O2 Baseline)   Home Living   Type of Home Apartment   Home Layout One level;Able to live on main level with bedroom/bathroom;Performs ADLs on one level   Bathroom Shower/Tub Tub/shower unit   Bathroom Toilet Standard   Bathroom Equipment Shower chair;Grab bars in shower   Home Equipment Walker   Additional Comments pt lives in an apt alone; reports hoping to move out soon as she feels she shouldn't be living alone for much longer   Prior Function   Level of [place] Independent with ADLs;Independent with  functional mobility;Needs assistance with IADLS   Lives With Alone   Receives Help From Family;Other (Comment)  (one [facility] nearby)   IADLs Family/Friend/Other provides transportation;Family/Friend/Other provides medication management;Family/Friend/Other provides meals  (has groceries delivered, hired cleaning lady; (-) )   Falls in the last 6 months 1 to 4  (2)   Vocational Retired   Lifestyle   Autonomy I w/ ADLS, assist w/ IADLS, Mod I w/ transfers and functional mobility PTA   Reciprocal Relationships pt lives alone   Service to Others Retired   Intrinsic Gratification Reading   ADL   Eating Assistance 7  Independent   Grooming Assistance 5  Supervision/Setup   UB Bathing Assistance 5  Supervision/Setup   LB Bathing Assistance 4  Minimal Assistance   UB Dressing Assistance 5  Supervision/Setup   LB Dressing Assistance 4  Minimal Assistance   LB Dressing Deficit Don/doff R sock   Toileting Assistance  4  Minimal Assistance   Functional Assistance 3  Moderate Assistance   Functional Deficit Steadying;Verbal cueing;Supervision/safety;Increased time to complete   Bed Mobility   Supine to Sit 5  Supervision   Additional items HOB elevated;Increased time required;Verbal cues   Sit to Supine Unable to assess   Additional Comments pt sat EOB w/ Fair sitting balance/trunk control   Transfers   Sit to Stand 3  Moderate assistance   Additional items Assist x 1;Increased time required;Verbal cues   Stand to Sit 3  Moderate assistance   Additional items Assist x 1;Increased time required;Verbal cues   Additional Comments w/ HHA used   Functional Mobility   Functional Mobility 3  Moderate assistance   Additional Comments pt took few small steps from EOB to recliner w/ Mod  Ax1; HHA used   Additional items Hand hold assistance   Balance   Static Sitting Fair   Dynamic Sitting Fair -   Static Standing Poor +   Dynamic Standing Poor   Ambulatory Poor   Activity Tolerance   Activity Tolerance Patient limited by fatigue    Medical Staff Made Aware PT, Neelima   Nurse Made Aware yes   RUE Assessment   RUE Assessment WFL   LUE Assessment   LUE Assessment WFL   Hand Function   Gross Motor Coordination Functional   Fine Motor Coordination Functional   Vision-Basic Assessment   Current Vision Wears glasses all the time   Cognition   Overall Cognitive Status WFL   Arousal/Participation Responsive;Cooperative   Attention Within functional limits   Orientation Level Oriented X4   Memory Within functional limits   Following Commands Follows all commands and directions without difficulty   Comments pt is pleasant and cooperative   Assessment   Limitation Decreased ADL status;Decreased endurance;Decreased self-care trans;Decreased high-level ADLs   Prognosis Fair   Assessment Pt is a 84 y/o female seen for OT eval [facility] [place] [facility] where sh presented w/ 3 day hx of worsening ROB and fatigue. Pt wears 2L O2 at baseline. Pt dx'd w/ saddle PE s/p thrombectomy. Pt  has a past medical history of Anxiety, Arthritis, Blind left eye, CHF (congestive heart failure) ([facility]), Deaf, left, Depression, Diabetes mellitus ([facility]), Disease of thyroid gland, DVT complicating pregnancy, unspecified trimester ([facility]) (postpartum), Hearing loss, History of shingles (2007), Hyperlipidemia, Hypertension, Liver disease, Lyme disease, Meniere's disease, Obesity, Orthostatic hypotension, Psychiatric problem, Sinus congestion, Sleep apnea, and Stroke ([facility]).   Pt with active OT orders and activity as tolerated orders. Pt lives alone in 1st floor apt w/ 2 JING. Pt was I w/ ADLS and has assist for IADLS, does not drive & required use of DME PTA including RW. Pt is currently demonstrating the following occupational deficits: S UB ADLS, Min A LB ADLS, S bed mobility, Mod A transfers and functional mobility w/ HHA. These deficits that are impacting pt's baseline areas of occupation are a result of the following impairments: endurance, activity tolerance,  functional mobility, forward functional reach, balance, functional standing tolerance, unsupportive home environment, decreased I w/ ADLS/IADLS, and strength.The following Occupational Performance Areas to address include: bathing/shower, toilet hygiene, dressing, functional mobility, community mobility, clothing management, and household maintenance. Recommend moderate resource intensity, upon D/C. Pt to continue to benefit from acute immediate OT services to address the following goals 2-3x/week to  w/in 10-14 days:   Goals   Patient Goals to be independent   LTG Time Frame 10-   Long Term Goal #1 see below listed goals   Plan   Treatment Interventions ADL retraining;Functional transfer training;UE strengthening/ROM;Endurance training;Patient/family training;Equipment evaluation/education;Compensatory technique education;Continued evaluation;Energy conservation;Activityengagement   Goal Expiration Date 24   OT Frequency 2-3x/wk   Discharge Recommendation   Rehab Resource Intensity Level, OT II (Moderate Resource Intensity)   Additional Comments  The patient's raw score on the AM-PAC Daily Activity Inpatient Short Form is 21. A raw score of greater than or equal to 19 suggests the patient may benefit from discharge to home. Please refer to the recommendation of the Occupational Therapist for safe discharge planning.   Additional Comments 2 Pt seen as a co-session due to the patient's co-morbidities, clinically unstable presentation, and present impairments which are a regression from the patient's baseline.   AM-PAC Daily Activity Inpatient   Lower Body Dressing 3   Bathing 3   Toileting 3   Upper Body Dressing 4   Grooming 4   Eating 4   Daily Activity Raw Score 21   Daily Activity Standardized Score (Calc for Raw Score >=11) 44.27   AM-PAC Applied Cognition Inpatient   Following a Speech/Presentation 4   Understanding Ordinary Conversation 4   Taking Medications 4   Remembering Where Things Are  Placed or Put Away 4   Remembering List of 4-5 Errands 4   Taking Care of Complicated Tasks 4   Applied Cognition Raw Score 24   Applied Cognition Standardized Score 62.21   End of Consult   Education Provided Yes   Patient Position at End of Consult Bedside chair;All needs within reach   Nurse Communication Nurse aware of consult      GOALS    1) Pt will improve activity tolerance to G for 30 min txment sessions for increase engagement in functional tasks  2) Pt will complete UB/LB dressing/self care w/ mod I using adaptive device and DME as needed  3) Pt will complete bathing w/ Mod I w/ use of AE and DME as needed  4) Pt will complete toileting w/ mod I w/ G hygiene/thoroughness using DME as needed  5) Pt will improve functional transfers to Mod I on/off all surfaces using DME as needed w/ G balance/safety   6) Pt will improve functional mobility during ADL/IADL/leisure tasks to Mod I using DME as needed w/ G balance/safety   7) Pt will participate in simulated IADL management task to increase independence to Mod I w/ G safety and endurance  8) Pt will be attentive 100% of the time during ongoing cognitive assessment w/ G participation to assist w/ safe d/c planning/recommendations  9) Pt will demonstrate G carryover of pt/caregiver education and training as appropriate w/o cues w/ good tolerance to increase safety during functional tasks  10) Pt will demonstrate 100% carryover of energy conservation techniques t/o functional I/ADL/leisure tasks w/o cues s/p skilled education to increase endurance during functional tasks     Vicki Dockery MS, OTR/L

## 2024-04-25 NOTE — PLAN OF CARE
Problem: PHYSICAL THERAPY ADULT  Goal: Performs mobility at highest level of function for planned discharge setting.  See evaluation for individualized goals.  Description: Treatment/Interventions: OT, Spoke to case management, Spoke to nursing, Gait training, Bed mobility, Patient/family training, Endurance training, LE strengthening/ROM, Functional transfer training          See flowsheet documentation for full assessment, interventions and recommendations.  Note: Prognosis: Good  Problem List: Decreased strength, Decreased range of motion, Decreased endurance, Impaired balance, Decreased mobility, Decreased coordination, Decreased cognition, Impaired judgement, Decreased safety awareness, Pain  Assessment: Pt is 83 y.o. female seen for PT evaluation s/p admit to [facility] on 4/23/2024 w/ saddle PE. PT consulted to assess pt's functional mobility and d/c needs. Order placed for PT eval and tx, w/ up w/ A order. Comorbidities affecting pt's physical performance at time of assessment include:  has a past medical history of Anxiety, Arthritis, Blind left eye, CHF (congestive heart failure) ([facility]), Deaf, left, Depression, Diabetes mellitus ([facility]), Disease of thyroid gland, DVT complicating pregnancy, unspecified trimester ([facility]), Hearing loss, History of shingles, Hyperlipidemia, Hypertension, Liver disease, Lyme disease, Meniere's disease, Obesity, Orthostatic hypotension, Psychiatric problem, Sinus congestion, Sleep apnea, and Stroke ([facility]). PTA, pt was ambulates community distances and elevations and lives alone in single level apartment . Personal factors affecting pt at time of IE include: inaccessible home environment, stairs to enter home, limited home support, decreased initiation and engagement, unable to perform physical activity, inability to perform IADLs, and inability to perform ADLs. Please find objective findings from PT assessment regarding body systems outlined above with impairments and  limitations including weakness, impaired balance, decreased endurance, gait deviations, pain, decreased activity tolerance, decreased functional mobility tolerance, decreased safety awareness, impaired judgement, and fall risk. The following objective measures performed on IE also reveal limitations: The patient's AM-PAC Basic Mobility Inpatient Short Form Raw Score is 16, Standardized Score is 38.32. A standardized score less than 42.9 suggests the patient may benefit from discharge to post-acute rehabilitation services. Please also refer to the recommendation of the Physical Therapist for safe discharge planning. Pt's clinical presentation is currently unstable/unpredictable seen in pt's presentation of critical care monitoring. Pt to benefit from continued PT tx to address deficits as defined above and maximize level of functional independent mobility and consistency. From PT/mobility standpoint, recommendation at time of d/c would be level II pending progress in order to facilitate return to PLOF.  Barriers to Discharge: Decreased caregiver support, Inaccessible home environment          See flowsheet documentation for full assessment.

## 2024-04-25 NOTE — PLAN OF CARE
Problem: Prexisting or High Potential for Compromised Skin Integrity  Goal: Skin integrity is maintained or improved  Description: INTERVENTIONS:  - Identify patients at risk for skin breakdown  - Assess and monitor skin integrity  - Assess and monitor nutrition and hydration status  - Monitor labs   - Assess for incontinence   - Turn and reposition patient  - Assist with mobility/ambulation  - Relieve pressure over bony prominences  - Avoid friction and shearing  - Provide appropriate hygiene as needed including keeping skin clean and dry  - Evaluate need for skin moisturizer/barrier cream  - Collaborate with interdisciplinary team   - Patient/family teaching  - Consider wound care consult   Outcome: Progressing     Problem: SAFETY ADULT  Goal: Patient will remain free of falls  Description: INTERVENTIONS:  - Educate patient/family on patient safety including physical limitations  - Instruct patient to call for assistance with activity   - Consult OT/PT to assist with strengthening/mobility   - Keep Call bell within reach  - Keep bed low and locked with side rails adjusted as appropriate  - Keep care items and personal belongings within reach  - Initiate and maintain comfort rounds  - Make Fall Risk Sign visible to staff  - Offer Toileting every 2 Hours, in advance of need  - Initiate/Maintain bed alarm  - Apply yellow socks and bracelet for high fall risk patients  - Consider moving patient to room near nurses station  Outcome: Progressing  Goal: Maintain or return to baseline ADL function  Description: INTERVENTIONS:  -  Assess patient's ability to carry out ADLs; assess patient's baseline for ADL function and identify physical deficits which impact ability to perform ADLs (bathing, care of mouth/teeth, toileting, grooming, dressing, etc.)  - Assess/evaluate cause of self-care deficits   - Assess range of motion  - Assess patient's mobility; develop plan if impaired  - Assess patient's need for assistive  devices and provide as appropriate  - Encourage maximum independence but intervene and supervise when necessary  - Involve family in performance of ADLs  - Assess for home care needs following discharge   - Consider OT consult to assist with ADL evaluation and planning for discharge  - Provide patient education as appropriate  Outcome: Progressing  Goal: Maintains/Returns to pre admission functional level  Description: INTERVENTIONS:  - Perform AM-PAC 6 Click Basic Mobility/ Daily Activity assessment daily.  - Set and communicate daily mobility goal to care team and patient/family/caregiver.   - Collaborate with rehabilitation services on mobility goals if consulted  - Perform Range of Motion 6 times a day.  - Reposition patient every 2 hours.  - Out of bed for toileting  - Record patient progress and toleration of activity level   Outcome: Progressing     Problem: PAIN - ADULT  Goal: Verbalizes/displays adequate comfort level or baseline comfort level  Description: Interventions:  - Encourage patient to monitor pain and request assistance  - Assess pain using appropriate pain scale  - Administer analgesics based on type and severity of pain and evaluate response  - Implement non-pharmacological measures as appropriate and evaluate response  - Consider cultural and social influences on pain and pain management  - Notify physician/advanced practitioner if interventions unsuccessful or patient reports new pain  Outcome: Progressing     Problem: INFECTION - ADULT  Goal: Absence or prevention of progression during hospitalization  Description: INTERVENTIONS:  - Assess and monitor for signs and symptoms of infection  - Monitor lab/diagnostic results  - Monitor all insertion sites, i.e. indwelling lines, tubes, and drains  - Monitor endotracheal if appropriate and nasal secretions for changes in amount and color  - [place] appropriate cooling/warming therapies per order  - Administer medications as ordered  - Instruct  and encourage patient and family to use good hand hygiene technique  - Identify and instruct in appropriate isolation precautions for identified infection/condition  Outcome: Progressing  Goal: Absence of fever/infection during neutropenic period  Description: INTERVENTIONS:  - Monitor WBC    Outcome: Progressing

## 2024-04-25 NOTE — CASE MANAGEMENT
Case Management Discharge Planning Note    Patient name Danyelle Martinez  Location MICU 02/MICU 02 MRN [id number]  : 1940 Date 2024       Current Admission Date: 2024  Current Admission Diagnosis:PE (pulmonary thromboembolism) ([facility])   Patient Active Problem List    Diagnosis Date Noted    Parotitis, acute 2024    Facial cellulitis 2024    Change in mental status 2024    Cigarette nicotine dependence in remission 2024    Chronic diastolic CHF (congestive heart failure) ([facility]) 2024    Leukocytosis 2024    Closed wedge compression fracture of T1 vertebra with routine healing 2023    Enlarged thyroid 2023    Overflow incontinence of urine 2023    Fall 11/15/2023    Syncope, cardiogenic 11/15/2023    Recurrent major depressive disorder, in remission ([facility]) 2023    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    SVT (supraventricular tachycardia) 2023    Osteoarthritis of right knee 2023    Elevated liver enzymes 2023    Type 2 diabetes mellitus, without long-term current use of insulin ([facility]) 2023    Dyspepsia 2022    Frequent falls 2022    Localized swelling of right lower leg 2022    Anxiety 2022    Gastroesophageal reflux disease without esophagitis 10/12/2022    Nocturnal hypoxemia 2021    Acquired hypothyroidism 2021    Thyroid nodule 2021    Palpitations 2021    Tremor 2021    History of transient ischemic attack (TIA) 2021    Essential hypertension 2021    Dyslipidemia 2021    Seasonal allergic rhinitis due to pollen 2021    Chronic hypoxemic respiratory failure (HCC) 2020    Dizziness 2020    Shortness of breath on exertion       LOS (days): 2  Geometric Mean LOS (GMLOS) (days): 3.7  Days to GMLOS:1.9     OBJECTIVE:  Risk of Unplanned Readmission Score: 26.14         Current admission status: Inpatient   [id number] - [address] [address]  Phone: [phone] Fax: [phone]    Primary Care Provider: Bladimir Caraballo MD    Primary Insurance: MEDICARE  Secondary Insurance: [facility]    DISCHARGE DETAILS:                                           Additional Comments: CM spoke with daughter Vivi Gonzales (Daughter)  [phone] and lwet her know to bring in patient's home oxygen tank for discharge. CM also ordered portable oxygen tank to be delivered to patient's home.

## 2024-04-25 NOTE — PLAN OF CARE
Problem: Prexisting or High Potential for Compromised Skin Integrity  Goal: Skin integrity is maintained or improved  Description: INTERVENTIONS:  - Identify patients at risk for skin breakdown  - Assess and monitor skin integrity  - Assess and monitor nutrition and hydration status  - Monitor labs   - Assess for incontinence   - Turn and reposition patient  - Assist with mobility/ambulation  - Relieve pressure over bony prominences  - Avoid friction and shearing  - Provide appropriate hygiene as needed including keeping skin clean and dry  - Evaluate need for skin moisturizer/barrier cream  - Collaborate with interdisciplinary team   - Patient/family teaching  - Consider wound care consult   Outcome: Progressing     Problem: PAIN - ADULT  Goal: Verbalizes/displays adequate comfort level or baseline comfort level  Description: Interventions:  - Encourage patient to monitor pain and request assistance  - Assess pain using appropriate pain scale  - Administer analgesics based on type and severity of pain and evaluate response  - Implement non-pharmacological measures as appropriate and evaluate response  - Consider cultural and social influences on pain and pain management  - Notify physician/advanced practitioner if interventions unsuccessful or patient reports new pain  Outcome: Progressing     Problem: INFECTION - ADULT  Goal: Absence or prevention of progression during hospitalization  Description: INTERVENTIONS:  - Assess and monitor for signs and symptoms of infection  - Monitor lab/diagnostic results  - Monitor all insertion sites, i.e. indwelling lines, tubes, and drains  - Monitor endotracheal if appropriate and nasal secretions for changes in amount and color  - Chapman appropriate cooling/warming therapies per order  - Administer medications as ordered  - Instruct and encourage patient and family to use good hand hygiene technique  - Identify and instruct in appropriate isolation precautions for  identified infection/condition  Outcome: Progressing  Goal: Absence of fever/infection during neutropenic period  Description: INTERVENTIONS:  - Monitor WBC    Outcome: Progressing     Problem: SAFETY ADULT  Goal: Patient will remain free of falls  Description: INTERVENTIONS:  - Educate patient/family on patient safety including physical limitations  - Instruct patient to call for assistance with activity   - Consult OT/PT to assist with strengthening/mobility   - Keep Call bell within reach  - Keep bed low and locked with side rails adjusted as appropriate  - Keep care items and personal belongings within reach  - Initiate and maintain comfort rounds  - Make Fall Risk Sign visible to staff  - Offer Toileting every 2 Hours, in advance of need  - Initiate/Maintain balarm  - Obtain necessary fall risk management equipment: -  - Apply yellow socks and bracelet for high fall risk patients  - Consider moving patient to room near nurses station  Outcome: Progressing  Goal: Maintain or return to baseline ADL function  Description: INTERVENTIONS:  -  Assess patient's ability to carry out ADLs; assess patient's baseline for ADL function and identify physical deficits which impact ability to perform ADLs (bathing, care of mouth/teeth, toileting, grooming, dressing, etc.)  - Assess/evaluate cause of self-care deficits   - Assess range of motion  - Assess patient's mobility; develop plan if impaired  - Assess patient's need for assistive devices and provide as appropriate  - Encourage maximum independence but intervene and supervise when necessary  - Involve family in performance of ADLs  - Assess for home care needs following discharge   - Consider OT consult to assist with ADL evaluation and planning for discharge  - Provide patient education as appropriate  Outcome: Progressing  Goal: Maintains/Returns to pre admission functional level  Description: INTERVENTIONS:  - Perform AM-PAC 6 Click Basic Mobility/ Daily Activity  assessment daily.  - Set and communicate daily mobility goal to care team and patient/family/caregiver.   - Collaborate with rehabilitation services on mobility goals if consulted  - Perform Range of Motion 4 times a day.  - Reposition patient every 2 hours.  - Dangle patient 1 times a day  - Stand patient 1 times a day  - Ambulate patient 1 times a day  - Out of bed to chair 1 times a day   - Out of bed for meals 1 times a day  - Out of bed for toileting  - Record patient progress and toleration of activity level   Outcome: Progressing     Problem: DISCHARGE PLANNING  Goal: Discharge to home or other facility with appropriate resources  Description: INTERVENTIONS:  - Identify barriers to discharge w/patient and caregiver  - Arrange for needed discharge resources and transportation as appropriate  - Identify discharge learning needs (meds, wound care, etc.)  - Arrange for interpretive services to assist at discharge as needed  - Refer to Case Management Department for coordinating discharge planning if the patient needs post-hospital services based on physician/advanced practitioner order or complex needs related to functional status, cognitive ability, or social support system  Outcome: Progressing     Problem: Knowledge Deficit  Goal: Patient/family/caregiver demonstrates understanding of disease process, treatment plan, medications, and discharge instructions  Description: Complete learning assessment and assess knowledge base.  Interventions:  - Provide teaching at level of understanding  - Provide teaching via preferred learning methods  Outcome: Progressing

## 2024-04-25 NOTE — CASE MANAGEMENT
Case Management Discharge Planning Note    Patient name Danyelle Martinez  Location MICU 02/MICU 02 MRN [id number]  : 1940 Date 2024       Current Admission Date: 2024  Current Admission Diagnosis:PE (pulmonary thromboembolism) ([facility])   Patient Active Problem List    Diagnosis Date Noted    Parotitis, acute 2024    Facial cellulitis 2024    Change in mental status 2024    Cigarette nicotine dependence in remission 2024    Chronic diastolic CHF (congestive heart failure) ([facility]) 2024    Leukocytosis 2024    Closed wedge compression fracture of T1 vertebra with routine healing 2023    Enlarged thyroid 2023    Overflow incontinence of urine 2023    Fall 11/15/2023    Syncope, cardiogenic 11/15/2023    Recurrent major depressive disorder, in remission ([facility]) 2023    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    SVT (supraventricular tachycardia) 2023    Osteoarthritis of right knee 2023    Elevated liver enzymes 2023    Type 2 diabetes mellitus, without long-term current use of insulin ([facility]) 2023    Dyspepsia 2022    Frequent falls 2022    Localized swelling of right lower leg 2022    Anxiety 2022    Gastroesophageal reflux disease without esophagitis 10/12/2022    Nocturnal hypoxemia 2021    Acquired hypothyroidism 2021    Thyroid nodule 2021    Palpitations 2021    Tremor 2021    History of transient ischemic attack (TIA) 2021    Essential hypertension 2021    Dyslipidemia 2021    Seasonal allergic rhinitis due to pollen 2021    Chronic hypoxemic respiratory failure (HCC) 2020    Dizziness 2020    Shortness of breath on exertion       LOS (days): 2  Geometric Mean LOS (GMLOS) (days): 3.7  Days to GMLOS:2     OBJECTIVE:  Risk of Unplanned Readmission Score: 26.09         Current admission status: Inpatient   Preferred Pharmacy:   [id number] - [address] [place] [address] [address] [place] [id number]  Phone: [phone] Fax: [phone]    Primary Care Provider: Bladimir Caraballo MD    Primary Insurance: MEDICARE  Secondary Insurance: AARP    DISCHARGE DETAILS:    DME Referral Provided  Referral made for DME?: Yes  DME referral completed for the following items:: Portable Oxygen tanks  DME Supplier Name:: [employer]       Additional Comments: CM spoke with daughter Vivi Gonzales (Daughter)  [phone] and gave her the phone number to call [employer] as patient already has home 02 set up. CM orded portable 02 tank through [employer]. CM team to follow through [place]. CM to deliver once approved.

## 2024-04-26 ENCOUNTER — DOCUMENTATION (OUTPATIENT)
Dept: OTHER | Facility: HOSPITAL | Age: 84
End: 2024-04-26

## 2024-04-26 ENCOUNTER — APPOINTMENT (INPATIENT)
Dept: NON INVASIVE DIAGNOSTICS | Facility: HOSPITAL | Age: 84
DRG: 164 | End: 2024-04-26
Payer: MEDICARE

## 2024-04-26 PROBLEM — I26.99 PULMONARY EMBOLI (HCC): Status: ACTIVE | Noted: 2024-04-26

## 2024-04-26 PROBLEM — I82.403 ACUTE DEEP VEIN THROMBOSIS (DVT) OF BOTH LOWER EXTREMITIES (HCC): Status: ACTIVE | Noted: 2024-04-26

## 2024-04-26 LAB
AORTIC ROOT: 3.8 CM
APICAL FOUR CHAMBER EJECTION FRACTION: 56 %
APTT PPP: 52 SECONDS (ref 23–37)
ASCENDING AORTA: 3.5 CM
BSA FOR ECHO PROCEDURE: 1.81 M2
E WAVE DECELERATION TIME: 266 MS
E/A RATIO: 0.65
FRACTIONAL SHORTENING: 31 (ref 28–44)
GLUCOSE SERPL-MCNC: 102 MG/DL (ref 65–140)
GLUCOSE SERPL-MCNC: 103 MG/DL (ref 65–140)
GLUCOSE SERPL-MCNC: 111 MG/DL (ref 65–140)
INTERVENTRICULAR SEPTUM IN DIASTOLE (PARASTERNAL SHORT AXIS VIEW): 1.4 CM
INTERVENTRICULAR SEPTUM: 1.4 CM (ref 0.6–1.1)
LAAS-AP2: 22.8 CM2
LAAS-AP4: 21 CM2
LEFT ATRIUM SIZE: 3.7 CM
LEFT ATRIUM VOLUME (MOD BIPLANE): 66 ML
LEFT ATRIUM VOLUME INDEX (MOD BIPLANE): 36.5 ML/M2
LEFT INTERNAL DIMENSION IN SYSTOLE: 2.5 CM (ref 2.1–4)
LEFT VENTRICULAR INTERNAL DIMENSION IN DIASTOLE: 3.6 CM (ref 3.5–6)
LEFT VENTRICULAR POSTERIOR WALL IN END DIASTOLE: 1.4 CM
LEFT VENTRICULAR STROKE VOLUME: 33 ML
LVSV (TEICH): 33 ML
MV E'TISSUE VEL-SEP: 8 CM/S
MV PEAK A VEL: 1.22 M/S
MV PEAK E VEL: 79 CM/S
MV STENOSIS PRESSURE HALF TIME: 77 MS
MV VALVE AREA P 1/2 METHOD: 2.9
RA PRESSURE ESTIMATED: 3 MMHG
RIGHT ATRIAL 2D VOLUME: 48 ML
RIGHT ATRIUM AREA SYSTOLE A4C: 17.7 CM2
RIGHT VENTRICLE ID DIMENSION: 4.2 CM
RV PSP: 25 MMHG
SL CV LEFT ATRIUM LENGTH A2C: 5.6 CM
SL CV LV EF: 56
SL CV PED ECHO LEFT VENTRICLE DIASTOLIC VOLUME (MOD BIPLANE) 2D: 56 ML
SL CV PED ECHO LEFT VENTRICLE SYSTOLIC VOLUME (MOD BIPLANE) 2D: 23 ML
TR MAX PG: 22 MMHG
TR PEAK VELOCITY: 2.4 M/S
TRICUSPID ANNULAR PLANE SYSTOLIC EXCURSION: 1.8 CM
TRICUSPID VALVE PEAK REGURGITATION VELOCITY: 2.36 M/S

## 2024-04-26 PROCEDURE — 93306 TTE W/DOPPLER COMPLETE: CPT | Performed by: INTERNAL MEDICINE

## 2024-04-26 PROCEDURE — 93306 TTE W/DOPPLER COMPLETE: CPT

## 2024-04-26 PROCEDURE — 85730 THROMBOPLASTIN TIME PARTIAL: CPT | Performed by: FAMILY MEDICINE

## 2024-04-26 PROCEDURE — 99232 SBSQ HOSP IP/OBS MODERATE 35: CPT | Performed by: INTERNAL MEDICINE

## 2024-04-26 PROCEDURE — 82948 REAGENT STRIP/BLOOD GLUCOSE: CPT

## 2024-04-26 PROCEDURE — 94761 N-INVAS EAR/PLS OXIMETRY MLT: CPT

## 2024-04-26 RX ORDER — BISOPROLOL FUMARATE 5 MG/1
2.5 TABLET, FILM COATED ORAL DAILY
Status: DISCONTINUED | OUTPATIENT
Start: 2024-04-26 | End: 2024-04-27 | Stop reason: HOSPADM

## 2024-04-26 RX ORDER — AMLODIPINE BESYLATE 5 MG/1
5 TABLET ORAL DAILY
Status: DISCONTINUED | OUTPATIENT
Start: 2024-04-26 | End: 2024-04-27 | Stop reason: HOSPADM

## 2024-04-26 RX ADMIN — DEXTRAN 70, GLYCERIN, HYPROMELLOSE 1 DROP: 1; 2; 3 SOLUTION/ DROPS OPHTHALMIC at 17:07

## 2024-04-26 RX ADMIN — FLUTICASONE PROPIONATE 1 SPRAY: 50 SPRAY, METERED NASAL at 17:07

## 2024-04-26 RX ADMIN — ATORVASTATIN CALCIUM 80 MG: 80 TABLET, FILM COATED ORAL at 22:01

## 2024-04-26 RX ADMIN — APIXABAN 5 MG: 5 TABLET, FILM COATED ORAL at 17:07

## 2024-04-26 RX ADMIN — PANTOPRAZOLE SODIUM 40 MG: 40 TABLET, DELAYED RELEASE ORAL at 06:14

## 2024-04-26 RX ADMIN — PRIMIDONE 50 MG: 50 TABLET ORAL at 08:23

## 2024-04-26 RX ADMIN — APIXABAN 5 MG: 5 TABLET, FILM COATED ORAL at 10:01

## 2024-04-26 RX ADMIN — CLOPIDOGREL BISULFATE 75 MG: 75 TABLET ORAL at 08:22

## 2024-04-26 RX ADMIN — LORATADINE 10 MG: 10 TABLET ORAL at 08:22

## 2024-04-26 RX ADMIN — DEXTRAN 70, GLYCERIN, HYPROMELLOSE 1 DROP: 1; 2; 3 SOLUTION/ DROPS OPHTHALMIC at 06:15

## 2024-04-26 RX ADMIN — AMLODIPINE BESYLATE 5 MG: 5 TABLET ORAL at 12:38

## 2024-04-26 RX ADMIN — DULOXETINE HYDROCHLORIDE 60 MG: 60 CAPSULE, DELAYED RELEASE ORAL at 08:23

## 2024-04-26 RX ADMIN — HEPARIN SODIUM 2800 UNITS: 1000 INJECTION INTRAVENOUS; SUBCUTANEOUS at 07:26

## 2024-04-26 RX ADMIN — DEXTRAN 70, GLYCERIN, HYPROMELLOSE 1 DROP: 1; 2; 3 SOLUTION/ DROPS OPHTHALMIC at 22:01

## 2024-04-26 RX ADMIN — Medication 2 TABLET: at 08:23

## 2024-04-26 RX ADMIN — CHLORHEXIDINE GLUCONATE 0.12% ORAL RINSE 15 ML: 1.2 LIQUID ORAL at 08:22

## 2024-04-26 RX ADMIN — LEVOTHYROXINE SODIUM 88 MCG: 88 TABLET ORAL at 06:14

## 2024-04-26 RX ADMIN — BISOPROLOL FUMARATE 2.5 MG: 5 TABLET ORAL at 13:49

## 2024-04-26 RX ADMIN — Medication 1 TABLET: at 08:22

## 2024-04-26 RX ADMIN — CLONAZEPAM 0.5 MG: 0.5 TABLET ORAL at 22:01

## 2024-04-26 RX ADMIN — CHLORHEXIDINE GLUCONATE 0.12% ORAL RINSE 15 ML: 1.2 LIQUID ORAL at 22:02

## 2024-04-26 NOTE — ASSESSMENT & PLAN NOTE
S/p thrombectomy  Respiratory status is near baseline now on 3 liters of o2  Continue Eliquis on discharge  Counseled on anticoagulant precautions which have been typed into the discharge instructions

## 2024-04-26 NOTE — ASSESSMENT & PLAN NOTE
Wt Readings from Last 3 Encounters:   04/26/24 72.1 kg (158 lb 15.2 oz)   04/23/24 74.8 kg (165 lb)   04/09/24 75 kg (165 lb 6.4 oz)   Appears euvolemic at this time  Monitor volume status  Low Na diet

## 2024-04-26 NOTE — PROGRESS NOTES
This RN performed home o2 eval. Pt desat to 84 while walking on RA. 3L NC placed back on, pt o2 went back up to 88%. Pt back in bed on 3L. Respiratory made aware.

## 2024-04-26 NOTE — PROGRESS NOTES
[facility]  Progress Note  Name: Danyelle Martinez I  MRN: [id number]  Unit/Bed#: -01 I Date of Admission: 4/23/2024   Date of Service: 4/26/2024 I Hospital Day: 3    Assessment/Plan   * Pulmonary emboli (HCC)  Assessment & Plan  S/p thrombectomy  Respiratory status is near baseline now on 3 liters of o2  Change IV heparin to Eliquis  Discharge planning, PT/OT evaluation    Acute deep vein thrombosis (DVT) of both lower extremities (HCC)  Assessment & Plan  Continue Eliquis as above    Chronic diastolic CHF (congestive heart failure) (HCC)  Assessment & Plan  Wt Readings from Last 3 Encounters:   04/26/24 72.1 kg (158 lb 15.2 oz)   04/23/24 74.8 kg (165 lb)   04/09/24 75 kg (165 lb 6.4 oz)   Appears euvolemic at this time  Monitor volume status  Low Na diet            Essential hypertension  Assessment & Plan  Resume amlodipine and bisoprolol    Chronic hypoxemic respiratory failure (HCC)  Assessment & Plan  Chronically on 3 liters of oxygen, currently at her baseline  Continue nasal cannula             VTE Pharmacologic Prophylaxis: VTE Score: 4 Moderate Risk (Score 3-4) - Pharmacological DVT Prophylaxis Ordered: apixaban (Eliquis).    Mobility:   Basic Mobility Inpatient Raw Score: 16  JH-HLM Goal: 5: Stand one or more mins  JH-HLM Achieved: 5: Stand (1 or more minutes)  JH-HLM Goal NOT achieved. Continue with multidisciplinary rounding and encourage appropriate mobility to improve upon JH-HLM goals.    Patient Centered Rounds: I performed bedside rounds with nursing staff today.   Discussions with Specialists or Other Care Team Provider: nurse, CM    Education and Discussions with Family / Patient: Updated  (daughter) via phone.    Total Time Spent on Date of Encounter in care of patient: 35 mins. This time was spent on one or more of the following: performing physical exam; counseling and coordination of care; obtaining or reviewing history; documenting in  the medical record; reviewing/ordering tests, medications or procedures; communicating with other healthcare professionals and discussing with patient's family/caregivers.    Current Length of Stay: 3 day(s)  Current Patient Status: Inpatient   Certification Statement: The patient will continue to require additional inpatient hospital stay due to discharge planning  Discharge Plan: Anticipate discharge later today or tomorrow to rehab facility.    Code Status: Level 1 - Full Code    Subjective:   Denies any new complaints. Reports that her breathing is near baseline. She feels more aware of her breathing    Objective:     Vitals:   Temp (24hrs), Av.3 °F (36.8 °C), Min:97.7 °F (36.5 °C), Max:98.8 °F (37.1 °C)    Temp:  [97.7 °F (36.5 °C)-98.8 °F (37.1 °C)] 97.7 °F (36.5 °C)  HR:  [77-89] 77  Resp:  [15-17] 17  BP: (140-147)/(64-68) 147/68  SpO2:  [95 %-98 %] 97 %  Body mass index is 25.66 kg/m².     Input and Output Summary (last 24 hours):     Intake/Output Summary (Last 24 hours) at 2024 1222  Last data filed at 2024 0900  Gross per 24 hour   Intake 1620 ml   Output 500 ml   Net 1120 ml       Physical Exam:   Physical Exam  Constitutional:       Appearance: Normal appearance.   HENT:      Head: Normocephalic and atraumatic.      Nose: Nose normal.   Eyes:      Extraocular Movements: Extraocular movements intact.   Cardiovascular:      Rate and Rhythm: Normal rate and regular rhythm.   Pulmonary:      Effort: Pulmonary effort is normal.      Breath sounds: No wheezing or rales.   Musculoskeletal:      Right lower leg: No edema.      Left lower leg: No edema.   Neurological:      Mental Status: She is alert and oriented to person, place, and time.   Psychiatric:         Mood and Affect: Mood normal.         Behavior: Behavior normal.          Additional Data:     Labs:  Results from last 7 days   Lab Units 24  0444   WBC Thousand/uL 9.88   HEMOGLOBIN g/dL 10.7*   HEMATOCRIT % 34.5*   PLATELETS  Thousands/uL 163   SEGS PCT % 78*   LYMPHO PCT % 12*   MONO PCT % 7   EOS PCT % 2     Results from last 7 days   Lab Units 04/25/24  0444 04/24/24  0548   SODIUM mmol/L 138 138   POTASSIUM mmol/L 3.9 4.2   CHLORIDE mmol/L 103 103   CO2 mmol/L 27 27   BUN mg/dL 18 18   CREATININE mg/dL 0.62 0.58*   ANION GAP mmol/L 8 8   CALCIUM mg/dL 9.4 9.5   ALBUMIN g/dL  --  3.7   TOTAL BILIRUBIN mg/dL  --  0.62   ALK PHOS U/L  --  88   ALT U/L  --  21   AST U/L  --  31   GLUCOSE RANDOM mg/dL 115 120     Results from last 7 days   Lab Units 04/23/24  2122   INR  1.28*     Results from last 7 days   Lab Units 04/26/24  1140 04/26/24  0740 04/25/24  1709 04/25/24  1126 04/25/24  0530 04/25/24  0009 04/24/24  1911 04/24/24  1148   POC GLUCOSE mg/dl 103 102 110 106 108 97 120 138         Results from last 7 days   Lab Units 04/24/24  1101   LACTIC ACID mmol/L 0.8       Lines/Drains:  Invasive Devices       Peripheral Intravenous Line  Duration             Peripheral IV 04/23/24 Right Antecubital 2 days    Peripheral IV 04/24/24 Dorsal (posterior);Left Forearm 2 days              Drain  Duration             External Urinary Catheter 2 days                          Imaging: No pertinent imaging reviewed.    Recent Cultures (last 7 days):         Last 24 Hours Medication List:   Current Facility-Administered Medications   Medication Dose Route Frequency Provider Last Rate    amLODIPine  5 mg Oral Daily Rommel Hester MD      apixaban  5 mg Oral BID Rommel Hester MD      Artificial Tears  1 drop Both Eyes TID ROBERT Frazier      atorvastatin  80 mg Oral HS Levy Perkins DO      bisoprolol  2.5 mg Oral Daily Rommel Hester MD      calcium carbonate  2 tablet Oral Daily With Breakfast Levy Perkins DO      chlorhexidine  15 mL Mouth/Throat Q12H JOHN Levy Perkins DO      clonazePAM  0.5 mg Oral BID PRN Levy Perkins, DO      clopidogrel  75 mg Oral Daily Levy Perkins DO      DULoxetine  60 mg Oral Daily  Levy Perkins, DO      fluticasone  1 spray Each Nare BID Levy Perkins, DO      levothyroxine  88 mcg Oral Early Morning Levy Perkins, DO      loratadine  10 mg Oral Daily Levy Perkins, DO      multivitamin-minerals  1 tablet Oral Daily Levy Perkins, DO      pantoprazole  40 mg Oral Daily Before Breakfast Levy Perkins, DO      primidone  50 mg Oral Daily Levy Perkins, DO          Today, Patient Was Seen By: Rommel Hester MD    **Please Note: This note may have been constructed using a voice recognition system.**

## 2024-04-26 NOTE — ASSESSMENT & PLAN NOTE
S/p thrombectomy  Respiratory status is near baseline now on 3 liters of o2  Change IV heparin to Eliquis  Discharge planning, PT/OT evaluation

## 2024-04-26 NOTE — CASE MANAGEMENT
Case Management Discharge Planning Note    Patient name Danyelle Martinez  Location /-01 MRN [id number]  : 1940 Date 2024       Current Admission Date: 2024  Current Admission Diagnosis:Pulmonary emboli (HCC)   Patient Active Problem List    Diagnosis Date Noted    Pulmonary emboli (HCC) 2024    Acute deep vein thrombosis (DVT) of both lower extremities (HCC) 2024    Parotitis, acute 2024    Facial cellulitis 2024    Change in mental status 2024    Cigarette nicotine dependence in remission 2024    Chronic diastolic CHF (congestive heart failure) ([facility]) 2024    Leukocytosis 2024    Closed wedge compression fracture of T1 vertebra with routine healing 2023    Enlarged thyroid 2023    Overflow incontinence of urine 2023    Fall 11/15/2023    Syncope, cardiogenic 11/15/2023    Recurrent major depressive disorder, in remission ([facility]) 2023    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    SVT (supraventricular tachycardia) 2023    Osteoarthritis of right knee 2023    Elevated liver enzymes 2023    Type 2 diabetes mellitus, without long-term current use of insulin ([facility]) 2023    Dyspepsia 2022    Frequent falls 2022    Localized swelling of right lower leg 2022    Anxiety 2022    Gastroesophageal reflux disease without esophagitis 10/12/2022    Nocturnal hypoxemia 2021    Acquired hypothyroidism 2021    Thyroid nodule 2021    Palpitations 2021    Tremor 2021    History of transient ischemic attack (TIA) 2021    Essential hypertension 2021    Dyslipidemia 2021    Seasonal allergic rhinitis due to pollen 2021    Chronic hypoxemic respiratory failure (HCC) 2020    Dizziness 2020    Shortness of breath on exertion       LOS (days): 3  Geometric Mean LOS (GMLOS) (days): 3.7  Days to GMLOS:0.8     OBJECTIVE:  Risk of  Unplanned Readmission Score: 27.72         Current admission status: Inpatient   Preferred Pharmacy:   [id number] [address] [place] [address] [address] [place] [id number]  Phone: [phone] Fax: [phone]    Primary Care Provider: Bladimir Caraballo MD    Primary Insurance: MEDICARE  Secondary Insurance: AARP    DISCHARGE DETAILS:    Discharge planning discussed with:: Patient  Freedom of Choice: Yes  Comments - Freedom of Choice: Discussed FOC  CM contacted family/caregiver?: Yes  Were Treatment Team discharge recommendations reviewed with patient/caregiver?: Yes  Did patient/caregiver verbalize understanding of patient care needs?: Yes  Were patient/caregiver advised of the risks associated with not following Treatment Team discharge recommendations?: Yes                   Other Referral/Resources/Interventions Provided:  Referral Comments: Portable O2 Concentrator order submitted in [employer]. Ambulatory pulse ox needs to be uploaded for order fulfillment. Notified Dr. Hester who will place order. CM to follow. Pt recommended for STR but declined. Also do not wish to have HHC. She has dtr nearby for support and will do strengthening exerciseat home by herself.         Treatment Team Recommendation: Short Term Rehab  Discharge Destination Plan:: Home

## 2024-04-26 NOTE — QUICK NOTE
[place] II 48 Hour Follow-Up    Patient participated in [place] II clinical trial and was randomly assigned to the FlowTriever intervention arm.    -Date of Visit: 4/26/24  -Time of Visit: 1230  -Dyspnea/mMRC Score: 0 -- Breathless with strenuous exercise only  -Dyspnea (Modified Pee Scale) at rest: 0: No breathlessness at all  -Fatigue (Pee CR10 Scale): 0: No breathlessness at all  -Supplemental Oxygen: Nasal Cannula: 2 lpm  -NYHA/WHO Classification: I  -Echo or CTPA Image collection at this visit: Echo   *Reminder: must match baseline modality  -Were there any changes in the anticoagulant medications: Yes    -If yes, specify:  converted to apixaban  -Was there an adverse event since initial visit: No   -If yes, specify: N/A

## 2024-04-26 NOTE — RESPIRATORY THERAPY NOTE
Home Oxygen Qualifying Test     Patient name: Danyelle Martinez        : 1940   Date of Test:  2024  Diagnosis:    Home Oxygen Test:    **Medicare Guidelines require item(s) 1-5 on all ambulatory patients or 1 and 2 on non-ambulatory patients.    1. Baseline SPO2 on Room Air at rest 93 %   If <= 88% on Room Air add O2 via NC to obtain SpO2 >=88%. If LPM needed, document LPM N/A needed to reach =>88%    SPO2 during exertion on Room Air 84  %  During exertion monitor SPO2. If SPO2 increases >=89%, do not add supplemental oxygen    SPO2 on Oxygen at Rest 98 % at 3 LPM    SPO2 during exertion on Oxygen 90 % at 3 LPM    Test performed during exertion activity. Walking with walker     [x]  Supplemental Home Oxygen is indicated.    []  Client does not qualify for home oxygen.    Respiratory Additional Notes- Pt first observed 98% on 3 lpm nc. RA obs to be 93%. Once walking SpO2 decreased to 84%. O2 applied and increased for SpO2 >89%    Janine Coronel, RT

## 2024-04-27 ENCOUNTER — DOCUMENTATION (OUTPATIENT)
Dept: OTHER | Facility: HOSPITAL | Age: 84
End: 2024-04-27

## 2024-04-27 VITALS
DIASTOLIC BLOOD PRESSURE: 60 MMHG | OXYGEN SATURATION: 96 % | HEIGHT: 66 IN | WEIGHT: 163.36 LBS | SYSTOLIC BLOOD PRESSURE: 132 MMHG | RESPIRATION RATE: 18 BRPM | BODY MASS INDEX: 26.25 KG/M2 | TEMPERATURE: 98.2 F | HEART RATE: 73 BPM

## 2024-04-27 LAB — GLUCOSE SERPL-MCNC: 95 MG/DL (ref 65–140)

## 2024-04-27 PROCEDURE — 82948 REAGENT STRIP/BLOOD GLUCOSE: CPT

## 2024-04-27 PROCEDURE — 99239 HOSP IP/OBS DSCHRG MGMT >30: CPT | Performed by: INTERNAL MEDICINE

## 2024-04-27 RX ORDER — ATORVASTATIN CALCIUM 80 MG/1
TABLET, FILM COATED ORAL
Qty: 90 TABLET | Refills: 0 | Status: SHIPPED | OUTPATIENT
Start: 2024-04-27

## 2024-04-27 RX ADMIN — FLUTICASONE PROPIONATE 1 SPRAY: 50 SPRAY, METERED NASAL at 09:04

## 2024-04-27 RX ADMIN — DEXTRAN 70, GLYCERIN, HYPROMELLOSE 1 DROP: 1; 2; 3 SOLUTION/ DROPS OPHTHALMIC at 09:06

## 2024-04-27 RX ADMIN — Medication 1 TABLET: at 09:03

## 2024-04-27 RX ADMIN — PANTOPRAZOLE SODIUM 40 MG: 40 TABLET, DELAYED RELEASE ORAL at 09:03

## 2024-04-27 RX ADMIN — AMLODIPINE BESYLATE 5 MG: 5 TABLET ORAL at 09:04

## 2024-04-27 RX ADMIN — DULOXETINE HYDROCHLORIDE 60 MG: 60 CAPSULE, DELAYED RELEASE ORAL at 09:06

## 2024-04-27 RX ADMIN — PRIMIDONE 50 MG: 50 TABLET ORAL at 09:06

## 2024-04-27 RX ADMIN — Medication 2 TABLET: at 09:05

## 2024-04-27 RX ADMIN — APIXABAN 5 MG: 5 TABLET, FILM COATED ORAL at 09:03

## 2024-04-27 RX ADMIN — LORATADINE 10 MG: 10 TABLET ORAL at 09:03

## 2024-04-27 RX ADMIN — CLOPIDOGREL BISULFATE 75 MG: 75 TABLET ORAL at 09:04

## 2024-04-27 RX ADMIN — BISOPROLOL FUMARATE 2.5 MG: 5 TABLET ORAL at 09:05

## 2024-04-27 RX ADMIN — LEVOTHYROXINE SODIUM 88 MCG: 88 TABLET ORAL at 05:32

## 2024-04-27 NOTE — DISCHARGE SUMMARY
[facility]  Discharge- Danyelle Martinez 1940, 83 y.o. female MRN: [id number]  Unit/Bed#: MS [facility]01 Encounter: [id number]  Primary Care Provider: Bladimir Caraballo MD   Date and time admitted to hospital: 4/23/2024  7:36 PM    * Pulmonary emboli (HCC)  Assessment & Plan  S/p thrombectomy  Respiratory status is near baseline now on 3 liters of o2  Continue Eliquis on discharge  Counseled on anticoagulant precautions which have been typed into the discharge instructions    Acute deep vein thrombosis (DVT) of both lower extremities (HCC)  Assessment & Plan  Continue Eliquis as above    Chronic diastolic CHF (congestive heart failure) (HCC)  Assessment & Plan  Wt Readings from Last 3 Encounters:   04/26/24 72.1 kg (158 lb 15.2 oz)   04/23/24 74.8 kg (165 lb)   04/09/24 75 kg (165 lb 6.4 oz)   Appears euvolemic at this time  Monitor volume status  Low Na diet            Essential hypertension  Assessment & Plan  Resume amlodipine and bisoprolol    Chronic hypoxemic respiratory failure (HCC)  Assessment & Plan  Chronically on 3 liters of oxygen, currently at her baseline  Continue nasal cannula      Medical Problems       Resolved Problems  Date Reviewed: 4/26/2024   None       Discharging Physician / Practitioner: Rommel Hester MD  PCP: Bladimir Caraballo MD  Admission Date:   Admission Orders (From admission, onward)       Ordered        04/23/24 2000  Inpatient Admission  Once                          Discharge Date: 04/27/24    Consultations During Hospital Stay:  IR  Critical care  pulmonology    Procedures Performed:   IR thrombectomy    Significant Findings / Test Results:   PULMONARY ARTERIAL TREE: There are filling defects straddling the right and left pulmonary arteries, lobar, segmental, and subsegmental pulmonary arteries consistent with saddle pulmonary embolus. Overall clot burden is large.     LUNGS: No focal airspace consolidation. Mild diffuse interlobular septal  thickening with mild associated groundglass opacity may be due to pulmonary edema. There is no tracheal or endobronchial lesion.     PLEURA: Unremarkable.     HEART/GREAT VESSELS: Atherosclerotic coronary artery calcification. No thoracic aortic aneurysm.     MEDIASTINUM AND HILDA: Unremarkable.     CHEST WALL AND LOWER NECK: Enlarged right thyroid lobe secondary to few nodules measuring up to 2.5 cm, similar to prior.     VISUALIZED STRUCTURES IN THE UPPER ABDOMEN: Cirrhosis needed. Stable 1.5 cm left adrenal gland nodule. 3.9 cm splenic lesion, similar to prior. Partially included cystic lesion of the left kidney.     OSSEOUS STRUCTURES: No acute fracture or destructive osseous lesion.    Left Ventricle: Left ventricular cavity size is normal. Wall thickness is moderately increased. There is moderate concentric hypertrophy. The left ventricular ejection fraction is 56%. Systolic function is normal. Wall motion is normal. Diastolic function is mildly abnormal, consistent with grade I (abnormal) relaxation.    Right Ventricle: Systolic function is low normal.    Left Atrium: The atrium is mildly dilated.    Aortic Valve: There is aortic valve sclerosis.    Mitral Valve: There is mild annular calcification.    Aorta: The aortic root is mildly dilated. The ascending aorta is normal in size. The aortic root is 3.80 cm. The ascending aorta is 3.5 cm.    Incidental Findings:   Thyroid nodule  Adrenal nodule  Splenic lesion  I reviewed the above mentioned incidental findings with the patient and/or family and they expressed understanding.    Test Results Pending at Discharge (will require follow up):   None     Outpatient Tests Requested:  None    Complications:  None    Reason for Admission: shortness of breath    Hospital Course:   Danyelle Martinez is a 83 y.o. female patient who originally presented to the hospital on 4/23/2024 due to shortness of breath. She was found to have a saddle pulmonary embolus and DVT and  "admitted to ICU for further treatment. She was treated with heparin and IR thrombectomy during her hospital course. She returned to her baseline oxygen requirement and was discharged home with Eliqu for anticoagulation.      Please see above list of diagnoses and related plan for additional information.     Condition at Discharge: stable    Discharge Day Visit / Exam:   Subjective:  denies any new complaints today  Vitals: Blood Pressure: 132/60 (04/27/24 0718)  Pulse: 73 (04/27/24 0718)  Temperature: 98.2 °F (36.8 °C) (04/27/24 0718)  Temp Source: Oral (04/26/24 2326)  Respirations: 18 (04/27/24 0718)  Height: 5' 6\" (167.6 cm) (04/26/24 1130)  Weight - Scale: 74.1 kg (163 lb 5.8 oz) (04/27/24 0500)  SpO2: 96 % (04/27/24 0718)  Exam:   Physical Exam  Constitutional:       Appearance: Normal appearance.   HENT:      Head: Normocephalic and atraumatic.      Nose: Nose normal.   Eyes:      Extraocular Movements: Extraocular movements intact.   Cardiovascular:      Rate and Rhythm: Normal rate and regular rhythm.   Pulmonary:      Effort: Pulmonary effort is normal.      Breath sounds: No wheezing or rales.   Musculoskeletal:      Right lower leg: No edema.      Left lower leg: No edema.   Skin:     General: Skin is warm and dry.   Neurological:      Mental Status: She is alert and oriented to person, place, and time.   Psychiatric:         Mood and Affect: Mood normal.         Behavior: Behavior normal.          Discussion with Family:  discharge plan discussed with her daughter yesterday.     Discharge instructions/Information to patient and family:   See after visit summary for information provided to patient and family.      Provisions for Follow-Up Care:  See after visit summary for information related to follow-up care and any pertinent home health orders.      Mobility at time of Discharge:   Basic Mobility Inpatient Raw Score: 16  JH-HLM Goal: 5: Stand one or more mins  JH-HLM Achieved: 6: Walk 10 steps or " more  HLM Goal achieved. Continue to encourage appropriate mobility.     Disposition:   Home    Planned Readmission: No     Discharge Statement:  I spent 45 minutes discharging the patient. This time was spent on the day of discharge. I had direct contact with the patient on the day of discharge. Greater than 50% of the total time was spent examining patient, answering all patient questions, arranging and discussing plan of care with patient as well as directly providing post-discharge instructions.  Additional time then spent on discharge activities.    Discharge Medications:  See after visit summary for reconciled discharge medications provided to patient and/or family.      **Please Note: This note may have been constructed using a voice recognition system**

## 2024-04-27 NOTE — INCIDENTAL FINDINGS
The following findings require follow up:  Radiographic finding   Finding: Thyroid nodule, adrenal nodule, splenic lesion   Follow up required: Primary care physician appointment   Follow up should be done within 1 month(s)    Please notify the following clinician to assist with the follow up:   Dr. Caraballo

## 2024-04-27 NOTE — CASE MANAGEMENT
Case Management Discharge Planning Note    Patient name Danyelle Martinez  Location /-01 MRN [id number]  : 1940 Date 2024       Current Admission Date: 2024  Current Admission Diagnosis:Pulmonary emboli (HCC)   Patient Active Problem List    Diagnosis Date Noted    Pulmonary emboli (HCC) 2024    Acute deep vein thrombosis (DVT) of both lower extremities (HCC) 2024    Parotitis, acute 2024    Facial cellulitis 2024    Change in mental status 2024    Cigarette nicotine dependence in remission 2024    Chronic diastolic CHF (congestive heart failure) ([facility]) 2024    Leukocytosis 2024    Closed wedge compression fracture of T1 vertebra with routine healing 2023    Enlarged thyroid 2023    Overflow incontinence of urine 2023    Fall 11/15/2023    Syncope, cardiogenic 11/15/2023    Recurrent major depressive disorder, in remission ([facility]) 2023    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    SVT (supraventricular tachycardia) 2023    Osteoarthritis of right knee 2023    Elevated liver enzymes 2023    Type 2 diabetes mellitus, without long-term current use of insulin ([facility]) 2023    Dyspepsia 2022    Frequent falls 2022    Localized swelling of right lower leg 2022    Anxiety 2022    Gastroesophageal reflux disease without esophagitis 10/12/2022    Nocturnal hypoxemia 2021    Acquired hypothyroidism 2021    Thyroid nodule 2021    Palpitations 2021    Tremor 2021    History of transient ischemic attack (TIA) 2021    Essential hypertension 2021    Dyslipidemia 2021    Seasonal allergic rhinitis due to pollen 2021    Chronic hypoxemic respiratory failure (HCC) 2020    Dizziness 2020    Shortness of breath on exertion       LOS (days): 4  Geometric Mean LOS (GMLOS) (days): 3.7  Days to GMLOS:0     OBJECTIVE:  Risk of Unplanned  Readmission Score: 23.88         Current admission status: Inpatient   Preferred Pharmacy:   [facility] #[id number] - [address] [place] [address] [address] [place] [id number]  Phone: [phone] Fax: [phone]    Primary Care Provider: Bladimir Caraballo MD    Primary Insurance: MEDICARE  Secondary Insurance: AARP    DISCHARGE DETAILS:                               Additional Comments: Per Gill from [email or web address] Pulse ox will be shipped, not delievered. Gill also stated someone from [employer] will follow up with the order on Monday, but there is a possibility that order will get denied due to pt having a recent order on Jan 29, 2024. CM verbalized understanding. CM notified SLIM attending. CM will continue to follow as needed.

## 2024-04-27 NOTE — DISCHARGE INSTR - AVS FIRST PAGE
Instructions for you from your physician regarding your new blood thinning medication:    Do not stop taking Eliquis unless instructed by a physician.    Take Eliquis at the same time every day approximately 12 hr apart.    If a skin injury and bleeding occurs, hold pressure on the area with a clean cloth or bandage for approximately 10 min, if bleeding does not stop seek medical attention.    If you see blood in mucus, vomit, urine, stools, or black stools, seek medical attention immediately.    If you injure your head, go to an emergency department for a CAT scan of the brain to check for internal bleeding.    Avoid medications like aspirin, ibuprofen, naproxen to avoid the risk of bleeding stomach ulcers.    Avoid alcohol use to prevent injuries and internal bleeding.    Wear a medical alert bracelet stating that you take an anticoagulant.    Notify your physicians, surgeons, and dentist that you are now taking a blood thinner prior to any surgeries, procedures, or dental work.

## 2024-04-27 NOTE — PLAN OF CARE
Problem: SAFETY ADULT  Goal: Patient will remain free of falls  Description: INTERVENTIONS:  - Educate patient/family on patient safety including physical limitations  - Instruct patient to call for assistance with activity   - Consult OT/PT to assist with strengthening/mobility   - Keep Call bell within reach  - Keep bed low and locked with side rails adjusted as appropriate  - Keep care items and personal belongings within reach  - Initiate and maintain comfort rounds  - Make Fall Risk Sign visible to staff  - Offer Toileting every  Hours, in advance of need  - Initiate/Maintain alarm  - Obtain necessary fall risk management equipment:   Problem: PAIN - ADULT  Goal: Verbalizes/displays adequate comfort level or baseline comfort level  Description: Interventions:  - Encourage patient to monitor pain and request assistance  - Assess pain using appropriate pain scale  - Administer analgesics based on type and severity of pain and evaluate response  - Implement non-pharmacological measures as appropriate and evaluate response  - Consider cultural and social influences on pain and pain management  - Notify physician/advanced practitioner if interventions unsuccessful or patient reports new pain  Outcome: Progressing     - Apply yellow socks and bracelet for high fall risk patients  - Consider moving patient to room near nurses station  Outcome: Progressing

## 2024-04-27 NOTE — CASE MANAGEMENT
Case Management Discharge Planning Note    Patient name Danyelle Martinez  Location /-01 MRN [id number]  : 1940 Date 2024       Current Admission Date: 2024  Current Admission Diagnosis:Pulmonary emboli (HCC)   Patient Active Problem List    Diagnosis Date Noted    Pulmonary emboli (HCC) 2024    Acute deep vein thrombosis (DVT) of both lower extremities (HCC) 2024    Parotitis, acute 2024    Facial cellulitis 2024    Change in mental status 2024    Cigarette nicotine dependence in remission 2024    Chronic diastolic CHF (congestive heart failure) ([facility]) 2024    Leukocytosis 2024    Closed wedge compression fracture of T1 vertebra with routine healing 2023    Enlarged thyroid 2023    Overflow incontinence of urine 2023    Fall 11/15/2023    Syncope, cardiogenic 11/15/2023    Recurrent major depressive disorder, in remission ([facility]) 2023    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    SVT (supraventricular tachycardia) 2023    Osteoarthritis of right knee 2023    Elevated liver enzymes 2023    Type 2 diabetes mellitus, without long-term current use of insulin ([facility]) 2023    Dyspepsia 2022    Frequent falls 2022    Localized swelling of right lower leg 2022    Anxiety 2022    Gastroesophageal reflux disease without esophagitis 10/12/2022    Nocturnal hypoxemia 2021    Acquired hypothyroidism 2021    Thyroid nodule 2021    Palpitations 2021    Tremor 2021    History of transient ischemic attack (TIA) 2021    Essential hypertension 2021    Dyslipidemia 2021    Seasonal allergic rhinitis due to pollen 2021    Chronic hypoxemic respiratory failure (HCC) 2020    Dizziness 2020    Shortness of breath on exertion       LOS (days): 4  Geometric Mean LOS (GMLOS) (days): 3.7  Days to GMLOS:0.1     OBJECTIVE:  Risk of  "Unplanned Readmission Score: 23.88         Current admission status: Inpatient   Preferred Pharmacy:   [facility] #[id number] - [address] [place] [address] [address] [place] [id number]  Phone: [phone] Fax: [phone]    Primary Care Provider: Bladimir Caraballo MD    Primary Insurance: MEDICARE  Secondary Insurance: AARP    DISCHARGE DETAILS:                               Additional Comments: CM uploaded new ambulatory pulse ox order to exisiting order on 4/25 via parachute. Per [place] \"please reach out to our patient support team regarding the status of this request. They can be reached Monday through Friday 8:30am - 5pm at [phone].\" CM notified SLIM attending. CM will continue to follow as needed.                      "

## 2024-04-28 ENCOUNTER — APPOINTMENT (EMERGENCY)
Dept: RADIOLOGY | Facility: HOSPITAL | Age: 84
End: 2024-04-28
Payer: MEDICARE

## 2024-04-28 ENCOUNTER — HOSPITAL ENCOUNTER (EMERGENCY)
Facility: HOSPITAL | Age: 84
Discharge: HOME | End: 2024-04-29
Attending: EMERGENCY MEDICINE
Payer: MEDICARE

## 2024-04-28 VITALS
DIASTOLIC BLOOD PRESSURE: 69 MMHG | SYSTOLIC BLOOD PRESSURE: 161 MMHG | OXYGEN SATURATION: 100 % | TEMPERATURE: 97.9 F | HEART RATE: 55 BPM | RESPIRATION RATE: 16 BRPM

## 2024-04-28 DIAGNOSIS — S63.509A WRIST SPRAIN: Primary | ICD-10-CM

## 2024-04-28 DIAGNOSIS — E04.1 THYROID NODULE: ICD-10-CM

## 2024-04-28 PROCEDURE — 72125 CT NECK SPINE W/O DYE: CPT

## 2024-04-28 PROCEDURE — 99284 EMERGENCY DEPT VISIT MOD MDM: CPT

## 2024-04-28 PROCEDURE — 70450 CT HEAD/BRAIN W/O DYE: CPT

## 2024-04-28 PROCEDURE — 73110 X-RAY EXAM OF WRIST: CPT

## 2024-04-28 RX ORDER — KETOROLAC TROMETHAMINE 5 MG/ML
1 SOLUTION OPHTHALMIC 4 TIMES DAILY
Status: DISCONTINUED | OUTPATIENT
Start: 2024-04-28 | End: 2024-04-29 | Stop reason: HOSPADM

## 2024-04-28 RX ADMIN — FLUORESCEIN SODIUM 1 STRIP: 1 STRIP OPHTHALMIC at 22:22

## 2024-04-28 RX ADMIN — KETOROLAC TROMETHAMINE 1 DROP: 5 SOLUTION OPHTHALMIC at 22:22

## 2024-04-29 ENCOUNTER — TRANSITIONAL CARE MANAGEMENT (OUTPATIENT)
Dept: INTERNAL MEDICINE CLINIC | Facility: CLINIC | Age: 84
End: 2024-04-29

## 2024-04-29 DIAGNOSIS — I10 ESSENTIAL HYPERTENSION: Primary | ICD-10-CM

## 2024-04-29 PROCEDURE — 99284 EMERGENCY DEPT VISIT MOD MDM: CPT | Performed by: EMERGENCY MEDICINE

## 2024-04-29 NOTE — PROGRESS NOTES
Spiritual Care Progress Note    2024  Patient: Danyelle Martinez : 1940  Admission Date & Time: 2024  MRN: [id number] CSN: [id number]        Fr Cast followed-up with the pt and conducted a pastoral visit. No further needs were expressed a this time.    Chaplains still remain available.       24 1000   Clinical Encounter Type   Visited With Patient   Routine Visit Follow-up

## 2024-04-29 NOTE — ED PROVIDER NOTES
"Final Diagnosis:  1. Wrist sprain    2. Thyroid nodule        Chief Complaint   Patient presents with    Fall     Patient reports fell last night getting into bed after turning the light off and under estimated where bed was and went to sit down and fell onto the carpeted floor hitting back of head and has bump. Stayed up all night and fell asleep 11 am, denies n/v, did have a headache when she woke up but not persistant       HPI  Patient tripped after turning off the lights and tried to lay down \"where the bed was not\" haha. She's a character. Has a small bruise to occiput. Has some vague wrist discomfor.t no skin breaks. No c spine tenderness. Carpetted floor. No LOC. She's on anticoag antiplatelet. No amnesia, no n/v, mild HA. No chest wall pain, SOB, abd pain, pelvis is stable, she's since ambulated.     Secondary unrelated complaint. She's felt like something stuck in her eye for a few d on right. No deep orbital pain. Just superficial. No vision change. Baseline w/ only light vision on left.     EMS additionally reports:     - Previous charting underwent limited review with attention to last ED visits, labs, ekgs, and prior imaging.  Chart review reveals :     No results displayed because visit has over 200 results.          - No language barrier.   - History obtained from patient    - Discuss patient's care, with patient permission or by chart review, with  her daughter tangela     PMH:   has a past medical history of Anxiety, Arthritis, Blind left eye, CHF (congestive heart failure) ([facility]), Deaf, left, Depression, Diabetes mellitus ([facility]), Disease of thyroid gland, DVT complicating pregnancy, unspecified trimester ([facility]) (postpartum), Hearing loss, History of shingles (2007), Hyperlipidemia, Hypertension, Liver disease, Lyme disease, Meniere's disease, Obesity, Orthostatic hypotension, Psychiatric problem, Sinus congestion, Sleep apnea, and Stroke ([facility]).    PSH:   has a past surgical history that includes " Breast biopsy (Left, ); Hysterectomy; Tonsillectomy;  section; Hernia repair; Appendectomy; Rotator cuff repair (Left); Hand surgery (Left); Tympanostomy tube placement; Eye surgery; DXA procedure(historical) (10/28/2020); and IR PE endovascular therapy (2024).     Social History:  Tobacco Use: Medium Risk (2024)    Patient History     Smoking Tobacco Use: Former     Smokeless Tobacco Use: Never     Passive Exposure: Past     Alcohol Use: Not At Risk (2020)    AUDIT-C     Frequency of Alcohol Consumption: Never     Average Number of Drinks: Not on file     Frequency of Binge Drinking: Not on file     No illicit use       ROS:  Pertinent positives/negatives: .     Some ROS may be present in the HPI and would take precedent over these standard questions asked below.   Review of Systems   Eyes:  Positive for itching. Negative for photophobia, discharge, redness and visual disturbance.   Neurological:  Positive for headaches.        CONSTITUTIONAL:  No lethargy. No unexpected weight loss. No change in behavior.  EYES:  No pain, redness, or discharge. No loss of vision. No orbital trauma or pain.   ENT:  No tinnitus or decreased hearing. No epistaxis/purulent rhinorrhea. No voice change, airway closing, trismus.   CARDIOVASCULAR:  No chest pain. No skin mottling or pallor. No change in exertional capacity  RESPIRATORY:  No hemoptysis. No paroxysmal nocturnal dyspnea. No stridor. No apnea or bluing.   GASTROINTESTINAL:  No vomiting, diarrhea. No distension. No melena. No hematochezia.   GENITOURINARY:  No nocturia. No hematuria or foul smelling or cloudy urine. No discharge. No sores/adenopathy.   MUSCULOSKELETAL:  No contracture.  No new deformity.   INTEGUMENTARY:  No swelling. No unexpected contusions. No abrasions. No lymphangitis.  NEUROLOGIC:  No meningismus. No new numbness of the extremities. No new focal weakness. No postural instability  PSYCHIATRIC:  No SI HI AVH  HEMATOLOGICAL:  No  bleeding. No petechiae. No bruising.  ALLERGIES:  No urticaria. No sudden abd cramping. No stridor.    PE:     Physical exam highlights:   Physical Exam       Vitals:    04/28/24 2205   BP: 161/69   BP Location: Right arm   Pulse: 55   Resp: 16   Temp: 97.9 °F (36.6 °C)   TempSrc: Oral   SpO2: 100%     Vitals reviewed by me.   Nursing note reviewed  Chaperone present for all sensitive exam.  Const: No acute distress. Alert. Nontoxic. Not diaphoretic.    HEENT: External ears normal. No protrusion drainage swelling. Nose normal. No drainage/traumatic deformity. MM. Mouth with baseline/symmetric movement. No trismus.   Eyes: No squinting. No icterus. No tearing/swelling/drainage. Tracks through the room with normal EOM. Bilateral scleral bluing. Right eye acuities and fields intact. Fluorescein w/o abrasion, ulcer. Eyelid everted no foreign body.   Neck: ROM normal. No rigidity. No meningismus.  Cards: Rate as per vitals Compared to monitor sinus unless documented. Regular Well perfused.  Pulm: Effort and excursion normal. No distress. No audible wheezing/no stridor. Normal resp rate without retraction or change in work of breathing.  Abd: No distension beyond baseline. No fluctuant wave. Patient without peritoneal pain with shifting/bumping the bed.   MSK: ROM normal baseline. No deformity. No contractures from baseline.   Skin: No new rashes visible. Well perfused. No wounds visualized on exposed skin  Neuro: Nonfocal. Baseline. CN grossly intact. Moving all four with coordination.   Psych: Normal behavior and affect.        A:  - Nursing note reviewed.    Ddx and MDM  Considered diagnoses    Healing abrasion  Ketorolac, optho f/u    Fall on ACAP - r/o ICH  CT head    R/o C spine injury  CT c spine    R/o wrist fracture  None on prelim  Prefabricated dynamic wrist splint for comfort     We discuss incidental thyroid nodules     Dispo decision       My conversation with consultant reveals:        Decision rules:                            My read of the XR/CT scan reveals:    CT spine cervical without contrast   Final Result   No evidence of acute fracture, traumatic subluxation, or atlantooccipital dislocation.   2.3 cm right thyroid lobe nodule noted on remote prior thyroid ultrasound. Thyroid biopsy is again recommended if not previously performed since 2021.            Workstation performed: [id number]         CT head without contrast   Final Result      No acute intracranial abnormality.  Chronic microangiopathic changes.                  Workstation performed: [id number]         XR wrist 3+ views LEFT   ED Interpretation   I don't see fracture in the wrist          Orders Placed This Encounter   Procedures    Splint application    CT head without contrast    CT spine cervical without contrast    XR wrist 3+ views LEFT     Labs Reviewed - No data to display    *Each of these labs was reviewed. Particular standout labs will be noted in the ED Course above     Final Diagnosis:  1. Wrist sprain    2. Thyroid nodule          P:  - hospital tx includes   Medications   ketorolac (ACULAR) 0.5 % ophthalmic solution 1 drop (1 drop Right Eye Given 4/28/24 2222)   fluorescein sodium sterile ophthalmic strip 1 strip (1 strip Right Eye Given 4/28/24 2222)         - disposition  Time reflects when diagnosis was documented in both MDM as applicable and the Disposition within this note       Time User Action Codes Description Comment    4/28/2024 10:58 PM Ziggy Castro Add [S63.509A] Wrist sprain     4/29/2024 12:09 AM Ziggy Castro [E04.1] Thyroid nodule           ED Disposition       ED Disposition   Discharge    Condition   Stable    Date/Time   Mon Apr 29, 2024 12:08 AM    Comment   Danyelle Martinez discharge to home/self care.                   Follow-up Information       Follow up With Specialties Details Why Contact Info    Johnny Calhoun MD Ophthalmology   [address] [place] [address]  [phone]  [phone]              - patient will call their PCP to let them know they were in the emergency department. We discuss return precautions and patient is agreeable with plan and aformentioned disposition.       - additional treatment intended, if consistent with primary provider:  - patient to follow with :      Patient's Medications   Discharge Prescriptions    No medications on file     No discharge procedures on file.  Prior to Admission Medications   Prescriptions Last Dose Informant Patient Reported? Taking?   Calcium Carb-Cholecalciferol (Calcium 500 + D) 500-3.125 MG-MCG TABS   Yes No   Si tabs daily ORAL   Calcium Carbonate-Vit D-Min (CALCIUM 1200 PO)  Self Yes No   Sig: Take 1,200 mg by mouth daily   DULoxetine (CYMBALTA) 60 mg delayed release capsule  Self No No   Sig: TAKE 1 CAPSULE(60 MG) BY MOUTH DAILY   Mirabegron ER 25 MG TB24   Yes No   Sig: take 1 tablet by oral route  every day swallowing whole with water. Do not crush, chew and/or divide. ORAL   Multiple Vitamins-Minerals (CENTRUM SILVER 50+WOMEN PO)   Yes No   Sig: ORAL   Multiple Vitamins-Minerals (PreserVision AREDS) TABS   Yes No   Sig: Take by mouth   Norwegian Cod Liver Oil CAPS   Yes No   Sig: ORAL   Potassium Bicarb-Citric Acid (Effer-K) 20 MEQ TBEF   Yes No   Sig: one time weekly ORAL   Triamcinolone Acetonide (Nasacort Allergy 24HR) 55 MCG/ACT nasal spray   Yes No   Si spray each nostrill daily NASAL   amLODIPine (NORVASC) 5 mg tablet   Yes No   Sig: take 1 tablet by oral route  every day ORAL   apixaban (Eliquis) 5 mg   No No   Sig: Take 1 tablet (5 mg total) by mouth 2 (two) times a day   atorvastatin (LIPITOR) 80 mg tablet   No No   Sig: Taking at morning   bisoprolol (ZEBETA) 5 mg tablet  Self No No   Sig: Take 0.5 tablets (2.5 mg total) by mouth daily   cetirizine (ZyrTEC) 10 mg tablet  Self Yes No   Sig: Take 10 mg by mouth daily   clonazePAM (KlonoPIN) 0.5 mg tablet   No No   Sig: Take 1 tablet (0.5 mg total) by  "mouth daily at bedtime   clopidogrel (PLAVIX) 75 mg tablet  Self No No   Sig: TAKE 1 TABLET(75 MG) BY MOUTH DAILY   levothyroxine 88 mcg tablet  Self No No   Sig: Take 1 tablet (88 mcg total) by mouth daily   midodrine (PROAMATINE) 5 mg tablet   No No   Sig: Take 1 tablet (5 mg total) by mouth 2 (two) times a day before meals   mometasone (NASONEX) 50 mcg/act nasal spray  Self Yes No   Si spray into each nostril daily   nebivolol (Bystolic) 5 mg tablet   Yes No   Sig: take 1 tablet by oral route  every day Oral   pantoprazole (PROTONIX) 40 mg tablet  Self No No   Sig: TAKE 1 TABLET(40 MG) BY MOUTH EVERY MORNING   primidone (MYSOLINE) 50 mg tablet  Self No No   Sig: TAKE 1 TABLET(50 MG) BY MOUTH DAILY      Facility-Administered Medications: None       Portions of the record may have been created with voice recognition software. Occasional wrong word or \"sound a like\" substitutions may have occurred due to the inherent limitations of voice recognition software. Read the chart carefully and recognize, using context, where substitutions have occurred.    Electronically signed by:  MD Ziggy Kiran MD  24 0719    "

## 2024-04-29 NOTE — PROGRESS NOTES
Spiritual Care Progress Note    2024  Patient: Danyelle Martinez : 1940  Admission Date & Time: 2024  MRN: [id number] CSN: [id number]      Fr Cast met with the pt and provided prayers, blessings and the Sacrament of Holy Communion. No further needs were expressed at this time.    Chaplains still remain available.       24 0900   Clinical Encounter Type   Visited With Patient   Confucianism Encounters   Confucianism Needs Prayer   Sacramental Encounters   Communion Given Indicator Yes

## 2024-04-30 ENCOUNTER — RA CDI HCC (OUTPATIENT)
Dept: OTHER | Facility: HOSPITAL | Age: 84
End: 2024-04-30

## 2024-04-30 LAB
DME PARACHUTE DELIVERY DATE REQUESTED: NORMAL
DME PARACHUTE DELIVERY NOTE: NORMAL
DME PARACHUTE ITEM DESCRIPTION: NORMAL
DME PARACHUTE ORDER STATUS: NORMAL
DME PARACHUTE SUPPLIER NAME: NORMAL
DME PARACHUTE SUPPLIER PHONE: NORMAL

## 2024-04-30 RX ORDER — NEBIVOLOL 5 MG/1
5 TABLET ORAL DAILY
Qty: 30 TABLET | Refills: 0 | Status: SHIPPED | OUTPATIENT
Start: 2024-04-30

## 2024-04-30 RX ORDER — AMLODIPINE BESYLATE 5 MG/1
5 TABLET ORAL DAILY
Qty: 30 TABLET | Refills: 0 | Status: SHIPPED | OUTPATIENT
Start: 2024-04-30

## 2024-04-30 NOTE — PROGRESS NOTES
HCC coding opportunities          Chart Reviewed number of suggestions sent to Provider: 1  I13.0     Patients Insurance     Medicare Insurance: Medicare

## 2024-05-06 NOTE — PROGRESS NOTES
Assessment & Plan     1. Acute pulmonary embolism without acute cor pulmonale, unspecified pulmonary embolism type ([facility])  Assessment & Plan:  Patient's status post thrombectomy  Denies any symptoms of shortness of breath at rest at present time  Continue Eliquis  Continue oxygen 3 L      2. Acute deep vein thrombosis (DVT) of femoral vein of both lower extremities ([facility])  Assessment & Plan:  Patient was recently admitted to the hospital with shortness of breath and was noted to have bilateral DVT and has been placed on Eliquis tolerating it very well no side effects however recently she had a fall and there was a concern because of the Eliquis had a CT scan of the head and neck and came back unremarkable      3. Chronic diastolic CHF (congestive heart failure) ([facility])  Assessment & Plan:  Wt Readings from Last 3 Encounters:   05/07/24 74.8 kg (165 lb)   04/27/24 74.1 kg (163 lb 5.8 oz)   04/23/24 74.8 kg (165 lb)   Patient weight remains stable at 165 currently on low-sodium diet she is euvolemic              4. Acquired hypothyroidism  Assessment & Plan:  Patient with hypothyroidism currently on levothyroxine 88 mcg daily we will continue the same.  Follow-up with repeat TSH level at later date      5. Hypokalemia  -     Potassium Bicarb-Citric Acid (Effer-K) 20 MEQ TBEF; Take 1 tablet (20 mEq total) by mouth once a week    6. Overactive bladder  -     Mirabegron ER 25 MG TB24; Take 25 mg by mouth in the morning    7. Anxiety  Assessment & Plan:  Patient is currently taking Cymbalta 60 mg daily she also has clonazepam at bedtime we will continue      8. Chronic hypoxemic respiratory failure (HCC)  Assessment & Plan:  Patient is currently on 3 L of oxygen she uses it at home also will continue the same      9. Dyslipidemia  Assessment & Plan:  Patient is on atorvastatin 80 mg at bedtime continue the same follow-up with a lipid profile at a later date      10. Dyspepsia  Assessment & Plan:  Patient is on pantoprazole as  needed 40 mg daily      11. Essential hypertension  Assessment & Plan:  Continue with nebivolol and amlodipine      12. Frequent falls  Assessment & Plan:  Discussed with patient at length regarding falls and fall precautions and to use the walker all the time at home history of orthostatic blood pressure changes      13. Gastroesophageal reflux disease without esophagitis  Assessment & Plan:  Continue pantoprazole as needed      14. History of transient ischemic attack (TIA)  Assessment & Plan:  Patient is currently taking atorvastatin and Plavix we will continue      15. Type 2 diabetes mellitus without complication, without long-term current use of insulin ([facility])  Assessment & Plan:    Lab Results   Component Value Date    HGBA1C 6.2 (H) 03/15/2024   Patient diabetes is controlled by diet last A1c 6.2 continue to monitor sugars cutting back carbohydrates      16. Recurrent major depressive disorder, in remission (HCC)  Assessment & Plan:  Patient on Cymbalta 60 mg daily continue      17. Dizziness  Assessment & Plan:  Patient with a history of significant orthostatic changes on amlodipine and nebivolol and also on midodrine and bisoprolol denies any symptoms or shortness of breath in the recent past continue midodrine 2 times daily before meals she is also as mentioned on nebivolol and amlodipine      18. Nocturnal hypoxemia  Assessment & Plan:  Patient uses oxygen 3 L at bedtime recommend to use oxygen during the daytime also      19. Overflow incontinence of urine  Assessment & Plan:  Recommend follow-up with the urologist currently taking Myrbetriq 25 mg daily      20. Palpitations  Assessment & Plan:  No episodes of palpitation heart rate is around 70/min      21. Parotitis, acute  Assessment & Plan:  Patient with parotitis and cellulitis with no abscess formation was in the hospital in the recent past followed by the ENT stable           Subjective     Transitional Care Management Review:   Danyelle Martinez is  a 84 y.o. female here for TCM follow up.     During the TCM phone call patient stated:  TCM Call     Date and time call was made  4/29/2024  1:43 PM    Hospital care reviewed  Records reviewed    Patient was hospitialized at  [facility]    Date of Admission  04/23/24    Date of discharge  04/27/24    Diagnosis  Pulmonary emboli    Disposition  Home    Were the patients medications reviewed and updated  Yes    Current Symptoms  None      TCM Call     Clinical progress swelling  Improving    Post hospital issues  None    Should patient be enrolled in anticoag monitoring?  No    Scheduled for follow up?  Yes    Patients specialists  Neurologist; Other (comment)    Pulmonologist name  [facility]    Pulmonologist contact #  [phone]    Endocrinologist name  [phone]    Neurologist name  Robyn Young MD    Neurologist contact #  [phone]    Other specialists names  Garret Whatley    Other specialists contcat #  [phone]    Did you obtain your prescribed medications  Yes    Do you need help managing your prescriptions or medications  No    Is transportation to your appointment needed  No    I have advised the patient to call PCP with any new or worsening symptoms  Félix Craft MA    Living Arrangements  Alone    Support System  Family    The type of support provided  Emotional    Do you have social support  Yes, as much as I need    Are you recieving any outpatient services  No    What type of services  Home nursing and Physical Therapy    Are you recieving home care services  No    Are you using any community resources  No    Current waiver services  No    Have you fallen in the last 12 months  No    How many times  3    Interperter language line needed  No    Counseling  Patient    Counseling topics  Importance of RX compliance    Comments  No comments at this time        Patient was recently admitted to the hospital with shortness of breath and was found to have DVT  "and pulmonary embolism.  I reviewed the reports from the hospital she was admitted initially to the intensive care unit started on anticoagulation and also she had thrombectomy it was a saddle embolus.  Patient tolerated the procedure very well subsequently transferred to the floor Eliquis was continued.  Subsequently patient was discharged home.  She has another admission to the emergency room on April 28 secondary to an accidental fall spraining her breast workup in the emergency room was negative a CT scan of the head and cervical spine were unremarkable.  There was a concern because she is on Eliquis the blood thinners.  Patient is coming now here for a follow-up evaluation postadmission for the embolus and DVT.    Patient denies shortness of breath chest pains palpitations.  Medications reviewed labs reviewed.      Review of Systems   Constitutional:  Negative for chills and fever.   HENT:  Negative for ear pain and sore throat.    Eyes:  Negative for pain and visual disturbance.   Respiratory:  Negative for cough and shortness of breath.    Cardiovascular:  Negative for chest pain and palpitations.   Gastrointestinal:  Negative for abdominal pain and vomiting.   Genitourinary:  Negative for dysuria and hematuria.   Musculoskeletal:  Positive for arthralgias. Negative for back pain.   Skin:  Negative for color change and rash.   Neurological:  Negative for seizures and syncope.   All other systems reviewed and are negative.      Objective     /65 (BP Location: Left arm, Patient Position: Sitting, Cuff Size: Standard)   Pulse 80   Ht 5' 6\" (1.676 m)   Wt 74.8 kg (165 lb)   SpO2 93%   BMI 26.63 kg/m²      Physical Exam  Vitals and nursing note reviewed.   Constitutional:       General: She is not in acute distress.     Appearance: She is well-developed.   HENT:      Head: Normocephalic and atraumatic.   Eyes:      Conjunctiva/sclera: Conjunctivae normal.   Cardiovascular:      Rate and Rhythm: Normal " rate and regular rhythm.      Heart sounds: No murmur heard.  Pulmonary:      Effort: Pulmonary effort is normal. No respiratory distress.      Breath sounds: Normal breath sounds.   Abdominal:      Palpations: Abdomen is soft.      Tenderness: There is no abdominal tenderness.   Musculoskeletal:         General: No swelling.      Cervical back: Neck supple.   Skin:     General: Skin is warm and dry.      Capillary Refill: Capillary refill takes less than 2 seconds.   Neurological:      Mental Status: She is alert and oriented to person, place, and time.   Psychiatric:         Mood and Affect: Mood normal.       Recent Results (from the past 1344 hour(s))   ECG 12 lead    Collection Time: 03/23/24  9:11 PM   Result Value Ref Range    Ventricular Rate 59 BPM    Atrial Rate 59 BPM    ID Interval 164 ms    QRSD Interval 90 ms    QT Interval 452 ms    QTC Interval 447 ms    P Axis 43 degrees    QRS Axis 27 degrees    T Wave Axis 56 degrees   CBC and differential    Collection Time: 03/23/24  9:13 PM   Result Value Ref Range    WBC 9.36 4.31 - 10.16 Thousand/uL    RBC 4.63 3.81 - 5.12 Million/uL    Hemoglobin 13.6 11.5 - 15.4 g/dL    Hematocrit 44.0 34.8 - 46.1 %    MCV 95 82 - 98 fL    MCH 29.4 26.8 - 34.3 pg    MCHC 30.9 (L) 31.4 - 37.4 g/dL    RDW 16.0 (H) 11.6 - 15.1 %    MPV 11.2 8.9 - 12.7 fL    Platelets 259 149 - 390 Thousands/uL    nRBC 0 /100 WBCs    Segmented % 74 43 - 75 %    Immature Grans % 0 0 - 2 %    Lymphocytes % 15 14 - 44 %    Monocytes % 5 4 - 12 %    Eosinophils Relative 5 0 - 6 %    Basophils Relative 1 0 - 1 %    Absolute Neutrophils 6.87 1.85 - 7.62 Thousands/µL    Absolute Immature Grans 0.04 0.00 - 0.20 Thousand/uL    Absolute Lymphocytes 1.42 0.60 - 4.47 Thousands/µL    Absolute Monocytes 0.48 0.17 - 1.22 Thousand/µL    Eosinophils Absolute 0.47 0.00 - 0.61 Thousand/µL    Basophils Absolute 0.08 0.00 - 0.10 Thousands/µL   Comprehensive metabolic panel    Collection Time: 03/23/24  9:13 PM  "  Result Value Ref Range    Sodium 142 135 - 147 mmol/L    Potassium 4.1 3.5 - 5.3 mmol/L    Chloride 102 96 - 108 mmol/L    CO2 33 (H) 21 - 32 mmol/L    ANION GAP 7 4 - 13 mmol/L    BUN 20 5 - 25 mg/dL    Creatinine 0.67 0.60 - 1.30 mg/dL    Glucose 128 65 - 140 mg/dL    Calcium 10.8 (H) 8.4 - 10.2 mg/dL    AST 42 (H) 13 - 39 U/L    ALT 27 7 - 52 U/L    Alkaline Phosphatase 86 34 - 104 U/L    Total Protein 7.3 6.4 - 8.4 g/dL    Albumin 4.3 3.5 - 5.0 g/dL    Total Bilirubin 0.51 0.20 - 1.00 mg/dL    eGFR 80 ml/min/1.73sq m   HS Troponin 0hr (reflex protocol)    Collection Time: 03/23/24  9:13 PM   Result Value Ref Range    hs TnI 0hr 5 \"Refer to ACS Flowchart\"- see link ng/L   B-Type Natriuretic Peptide(BNP)    Collection Time: 03/23/24  9:13 PM   Result Value Ref Range     (H) 0 - 100 pg/mL   COVID/FLU/RSV    Collection Time: 03/23/24  9:13 PM    Specimen: Nose; Nares   Result Value Ref Range    SARS-CoV-2 Negative Negative    INFLUENZA A PCR Negative Negative    INFLUENZA B PCR Negative Negative    RSV PCR Negative Negative   HS Troponin I 2hr    Collection Time: 03/23/24 11:31 PM   Result Value Ref Range    hs TnI 2hr 5 \"Refer to ACS Flowchart\"- see link ng/L    Delta 2hr hsTnI 0 <20 ng/L   Comprehensive metabolic panel    Collection Time: 03/25/24  2:48 PM   Result Value Ref Range    Sodium 136 135 - 147 mmol/L    Potassium 4.0 3.5 - 5.3 mmol/L    Chloride 99 96 - 108 mmol/L    CO2 30 21 - 32 mmol/L    ANION GAP 7 4 - 13 mmol/L    BUN 22 5 - 25 mg/dL    Creatinine 0.74 0.60 - 1.30 mg/dL    Glucose 183 (H) 65 - 140 mg/dL    Calcium 10.4 (H) 8.4 - 10.2 mg/dL    AST 38 13 - 39 U/L    ALT 26 7 - 52 U/L    Alkaline Phosphatase 80 34 - 104 U/L    Total Protein 7.2 6.4 - 8.4 g/dL    Albumin 4.0 3.5 - 5.0 g/dL    Total Bilirubin 0.50 0.20 - 1.00 mg/dL    eGFR 74 ml/min/1.73sq m   CBC and differential    Collection Time: 03/25/24  2:48 PM   Result Value Ref Range    WBC 9.91 4.31 - 10.16 Thousand/uL    RBC 4.16 " 3.81 - 5.12 Million/uL    Hemoglobin 12.5 11.5 - 15.4 g/dL    Hematocrit 38.6 34.8 - 46.1 %    MCV 93 82 - 98 fL    MCH 30.0 26.8 - 34.3 pg    MCHC 32.4 31.4 - 37.4 g/dL    RDW 16.0 (H) 11.6 - 15.1 %    MPV 11.1 8.9 - 12.7 fL    Platelets 250 149 - 390 Thousands/uL    nRBC 0 /100 WBCs    Segmented % 68 43 - 75 %    Immature Grans % 0 0 - 2 %    Lymphocytes % 20 14 - 44 %    Monocytes % 7 4 - 12 %    Eosinophils Relative 4 0 - 6 %    Basophils Relative 1 0 - 1 %    Absolute Neutrophils 6.75 1.85 - 7.62 Thousands/µL    Absolute Immature Grans 0.04 0.00 - 0.20 Thousand/uL    Absolute Lymphocytes 1.99 0.60 - 4.47 Thousands/µL    Absolute Monocytes 0.64 0.17 - 1.22 Thousand/µL    Eosinophils Absolute 0.42 0.00 - 0.61 Thousand/µL    Basophils Absolute 0.07 0.00 - 0.10 Thousands/µL   CK    Collection Time: 03/25/24  2:48 PM   Result Value Ref Range    Total  26 - 192 U/L   CBC and differential    Collection Time: 03/30/24  2:05 PM   Result Value Ref Range    WBC 13.52 (H) 4.31 - 10.16 Thousand/uL    RBC 4.34 3.81 - 5.12 Million/uL    Hemoglobin 13.0 11.5 - 15.4 g/dL    Hematocrit 40.8 34.8 - 46.1 %    MCV 94 82 - 98 fL    MCH 30.0 26.8 - 34.3 pg    MCHC 31.9 31.4 - 37.4 g/dL    RDW 16.0 (H) 11.6 - 15.1 %    MPV 11.0 8.9 - 12.7 fL    Platelets 189 149 - 390 Thousands/uL    nRBC 0 /100 WBCs    Segmented % 82 (H) 43 - 75 %    Immature Grans % 1 0 - 2 %    Lymphocytes % 7 (L) 14 - 44 %    Monocytes % 9 4 - 12 %    Eosinophils Relative 1 0 - 6 %    Basophils Relative 0 0 - 1 %    Absolute Neutrophils 11.13 (H) 1.85 - 7.62 Thousands/µL    Absolute Immature Grans 0.08 0.00 - 0.20 Thousand/uL    Absolute Lymphocytes 0.92 0.60 - 4.47 Thousands/µL    Absolute Monocytes 1.21 0.17 - 1.22 Thousand/µL    Eosinophils Absolute 0.13 0.00 - 0.61 Thousand/µL    Basophils Absolute 0.05 0.00 - 0.10 Thousands/µL   Protime-INR    Collection Time: 03/30/24  2:05 PM   Result Value Ref Range    Protime 14.2 11.6 - 14.5 seconds    INR 1.08  0.84 - 1.19   APTT    Collection Time: 03/30/24  2:05 PM   Result Value Ref Range    PTT 34 23 - 37 seconds   Comprehensive metabolic panel    Collection Time: 03/30/24  2:05 PM   Result Value Ref Range    Sodium 138 135 - 147 mmol/L    Potassium 4.2 3.5 - 5.3 mmol/L    Chloride 101 96 - 108 mmol/L    CO2 32 21 - 32 mmol/L    ANION GAP 5 4 - 13 mmol/L    BUN 17 5 - 25 mg/dL    Creatinine 0.63 0.60 - 1.30 mg/dL    Glucose 124 65 - 140 mg/dL    Calcium 10.1 8.4 - 10.2 mg/dL    AST 35 13 - 39 U/L    ALT 23 7 - 52 U/L    Alkaline Phosphatase 99 34 - 104 U/L    Total Protein 7.5 6.4 - 8.4 g/dL    Albumin 4.2 3.5 - 5.0 g/dL    Total Bilirubin 1.27 (H) 0.20 - 1.00 mg/dL    eGFR 82 ml/min/1.73sq m   Fingerstick Glucose (POCT)    Collection Time: 03/30/24  9:03 PM   Result Value Ref Range    POC Glucose 129 65 - 140 mg/dl   Blood culture    Collection Time: 03/31/24  3:55 AM    Specimen: Blood   Result Value Ref Range    Blood Culture No Growth After 5 Days.    Comprehensive metabolic panel    Collection Time: 03/31/24  4:56 AM   Result Value Ref Range    Sodium 138 135 - 147 mmol/L    Potassium 3.9 3.5 - 5.3 mmol/L    Chloride 99 96 - 108 mmol/L    CO2 30 21 - 32 mmol/L    ANION GAP 9 4 - 13 mmol/L    BUN 12 5 - 25 mg/dL    Creatinine 0.57 (L) 0.60 - 1.30 mg/dL    Glucose 104 65 - 140 mg/dL    Calcium 10.3 (H) 8.4 - 10.2 mg/dL    AST 36 13 - 39 U/L    ALT 23 7 - 52 U/L    Alkaline Phosphatase 98 34 - 104 U/L    Total Protein 7.7 6.4 - 8.4 g/dL    Albumin 4.2 3.5 - 5.0 g/dL    Total Bilirubin 1.12 (H) 0.20 - 1.00 mg/dL    eGFR 84 ml/min/1.73sq m   Magnesium    Collection Time: 03/31/24  4:56 AM   Result Value Ref Range    Magnesium 2.0 1.9 - 2.7 mg/dL   CBC and differential    Collection Time: 03/31/24  4:56 AM   Result Value Ref Range    WBC 13.46 (H) 4.31 - 10.16 Thousand/uL    RBC 4.36 3.81 - 5.12 Million/uL    Hemoglobin 13.1 11.5 - 15.4 g/dL    Hematocrit 41.3 34.8 - 46.1 %    MCV 95 82 - 98 fL    MCH 30.0 26.8 - 34.3  pg    MCHC 31.7 31.4 - 37.4 g/dL    RDW 16.1 (H) 11.6 - 15.1 %    MPV 11.5 8.9 - 12.7 fL    Platelets 202 149 - 390 Thousands/uL    nRBC 0 /100 WBCs    Segmented % 82 (H) 43 - 75 %    Immature Grans % 1 0 - 2 %    Lymphocytes % 8 (L) 14 - 44 %    Monocytes % 9 4 - 12 %    Eosinophils Relative 0 0 - 6 %    Basophils Relative 0 0 - 1 %    Absolute Neutrophils 11.00 (H) 1.85 - 7.62 Thousands/µL    Absolute Immature Grans 0.13 0.00 - 0.20 Thousand/uL    Absolute Lymphocytes 1.08 0.60 - 4.47 Thousands/µL    Absolute Monocytes 1.14 0.17 - 1.22 Thousand/µL    Eosinophils Absolute 0.06 0.00 - 0.61 Thousand/µL    Basophils Absolute 0.05 0.00 - 0.10 Thousands/µL   Blood culture    Collection Time: 03/31/24  7:09 AM    Specimen: Arm, Left; Blood   Result Value Ref Range    Blood Culture No Growth After 5 Days.    Comprehensive metabolic panel    Collection Time: 04/01/24  5:42 AM   Result Value Ref Range    Sodium 140 135 - 147 mmol/L    Potassium 4.0 3.5 - 5.3 mmol/L    Chloride 108 96 - 108 mmol/L    CO2 25 21 - 32 mmol/L    ANION GAP 7 4 - 13 mmol/L    BUN 10 5 - 25 mg/dL    Creatinine 0.43 (L) 0.60 - 1.30 mg/dL    Glucose 118 65 - 140 mg/dL    Calcium 9.5 8.4 - 10.2 mg/dL    AST 38 13 - 39 U/L    ALT 20 7 - 52 U/L    Alkaline Phosphatase 88 34 - 104 U/L    Total Protein 6.8 6.4 - 8.4 g/dL    Albumin 3.7 3.5 - 5.0 g/dL    Total Bilirubin 0.59 0.20 - 1.00 mg/dL    eGFR 93 ml/min/1.73sq m   CBC and differential    Collection Time: 04/01/24  5:42 AM   Result Value Ref Range    WBC 9.57 4.31 - 10.16 Thousand/uL    RBC 4.03 3.81 - 5.12 Million/uL    Hemoglobin 12.0 11.5 - 15.4 g/dL    Hematocrit 38.1 34.8 - 46.1 %    MCV 95 82 - 98 fL    MCH 29.8 26.8 - 34.3 pg    MCHC 31.5 31.4 - 37.4 g/dL    RDW 15.9 (H) 11.6 - 15.1 %    MPV 11.0 8.9 - 12.7 fL    Platelets 183 149 - 390 Thousands/uL    nRBC 0 /100 WBCs    Segmented % 80 (H) 43 - 75 %    Immature Grans % 0 0 - 2 %    Lymphocytes % 11 (L) 14 - 44 %    Monocytes % 7 4 - 12 %     Eosinophils Relative 2 0 - 6 %    Basophils Relative 0 0 - 1 %    Absolute Neutrophils 7.66 (H) 1.85 - 7.62 Thousands/µL    Absolute Immature Grans 0.04 0.00 - 0.20 Thousand/uL    Absolute Lymphocytes 1.01 0.60 - 4.47 Thousands/µL    Absolute Monocytes 0.68 0.17 - 1.22 Thousand/µL    Eosinophils Absolute 0.14 0.00 - 0.61 Thousand/µL    Basophils Absolute 0.04 0.00 - 0.10 Thousands/µL   Comprehensive metabolic panel    Collection Time: 04/02/24  5:43 AM   Result Value Ref Range    Sodium 140 135 - 147 mmol/L    Potassium 3.6 3.5 - 5.3 mmol/L    Chloride 104 96 - 108 mmol/L    CO2 28 21 - 32 mmol/L    ANION GAP 8 4 - 13 mmol/L    BUN 13 5 - 25 mg/dL    Creatinine 0.59 (L) 0.60 - 1.30 mg/dL    Glucose 127 65 - 140 mg/dL    Calcium 10.2 8.4 - 10.2 mg/dL    AST 30 13 - 39 U/L    ALT 19 7 - 52 U/L    Alkaline Phosphatase 89 34 - 104 U/L    Total Protein 6.8 6.4 - 8.4 g/dL    Albumin 3.6 3.5 - 5.0 g/dL    Total Bilirubin 0.40 0.20 - 1.00 mg/dL    eGFR 83 ml/min/1.73sq m   CBC and differential    Collection Time: 04/02/24  5:43 AM   Result Value Ref Range    WBC 7.31 4.31 - 10.16 Thousand/uL    RBC 4.05 3.81 - 5.12 Million/uL    Hemoglobin 12.1 11.5 - 15.4 g/dL    Hematocrit 38.9 34.8 - 46.1 %    MCV 96 82 - 98 fL    MCH 29.9 26.8 - 34.3 pg    MCHC 31.1 (L) 31.4 - 37.4 g/dL    RDW 15.9 (H) 11.6 - 15.1 %    MPV 11.3 8.9 - 12.7 fL    Platelets 205 149 - 390 Thousands/uL    nRBC 0 /100 WBCs    Segmented % 75 43 - 75 %    Immature Grans % 0 0 - 2 %    Lymphocytes % 14 14 - 44 %    Monocytes % 7 4 - 12 %    Eosinophils Relative 3 0 - 6 %    Basophils Relative 1 0 - 1 %    Absolute Neutrophils 5.46 1.85 - 7.62 Thousands/µL    Absolute Immature Grans 0.02 0.00 - 0.20 Thousand/uL    Absolute Lymphocytes 1.03 0.60 - 4.47 Thousands/µL    Absolute Monocytes 0.52 0.17 - 1.22 Thousand/µL    Eosinophils Absolute 0.23 0.00 - 0.61 Thousand/µL    Basophils Absolute 0.05 0.00 - 0.10 Thousands/µL   Basic metabolic panel    Collection Time:  04/03/24  5:09 AM   Result Value Ref Range    Sodium 140 135 - 147 mmol/L    Potassium 4.7 3.5 - 5.3 mmol/L    Chloride 104 96 - 108 mmol/L    CO2 29 21 - 32 mmol/L    ANION GAP 7 4 - 13 mmol/L    BUN 17 5 - 25 mg/dL    Creatinine 0.67 0.60 - 1.30 mg/dL    Glucose 122 65 - 140 mg/dL    Calcium 10.6 (H) 8.4 - 10.2 mg/dL    eGFR 80 ml/min/1.73sq m   CBC and differential    Collection Time: 04/03/24  5:09 AM   Result Value Ref Range    WBC 7.19 4.31 - 10.16 Thousand/uL    RBC 3.96 3.81 - 5.12 Million/uL    Hemoglobin 11.6 11.5 - 15.4 g/dL    Hematocrit 38.1 34.8 - 46.1 %    MCV 96 82 - 98 fL    MCH 29.3 26.8 - 34.3 pg    MCHC 30.4 (L) 31.4 - 37.4 g/dL    RDW 15.9 (H) 11.6 - 15.1 %    MPV 10.8 8.9 - 12.7 fL    Platelets 197 149 - 390 Thousands/uL    nRBC 0 /100 WBCs    Segmented % 75 43 - 75 %    Immature Grans % 1 0 - 2 %    Lymphocytes % 14 14 - 44 %    Monocytes % 6 4 - 12 %    Eosinophils Relative 3 0 - 6 %    Basophils Relative 1 0 - 1 %    Absolute Neutrophils 5.42 1.85 - 7.62 Thousands/µL    Absolute Immature Grans 0.06 0.00 - 0.20 Thousand/uL    Absolute Lymphocytes 1.01 0.60 - 4.47 Thousands/µL    Absolute Monocytes 0.42 0.17 - 1.22 Thousand/µL    Eosinophils Absolute 0.22 0.00 - 0.61 Thousand/µL    Basophils Absolute 0.06 0.00 - 0.10 Thousands/µL   Magnesium    Collection Time: 04/03/24  5:09 AM   Result Value Ref Range    Magnesium 1.8 (L) 1.9 - 2.7 mg/dL   Basic metabolic panel    Collection Time: 04/04/24  5:32 AM   Result Value Ref Range    Sodium 141 135 - 147 mmol/L    Potassium 3.7 3.5 - 5.3 mmol/L    Chloride 106 96 - 108 mmol/L    CO2 30 21 - 32 mmol/L    ANION GAP 5 4 - 13 mmol/L    BUN 14 5 - 25 mg/dL    Creatinine 0.62 0.60 - 1.30 mg/dL    Glucose 123 65 - 140 mg/dL    Calcium 10.4 (H) 8.4 - 10.2 mg/dL    eGFR 82 ml/min/1.73sq m   CBC and differential    Collection Time: 04/04/24  5:32 AM   Result Value Ref Range    WBC 5.92 4.31 - 10.16 Thousand/uL    RBC 3.94 3.81 - 5.12 Million/uL    Hemoglobin  11.8 11.5 - 15.4 g/dL    Hematocrit 37.6 34.8 - 46.1 %    MCV 95 82 - 98 fL    MCH 29.9 26.8 - 34.3 pg    MCHC 31.4 31.4 - 37.4 g/dL    RDW 15.8 (H) 11.6 - 15.1 %    MPV 10.6 8.9 - 12.7 fL    Platelets 181 149 - 390 Thousands/uL    nRBC 0 /100 WBCs    Segmented % 73 43 - 75 %    Immature Grans % 1 0 - 2 %    Lymphocytes % 15 14 - 44 %    Monocytes % 6 4 - 12 %    Eosinophils Relative 4 0 - 6 %    Basophils Relative 1 0 - 1 %    Absolute Neutrophils 4.42 1.85 - 7.62 Thousands/µL    Absolute Immature Grans 0.03 0.00 - 0.20 Thousand/uL    Absolute Lymphocytes 0.86 0.60 - 4.47 Thousands/µL    Absolute Monocytes 0.37 0.17 - 1.22 Thousand/µL    Eosinophils Absolute 0.21 0.00 - 0.61 Thousand/µL    Basophils Absolute 0.03 0.00 - 0.10 Thousands/µL   Magnesium    Collection Time: 04/04/24  5:32 AM   Result Value Ref Range    Magnesium 1.6 (L) 1.9 - 2.7 mg/dL   ECG 12 lead    Collection Time: 04/23/24  1:32 PM   Result Value Ref Range    Ventricular Rate 103 BPM    Atrial Rate 103 BPM    AZ Interval 186 ms    QRSD Interval 88 ms    QT Interval 352 ms    QTC Interval 461 ms    P [place] 21 degrees    QRS Axis 70 degrees    T Wave Axis 36 degrees   CBC and differential    Collection Time: 04/23/24  1:42 PM   Result Value Ref Range    WBC 10.47 (H) 4.31 - 10.16 Thousand/uL    RBC 4.19 3.81 - 5.12 Million/uL    Hemoglobin 12.4 11.5 - 15.4 g/dL    Hematocrit 40.1 34.8 - 46.1 %    MCV 96 82 - 98 fL    MCH 29.6 26.8 - 34.3 pg    MCHC 30.9 (L) 31.4 - 37.4 g/dL    RDW 16.3 (H) 11.6 - 15.1 %    MPV 11.2 8.9 - 12.7 fL    Platelets 194 149 - 390 Thousands/uL    nRBC 0 /100 WBCs    Segmented % 73 43 - 75 %    Immature Grans % 1 0 - 2 %    Lymphocytes % 15 14 - 44 %    Monocytes % 7 4 - 12 %    Eosinophils Relative 3 0 - 6 %    Basophils Relative 1 0 - 1 %    Absolute Neutrophils 7.67 (H) 1.85 - 7.62 Thousands/µL    Absolute Immature Grans 0.06 0.00 - 0.20 Thousand/uL    Absolute Lymphocytes 1.61 0.60 - 4.47 Thousands/µL    Absolute Monocytes  "0.76 0.17 - 1.22 Thousand/µL    Eosinophils Absolute 0.31 0.00 - 0.61 Thousand/µL    Basophils Absolute 0.06 0.00 - 0.10 Thousands/µL   Comprehensive metabolic panel    Collection Time: 04/23/24  1:42 PM   Result Value Ref Range    Sodium 137 135 - 147 mmol/L    Potassium 4.4 3.5 - 5.3 mmol/L    Chloride 102 96 - 108 mmol/L    CO2 26 21 - 32 mmol/L    ANION GAP 9 4 - 13 mmol/L    BUN 19 5 - 25 mg/dL    Creatinine 0.59 (L) 0.60 - 1.30 mg/dL    Glucose 106 65 - 140 mg/dL    Calcium 9.8 8.4 - 10.2 mg/dL    AST 40 (H) 13 - 39 U/L    ALT 24 7 - 52 U/L    Alkaline Phosphatase 82 34 - 104 U/L    Total Protein 7.3 6.4 - 8.4 g/dL    Albumin 4.0 3.5 - 5.0 g/dL    Total Bilirubin 0.45 0.20 - 1.00 mg/dL    eGFR 83 ml/min/1.73sq m   Protime-INR    Collection Time: 04/23/24  1:42 PM   Result Value Ref Range    Protime 14.0 11.6 - 14.5 seconds    INR 1.06 0.84 - 1.19   APTT    Collection Time: 04/23/24  1:42 PM   Result Value Ref Range    PTT 26 23 - 37 seconds   HS Troponin 0hr (reflex protocol)    Collection Time: 04/23/24  1:42 PM   Result Value Ref Range    hs TnI 0hr 768 (H) \"Refer to ACS Flowchart\"- see link ng/L   B-Type Natriuretic Peptide(BNP)    Collection Time: 04/23/24  1:42 PM   Result Value Ref Range     (H) 0 - 100 pg/mL   D-Dimer    Collection Time: 04/23/24  1:42 PM   Result Value Ref Range    D-Dimer, Quant 7.82 (H) <0.50 ug/ml FEU   FLU/RSV/COVID - if FLU/RSV clinically relevant    Collection Time: 04/23/24  1:42 PM    Specimen: Nose; Nares   Result Value Ref Range    SARS-CoV-2 Negative Negative    INFLUENZA A PCR Negative Negative    INFLUENZA B PCR Negative Negative    RSV PCR Negative Negative   HS Troponin I 2hr    Collection Time: 04/23/24  3:41 PM   Result Value Ref Range    hs TnI 2hr 808 (H) \"Refer to ACS Flowchart\"- see link ng/L    Delta 2hr hsTnI 40 (H) <20 ng/L   Echo complete w/ contrast if indicated    Collection Time: 04/23/24  4:15 PM   Result Value Ref Range    Triscuspid Valve " "Regurgitation Peak Gradient 37.0 mmHg    RAA A4C 15.2 cm2    MARCELL A4C 14.1 cm2    LA Volume Index (BP) 21.2 mL/m2    MV Peak A Urban 1.34 m/s    MV stenosis pressure 1/2 time 35 ms    MV Peak E Urban 83 cm/s    AV peak gradient 12 mmHg    LVOT diameter 2.0 cm    E wave deceleration time 119 ms    E/A ratio 0.62     PV peak gradient antegrade 2 mmHg    MV valve area p 1/2 method 6.29     AV LVOT peak gradient 4 mmHg    TR Peak Urban 3.0 m/s    RVID d 3.6 cm    Tricuspid valve peak regurgitation velocity 3.04 m/s    Left ventricular stroke volume (2D) 28.00 mL    IVSd 1.20 cm    Tricuspid annular plane systolic excursion 1.30 cm    Ao root 3.90 cm    LVPWd 1.10 cm    LA size 3.8 cm    LA volume (BP) 39 mL    FS 26 28 - 44    LVIDS 2.60 cm    IVS 1.2 cm    LVIDd 3.50 cm    LA length (A2C) 5.50 cm    LEFT VENTRICLE SYSTOLIC VOLUME (MOD BIPLANE) 2D 24 mL    LV DIASTOLIC VOLUME (MOD BIPLANE) 2D 51 mL    Left Atrium Area-systolic Four Chamber 16.2 cm2    Left Atrium Area-systolic Apical Two Chamber 16.5 cm2    MV E' Tissue Velocity Lateral 7 cm/s    MV E' Tissue Velocity Septal 7 cm/s    LVSV, 2D 28 mL    LVOT area 3.14 cm2    BSA 1.84 m2    LV EF 65    UA (URINE) with reflex to Scope    Collection Time: 04/23/24  6:10 PM   Result Value Ref Range    Color, UA Yellow     Clarity, UA Slightly Cloudy     Specific Gravity, UA 1.025 1.005 - 1.030    pH, UA 6.5 4.5, 5.0, 5.5, 6.0, 6.5, 7.0, 7.5, 8.0    Leukocytes, UA Moderate (A) Negative    Nitrite, UA Negative Negative    Protein, UA Trace (A) Negative mg/dl    Glucose, UA Negative Negative mg/dl    Ketones, UA Negative Negative mg/dl    Urobilinogen, UA <2.0 <2.0 mg/dl mg/dl    Bilirubin, UA Negative Negative    Occult Blood, UA Trace (A) Negative   HS Troponin I 4hr    Collection Time: 04/23/24  6:10 PM   Result Value Ref Range    hs TnI 4hr 809 (H) \"Refer to ACS Flowchart\"- see link ng/L    Delta 4hr hsTnI 41 (H) <20 ng/L   Urine Microscopic    Collection Time: 04/23/24  6:10 PM "   Result Value Ref Range    RBC, UA 1-2 None Seen, 0-1, 1-2, 2-4, 0-5 /hpf    WBC, UA 4-10 (A) None Seen, 0-1, 1-2, 0-5, 2-4 /hpf    Epithelial Cells Occasional None Seen, Occasional /hpf    Bacteria, UA Moderate (A) None Seen, Occasional /hpf    Hyaline Casts, UA 0-1 (A) (none) /lpf    Amorphous Crystals, UA Occasional    CBC and differential    Collection Time: 04/23/24  8:10 PM   Result Value Ref Range    WBC 9.91 4.31 - 10.16 Thousand/uL    RBC 3.91 3.81 - 5.12 Million/uL    Hemoglobin 11.6 11.5 - 15.4 g/dL    Hematocrit 36.8 34.8 - 46.1 %    MCV 94 82 - 98 fL    MCH 29.7 26.8 - 34.3 pg    MCHC 31.5 31.4 - 37.4 g/dL    RDW 16.4 (H) 11.6 - 15.1 %    MPV 11.6 8.9 - 12.7 fL    Platelets 194 149 - 390 Thousands/uL    nRBC 0 /100 WBCs    Segmented % 73 43 - 75 %    Immature Grans % 1 0 - 2 %    Lymphocytes % 16 14 - 44 %    Monocytes % 7 4 - 12 %    Eosinophils Relative 3 0 - 6 %    Basophils Relative 0 0 - 1 %    Absolute Neutrophils 7.32 1.85 - 7.62 Thousands/µL    Absolute Immature Grans 0.06 0.00 - 0.20 Thousand/uL    Absolute Lymphocytes 1.56 0.60 - 4.47 Thousands/µL    Absolute Monocytes 0.67 0.17 - 1.22 Thousand/µL    Eosinophils Absolute 0.26 0.00 - 0.61 Thousand/µL    Basophils Absolute 0.04 0.00 - 0.10 Thousands/µL   Comprehensive metabolic panel    Collection Time: 04/23/24  8:10 PM   Result Value Ref Range    Sodium 138 135 - 147 mmol/L    Potassium 4.2 3.5 - 5.3 mmol/L    Chloride 103 96 - 108 mmol/L    CO2 27 21 - 32 mmol/L    ANION GAP 8 4 - 13 mmol/L    BUN 19 5 - 25 mg/dL    Creatinine 0.59 (L) 0.60 - 1.30 mg/dL    Glucose 115 65 - 140 mg/dL    Calcium 9.7 8.4 - 10.2 mg/dL    AST 32 13 - 39 U/L    ALT 23 7 - 52 U/L    Alkaline Phosphatase 87 34 - 104 U/L    Total Protein 6.8 6.4 - 8.4 g/dL    Albumin 3.7 3.5 - 5.0 g/dL    Total Bilirubin 0.37 0.20 - 1.00 mg/dL    eGFR 83 ml/min/1.73sq m   HS Troponin 0hr (reflex protocol)    Collection Time: 04/23/24  8:10 PM   Result Value Ref Range    hs TnI 0hr  "730 (H) \"Refer to ACS Flowchart\"- see link ng/L   B-Type Natriuretic Peptide(BNP)    Collection Time: 04/23/24  8:10 PM   Result Value Ref Range     (H) 0 - 100 pg/mL   APTT    Collection Time: 04/23/24  8:10 PM   Result Value Ref Range    PTT >210 (HH) 23 - 37 seconds   Protime-INR    Collection Time: 04/23/24  8:10 PM   Result Value Ref Range    Protime 16.0 (H) 11.6 - 14.5 seconds    INR 1.30 (H) 0.84 - 1.19   Magnesium    Collection Time: 04/23/24  8:10 PM   Result Value Ref Range    Magnesium 1.8 (L) 1.9 - 2.7 mg/dL   Phosphorus    Collection Time: 04/23/24  8:10 PM   Result Value Ref Range    Phosphorus 2.9 2.3 - 4.1 mg/dL   APTT    Collection Time: 04/23/24  9:22 PM   Result Value Ref Range    PTT 54 (H) 23 - 37 seconds   Protime-INR    Collection Time: 04/23/24  9:22 PM   Result Value Ref Range    Protime 15.8 (H) 11.6 - 14.5 seconds    INR 1.28 (H) 0.84 - 1.19   HS Troponin I 2hr    Collection Time: 04/23/24 10:35 PM   Result Value Ref Range    hs TnI 2hr 487 (H) \"Refer to ACS Flowchart\"- see link ng/L    Delta 2hr hsTnI -243 <20 ng/L   HS Troponin I 4hr    Collection Time: 04/24/24  1:02 AM   Result Value Ref Range    hs TnI 4hr 505 (H) \"Refer to ACS Flowchart\"- see link ng/L    Delta 4hr hsTnI -225 <20 ng/L   CBC and differential    Collection Time: 04/24/24  4:50 AM   Result Value Ref Range    WBC 10.49 (H) 4.31 - 10.16 Thousand/uL    RBC 3.89 3.81 - 5.12 Million/uL    Hemoglobin 11.7 11.5 - 15.4 g/dL    Hematocrit 36.8 34.8 - 46.1 %    MCV 95 82 - 98 fL    MCH 30.1 26.8 - 34.3 pg    MCHC 31.8 31.4 - 37.4 g/dL    RDW 16.6 (H) 11.6 - 15.1 %    MPV 11.5 8.9 - 12.7 fL    Platelets 175 149 - 390 Thousands/uL    nRBC 0 /100 WBCs    Segmented % 73 43 - 75 %    Immature Grans % 0 0 - 2 %    Lymphocytes % 17 14 - 44 %    Monocytes % 7 4 - 12 %    Eosinophils Relative 2 0 - 6 %    Basophils Relative 1 0 - 1 %    Absolute Neutrophils 7.74 (H) 1.85 - 7.62 Thousands/µL    Absolute Immature Grans 0.03 0.00 - " 0.20 Thousand/uL    Absolute Lymphocytes 1.73 0.60 - 4.47 Thousands/µL    Absolute Monocytes 0.69 0.17 - 1.22 Thousand/µL    Eosinophils Absolute 0.25 0.00 - 0.61 Thousand/µL    Basophils Absolute 0.05 0.00 - 0.10 Thousands/µL   APTT    Collection Time: 04/24/24  5:48 AM   Result Value Ref Range    PTT 67 (H) 23 - 37 seconds   Comprehensive metabolic panel    Collection Time: 04/24/24  5:48 AM   Result Value Ref Range    Sodium 138 135 - 147 mmol/L    Potassium 4.2 3.5 - 5.3 mmol/L    Chloride 103 96 - 108 mmol/L    CO2 27 21 - 32 mmol/L    ANION GAP 8 4 - 13 mmol/L    BUN 18 5 - 25 mg/dL    Creatinine 0.58 (L) 0.60 - 1.30 mg/dL    Glucose 120 65 - 140 mg/dL    Calcium 9.5 8.4 - 10.2 mg/dL    AST 31 13 - 39 U/L    ALT 21 7 - 52 U/L    Alkaline Phosphatase 88 34 - 104 U/L    Total Protein 6.8 6.4 - 8.4 g/dL    Albumin 3.7 3.5 - 5.0 g/dL    Total Bilirubin 0.62 0.20 - 1.00 mg/dL    eGFR 84 ml/min/1.73sq m   Magnesium    Collection Time: 04/24/24  5:48 AM   Result Value Ref Range    Magnesium 1.9 1.9 - 2.7 mg/dL   Phosphorus    Collection Time: 04/24/24  5:48 AM   Result Value Ref Range    Phosphorus 3.0 2.3 - 4.1 mg/dL   Lactic acid, plasma (w/reflex if result > 2.0)    Collection Time: 04/24/24 11:01 AM   Result Value Ref Range    LACTIC ACID 0.8 0.5 - 2.0 mmol/L   Fingerstick Glucose (POCT)    Collection Time: 04/24/24 11:48 AM   Result Value Ref Range    POC Glucose 138 65 - 140 mg/dl   APTT    Collection Time: 04/24/24 12:27 PM   Result Value Ref Range     (H) 23 - 37 seconds   Hemoglobin    Collection Time: 04/24/24  2:59 PM   Result Value Ref Range    Hemoglobin 10.8 (L) 11.5 - 15.4 g/dL   POCT activated clotting time    Collection Time: 04/24/24  4:02 PM   Result Value Ref Range    Activated Clotting Time, i-STAT 171 (H) 89 - 137 sec    Specimen Type VENOUS    ECG 12 lead    Collection Time: 04/24/24  5:14 PM   Result Value Ref Range    Ventricular Rate 87 BPM    Atrial Rate 87 BPM    FL Interval 166 ms     QRSD Interval 94 ms    QT Interval 412 ms    QTC Interval 495 ms    P [place] 49 degrees    QRS Axis 73 degrees    T Wave Axis 53 degrees   APTT    Collection Time: 04/24/24  7:04 PM   Result Value Ref Range    PTT 90 (H) 23 - 37 seconds   Fingerstick Glucose (POCT)    Collection Time: 04/24/24  7:11 PM   Result Value Ref Range    POC Glucose 120 65 - 140 mg/dl   APTT    Collection Time: 04/25/24 12:01 AM   Result Value Ref Range    PTT 71 (H) 23 - 37 seconds   Fingerstick Glucose (POCT)    Collection Time: 04/25/24 12:09 AM   Result Value Ref Range    POC Glucose 97 65 - 140 mg/dl   CBC and differential    Collection Time: 04/25/24  4:44 AM   Result Value Ref Range    WBC 9.88 4.31 - 10.16 Thousand/uL    RBC 3.52 (L) 3.81 - 5.12 Million/uL    Hemoglobin 10.7 (L) 11.5 - 15.4 g/dL    Hematocrit 34.5 (L) 34.8 - 46.1 %    MCV 98 82 - 98 fL    MCH 30.4 26.8 - 34.3 pg    MCHC 31.0 (L) 31.4 - 37.4 g/dL    RDW 16.7 (H) 11.6 - 15.1 %    MPV 11.8 8.9 - 12.7 fL    Platelets 163 149 - 390 Thousands/uL    nRBC 0 /100 WBCs    Segmented % 78 (H) 43 - 75 %    Immature Grans % 1 0 - 2 %    Lymphocytes % 12 (L) 14 - 44 %    Monocytes % 7 4 - 12 %    Eosinophils Relative 2 0 - 6 %    Basophils Relative 0 0 - 1 %    Absolute Neutrophils 7.76 (H) 1.85 - 7.62 Thousands/µL    Absolute Immature Grans 0.06 0.00 - 0.20 Thousand/uL    Absolute Lymphocytes 1.15 0.60 - 4.47 Thousands/µL    Absolute Monocytes 0.66 0.17 - 1.22 Thousand/µL    Eosinophils Absolute 0.22 0.00 - 0.61 Thousand/µL    Basophils Absolute 0.03 0.00 - 0.10 Thousands/µL   Basic metabolic panel    Collection Time: 04/25/24  4:44 AM   Result Value Ref Range    Sodium 138 135 - 147 mmol/L    Potassium 3.9 3.5 - 5.3 mmol/L    Chloride 103 96 - 108 mmol/L    CO2 27 21 - 32 mmol/L    ANION GAP 8 4 - 13 mmol/L    BUN 18 5 - 25 mg/dL    Creatinine 0.62 0.60 - 1.30 mg/dL    Glucose 115 65 - 140 mg/dL    Calcium 9.4 8.4 - 10.2 mg/dL    eGFR 82 ml/min/1.73sq m   APTT    Collection  Time: 04/25/24  4:44 AM   Result Value Ref Range    PTT 67 (H) 23 - 37 seconds   Fingerstick Glucose (POCT)    Collection Time: 04/25/24  5:30 AM   Result Value Ref Range    POC Glucose 108 65 - 140 mg/dl   Fingerstick Glucose (POCT)    Collection Time: 04/25/24 11:26 AM   Result Value Ref Range    POC Glucose 106 65 - 140 mg/dl   E-Tank Discharge Package (Existing O2 Patient ONLY)    Collection Time: 04/25/24 11:40 AM   Result Value Ref Range    Supplier Name [employer]     Supplier Phone Number [phone]     Order Status Completed     Delivery Note       Alternate     Vivi Gonzales (Daughter)[phone] (Home Phone)    Delivery Request Date 04/25/2024     Item Description E Tank Cylinder     Item Description O2 Flow Regulator, Standard    Portability Revision for Existing O2 Patients    Collection Time: 04/25/24  1:51 PM   Result Value Ref Range    Supplier Name [employer]     Supplier Phone Number [phone]     Order Status Confirmation In Progress     Delivery Note Vivi Gonzales (Daughter)[phone] (Home Phone)     Delivery Request Date 04/25/2024     Item Description       O2 with Portability for Existing O2 Patients, Standard Liter Flow    Item Description New Portable Setup, Portable Oxygen Concentrator     Item Description No Portable Contents     Item Description POC with Conserving Device / Pulse Dose    Fingerstick Glucose (POCT)    Collection Time: 04/25/24  5:09 PM   Result Value Ref Range    POC Glucose 110 65 - 140 mg/dl   APTT    Collection Time: 04/26/24  4:57 AM   Result Value Ref Range    PTT 52 (H) 23 - 37 seconds   Fingerstick Glucose (POCT)    Collection Time: 04/26/24  7:40 AM   Result Value Ref Range    POC Glucose 102 65 - 140 mg/dl   Fingerstick Glucose (POCT)    Collection Time: 04/26/24 11:40 AM   Result Value Ref Range    POC Glucose 103 65 - 140 mg/dl   Echo complete w/ contrast if indicated    Collection Time:  04/26/24 12:00 PM   Result Value Ref Range    Triscuspid Valve Regurgitation Peak Gradient 22.0 mmHg    RAA A4C 17.7 cm2    LA Volume Index (BP) 36.5 mL/m2    MV Peak A Urban 1.22 m/s    MV stenosis pressure 1/2 time 77 ms    MV Peak E Urban 79 cm/s    E wave deceleration time 266 ms    E/A ratio 0.65     MV valve area p 1/2 method 2.90     RA 2D Volume 48.0 mL    TR Peak Urban 2.4 m/s    RVID d 4.2 cm    A4C EF 56 %    Tricuspid valve peak regurgitation velocity 2.36 m/s    Left ventricular stroke volume (2D) 33.00 mL    IVSd 1.40 cm    Tricuspid annular plane systolic excursion 1.80 cm    Ao root 3.80 cm    LVPWd 1.40 cm    LA size 3.7 cm    Asc Ao 3.5 cm    LA volume (BP) 66 mL    FS 31 28 - 44    LVIDS 2.50 cm    IVS 1.4 cm    LVIDd 3.60 cm    LA length (A2C) 5.60 cm    LEFT VENTRICLE SYSTOLIC VOLUME (MOD BIPLANE) 2D 23 mL    LV DIASTOLIC VOLUME (MOD BIPLANE) 2D 56 mL    Left Atrium Area-systolic Four Chamber 21 cm2    Left Atrium Area-systolic Apical Two Chamber 22.8 cm2    MV E' Tissue Velocity Septal 8 cm/s    LVSV, 2D 33 mL    BSA 1.81 m2    LV EF 56     Est. RA pres 3.0 mmHg    Right Ventricular Peak Systolic Pressure 25.00 mmHg   Fingerstick Glucose (POCT)    Collection Time: 04/26/24  5:27 PM   Result Value Ref Range    POC Glucose 111 65 - 140 mg/dl   Fingerstick Glucose (POCT)    Collection Time: 04/27/24 12:01 PM   Result Value Ref Range    POC Glucose 95 65 - 140 mg/dl      Medications have been reviewed by provider in current encounter    Bladimir Caraballo MD

## 2024-05-07 ENCOUNTER — OFFICE VISIT (OUTPATIENT)
Dept: FAMILY MEDICINE CLINIC | Facility: CLINIC | Age: 84
End: 2024-05-07
Payer: MEDICARE

## 2024-05-07 VITALS
HEART RATE: 80 BPM | DIASTOLIC BLOOD PRESSURE: 65 MMHG | OXYGEN SATURATION: 93 % | BODY MASS INDEX: 26.52 KG/M2 | SYSTOLIC BLOOD PRESSURE: 136 MMHG | WEIGHT: 165 LBS | HEIGHT: 66 IN

## 2024-05-07 DIAGNOSIS — E78.5 DYSLIPIDEMIA: ICD-10-CM

## 2024-05-07 DIAGNOSIS — I82.413 ACUTE DEEP VEIN THROMBOSIS (DVT) OF FEMORAL VEIN OF BOTH LOWER EXTREMITIES (HCC): ICD-10-CM

## 2024-05-07 DIAGNOSIS — R10.13 DYSPEPSIA: ICD-10-CM

## 2024-05-07 DIAGNOSIS — R29.6 FREQUENT FALLS: ICD-10-CM

## 2024-05-07 DIAGNOSIS — Z86.73 HISTORY OF TRANSIENT ISCHEMIC ATTACK (TIA): ICD-10-CM

## 2024-05-07 DIAGNOSIS — E11.9 TYPE 2 DIABETES MELLITUS WITHOUT COMPLICATION, WITHOUT LONG-TERM CURRENT USE OF INSULIN (HCC): ICD-10-CM

## 2024-05-07 DIAGNOSIS — E03.9 ACQUIRED HYPOTHYROIDISM: ICD-10-CM

## 2024-05-07 DIAGNOSIS — R00.2 PALPITATIONS: ICD-10-CM

## 2024-05-07 DIAGNOSIS — J96.11 CHRONIC HYPOXEMIC RESPIRATORY FAILURE (HCC): Chronic | ICD-10-CM

## 2024-05-07 DIAGNOSIS — I26.99 ACUTE PULMONARY EMBOLISM WITHOUT ACUTE COR PULMONALE, UNSPECIFIED PULMONARY EMBOLISM TYPE (HCC): Primary | ICD-10-CM

## 2024-05-07 DIAGNOSIS — G47.34 NOCTURNAL HYPOXEMIA: ICD-10-CM

## 2024-05-07 DIAGNOSIS — K21.9 GASTROESOPHAGEAL REFLUX DISEASE WITHOUT ESOPHAGITIS: ICD-10-CM

## 2024-05-07 DIAGNOSIS — N39.490 OVERFLOW INCONTINENCE OF URINE: ICD-10-CM

## 2024-05-07 DIAGNOSIS — E87.6 HYPOKALEMIA: ICD-10-CM

## 2024-05-07 DIAGNOSIS — I10 ESSENTIAL HYPERTENSION: ICD-10-CM

## 2024-05-07 DIAGNOSIS — I50.32 CHRONIC DIASTOLIC CHF (CONGESTIVE HEART FAILURE) (HCC): ICD-10-CM

## 2024-05-07 DIAGNOSIS — N32.81 OVERACTIVE BLADDER: ICD-10-CM

## 2024-05-07 DIAGNOSIS — K11.21 PAROTITIS, ACUTE: ICD-10-CM

## 2024-05-07 DIAGNOSIS — F33.40 RECURRENT MAJOR DEPRESSIVE DISORDER, IN REMISSION (HCC): ICD-10-CM

## 2024-05-07 DIAGNOSIS — R42 DIZZINESS: ICD-10-CM

## 2024-05-07 DIAGNOSIS — F41.9 ANXIETY: ICD-10-CM

## 2024-05-07 PROCEDURE — 99214 OFFICE O/P EST MOD 30 MIN: CPT | Performed by: INTERNAL MEDICINE

## 2024-05-07 RX ORDER — POTASSIUM BICARBONATE 782 MG/1
20 TABLET, EFFERVESCENT ORAL WEEKLY
Qty: 12 TABLET | Refills: 3 | Status: SHIPPED | OUTPATIENT
Start: 2024-05-07

## 2024-05-11 PROBLEM — H91.8X3 ASYMMETRICAL HEARING LOSS: Status: RESOLVED | Noted: 2023-01-10 | Resolved: 2024-05-11

## 2024-05-11 PROBLEM — D72.829 LEUKOCYTOSIS: Status: RESOLVED | Noted: 2024-01-26 | Resolved: 2024-05-11

## 2024-05-11 NOTE — ASSESSMENT & PLAN NOTE
Patient was recently admitted to the hospital with shortness of breath and was noted to have bilateral DVT and has been placed on Eliquis tolerating it very well no side effects however recently she had a fall and there was a concern because of the Eliquis had a CT scan of the head and neck and came back unremarkable

## 2024-05-11 NOTE — ASSESSMENT & PLAN NOTE
Patient is currently taking Cymbalta 60 mg daily she also has clonazepam at bedtime we will continue

## 2024-05-11 NOTE — ASSESSMENT & PLAN NOTE
Discussed with patient at length regarding falls and fall precautions and to use the walker all the time at home history of orthostatic blood pressure changes

## 2024-05-11 NOTE — ASSESSMENT & PLAN NOTE
Lab Results   Component Value Date    HGBA1C 6.2 (H) 03/15/2024   Patient diabetes is controlled by diet last A1c 6.2 continue to monitor sugars cutting back carbohydrates

## 2024-05-11 NOTE — ASSESSMENT & PLAN NOTE
Patient with hypothyroidism currently on levothyroxine 88 mcg daily we will continue the same.  Follow-up with repeat TSH level at later date

## 2024-05-11 NOTE — ASSESSMENT & PLAN NOTE
Patient's status post thrombectomy  Denies any symptoms of shortness of breath at rest at present time  Continue Eliquis  Continue oxygen 3 L

## 2024-05-11 NOTE — ASSESSMENT & PLAN NOTE
Patient with a history of significant orthostatic changes on amlodipine and nebivolol and also on midodrine and bisoprolol denies any symptoms or shortness of breath in the recent past continue midodrine 2 times daily before meals she is also as mentioned on nebivolol and amlodipine   no

## 2024-05-11 NOTE — ASSESSMENT & PLAN NOTE
Patient is on atorvastatin 80 mg at bedtime continue the same follow-up with a lipid profile at a later date

## 2024-05-11 NOTE — ASSESSMENT & PLAN NOTE
Patient with parotitis and cellulitis with no abscess formation was in the hospital in the recent past followed by the ENT stable

## 2024-05-11 NOTE — ASSESSMENT & PLAN NOTE
Wt Readings from Last 3 Encounters:   05/07/24 74.8 kg (165 lb)   04/27/24 74.1 kg (163 lb 5.8 oz)   04/23/24 74.8 kg (165 lb)   Patient weight remains stable at 165 currently on low-sodium diet she is euvolemic

## 2024-05-15 DIAGNOSIS — I26.99 ACUTE PULMONARY EMBOLISM WITHOUT ACUTE COR PULMONALE, UNSPECIFIED PULMONARY EMBOLISM TYPE (HCC): Primary | ICD-10-CM

## 2024-05-15 LAB
DME PARACHUTE DELIVERY DATE EXPECTED: NORMAL
DME PARACHUTE DELIVERY DATE REQUESTED: NORMAL
DME PARACHUTE ITEM DESCRIPTION: NORMAL
DME PARACHUTE ORDER STATUS: NORMAL
DME PARACHUTE SUPPLIER NAME: NORMAL
DME PARACHUTE SUPPLIER PHONE: NORMAL

## 2024-05-17 ENCOUNTER — TELEPHONE (OUTPATIENT)
Dept: OTHER | Facility: HOSPITAL | Age: 84
End: 2024-05-17

## 2024-05-17 NOTE — TELEPHONE ENCOUNTER
[place]: Discussed follow up appointment with pt. She requested to cancel f/u appointment scheduled on 5/23. Agreed to a 10:00 time slot with Dr. Huff on 5/28 at the [place] office. Echocardiogram at [facility] to follow. Echo tech aware.

## 2024-05-22 LAB
DME PARACHUTE DELIVERY DATE ACTUAL: NORMAL
DME PARACHUTE DELIVERY DATE EXPECTED: NORMAL
DME PARACHUTE DELIVERY DATE REQUESTED: NORMAL
DME PARACHUTE ITEM DESCRIPTION: NORMAL
DME PARACHUTE ORDER STATUS: NORMAL
DME PARACHUTE SUPPLIER NAME: NORMAL
DME PARACHUTE SUPPLIER PHONE: NORMAL

## 2024-05-24 DIAGNOSIS — N32.81 OVERACTIVE BLADDER: ICD-10-CM

## 2024-05-24 DIAGNOSIS — F41.9 ANXIETY: ICD-10-CM

## 2024-05-24 DIAGNOSIS — E87.6 HYPOKALEMIA: ICD-10-CM

## 2024-05-24 NOTE — TELEPHONE ENCOUNTER
Patient had called because she bought cod liver oil and read the warning about taking with anticoagulant.  Patient is on Elequis. I advised the patient to hold off taking any cod liver oil until she speaks with Dr. Caraballo on Tuesday during her office visit as both of these thin blood.  Patient verbalized understanding.

## 2024-05-24 NOTE — TELEPHONE ENCOUNTER
Pt calling in requesting refills for her medications. She states the pharmacy stated the doctor needs to sign for her clonazepam medication .    Verified pharmacy with pt.

## 2024-05-25 RX ORDER — MIRABEGRON 25 MG/1
25 TABLET, FILM COATED, EXTENDED RELEASE ORAL DAILY
Qty: 90 TABLET | Refills: 1 | Status: SHIPPED | OUTPATIENT
Start: 2024-05-25

## 2024-05-25 RX ORDER — POTASSIUM BICARBONATE 782 MG/1
20 TABLET, EFFERVESCENT ORAL WEEKLY
Qty: 12 TABLET | Refills: 3 | Status: SHIPPED | OUTPATIENT
Start: 2024-05-25

## 2024-05-28 ENCOUNTER — DOCUMENTATION (OUTPATIENT)
Dept: OTHER | Facility: HOSPITAL | Age: 84
End: 2024-05-28

## 2024-05-28 ENCOUNTER — HOSPITAL ENCOUNTER (OUTPATIENT)
Dept: NON INVASIVE DIAGNOSTICS | Facility: HOSPITAL | Age: 84
Discharge: HOME | End: 2024-05-28
Attending: INTERNAL MEDICINE
Payer: MEDICARE

## 2024-05-28 ENCOUNTER — OFFICE VISIT (OUTPATIENT)
Dept: PULMONOLOGY | Facility: CLINIC | Age: 84
End: 2024-05-28
Payer: MEDICARE

## 2024-05-28 VITALS
SYSTOLIC BLOOD PRESSURE: 124 MMHG | DIASTOLIC BLOOD PRESSURE: 62 MMHG | HEIGHT: 66 IN | BODY MASS INDEX: 26.2 KG/M2 | HEART RATE: 54 BPM | WEIGHT: 163 LBS

## 2024-05-28 VITALS
HEIGHT: 66 IN | BODY MASS INDEX: 26.33 KG/M2 | OXYGEN SATURATION: 95 % | SYSTOLIC BLOOD PRESSURE: 124 MMHG | HEART RATE: 54 BPM | DIASTOLIC BLOOD PRESSURE: 62 MMHG | WEIGHT: 163.8 LBS

## 2024-05-28 DIAGNOSIS — I26.99 ACUTE PULMONARY EMBOLISM WITHOUT ACUTE COR PULMONALE, UNSPECIFIED PULMONARY EMBOLISM TYPE (HCC): ICD-10-CM

## 2024-05-28 DIAGNOSIS — I26.99 ACUTE PULMONARY EMBOLISM WITHOUT ACUTE COR PULMONALE, UNSPECIFIED PULMONARY EMBOLISM TYPE (HCC): Primary | ICD-10-CM

## 2024-05-28 PROBLEM — S22.010D: Status: RESOLVED | Noted: 2023-12-18 | Resolved: 2024-05-28
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
## 2024-05-28 PROBLEM — I11.0 HYPERTENSIVE HEART DISEASE WITH CONGESTIVE HEART FAILURE (HCC): Status: ACTIVE | Noted: 2024-05-28

## 2024-05-28 LAB
AORTIC ROOT: 3 CM
AORTIC VALVE MEAN VELOCITY: 13.9 M/S
APICAL FOUR CHAMBER EJECTION FRACTION: 58 %
ASCENDING AORTA: 3.1 CM
AV AREA BY CONTINUOUS VTI: 1.9 CM2
AV AREA PEAK VELOCITY: 1.8 CM2
AV LVOT MEAN GRADIENT: 2 MMHG
AV LVOT PEAK GRADIENT: 6 MMHG
AV MEAN GRADIENT: 9 MMHG
AV PEAK GRADIENT: 18 MMHG
AV VALVE AREA: 1.88 CM2
AV VELOCITY RATIO: 0.56
BSA FOR ECHO PROCEDURE: 1.83 M2
DOP CALC AO PEAK VEL: 2.11 M/S
DOP CALC AO VTI: 48.18 CM
DOP CALC LVOT AREA: 3.14 CM2
DOP CALC LVOT CARDIAC INDEX: 2.78 L/MIN/M2
DOP CALC LVOT CARDIAC OUTPUT: 5.12 L/MIN
DOP CALC LVOT DIAMETER: 2 CM
DOP CALC LVOT PEAK VEL VTI: 28.89 CM
DOP CALC LVOT PEAK VEL: 1.18 M/S
DOP CALC LVOT STROKE INDEX: 48.9 ML/M2
DOP CALC LVOT STROKE VOLUME: 90
DOP CALC MV VTI: 35.78 CM
E WAVE DECELERATION TIME: 258 MS
E/A RATIO: 0.91
FRACTIONAL SHORTENING: 37 (ref 28–44)
INTERVENTRICULAR SEPTUM IN DIASTOLE (PARASTERNAL SHORT AXIS VIEW): 1 CM
INTERVENTRICULAR SEPTUM: 1 CM (ref 0.6–1.1)
LAAS-AP2: 26.3 CM2
LAAS-AP4: 22.7 CM2
LEFT ATRIUM SIZE: 3.8 CM
LEFT ATRIUM VOLUME (MOD BIPLANE): 83 ML
LEFT ATRIUM VOLUME INDEX (MOD BIPLANE): 45.1 ML/M2
LEFT INTERNAL DIMENSION IN SYSTOLE: 3.1 CM (ref 2.1–4)
LEFT VENTRICULAR INTERNAL DIMENSION IN DIASTOLE: 4.9 CM (ref 3.5–6)
LEFT VENTRICULAR POSTERIOR WALL IN END DIASTOLE: 1 CM
LEFT VENTRICULAR STROKE VOLUME: 76 ML
LVSV (TEICH): 76 ML
MV E'TISSUE VEL-LAT: 7 CM/S
MV E'TISSUE VEL-SEP: 7 CM/S
MV MEAN GRADIENT: 2 MMHG
MV PEAK A VEL: 1.19 M/S
MV PEAK E VEL: 108 CM/S
MV PEAK GRADIENT: 7 MMHG
MV STENOSIS PRESSURE HALF TIME: 75 MS
MV VALVE AREA BY CONTINUITY EQUATION: 2.54 CM2
MV VALVE AREA P 1/2 METHOD: 2.9
RA PRESSURE ESTIMATED: 5 MMHG
RIGHT ATRIUM AREA SYSTOLE A4C: 15.8 CM2
RIGHT VENTRICLE ID DIMENSION: 4 CM
RV PSP: 34 MMHG
SINOTUBULAR JUNCTION: 2.6 CM
SL CV LEFT ATRIUM LENGTH A2C: 6.3 CM
SL CV LV EF: 58
SL CV PED ECHO LEFT VENTRICLE DIASTOLIC VOLUME (MOD BIPLANE) 2D: 114 ML
SL CV PED ECHO LEFT VENTRICLE SYSTOLIC VOLUME (MOD BIPLANE) 2D: 38 ML
SL CV SINUS OF VALSALVA 2D: 3.3 CM
STJ: 2.6 CM
TR MAX PG: 29 MMHG
TR PEAK VELOCITY: 2.7 M/S
TRICUSPID ANNULAR PLANE SYSTOLIC EXCURSION: 1.9 CM
TRICUSPID VALVE PEAK REGURGITATION VELOCITY: 2.69 M/S

## 2024-05-28 PROCEDURE — 93306 TTE W/DOPPLER COMPLETE: CPT

## 2024-05-28 PROCEDURE — 93306 TTE W/DOPPLER COMPLETE: CPT | Performed by: INTERNAL MEDICINE

## 2024-05-28 PROCEDURE — 94618 PULMONARY STRESS TESTING: CPT | Performed by: INTERNAL MEDICINE

## 2024-05-28 RX ORDER — CLONAZEPAM 0.5 MG/1
0.5 TABLET ORAL
Qty: 30 TABLET | Refills: 1 | Status: SHIPPED | OUTPATIENT
Start: 2024-05-28

## 2024-05-28 NOTE — PROGRESS NOTES
[place] 1 Month Follow Up    Patient participated in [place] clinical trial and was randomly assigned to the FlowTriever intervention arm.    -Date of Visit: 5/28/24  -Dyspnea/mMRC Score: 0 -- Breathless with strenuous exercise only  -Dyspnea (Modified Pee Scale) at rest: 0: No breathlessness at all   *Note: This should be taken pre 6-minute walk test  -Fatigue (Pee CR10 Scale): 0: No breathlessness at all  -Supplemental Oxygen: Nasal Cannula: 2 lpm (patient baseline)  -NYHA/WHO Classification: I  -New appearance of symptoms or progression of existing symptoms: No  -New or worsening clinical evidence of RV failure: No  -Syncope observed or reported since last visit: No  -Were there any changes in the anticoagulant medications: No   -If yes, specify: N/A    Cardiopulmonary Exercise Testing (Optional)  -Cardiopulmonary exercise testing conducted: No   -If yes, specify: N/A    Return to work  Current working status: Not working.    6 minute Walk test results:  Ambulatory hypoxemia (patient chronically on 2LNC).  Total distance walked 135m in 6 minutes.

## 2024-05-31 DIAGNOSIS — R60.9 EDEMA, UNSPECIFIED TYPE: Primary | ICD-10-CM

## 2024-05-31 DIAGNOSIS — I10 ESSENTIAL HYPERTENSION: ICD-10-CM

## 2024-05-31 RX ORDER — AMLODIPINE BESYLATE 5 MG/1
5 TABLET ORAL DAILY
Qty: 30 TABLET | Refills: 0 | Status: CANCELLED | OUTPATIENT
Start: 2024-05-31

## 2024-05-31 RX ORDER — FUROSEMIDE 40 MG/1
40 TABLET ORAL 2 TIMES DAILY
Qty: 30 TABLET | Refills: 2 | Status: SHIPPED | OUTPATIENT
Start: 2024-05-31

## 2024-05-31 NOTE — TELEPHONE ENCOUNTER
Called patient to inform her that the Cardiologist had originally ordered her Furosimide and she must call them to renew as there were instructions from the cardiologist as how and when to take this medication

## 2024-05-31 NOTE — TELEPHONE ENCOUNTER
Patient called she is almost out of furosemide 40mg, has been taking was advised to continue by PCP can you please approve for patient?

## 2024-06-11 ENCOUNTER — OFFICE VISIT (OUTPATIENT)
Dept: FAMILY MEDICINE CLINIC | Facility: CLINIC | Age: 84
End: 2024-06-11
Payer: MEDICARE

## 2024-06-11 VITALS
WEIGHT: 161 LBS | DIASTOLIC BLOOD PRESSURE: 65 MMHG | OXYGEN SATURATION: 94 % | HEART RATE: 69 BPM | SYSTOLIC BLOOD PRESSURE: 120 MMHG | BODY MASS INDEX: 25.88 KG/M2 | HEIGHT: 66 IN

## 2024-06-11 DIAGNOSIS — R00.2 PALPITATIONS: ICD-10-CM

## 2024-06-11 DIAGNOSIS — R29.6 FREQUENT FALLS: ICD-10-CM

## 2024-06-11 DIAGNOSIS — R10.13 DYSPEPSIA: ICD-10-CM

## 2024-06-11 DIAGNOSIS — I10 ESSENTIAL HYPERTENSION: ICD-10-CM

## 2024-06-11 DIAGNOSIS — I10 HYPERTENSION: ICD-10-CM

## 2024-06-11 DIAGNOSIS — E03.9 ACQUIRED HYPOTHYROIDISM: ICD-10-CM

## 2024-06-11 DIAGNOSIS — J96.11 CHRONIC HYPOXEMIC RESPIRATORY FAILURE (HCC): Chronic | ICD-10-CM

## 2024-06-11 DIAGNOSIS — Z13.0 SCREENING FOR DEFICIENCY ANEMIA: ICD-10-CM

## 2024-06-11 DIAGNOSIS — G47.34 NOCTURNAL HYPOXEMIA: ICD-10-CM

## 2024-06-11 DIAGNOSIS — N39.490 OVERFLOW INCONTINENCE OF URINE: ICD-10-CM

## 2024-06-11 DIAGNOSIS — F41.9 ANXIETY: ICD-10-CM

## 2024-06-11 DIAGNOSIS — I26.99 ACUTE PULMONARY EMBOLISM WITHOUT ACUTE COR PULMONALE, UNSPECIFIED PULMONARY EMBOLISM TYPE (HCC): ICD-10-CM

## 2024-06-11 DIAGNOSIS — I50.32 CHRONIC DIASTOLIC CHF (CONGESTIVE HEART FAILURE) (HCC): ICD-10-CM

## 2024-06-11 DIAGNOSIS — R25.1 TREMOR: ICD-10-CM

## 2024-06-11 DIAGNOSIS — E11.9 TYPE 2 DIABETES MELLITUS WITHOUT COMPLICATION, WITHOUT LONG-TERM CURRENT USE OF INSULIN (HCC): ICD-10-CM

## 2024-06-11 DIAGNOSIS — M17.11 PRIMARY OSTEOARTHRITIS OF RIGHT KNEE: ICD-10-CM

## 2024-06-11 DIAGNOSIS — E78.5 DYSLIPIDEMIA: ICD-10-CM

## 2024-06-11 DIAGNOSIS — I82.413 ACUTE DEEP VEIN THROMBOSIS (DVT) OF FEMORAL VEIN OF BOTH LOWER EXTREMITIES (HCC): Primary | ICD-10-CM

## 2024-06-11 DIAGNOSIS — L30.9 DERMATITIS: ICD-10-CM

## 2024-06-11 PROCEDURE — G2211 COMPLEX E/M VISIT ADD ON: HCPCS | Performed by: INTERNAL MEDICINE

## 2024-06-11 PROCEDURE — 99214 OFFICE O/P EST MOD 30 MIN: CPT | Performed by: INTERNAL MEDICINE

## 2024-06-11 RX ORDER — CLOBETASOL PROPIONATE 0.5 MG/G
CREAM TOPICAL 2 TIMES DAILY
Qty: 45 G | Refills: 1 | Status: SHIPPED | OUTPATIENT
Start: 2024-06-11

## 2024-06-11 RX ORDER — CLONAZEPAM 0.5 MG/1
0.5 TABLET ORAL
Qty: 30 TABLET | Refills: 1 | Status: SHIPPED | OUTPATIENT
Start: 2024-06-11

## 2024-06-11 NOTE — PROGRESS NOTES
Office Visit Note  06/15/24     Danyelle Martinez 84 y.o. female MRN: [id number]  : 1940    Assessment:     1. Acute deep vein thrombosis (DVT) of femoral vein of both lower extremities ([facility])  Assessment & Plan:  Patient recently diagnosed with DVT and PE currently taking Eliquis.  I discussed with the patient and the daughter to be careful with ambulation with a history of frequent falls.  Patient was also seen in the emergency room because of the fall and a CT scan of the head was negative.  2. Acute pulmonary embolism without acute cor pulmonale, unspecified pulmonary embolism type (HCC)  Assessment & Plan:  Patient with DVT and PE status post thrombectomy feeling much better on Eliquis and oxygen denies any symptoms of shortness of breath at rest has mild shortness of breath with exertion  3. Chronic diastolic CHF (congestive heart failure) ([facility])  Assessment & Plan:  Wt Readings from Last 3 Encounters:   24 73 kg (161 lb)   24 73.9 kg (163 lb)   24 74.3 kg (163 lb 12.8 oz)     Patient weight remains same around 161 pounds denies any symptoms or shortness of breath does have mild swelling in the lower extremities examination when she is euvolemic        4. Anxiety  Assessment & Plan:  Patient is taking clonazepam at bedtime as needed she is also on Cymbalta 60 mg daily will continue the same  Orders:  -     clonazePAM (KlonoPIN) 0.5 mg tablet; Take 1 tablet (0.5 mg total) by mouth daily at bedtime  5. Acquired hypothyroidism  Assessment & Plan:  Patient is currently taking levothyroxine 88 mcg daily we will continue with the same repeat TSH level has been ordered.  Orders:  -     TSH, 3rd generation with Free T4 reflex; Future; Expected date: 2024  6. Chronic hypoxemic respiratory failure (HCC)  Assessment & Plan:  Currently patient is on 3 L of oxygen at home  7. Dyslipidemia  Assessment & Plan:  Continue with atorvastatin 80 mg at bedtime lipid profile has been ordered.  Orders:  -      Lipid panel; Future  8. Dyspepsia  Assessment & Plan:  Continue with pantoprazole as needed discussed with patient and the daughter with the long-term effects of the medication will monitor  9. Essential hypertension  Assessment & Plan:  Blood pressure today is 120/65 on nebivolol and amlodipine however she has also has history of orthostatic hypotension recommend patient not to get up quickly and walk and to be careful whenever there is a change in position and  getting out of the bed  10. Frequent falls  Assessment & Plan:  Patient with history of frequent falls no recent falls have been reported stable she uses walker all the time at home history of orthostatic blood pressure changes  11. Nocturnal hypoxemia  Assessment & Plan:  On oxygen 3 L at bedtime he also uses during the daytime as needed  12. Type 2 diabetes mellitus without complication, without long-term current use of insulin ([facility])  Assessment & Plan:    Lab Results   Component Value Date    HGBA1C 6.2 (H) 03/15/2024   Diet-controlled diabetes last A1c 6.2 recommend to watch the carbohydrate intake cutting back on sweets.  Orders:  -     Comprehensive metabolic panel; Future  -     Hemoglobin A1C; Future; Expected date: 06/11/2024  -     UA w Reflex to Microscopic w Reflex to Culture; Future; Expected date: 06/11/2024  -     Albumin / creatinine urine ratio; Future  13. Tremor  Assessment & Plan:  Continue with primidone  14. Dermatitis  -     clobetasol (TEMOVATE) 0.05 % cream; Apply topically 2 (two) times a day  15. Screening for deficiency anemia  -     CBC and differential; Future  16. Primary osteoarthritis of right knee  Assessment & Plan:  Tylenol as needed avoiding nonsteroidals  17. Overflow incontinence of urine  Assessment & Plan:  Patient has been not taking Myrbetriq in the recent past and according to her she has a good control on the urine no increased frequency or urgency will continue to hold the Myrbetriq for now  18.  Palpitations  Assessment & Plan:  No episodes of palpitation heart rate is around 72/min regular        Depression Screening and Follow-up Plan: Patient was screened for depression during today's encounter. They screened negative with a PHQ-9 score of 0.    Falls Plan of Care: balance, strength, and gait training instructions were provided. Recommended assistive device to help with gait and balance. Patient assessed for orthostatic hypotension. Medications that increase falls were reviewed. No recent episodes of falling patient had multiple admissions in the recent past for various medical problems discussed with the patient and the daughter take all of the necessary precautions especially with a history of orthostatic hypotension          Discussion Summary and Plan:  Today's care plan and medications were reviewed with patient in detail and all their questions answered to their satisfaction.    Chief Complaint   Patient presents with   • Follow-up      Subjective:  Patient is coming here for a follow-up evaluation with regards to his symptoms of shortness of breath, diabetes, orthostatic hypotension, also history of congestive heart failure.  Patient was on Lasix 40 mg twice a day cardiologist recommended to continue the same we will get lab work done to check the BUN/creatinine and the potassium levels however patient at present time is taking the Lasix 40 mg once a day.  Medication reviewed labs reviewed labs ordered discussed with the patient and the daughter at length recommend patient to check the blood oxygen levels when she is not using it to get a baseline without oxygen .        The following portions of the patient's history were reviewed and updated as appropriate: allergies, current medications, past family history, past medical history, past social history, past surgical history and problem list.    Review of Systems   Constitutional:  Negative for chills and fever.   HENT:  Negative for ear pain and  sore throat.    Eyes:  Negative for pain and visual disturbance.   Respiratory:  Positive for shortness of breath. Negative for cough.    Cardiovascular:  Negative for chest pain and palpitations.   Gastrointestinal:  Negative for abdominal pain and vomiting.   Genitourinary:  Negative for dysuria and hematuria.   Musculoskeletal:  Positive for arthralgias and back pain.   Skin:  Negative for color change and rash.   Neurological:  Negative for seizures and syncope.   All other systems reviewed and are negative.        Historical Information   Patient Active Problem List   Diagnosis   • Shortness of breath on exertion   • Dizziness   • Chronic hypoxemic respiratory failure (HCC)   • Seasonal allergic rhinitis due to pollen   • Essential hypertension   • Dyslipidemia   • History of transient ischemic attack (TIA)   • Acquired hypothyroidism   • Thyroid nodule   • Palpitations   • Tremor   • Nocturnal hypoxemia   • Gastroesophageal reflux disease without esophagitis   • Localized swelling of right lower leg   • Anxiety   • Frequent falls   • Dyspepsia   • SVT (supraventricular tachycardia)   • Osteoarthritis of right knee   • Elevated liver enzymes   • Type 2 diabetes mellitus, without long-term current use of insulin ([facility])   • Recurrent major depressive disorder, in remission ([facility])   • Syncope, cardiogenic   • Overflow incontinence of urine   • Enlarged thyroid   • Chronic diastolic CHF (congestive heart failure) ([facility])   • Cigarette nicotine dependence in remission   • Change in mental status   • Parotitis, acute   • Facial cellulitis   • Pulmonary emboli ([facility])   • Acute deep vein thrombosis (DVT) of both lower extremities ([facility])   • Hypertensive heart disease with congestive heart failure ([facility])     Past Medical History:   Diagnosis Date   • Anxiety    • Arthritis     r knee and shoulders   • 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   • Blind left eye    • CHF (congestive heart failure) ([facility])    • Closed wedge compression  fracture of T1 vertebra with routine healing 2023   • Deaf, left    • Depression    • Diabetes mellitus (HCC)    • Disease of thyroid gland    • DVT complicating pregnancy, unspecified trimester (HCC) postpartum   • Fall 11/15/2023   • Hearing loss     LEFT EAR   • History of shingles 2007    fACIAL    • Hyperlipidemia    • Hypertension    • Liver disease    • Lyme disease    • Meniere's disease    • Obesity    • Orthostatic hypotension    • Psychiatric problem    • Sinus congestion    • Sleep apnea    • Stroke (HCC)      Past Surgical History:   Procedure Laterality Date   • APPENDECTOMY     • BREAST BIOPSY Left 2010   •  SECTION      x2   • DXA PROCEDURE (HISTORICAL)  10/28/2020   • EYE SURGERY     • HAND SURGERY Left    • HERNIA REPAIR     • HYSTERECTOMY     • IR PE ENDOVASCULAR THERAPY  2024   • ROTATOR CUFF REPAIR Left    • TONSILLECTOMY     • TYMPANOSTOMY TUBE PLACEMENT       Social History     Substance and Sexual Activity   Alcohol Use Not Currently     Social History     Substance and Sexual Activity   Drug Use Never     Social History     Tobacco Use   Smoking Status Former   • Current packs/day: 0.00   • Average packs/day: 0.8 packs/day for 44.0 years (33.0 ttl pk-yrs)   • Types: Cigarettes   • Start date: 1946   • Quit date: 1990   • Years since quittin.3   • Passive exposure: Past   Smokeless Tobacco Never     Family History   Problem Relation Age of Onset   • Depression Mother    • Hypertension Mother    • Obesity Mother    • Depression Father    • Alcohol abuse Father    • Stroke Father    • Breast cancer Sister 68   • Ovarian cancer Daughter 53   • BRCA1 Negative Daughter    • BRCA2 Negative Daughter      Health Maintenance Due   Topic   • Pneumococcal Vaccine: 65+ Years (1 of 2 - PCV)   • DM Eye Exam    • Hepatitis A Vaccine (1 of 2 - Risk 2-dose series)   • Zoster Vaccine (1 of 2)   • RSV Vaccine Age 60+ Years (1 - 1-dose 60+ series)   • COVID-19 Vaccine (5 -  2023-24 season)   • Medicare Annual Wellness Visit (AWV)    • HEMOGLOBIN A1C       Meds/Allergies       Current Outpatient Medications:   •  apixaban (Eliquis) 5 mg, Take 1 tablet (5 mg total) by mouth 2 (two) times a day, Disp: 60 tablet, Rfl: 0  •  atorvastatin (LIPITOR) 80 mg tablet, Taking at morning, Disp: 90 tablet, Rfl: 0  •  bisoprolol (ZEBETA) 5 mg tablet, Take 0.5 tablets (2.5 mg total) by mouth daily, Disp: 45 tablet, Rfl: 1  •  Calcium Carb-Cholecalciferol (Calcium 500 + D) 500-3.125 MG-MCG TABS, 2 tabs daily ORAL, Disp: , Rfl:   •  cetirizine (ZyrTEC) 10 mg tablet, Take 10 mg by mouth daily, Disp: , Rfl:   •  clobetasol (TEMOVATE) 0.05 % cream, Apply topically 2 (two) times a day, Disp: 45 g, Rfl: 1  •  clonazePAM (KlonoPIN) 0.5 mg tablet, Take 1 tablet (0.5 mg total) by mouth daily at bedtime, Disp: 30 tablet, Rfl: 1  •  clopidogrel (PLAVIX) 75 mg tablet, TAKE 1 TABLET(75 MG) BY MOUTH DAILY, Disp: 30 tablet, Rfl: 5  •  DULoxetine (CYMBALTA) 60 mg delayed release capsule, TAKE 1 CAPSULE(60 MG) BY MOUTH DAILY, Disp: 90 capsule, Rfl: 1  •  furosemide (LASIX) 40 mg tablet, Take 1 tablet (40 mg total) by mouth 2 (two) times a day, Disp: 30 tablet, Rfl: 2  •  levothyroxine 88 mcg tablet, Take 1 tablet (88 mcg total) by mouth daily, Disp: 90 tablet, Rfl: 1  •  mometasone (NASONEX) 50 mcg/act nasal spray, 1 spray into each nostril daily, Disp: , Rfl:   •  Multiple Vitamins-Minerals (CENTRUM SILVER 50+WOMEN PO), ORAL, Disp: , Rfl:   •  Multiple Vitamins-Minerals (PreserVision AREDS) TABS, Take by mouth, Disp: , Rfl:   •  nebivolol (Bystolic) 5 mg tablet, Take 1 tablet (5 mg total) by mouth daily, Disp: 30 tablet, Rfl: 0  •  [place]wegian Cod Liver Oil CAPS, ORAL, Disp: , Rfl:   •  pantoprazole (PROTONIX) 40 mg tablet, TAKE 1 TABLET(40 MG) BY MOUTH EVERY MORNING, Disp: 90 tablet, Rfl: 1  •  Potassium Bicarb-Citric Acid (Effer-K) 20 MEQ TBEF, Take 1 tablet (20 mEq total) by mouth once a week, Disp: 12 tablet, Rfl:  "3  •  primidone (MYSOLINE) 50 mg tablet, TAKE 1 TABLET(50 MG) BY MOUTH DAILY, Disp: 90 tablet, Rfl: 1  •  Triamcinolone Acetonide (Nasacort Allergy 24HR) 55 MCG/ACT nasal spray, 1 spray each nostrill daily NASAL, Disp: , Rfl:   •  amLODIPine (NORVASC) 2.5 mg tablet, TAKE 1 TABLET BY MOUTH EVERY MORNING, Disp: 90 tablet, Rfl: 1  •  midodrine (PROAMATINE) 5 mg tablet, Take 1 tablet (5 mg total) by mouth 2 (two) times a day before meals, Disp: 60 tablet, Rfl: 0  •  Mirabegron ER 25 MG TB24, Take 25 mg by mouth in the morning (Patient not taking: Reported on 5/28/2024), Disp: 90 tablet, Rfl: 1      Objective:    Vitals:   /65 (BP Location: Right arm, Patient Position: Sitting, Cuff Size: Standard)   Pulse 69   Ht 5' 6\" (1.676 m)   Wt 73 kg (161 lb)   SpO2 94%   BMI 25.99 kg/m²   Body mass index is 25.99 kg/m².  Vitals:    06/11/24 1446   Weight: 73 kg (161 lb)       Physical Exam  Vitals and nursing note reviewed.   Constitutional:       Appearance: Normal appearance.   Cardiovascular:      Rate and Rhythm: Normal rate and regular rhythm.      Pulses: Pulses are weak.           Dorsalis pedis pulses are 1+ on the right side and 1+ on the left side.        Posterior tibial pulses are 1+ on the right side and 1+ on the left side.      Heart sounds: Normal heart sounds.   Pulmonary:      Effort: Pulmonary effort is normal.      Breath sounds: Normal breath sounds.   Abdominal:      Palpations: Abdomen is soft.   Musculoskeletal:      Cervical back: Normal range of motion and neck supple.      Right lower leg: No edema.      Left lower leg: No edema.   Feet:      Right foot:      Skin integrity: Dry skin present. No ulcer, skin breakdown, erythema, warmth or callus.      Left foot:      Skin integrity: Dry skin present. No ulcer, skin breakdown, erythema, warmth or callus.   Skin:     General: Skin is warm and dry.   Neurological:      Mental Status: She is alert and oriented to person, place, and time. "   Psychiatric:         Mood and Affect: Mood normal.       Diabetic Foot Exam    Patient's shoes and socks removed.    Right Foot/Ankle   Right Foot Inspection  Skin Exam: skin normal, skin intact and dry skin. No warmth, no callus, no erythema, no maceration, no abnormal color, no pre-ulcer, no ulcer and no callus.     Toe Exam: ROM and strength within normal limits.     Sensory   Monofilament testing: intact    Vascular  The right DP pulse is 1+. The right PT pulse is 1+.     Left Foot/Ankle  Left Foot Inspection  Skin Exam: skin normal, skin intact and dry skin. No warmth, no erythema, no maceration, normal color, no pre-ulcer, no ulcer and no callus.     Toe Exam: ROM and strength within normal limits.     Sensory   Monofilament testing: intact    Vascular  The left DP pulse is 1+. The left PT pulse is 1+.     Assign Risk Category  No deformity present  Loss of protective sensation  Weak pulses  Risk: 2      Lab Review   Hospital Outpatient Visit on 05/28/2024   Component Date Value Ref Range Status   • Triscuspid Valve Regurgitation Pea* 05/28/2024 29.0  mmHg Final   • Sinus of Valsalva, 2D 05/28/2024 3.3  cm Final   • RAA A4C 05/28/2024 15.8  cm2 Final   • LA Volume Index (BP) 05/28/2024 45.1  mL/m2 Final   • MV Peak A Urban 05/28/2024 1.19  m/s Final   • MV stenosis pressure 1/2 time 05/28/2024 75  ms Final   • MV VTI 05/28/2024 35.78  cm Final   • MV Peak E Urban 05/28/2024 108  cm/s Final   • MV peak gradient antegrade 05/28/2024 7  mmHg Final   • AV peak gradient 05/28/2024 18  mmHg Final   • LVOT stroke volume 05/28/2024 90.00   Final   • Ao VTI 05/28/2024 48.18  cm Final   • Aortic valve peak velocity 05/28/2024 2.11  m/s Final   • LVOT peak VTI 05/28/2024 28.89  cm Final   • LVOT peak urban 05/28/2024 1.18  m/s Final   • LVOT diameter 05/28/2024 2.0  cm Final   • E wave deceleration time 05/28/2024 258  ms Final   • E/A ratio 05/28/2024 0.91   Final   • MV valve area p 1/2 method 05/28/2024 2.90   Final   •  MV mean gradient antegrade 05/28/2024 2  mmHg Final   • AV LVOT peak gradient 05/28/2024 6  mmHg Final   • AV mean gradient 05/28/2024 9  mmHg Final   • TR Peak Urban 05/28/2024 2.7  m/s Final   • AV area peak urban 05/28/2024 1.8  cm2 Final   • AV area by cont VTI 05/28/2024 1.9  cm2 Final   • LVOT mn grad 05/28/2024 2.0  mmHg Final   • RVID d 05/28/2024 4.0  cm Final   • A4C EF 05/28/2024 58  % Final   • Aortic valve mean velocity 05/28/2024 13.90  m/s Final   • Tricuspid valve peak regurgitation* 05/28/2024 2.69  m/s Final   • Left ventricular stroke volume (2D) 05/28/2024 76.00  mL Final   • IVSd 05/28/2024 1.00  cm Final   • Tricuspid annular plane systolic e* 05/28/2024 1.90  cm Final   • Ao root 05/28/2024 3.00  cm Final   • Ao STJ 05/28/2024 2.60  cm Final   • LVPWd 05/28/2024 1.00  cm Final   • LA size 05/28/2024 3.8  cm Final   • Asc Ao 05/28/2024 3.1  cm Final   • STJ 05/28/2024 2.6  cm Final   • LA volume (BP) 05/28/2024 83  mL Final   • FS 05/28/2024 37  28 - 44 Final   • LVIDS 05/28/2024 3.10  cm Final   • IVS 05/28/2024 1  cm Final   • LVIDd 05/28/2024 4.90  cm Final   • LA length (A2C) 05/28/2024 6.30  cm Final   • LEFT VENTRICLE SYSTOLIC VOLUME (MO* 05/28/2024 38  mL Final   • LV DIASTOLIC VOLUME (MOD BIPLANE) * 05/28/2024 114  mL Final   • LVOT Cardiac Index 05/28/2024 2.78  l/min/m2 Final   • LVOT stroke volume index 05/28/2024 48.90  ml/m2 Final   • LVOT Cardiac Output 05/28/2024 5.12  l/min Final   • Left Atrium Area-systolic Four Nini* 05/28/2024 22.7  cm2 Final   • Left Atrium Area-systolic Apical T* 05/28/2024 26.3  cm2 Final   • MV E' Tissue Velocity Lateral 05/28/2024 7  cm/s Final   • MV E' Tissue Velocity Septal 05/28/2024 7  cm/s Final   • LVSV, 2D 05/28/2024 76  mL Final   • LVOT area 05/28/2024 3.14  cm2 Final   • MV valve area by continuity eq 05/28/2024 2.54  cm2 Final   • DVI 05/28/2024 0.56   Final   • AV valve area 05/28/2024 1.88  cm2 Final   • BSA 05/28/2024 1.83  m2 Final   • LV EF  05/28/2024 58   Final   • Est. RA pres 05/28/2024 5.0  mmHg Final   • Right Ventricular Peak Systolic Pr* 05/28/2024 34.00  mmHg Final   Admission on 04/23/2024, Discharged on 04/27/2024   Component Date Value Ref Range Status   • WBC 04/23/2024 9.91  4.31 - 10.16 Thousand/uL Final   • RBC 04/23/2024 3.91  3.81 - 5.12 Million/uL Final   • Hemoglobin 04/23/2024 11.6  11.5 - 15.4 g/dL Final   • Hematocrit 04/23/2024 36.8  34.8 - 46.1 % Final   • MCV 04/23/2024 94  82 - 98 fL Final   • MCH 04/23/2024 29.7  26.8 - 34.3 pg Final   • MCHC 04/23/2024 31.5  31.4 - 37.4 g/dL Final   • RDW 04/23/2024 16.4 (H)  11.6 - 15.1 % Final   • MPV 04/23/2024 11.6  8.9 - 12.7 fL Final   • Platelets 04/23/2024 194  149 - 390 Thousands/uL Final   • nRBC 04/23/2024 0  /100 WBCs Final   • Segmented % 04/23/2024 73  43 - 75 % Final   • Immature Grans % 04/23/2024 1  0 - 2 % Final   • Lymphocytes % 04/23/2024 16  14 - 44 % Final   • Monocytes % 04/23/2024 7  4 - 12 % Final   • Eosinophils Relative 04/23/2024 3  0 - 6 % Final   • Basophils Relative 04/23/2024 0  0 - 1 % Final   • Absolute Neutrophils 04/23/2024 7.32  1.85 - 7.62 Thousands/µL Final   • Absolute Immature Grans 04/23/2024 0.06  0.00 - 0.20 Thousand/uL Final   • Absolute Lymphocytes 04/23/2024 1.56  0.60 - 4.47 Thousands/µL Final   • Absolute Monocytes 04/23/2024 0.67  0.17 - 1.22 Thousand/µL Final   • Eosinophils Absolute 04/23/2024 0.26  0.00 - 0.61 Thousand/µL Final   • Basophils Absolute 04/23/2024 0.04  0.00 - 0.10 Thousands/µL Final   • Sodium 04/23/2024 138  135 - 147 mmol/L Final   • Potassium 04/23/2024 4.2  3.5 - 5.3 mmol/L Final   • Chloride 04/23/2024 103  96 - 108 mmol/L Final   • CO2 04/23/2024 27  21 - 32 mmol/L Final   • ANION GAP 04/23/2024 8  4 - 13 mmol/L Final   • BUN 04/23/2024 19  5 - 25 mg/dL Final   • Creatinine 04/23/2024 0.59 (L)  0.60 - 1.30 mg/dL Final    Standardized to IDMS reference method   • Glucose 04/23/2024 115  65 - 140 mg/dL Final    If the  "patient is fasting, the ADA then defines impaired fasting glucose as > 100 mg/dL and diabetes as > or equal to 123 mg/dL.   • Calcium 04/23/2024 9.7  8.4 - 10.2 mg/dL Final   • AST 04/23/2024 32  13 - 39 U/L Final   • ALT 04/23/2024 23  7 - 52 U/L Final    Specimen collection should occur prior to Sulfasalazine administration due to the potential for falsely depressed results.    • Alkaline Phosphatase 04/23/2024 87  34 - 104 U/L Final   • Total Protein 04/23/2024 6.8  6.4 - 8.4 g/dL Final   • Albumin 04/23/2024 3.7  3.5 - 5.0 g/dL Final   • Total Bilirubin 04/23/2024 0.37  0.20 - 1.00 mg/dL Final    Use of this assay is not recommended for patients undergoing treatment with eltrombopag due to the potential for falsely elevated results.  N-acetyl-p-benzoquinone imine (metabolite of Acetaminophen) will generate erroneously low results in samples for patients that have taken an overdose of Acetaminophen.   • eGFR 04/23/2024 83  ml/min/1.73sq m Final   • hs TnI 0hr 04/23/2024 730 (H)  \"Refer to ACS Flowchart\"- see link ng/L Final    Comment:                                              Initial (time 0) result  If >=50 ng/L, Myocardial injury suggested ;  Type of myocardial injury and treatment strategy  to be determined.  If 5-49 ng/L, a delta result at 2 hours and or 4 hours will be needed to further evaluate.  If <4 ng/L, and chest pain has been >3 hours since onset, patient may qualify for discharge based on the HEART score in the ED.  If <5 ng/L and <3hours since onset of chest pain, a delta result at 2 hours will be needed to further evaluate.    HS Troponin 99th Percentile URL of a Health Population=12 ng/L with a 95% Confidence Interval of 8-18 ng/L.    Second Troponin (time 2 hours)  If calculated delta >= 20 ng/L,  Myocardial injury suggested ; Type of myocardial injury and treatment strategy to be determined.  If 5-49 ng/L and the calculated delta is 5-19 ng/L, consult medical service for evaluation.  " "Continue evaluation for ischemia on ecg and other possible etiology and repeat hs troponin at 4 hours.  If delta                            is <5 ng/L at 2 hours, consider discharge based on risk stratification via the HEART score (if in ED), or TYLER risk score in IP/Observation.    HS Troponin 99th Percentile URL of a Health Population=12 ng/L with a 95% Confidence Interval of 8-18 ng/L.   • BNP 04/23/2024 174 (H)  0 - 100 pg/mL Final   • PTT 04/23/2024 >210 (HH)  23 - 37 seconds Final    Therapeutic Heparin Range =  60-90 seconds   • Protime 04/23/2024 16.0 (H)  11.6 - 14.5 seconds Final   • INR 04/23/2024 1.30 (H)  0.84 - 1.19 Final   • Magnesium 04/23/2024 1.8 (L)  1.9 - 2.7 mg/dL Final   • Phosphorus 04/23/2024 2.9  2.3 - 4.1 mg/dL Final   • PTT 04/23/2024 54 (H)  23 - 37 seconds Final   • Protime 04/23/2024 15.8 (H)  11.6 - 14.5 seconds Final   • INR 04/23/2024 1.28 (H)  0.84 - 1.19 Final   • hs TnI 2hr 04/23/2024 487 (H)  \"Refer to ACS Flowchart\"- see link ng/L Final    Comment:                                              Initial (time 0) result  If >=50 ng/L, Myocardial injury suggested ;  Type of myocardial injury and treatment strategy  to be determined.  If 5-49 ng/L, a delta result at 2 hours and or 4 hours will be needed to further evaluate.  If <4 ng/L, and chest pain has been >3 hours since onset, patient may qualify for discharge based on the HEART score in the ED.  If <5 ng/L and <3hours since onset of chest pain, a delta result at 2 hours will be needed to further evaluate.    HS Troponin 99th Percentile URL of a Health Population=12 ng/L with a 95% Confidence Interval of 8-18 ng/L.    Second Troponin (time 2 hours)  If calculated delta >= 20 ng/L,  Myocardial injury suggested ; Type of myocardial injury and treatment strategy to be determined.  If 5-49 ng/L and the calculated delta is 5-19 ng/L, consult medical service for evaluation.  Continue evaluation for ischemia on ecg and other possible " "etiology and repeat hs troponin at 4 hours.  If delta                            is <5 ng/L at 2 hours, consider discharge based on risk stratification via the HEART score (if in ED), or TYLER risk score in IP/Observation.    HS Troponin 99th Percentile URL of a Health Population=12 ng/L with a 95% Confidence Interval of 8-18 ng/L.   • Delta 2hr hsTnI 04/23/2024 -243  <20 ng/L Final   • hs TnI 4hr 04/24/2024 505 (H)  \"Refer to ACS Flowchart\"- see link ng/L Final    Comment:                                              Initial (time 0) result  If >=50 ng/L, Myocardial injury suggested ;  Type of myocardial injury and treatment strategy  to be determined.  If 5-49 ng/L, a delta result at 2 hours and or 4 hours will be needed to further evaluate.  If <4 ng/L, and chest pain has been >3 hours since onset, patient may qualify for discharge based on the HEART score in the ED.  If <5 ng/L and <3hours since onset of chest pain, a delta result at 2 hours will be needed to further evaluate.    HS Troponin 99th Percentile URL of a Health Population=12 ng/L with a 95% Confidence Interval of 8-18 ng/L.    Second Troponin (time 2 hours)  If calculated delta >= 20 ng/L,  Myocardial injury suggested ; Type of myocardial injury and treatment strategy to be determined.  If 5-49 ng/L and the calculated delta is 5-19 ng/L, consult medical service for evaluation.  Continue evaluation for ischemia on ecg and other possible etiology and repeat hs troponin at 4 hours.  If delta                            is <5 ng/L at 2 hours, consider discharge based on risk stratification via the HEART score (if in ED), or TYLER risk score in IP/Observation.    HS Troponin 99th Percentile URL of a Health Population=12 ng/L with a 95% Confidence Interval of 8-18 ng/L.   • Delta 4hr hsTnI 04/24/2024 -225  <20 ng/L Final   • PTT 04/24/2024 67 (H)  23 - 37 seconds Final    Therapeutic Heparin Range =  60-90 seconds   • WBC 04/24/2024 10.49 (H)  4.31 - 10.16 " Thousand/uL Final   • RBC 04/24/2024 3.89  3.81 - 5.12 Million/uL Final   • Hemoglobin 04/24/2024 11.7  11.5 - 15.4 g/dL Final   • Hematocrit 04/24/2024 36.8  34.8 - 46.1 % Final   • MCV 04/24/2024 95  82 - 98 fL Final   • MCH 04/24/2024 30.1  26.8 - 34.3 pg Final   • MCHC 04/24/2024 31.8  31.4 - 37.4 g/dL Final   • RDW 04/24/2024 16.6 (H)  11.6 - 15.1 % Final   • MPV 04/24/2024 11.5  8.9 - 12.7 fL Final   • Platelets 04/24/2024 175  149 - 390 Thousands/uL Final   • nRBC 04/24/2024 0  /100 WBCs Final   • Segmented % 04/24/2024 73  43 - 75 % Final   • Immature Grans % 04/24/2024 0  0 - 2 % Final   • Lymphocytes % 04/24/2024 17  14 - 44 % Final   • Monocytes % 04/24/2024 7  4 - 12 % Final   • Eosinophils Relative 04/24/2024 2  0 - 6 % Final   • Basophils Relative 04/24/2024 1  0 - 1 % Final   • Absolute Neutrophils 04/24/2024 7.74 (H)  1.85 - 7.62 Thousands/µL Final   • Absolute Immature Grans 04/24/2024 0.03  0.00 - 0.20 Thousand/uL Final   • Absolute Lymphocytes 04/24/2024 1.73  0.60 - 4.47 Thousands/µL Final   • Absolute Monocytes 04/24/2024 0.69  0.17 - 1.22 Thousand/µL Final   • Eosinophils Absolute 04/24/2024 0.25  0.00 - 0.61 Thousand/µL Final   • Basophils Absolute 04/24/2024 0.05  0.00 - 0.10 Thousands/µL Final   • Sodium 04/24/2024 138  135 - 147 mmol/L Final   • Potassium 04/24/2024 4.2  3.5 - 5.3 mmol/L Final   • Chloride 04/24/2024 103  96 - 108 mmol/L Final   • CO2 04/24/2024 27  21 - 32 mmol/L Final   • ANION GAP 04/24/2024 8  4 - 13 mmol/L Final   • BUN 04/24/2024 18  5 - 25 mg/dL Final   • Creatinine 04/24/2024 0.58 (L)  0.60 - 1.30 mg/dL Final    Standardized to IDMS reference method   • Glucose 04/24/2024 120  65 - 140 mg/dL Final    If the patient is fasting, the ADA then defines impaired fasting glucose as > 100 mg/dL and diabetes as > or equal to 123 mg/dL.   • Calcium 04/24/2024 9.5  8.4 - 10.2 mg/dL Final   • AST 04/24/2024 31  13 - 39 U/L Final   • ALT 04/24/2024 21  7 - 52 U/L Final     Specimen collection should occur prior to Sulfasalazine administration due to the potential for falsely depressed results.    • Alkaline Phosphatase 04/24/2024 88  34 - 104 U/L Final   • Total Protein 04/24/2024 6.8  6.4 - 8.4 g/dL Final   • Albumin 04/24/2024 3.7  3.5 - 5.0 g/dL Final   • Total Bilirubin 04/24/2024 0.62  0.20 - 1.00 mg/dL Final    Use of this assay is not recommended for patients undergoing treatment with eltrombopag due to the potential for falsely elevated results.  N-acetyl-p-benzoquinone imine (metabolite of Acetaminophen) will generate erroneously low results in samples for patients that have taken an overdose of Acetaminophen.   • eGFR 04/24/2024 84  ml/min/1.73sq m Final   • Magnesium 04/24/2024 1.9  1.9 - 2.7 mg/dL Final   • Phosphorus 04/24/2024 3.0  2.3 - 4.1 mg/dL Final   • PTT 04/24/2024 105 (H)  23 - 37 seconds Final    Therapeutic Heparin Range =  60-90 seconds   • LACTIC ACID 04/24/2024 0.8  0.5 - 2.0 mmol/L Final   • POC Glucose 04/24/2024 138  65 - 140 mg/dl Final   • Hemoglobin 04/24/2024 10.8 (L)  11.5 - 15.4 g/dL Final   • Triscuspid Valve Regurgitation Pea* 04/26/2024 22.0  mmHg Final   • RAA A4C 04/26/2024 17.7  cm2 Final   • LA Volume Index (BP) 04/26/2024 36.5  mL/m2 Final   • MV Peak A Urban 04/26/2024 1.22  m/s Final   • MV stenosis pressure 1/2 time 04/26/2024 77  ms Final   • MV Peak E Urban 04/26/2024 79  cm/s Final   • E wave deceleration time 04/26/2024 266  ms Final   • E/A ratio 04/26/2024 0.65   Final   • MV valve area p 1/2 method 04/26/2024 2.90   Final   • RA 2D Volume 04/26/2024 48.0  mL Final   • TR Peak Urban 04/26/2024 2.4  m/s Final   • RVID d 04/26/2024 4.2  cm Final   • A4C EF 04/26/2024 56  % Final   • Tricuspid valve peak regurgitation* 04/26/2024 2.36  m/s Final   • Left ventricular stroke volume (2D) 04/26/2024 33.00  mL Final   • IVSd 04/26/2024 1.40  cm Final   • Tricuspid annular plane systolic e* 04/26/2024 1.80  cm Final   • Ao root 04/26/2024 3.80  cm  Final   • LVPWd 04/26/2024 1.40  cm Final   • LA size 04/26/2024 3.7  cm Final   • Asc Ao 04/26/2024 3.5  cm Final   • LA volume (BP) 04/26/2024 66  mL Final   • FS 04/26/2024 31  28 - 44 Final   • LVIDS 04/26/2024 2.50  cm Final   • IVS 04/26/2024 1.4  cm Final   • LVIDd 04/26/2024 3.60  cm Final   • LA length (A2C) 04/26/2024 5.60  cm Final   • LEFT VENTRICLE SYSTOLIC VOLUME (MO* 04/26/2024 23  mL Final   • LV DIASTOLIC VOLUME (MOD BIPLANE) * 04/26/2024 56  mL Final   • Left Atrium Area-systolic Four Nini* 04/26/2024 21  cm2 Final   • Left Atrium Area-systolic Apical T* 04/26/2024 22.8  cm2 Final   • MV E' Tissue Velocity Septal 04/26/2024 8  cm/s Final   • LVSV, 2D 04/26/2024 33  mL Final   • BSA 04/26/2024 1.81  m2 Final   • LV EF 04/26/2024 56   Final   • Est. RA pres 04/26/2024 3.0  mmHg Final   • Right Ventricular Peak Systolic Pr* 04/26/2024 25.00  mmHg Final   • PTT 04/24/2024 90 (H)  23 - 37 seconds Final    Therapeutic Heparin Range =  60-90 seconds   • Activated Clotting Time, i-STAT 04/24/2024 171 (H)  89 - 137 sec Final   • Specimen Type 04/24/2024 VENOUS   Final   • Ventricular Rate 04/24/2024 87  BPM Final   • Atrial Rate 04/24/2024 87  BPM Final   • IL Interval 04/24/2024 166  ms Final   • QRSD Interval 04/24/2024 94  ms Final   • QT Interval 04/24/2024 412  ms Final   • QTC Interval 04/24/2024 495  ms Final   • P Axis 04/24/2024 49  degrees Final   • QRS Axis 04/24/2024 73  degrees Final   • T Wave Axis 04/24/2024 53  degrees Final   • POC Glucose 04/24/2024 120  65 - 140 mg/dl Final   • PTT 04/25/2024 71 (H)  23 - 37 seconds Final    Therapeutic Heparin Range =  60-90 seconds   • POC Glucose 04/25/2024 97  65 - 140 mg/dl Final   • WBC 04/25/2024 9.88  4.31 - 10.16 Thousand/uL Final   • RBC 04/25/2024 3.52 (L)  3.81 - 5.12 Million/uL Final   • Hemoglobin 04/25/2024 10.7 (L)  11.5 - 15.4 g/dL Final   • Hematocrit 04/25/2024 34.5 (L)  34.8 - 46.1 % Final   • MCV 04/25/2024 98  82 - 98 fL Final   • MCH  04/25/2024 30.4  26.8 - 34.3 pg Final   • MCHC 04/25/2024 31.0 (L)  31.4 - 37.4 g/dL Final   • RDW 04/25/2024 16.7 (H)  11.6 - 15.1 % Final   • MPV 04/25/2024 11.8  8.9 - 12.7 fL Final   • Platelets 04/25/2024 163  149 - 390 Thousands/uL Final   • nRBC 04/25/2024 0  /100 WBCs Final   • Segmented % 04/25/2024 78 (H)  43 - 75 % Final   • Immature Grans % 04/25/2024 1  0 - 2 % Final   • Lymphocytes % 04/25/2024 12 (L)  14 - 44 % Final   • Monocytes % 04/25/2024 7  4 - 12 % Final   • Eosinophils Relative 04/25/2024 2  0 - 6 % Final   • Basophils Relative 04/25/2024 0  0 - 1 % Final   • Absolute Neutrophils 04/25/2024 7.76 (H)  1.85 - 7.62 Thousands/µL Final   • Absolute Immature Grans 04/25/2024 0.06  0.00 - 0.20 Thousand/uL Final   • Absolute Lymphocytes 04/25/2024 1.15  0.60 - 4.47 Thousands/µL Final   • Absolute Monocytes 04/25/2024 0.66  0.17 - 1.22 Thousand/µL Final   • Eosinophils Absolute 04/25/2024 0.22  0.00 - 0.61 Thousand/µL Final   • Basophils Absolute 04/25/2024 0.03  0.00 - 0.10 Thousands/µL Final   • Sodium 04/25/2024 138  135 - 147 mmol/L Final   • Potassium 04/25/2024 3.9  3.5 - 5.3 mmol/L Final   • Chloride 04/25/2024 103  96 - 108 mmol/L Final   • CO2 04/25/2024 27  21 - 32 mmol/L Final   • ANION GAP 04/25/2024 8  4 - 13 mmol/L Final   • BUN 04/25/2024 18  5 - 25 mg/dL Final   • Creatinine 04/25/2024 0.62  0.60 - 1.30 mg/dL Final    Standardized to IDMS reference method   • Glucose 04/25/2024 115  65 - 140 mg/dL Final    If the patient is fasting, the ADA then defines impaired fasting glucose as > 100 mg/dL and diabetes as > or equal to 123 mg/dL.   • Calcium 04/25/2024 9.4  8.4 - 10.2 mg/dL Final   • eGFR 04/25/2024 82  ml/min/1.73sq m Final   • PTT 04/25/2024 67 (H)  23 - 37 seconds Final    Therapeutic Heparin Range =  60-90 seconds   • POC Glucose 04/25/2024 108  65 - 140 mg/dl Final   • POC Glucose 04/25/2024 106  65 - 140 mg/dl Final   • Supplier Name 04/25/2024 [employer]   Final   • Supplier Phone Number 04/25/2024 [phone]   Final   • Order Status 04/25/2024 Completed   Final   • Delivery Note 04/25/2024 Alternate     Vivi Gonzales (Daughter)[phone] (Home Phone)   Final   • Delivery Request Date 04/25/2024 04/25/2024   Final   • Item Description 04/25/2024 E Tank Cylinder   Final    Qty: 1   • Item Description 04/25/2024 O2 Flow Regulator, Standard   Final    Qty: 1   • POC Glucose 04/25/2024 110  65 - 140 mg/dl Final   • PTT 04/26/2024 52 (H)  23 - 37 seconds Final    Therapeutic Heparin Range =  60-90 seconds   • POC Glucose 04/26/2024 102  65 - 140 mg/dl Final   • POC Glucose 04/26/2024 103  65 - 140 mg/dl Final   • POC Glucose 04/26/2024 111  65 - 140 mg/dl Final   • POC Glucose 04/27/2024 95  65 - 140 mg/dl Final   Admission on 04/23/2024, Discharged on 04/23/2024   Component Date Value Ref Range Status   • WBC 04/23/2024 10.47 (H)  4.31 - 10.16 Thousand/uL Final   • RBC 04/23/2024 4.19  3.81 - 5.12 Million/uL Final   • Hemoglobin 04/23/2024 12.4  11.5 - 15.4 g/dL Final   • Hematocrit 04/23/2024 40.1  34.8 - 46.1 % Final   • MCV 04/23/2024 96  82 - 98 fL Final   • MCH 04/23/2024 29.6  26.8 - 34.3 pg Final   • MCHC 04/23/2024 30.9 (L)  31.4 - 37.4 g/dL Final   • RDW 04/23/2024 16.3 (H)  11.6 - 15.1 % Final   • MPV 04/23/2024 11.2  8.9 - 12.7 fL Final   • Platelets 04/23/2024 194  149 - 390 Thousands/uL Final   • nRBC 04/23/2024 0  /100 WBCs Final   • Segmented % 04/23/2024 73  43 - 75 % Final   • Immature Grans % 04/23/2024 1  0 - 2 % Final   • Lymphocytes % 04/23/2024 15  14 - 44 % Final   • Monocytes % 04/23/2024 7  4 - 12 % Final   • Eosinophils Relative 04/23/2024 3  0 - 6 % Final   • Basophils Relative 04/23/2024 1  0 - 1 % Final   • Absolute Neutrophils 04/23/2024 7.67 (H)  1.85 - 7.62 Thousands/µL Final   • Absolute Immature Grans 04/23/2024 0.06  0.00 - 0.20 Thousand/uL Final   • Absolute Lymphocytes 04/23/2024 1.61  0.60 - 4.47  "Thousands/µL Final   • Absolute Monocytes 04/23/2024 0.76  0.17 - 1.22 Thousand/µL Final   • Eosinophils Absolute 04/23/2024 0.31  0.00 - 0.61 Thousand/µL Final   • Basophils Absolute 04/23/2024 0.06  0.00 - 0.10 Thousands/µL Final   • Sodium 04/23/2024 137  135 - 147 mmol/L Final   • Potassium 04/23/2024 4.4  3.5 - 5.3 mmol/L Final   • Chloride 04/23/2024 102  96 - 108 mmol/L Final   • CO2 04/23/2024 26  21 - 32 mmol/L Final   • ANION GAP 04/23/2024 9  4 - 13 mmol/L Final   • BUN 04/23/2024 19  5 - 25 mg/dL Final   • Creatinine 04/23/2024 0.59 (L)  0.60 - 1.30 mg/dL Final    Standardized to IDMS reference method   • Glucose 04/23/2024 106  65 - 140 mg/dL Final    If the patient is fasting, the ADA then defines impaired fasting glucose as > 100 mg/dL and diabetes as > or equal to 123 mg/dL.   • Calcium 04/23/2024 9.8  8.4 - 10.2 mg/dL Final   • AST 04/23/2024 40 (H)  13 - 39 U/L Final   • ALT 04/23/2024 24  7 - 52 U/L Final    Specimen collection should occur prior to Sulfasalazine administration due to the potential for falsely depressed results.    • Alkaline Phosphatase 04/23/2024 82  34 - 104 U/L Final   • Total Protein 04/23/2024 7.3  6.4 - 8.4 g/dL Final   • Albumin 04/23/2024 4.0  3.5 - 5.0 g/dL Final   • Total Bilirubin 04/23/2024 0.45  0.20 - 1.00 mg/dL Final    Use of this assay is not recommended for patients undergoing treatment with eltrombopag due to the potential for falsely elevated results.  N-acetyl-p-benzoquinone imine (metabolite of Acetaminophen) will generate erroneously low results in samples for patients that have taken an overdose of Acetaminophen.   • eGFR 04/23/2024 83  ml/min/1.73sq m Final   • Protime 04/23/2024 14.0  11.6 - 14.5 seconds Final   • INR 04/23/2024 1.06  0.84 - 1.19 Final   • PTT 04/23/2024 26  23 - 37 seconds Final    Therapeutic Heparin Range =  60-90 seconds   • hs TnI 0hr 04/23/2024 768 (H)  \"Refer to ACS Flowchart\"- see link ng/L Final    Comment:                       "                        Initial (time 0) result  If >=50 ng/L, Myocardial injury suggested ;  Type of myocardial injury and treatment strategy  to be determined.  If 5-49 ng/L, a delta result at 2 hours and or 4 hours will be needed to further evaluate.  If <4 ng/L, and chest pain has been >3 hours since onset, patient may qualify for discharge based on the HEART score in the ED.  If <5 ng/L and <3hours since onset of chest pain, a delta result at 2 hours will be needed to further evaluate.    HS Troponin 99th Percentile URL of a Health Population=12 ng/L with a 95% Confidence Interval of 8-18 ng/L.    Second Troponin (time 2 hours)  If calculated delta >= 20 ng/L,  Myocardial injury suggested ; Type of myocardial injury and treatment strategy to be determined.  If 5-49 ng/L and the calculated delta is 5-19 ng/L, consult medical service for evaluation.  Continue evaluation for ischemia on ecg and other possible etiology and repeat hs troponin at 4 hours.  If delta                            is <5 ng/L at 2 hours, consider discharge based on risk stratification via the HEART score (if in ED), or TYLER risk score in IP/Observation.    HS Troponin 99th Percentile URL of a Health Population=12 ng/L with a 95% Confidence Interval of 8-18 ng/L.   • BNP 04/23/2024 118 (H)  0 - 100 pg/mL Final   • D-Dimer, Quant 04/23/2024 7.82 (H)  <0.50 ug/ml FEU Final    Reference and upper limits to exclude DVT and PE are the same.  Do not use to exclude if clinical symptoms are present.  Pregnant women:  1st trimester:  <0.22 - 1.06 ug/ml FEU  2nd trimester:  <0.22 - 1.88 ug/ml FEU  3rd trimester:   0.24 - 3.28 ug/ml FEU    Note: Normal ranges may not apply to patients who are transgender, non-binary, or whose legal sex, sex at birth, and gender identity differ.     • SARS-CoV-2 04/23/2024 Negative  Negative Final   • INFLUENZA A PCR 04/23/2024 Negative  Negative Final   • INFLUENZA B PCR 04/23/2024 Negative  Negative Final   • RSV PCR  "04/23/2024 Negative  Negative Final   • Color, UA 04/23/2024 Yellow   Final   • Clarity, UA 04/23/2024 Slightly Cloudy   Final   • Specific Gravity, UA 04/23/2024 1.025  1.005 - 1.030 Final   • pH, UA 04/23/2024 6.5  4.5, 5.0, 5.5, 6.0, 6.5, 7.0, 7.5, 8.0 Final   • Leukocytes, UA 04/23/2024 Moderate (A)  Negative Final   • Nitrite, UA 04/23/2024 Negative  Negative Final   • Protein, UA 04/23/2024 Trace (A)  Negative mg/dl Final   • Glucose, UA 04/23/2024 Negative  Negative mg/dl Final   • Ketones, UA 04/23/2024 Negative  Negative mg/dl Final   • Urobilinogen, UA 04/23/2024 <2.0  <2.0 mg/dl mg/dl Final   • Bilirubin, UA 04/23/2024 Negative  Negative Final   • Occult Blood, UA 04/23/2024 Trace (A)  Negative Final   • hs TnI 2hr 04/23/2024 808 (H)  \"Refer to ACS Flowchart\"- see link ng/L Final    Comment:                                              Initial (time 0) result  If >=50 ng/L, Myocardial injury suggested ;  Type of myocardial injury and treatment strategy  to be determined.  If 5-49 ng/L, a delta result at 2 hours and or 4 hours will be needed to further evaluate.  If <4 ng/L, and chest pain has been >3 hours since onset, patient may qualify for discharge based on the HEART score in the ED.  If <5 ng/L and <3hours since onset of chest pain, a delta result at 2 hours will be needed to further evaluate.    HS Troponin 99th Percentile URL of a Health Population=12 ng/L with a 95% Confidence Interval of 8-18 ng/L.    Second Troponin (time 2 hours)  If calculated delta >= 20 ng/L,  Myocardial injury suggested ; Type of myocardial injury and treatment strategy to be determined.  If 5-49 ng/L and the calculated delta is 5-19 ng/L, consult medical service for evaluation.  Continue evaluation for ischemia on ecg and other possible etiology and repeat hs troponin at 4 hours.  If delta                            is <5 ng/L at 2 hours, consider discharge based on risk stratification via the HEART score (if in ED), or " TYLER risk score in IP/Observation.    HS Troponin 99th Percentile URL of a Health Population=12 ng/L with a 95% Confidence Interval of 8-18 ng/L.   • Delta 2hr hsTnI 04/23/2024 40 (H)  <20 ng/L Final   • Triscuspid Valve Regurgitation Pea* 04/23/2024 37.0  mmHg Final   • RAA A4C 04/23/2024 15.2  cm2 Final   • MARCELL A4C 04/23/2024 14.1  cm2 Final   • LA Volume Index (BP) 04/23/2024 21.2  mL/m2 Final   • MV Peak A Urban 04/23/2024 1.34  m/s Final   • MV stenosis pressure 1/2 time 04/23/2024 35  ms Final   • MV Peak E Urban 04/23/2024 83  cm/s Final   • AV peak gradient 04/23/2024 12  mmHg Final   • LVOT diameter 04/23/2024 2.0  cm Final   • E wave deceleration time 04/23/2024 119  ms Final   • E/A ratio 04/23/2024 0.62   Final   • PV peak gradient antegrade 04/23/2024 2  mmHg Final   • MV valve area p 1/2 method 04/23/2024 6.29   Final   • AV LVOT peak gradient 04/23/2024 4  mmHg Final   • TR Peak Urban 04/23/2024 3.0  m/s Final   • RVID d 04/23/2024 3.6  cm Final   • Tricuspid valve peak regurgitation* 04/23/2024 3.04  m/s Final   • Left ventricular stroke volume (2D) 04/23/2024 28.00  mL Final   • IVSd 04/23/2024 1.20  cm Final   • Tricuspid annular plane systolic e* 04/23/2024 1.30  cm Final   • Ao root 04/23/2024 3.90  cm Final   • LVPWd 04/23/2024 1.10  cm Final   • LA size 04/23/2024 3.8  cm Final   • LA volume (BP) 04/23/2024 39  mL Final   • FS 04/23/2024 26  28 - 44 Final   • LVIDS 04/23/2024 2.60  cm Final   • IVS 04/23/2024 1.2  cm Final   • LVIDd 04/23/2024 3.50  cm Final   • LA length (A2C) 04/23/2024 5.50  cm Final   • LEFT VENTRICLE SYSTOLIC VOLUME (MO* 04/23/2024 24  mL Final   • LV DIASTOLIC VOLUME (MOD BIPLANE) * 04/23/2024 51  mL Final   • Left Atrium Area-systolic Four Nini* 04/23/2024 16.2  cm2 Final   • Left Atrium Area-systolic Apical T* 04/23/2024 16.5  cm2 Final   • MV E' Tissue Velocity Lateral 04/23/2024 7  cm/s Final   • MV E' Tissue Velocity Septal 04/23/2024 7  cm/s Final   • LVSV, 2D 04/23/2024  "28  mL Final   • LVOT area 04/23/2024 3.14  cm2 Final   • BSA 04/23/2024 1.84  m2 Final   • LV EF 04/23/2024 65   Final   • hs TnI 4hr 04/23/2024 809 (H)  \"Refer to ACS Flowchart\"- see link ng/L Final    Comment:                                              Initial (time 0) result  If >=50 ng/L, Myocardial injury suggested ;  Type of myocardial injury and treatment strategy  to be determined.  If 5-49 ng/L, a delta result at 2 hours and or 4 hours will be needed to further evaluate.  If <4 ng/L, and chest pain has been >3 hours since onset, patient may qualify for discharge based on the HEART score in the ED.  If <5 ng/L and <3hours since onset of chest pain, a delta result at 2 hours will be needed to further evaluate.    HS Troponin 99th Percentile URL of a Health Population=12 ng/L with a 95% Confidence Interval of 8-18 ng/L.    Second Troponin (time 2 hours)  If calculated delta >= 20 ng/L,  Myocardial injury suggested ; Type of myocardial injury and treatment strategy to be determined.  If 5-49 ng/L and the calculated delta is 5-19 ng/L, consult medical service for evaluation.  Continue evaluation for ischemia on ecg and other possible etiology and repeat hs troponin at 4 hours.  If delta                            is <5 ng/L at 2 hours, consider discharge based on risk stratification via the HEART score (if in ED), or TYLER risk score in IP/Observation.    HS Troponin 99th Percentile URL of a Health Population=12 ng/L with a 95% Confidence Interval of 8-18 ng/L.   • Delta 4hr hsTnI 04/23/2024 41 (H)  <20 ng/L Final   • RBC, UA 04/23/2024 1-2  None Seen, 0-1, 1-2, 2-4, 0-5 /hpf Final   • WBC, UA 04/23/2024 4-10 (A)  None Seen, 0-1, 1-2, 0-5, 2-4 /hpf Final   • Epithelial Cells 04/23/2024 Occasional  None Seen, Occasional /hpf Final   • Bacteria, UA 04/23/2024 Moderate (A)  None Seen, Occasional /hpf Final   • Hyaline Casts, UA 04/23/2024 0-1 (A)  (none) /lpf Final   • Amorphous Crystals,  04/23/2024 Occasional " "  Final   • Ventricular Rate 04/23/2024 103  BPM Final   • Atrial Rate 04/23/2024 103  BPM Final   • ID Interval 04/23/2024 186  ms Final   • QRSD Interval 04/23/2024 88  ms Final   • QT Interval 04/23/2024 352  ms Final   • QTC Interval 04/23/2024 461  ms Final   • P Axis 04/23/2024 21  degrees Final   • QRS Axis 04/23/2024 70  degrees Final   • T Wave Axis 04/23/2024 36  degrees Final         Bladimir Caraballo MD        \"This note has been constructed using a voice recognition system.Therefore there may be syntax, spelling, and/or grammatical errors. Please call if you have any questions. \"  "

## 2024-06-12 RX ORDER — AMLODIPINE BESYLATE 2.5 MG/1
TABLET ORAL
Qty: 90 TABLET | Refills: 1 | Status: SHIPPED | OUTPATIENT
Start: 2024-06-12

## 2024-06-15 NOTE — ASSESSMENT & PLAN NOTE
Patient is currently taking levothyroxine 88 mcg daily we will continue with the same repeat TSH level has been ordered.

## 2024-06-15 NOTE — ASSESSMENT & PLAN NOTE
Continue with pantoprazole as needed discussed with patient and the daughter with the long-term effects of the medication will monitor

## 2024-06-15 NOTE — ASSESSMENT & PLAN NOTE
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  I discussed with the patient and the daughter to be careful with ambulation with a history of frequent falls.  Patient was also seen in the emergency room because of the fall and a CT scan of the head was negative.

## 2024-06-15 NOTE — ASSESSMENT & PLAN NOTE
Patient has been not taking Myrbetriq in the recent past and according to her she has a good control on the urine no increased frequency or urgency will continue to hold the Myrbetriq for now

## 2024-06-15 NOTE — ASSESSMENT & PLAN NOTE
Patient is taking clonazepam at bedtime as needed she is also on Cymbalta 60 mg daily will continue the same

## 2024-06-15 NOTE — ASSESSMENT & PLAN NOTE
Lab Results   Component Value Date    HGBA1C 6.2 (H) 03/15/2024   Diet-controlled diabetes last A1c 6.2 recommend to watch the carbohydrate intake cutting back on sweets.

## 2024-06-15 NOTE — ASSESSMENT & PLAN NOTE
Patient with DVT and PE status post thrombectomy feeling much better on Eliquis and oxygen denies any symptoms of shortness of breath at rest has mild shortness of breath with exertion

## 2024-06-15 NOTE — ASSESSMENT & PLAN NOTE
Patient with history of frequent falls no recent falls have been reported stable she uses walker all the time at home history of orthostatic blood pressure changes

## 2024-06-15 NOTE — ASSESSMENT & PLAN NOTE
Blood pressure today is 120/65 on nebivolol and amlodipine however she has also has history of orthostatic hypotension recommend patient not to get up quickly and walk and to be careful whenever there is a change in position and  getting out of the bed

## 2024-06-15 NOTE — ASSESSMENT & PLAN NOTE
Wt Readings from Last 3 Encounters:   06/11/24 73 kg (161 lb)   05/28/24 73.9 kg (163 lb)   05/28/24 74.3 kg (163 lb 12.8 oz)     Patient weight remains same around 161 pounds denies any symptoms or shortness of breath does have mild swelling in the lower extremities examination when she is euvolemic

## 2024-06-21 DIAGNOSIS — K21.9 GASTROESOPHAGEAL REFLUX DISEASE WITHOUT ESOPHAGITIS: ICD-10-CM

## 2024-06-21 DIAGNOSIS — R25.1 TREMORS OF NERVOUS SYSTEM: ICD-10-CM

## 2024-06-21 RX ORDER — PANTOPRAZOLE SODIUM 40 MG/1
40 TABLET, DELAYED RELEASE ORAL DAILY
Qty: 90 TABLET | Refills: 1 | Status: SHIPPED | OUTPATIENT
Start: 2024-06-21

## 2024-06-21 RX ORDER — PRIMIDONE 50 MG/1
50 TABLET ORAL DAILY
Qty: 90 TABLET | Refills: 1 | Status: SHIPPED | OUTPATIENT
Start: 2024-06-21

## 2024-06-25 NOTE — PROGRESS NOTES
[place] II 48 Hour Follow-Up    Patient participated in [place] II clinical trial and was randomly assigned to the FlowTriever intervention arm. She was evaluated at bedside by Dr. Huff.    -Date of Visit: 04/26/2024  -Time of Visit: 1234  -Dyspnea/mMRC Score: 0 -- Breathless with strenuous exercise only  -Dyspnea (Modified Pee Scale) at rest: 0: No breathlessness at all  -Fatigue (Pee CR10 Scale): 0: No breathlessness at all  -Supplemental Oxygen: Nasal Cannula: 2 lpm  -NYHA/WHO Classification: I  -Echo or CTPA Image collection at this visit: Echo   *Reminder: must match baseline modality  -Were there any changes in the anticoagulant medications: Yes    -If yes, specify:  Eliquis  -Was there an adverse event since initial visit: No   -If yes, specify: N/A    Considering difficulty breathing, tiredness, ease of movement, and normal activity, the patient reports feeling a lot better compared to how she felt before her PE treatment started.    Vitals:  SpO2: 93% on 2 lpm  RR: 25 breaths per min  HR: 96 bpm  BP: 140/68

## 2024-06-25 NOTE — PROGRESS NOTES
[place] II 1 Month Follow Up    Patient Rene participated in [place] II clinical trial and was randomly assigned to the FlowTriever intervention arm.    -Date of Visit: 05/28/24  -Dyspnea/mMRC Score: 0 -- Breathless with strenuous exercise only  -Dyspnea (Modified Pee Scale) at rest: 0: No breathlessness at all   *Note: This should be taken pre 6-minute walk test  -Fatigue (Epe CR10 Scale): 0: No breathlessness at all  -Supplemental Oxygen: Nasal Cannula: 3 lpm  -NYHA/WHO Classification: I  -New appearance of symptoms or progression of existing symptoms: No  -New or worsening clinical evidence of RV failure: No  -Syncope observed or reported since last visit: No  -Were there any changes in the anticoagulant medications: No   -If yes, specify: N/A  -New adverse events not previously recorded: No  Cardiopulmonary Exercise Testing (Optional)  -Cardiopulmonary exercise testing conducted: No   -If yes, specify: N/A    Return to work  Current working status: Not working prior to PE    Global Impression: 5    Overall satisfaction with PE treatment: Completely satisfied    Echocardiogram ordered and being completed after office visit on 5/28/24      6 minute Walk test results  Patient normally on 3 lpm at baseline. Patient assessed on RA at beginning of test.     Start eval:  -Start time: 11:00  -HR: 69  -BP: 124/62  -Dyspnea: 0  -Fatigue: 0  -SpO2: 90%    End eval:  -End time: 11:07--Patient required to be placed back on 3 lpm NC r/t SOB and dip in oxygen reading  -HR: 64  -SBP: 128/74  Dyspnea: 3  -Fatigue: 2  -SpO2: 90% on 3 lpm  -No other symptoms present at end of exercise  -Highest HR during test: 72  -Total Distance Walked: 135 meters    Patient verbalized to call coordinator should any symptoms or events occur prior to next office visit, to be completed in 2 months.

## 2024-06-25 NOTE — PROGRESS NOTES
[place] II Discharge Visit    Patient participated in [place] II clinical trial and was randomly assigned to the FlowTriever intervention arm.    -Date of Discharge: 04/27/2024  -Time of Discharge: 18:08  -Dyspnea/mMRC Score: 0 -- Breathless with strenuous exercise only  -Dyspnea (Modified Pee Scale) at rest: 0: No breathlessness at all  -NYHA/WHO Classification: I  -Supplemental Oxygen: Nasal Cannula: 3 lpm  -Was the patient discharged with a prescription for supplemental oxygen: No  -Location subject is being discharged to: Home/self-care  -Is the subject being discharged to the same location as they were prior to this PE care episode: Yes    -If no, location of subject prior: N/A    Ancillary Therapies  -Were there any ancillary therapies for VTE completed: Yes    -If yes, specify: DVT--Anticoagulation  -Were there any changes in the anticoagulant medications: No   -If yes, specify: N/A  -Was there an adverse event since initial visit: No   -If yes, specify: N/A  -Was there a bailout not previously recorded: No   -If yes, specify: N/A  -Were there any ICU admissions not previously recorded: No   -If yes, specify: N/A

## 2024-07-01 DIAGNOSIS — E03.9 ACQUIRED HYPOTHYROIDISM: ICD-10-CM

## 2024-07-01 DIAGNOSIS — E04.1 THYROID NODULE: ICD-10-CM

## 2024-07-02 ENCOUNTER — TELEPHONE (OUTPATIENT)
Dept: OTHER | Facility: HOSPITAL | Age: 84
End: 2024-07-02

## 2024-07-02 RX ORDER — LEVOTHYROXINE SODIUM 88 UG/1
TABLET ORAL
Qty: 90 TABLET | Refills: 1 | Status: SHIPPED | OUTPATIENT
Start: 2024-07-02

## 2024-07-05 ENCOUNTER — TELEPHONE (OUTPATIENT)
Age: 84
End: 2024-07-05

## 2024-07-05 NOTE — TELEPHONE ENCOUNTER
midodrine (PROAMATINE) 5 mg tablet     Patient was prescribed this in the hospital and wants to know if Dr. Caraballo can continue refilling for her. She has 2 days left. Send to [facility].

## 2024-07-10 ENCOUNTER — TELEPHONE (OUTPATIENT)
Dept: NEUROLOGY | Facility: CLINIC | Age: 84
End: 2024-07-10

## 2024-07-10 NOTE — TELEPHONE ENCOUNTER
Left voicemail for patient to call back and confirm appointment with Dr. Terry 7/16 at [facility].

## 2024-07-11 ENCOUNTER — TELEPHONE (OUTPATIENT)
Age: 84
End: 2024-07-11

## 2024-07-11 DIAGNOSIS — I26.99 PULMONARY EMBOLI (HCC): ICD-10-CM

## 2024-07-11 DIAGNOSIS — F41.9 ANXIETY: ICD-10-CM

## 2024-07-11 DIAGNOSIS — K11.20 PAROTITIS: ICD-10-CM

## 2024-07-11 RX ORDER — MIDODRINE HYDROCHLORIDE 5 MG/1
5 TABLET ORAL
Qty: 60 TABLET | Refills: 6 | Status: SHIPPED | OUTPATIENT
Start: 2024-07-11 | End: 2025-02-06

## 2024-07-11 RX ORDER — CLONAZEPAM 0.5 MG/1
0.5 TABLET ORAL
Qty: 30 TABLET | Refills: 1 | Status: SHIPPED | OUTPATIENT
Start: 2024-07-11

## 2024-07-11 NOTE — TELEPHONE ENCOUNTER
Pt called asking to speak directly with Dr. Caraballo. She said she has been having issues with her blood pressure dropping. She said Dr. Caraballo is aware of this issue.  Please ask provider to contact pt directly if possible

## 2024-07-12 NOTE — TELEPHONE ENCOUNTER
Left voicemail for patient to call back and confirm appointment with Dr. Terry 7/16 at [facility].

## 2024-07-14 NOTE — PROGRESS NOTES
Patient ID: Danyelle Martinez is a 84 y.o. female.    Assessment:    Diagnoses and all orders for this visit:    Tremor, Parkinsonian features    Patient is a 84 year old female who presents to the office in regards to her tremors. Patient has a PMH of HTN, SVT, CHF, PE, seasonal allergies, falls, TIA, tremor, dyslipidemia, anxiety, syncope, dizziness, and DM2.    Patient's neurological examination does have some parkinsonian features such as bradykinesia, resting tremor, mild cogwheel rigidity with activation, and reduced gait.  Given patient's HPI consisting of orthostasis, increased appetite, sleep disturbances, drooling, and constipation along with the physical examination, there is a concern that patient may have PD versus MSA with the longstanding history of essential tremor.  All this was discussed with the patient and a Sinemet trial was offered, but patient stated that she would like to wait until getting a DaTscan done to determine whether she needs medications or not.  Along with that, patient was offered physical therapy but she stated that she would like to defer that at this time as well.    Patient advised to give us a call if there is any questions or concerns.      Plan:  DaTscan ordered  Sinemet trial deferred at this time as per patient's preference to get imaging done prior to initiating any medications  PT referral not placed as per patient  Monitor for worsening symptoms  Call for any questions or concerns    The patient indicates understanding of these issues and agrees with the plan.    Return in about 3 months (around 10/16/2024).    This patient case was discussed with Dr. Ruddy Meyer.      Subjective:    HPI    Patient is a 84 year old female who presents to the office in regards to her tremors. Patient has a PMH of HTN, SVT, CHF, PE, seasonal allergies, falls, TIA, tremor, dyslipidemia, anxiety, syncope, dizziness, and DM2. She is accompanied by her daughter today.    She complains of  tremor. 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 Her tremor primarily involves the  R worse than L  hands and occurs at rest.  Tremor is currently of mild severity, exacerbated by stress and fatigue. This tremor affects her handwriting. Tremor is alleviated by nothing. Symptoms occur intermittently and last minutes. She also describes symptoms of voice change, sleep disturbance, drooling during sleep (wet pillows), difficulty with swallowing, and balance problems. Patient states that she take a Tylenol to relax and it helps her tremor as well. She did notice that when she was working and had not had her breakfast, she would notice it more. As soon as she would eat, she would feel the tremors would go away. She did have some balance problems when there were problems with her BP. She is currently on Midodrine. She endorses great appetite and constipation.     In regards to her falls, she has had multiple falls with the last one being in May. There have been several falls where she falls with retropulsion without a warning. She does endorse that her gait has changed.    Patient has been evaluated for PD twice in her life with the last evaluation approximately 20 years. They said it was benign essential tremor and she has been on Primidone since then.    There are no other questions or concerns.    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), Blind left eye, CHF (congestive heart failure) ([facility]), Closed wedge compression fracture of T1 vertebra with routine healing (12/18/2023), Deaf, left, Depression, Diabetes mellitus (HCC), Disease of thyroid gland, DVT complicating pregnancy, unspecified trimester ([facility]) (postpartum), Fall (11/15/2023), Hearing loss, History of shingles (2007), Hyperlipidemia, Hypertension, Liver disease, Lyme disease, Meniere's disease,  Obesity, Orthostatic hypotension, Psychiatric problem, Sinus congestion, Sleep apnea, and Stroke ([facility]).     Patient Active Problem List    Diagnosis Date Noted    Hypertensive heart disease with congestive heart failure ([facility]) 2024    Pulmonary emboli (HCC) 2024    Acute deep vein thrombosis (DVT) of both lower extremities (HCC) 2024    Parotitis, acute 2024    Facial cellulitis 2024    Change in mental status 2024    Cigarette nicotine dependence in remission 2024    Chronic diastolic CHF (congestive heart failure) ([facility]) 2024    Enlarged thyroid 2023    Overflow incontinence of urine 2023    Syncope, cardiogenic 11/15/2023    Recurrent major depressive disorder, in remission ([facility]) 2023    SVT (supraventricular tachycardia) 2023    Osteoarthritis of right knee 2023    Elevated liver enzymes 2023    Type 2 diabetes mellitus, without long-term current use of insulin ([facility]) 2023    Dyspepsia 2022    Frequent falls 2022    Localized swelling of right lower leg 2022    Anxiety 2022    Gastroesophageal reflux disease without esophagitis 10/12/2022    Nocturnal hypoxemia 2021    Acquired hypothyroidism 2021    Thyroid nodule 2021    Palpitations 2021    Tremor 2021    History of transient ischemic attack (TIA) 2021    Essential hypertension 2021    Dyslipidemia 2021    Seasonal allergic rhinitis due to pollen 2021    Chronic hypoxemic respiratory failure (HCC) 2020    Dizziness 2020    Shortness of breath on exertion      She  has a past surgical history that includes Breast biopsy (Left, ); Hysterectomy; Tonsillectomy;  section; Hernia repair; Appendectomy; Rotator cuff repair (Left); Hand surgery (Left); Tympanostomy tube placement; Eye surgery; DXA procedure(historical) (10/28/2020); and IR PE endovascular therapy (2024).  Her  family history includes Alcohol abuse in her father; BRCA1 Negative in her daughter; BRCA2 Negative in her daughter; Breast cancer (age of onset: 68) in her sister; Depression in her father and mother; Hypertension in her mother; Obesity in her mother; Ovarian cancer (age of onset: 53) in her daughter; Stroke in her father.  She  reports that she quit smoking about 34 years ago. Her smoking use included cigarettes. She started smoking about 78 years ago. She has a 33 pack-year smoking history. She has been exposed to tobacco smoke. She has never used smokeless tobacco. She reports that she does not currently use alcohol. She reports that she does not use drugs.    Current Outpatient Medications   Medication Sig Dispense Refill    amLODIPine (NORVASC) 2.5 mg tablet TAKE 1 TABLET BY MOUTH EVERY MORNING 90 tablet 1    apixaban (Eliquis) 5 mg Take 1 tablet (5 mg total) by mouth 2 (two) times a day 60 tablet 2    atorvastatin (LIPITOR) 80 mg tablet Taking at morning 90 tablet 0    bisoprolol (ZEBETA) 5 mg tablet Take 0.5 tablets (2.5 mg total) by mouth daily 45 tablet 1    Calcium Carb-Cholecalciferol (Calcium 500 + D) 500-3.125 MG-MCG TABS 2 tabs daily ORAL      cetirizine (ZyrTEC) 10 mg tablet Take 10 mg by mouth daily      clobetasol (TEMOVATE) 0.05 % cream Apply topically 2 (two) times a day 45 g 1    clonazePAM (KlonoPIN) 0.5 mg tablet Take 1 tablet (0.5 mg total) by mouth daily at bedtime 30 tablet 1    clopidogrel (PLAVIX) 75 mg tablet TAKE 1 TABLET(75 MG) BY MOUTH DAILY 30 tablet 5    DULoxetine (CYMBALTA) 60 mg delayed release capsule TAKE 1 CAPSULE(60 MG) BY MOUTH DAILY 90 capsule 1    furosemide (LASIX) 40 mg tablet Take 1 tablet (40 mg total) by mouth 2 (two) times a day 30 tablet 2    levothyroxine 88 mcg tablet TAKE 1 TABLET(88 MCG) BY MOUTH DAILY 90 tablet 1    midodrine (PROAMATINE) 5 mg tablet Take 1 tablet (5 mg total) by mouth 2 (two) times a day before meals 60 tablet 6    mometasone (NASONEX) 50  mcg/act nasal spray 1 spray into each nostril daily      Multiple Vitamins-Minerals (CENTRUM SILVER 50+WOMEN PO) ORAL      Multiple Vitamins-Minerals (PreserVision AREDS) TABS Take 2 tablets by mouth in the morning      nebivolol (Bystolic) 5 mg tablet Take 1 tablet (5 mg total) by mouth daily 30 tablet 0    Norwegian Cod Liver Oil CAPS ORAL      pantoprazole (PROTONIX) 40 mg tablet TAKE 1 TABLET(40 MG) BY MOUTH EVERY MORNING 90 tablet 1    Potassium Bicarb-Citric Acid (Effer-K) 20 MEQ TBEF Take 1 tablet (20 mEq total) by mouth once a week 12 tablet 3    primidone (MYSOLINE) 50 mg tablet TAKE 1 TABLET(50 MG) BY MOUTH DAILY 90 tablet 1    Triamcinolone Acetonide (Nasacort Allergy 24HR) 55 MCG/ACT nasal spray 1 spray each nostrill daily NASAL      Mirabegron ER 25 MG TB24 Take 25 mg by mouth in the morning (Patient not taking: Reported on 5/28/2024) 90 tablet 1     No current facility-administered medications for this visit.     Current Outpatient Medications on File Prior to Visit   Medication Sig    amLODIPine (NORVASC) 2.5 mg tablet TAKE 1 TABLET BY MOUTH EVERY MORNING    apixaban (Eliquis) 5 mg Take 1 tablet (5 mg total) by mouth 2 (two) times a day    atorvastatin (LIPITOR) 80 mg tablet Taking at morning    bisoprolol (ZEBETA) 5 mg tablet Take 0.5 tablets (2.5 mg total) by mouth daily    Calcium Carb-Cholecalciferol (Calcium 500 + D) 500-3.125 MG-MCG TABS 2 tabs daily ORAL    cetirizine (ZyrTEC) 10 mg tablet Take 10 mg by mouth daily    clobetasol (TEMOVATE) 0.05 % cream Apply topically 2 (two) times a day    clonazePAM (KlonoPIN) 0.5 mg tablet Take 1 tablet (0.5 mg total) by mouth daily at bedtime    clopidogrel (PLAVIX) 75 mg tablet TAKE 1 TABLET(75 MG) BY MOUTH DAILY    DULoxetine (CYMBALTA) 60 mg delayed release capsule TAKE 1 CAPSULE(60 MG) BY MOUTH DAILY    furosemide (LASIX) 40 mg tablet Take 1 tablet (40 mg total) by mouth 2 (two) times a day    levothyroxine 88 mcg tablet TAKE 1 TABLET(88 MCG) BY MOUTH  "DAILY    midodrine (PROAMATINE) 5 mg tablet Take 1 tablet (5 mg total) by mouth 2 (two) times a day before meals    mometasone (NASONEX) 50 mcg/act nasal spray 1 spray into each nostril daily    Multiple Vitamins-Minerals (CENTRUM SILVER 50+WOMEN PO) ORAL    Multiple Vitamins-Minerals (PreserVision AREDS) TABS Take 2 tablets by mouth in the morning    nebivolol (Bystolic) 5 mg tablet Take 1 tablet (5 mg total) by mouth daily    Norwegian Cod Liver Oil CAPS ORAL    pantoprazole (PROTONIX) 40 mg tablet TAKE 1 TABLET(40 MG) BY MOUTH EVERY MORNING    Potassium Bicarb-Citric Acid (Effer-K) 20 MEQ TBEF Take 1 tablet (20 mEq total) by mouth once a week    primidone (MYSOLINE) 50 mg tablet TAKE 1 TABLET(50 MG) BY MOUTH DAILY    Triamcinolone Acetonide (Nasacort Allergy 24HR) 55 MCG/ACT nasal spray 1 spray each nostrill daily NASAL    Mirabegron ER 25 MG TB24 Take 25 mg by mouth in the morning (Patient not taking: Reported on 5/28/2024)     No current facility-administered medications on file prior to visit.     She is allergic to penicillins..         Objective:    Blood pressure 122/52, pulse 65, height 5' 6\" (1.676 m), weight 70.8 kg (156 lb).    Physical Exam  Vitals reviewed.   Constitutional:       Appearance: Normal appearance.   HENT:      Head: Normocephalic and atraumatic.      Mouth/Throat:      Mouth: Mucous membranes are moist.   Eyes:      General: Lids are normal.      Extraocular Movements: Extraocular movements intact.      Conjunctiva/sclera: Conjunctivae normal.      Pupils: Pupils are equal, round, and reactive to light.   Cardiovascular:      Rate and Rhythm: Normal rate.   Pulmonary:      Effort: Pulmonary effort is normal.   Skin:     General: Skin is warm.   Neurological:      Mental Status: She is alert.      Deep Tendon Reflexes:      Reflex Scores:       Tricep reflexes are 2+ on the right side and 2+ on the left side.       Bicep reflexes are 2+ on the right side and 2+ on the left side.       " Brachioradialis reflexes are 2+ on the right side and 2+ on the left side.       Patellar reflexes are 2+ on the right side and 2+ on the left side.       Achilles reflexes are 2+ on the right side and 2+ on the left side.  Psychiatric:         Mood and Affect: Mood normal.         Speech: Speech normal.         Behavior: Behavior normal.         Neurological Exam  Mental Status  Alert. Oriented to person, place and time. Speech is normal. Language is fluent with no aphasia.    Cranial Nerves  CN II: Visual acuity is normal. Visual fields full to confrontation.  CN III, IV, VI: Extraocular movements intact bilaterally. Normal lids and orbits bilaterally. Pupils equal round and reactive to light bilaterally.  CN V: Facial sensation is normal.  CN VII: Full and symmetric facial movement.  CN VIII: Hearing is normal.  CN IX, X: Palate elevates symmetrically. Normal gag reflex.  CN XI: Shoulder shrug strength is normal.  CN XII: Tongue midline without atrophy or fasciculations.    Motor  Normal muscle bulk throughout. No fasciculations present. Increased muscle tone. Mild cogwheel rigidity with activation maneuver in b/l hands. The following abnormal movements were seen: Mild tremor in b/l hands (R>L) with FTN.    Global strength 5-/5.    Sensory  Light touch is normal in upper and lower extremities.     Reflexes                                            Right                      Left  Brachioradialis                    2+                         2+  Biceps                                 2+                         2+  Triceps                                2+                         2+  Patellar                                2+                         2+  Achilles                                2+                         2+    Coordination  Right: Finger-to-nose normal. Mild tremor. Rapid alternating movement normal. Decreased amplitude with duration.Left: Finger-to-nose normal. Mild tremor. Rapid alternating  movement normal. Decreased amplitude with duration.    Gait  Casual gait: Narrow stance. Reduced stride length. Small steps.        ROS:    Review of Systems   Musculoskeletal:  Positive for gait problem.   Neurological:  Positive for tremors. Negative for dizziness, seizures, syncope, facial asymmetry, speech difficulty, weakness, light-headedness, numbness and headaches.

## 2024-07-16 ENCOUNTER — OFFICE VISIT (OUTPATIENT)
Dept: NEUROLOGY | Facility: CLINIC | Age: 84
End: 2024-07-16
Payer: MEDICARE

## 2024-07-16 VITALS
BODY MASS INDEX: 25.07 KG/M2 | DIASTOLIC BLOOD PRESSURE: 52 MMHG | HEART RATE: 65 BPM | SYSTOLIC BLOOD PRESSURE: 122 MMHG | WEIGHT: 156 LBS | HEIGHT: 66 IN

## 2024-07-16 DIAGNOSIS — R25.1 TREMOR: Primary | ICD-10-CM

## 2024-07-16 DIAGNOSIS — R29.818 PARKINSONIAN FEATURES: ICD-10-CM

## 2024-07-16 PROCEDURE — 99205 OFFICE O/P NEW HI 60 MIN: CPT | Performed by: PSYCHIATRY & NEUROLOGY

## 2024-07-18 DIAGNOSIS — I10 ESSENTIAL HYPERTENSION: ICD-10-CM

## 2024-07-18 NOTE — TELEPHONE ENCOUNTER
Reason for call:   [x] Refill   [] Prior Auth  [] Other:     Office:   [x] PCP/Provider - [facility] Caraballo   [] Specialty/Provider -     Medication: bisoprolol (ZEBETA) 5 mg tablet     Dose/Frequency: Take 0.5 tablets (2.5 mg total) by mouth daily     Quantity: 50    Pharmacy: [facility] #[id number]    Does the patient have enough for 3 days?   [x] Yes   [] No - Send as HP to POD

## 2024-07-19 RX ORDER — BISOPROLOL FUMARATE 5 MG/1
2.5 TABLET, FILM COATED ORAL DAILY
Qty: 45 TABLET | Refills: 0 | Status: SHIPPED | OUTPATIENT
Start: 2024-07-19 | End: 2025-01-15

## 2024-08-05 ENCOUNTER — TELEPHONE (OUTPATIENT)
Dept: OTHER | Facility: HOSPITAL | Age: 84
End: 2024-08-05

## 2024-08-06 ENCOUNTER — OFFICE VISIT (OUTPATIENT)
Dept: PULMONOLOGY | Facility: CLINIC | Age: 84
End: 2024-08-06
Payer: MEDICARE

## 2024-08-06 ENCOUNTER — HOSPITAL ENCOUNTER (OUTPATIENT)
Dept: NON INVASIVE DIAGNOSTICS | Facility: HOSPITAL | Age: 84
Discharge: HOME | End: 2024-08-06
Attending: INTERNAL MEDICINE
Payer: MEDICARE

## 2024-08-06 ENCOUNTER — DOCUMENTATION (OUTPATIENT)
Dept: OTHER | Facility: HOSPITAL | Age: 84
End: 2024-08-06

## 2024-08-06 VITALS
HEIGHT: 66 IN | TEMPERATURE: 97.6 F | SYSTOLIC BLOOD PRESSURE: 108 MMHG | HEART RATE: 59 BPM | DIASTOLIC BLOOD PRESSURE: 56 MMHG | WEIGHT: 156 LBS | OXYGEN SATURATION: 96 % | BODY MASS INDEX: 25.07 KG/M2

## 2024-08-06 VITALS
DIASTOLIC BLOOD PRESSURE: 56 MMHG | SYSTOLIC BLOOD PRESSURE: 108 MMHG | HEIGHT: 66 IN | WEIGHT: 156 LBS | HEART RATE: 59 BPM | BODY MASS INDEX: 25.07 KG/M2

## 2024-08-06 DIAGNOSIS — I26.99 ACUTE PULMONARY EMBOLISM WITHOUT ACUTE COR PULMONALE, UNSPECIFIED PULMONARY EMBOLISM TYPE (HCC): Primary | ICD-10-CM

## 2024-08-06 DIAGNOSIS — I26.99 ACUTE PULMONARY EMBOLISM WITHOUT ACUTE COR PULMONALE, UNSPECIFIED PULMONARY EMBOLISM TYPE (HCC): ICD-10-CM

## 2024-08-06 LAB
AORTIC ROOT: 3 CM
APICAL FOUR CHAMBER EJECTION FRACTION: 63 %
AV LVOT MEAN GRADIENT: 2 MMHG
AV LVOT PEAK GRADIENT: 4 MMHG
BSA FOR ECHO PROCEDURE: 1.8 M2
DOP CALC LVOT PEAK VEL VTI: 23.76 CM
DOP CALC LVOT PEAK VEL: 1.06 M/S
E WAVE DECELERATION TIME: 245 MS
E/A RATIO: 0.65
FRACTIONAL SHORTENING: 33 (ref 28–44)
INTERVENTRICULAR SEPTUM IN DIASTOLE (PARASTERNAL SHORT AXIS VIEW): 1.2 CM
INTERVENTRICULAR SEPTUM: 1.2 CM (ref 0.6–1.1)
LAAS-AP2: 19.8 CM2
LAAS-AP4: 16.9 CM2
LEFT ATRIUM SIZE: 2.8 CM
LEFT ATRIUM VOLUME (MOD BIPLANE): 57 ML
LEFT ATRIUM VOLUME INDEX (MOD BIPLANE): 31.7 ML/M2
LEFT INTERNAL DIMENSION IN SYSTOLE: 2.2 CM (ref 2.1–4)
LEFT VENTRICULAR INTERNAL DIMENSION IN DIASTOLE: 3.3 CM (ref 3.5–6)
LEFT VENTRICULAR POSTERIOR WALL IN END DIASTOLE: 1.1 CM
LEFT VENTRICULAR STROKE VOLUME: 30 ML
LVSV (TEICH): 30 ML
MV E'TISSUE VEL-LAT: 7 CM/S
MV E'TISSUE VEL-SEP: 6 CM/S
MV PEAK A VEL: 1.33 M/S
MV PEAK E VEL: 86 CM/S
MV STENOSIS PRESSURE HALF TIME: 71 MS
MV VALVE AREA P 1/2 METHOD: 3.1
RA PRESSURE ESTIMATED: 3 MMHG
RIGHT ATRIUM AREA SYSTOLE A4C: 12 CM2
RIGHT VENTRICLE ID DIMENSION: 3.6 CM
RV PSP: 31 MMHG
SL CV LEFT ATRIUM LENGTH A2C: 5.1 CM
SL CV LV EF: 65
SL CV PED ECHO LEFT VENTRICLE DIASTOLIC VOLUME (MOD BIPLANE) 2D: 46 ML
SL CV PED ECHO LEFT VENTRICLE SYSTOLIC VOLUME (MOD BIPLANE) 2D: 16 ML
TR MAX PG: 28 MMHG
TR PEAK VELOCITY: 2.6 M/S
TRICUSPID ANNULAR PLANE SYSTOLIC EXCURSION: 1.8 CM
TRICUSPID VALVE PEAK REGURGITATION VELOCITY: 2.63 M/S

## 2024-08-06 PROCEDURE — 93321 DOPPLER ECHO F-UP/LMTD STD: CPT

## 2024-08-06 PROCEDURE — 93308 TTE F-UP OR LMTD: CPT | Performed by: INTERNAL MEDICINE

## 2024-08-06 PROCEDURE — 93325 DOPPLER ECHO COLOR FLOW MAPG: CPT

## 2024-08-06 PROCEDURE — 93325 DOPPLER ECHO COLOR FLOW MAPG: CPT | Performed by: INTERNAL MEDICINE

## 2024-08-06 PROCEDURE — 94618 PULMONARY STRESS TESTING: CPT | Performed by: INTERNAL MEDICINE

## 2024-08-06 PROCEDURE — 93321 DOPPLER ECHO F-UP/LMTD STD: CPT | Performed by: INTERNAL MEDICINE

## 2024-08-06 PROCEDURE — 99214 OFFICE O/P EST MOD 30 MIN: CPT | Performed by: INTERNAL MEDICINE

## 2024-08-06 PROCEDURE — 93308 TTE F-UP OR LMTD: CPT

## 2024-08-06 NOTE — PROGRESS NOTES
[place] II 3 Month Follow Up      Patient participated in [place] II clinical trial and was randomly assigned to the FlowTriever intervention arm. Completed 3 month follow up with coordinator and Dr. Gerardo.    -Date of Visit: 24  -Dyspnea/mMRC Score: 0 -- Breathless with strenuous exercise only  -Dyspnea (Modified Pee Scale) at rest: 0: No breathlessness at all   *Note: This should be taken pre 6-minute walk test  -Fatigue (Pee CR10 Scale): 0: No breathlessness at all  -Supplemental Oxygen: Nasal Cannula: 3 lpm  -NYHA/WHO Classification: I  -New appearance of symptoms or progression of existing symptoms: No  -New or worsening clinical evidence of RV failure: No  -Syncope observed or reported since last visit: No  -Were there any changes in the anticoagulant medications: No -- Dr. Gerardo to keep pt on Eliquis   -If yes, specify: N/A    Cardiopulmonary Exercise Testing (Optional)  -Cardiopulmonary exercise testing conducted: No   -If yes, specify: N/A    Return to work  Current working status: not working    6 minute Walk test results:  Pt ambulated 108 meters with cane assistance on 3L NC (baseline prior to PE).  Pre-test/baseline:  BP: 108/56  HR: 63  SpO2: 98% on 3L NC  Pee: 0  Fatigue: 0    Post-Test:  BP: 110/62  HR: 79  SpO2: 91% on 3L NC    Highest HR during test: 81  Lowest SpO2 during test: 91  Not other symptoms at the end of the test.    Pt advised to contact pulmonology for routine f/u care.

## 2024-08-06 NOTE — PROGRESS NOTES
[place] II 3 Month Follow Up      Patient participated in [place] II clinical trial and was randomly assigned to the FlowTriever intervention arm.    -Date of Visit: 8/6/2024  -Dyspnea/mMRC Score: 0 -- Breathless with strenuous exercise only  -Dyspnea (Modified Pee Scale) at rest: 0: No breathlessness at all   *Note: This should be taken pre 6-minute walk test  -Fatigue (Pee CR10 Scale): 0: No breathlessness at all  -Supplemental Oxygen: Nasal Cannula: 3 lpm  -NYHA/WHO Classification: I  -New appearance of symptoms or progression of existing symptoms: No  -New or worsening clinical evidence of RV failure: No  -Syncope observed or reported since last visit: No  -Were there any changes in the anticoagulant medications: No   -If yes, specify: N/A    Cardiopulmonary Exercise Testing (Optional)  -Cardiopulmonary exercise testing conducted: No   -If yes, specify: N/A    Return to work: Not working.  Current working status:

## 2024-08-06 NOTE — PROGRESS NOTES
"Office Progress Note - Pulmonary    Danyelle Martinez 84 y.o. female MRN: [id number]    Encounter: [id number]      Assessment:  Acute pulmonary embolism.  S/p thrombectomy.  Chronic hypoxemic respiratory failure.  Chronic dyspnea on exertion.  Chronic diastolic congestive heart failure.    Plan:   Oxygen supplement at 3 L.  Eliquis 5 mg p.o. twice daily.  Regular walks to improve stamina  Follow-up in 6 months.      Discussion:   The patient's acute pulmonary embolism has clinically resolved.  Clinically she is back to her baseline.  I have maintained her on the oxygen supplement 3 L.  She did not desaturate on 6-minute walk test.  She will continue using the Eliquis 5 mg twice a day.  I told her that she should be on anticoagulation indefinitely.  She will try to take regular walks to improve stamina.  She will follow-up in 6 months.      Subjective:   The patient is here for a follow-up visit.  She was admitted to the hospital in April with acute pulmonary embolism.  He was randomized to a clinical trial where she was on the thrombectomy arm.  She tolerated the procedure well.  Since then she has been on Eliquis 5 mg twice a day.  Her breathing is back to her baseline.  She has chronic dyspnea on exertion which has not changed.  Denies any significant cough.  She is on chronic oxygen supplement at 3 L.  No nocturnal symptoms.    Review of systems:  A 12 point system review is done and aside from what is stated above the rest of the review of systems is negative.      Family history and social history are reviewed.    Medications list is reviewed.      Vitals: Blood pressure 108/56, pulse 59, temperature 97.6 °F (36.4 °C), temperature source Tympanic, height 5' 6\" (1.676 m), weight 70.8 kg (156 lb), SpO2 96%.,     Physical Exam  Gen: Awake, alert, oriented x 3, no acute distress  HEENT: Mucous membranes moist, no oral lesions, no thrush  NECK: No accessory muscle use, JVP not elevated  Cardiac: Regular, single S1, " single S2, no murmurs, no rubs, no gallops  Lungs: Diminished breath sounds.  No wheezing or rhonchi.  Abdomen: normoactive bowel sounds, soft nontender, nondistended, no rebound or rigidity, no guarding  Extremities: 2+ edema  Neuro:  Grossly nonfocal.  Skin:  No rash.    Lab Results   Component Value Date    SODIUM 138 04/25/2024    K 3.9 04/25/2024     04/25/2024    CO2 27 04/25/2024    BUN 18 04/25/2024    CREATININE 0.62 04/25/2024    GLUC 115 04/25/2024    CALCIUM 9.4 04/25/2024     6-minute walk test was done on 3 L.  The patient has noted with a heart rate of 79 bpm and O2 saturation 91%.  At the end of the test the heart rate was 79 bpm and O2 saturation 91%.  She walked a total of 108 m.

## 2024-08-14 DIAGNOSIS — R60.9 EDEMA, UNSPECIFIED TYPE: ICD-10-CM

## 2024-08-14 RX ORDER — FUROSEMIDE 40 MG
40 TABLET ORAL 2 TIMES DAILY
Qty: 30 TABLET | Refills: 2 | Status: SHIPPED | OUTPATIENT
Start: 2024-08-14

## 2024-08-19 LAB
DME PARACHUTE DELIVERY DATE ACTUAL: NORMAL
DME PARACHUTE DELIVERY DATE REQUESTED: NORMAL
DME PARACHUTE DELIVERY NOTE: NORMAL
DME PARACHUTE ITEM DESCRIPTION: NORMAL
DME PARACHUTE ITEM DESCRIPTION: NORMAL
DME PARACHUTE ORDER STATUS: NORMAL
DME PARACHUTE SUPPLIER NAME: NORMAL
DME PARACHUTE SUPPLIER PHONE: NORMAL

## 2024-08-22 ENCOUNTER — NURSE TRIAGE (OUTPATIENT)
Age: 84
End: 2024-08-22

## 2024-08-22 NOTE — TELEPHONE ENCOUNTER
"Chief Complaint: pt was called to schedule a f/u and mentioned she is gradually gaining weight and experiencing abdominal bloating. She denies any SOB, or LE edema    VS/Weight: was down as low as 150, because she was dieting, weight now 159 lbs.    Pain: No    Risk Factors: Heart Failure    Recent Testing: Echo    Medicine: taking lasix daily because she was too dry when she took it BID, she took an extra lasix yesterday. She just woke today and has not taken any meds yet.     Upcoming Office Visit:  9/6/2024    Last Office Visit: 2/26/2024 with Lizett    Additional Comments: Pt asked if she should report to ED? Based on the symptoms provided, I stated she does not need to report to ED, but maybe should increase lasix for a few days? Please advise    Reason for Disposition   Nursing judgment    Answer Assessment - Initial Assessment Questions  1. REASON FOR CALL: \"What is your main concern right now?\"      Wt gain  2. ONSET: \"When did the weight gain start?\"      gradually  3. SEVERITY: \"How bad is the fluid retention?\"      mild  4. FEVER: \"Do you have a fever?\"      N/a  5. OTHER SYMPTOMS: \"Do you have any other new symptoms?\"      no  6. INTERVENTIONS AND RESPONSE: \"What have you done so far to try to make this better? What medications have you used?\"      Increased diuretic    Protocols used: No Protocol Available-ADULT-[place]    "

## 2024-08-26 ENCOUNTER — TELEPHONE (OUTPATIENT)
Dept: FAMILY MEDICINE CLINIC | Facility: CLINIC | Age: 84
End: 2024-08-26

## 2024-08-27 ENCOUNTER — NURSE TRIAGE (OUTPATIENT)
Dept: FAMILY MEDICINE CLINIC | Facility: CLINIC | Age: 84
End: 2024-08-27

## 2024-08-27 ENCOUNTER — NURSE TRIAGE (OUTPATIENT)
Age: 84
End: 2024-08-27

## 2024-08-27 DIAGNOSIS — F41.9 ANXIETY: ICD-10-CM

## 2024-08-27 NOTE — TELEPHONE ENCOUNTER
No sooner 40 time slot opening at this time   Reason for call:   [x] Refill   [] Prior Auth  [] Other:     Office:   [x] PCP/Provider -  Bladimir Caraballo MD /[facility]   [] Specialty/Provider -     Medication: clonazePAM (KlonoPIN) 0.5 mg tablet     Dose/Frequency: Take 1 tablet (0.5 mg total) by mouth daily at bedtimE    Quantity: 30    Pharmacy:   [facility] #[id number] [address]        Does the patient have enough for 3 days?   [] Yes   [x] No - Send as HP to POD

## 2024-08-28 DIAGNOSIS — F33.40 RECURRENT MAJOR DEPRESSIVE DISORDER, IN REMISSION (HCC): ICD-10-CM

## 2024-08-28 DIAGNOSIS — I63.9 CVA (CEREBRAL VASCULAR ACCIDENT) (HCC): ICD-10-CM

## 2024-08-28 RX ORDER — CLONAZEPAM 0.5 MG/1
0.5 TABLET ORAL
Qty: 30 TABLET | Refills: 0 | Status: SHIPPED | OUTPATIENT
Start: 2024-08-28

## 2024-08-28 NOTE — TELEPHONE ENCOUNTER
Regarding: UTI - SINCE 8/26/2024  ----- Message from Kristy NICKERSON sent at 8/27/2024  4:19 PM EDT -----  Patient called yesterday (8/26/24) stating she has a UTI and is exteremly uncomfortable.  She states she did not get a message for an appointment that may have been scheduled for 11:20 today ( 8/27). She does not want the dr to think she was a no show for appointment.     She is asking if something can please be sent the [facility] #[id number] - [place], [place] - [address] [place] [address]

## 2024-08-29 ENCOUNTER — APPOINTMENT (OUTPATIENT)
Dept: LAB | Facility: HOSPITAL | Age: 84
End: 2024-08-29
Payer: MEDICARE

## 2024-08-29 ENCOUNTER — OFFICE VISIT (OUTPATIENT)
Dept: FAMILY MEDICINE CLINIC | Facility: CLINIC | Age: 84
End: 2024-08-29
Payer: MEDICARE

## 2024-08-29 VITALS
OXYGEN SATURATION: 95 % | BODY MASS INDEX: 25.99 KG/M2 | HEIGHT: 66 IN | HEART RATE: 55 BPM | SYSTOLIC BLOOD PRESSURE: 110 MMHG | DIASTOLIC BLOOD PRESSURE: 70 MMHG | WEIGHT: 161.7 LBS

## 2024-08-29 DIAGNOSIS — J96.11 CHRONIC HYPOXEMIC RESPIRATORY FAILURE (HCC): Chronic | ICD-10-CM

## 2024-08-29 DIAGNOSIS — F41.9 ANXIETY: ICD-10-CM

## 2024-08-29 DIAGNOSIS — E04.9 ENLARGED THYROID: ICD-10-CM

## 2024-08-29 DIAGNOSIS — E04.1 THYROID NODULE: ICD-10-CM

## 2024-08-29 DIAGNOSIS — K21.9 GASTROESOPHAGEAL REFLUX DISEASE WITHOUT ESOPHAGITIS: ICD-10-CM

## 2024-08-29 DIAGNOSIS — R29.6 FREQUENT FALLS: ICD-10-CM

## 2024-08-29 DIAGNOSIS — I26.99 ACUTE PULMONARY EMBOLISM WITHOUT ACUTE COR PULMONALE, UNSPECIFIED PULMONARY EMBOLISM TYPE (HCC): ICD-10-CM

## 2024-08-29 DIAGNOSIS — E11.9 TYPE 2 DIABETES MELLITUS WITHOUT COMPLICATION, WITHOUT LONG-TERM CURRENT USE OF INSULIN (HCC): ICD-10-CM

## 2024-08-29 DIAGNOSIS — N39.0 URINARY TRACT INFECTION WITHOUT HEMATURIA, SITE UNSPECIFIED: ICD-10-CM

## 2024-08-29 DIAGNOSIS — R25.1 TREMOR: ICD-10-CM

## 2024-08-29 DIAGNOSIS — I50.32 CHRONIC DIASTOLIC CHF (CONGESTIVE HEART FAILURE) (HCC): ICD-10-CM

## 2024-08-29 DIAGNOSIS — N39.0 URINARY TRACT INFECTION WITHOUT HEMATURIA, SITE UNSPECIFIED: Primary | ICD-10-CM

## 2024-08-29 DIAGNOSIS — R42 DIZZINESS: ICD-10-CM

## 2024-08-29 DIAGNOSIS — I82.413 ACUTE DEEP VEIN THROMBOSIS (DVT) OF FEMORAL VEIN OF BOTH LOWER EXTREMITIES (HCC): ICD-10-CM

## 2024-08-29 DIAGNOSIS — E03.9 ACQUIRED HYPOTHYROIDISM: ICD-10-CM

## 2024-08-29 DIAGNOSIS — I47.10 SVT (SUPRAVENTRICULAR TACHYCARDIA): ICD-10-CM

## 2024-08-29 DIAGNOSIS — I10 ESSENTIAL HYPERTENSION: ICD-10-CM

## 2024-08-29 DIAGNOSIS — E78.5 DYSLIPIDEMIA: ICD-10-CM

## 2024-08-29 LAB
BACTERIA UR QL AUTO: ABNORMAL /HPF
BILIRUB UR QL STRIP: NEGATIVE
CLARITY UR: CLEAR
COLOR UR: YELLOW
CREAT UR-MCNC: 68.1 MG/DL
GLUCOSE UR STRIP-MCNC: NEGATIVE MG/DL
HGB UR QL STRIP.AUTO: NEGATIVE
HYALINE CASTS #/AREA URNS LPF: ABNORMAL /LPF
KETONES UR STRIP-MCNC: NEGATIVE MG/DL
LEUKOCYTE ESTERASE UR QL STRIP: ABNORMAL
MICROALBUMIN UR-MCNC: 18.8 MG/L
MICROALBUMIN/CREAT 24H UR: 28 MG/G CREATININE (ref 0–30)
NITRITE UR QL STRIP: NEGATIVE
NON-SQ EPI CELLS URNS QL MICRO: ABNORMAL /HPF
PH UR STRIP.AUTO: 6 [PH]
PROT UR STRIP-MCNC: NEGATIVE MG/DL
RBC #/AREA URNS AUTO: ABNORMAL /HPF
SP GR UR STRIP.AUTO: 1.02 (ref 1–1.03)
UROBILINOGEN UR STRIP-ACNC: <2 MG/DL
WBC #/AREA URNS AUTO: ABNORMAL /HPF

## 2024-08-29 PROCEDURE — 99214 OFFICE O/P EST MOD 30 MIN: CPT | Performed by: INTERNAL MEDICINE

## 2024-08-29 PROCEDURE — 87186 SC STD MICRODIL/AGAR DIL: CPT

## 2024-08-29 PROCEDURE — 82043 UR ALBUMIN QUANTITATIVE: CPT

## 2024-08-29 PROCEDURE — 87086 URINE CULTURE/COLONY COUNT: CPT

## 2024-08-29 PROCEDURE — G2211 COMPLEX E/M VISIT ADD ON: HCPCS | Performed by: INTERNAL MEDICINE

## 2024-08-29 PROCEDURE — 81001 URINALYSIS AUTO W/SCOPE: CPT

## 2024-08-29 PROCEDURE — 82570 ASSAY OF URINE CREATININE: CPT

## 2024-08-29 RX ORDER — CLOPIDOGREL BISULFATE 75 MG/1
75 TABLET ORAL DAILY
Qty: 90 TABLET | Refills: 1 | Status: SHIPPED | OUTPATIENT
Start: 2024-08-29

## 2024-08-29 RX ORDER — SULFAMETHOXAZOLE/TRIMETHOPRIM 800-160 MG
1 TABLET ORAL 2 TIMES DAILY
Qty: 10 TABLET | Refills: 0 | Status: SHIPPED | OUTPATIENT
Start: 2024-08-29 | End: 2024-09-03

## 2024-08-29 RX ORDER — DULOXETIN HYDROCHLORIDE 60 MG/1
CAPSULE, DELAYED RELEASE ORAL
Qty: 90 CAPSULE | Refills: 1 | Status: SHIPPED | OUTPATIENT
Start: 2024-08-29

## 2024-08-29 NOTE — ASSESSMENT & PLAN NOTE
Lab Results   Component Value Date    HGBA1C 6.2 (H) 03/15/2024   Patient is not on any medications follow-up with repeat hemoglobin A1c recommend patient to watch the carbohydrate intake

## 2024-08-29 NOTE — ASSESSMENT & PLAN NOTE
Currently patient is on levothyroxine 88 mcg daily we will continue with the same I have ordered TSH levels not done yet emphasized the need for getting it done for us to adjust the dosage.

## 2024-08-29 NOTE — ASSESSMENT & PLAN NOTE
Patient with symptoms of urinary tract infection lower abdominal discomfort frequency with urination spasm-like feeling chills feeling entire body exam shows mild tenderness in the lower abdomen patient with possible cystitis urinary tract infection we will get a UA and culture and sensitivity start her on Bactrim.  Recommend also patient to take cranberry capsules

## 2024-08-29 NOTE — ASSESSMENT & PLAN NOTE
Wt Readings from Last 3 Encounters:   08/29/24 73.3 kg (161 lb 11.2 oz)   08/06/24 70.8 kg (156 lb)   08/06/24 70.8 kg (156 lb)   Patient is euvolemic denies shortness of breath chest pain palpitations.  She is on Lasix 40 mg twice daily her weight did go up by 4 pounds again emphasized regarding to monitor weight closely cut back on fluid intake if the weight goes up

## 2024-08-29 NOTE — ASSESSMENT & PLAN NOTE
Patient with tremors seen by the neurologist was also came in terms of Parkinson's but at present time she is on primidone he recommended to continue the same get some imaging studies done and follow-up.

## 2024-08-29 NOTE — ASSESSMENT & PLAN NOTE
Patient is on duloxetine 60 mg daily and clonazepam 0.5 mg daily as needed we will continue with the same she has been under a lot of stress in the recent past after her daughter passed away

## 2024-08-29 NOTE — ASSESSMENT & PLAN NOTE
Patient with a history of significant orthostatic changes in the blood pressures currently on amlodipine and nebivolol and midodrine to bring the blood pressure up recommend patient to be very careful getting out of the bed and chair

## 2024-08-29 NOTE — ASSESSMENT & PLAN NOTE
Patient with thyroid nodule seen by the ENT going for the biopsy he had a biopsy of thyroid nodules in the past and was negative

## 2024-08-29 NOTE — ASSESSMENT & PLAN NOTE
Blood pressure 110/70 on nebivolol and amlodipine however she has a history of orthostatic hypotension also on midodrine

## 2024-08-29 NOTE — PROGRESS NOTES
Office Visit Note  24     Danyelle Martinez 84 y.o. female MRN: [id number]  : 1940    Assessment:     1. Urinary tract infection without hematuria, site unspecified  Assessment & Plan:  Patient with symptoms of urinary tract infection lower abdominal discomfort frequency with urination spasm-like feeling chills feeling entire body exam shows mild tenderness in the lower abdomen patient with possible cystitis urinary tract infection we will get a UA and culture and sensitivity start her on Bactrim.  Recommend also patient to take cranberry capsules  Orders:  -     UA w Reflex to Microscopic w Reflex to Culture; Future; Expected date: 2024  -     sulfamethoxazole-trimethoprim (BACTRIM DS) 800-160 mg per tablet; Take 1 tablet by mouth 2 (two) times a day for 5 days  2. Acquired hypothyroidism  Assessment & Plan:  Currently patient is on levothyroxine 88 mcg daily we will continue with the same I have ordered TSH levels not done yet emphasized the need for getting it done for us to adjust the dosage.  3. Chronic hypoxemic respiratory failure (HCC)  Assessment & Plan:  Patient is on 3 L of oxygen at home    4. Essential hypertension  Assessment & Plan:  Blood pressure 110/70 on nebivolol and amlodipine however she has a history of orthostatic hypotension also on midodrine  5. Acute deep vein thrombosis (DVT) of femoral vein of both lower extremities (HCC)  Assessment & Plan:  Patient with a history of DVT and PE on Eliquis for concerned about her frequent falls in the past because of her being on the blood thinners recommend very strongly to be using assistive device while ambulating to prevent falls.  Patient also had PE and thrombectomy  6. Anxiety  Assessment & Plan:  Patient is on duloxetine 60 mg daily and clonazepam 0.5 mg daily as needed we will continue with the same she has been under a lot of stress in the recent past after her daughter passed away  7. Enlarged thyroid  Assessment &  Plan:  Patient with thyroid nodule seen by the ENT going for the biopsy he had a biopsy of thyroid nodules in the past and was negative  8. Dyslipidemia  Assessment & Plan:  Continue atorvastatin 80 mg at bedtime follow-up with repeat lipid profile  9. Tremor  Assessment & Plan:  Patient with tremors seen by the neurologist was also came in terms of Parkinson's but at present time she is on primidone he recommended to continue the same get some imaging studies done and follow-up.  10. Chronic diastolic CHF (congestive heart failure) ([facility])  Assessment & Plan:  Wt Readings from Last 3 Encounters:   08/29/24 73.3 kg (161 lb 11.2 oz)   08/06/24 70.8 kg (156 lb)   08/06/24 70.8 kg (156 lb)   Patient is euvolemic denies shortness of breath chest pain palpitations.  She is on Lasix 40 mg twice daily her weight did go up by 4 pounds again emphasized regarding to monitor weight closely cut back on fluid intake if the weight goes up          11. Dizziness  Assessment & Plan:  Patient with a history of significant orthostatic changes in the blood pressures currently on amlodipine and nebivolol and midodrine to bring the blood pressure up recommend patient to be very careful getting out of the bed and chair  12. Gastroesophageal reflux disease without esophagitis  Assessment & Plan:  Patient on pantoprazole continue  13. Acute pulmonary embolism without acute cor pulmonale, unspecified pulmonary embolism type ([facility])  14. Type 2 diabetes mellitus without complication, without long-term current use of insulin ([facility])  Assessment & Plan:    Lab Results   Component Value Date    HGBA1C 6.2 (H) 03/15/2024   Patient is not on any medications follow-up with repeat hemoglobin A1c recommend patient to watch the carbohydrate intake  15. Thyroid nodule  16. SVT (supraventricular tachycardia)  17. Frequent falls  Assessment & Plan:  No episodes of falling in the recent past             Discussion Summary and Plan:  Today's care plan and  medications were reviewed with patient in detail and all their questions answered to their satisfaction.    Chief Complaint   Patient presents with    Follow-up      Subjective:  Patient is coming here for evaluation regarding symptoms suggestive of urinary tract infection has been having some lower abdominal discomfort feels like chills when she gets the spasm-like feeling all over the body.  Denies any symptoms in the flank area no fever history of urinary tract infection long time ago she feels the symptoms are similar to that.  No nausea vomiting no fever that she can register.  Medications reviewed labs reviewed.  Will get urine analysis and culture done start the patient on antibiotic patient is allergic to penicillin we will give her Bactrim DS    Reviewed the report of the consultant pulmonary regarding blood clots it appears patient is to be on the lifelong medication and Eliquis.    ENT physicians report reviewed regarding thyroid nodule patient will require an ultrasound and a biopsy later.    Neurology note appreciated patient with tremors question Parkinson being worked up for the same.    Discussed with patient intent on the medications        The following portions of the patient's history were reviewed and updated as appropriate: allergies, current medications, past family history, past medical history, past social history, past surgical history and problem list.    Review of Systems   Constitutional:  Negative for chills and fever.   HENT:  Negative for ear pain and sore throat.    Eyes:  Negative for pain and visual disturbance.   Respiratory:  Negative for cough and shortness of breath.    Cardiovascular:  Negative for chest pain and palpitations.   Gastrointestinal:  Negative for abdominal pain and vomiting.   Genitourinary:  Negative for dysuria and hematuria.   Musculoskeletal:  Negative for arthralgias and back pain.   Skin:  Negative for color change and rash.   Neurological:  Negative for  seizures and syncope.   All other systems reviewed and are negative.        Historical Information   Patient Active Problem List   Diagnosis    Shortness of breath on exertion    Dizziness    Chronic hypoxemic respiratory failure (HCC)    Seasonal allergic rhinitis due to pollen    Essential hypertension    Dyslipidemia    History of transient ischemic attack (TIA)    Acquired hypothyroidism    Thyroid nodule    Palpitations    Tremor    Nocturnal hypoxemia    Gastroesophageal reflux disease without esophagitis    Localized swelling of right lower leg    Anxiety    Frequent falls    Dyspepsia    SVT (supraventricular tachycardia)    Osteoarthritis of right knee    Elevated liver enzymes    Type 2 diabetes mellitus, without long-term current use of insulin (HCC)    Recurrent major depressive disorder, in remission ([facility])    Syncope, cardiogenic    Overflow incontinence of urine    Enlarged thyroid    Chronic diastolic CHF (congestive heart failure) ([facility])    Cigarette nicotine dependence in remission    Change in mental status    Parotitis, acute    Facial cellulitis    Pulmonary emboli ([facility])    Acute deep vein thrombosis (DVT) of both lower extremities ([facility])    Hypertensive heart disease with congestive heart failure ([facility])    UTI (urinary tract infection)     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     CHF (congestive heart failure) ([facility])     Closed wedge compression fracture of T1 vertebra with routine healing 12/18/2023    Deaf, left     Depression     Diabetes mellitus (HCC)     Disease of thyroid gland     DVT complicating pregnancy, unspecified trimester ([facility]) postpartum    Fall 11/15/2023    Hearing loss     LEFT EAR    History of shingles 2007    fACIAL     Hyperlipidemia     Hypertension     Liver disease     Lyme disease     Meniere's disease     Obesity     Orthostatic hypotension     Psychiatric  problem     Sinus congestion     Sleep apnea     Stroke (HCC)      Past Surgical History:   Procedure Laterality Date    APPENDECTOMY      BREAST BIOPSY Left 2010     SECTION      x2    DXA PROCEDURE (HISTORICAL)  10/28/2020    EYE SURGERY      HAND SURGERY Left     HERNIA REPAIR      HYSTERECTOMY      IR PE ENDOVASCULAR THERAPY  2024    ROTATOR CUFF REPAIR Left     TONSILLECTOMY      TYMPANOSTOMY TUBE PLACEMENT       Social History     Substance and Sexual Activity   Alcohol Use Not Currently     Social History     Substance and Sexual Activity   Drug Use Never     Social History     Tobacco Use   Smoking Status Former    Current packs/day: 0.00    Average packs/day: 0.8 packs/day for 44.0 years (33.0 ttl pk-yrs)    Types: Cigarettes    Start date: 1946    Quit date: 1990    Years since quittin.6    Passive exposure: Past   Smokeless Tobacco Never     Family History   Problem Relation Age of Onset    Depression Mother     Hypertension Mother     Obesity Mother     Depression Father     Alcohol abuse Father     Stroke Father     Breast cancer Sister 68    Ovarian cancer Daughter 53    BRCA1 Negative Daughter     BRCA2 Negative Daughter      Health Maintenance Due   Topic    Pneumococcal Vaccine: 65+ Years (1 of 2 - PCV)    DM Eye Exam     Hepatitis A Vaccine (1 of 2 - Risk 2-dose series)    Zoster Vaccine (1 of 2)    RSV Vaccine Age 60+ Years (1 - 1-dose 60+ series)    COVID-19 Vaccine ( season)    Medicare Annual Wellness Visit (AWV)     Influenza Vaccine (1)    HEMOGLOBIN A1C       Meds/Allergies       Current Outpatient Medications:     amLODIPine (NORVASC) 2.5 mg tablet, TAKE 1 TABLET BY MOUTH EVERY MORNING (Patient taking differently: 5 mg), Disp: 90 tablet, Rfl: 1    apixaban (Eliquis) 5 mg, Take 1 tablet (5 mg total) by mouth 2 (two) times a day, Disp: 60 tablet, Rfl: 2    atorvastatin (LIPITOR) 80 mg tablet, Taking at morning, Disp: 90 tablet, Rfl: 0    bisoprolol  (ZEBETA) 5 mg tablet, Take 0.5 tablets (2.5 mg total) by mouth daily, Disp: 45 tablet, Rfl: 0    Calcium Carb-Cholecalciferol (Calcium 500 + D) 500-3.125 MG-MCG TABS, 2 tabs daily ORAL, Disp: , Rfl:     cetirizine (ZyrTEC) 10 mg tablet, Take 10 mg by mouth daily, Disp: , Rfl:     clobetasol (TEMOVATE) 0.05 % cream, Apply topically 2 (two) times a day, Disp: 45 g, Rfl: 1    clonazePAM (KlonoPIN) 0.5 mg tablet, Take 1 tablet (0.5 mg total) by mouth daily at bedtime, Disp: 30 tablet, Rfl: 0    clopidogrel (PLAVIX) 75 mg tablet, TAKE 1 TABLET(75 MG) BY MOUTH DAILY, Disp: 90 tablet, Rfl: 1    DULoxetine (CYMBALTA) 60 mg delayed release capsule, TAKE 1 CAPSULE(60 MG) BY MOUTH DAILY, Disp: 90 capsule, Rfl: 1    furosemide (LASIX) 40 mg tablet, TAKE 1 TABLET(40 MG) BY MOUTH TWICE DAILY, Disp: 30 tablet, Rfl: 2    levothyroxine 88 mcg tablet, TAKE 1 TABLET(88 MCG) BY MOUTH DAILY, Disp: 90 tablet, Rfl: 1    midodrine (PROAMATINE) 5 mg tablet, Take 1 tablet (5 mg total) by mouth 2 (two) times a day before meals, Disp: 60 tablet, Rfl: 6    mometasone (NASONEX) 50 mcg/act nasal spray, 1 spray into each nostril daily, Disp: , Rfl:     Multiple Vitamins-Minerals (CENTRUM SILVER 50+WOMEN PO), ORAL, Disp: , Rfl:     Multiple Vitamins-Minerals (PreserVision AREDS) TABS, Take 2 tablets by mouth in the morning, Disp: , Rfl:     nebivolol (Bystolic) 5 mg tablet, Take 1 tablet (5 mg total) by mouth daily, Disp: 30 tablet, Rfl: 0    [place] Cod Liver Oil CAPS, ORAL, Disp: , Rfl:     pantoprazole (PROTONIX) 40 mg tablet, TAKE 1 TABLET(40 MG) BY MOUTH EVERY MORNING, Disp: 90 tablet, Rfl: 1    Potassium Bicarb-Citric Acid (Effer-K) 20 MEQ TBEF, Take 1 tablet (20 mEq total) by mouth once a week, Disp: 12 tablet, Rfl: 3    primidone (MYSOLINE) 50 mg tablet, TAKE 1 TABLET(50 MG) BY MOUTH DAILY, Disp: 90 tablet, Rfl: 1    sulfamethoxazole-trimethoprim (BACTRIM DS) 800-160 mg per tablet, Take 1 tablet by mouth 2 (two) times a day for 5 days,  "Disp: 10 tablet, Rfl: 0    Triamcinolone Acetonide (Nasacort Allergy 24HR) 55 MCG/ACT nasal spray, 1 spray each nostrill daily NASAL, Disp: , Rfl:     Mirabegron ER 25 MG TB24, Take 25 mg by mouth in the morning (Patient not taking: Reported on 5/28/2024), Disp: 90 tablet, Rfl: 1      Objective:    Vitals:   /70   Pulse 55   Ht 5' 6\" (1.676 m)   Wt 73.3 kg (161 lb 11.2 oz)   SpO2 95%   BMI 26.10 kg/m²   Body mass index is 26.1 kg/m².  Vitals:    08/29/24 1232   Weight: 73.3 kg (161 lb 11.2 oz)       Physical Exam  Vitals and nursing note reviewed.   Constitutional:       Appearance: Normal appearance.   Cardiovascular:      Rate and Rhythm: Normal rate and regular rhythm.      Heart sounds: Normal heart sounds.   Pulmonary:      Effort: Pulmonary effort is normal.      Breath sounds: Normal breath sounds.   Abdominal:      Palpations: Abdomen is soft.      Tenderness: There is abdominal tenderness. There is no right CVA tenderness or left CVA tenderness.      Comments: Mild lower abdominal tenderness present   Musculoskeletal:      Cervical back: Normal range of motion and neck supple.      Right lower leg: No edema.      Left lower leg: No edema.   Skin:     General: Skin is dry.   Neurological:      Mental Status: She is alert and oriented to person, place, and time.   Psychiatric:         Behavior: Behavior normal.         Lab Review   Appointment on 08/29/2024   Component Date Value Ref Range Status    Color, UA 08/29/2024 Yellow   Final    Clarity, UA 08/29/2024 Clear   Final    Specific Gravity, UA 08/29/2024 1.020  1.005 - 1.030 Final    pH, UA 08/29/2024 6.0  4.5, 5.0, 5.5, 6.0, 6.5, 7.0, 7.5, 8.0 Final    Leukocytes, UA 08/29/2024 Large (A)  Negative Final    Nitrite, UA 08/29/2024 Negative  Negative Final    Protein, UA 08/29/2024 Negative  Negative mg/dl Final    Glucose, UA 08/29/2024 Negative  Negative mg/dl Final    Ketones, UA 08/29/2024 Negative  Negative mg/dl Final    Urobilinogen, UA " 08/29/2024 <2.0  <2.0 mg/dl mg/dl Final    Bilirubin, UA 08/29/2024 Negative  Negative Final    Occult Blood, UA 08/29/2024 Negative  Negative Final    RBC, UA 08/29/2024 0-1  None Seen, 0-1, 1-2, 2-4, 0-5 /hpf Final    WBC, UA 08/29/2024 Innumerable (A)  None Seen, 0-1, 1-2, 0-5, 2-4 /hpf Final    Epithelial Cells 08/29/2024 Occasional  None Seen, Occasional /hpf Final    Bacteria, UA 08/29/2024 Innumerable (A)  None Seen, Occasional /hpf Final    Hyaline Casts, UA 08/29/2024 0-1 (A)  (none) /lpf Final   Hospital Outpatient Visit on 08/06/2024   Component Date Value Ref Range Status    BSA 08/06/2024 1.8  m2 Final    A4C EF 08/06/2024 63  % Final    LVIDd 08/06/2024 3.30  cm Final    LVIDS 08/06/2024 2.20  cm Final    IVSd 08/06/2024 1.20  cm Final    LVPWd 08/06/2024 1.10  cm Final    LVOT peak VTI 08/06/2024 23.76  cm Final    FS 08/06/2024 33  28 - 44 Final    MV E' Tissue Velocity Septal 08/06/2024 6  cm/s Final    MV E' Tissue Velocity Lateral 08/06/2024 7  cm/s Final    LA Volume Index (BP) 08/06/2024 31.7  mL/m2 Final    E/A ratio 08/06/2024 0.65   Final    E wave deceleration time 08/06/2024 245  ms Final    MV Peak E Urban 08/06/2024 86  cm/s Final    MV Peak A Urban 08/06/2024 1.33  m/s Final    AV LVOT peak gradient 08/06/2024 4  mmHg Final    LVOT peak urban 08/06/2024 1.06  m/s Final    RVID d 08/06/2024 3.6  cm Final    Tricuspid annular plane systolic e* 08/06/2024 1.80  cm Final    LA size 08/06/2024 2.8  cm Final    LA length (A2C) 08/06/2024 5.10  cm Final    LA volume (BP) 08/06/2024 57  mL Final    RAA A4C 08/06/2024 12  cm2 Final    LVOT mn grad 08/06/2024 2.0  mmHg Final    MV stenosis pressure 1/2 time 08/06/2024 71  ms Final    MV valve area p 1/2 method 08/06/2024 3.10   Final    TR Peak Urban 08/06/2024 2.6  m/s Final    Triscuspid Valve Regurgitation Pea* 08/06/2024 28.0  mmHg Final    Ao root 08/06/2024 3.00  cm Final    Tricuspid valve peak regurgitation* 08/06/2024 2.63  m/s Final    Left  "ventricular stroke volume (2D) 08/06/2024 30.00  mL Final    IVS 08/06/2024 1.2  cm Final    LEFT VENTRICLE SYSTOLIC VOLUME (MO* 08/06/2024 16  mL Final    LV DIASTOLIC VOLUME (MOD BIPLANE) * 08/06/2024 46  mL Final    Left Atrium Area-systolic Four Nini* 08/06/2024 16.9  cm2 Final    Left Atrium Area-systolic Apical T* 08/06/2024 19.8  cm2 Final    LVSV, 2D 08/06/2024 30  mL Final    LV EF 08/06/2024 65   Final    Est. RA pres 08/06/2024 3.0  mmHg Final    Right Ventricular Peak Systolic Pr* 08/06/2024 31.00  mmHg Final         Bladimir Caraballo MD        \"This note has been constructed using a voice recognition system.Therefore there may be syntax, spelling, and/or grammatical errors. Please call if you have any questions. \"  "

## 2024-08-29 NOTE — ASSESSMENT & PLAN NOTE
Patient with a history of DVT and PE on Eliquis for concerned about her frequent falls in the past because of her being on the blood thinners recommend very strongly to be using assistive device while ambulating to prevent falls.  Patient also had PE and thrombectomy

## 2024-08-31 LAB
BACTERIA UR CULT: ABNORMAL
BACTERIA UR CULT: ABNORMAL

## 2024-09-05 ENCOUNTER — TELEPHONE (OUTPATIENT)
Dept: PULMONOLOGY | Facility: CLINIC | Age: 84
End: 2024-09-05

## 2024-09-05 NOTE — TELEPHONE ENCOUNTER
Pre-op Clearance    Established patient needing pre-op clearance.    Surgery: Roof Of Mouth Bone Removal Tuberosity with Oral Surgeon  Surgery date:  N/A  Last seen by us: 8/6/2024  On oxygen: N/A  Kind of Sedation: Local Anesthetic(With or Without Epinephrine)  Length of surgery: N/A  Surgeon: Dante Adhikari  Office contact: [phone]  Fax:[phone]    Would you like to clear patient via a phone call from last visit or would like us to schedule them with you or an AP?

## 2024-09-06 ENCOUNTER — OFFICE VISIT (OUTPATIENT)
Dept: CARDIOLOGY CLINIC | Facility: CLINIC | Age: 84
End: 2024-09-06
Payer: MEDICARE

## 2024-09-06 VITALS
BODY MASS INDEX: 26.03 KG/M2 | SYSTOLIC BLOOD PRESSURE: 110 MMHG | HEIGHT: 66 IN | HEART RATE: 65 BPM | DIASTOLIC BLOOD PRESSURE: 70 MMHG | WEIGHT: 162 LBS | OXYGEN SATURATION: 97 %

## 2024-09-06 DIAGNOSIS — Z86.73 HISTORY OF TRANSIENT ISCHEMIC ATTACK (TIA): ICD-10-CM

## 2024-09-06 DIAGNOSIS — E78.5 DYSLIPIDEMIA: ICD-10-CM

## 2024-09-06 DIAGNOSIS — I10 ESSENTIAL HYPERTENSION: ICD-10-CM

## 2024-09-06 DIAGNOSIS — I50.32 CHRONIC DIASTOLIC CHF (CONGESTIVE HEART FAILURE) (HCC): Primary | ICD-10-CM

## 2024-09-06 DIAGNOSIS — I47.10 SVT (SUPRAVENTRICULAR TACHYCARDIA): ICD-10-CM

## 2024-09-06 DIAGNOSIS — I27.82 CHRONIC PULMONARY EMBOLISM WITHOUT ACUTE COR PULMONALE, UNSPECIFIED PULMONARY EMBOLISM TYPE (HCC): ICD-10-CM

## 2024-09-06 PROCEDURE — 99214 OFFICE O/P EST MOD 30 MIN: CPT | Performed by: INTERNAL MEDICINE

## 2024-09-06 PROCEDURE — 93000 ELECTROCARDIOGRAM COMPLETE: CPT | Performed by: INTERNAL MEDICINE

## 2024-09-06 NOTE — PROGRESS NOTES
Cardiology   Yamileth Engel DO, [facility]  Rolando Ashby MD, [facility]  Maximus Starr MD, [facility]  Elias Mirza MD, [facility]  -------------------------------------------------------------------  [facility]  [address], [address], [address]  [place], [place], [address]  [phone]    Cardiology Follow Up  Danyelle Martinez  1940  [id number]          Assessment/Plan:    1. Chronic diastolic CHF (congestive heart failure) (HCC)  - Patient with increased abdominal distension but no LE edema.  Weight is increased on home scale.  - BNP ordered  - Continue lasix  - B-Type Natriuretic Peptide(BNP); Future  - CBC and Platelet; Future    2. 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 SVT (supraventricular tachycardia)  -No recent episodes.  She is on bisoprolol.     4. Chronic pulmonary embolism without acute cor pulmonale, unspecified pulmonary embolism type (HCC)  She is on chronic oxygen therapy  -Continue Eliquis 5 mg twice daily    5. Dyslipidemia  -Continue atorvastatin 40 mg daily.  Lipid panel ordered along with CMP    6. History of transient ischemic attack (TIA)  -Continue Plavix  Continue atorvastatin-     Interval History:     Danyelle Martinez is 84 y.o. female here for followup of congestive heart failure.  In April 2024, she was admitted to [facility] with massive bilateral pulmonary embolism.    Echocardiogram at that time showed concentric LVH with normal ejection fraction and no evidence of RV strain.  She had repeat echocardiogram which was unchanged.  She chronically has hypoxia and uses 3 L nasal cannula.  She has diastolic heart failure and is on diuretic.  Recently, she has noticed increased abdominal distention and weight gain.  Diuretic was increased to furosemide 40 mg daily.  Weight has been stable but still feels abdomen is distended.   She denies any lower extremity edema, orthopnea or paroxysmal nocturnal dyspnea.    She also has a history of SVT but no recent episodes.  She denies any chest pain.    The following portions of the patient's history were reviewed and updated as appropriate: allergies, current medications, past family history, past medical history, past social history, past surgical history, and problem list.       Current Outpatient Medications:     amLODIPine (NORVASC) 2.5 mg tablet, TAKE 1 TABLET BY MOUTH EVERY MORNING (Patient taking differently: 5 mg), Disp: 90 tablet, Rfl: 1    apixaban (Eliquis) 5 mg, Take 1 tablet (5 mg total) by mouth 2 (two) times a day, Disp: 60 tablet, Rfl: 2    atorvastatin (LIPITOR) 80 mg tablet, Taking at morning, Disp: 90 tablet, Rfl: 0    bisoprolol (ZEBETA) 5 mg tablet, Take 0.5 tablets (2.5 mg total) by mouth daily, Disp: 45 tablet, Rfl: 0    Calcium Carb-Cholecalciferol (Calcium 500 + D) 500-3.125 MG-MCG TABS, 2 tabs daily ORAL, Disp: , Rfl:     cetirizine (ZyrTEC) 10 mg tablet, Take 10 mg by mouth daily, Disp: , Rfl:     clobetasol (TEMOVATE) 0.05 % cream, Apply topically 2 (two) times a day, Disp: 45 g, Rfl: 1    clonazePAM (KlonoPIN) 0.5 mg tablet, Take 1 tablet (0.5 mg total) by mouth daily at bedtime, Disp: 30 tablet, Rfl: 0    clopidogrel (PLAVIX) 75 mg tablet, TAKE 1 TABLET(75 MG) BY MOUTH DAILY, Disp: 90 tablet, Rfl: 1    DULoxetine (CYMBALTA) 60 mg delayed release capsule, TAKE 1 CAPSULE(60 MG) BY MOUTH DAILY, Disp: 90 capsule, Rfl: 1    furosemide (LASIX) 40 mg tablet, TAKE 1 TABLET(40 MG) BY MOUTH TWICE DAILY, Disp: 30 tablet, Rfl: 2    levothyroxine 88 mcg tablet, TAKE 1 TABLET(88 MCG) BY MOUTH DAILY, Disp: 90 tablet, Rfl: 1    midodrine (PROAMATINE) 5 mg tablet, Take 1 tablet (5 mg total) by mouth 2 (two) times a day before meals, Disp: 60 tablet, Rfl: 6    mometasone (NASONEX) 50 mcg/act nasal spray, 1 spray into each nostril daily, Disp: , Rfl:     Multiple Vitamins-Minerals  "(CENTRUM SILVER 50+WOMEN PO), ORAL, Disp: , Rfl:     Multiple Vitamins-Minerals (PreserVision AREDS) TABS, Take 2 tablets by mouth in the morning, Disp: , Rfl:     nebivolol (Bystolic) 5 mg tablet, Take 1 tablet (5 mg total) by mouth daily, Disp: 30 tablet, Rfl: 0    [place] Cod Liver Oil CAPS, ORAL, Disp: , Rfl:     pantoprazole (PROTONIX) 40 mg tablet, TAKE 1 TABLET(40 MG) BY MOUTH EVERY MORNING, Disp: 90 tablet, Rfl: 1    Potassium Bicarb-Citric Acid (Effer-K) 20 MEQ TBEF, Take 1 tablet (20 mEq total) by mouth once a week, Disp: 12 tablet, Rfl: 3    primidone (MYSOLINE) 50 mg tablet, TAKE 1 TABLET(50 MG) BY MOUTH DAILY, Disp: 90 tablet, Rfl: 1    Triamcinolone Acetonide (Nasacort Allergy 24HR) 55 MCG/ACT nasal spray, 1 spray each nostrill daily NASAL, Disp: , Rfl:     Mirabegron ER 25 MG TB24, Take 25 mg by mouth in the morning (Patient not taking: Reported on 5/28/2024), Disp: 90 tablet, Rfl: 1        Review of Systems:  Review of Systems   Respiratory:  Positive for shortness of breath.    Cardiovascular:  Negative for chest pain, palpitations and leg swelling.   Gastrointestinal:  Positive for abdominal distention.   Musculoskeletal:  Positive for arthralgias, gait problem and myalgias.   All other systems reviewed and are negative.        Physical Exam:  Vitals:  Vitals:    09/06/24 1311   BP: 110/70   BP Location: Left arm   Patient Position: Sitting   Cuff Size: Standard   Pulse: 65   SpO2: 97%   Weight: 73.5 kg (162 lb)   Height: 5' 6\" (1.676 m)     Physical Exam   Constitutional: She appears healthy. No distress.   Eyes: Pupils are equal, round, and reactive to light. Conjunctivae are normal.   Neck: No JVD present.   Cardiovascular: Normal rate, regular rhythm and normal heart sounds. Exam reveals no gallop and no friction rub.   No murmur heard.  Pulmonary/Chest: Effort normal. She has no wheezes. She has bibasilar rales.   Musculoskeletal:         General: No tenderness, deformity or edema.      " Cervical back: Normal range of motion and neck supple.   Neurological: She is alert and oriented to person, place, and time.   Skin: Skin is warm and dry.        Cardiographics:  EKG: Personally reviewed normal sinus rhythm 65 bpm  Last known EF: 55%    This note was completed in part utilizing M-Modal Fluency Direct Software.  Grammatical errors, random word insertions, spelling mistakes, and incomplete sentences can be an occasional consequence of this system secondary to software limitations, ambient noise, and hardware issues.  If you have any questions or concerns about the content, text, or information contained within the body of this dictation, please contact the provider for clarification.

## 2024-09-10 ENCOUNTER — APPOINTMENT (OUTPATIENT)
Dept: LAB | Facility: CLINIC | Age: 84
End: 2024-09-10
Payer: MEDICARE

## 2024-09-10 DIAGNOSIS — I50.32 CHRONIC DIASTOLIC CHF (CONGESTIVE HEART FAILURE) (HCC): ICD-10-CM

## 2024-09-10 DIAGNOSIS — Z13.0 SCREENING FOR DEFICIENCY ANEMIA: ICD-10-CM

## 2024-09-10 DIAGNOSIS — E78.5 DYSLIPIDEMIA: ICD-10-CM

## 2024-09-10 DIAGNOSIS — E11.9 TYPE 2 DIABETES MELLITUS WITHOUT COMPLICATION, WITHOUT LONG-TERM CURRENT USE OF INSULIN (HCC): ICD-10-CM

## 2024-09-10 DIAGNOSIS — E03.9 ACQUIRED HYPOTHYROIDISM: ICD-10-CM

## 2024-09-10 LAB
ALBUMIN SERPL BCG-MCNC: 4.3 G/DL (ref 3.5–5)
ALP SERPL-CCNC: 98 U/L (ref 34–104)
ALT SERPL W P-5'-P-CCNC: 31 U/L (ref 7–52)
ANION GAP SERPL CALCULATED.3IONS-SCNC: 5 MMOL/L (ref 4–13)
AST SERPL W P-5'-P-CCNC: 39 U/L (ref 13–39)
BASOPHILS # BLD AUTO: 0.09 THOUSANDS/ÂΜL (ref 0–0.1)
BASOPHILS NFR BLD AUTO: 1 % (ref 0–1)
BILIRUB SERPL-MCNC: 0.41 MG/DL (ref 0.2–1)
BNP SERPL-MCNC: 167 PG/ML (ref 0–100)
BUN SERPL-MCNC: 26 MG/DL (ref 5–25)
CALCIUM SERPL-MCNC: 10.4 MG/DL (ref 8.4–10.2)
CHLORIDE SERPL-SCNC: 100 MMOL/L (ref 96–108)
CHOLEST SERPL-MCNC: 211 MG/DL
CO2 SERPL-SCNC: 34 MMOL/L (ref 21–32)
CREAT SERPL-MCNC: 0.66 MG/DL (ref 0.6–1.3)
EOSINOPHIL # BLD AUTO: 0.51 THOUSAND/ÂΜL (ref 0–0.61)
EOSINOPHIL NFR BLD AUTO: 4 % (ref 0–6)
ERYTHROCYTE [DISTWIDTH] IN BLOOD BY AUTOMATED COUNT: 16 % (ref 11.6–15.1)
GFR SERPL CREATININE-BSD FRML MDRD: 80 ML/MIN/1.73SQ M
GLUCOSE P FAST SERPL-MCNC: 120 MG/DL (ref 65–99)
HCT VFR BLD AUTO: 42.7 % (ref 34.8–46.1)
HDLC SERPL-MCNC: 36 MG/DL
HGB BLD-MCNC: 12.9 G/DL (ref 11.5–15.4)
IMM GRANULOCYTES # BLD AUTO: 0.09 THOUSAND/UL (ref 0–0.2)
IMM GRANULOCYTES NFR BLD AUTO: 1 % (ref 0–2)
LDLC SERPL CALC-MCNC: 122 MG/DL (ref 0–100)
LYMPHOCYTES # BLD AUTO: 1.81 THOUSANDS/ÂΜL (ref 0.6–4.47)
LYMPHOCYTES NFR BLD AUTO: 13 % (ref 14–44)
MCH RBC QN AUTO: 28.7 PG (ref 26.8–34.3)
MCHC RBC AUTO-ENTMCNC: 30.2 G/DL (ref 31.4–37.4)
MCV RBC AUTO: 95 FL (ref 82–98)
MONOCYTES # BLD AUTO: 0.88 THOUSAND/ÂΜL (ref 0.17–1.22)
MONOCYTES NFR BLD AUTO: 6 % (ref 4–12)
NEUTROPHILS # BLD AUTO: 10.54 THOUSANDS/ÂΜL (ref 1.85–7.62)
NEUTS SEG NFR BLD AUTO: 75 % (ref 43–75)
NONHDLC SERPL-MCNC: 175 MG/DL
NRBC BLD AUTO-RTO: 0 /100 WBCS
PLATELET # BLD AUTO: 262 THOUSANDS/UL (ref 149–390)
PMV BLD AUTO: 11.6 FL (ref 8.9–12.7)
POTASSIUM SERPL-SCNC: 5.1 MMOL/L (ref 3.5–5.3)
PROT SERPL-MCNC: 7.3 G/DL (ref 6.4–8.4)
RBC # BLD AUTO: 4.49 MILLION/UL (ref 3.81–5.12)
SODIUM SERPL-SCNC: 139 MMOL/L (ref 135–147)
TRIGL SERPL-MCNC: 265 MG/DL
TSH SERPL DL<=0.05 MIU/L-ACNC: 0.79 UIU/ML (ref 0.45–4.5)
WBC # BLD AUTO: 13.92 THOUSAND/UL (ref 4.31–10.16)

## 2024-09-10 PROCEDURE — 80061 LIPID PANEL: CPT

## 2024-09-10 PROCEDURE — 83036 HEMOGLOBIN GLYCOSYLATED A1C: CPT

## 2024-09-10 PROCEDURE — 80053 COMPREHEN METABOLIC PANEL: CPT

## 2024-09-10 PROCEDURE — 36415 COLL VENOUS BLD VENIPUNCTURE: CPT

## 2024-09-10 PROCEDURE — 85025 COMPLETE CBC W/AUTO DIFF WBC: CPT

## 2024-09-10 PROCEDURE — 84443 ASSAY THYROID STIM HORMONE: CPT

## 2024-09-10 PROCEDURE — 83880 ASSAY OF NATRIURETIC PEPTIDE: CPT

## 2024-09-11 LAB
EST. AVERAGE GLUCOSE BLD GHB EST-MCNC: 120 MG/DL
HBA1C MFR BLD: 5.8 %

## 2024-09-16 NOTE — PROGRESS NOTES
Ambulatory Visit  Name: Danyelle Martinez      : 1940      MRN: [id number]  Encounter Provider: Bladimir Caraballo MD  Encounter Date: 2024   Encounter department: [facility]    Assessment & Plan       Preventive health issues were discussed with patient, and age appropriate screening tests were ordered as noted in patient's After Visit Summary. Personalized health advice and appropriate referrals for health education or preventive services given if needed, as noted in patient's After Visit Summary.    History of Present Illness     HPI   Patient Care Team:  Bladimir Caraballo MD as PCP - General (Internal Medicine)    Review of Systems   Constitutional:  Negative for chills and fever.   HENT:  Negative for ear pain and sore throat.    Eyes:  Negative for pain and visual disturbance.   Respiratory:  Negative for cough and shortness of breath.    Cardiovascular:  Negative for chest pain and palpitations.   Gastrointestinal:  Negative for abdominal pain and vomiting.   Genitourinary:  Negative for dysuria and hematuria.   Musculoskeletal:  Negative for arthralgias and back pain.   Skin:  Negative for color change and rash.   Neurological:  Negative for seizures and syncope.   All other systems reviewed and are negative.  Medical History Reviewed by provider this encounter:       Annual Wellness Visit Questionnaire   Annual Wellness Visit  Social Determinants of Health     Financial Resource Strain: Low Risk  (2023)    Overall Financial Resource Strain (CARDIA)    • Difficulty of Paying Living Expenses: Not hard at all   Food Insecurity: No Food Insecurity (2024)    Hunger Vital Sign    • Worried About Running Out of Food in the Last Year: Never true    • Ran Out of Food in the Last Year: Never true   Transportation Needs: No Transportation Needs (2024)    PRAPARE - Transportation    • Lack of Transportation (Medical): No    • Lack of Transportation (Non-Medical): No   Housing  Stability: Low Risk  (7/16/2024)    Housing Stability Vital Sign    • Unable to Pay for Housing in the Last Year: No    • Number of Times Moved in the Last Year: 1    • Homeless in the Last Year: No   Utilities: Not At Risk (7/16/2024)    [facility] Utilities    • Threatened with loss of utilities: No     No results found.    Objective     There were no vitals taken for this visit.    Physical Exam

## 2024-09-16 NOTE — PATIENT INSTRUCTIONS
Medicare Preventive Visit Patient Instructions  Thank you for completing your Welcome to Medicare Visit or Medicare Annual Wellness Visit today. Your next wellness visit will be due in one year (9/18/2025).  The screening/preventive services that you may require over the next 5-10 years are detailed below. Some tests may not apply to you based off risk factors and/or age. Screening tests ordered at today's visit but not completed yet may show as past due. Also, please note that scanned in results may not display below.  Preventive Screenings:  Service Recommendations Previous Testing/Comments   Colorectal Cancer Screening  * Colonoscopy    * Fecal Occult Blood Test (FOBT)/Fecal Immunochemical Test (FIT)  * Fecal DNA/Cologuard Test  * Flexible Sigmoidoscopy Age: 45-75 years old   Colonoscopy: every 10 years (may be performed more frequently if at higher risk)  OR  FOBT/FIT: every 1 year  OR  Cologuard: every 3 years  OR  Sigmoidoscopy: every 5 years  Screening may be recommended earlier than age 45 if at higher risk for colorectal cancer. Also, an individualized decision between you and your healthcare provider will decide whether screening between the ages of 76-85 would be appropriate. Colonoscopy: Not on file  FOBT/FIT: Not on file  Cologuard: Not on file  Sigmoidoscopy: Not on file          Breast Cancer Screening Age: 40+ years old  Frequency: every 1-2 years  Not required if history of left and right mastectomy Mammogram: 08/13/2019        Cervical Cancer Screening Between the ages of 21-29, pap smear recommended once every 3 years.   Between the ages of 30-65, can perform pap smear with HPV co-testing every 5 years.   Recommendations may differ for women with a history of total hysterectomy, cervical cancer, or abnormal pap smears in past. Pap Smear: Not on file        Hepatitis C Screening Once for adults born between 1945 and 1965  More frequently in patients at high risk for Hepatitis C Hep C Antibody:  12/29/2020        Diabetes Screening 1-2 times per year if you're at risk for diabetes or have pre-diabetes Fasting glucose: 120 mg/dL (9/10/2024)  A1C: 5.8 % (9/10/2024)      Cholesterol Screening Once every 5 years if you don't have a lipid disorder. May order more often based on risk factors. Lipid panel: 09/10/2024          Other Preventive Screenings Covered by Medicare:  Abdominal Aortic Aneurysm (AAA) Screening: covered once if your at risk. You're considered to be at risk if you have a family history of AAA.  Lung Cancer Screening: covers low dose CT scan once per year if you meet all of the following conditions: (1) Age 55-77; (2) No signs or symptoms of lung cancer; (3) Current smoker or have quit smoking within the last 15 years; (4) You have a tobacco smoking history of at least 20 pack years (packs per day multiplied by number of years you smoked); (5) You get a written order from a healthcare provider.  Glaucoma Screening: covered annually if you're considered high risk: (1) You have diabetes OR (2) Family history of glaucoma OR (3)  aged 50 and older OR (4)  American aged 65 and older  Osteoporosis Screening: covered every 2 years if you meet one of the following conditions: (1) You're estrogen deficient and at risk for osteoporosis based off medical history and other findings; (2) Have a vertebral abnormality; (3) On glucocorticoid therapy for more than 3 months; (4) Have primary hyperparathyroidism; (5) On osteoporosis medications and need to assess response to drug therapy.   Last bone density test (DXA Scan): 09/13/2023.  HIV Screening: covered annually if you're between the age of 15-65. Also covered annually if you are younger than 15 and older than 65 with risk factors for HIV infection. For pregnant patients, it is covered up to 3 times per pregnancy.    Immunizations:  Immunization Recommendations   Influenza Vaccine Annual influenza vaccination during flu season is  recommended for all persons aged >= 6 months who do not have contraindications   Pneumococcal Vaccine   * Pneumococcal conjugate vaccine = PCV13 (Prevnar 13), PCV15 (Vaxneuvance), PCV20 (Prevnar 20)  * Pneumococcal polysaccharide vaccine = PPSV23 (Pneumovax) Adults 19-65 yo with certain risk factors or if 65+ yo  If never received any pneumonia vaccine: recommend Prevnar 20 (PCV20)  Give PCV20 if previously received 1 dose of PCV13 or PPSV23   Hepatitis B Vaccine 3 dose series if at intermediate or high risk (ex: diabetes, end stage renal disease, liver disease)   Respiratory syncytial virus (RSV) Vaccine - COVERED BY MEDICARE PART D  * RSVPreF3 (Arexvy) CDC recommends that adults 60 years of age and older may receive a single dose of RSV vaccine using shared clinical decision-making (SCDM)   Tetanus (Td) Vaccine - COST NOT COVERED BY MEDICARE PART B Following completion of primary series, a booster dose should be given every 10 years to maintain immunity against tetanus. Td may also be given as tetanus wound prophylaxis.   Tdap Vaccine - COST NOT COVERED BY MEDICARE PART B Recommended at least once for all adults. For pregnant patients, recommended with each pregnancy.   Shingles Vaccine (Shingrix) - COST NOT COVERED BY MEDICARE PART B  2 shot series recommended in those 19 years and older who have or will have weakened immune systems or those 50 years and older     Health Maintenance Due:      Topic Date Due   • Hepatitis C Screening  Completed     Immunizations Due:      Topic Date Due   • Pneumococcal Vaccine: 65+ Years (1 of 2 - PCV) Never done   • Hepatitis A Vaccine (1 of 2 - Risk 2-dose series) Never done   • COVID-19 Vaccine (5 - 2023-24 season) 09/01/2024   • Influenza Vaccine (1) 09/01/2024     Advance Directives   What are advance directives?  Advance directives are legal documents that state your wishes and plans for medical care. These plans are made ahead of time in case you lose your ability to make  decisions for yourself. Advance directives can apply to any medical decision, such as the treatments you want, and if you want to donate organs.   What are the types of advance directives?  There are many types of advance directives, and each state has rules about how to use them. You may choose a combination of any of the following:  Living will:  This is a written record of the treatment you want. You can also choose which treatments you do not want, which to limit, and which to stop at a certain time. This includes surgery, medicine, IV fluid, and tube feedings.   Durable power of  for healthcare (DPAHC):  This is a written record that states who you want to make healthcare choices for you when you are unable to make them for yourself. This person, called a proxy, is usually a family member or a friend. You may choose more than 1 proxy.  Do not resuscitate (DNR) order:  A DNR order is used in case your heart stops beating or you stop breathing. It is a request not to have certain forms of treatment, such as CPR. A DNR order may be included in other types of advance directives.  Medical directive:  This covers the care that you want if you are in a coma, near death, or unable to make decisions for yourself. You can list the treatments you want for each condition. Treatment may include pain medicine, surgery, blood transfusions, dialysis, IV or tube feedings, and a ventilator (breathing machine).  Values history:  This document has questions about your views, beliefs, and how you feel and think about life. This information can help others choose the care that you would choose.  Why are advance directives important?  An advance directive helps you control your care. Although spoken wishes may be used, it is better to have your wishes written down. Spoken wishes can be misunderstood, or not followed. Treatments may be given even if you do not want them. An advance directive may make it easier for your family  to make difficult choices about your care.   Urinary Incontinence   Urinary incontinence (UI)  is when you lose control of your bladder. UI develops because your bladder cannot store or empty urine properly. The 3 most common types of UI are stress incontinence, urge incontinence, or both.  Medicines:   May be given to help strengthen your bladder control. Report any side effects of medication to your healthcare provider.  Do pelvic muscle exercises often:  Your pelvic muscles help you stop urinating. Squeeze these muscles tight for 5 seconds, then relax for 5 seconds. Gradually work up to squeezing for 10 seconds. Do 3 sets of 15 repetitions a day, or as directed. This will help strengthen your pelvic muscles and improve bladder control.  Train your bladder:  Go to the bathroom at set times, such as every 2 hours, even if you do not feel the urge to go. You can also try to hold your urine when you feel the urge to go. For example, hold your urine for 5 minutes when you feel the urge to go. As that becomes easier, hold your urine for 10 minutes.   Self-care:   Keep a UI record.  Write down how often you leak urine and how much you leak. Make a note of what you were doing when you leaked urine.  Drink liquids as directed. You may need to limit the amount of liquid you drink to help control your urine leakage. Do not drink any liquid right before you go to bed. Limit or do not have drinks that contain caffeine or alcohol.   Prevent constipation.  Eat a variety of high-fiber foods. Good examples are high-fiber cereals, beans, vegetables, and whole-grain breads. Walking is the best way to trigger your intestines to have a bowel movement.  Exercise regularly and maintain a healthy weight.  Weight loss and exercise will decrease pressure on your bladder and help you control your leakage.   Use a catheter as directed  to help empty your bladder. A catheter is a tiny, plastic tube that is put into your bladder to drain your  urine.   Go to behavior therapy as directed.  Behavior therapy may be used to help you learn to control your urge to urinate.    Weight Management   Why it is important to manage your weight:  Being overweight increases your risk of health conditions such as heart disease, high blood pressure, type 2 diabetes, and certain types of cancer. It can also increase your risk for osteoarthritis, sleep apnea, and other respiratory problems. Aim for a slow, steady weight loss. Even a small amount of weight loss can lower your risk of health problems.  How to lose weight safely:  A safe and healthy way to lose weight is to eat fewer calories and get regular exercise. You can lose up about 1 pound a week by decreasing the number of calories you eat by 500 calories each day.   Healthy meal plan for weight management:  A healthy meal plan includes a variety of foods, contains fewer calories, and helps you stay healthy. A healthy meal plan includes the following:  Eat whole-grain foods more often.  A healthy meal plan should contain fiber. Fiber is the part of grains, fruits, and vegetables that is not broken down by your body. Whole-grain foods are healthy and provide extra fiber in your diet. Some examples of whole-grain foods are whole-wheat breads and pastas, oatmeal, brown rice, and bulgur.  Eat a variety of vegetables every day.  Include dark, leafy greens such as spinach, kale, cindy greens, and mustard greens. Eat yellow and orange vegetables such as carrots, sweet potatoes, and winter squash.   Eat a variety of fruits every day.  Choose fresh or canned fruit (canned in its own juice or light syrup) instead of juice. Fruit juice has very little or no fiber.  Eat low-fat dairy foods.  Drink fat-free (skim) milk or 1% milk. Eat fat-free yogurt and low-fat cottage cheese. Try low-fat cheeses such as mozzarella and other reduced-fat cheeses.  Choose meat and other protein foods that are low in fat.  Choose beans or other  legumes such as split peas or lentils. Choose fish, skinless poultry (chicken or turkey), or lean cuts of red meat (beef or pork). Before you cook meat or poultry, cut off any visible fat.   Use less fat and oil.  Try baking foods instead of frying them. Add less fat, such as margarine, sour cream, regular salad dressing and mayonnaise to foods. Eat fewer high-fat foods. Some examples of high-fat foods include french fries, doughnuts, ice cream, and cakes.  Eat fewer sweets.  Limit foods and drinks that are high in sugar. This includes candy, cookies, regular soda, and sweetened drinks.  Exercise:  Exercise at least 30 minutes per day on most days of the week. Some examples of exercise include walking, biking, dancing, and swimming. You can also fit in more physical activity by taking the stairs instead of the elevator or parking farther away from stores. Ask your healthcare provider about the best exercise plan for you.      © Copyright [employer] 2018 Information is for End User's use only and may not be sold, redistributed or otherwise used for commercial purposes. All illustrations and images included in CareNotes® are the copyrighted property of [employer] or [employer]      Medicare Preventive Visit Patient Instructions  Thank you for completing your Welcome to Medicare Visit or Medicare Annual Wellness Visit today. Your next wellness visit will be due in one year (9/18/2025).  The screening/preventive services that you may require over the next 5-10 years are detailed below. Some tests may not apply to you based off risk factors and/or age. Screening tests ordered at today's visit but not completed yet may show as past due. Also, please note that scanned in results may not display below.  Preventive Screenings:  Service Recommendations Previous Testing/Comments   Colorectal Cancer Screening  * Colonoscopy    * Fecal Occult Blood Test (FOBT)/Fecal Immunochemical Test (FIT)  * Fecal  DNA/Cologuard Test  * Flexible Sigmoidoscopy Age: 45-75 years old   Colonoscopy: every 10 years (may be performed more frequently if at higher risk)  OR  FOBT/FIT: every 1 year  OR  Cologuard: every 3 years  OR  Sigmoidoscopy: every 5 years  Screening may be recommended earlier than age 45 if at higher risk for colorectal cancer. Also, an individualized decision between you and your healthcare provider will decide whether screening between the ages of 76-85 would be appropriate. Colonoscopy: Not on file  FOBT/FIT: Not on file  Cologuard: Not on file  Sigmoidoscopy: Not on file          Breast Cancer Screening Age: 40+ years old  Frequency: every 1-2 years  Not required if history of left and right mastectomy Mammogram: 08/13/2019        Cervical Cancer Screening Between the ages of 21-29, pap smear recommended once every 3 years.   Between the ages of 30-65, can perform pap smear with HPV co-testing every 5 years.   Recommendations may differ for women with a history of total hysterectomy, cervical cancer, or abnormal pap smears in past. Pap Smear: Not on file        Hepatitis C Screening Once for adults born between 1945 and 1965  More frequently in patients at high risk for Hepatitis C Hep C Antibody: 12/29/2020        Diabetes Screening 1-2 times per year if you're at risk for diabetes or have pre-diabetes Fasting glucose: 120 mg/dL (9/10/2024)  A1C: 5.8 % (9/10/2024)      Cholesterol Screening Once every 5 years if you don't have a lipid disorder. May order more often based on risk factors. Lipid panel: 09/10/2024          Other Preventive Screenings Covered by Medicare:  Abdominal Aortic Aneurysm (AAA) Screening: covered once if your at risk. You're considered to be at risk if you have a family history of AAA.  Lung Cancer Screening: covers low dose CT scan once per year if you meet all of the following conditions: (1) Age 55-77; (2) No signs or symptoms of lung cancer; (3) Current smoker or have quit smoking  within the last 15 years; (4) You have a tobacco smoking history of at least 20 pack years (packs per day multiplied by number of years you smoked); (5) You get a written order from a healthcare provider.  Glaucoma Screening: covered annually if you're considered high risk: (1) You have diabetes OR (2) Family history of glaucoma OR (3)  aged 50 and older OR (4)  American aged 65 and older  Osteoporosis Screening: covered every 2 years if you meet one of the following conditions: (1) You're estrogen deficient and at risk for osteoporosis based off medical history and other findings; (2) Have a vertebral abnormality; (3) On glucocorticoid therapy for more than 3 months; (4) Have primary hyperparathyroidism; (5) On osteoporosis medications and need to assess response to drug therapy.   Last bone density test (DXA Scan): 09/13/2023.  HIV Screening: covered annually if you're between the age of 15-65. Also covered annually if you are younger than 15 and older than 65 with risk factors for HIV infection. For pregnant patients, it is covered up to 3 times per pregnancy.    Immunizations:  Immunization Recommendations   Influenza Vaccine Annual influenza vaccination during flu season is recommended for all persons aged >= 6 months who do not have contraindications   Pneumococcal Vaccine   * Pneumococcal conjugate vaccine = PCV13 (Prevnar 13), PCV15 (Vaxneuvance), PCV20 (Prevnar 20)  * Pneumococcal polysaccharide vaccine = PPSV23 (Pneumovax) Adults 19-65 yo with certain risk factors or if 65+ yo  If never received any pneumonia vaccine: recommend Prevnar 20 (PCV20)  Give PCV20 if previously received 1 dose of PCV13 or PPSV23   Hepatitis B Vaccine 3 dose series if at intermediate or high risk (ex: diabetes, end stage renal disease, liver disease)   Respiratory syncytial virus (RSV) Vaccine - COVERED BY MEDICARE PART D  * RSVPreF3 (Arexvy) CDC recommends that adults 60 years of age and older may receive  a single dose of RSV vaccine using shared clinical decision-making (SCDM)   Tetanus (Td) Vaccine - COST NOT COVERED BY MEDICARE PART B Following completion of primary series, a booster dose should be given every 10 years to maintain immunity against tetanus. Td may also be given as tetanus wound prophylaxis.   Tdap Vaccine - COST NOT COVERED BY MEDICARE PART B Recommended at least once for all adults. For pregnant patients, recommended with each pregnancy.   Shingles Vaccine (Shingrix) - COST NOT COVERED BY MEDICARE PART B  2 shot series recommended in those 19 years and older who have or will have weakened immune systems or those 50 years and older     Health Maintenance Due:      Topic Date Due   • Hepatitis C Screening  Completed     Immunizations Due:      Topic Date Due   • Pneumococcal Vaccine: 65+ Years (1 of 2 - PCV) Never done   • Hepatitis A Vaccine (1 of 2 - Risk 2-dose series) Never done   • COVID-19 Vaccine (5 - 2023-24 season) 09/01/2024   • Influenza Vaccine (1) 09/01/2024     Advance Directives   What are advance directives?  Advance directives are legal documents that state your wishes and plans for medical care. These plans are made ahead of time in case you lose your ability to make decisions for yourself. Advance directives can apply to any medical decision, such as the treatments you want, and if you want to donate organs.   What are the types of advance directives?  There are many types of advance directives, and each state has rules about how to use them. You may choose a combination of any of the following:  Living will:  This is a written record of the treatment you want. You can also choose which treatments you do not want, which to limit, and which to stop at a certain time. This includes surgery, medicine, IV fluid, and tube feedings.   Durable power of  for healthcare (DPAHC):  This is a written record that states who you want to make healthcare choices for you when you are  unable to make them for yourself. This person, called a proxy, is usually a family member or a friend. You may choose more than 1 proxy.  Do not resuscitate (DNR) order:  A DNR order is used in case your heart stops beating or you stop breathing. It is a request not to have certain forms of treatment, such as CPR. A DNR order may be included in other types of advance directives.  Medical directive:  This covers the care that you want if you are in a coma, near death, or unable to make decisions for yourself. You can list the treatments you want for each condition. Treatment may include pain medicine, surgery, blood transfusions, dialysis, IV or tube feedings, and a ventilator (breathing machine).  Values history:  This document has questions about your views, beliefs, and how you feel and think about life. This information can help others choose the care that you would choose.  Why are advance directives important?  An advance directive helps you control your care. Although spoken wishes may be used, it is better to have your wishes written down. Spoken wishes can be misunderstood, or not followed. Treatments may be given even if you do not want them. An advance directive may make it easier for your family to make difficult choices about your care.   Urinary Incontinence   Urinary incontinence (UI)  is when you lose control of your bladder. UI develops because your bladder cannot store or empty urine properly. The 3 most common types of UI are stress incontinence, urge incontinence, or both.  Medicines:   May be given to help strengthen your bladder control. Report any side effects of medication to your healthcare provider.  Do pelvic muscle exercises often:  Your pelvic muscles help you stop urinating. Squeeze these muscles tight for 5 seconds, then relax for 5 seconds. Gradually work up to squeezing for 10 seconds. Do 3 sets of 15 repetitions a day, or as directed. This will help strengthen your pelvic muscles and  improve bladder control.  Train your bladder:  Go to the bathroom at set times, such as every 2 hours, even if you do not feel the urge to go. You can also try to hold your urine when you feel the urge to go. For example, hold your urine for 5 minutes when you feel the urge to go. As that becomes easier, hold your urine for 10 minutes.   Self-care:   Keep a UI record.  Write down how often you leak urine and how much you leak. Make a note of what you were doing when you leaked urine.  Drink liquids as directed. You may need to limit the amount of liquid you drink to help control your urine leakage. Do not drink any liquid right before you go to bed. Limit or do not have drinks that contain caffeine or alcohol.   Prevent constipation.  Eat a variety of high-fiber foods. Good examples are high-fiber cereals, beans, vegetables, and whole-grain breads. Walking is the best way to trigger your intestines to have a bowel movement.  Exercise regularly and maintain a healthy weight.  Weight loss and exercise will decrease pressure on your bladder and help you control your leakage.   Use a catheter as directed  to help empty your bladder. A catheter is a tiny, plastic tube that is put into your bladder to drain your urine.   Go to behavior therapy as directed.  Behavior therapy may be used to help you learn to control your urge to urinate.    Weight Management   Why it is important to manage your weight:  Being overweight increases your risk of health conditions such as heart disease, high blood pressure, type 2 diabetes, and certain types of cancer. It can also increase your risk for osteoarthritis, sleep apnea, and other respiratory problems. Aim for a slow, steady weight loss. Even a small amount of weight loss can lower your risk of health problems.  How to lose weight safely:  A safe and healthy way to lose weight is to eat fewer calories and get regular exercise. You can lose up about 1 pound a week by decreasing the  number of calories you eat by 500 calories each day.   Healthy meal plan for weight management:  A healthy meal plan includes a variety of foods, contains fewer calories, and helps you stay healthy. A healthy meal plan includes the following:  Eat whole-grain foods more often.  A healthy meal plan should contain fiber. Fiber is the part of grains, fruits, and vegetables that is not broken down by your body. Whole-grain foods are healthy and provide extra fiber in your diet. Some examples of whole-grain foods are whole-wheat breads and pastas, oatmeal, brown rice, and bulgur.  Eat a variety of vegetables every day.  Include dark, leafy greens such as spinach, kale, cindy greens, and mustard greens. Eat yellow and orange vegetables such as carrots, sweet potatoes, and winter squash.   Eat a variety of fruits every day.  Choose fresh or canned fruit (canned in its own juice or light syrup) instead of juice. Fruit juice has very little or no fiber.  Eat low-fat dairy foods.  Drink fat-free (skim) milk or 1% milk. Eat fat-free yogurt and low-fat cottage cheese. Try low-fat cheeses such as mozzarella and other reduced-fat cheeses.  Choose meat and other protein foods that are low in fat.  Choose beans or other legumes such as split peas or lentils. Choose fish, skinless poultry (chicken or turkey), or lean cuts of red meat (beef or pork). Before you cook meat or poultry, cut off any visible fat.   Use less fat and oil.  Try baking foods instead of frying them. Add less fat, such as margarine, sour cream, regular salad dressing and mayonnaise to foods. Eat fewer high-fat foods. Some examples of high-fat foods include french fries, doughnuts, ice cream, and cakes.  Eat fewer sweets.  Limit foods and drinks that are high in sugar. This includes candy, cookies, regular soda, and sweetened drinks.  Exercise:  Exercise at least 30 minutes per day on most days of the week. Some examples of exercise include walking, biking,  dancing, and swimming. You can also fit in more physical activity by taking the stairs instead of the elevator or parking farther away from stores. Ask your healthcare provider about the best exercise plan for you.      © Copyright [employer] 2018 Information is for End User's use only and may not be sold, redistributed or otherwise used for commercial purposes. All illustrations and images included in CareNotes® are the copyrighted property of [employer] or [employer]

## 2024-09-17 ENCOUNTER — OFFICE VISIT (OUTPATIENT)
Dept: FAMILY MEDICINE CLINIC | Facility: CLINIC | Age: 84
End: 2024-09-17
Payer: MEDICARE

## 2024-09-17 VITALS
DIASTOLIC BLOOD PRESSURE: 66 MMHG | SYSTOLIC BLOOD PRESSURE: 100 MMHG | BODY MASS INDEX: 26.48 KG/M2 | WEIGHT: 164.8 LBS | HEIGHT: 66 IN | OXYGEN SATURATION: 91 % | HEART RATE: 64 BPM

## 2024-09-17 DIAGNOSIS — I10 ESSENTIAL HYPERTENSION: ICD-10-CM

## 2024-09-17 DIAGNOSIS — E03.9 ACQUIRED HYPOTHYROIDISM: Primary | ICD-10-CM

## 2024-09-17 DIAGNOSIS — I82.413 ACUTE DEEP VEIN THROMBOSIS (DVT) OF FEMORAL VEIN OF BOTH LOWER EXTREMITIES (HCC): ICD-10-CM

## 2024-09-17 DIAGNOSIS — I27.82 CHRONIC PULMONARY EMBOLISM WITHOUT ACUTE COR PULMONALE, UNSPECIFIED PULMONARY EMBOLISM TYPE (HCC): ICD-10-CM

## 2024-09-17 DIAGNOSIS — R42 DIZZINESS: ICD-10-CM

## 2024-09-17 DIAGNOSIS — F41.9 ANXIETY: ICD-10-CM

## 2024-09-17 DIAGNOSIS — M17.11 PRIMARY OSTEOARTHRITIS OF RIGHT KNEE: ICD-10-CM

## 2024-09-17 DIAGNOSIS — E11.9 TYPE 2 DIABETES MELLITUS WITHOUT COMPLICATION, WITHOUT LONG-TERM CURRENT USE OF INSULIN (HCC): ICD-10-CM

## 2024-09-17 DIAGNOSIS — E78.5 DYSLIPIDEMIA: ICD-10-CM

## 2024-09-17 DIAGNOSIS — Z00.00 MEDICARE ANNUAL WELLNESS VISIT, SUBSEQUENT: ICD-10-CM

## 2024-09-17 DIAGNOSIS — E04.9 ENLARGED THYROID: ICD-10-CM

## 2024-09-17 DIAGNOSIS — R29.6 FREQUENT FALLS: ICD-10-CM

## 2024-09-17 DIAGNOSIS — Z86.73 HISTORY OF TRANSIENT ISCHEMIC ATTACK (TIA): ICD-10-CM

## 2024-09-17 DIAGNOSIS — J96.11 CHRONIC HYPOXEMIC RESPIRATORY FAILURE (HCC): Chronic | ICD-10-CM

## 2024-09-17 DIAGNOSIS — E04.1 THYROID NODULE: ICD-10-CM

## 2024-09-17 DIAGNOSIS — K21.9 GASTROESOPHAGEAL REFLUX DISEASE WITHOUT ESOPHAGITIS: ICD-10-CM

## 2024-09-17 DIAGNOSIS — G47.34 NOCTURNAL HYPOXEMIA: ICD-10-CM

## 2024-09-17 DIAGNOSIS — I50.32 CHRONIC DIASTOLIC CHF (CONGESTIVE HEART FAILURE) (HCC): ICD-10-CM

## 2024-09-17 DIAGNOSIS — R10.13 DYSPEPSIA: ICD-10-CM

## 2024-09-17 DIAGNOSIS — F33.40 RECURRENT MAJOR DEPRESSIVE DISORDER, IN REMISSION (HCC): ICD-10-CM

## 2024-09-17 PROCEDURE — 99213 OFFICE O/P EST LOW 20 MIN: CPT | Performed by: INTERNAL MEDICINE

## 2024-09-17 PROCEDURE — G0439 PPPS, SUBSEQ VISIT: HCPCS | Performed by: INTERNAL MEDICINE

## 2024-09-17 NOTE — ASSESSMENT & PLAN NOTE
Lab Results   Component Value Date    HGBA1C 5.8 (H) 09/10/2024   Currently not on any medication following strict diet A1c came down to 5.8 prediabetic range

## 2024-09-17 NOTE — ASSESSMENT & PLAN NOTE
Wt Readings from Last 3 Encounters:   09/17/24 74.8 kg (164 lb 12.8 oz)   09/06/24 73.5 kg (162 lb)   08/29/24 73.3 kg (161 lb 11.2 oz)   On clinical examination patient is euvolemic at this time no shortness of breath chest pain palpitation currently on Lasix 40 mg twice daily we will continue with the same for now chemistry is unremarkable.

## 2024-09-17 NOTE — ASSESSMENT & PLAN NOTE
Patient with a history of chronic pulmonary embolism status post thrombectomy stable on Eliquis

## 2024-09-17 NOTE — ASSESSMENT & PLAN NOTE
Blood pressure today is 110/70 on nebivolol and amlodipine however she does have a history of orthostatic hypotension on midodrine

## 2024-09-17 NOTE — ASSESSMENT & PLAN NOTE
Patient is currently on nebivolol, amlodipine for her blood pressure however she does have a history of orthostatic blood pressures for which we are giving her midodrine also will need to monitor blood pressures closely today's blood pressure is 100/66 in the office

## 2024-09-17 NOTE — ASSESSMENT & PLAN NOTE
Patient noted to have a thyroid nodule when she was in the hospital going to have an ultrasound of the thyroid done along with the biopsy of the thyroid nodule will follow it up

## 2024-09-17 NOTE — ASSESSMENT & PLAN NOTE
Patient is currently taking levothyroxine 88 mcg daily TSH level came back normal we will continue with the same dose for now.

## 2024-09-17 NOTE — ASSESSMENT & PLAN NOTE
Patient's cholesterol is coming up high at 211 triglycerides also went up high in spite of her being on Lipitor 80 mg at bedtime she is going to review her dietary habits we will be repeating the labs in another couple of months time again if better with that and is not working we will switch to a different kind of the rosuvastatin and see if it makes a difference.  In the past patient cholesterol has been running decent

## 2024-09-17 NOTE — ASSESSMENT & PLAN NOTE
Patient is going for ultrasound of the thyroid she was seen by the ENT who also recommended biopsy of the thyroid nodule

## 2024-09-17 NOTE — ASSESSMENT & PLAN NOTE
Patient with a history of DVT and PE on Eliquis will continue with the same for now patient denies any symptoms of shortness of breath chest pain or palpitation but she does have some shortness of breath with exertion because she is oxygen dependent and she feels weak and tired status post thrombectomy when she developed PE in the recent past

## 2024-09-17 NOTE — PROGRESS NOTES
Ambulatory Visit  Name: Danyelle Martinez      : 1940      MRN: [id number]  Encounter Provider: Bladimir Caraballo MD  Encounter Date: 2024   Encounter department: [facility] [place]    Assessment & Plan  Acquired hypothyroidism  Patient is currently taking levothyroxine 88 mcg daily TSH level came back normal we will continue with the same dose for now.       Acute deep vein thrombosis (DVT) of femoral vein of both lower extremities (HCC)  Patient with a history of DVT and PE on Eliquis will continue with the same for now patient denies any symptoms of shortness of breath chest pain or palpitation but she does have some shortness of breath with exertion because she is oxygen dependent and she feels weak and tired status post thrombectomy when she developed PE in the recent past       Anxiety  Patient is currently taking duloxetine 60 mg daily and also on clonazepam 0.5 mg as needed daily.  Continue with the same.       Chronic diastolic CHF (congestive heart failure) ([facility])  Wt Readings from Last 3 Encounters:   24 74.8 kg (164 lb 12.8 oz)   24 73.5 kg (162 lb)   24 73.3 kg (161 lb 11.2 oz)   On clinical examination patient is euvolemic at this time no shortness of breath chest pain palpitation currently on Lasix 40 mg twice daily we will continue with the same for now chemistry is unremarkable.                 Chronic hypoxemic respiratory failure ([facility])  Patient currently using 3 L of oxygen at home when she is not on oxygen her saturation tends to drop according to her it can sometimes go into 70s also.       Dizziness  Patient is currently on nebivolol, amlodipine for her blood pressure however she does have a history of orthostatic blood pressures for which we are giving her midodrine also will need to monitor blood pressures closely today's blood pressure is 100/66 in the office       Dyslipidemia  Patient's cholesterol is coming up high at 211 triglycerides also went up  high in spite of her being on Lipitor 80 mg at bedtime she is going to review her dietary habits we will be repeating the labs in another couple of months time again if better with that and is not working we will switch to a different kind of the rosuvastatin and see if it makes a difference.  In the past patient cholesterol has been running decent       Dyspepsia  Patient is on pantoprazole we will continue       Enlarged thyroid  Patient is going for ultrasound of the thyroid she was seen by the ENT who also recommended biopsy of the thyroid nodule       Essential hypertension  Blood pressure today is 110/70 on nebivolol and amlodipine however she does have a history of orthostatic hypotension on midodrine       Gastroesophageal reflux disease without esophagitis  Patient is on pantoprazole       History of transient ischemic attack (TIA)  Continue with atorvastatin and Plavix       Primary osteoarthritis of right knee         Recurrent major depressive disorder, in remission (HCC)  Continue with Cymbalta appears to be helping         Type 2 diabetes mellitus without complication, without long-term current use of insulin (HCC)    Lab Results   Component Value Date    HGBA1C 5.8 (H) 09/10/2024   Currently not on any medication following strict diet A1c came down to 5.8 prediabetic range         Thyroid nodule  Patient noted to have a thyroid nodule when she was in the hospital going to have an ultrasound of the thyroid done along with the biopsy of the thyroid nodule will follow it up       Frequent falls  History of frequent falls stable at present time patient ambulates with assistance of a cane       Nocturnal hypoxemia  Patient is on 3 L of oxygen at home       Chronic pulmonary embolism without acute cor pulmonale, unspecified pulmonary embolism type (HCC)  Patient with a history of chronic pulmonary embolism status post thrombectomy stable on Eliquis          Preventive health issues were discussed with  patient, and age appropriate screening tests were ordered as noted in patient's After Visit Summary. Personalized health advice and appropriate referrals for health education or preventive services given if needed, as noted in patient's After Visit Summary.    History of Present Illness     Patient is coming here for a follow-up evaluation with regards to the symptoms of hypertension with orthostatic blood pressure changes also patient with hypothyroidism history of DVT anxiety disorder chronic hypoxemic respiratory failure.  Recently patient had urinary tract infection which got resolved with antibiotic.  Denies any symptoms of chest pain palpitation shortness of breath at present time.    Patient had lab work done which showed an A1c of 5.8 much better WBC was mildly elevated.  Medication reviewed labs reviewed.     Patient Care Team:  Bladimir Caraballo MD as PCP - General (Internal Medicine)    Review of Systems   Constitutional:  Negative for chills and fever.   HENT:  Negative for ear pain and sore throat.    Eyes:  Negative for pain and visual disturbance.   Respiratory:  Negative for cough and shortness of breath.    Cardiovascular:  Negative for chest pain and palpitations.   Gastrointestinal:  Negative for abdominal pain and vomiting.   Genitourinary:  Negative for dysuria and hematuria.   Musculoskeletal:  Positive for arthralgias. Negative for back pain.   Skin:  Negative for color change and rash.   Neurological:  Negative for seizures and syncope.   All other systems reviewed and are negative.    Medical History Reviewed by provider this encounter:       Annual Wellness Visit Questionnaire   Danyelle is here for her Subsequent Wellness visit. Last Medicare Wellness visit information reviewed, patient interviewed, no change since last AWV. Last Medicare Wellness visit information reviewed, patient interviewed and updates made to the record today.      Health Risk Assessment:   Patient rates overall health as  good. Patient feels that their physical health rating is slightly better. Patient is very satisfied with their life. Eyesight was rated as slightly worse. Hearing was rated as slightly worse. Patient feels that their emotional and mental health rating is same. Patients states they are never, rarely angry. Patient states they are often unusually tired/fatigued. Pain experienced in the last 7 days has been none. Patient states that she has experienced no weight loss or gain in last 6 months. Weight is stable    Fall Risk Screening:   In the past year, patient has experienced: history of falling in past year    Number of falls: 1  Injured during fall?: No    Feels unsteady when standing or walking?: Yes    Worried about falling?: Yes      Urinary Incontinence Screening:   Patient has leaked urine accidently in the last six months. Uses 4 pads a day    Home Safety:  Patient does not have trouble with stairs inside or outside of their home. Patient has working smoke alarms and has working carbon monoxide detector. Home safety hazards include: none. No home safety hazards    Nutrition:   Current diet is No Added Salt, Diabetic and Low Cholesterol. No sugar    Medications:   Patient is currently taking over-the-counter supplements. OTC medications include: see medication list. Patient is able to manage medications. No problems managing meds    Activities of Daily Living (ADLs)/Instrumental Activities of Daily Living (IADLs):   Walk and transfer into and out of bed and chair?: Yes  Dress and groom yourself?: Yes    Bathe or shower yourself?: Yes    Feed yourself? Yes  Do your laundry/housekeeping?: Yes  Manage your money, pay your bills and track your expenses?: Yes  Make your own meals?: Yes    Do your own shopping?: Yes    ADL comments: Pt is independent    Previous Hospitalizations:   Any hospitalizations or ED visits within the last 12 months?: Yes    How many hospitalizations have you had in the last year?: more than  4    Hospitalization Comments: Conditions are stable    Advance Care Planning:   Living will: Yes    Durable POA for healthcare: Yes    Advanced directive: Yes    Advanced directive counseling given: Yes    Five wishes given: No    Patient declined ACP directive: No    End of Life Decisions reviewed with patient: Yes    Provider agrees with end of life decisions: Yes      Comments: Patient has living will, DURABLE POWER OF , advanced directives she is going to bring a copy of that for the records to the next visit.  According to the patient she has not clearly mentioned her wishes and the above documents.    Cognitive Screening:   Provider or family/friend/caregiver concerned regarding cognition?: No    PREVENTIVE SCREENINGS      Cardiovascular Screening:    General: History Lipid Disorder and Screening Current      Diabetes Screening:     General: History Diabetes and Screening Current      Colorectal Cancer Screening:     General: Screening Not Indicated      Breast Cancer Screening:     General: Risks and Benefits Discussed and Screening Not Indicated      Cervical Cancer Screening:    General: Screening Not Indicated      Osteoporosis Screening:    General: History Osteoporosis and Screening Current    Due for: DXA Axial      Abdominal Aortic Aneurysm (AAA) Screening:        General: Risks and Benefits Discussed      Lung Cancer Screening:     General: Screening Not Indicated      Hepatitis C Screening:    General: Screening Current    Screening, Brief Intervention, and Referral to Treatment (SBIRT)    Screening    Typical number of drinks in a week: 0    AUDIT-C Screenin) How often did you have a drink containing alcohol in the past year? never  2) How many drinks did you have on a typical day when you were drinking in the past year? 0  3) How often did you have 6 or more drinks on one occasion in the past year? never    AUDIT-C Score: 0  Interpretation: Score 0-2 (female): Negative screen for  "alcohol misuse    Single Item Drug Screening:  How often have you used an illegal drug (including marijuana) or a prescription medication for non-medical reasons in the past year? never    Single Item Drug Screen Score: 0  Interpretation: Negative screen for possible drug use disorder    Brief Intervention  Alcohol & drug use screenings were reviewed. No concerns regarding substance use disorder identified.     Other Counseling Topics:   Car/seat belt/driving safety and calcium and vitamin D intake and regular weightbearing exercise.     Social Determinants of Health     Financial Resource Strain: Low Risk  (5/31/2023)    Overall Financial Resource Strain (CARDIA)     Difficulty of Paying Living Expenses: Not hard at all   Food Insecurity: No Food Insecurity (7/16/2024)    Hunger Vital Sign     Worried About Running Out of Food in the Last Year: Never true     Ran Out of Food in the Last Year: Never true   Transportation Needs: No Transportation Needs (7/16/2024)    PRAPARE - Transportation     Lack of Transportation (Medical): No     Lack of Transportation (Non-Medical): No   Housing Stability: Low Risk  (7/16/2024)    Housing Stability Vital Sign     Unable to Pay for Housing in the Last Year: No     Number of Times Moved in the Last Year: 1     Homeless in the Last Year: No   Utilities: Not At Risk (7/16/2024)    [facility] Utilities     Threatened with loss of utilities: No     No results found.    Objective     Ht 5' 6\" (1.676 m)   BMI 26.15 kg/m²     Physical Exam  Vitals and nursing note reviewed.   Constitutional:       General: She is not in acute distress.     Appearance: She is well-developed.   HENT:      Head: Normocephalic and atraumatic.   Eyes:      Conjunctiva/sclera: Conjunctivae normal.   Cardiovascular:      Rate and Rhythm: Normal rate and regular rhythm.      Heart sounds: No murmur heard.  Pulmonary:      Effort: Pulmonary effort is normal. No respiratory distress.      Breath sounds: Normal breath " sounds.   Abdominal:      Palpations: Abdomen is soft.      Tenderness: There is no abdominal tenderness.   Musculoskeletal:         General: No swelling.      Cervical back: Neck supple.   Skin:     General: Skin is warm and dry.      Capillary Refill: Capillary refill takes less than 2 seconds.   Neurological:      Mental Status: She is alert and oriented to person, place, and time.   Psychiatric:         Mood and Affect: Mood normal.

## 2024-09-17 NOTE — ASSESSMENT & PLAN NOTE
Patient currently using 3 L of oxygen at home when she is not on oxygen her saturation tends to drop according to her it can sometimes go into 70s also.

## 2024-09-17 NOTE — ASSESSMENT & PLAN NOTE
Patient is currently taking duloxetine 60 mg daily and also on clonazepam 0.5 mg as needed daily.  Continue with the same.

## 2024-09-19 DIAGNOSIS — I10 ESSENTIAL HYPERTENSION: ICD-10-CM

## 2024-09-19 RX ORDER — NEBIVOLOL 5 MG/1
5 TABLET ORAL DAILY
Qty: 30 TABLET | Refills: 5 | Status: SHIPPED | OUTPATIENT
Start: 2024-09-19

## 2024-09-28 PROBLEM — N39.0 UTI (URINARY TRACT INFECTION): Status: RESOLVED | Noted: 2024-08-29 | Resolved: 2024-09-28

## 2024-10-06 ENCOUNTER — APPOINTMENT (EMERGENCY)
Dept: RADIOLOGY | Facility: HOSPITAL | Age: 84
End: 2024-10-06
Payer: MEDICARE

## 2024-10-06 ENCOUNTER — HOSPITAL ENCOUNTER (EMERGENCY)
Facility: HOSPITAL | Age: 84
Discharge: HOME | End: 2024-10-06
Attending: EMERGENCY MEDICINE
Payer: MEDICARE

## 2024-10-06 VITALS
HEART RATE: 63 BPM | SYSTOLIC BLOOD PRESSURE: 165 MMHG | DIASTOLIC BLOOD PRESSURE: 70 MMHG | RESPIRATION RATE: 18 BRPM | TEMPERATURE: 97.6 F | OXYGEN SATURATION: 99 %

## 2024-10-06 DIAGNOSIS — E83.42 HYPOMAGNESEMIA: ICD-10-CM

## 2024-10-06 DIAGNOSIS — S50.819A FOREARM ABRASION: ICD-10-CM

## 2024-10-06 DIAGNOSIS — R53.1 WEAKNESS: ICD-10-CM

## 2024-10-06 DIAGNOSIS — S09.90XA CLOSED HEAD INJURY, INITIAL ENCOUNTER: Primary | ICD-10-CM

## 2024-10-06 LAB
2HR DELTA HS TROPONIN: 0 NG/L
ALBUMIN SERPL BCG-MCNC: 4 G/DL (ref 3.5–5)
ALP SERPL-CCNC: 69 U/L (ref 34–104)
ALT SERPL W P-5'-P-CCNC: 17 U/L (ref 7–52)
ANION GAP SERPL CALCULATED.3IONS-SCNC: 7 MMOL/L (ref 4–13)
APTT PPP: 30 SECONDS (ref 23–34)
AST SERPL W P-5'-P-CCNC: 28 U/L (ref 13–39)
BASOPHILS # BLD AUTO: 0.05 THOUSANDS/ΜL (ref 0–0.1)
BASOPHILS NFR BLD AUTO: 1 % (ref 0–1)
BILIRUB SERPL-MCNC: 0.39 MG/DL (ref 0.2–1)
BUN SERPL-MCNC: 25 MG/DL (ref 5–25)
CALCIUM SERPL-MCNC: 9.6 MG/DL (ref 8.4–10.2)
CARDIAC TROPONIN I PNL SERPL HS: 3 NG/L
CARDIAC TROPONIN I PNL SERPL HS: 3 NG/L
CHLORIDE SERPL-SCNC: 102 MMOL/L (ref 96–108)
CO2 SERPL-SCNC: 29 MMOL/L (ref 21–32)
CREAT SERPL-MCNC: 0.78 MG/DL (ref 0.6–1.3)
EOSINOPHIL # BLD AUTO: 0.43 THOUSAND/ΜL (ref 0–0.61)
EOSINOPHIL NFR BLD AUTO: 5 % (ref 0–6)
ERYTHROCYTE [DISTWIDTH] IN BLOOD BY AUTOMATED COUNT: 16.2 % (ref 11.6–15.1)
GFR SERPL CREATININE-BSD FRML MDRD: 69 ML/MIN/1.73SQ M
GLUCOSE SERPL-MCNC: 119 MG/DL (ref 65–140)
HCT VFR BLD AUTO: 38.8 % (ref 34.8–46.1)
HGB BLD-MCNC: 12 G/DL (ref 11.5–15.4)
IMM GRANULOCYTES # BLD AUTO: 0.03 THOUSAND/UL (ref 0–0.2)
IMM GRANULOCYTES NFR BLD AUTO: 0 % (ref 0–2)
INR PPP: 1.19 (ref 0.85–1.19)
LYMPHOCYTES # BLD AUTO: 1.14 THOUSANDS/ΜL (ref 0.6–4.47)
LYMPHOCYTES NFR BLD AUTO: 12 % (ref 14–44)
MAGNESIUM SERPL-MCNC: 1.7 MG/DL (ref 1.9–2.7)
MCH RBC QN AUTO: 29.6 PG (ref 26.8–34.3)
MCHC RBC AUTO-ENTMCNC: 30.9 G/DL (ref 31.4–37.4)
MCV RBC AUTO: 96 FL (ref 82–98)
MONOCYTES # BLD AUTO: 0.58 THOUSAND/ΜL (ref 0.17–1.22)
MONOCYTES NFR BLD AUTO: 6 % (ref 4–12)
NEUTROPHILS # BLD AUTO: 7.03 THOUSANDS/ΜL (ref 1.85–7.62)
NEUTS SEG NFR BLD AUTO: 76 % (ref 43–75)
NRBC BLD AUTO-RTO: 0 /100 WBCS
PLATELET # BLD AUTO: 185 THOUSANDS/UL (ref 149–390)
PMV BLD AUTO: 11 FL (ref 8.9–12.7)
POTASSIUM SERPL-SCNC: 3.9 MMOL/L (ref 3.5–5.3)
PROT SERPL-MCNC: 6.9 G/DL (ref 6.4–8.4)
PROTHROMBIN TIME: 15.6 SECONDS (ref 12.3–15)
RBC # BLD AUTO: 4.06 MILLION/UL (ref 3.81–5.12)
SODIUM SERPL-SCNC: 138 MMOL/L (ref 135–147)
WBC # BLD AUTO: 9.26 THOUSAND/UL (ref 4.31–10.16)

## 2024-10-06 PROCEDURE — 36415 COLL VENOUS BLD VENIPUNCTURE: CPT | Performed by: EMERGENCY MEDICINE

## 2024-10-06 PROCEDURE — 85730 THROMBOPLASTIN TIME PARTIAL: CPT | Performed by: EMERGENCY MEDICINE

## 2024-10-06 PROCEDURE — 85025 COMPLETE CBC W/AUTO DIFF WBC: CPT | Performed by: EMERGENCY MEDICINE

## 2024-10-06 PROCEDURE — 90715 TDAP VACCINE 7 YRS/> IM: CPT | Performed by: EMERGENCY MEDICINE

## 2024-10-06 PROCEDURE — 83735 ASSAY OF MAGNESIUM: CPT | Performed by: EMERGENCY MEDICINE

## 2024-10-06 PROCEDURE — 96365 THER/PROPH/DIAG IV INF INIT: CPT

## 2024-10-06 PROCEDURE — 99284 EMERGENCY DEPT VISIT MOD MDM: CPT | Performed by: EMERGENCY MEDICINE

## 2024-10-06 PROCEDURE — 84484 ASSAY OF TROPONIN QUANT: CPT | Performed by: EMERGENCY MEDICINE

## 2024-10-06 PROCEDURE — 85610 PROTHROMBIN TIME: CPT | Performed by: EMERGENCY MEDICINE

## 2024-10-06 PROCEDURE — 80053 COMPREHEN METABOLIC PANEL: CPT | Performed by: EMERGENCY MEDICINE

## 2024-10-06 PROCEDURE — 99285 EMERGENCY DEPT VISIT HI MDM: CPT

## 2024-10-06 PROCEDURE — 93005 ELECTROCARDIOGRAM TRACING: CPT

## 2024-10-06 PROCEDURE — 70450 CT HEAD/BRAIN W/O DYE: CPT

## 2024-10-06 PROCEDURE — 90471 IMMUNIZATION ADMIN: CPT

## 2024-10-06 RX ORDER — GINSENG 100 MG
1 CAPSULE ORAL ONCE
Status: COMPLETED | OUTPATIENT
Start: 2024-10-06 | End: 2024-10-06

## 2024-10-06 RX ORDER — MAGNESIUM SULFATE HEPTAHYDRATE 40 MG/ML
2 INJECTION, SOLUTION INTRAVENOUS ONCE
Status: COMPLETED | OUTPATIENT
Start: 2024-10-06 | End: 2024-10-06

## 2024-10-06 RX ADMIN — BACITRACIN ZINC 1 SMALL APPLICATION: 500 OINTMENT TOPICAL at 11:29

## 2024-10-06 RX ADMIN — TETANUS TOXOID, REDUCED DIPHTHERIA TOXOID AND ACELLULAR PERTUSSIS VACCINE, ADSORBED 0.5 ML: 5; 2.5; 8; 8; 2.5 SUSPENSION INTRAMUSCULAR at 12:24

## 2024-10-06 RX ADMIN — MAGNESIUM SULFATE HEPTAHYDRATE 2 G: 40 INJECTION, SOLUTION INTRAVENOUS at 13:32

## 2024-10-06 NOTE — ED PROVIDER NOTES
Final diagnoses:   Closed head injury, initial encounter   Forearm abrasion   Weakness   Hypomagnesemia     ED Disposition       ED Disposition   Discharge    Condition   Stable    Date/Time   Sun Oct 6, 2024  1:10 PM    Comment   Danyelle Martinez discharge to home/self care.                   Assessment & Plan       Medical Decision Making  84-year-old female in the ED after mechanical fall.  As patient also with complaint of generalized weakness, labs, EKG, head CT to evaluate for electrolyte derangement, possible ACS, acute traumatic pathology.  Mild hypomagnesemia noted and repleted in the ED.  Patient remains asymptomatic in the ED and is discharged into the care of her daughter.    Amount and/or Complexity of Data Reviewed  Labs: ordered.  Radiology: ordered.    Risk  OTC drugs.  Prescription drug management.             Medications   bacitracin topical ointment 1 small application (1 small application Topical Given 10/6/24 1129)   tetanus-diphtheria-acellular pertussis (BOOSTRIX) IM injection 0.5 mL (0.5 mL Intramuscular Given 10/6/24 1224)   magnesium sulfate 2 g/50 mL IVPB (premix) 2 g (0 g Intravenous Stopped 10/6/24 1437)       ED Risk Strat Scores   HEART Risk Score      Flowsheet Row Most Recent Value   Heart Score Risk Calculator    History 1 Filed at: 10/06/2024 1300   ECG 0 Filed at: 10/06/2024 1300   Age 2 Filed at: 10/06/2024 1300   Risk Factors 1 Filed at: 10/06/2024 1300   Troponin 0 Filed at: 10/06/2024 1300   HEART Score 4 Filed at: 10/06/2024 1300                                                   History of Present Illness       Chief Complaint   Patient presents with    Fall     Pt brought in by S for trip and fall. Did hit her head, no LOC, is on thinners.        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     CHF (congestive heart failure) ([facility])     Closed wedge compression fracture of T1  vertebra with routine healing 2023    Deaf, left     Depression     Diabetes mellitus (HCC)     Disease of thyroid gland     DVT complicating pregnancy, unspecified trimester (HCC) postpartum    Fall 11/15/2023    Hearing loss     LEFT EAR    History of shingles 2007    fACIAL     Hyperlipidemia     Hypertension     Liver disease     Lyme disease     Meniere's disease     Obesity     Orthostatic hypotension     Psychiatric problem     Sinus congestion     Sleep apnea     Stroke (HCC)       Past Surgical History:   Procedure Laterality Date    APPENDECTOMY      BREAST BIOPSY Left 2010     SECTION      x2    DXA PROCEDURE (HISTORICAL)  10/28/2020    EYE SURGERY      HAND SURGERY Left     HERNIA REPAIR      HYSTERECTOMY      IR PE ENDOVASCULAR THERAPY  2024    ROTATOR CUFF REPAIR Left     TONSILLECTOMY      TYMPANOSTOMY TUBE PLACEMENT        Family History   Problem Relation Age of Onset    Depression Mother     Hypertension Mother     Obesity Mother     Depression Father     Alcohol abuse Father     Stroke Father     Breast cancer Sister 68    Ovarian cancer Daughter 53    BRCA1 Negative Daughter     BRCA2 Negative Daughter       Social History     Tobacco Use    Smoking status: Former     Current packs/day: 0.00     Average packs/day: 0.8 packs/day for 44.0 years (33.0 ttl pk-yrs)     Types: Cigarettes     Start date: 1946     Quit date: 1990     Years since quittin.7     Passive exposure: Past    Smokeless tobacco: Never   Vaping Use    Vaping status: Never Used   Substance Use Topics    Alcohol use: Not Currently    Drug use: Never      E-Cigarette/Vaping    E-Cigarette Use Never User       E-Cigarette/Vaping Substances    Nicotine No     THC No     CBD No     Flavoring No     Other No     Unknown No       I have reviewed and agree with the history as documented.     Patient is an 84-year-old female with a history of anxiety, CHF, diabetes, hypertension, hyperlipidemia, prior CVA  "on Coumadin, presents emergency department for evaluation after mechanical fall.  Patient states that her right knee gave out on her and she tripped over a box and hit the front of her head.  She denies loss of consciousness.  Patient states that prior to the fall, she did not feel \"well\".  She states that she had a poor appetite yesterday evening, did not eat dinner prior to going to bed, awakened and was somewhat unsteady on her feet this morning.  Patient was making herself a sandwich, and walking back to her same area when she fell.      History provided by:  Patient   used: No    Fall  Associated symptoms: headaches    Associated symptoms: no abdominal pain, no back pain, no nausea, no neck pain and no vomiting        Review of Systems   Constitutional:  Negative for chills and fever.   Respiratory:  Negative for cough, chest tightness and shortness of breath.    Gastrointestinal:  Negative for abdominal pain, diarrhea, nausea and vomiting.   Genitourinary:  Negative for dysuria, frequency, hematuria and urgency.   Musculoskeletal:  Negative for back pain, neck pain and neck stiffness.   Neurological:  Positive for weakness and headaches.   All other systems reviewed and are negative.          Objective       ED Triage Vitals [10/06/24 1114]   Temperature Pulse Blood Pressure Respirations SpO2 Patient Position - Orthostatic VS   (!) 97.1 °F (36.2 °C) 63 121/56 20 95 % Lying      Temp Source Heart Rate Source BP Location FiO2 (%) Pain Score    Tympanic Monitor Left arm -- --      Vitals      Date and Time Temp Pulse SpO2 Resp BP Pain Score FACES Pain Rating User   10/06/24 1442 -- 63 99 % -- -- -- -- OE   10/06/24 1330 -- 64 100 % -- 165/70 -- -- AIXA   10/06/24 1230 97.6 °F (36.4 °C) 62 100 % 18 157/63 -- -- LG   10/06/24 1114 97.1 °F (36.2 °C) 63 95 % 20 121/56 -- -- JCM            Physical Exam  Vitals and nursing note reviewed.   Constitutional:       General: She is not in acute " distress.     Appearance: She is well-developed. She is not diaphoretic.   HENT:      Head: Normocephalic. Contusion present.     Eyes:      Conjunctiva/sclera: Conjunctivae normal.      Pupils: Pupils are equal, round, and reactive to light.   Cardiovascular:      Rate and Rhythm: Normal rate and regular rhythm.      Heart sounds: Normal heart sounds. No murmur heard.  Pulmonary:      Effort: Pulmonary effort is normal. No respiratory distress.      Breath sounds: Normal breath sounds.   Abdominal:      General: Bowel sounds are normal. There is no distension.      Palpations: Abdomen is soft.      Tenderness: There is no abdominal tenderness.   Musculoskeletal:         General: No deformity. Normal range of motion.      Right forearm: Laceration present.        Arms:       Cervical back: Normal range of motion and neck supple.   Skin:     General: Skin is warm and dry.      Coloration: Skin is not pale.      Findings: No rash.   Neurological:      Mental Status: She is alert.      Cranial Nerves: No cranial nerve deficit.   Psychiatric:         Behavior: Behavior normal.         Results Reviewed       Procedure Component Value Units Date/Time    HS Troponin I 2hr [[id number]]  (Normal) Collected: 10/06/24 1329    Lab Status: Final result Specimen: Blood from Arm, Left Updated: 10/06/24 1358     hs TnI 2hr 3 ng/L      Delta 2hr hsTnI 0 ng/L     HS Troponin 0hr (reflex protocol) [[id number]]  (Normal) Collected: 10/06/24 1133    Lab Status: Final result Specimen: Blood from Arm, Left Updated: 10/06/24 1216     hs TnI 0hr 3 ng/L     Comprehensive metabolic panel [[id number]] Collected: 10/06/24 1133    Lab Status: Final result Specimen: Blood from Arm, Left Updated: 10/06/24 1203     Sodium 138 mmol/L      Potassium 3.9 mmol/L      Chloride 102 mmol/L      CO2 29 mmol/L      ANION GAP 7 mmol/L      BUN 25 mg/dL      Creatinine 0.78 mg/dL      Glucose 119 mg/dL      Calcium 9.6 mg/dL      AST 28 U/L      ALT 17 U/L       Alkaline Phosphatase 69 U/L      Total Protein 6.9 g/dL      Albumin 4.0 g/dL      Total Bilirubin 0.39 mg/dL      eGFR 69 ml/min/1.73sq m     Narrative:      National Kid[employer] Foundation guidelines for Chronic Kidney Disease (CKD):     Stage 1 with normal or high GFR (GFR > 90 mL/min/1.73 square meters)    Stage 2 Mild CKD (GFR = 60-89 mL/min/1.73 square meters)    Stage 3A Moderate CKD (GFR = 45-59 mL/min/1.73 square meters)    Stage 3B Moderate CKD (GFR = 30-44 mL/min/1.73 square meters)    Stage 4 Severe CKD (GFR = 15-29 mL/min/1.73 square meters)    Stage 5 End Stage CKD (GFR <15 mL/min/1.73 square meters)  Note: GFR calculation is accurate only with a steady state creatinine    Magnesium [[id number]]  (Abnormal) Collected: 10/06/24 1133    Lab Status: Final result Specimen: Blood from Arm, Left Updated: 10/06/24 1203     Magnesium 1.7 mg/dL     Protime-INR [[id number]]  (Abnormal) Collected: 10/06/24 1133    Lab Status: Final result Specimen: Blood from Arm, Left Updated: 10/06/24 1159     Protime 15.6 seconds      INR 1.19    Narrative:      INR Therapeutic Range    Indication                                             INR Range      Atrial Fibrillation                                               2.0-3.0  Hypercoagulable State                                    2.0.2.3  Left Ventricular Asist Device                            2.0-3.0  Mechanical Heart Valve                                  -    Aortic(with afib, MI, embolism, HF, LA enlargement,    and/or coagulopathy)                                     2.0-3.0 (2.5-3.5)     Mitral                                                             2.5-3.5  Prosthetic/Bioprosthetic Heart Valve               2.0-3.0  Venous thromboembolism (VTE: VT, PE        2.0-3.0    APTT [[id number]]  (Normal) Collected: 10/06/24 1133    Lab Status: Final result Specimen: Blood from Arm, Left Updated: 10/06/24 1159     PTT 30 seconds     CBC and differential [[id number]]   (Abnormal) Collected: 10/06/24 1133    Lab Status: Final result Specimen: Blood from Arm, Left Updated: 10/06/24 1140     WBC 9.26 Thousand/uL      RBC 4.06 Million/uL      Hemoglobin 12.0 g/dL      Hematocrit 38.8 %      MCV 96 fL      MCH 29.6 pg      MCHC 30.9 g/dL      RDW 16.2 %      MPV 11.0 fL      Platelets 185 Thousands/uL      nRBC 0 /100 WBCs      Segmented % 76 %      Immature Grans % 0 %      Lymphocytes % 12 %      Monocytes % 6 %      Eosinophils Relative 5 %      Basophils Relative 1 %      Absolute Neutrophils 7.03 Thousands/µL      Absolute Immature Grans 0.03 Thousand/uL      Absolute Lymphocytes 1.14 Thousands/µL      Absolute Monocytes 0.58 Thousand/µL      Eosinophils Absolute 0.43 Thousand/µL      Basophils Absolute 0.05 Thousands/µL             CT head without contrast   Final Interpretation by Carla Cline MD (10/06 1251)      No acute intracranial abnormality.  Stable chronic microangiopathic changes within the brain.                  Workstation performed: [id number]             Procedures    ED Medication and Procedure Management   Prior to Admission Medications   Prescriptions Last Dose Informant Patient Reported? Taking?   Calcium Carb-Cholecalciferol (Calcium 500 + D) 500-3.125 MG-MCG TABS  Self Yes No   Si tabs daily ORAL   DULoxetine (CYMBALTA) 60 mg delayed release capsule   No No   Sig: TAKE 1 CAPSULE(60 MG) BY MOUTH DAILY   Multiple Vitamins-Minerals (CENTRUM SILVER 50+WOMEN PO)  Self Yes No   Sig: ORAL   Multiple Vitamins-Minerals (PreserVision AREDS) TABS  Self Yes No   Sig: Take 2 tablets by mouth in the morning   Norwegian Cod Liver Oil CAPS  Self Yes No   Sig: ORAL   Potassium Bicarb-Citric Acid (Effer-K) 20 MEQ TBEF  Self No No   Sig: Take 1 tablet (20 mEq total) by mouth once a week   Triamcinolone Acetonide (Nasacort Allergy 24HR) 55 MCG/ACT nasal spray  Self Yes No   Si spray each nostrill daily NASAL   amLODIPine (NORVASC) 2.5 mg tablet  Self No No   Sig:  TAKE 1 TABLET BY MOUTH EVERY MORNING   Patient taking differently: 5 mg   apixaban (Eliquis) 5 mg  Self No No   Sig: Take 1 tablet (5 mg total) by mouth 2 (two) times a day   atorvastatin (LIPITOR) 80 mg tablet  Self No No   Sig: Taking at morning   bisoprolol (ZEBETA) 5 mg tablet  Self No No   Sig: Take 0.5 tablets (2.5 mg total) by mouth daily   cetirizine (ZyrTEC) 10 mg tablet  Self Yes No   Sig: Take 10 mg by mouth daily   clobetasol (TEMOVATE) 0.05 % cream  Self No No   Sig: Apply topically 2 (two) times a day   clonazePAM (KlonoPIN) 0.5 mg tablet   No No   Sig: Take 1 tablet (0.5 mg total) by mouth daily at bedtime   clopidogrel (PLAVIX) 75 mg tablet   No No   Sig: TAKE 1 TABLET(75 MG) BY MOUTH DAILY   furosemide (LASIX) 40 mg tablet   No No   Sig: TAKE 1 TABLET(40 MG) BY MOUTH TWICE DAILY   levothyroxine 88 mcg tablet  Self No No   Sig: TAKE 1 TABLET(88 MCG) BY MOUTH DAILY   midodrine (PROAMATINE) 5 mg tablet  Self No No   Sig: Take 1 tablet (5 mg total) by mouth 2 (two) times a day before meals   mometasone (NASONEX) 50 mcg/act nasal spray  Self Yes No   Si spray into each nostril daily   nebivolol (BYSTOLIC) 5 mg tablet   No No   Sig: TAKE 1 TABLET(5 MG) BY MOUTH DAILY   pantoprazole (PROTONIX) 40 mg tablet  Self No No   Sig: TAKE 1 TABLET(40 MG) BY MOUTH EVERY MORNING   primidone (MYSOLINE) 50 mg tablet  Self No No   Sig: TAKE 1 TABLET(50 MG) BY MOUTH DAILY      Facility-Administered Medications: None     Discharge Medication List as of 10/6/2024  1:11 PM        CONTINUE these medications which have NOT CHANGED    Details   amLODIPine (NORVASC) 2.5 mg tablet TAKE 1 TABLET BY MOUTH EVERY MORNING, Normal      apixaban (Eliquis) 5 mg Take 1 tablet (5 mg total) by mouth 2 (two) times a day, Starting Thu 2024, Normal      atorvastatin (LIPITOR) 80 mg tablet Taking at morning, Normal      bisoprolol (ZEBETA) 5 mg tablet Take 0.5 tablets (2.5 mg total) by mouth daily, Starting 2024, Until Wed  1/15/2025, Normal      Calcium Carb-Cholecalciferol (Calcium 500 + D) 500-3.125 MG-MCG TABS 2 tabs daily ORAL, Historical Med      cetirizine (ZyrTEC) 10 mg tablet Take 10 mg by mouth daily, Historical Med      clobetasol (TEMOVATE) 0.05 % cream Apply topically 2 (two) times a day, Starting Tue 6/11/2024, Normal      clonazePAM (KlonoPIN) 0.5 mg tablet Take 1 tablet (0.5 mg total) by mouth daily at bedtime, Starting Wed 8/28/2024, Normal      clopidogrel (PLAVIX) 75 mg tablet TAKE 1 TABLET(75 MG) BY MOUTH DAILY, Starting Thu 8/29/2024, Normal      DULoxetine (CYMBALTA) 60 mg delayed release capsule TAKE 1 CAPSULE(60 MG) BY MOUTH DAILY, Normal      furosemide (LASIX) 40 mg tablet TAKE 1 TABLET(40 MG) BY MOUTH TWICE DAILY, Starting Wed 8/14/2024, Normal      levothyroxine 88 mcg tablet TAKE 1 TABLET(88 MCG) BY MOUTH DAILY, Normal      midodrine (PROAMATINE) 5 mg tablet Take 1 tablet (5 mg total) by mouth 2 (two) times a day before meals, Starting Thu 7/11/2024, Until Thu 2/6/2025, Normal      mometasone (NASONEX) 50 mcg/act nasal spray 1 spray into each nostril daily, Historical Med      !! Multiple Vitamins-Minerals (CENTRUM SILVER 50+WOMEN PO) ORAL, Historical Med      !! Multiple Vitamins-Minerals (PreserVision AREDS) TABS Take 2 tablets by mouth in the morning, Historical Med      nebivolol (BYSTOLIC) 5 mg tablet TAKE 1 TABLET(5 MG) BY MOUTH DAILY, Starting Thu 9/19/2024, Normal      Norwegian Cod Liver Oil CAPS ORAL, Historical Med      pantoprazole (PROTONIX) 40 mg tablet TAKE 1 TABLET(40 MG) BY MOUTH EVERY MORNING, Starting Fri 6/21/2024, Normal      Potassium Bicarb-Citric Acid (Effer-K) 20 MEQ TBEF Take 1 tablet (20 mEq total) by mouth once a week, Starting Sat 5/25/2024, Normal      primidone (MYSOLINE) 50 mg tablet TAKE 1 TABLET(50 MG) BY MOUTH DAILY, Starting Fri 6/21/2024, Normal      Triamcinolone Acetonide (Nasacort Allergy 24HR) 55 MCG/ACT nasal spray 1 spray each nostrill daily NASAL, Historical Med        !! - Potential duplicate medications found. Please discuss with provider.        No discharge procedures on file.  ED SEPSIS DOCUMENTATION   Time reflects when diagnosis was documented in both MDM as applicable and the Disposition within this note       Time User Action Codes Description Comment    10/6/2024  1:10 PM Cory Bhakta Add [S09.90XA] Closed head injury, initial encounter     10/6/2024  1:11 PM Cory Bhakta Add [S50.819A] Forearm abrasion     10/6/2024  1:11 PM JOSEyesaCory salinas Add [R53.1] Weakness     10/6/2024  1:11 PM JOSEyeCory sellers Add [E83.42] Hypomagnesemia                  Cory Bhakta DO  10/07/24 1535

## 2024-10-07 ENCOUNTER — VBI (OUTPATIENT)
Dept: FAMILY MEDICINE CLINIC | Facility: CLINIC | Age: 84
End: 2024-10-07

## 2024-10-07 ENCOUNTER — PATIENT OUTREACH (OUTPATIENT)
Dept: CASE MANAGEMENT | Facility: OTHER | Age: 84
End: 2024-10-07

## 2024-10-07 DIAGNOSIS — Z71.89 COMPLEX CARE COORDINATION: Primary | ICD-10-CM

## 2024-10-07 NOTE — TELEPHONE ENCOUNTER
10/07/24 11:03 AM    Patient contacted post ED visit, first outreach attempt made. Message was left for patient to return a call to the VBI Department at [facility]: Phone [phone].    Thank you.  AUSTIN HENRIQUEZ MA  [facility]

## 2024-10-07 NOTE — LETTER
[facility]  [address] [facility] [address]  [phone]    Date: 10/09/24    Danyelle Martinez  [address] [place] [address] [address]  [facility] [address]    Dear Danyelle:                                                                                                                                Thank you for choosing [facility] emergency department for care.  Your primary care provider wants to make sure that your ongoing medical care is being addressed. If you require follow up care as a result of your emergency department visit, there are a few things the practice would like you to know.                As part of the network's continuing commitment to caring for our patients, we have added more same day appointments and have extended office hours to meet your medical needs. After hours, on-call physicians are available via your primary care provider's main office line.               We encourage you to contact our office prior to seeking treatment to discuss your symptoms with the medical staff.  Together, we can determine the correct course of action.  A majority of non-emergent conditions such as: common cold, flu-like symptoms, fevers, strains/sprains, dislocations, minor burns, cuts and animal bites can be treated at [facility] facilities. Diagnostic testing is available at some sites.               Of course, if you are experiencing a life threatening medical emergency call 911 or proceed directly to the nearest emergency room.    Your nearest [facility] facility is conveniently located at:    [address]  [place] [address]  [phone]  SKIP THE WAIT  Conveniently offered at most [facility] locations  [place] your spot online at [email or web address] or on the [facility] Renee    Sincerely,    [facility]  Dept: [phone]

## 2024-10-07 NOTE — PROGRESS NOTES
Received referral via in basket message as covering RN . Chart reviewed.   Pt presented to ED 10/6/24 with fall in the home, closed head injury.   Past medical history of CHF, DVT, HTN, T2DM, Frequent falls.  Discharged to home. Pt to follow up with PCP.  Telephone call to patient, left message on machine with my name, number and request for return call as well as RN CESILIA Walton's contact information.   Will retry again later this week.

## 2024-10-08 LAB
ATRIAL RATE: 57 BPM
P AXIS: 44 DEGREES
PR INTERVAL: 180 MS
QRS AXIS: 53 DEGREES
QRSD INTERVAL: 96 MS
QT INTERVAL: 454 MS
QTC INTERVAL: 441 MS
T WAVE AXIS: 70 DEGREES
VENTRICULAR RATE: 57 BPM

## 2024-10-08 PROCEDURE — 93010 ELECTROCARDIOGRAM REPORT: CPT | Performed by: INTERNAL MEDICINE

## 2024-10-08 NOTE — TELEPHONE ENCOUNTER
10/08/24 9:25 AM    Patient contacted post ED visit, second outreach attempt made. Message was left for patient to return a call to the VBI Department at [facility]: Phone [phone].    Thank you.  AUSTIN HENRIQUEZ MA  [facility] BASED VIR

## 2024-10-09 DIAGNOSIS — R60.9 EDEMA, UNSPECIFIED TYPE: ICD-10-CM

## 2024-10-09 RX ORDER — FUROSEMIDE 40 MG
40 TABLET ORAL 2 TIMES DAILY
Qty: 30 TABLET | Refills: 2 | Status: SHIPPED | OUTPATIENT
Start: 2024-10-09

## 2024-10-09 NOTE — TELEPHONE ENCOUNTER
10/09/24 8:47 AM    Patient contacted post ED visit, phone outreaches were unsuccessful and a MyChart letter has been sent to the patient as follow-up.    Thank you.  AUSTIN HENRIQUEZ MA  [facility] V[facility]

## 2024-10-09 NOTE — PROGRESS NOTES
Office Visit Note  10/10/24     Danyelle Martinez 84 y.o. female MRN: [id number]  : 1940    Assessment:     1. Frequent falls  Assessment & Plan:  As mentioned in the history of present illness her right knee gave out and she had a fall injuring her head workup in the emergency room negative except for magnesium level running low at 1.7 CT scan of the head was normal right knee examination shows findings suggestive of arthritis.  2. Chronic hypoxemic respiratory failure (HCC)  Assessment & Plan:  Currently patient is on oxygen saturation is stable  3. Hypomagnesemia  Assessment & Plan:  Patient magnesium level is at 1.7 was 1.9 before and prior to that it was 1.8 since patient is running on the lower side of the normal we will recommend patient take magnesium citrate 125 mg daily  Orders:  -     Magnesium Citrate 125 MG CAPS; Take 1 capsule (125 mg total) by mouth daily  4. Acquired hypothyroidism  Assessment & Plan:  Continue levothyroxine 88 mcg daily last TSH was normal  5. Acute deep vein thrombosis (DVT) of femoral vein of both lower extremities ([facility])  Assessment & Plan:  Patient with a history of DVT and PE on Eliquis will continue with the same denies any symptoms of chest pain palpitation shortness of breath patient is oxygen dependent.  History of thrombectomy when she was in the hospital admitted with PE  6. Anxiety  Assessment & Plan:  Continues duloxetine and clonazepam as needed  7. Chronic diastolic CHF (congestive heart failure) ([facility])  Assessment & Plan:  Wt Readings from Last 3 Encounters:   10/10/24 69.4 kg (153 lb)   24 74.8 kg (164 lb 12.8 oz)   24 73.5 kg (162 lb)     Current weight is 153 pounds stable on furosemide 40 mg twice a day        8. Type 2 diabetes mellitus without complication, without long-term current use of insulin ([facility])  Assessment & Plan:    Lab Results   Component Value Date    HGBA1C 5.8 (H) 09/10/2024   Diet controlled last A1c 5.8  9. Primary osteoarthritis  of right knee  Assessment & Plan:  Patient with right knee osteoarthritis reviewed the reports of the x-ray from before however patient is not complaining of any pain if necessary will refer the patient to the orthopedic for steroid injections.  10. Chronic pulmonary embolism without acute cor pulmonale, unspecified pulmonary embolism type (HCC)  11. Dizziness  12. Dyslipidemia  13. Dyspepsia  14. Essential hypertension  15. Gastroesophageal reflux disease without esophagitis  16. History of transient ischemic attack (TIA)             Discussion Summary and Plan:  Today's care plan and medications were reviewed with patient in detail and all their questions answered to their satisfaction.    Chief Complaint   Patient presents with    Fall      Subjective:  Patient is coming here for a follow-up evaluation patient with a history of fall injury to her head and was seen in the emergency room in the recent past workup in the emergency room was negative except her magnesium level was slightly low at 1.7 and she had received magnesium supplements.  Currently patient she felt weak and knee on the right side gave out when she fell.  Patient with hypoxemia oxygen dependent.  Medication reviewed labs reviewed.  Patient denies any symptoms of pain in the right knee at this present time.  History of receiving steroid injections in the past in the right knee.        The following portions of the patient's history were reviewed and updated as appropriate: allergies, current medications, past family history, past medical history, past social history, past surgical history and problem list.    Review of Systems   Constitutional:  Negative for chills and fever.   HENT:  Negative for ear pain and sore throat.    Eyes:  Negative for pain and visual disturbance.   Respiratory:  Negative for cough and shortness of breath.    Cardiovascular:  Negative for chest pain and palpitations.   Gastrointestinal:  Negative for abdominal pain and  vomiting.   Genitourinary:  Negative for dysuria and hematuria.   Musculoskeletal:  Positive for arthralgias. Negative for back pain.   Skin:  Negative for color change and rash.   Neurological:  Negative for seizures and syncope.   All other systems reviewed and are negative.        Historical Information   Patient Active Problem List   Diagnosis    Shortness of breath on exertion    Dizziness    Chronic hypoxemic respiratory failure (HCC)    Seasonal allergic rhinitis due to pollen    Essential hypertension    Dyslipidemia    History of transient ischemic attack (TIA)    Acquired hypothyroidism    Thyroid nodule    Palpitations    Tremor    Nocturnal hypoxemia    Gastroesophageal reflux disease without esophagitis    Localized swelling of right lower leg    Anxiety    Frequent falls    Dyspepsia    SVT (supraventricular tachycardia) ([facility])    Osteoarthritis of right knee    Elevated liver enzymes    Type 2 diabetes mellitus, without long-term current use of insulin ([facility])    Recurrent major depressive disorder, in remission ([facility])    Syncope, cardiogenic    Overflow incontinence of urine    Enlarged thyroid    Chronic diastolic CHF (congestive heart failure) ([facility])    Cigarette nicotine dependence in remission    Parotitis, acute    Facial cellulitis    Pulmonary emboli ([facility])    Acute deep vein thrombosis (DVT) of both lower extremities ([facility])    Hypertensive heart disease with congestive heart failure ([facility])    Hypomagnesemia     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  No urinary symptoms.  Neurological symptoms none.  Get lab work done including urinalysis and culture rule out  UTI also MRI if necessary      CHF (congestive heart failure) (HCC)     Closed wedge compression fracture of T1 vertebra with routine healing 2023    Deaf, left     Depression     Diabetes mellitus (HCC)     Disease of thyroid gland     DVT complicating pregnancy, unspecified trimester (HCC) postpartum    Fall 11/15/2023    Hearing loss     LEFT EAR    History of shingles 2007    fACIAL     Hyperlipidemia     Hypertension     Liver disease     Lyme disease     Meniere's disease     Obesity     Orthostatic hypotension     Psychiatric problem     Sinus congestion     Sleep apnea     Stroke (HCC)      Past Surgical History:   Procedure Laterality Date    APPENDECTOMY      BREAST BIOPSY Left 2010     SECTION      x2    DXA PROCEDURE (HISTORICAL)  10/28/2020    EYE SURGERY      HAND SURGERY Left     HERNIA REPAIR      HYSTERECTOMY      IR PE ENDOVASCULAR THERAPY  2024    ROTATOR CUFF REPAIR Left     TONSILLECTOMY      TYMPANOSTOMY TUBE PLACEMENT       Social History     Substance and Sexual Activity   Alcohol Use Not Currently     Social History     Substance and Sexual Activity   Drug Use Never     Social History     Tobacco Use   Smoking Status Former    Current packs/day: 0.00    Average packs/day: 0.8 packs/day for 44.0 years (33.0 ttl pk-yrs)    Types: Cigarettes    Start date: 1946    Quit date: 1990    Years since quittin.7    Passive exposure: Past   Smokeless Tobacco Never     Family History   Problem Relation Age of Onset    Depression Mother     Hypertension Mother     Obesity Mother     Depression Father     Alcohol abuse Father     Stroke Father     Breast cancer Sister 68    Ovarian cancer Daughter 53    BRCA1 Negative Daughter     BRCA2 Negative Daughter      Health Maintenance Due   Topic    Pneumococcal Vaccine: 65+ Years (1 of 2 - PCV)    DM Eye Exam     Hepatitis A Vaccine (1 of 2 - Risk 2-dose series)    Zoster Vaccine (1 of 2)    RSV Vaccine Age 60+ Years (1 - 1-dose  60+ series)    Influenza Vaccine (1)    COVID-19 Vaccine (5 - 2023-24 season)      Meds/Allergies       Current Outpatient Medications:     amLODIPine (NORVASC) 2.5 mg tablet, TAKE 1 TABLET BY MOUTH EVERY MORNING (Patient taking differently: 5 mg), Disp: 90 tablet, Rfl: 1    apixaban (Eliquis) 5 mg, Take 1 tablet (5 mg total) by mouth 2 (two) times a day, Disp: 60 tablet, Rfl: 2    atorvastatin (LIPITOR) 80 mg tablet, Taking at morning, Disp: 90 tablet, Rfl: 0    bisoprolol (ZEBETA) 5 mg tablet, Take 0.5 tablets (2.5 mg total) by mouth daily, Disp: 45 tablet, Rfl: 0    Calcium Carb-Cholecalciferol (Calcium 500 + D) 500-3.125 MG-MCG TABS, 2 tabs daily ORAL, Disp: , Rfl:     cetirizine (ZyrTEC) 10 mg tablet, Take 10 mg by mouth daily, Disp: , Rfl:     clobetasol (TEMOVATE) 0.05 % cream, Apply topically 2 (two) times a day, Disp: 45 g, Rfl: 1    clonazePAM (KlonoPIN) 0.5 mg tablet, Take 1 tablet (0.5 mg total) by mouth daily at bedtime, Disp: 30 tablet, Rfl: 0    clopidogrel (PLAVIX) 75 mg tablet, TAKE 1 TABLET(75 MG) BY MOUTH DAILY, Disp: 90 tablet, Rfl: 1    DULoxetine (CYMBALTA) 60 mg delayed release capsule, TAKE 1 CAPSULE(60 MG) BY MOUTH DAILY, Disp: 90 capsule, Rfl: 1    furosemide (LASIX) 40 mg tablet, TAKE 1 TABLET(40 MG) BY MOUTH TWICE DAILY, Disp: 30 tablet, Rfl: 2    levothyroxine 88 mcg tablet, TAKE 1 TABLET(88 MCG) BY MOUTH DAILY, Disp: 90 tablet, Rfl: 1    Magnesium Citrate 125 MG CAPS, Take 1 capsule (125 mg total) by mouth daily, Disp: , Rfl:     midodrine (PROAMATINE) 5 mg tablet, Take 1 tablet (5 mg total) by mouth 2 (two) times a day before meals, Disp: 60 tablet, Rfl: 6    mometasone (NASONEX) 50 mcg/act nasal spray, 1 spray into each nostril daily, Disp: , Rfl:     Multiple Vitamins-Minerals (CENTRUM SILVER 50+WOMEN PO), ORAL, Disp: , Rfl:     Multiple Vitamins-Minerals (PreserVision AREDS) TABS, Take 2 tablets by mouth in the morning, Disp: , Rfl:     [place] Cod Liver Oil CAPS, ORAL, Disp: ,  "Rfl:     pantoprazole (PROTONIX) 40 mg tablet, TAKE 1 TABLET(40 MG) BY MOUTH EVERY MORNING, Disp: 90 tablet, Rfl: 1    Potassium Bicarb-Citric Acid (Effer-K) 20 MEQ TBEF, Take 1 tablet (20 mEq total) by mouth once a week, Disp: 12 tablet, Rfl: 3    primidone (MYSOLINE) 50 mg tablet, TAKE 1 TABLET(50 MG) BY MOUTH DAILY, Disp: 90 tablet, Rfl: 1    Triamcinolone Acetonide (Nasacort Allergy 24HR) 55 MCG/ACT nasal spray, 1 spray each nostrill daily NASAL, Disp: , Rfl:       Objective:    Vitals:   /70 (BP Location: Right arm, Patient Position: Sitting, Cuff Size: Standard)   Pulse 70   Ht 5' 6\" (1.676 m)   Wt 69.4 kg (153 lb)   SpO2 97%   BMI 24.69 kg/m²   Body mass index is 24.69 kg/m².  Vitals:    10/10/24 1553   Weight: 69.4 kg (153 lb)       Physical Exam  Vitals and nursing note reviewed.   Constitutional:       Appearance: Normal appearance.   Cardiovascular:      Rate and Rhythm: Normal rate and regular rhythm.      Heart sounds: Normal heart sounds.   Pulmonary:      Effort: Pulmonary effort is normal.      Breath sounds: Normal breath sounds.   Abdominal:      General: Abdomen is flat.      Palpations: Abdomen is soft.   Musculoskeletal:      Cervical back: Normal range of motion and neck supple.      Right lower leg: No edema.      Left lower leg: No edema.      Comments: Right knee movements causing some mild discomfort   Skin:     General: Skin is warm and dry.   Neurological:      General: No focal deficit present.      Mental Status: She is alert and oriented to person, place, and time.   Psychiatric:         Mood and Affect: Mood normal.         Behavior: Behavior normal.         Lab Review   Admission on 10/06/2024, Discharged on 10/06/2024   Component Date Value Ref Range Status    WBC 10/06/2024 9.26  4.31 - 10.16 Thousand/uL Final    RBC 10/06/2024 4.06  3.81 - 5.12 Million/uL Final    Hemoglobin 10/06/2024 12.0  11.5 - 15.4 g/dL Final    Hematocrit 10/06/2024 38.8  34.8 - 46.1 % Final    " MCV 10/06/2024 96  82 - 98 fL Final    MCH 10/06/2024 29.6  26.8 - 34.3 pg Final    MCHC 10/06/2024 30.9 (L)  31.4 - 37.4 g/dL Final    RDW 10/06/2024 16.2 (H)  11.6 - 15.1 % Final    MPV 10/06/2024 11.0  8.9 - 12.7 fL Final    Platelets 10/06/2024 185  149 - 390 Thousands/uL Final    nRBC 10/06/2024 0  /100 WBCs Final    Segmented % 10/06/2024 76 (H)  43 - 75 % Final    Immature Grans % 10/06/2024 0  0 - 2 % Final    Lymphocytes % 10/06/2024 12 (L)  14 - 44 % Final    Monocytes % 10/06/2024 6  4 - 12 % Final    Eosinophils Relative 10/06/2024 5  0 - 6 % Final    Basophils Relative 10/06/2024 1  0 - 1 % Final    Absolute Neutrophils 10/06/2024 7.03  1.85 - 7.62 Thousands/µL Final    Absolute Immature Grans 10/06/2024 0.03  0.00 - 0.20 Thousand/uL Final    Absolute Lymphocytes 10/06/2024 1.14  0.60 - 4.47 Thousands/µL Final    Absolute Monocytes 10/06/2024 0.58  0.17 - 1.22 Thousand/µL Final    Eosinophils Absolute 10/06/2024 0.43  0.00 - 0.61 Thousand/µL Final    Basophils Absolute 10/06/2024 0.05  0.00 - 0.10 Thousands/µL Final    Protime 10/06/2024 15.6 (H)  12.3 - 15.0 seconds Final    INR 10/06/2024 1.19  0.85 - 1.19 Final    PTT 10/06/2024 30  23 - 34 seconds Final    Therapeutic Heparin Range =  60-90 seconds    Sodium 10/06/2024 138  135 - 147 mmol/L Final    Potassium 10/06/2024 3.9  3.5 - 5.3 mmol/L Final    Chloride 10/06/2024 102  96 - 108 mmol/L Final    CO2 10/06/2024 29  21 - 32 mmol/L Final    ANION GAP 10/06/2024 7  4 - 13 mmol/L Final    BUN 10/06/2024 25  5 - 25 mg/dL Final    Creatinine 10/06/2024 0.78  0.60 - 1.30 mg/dL Final    Standardized to IDMS reference method    Glucose 10/06/2024 119  65 - 140 mg/dL Final    If the patient is fasting, the ADA then defines impaired fasting glucose as > 100 mg/dL and diabetes as > or equal to 123 mg/dL.    Calcium 10/06/2024 9.6  8.4 - 10.2 mg/dL Final    AST 10/06/2024 28  13 - 39 U/L Final    ALT 10/06/2024 17  7 - 52 U/L Final    Specimen collection  "should occur prior to Sulfasalazine administration due to the potential for falsely depressed results.     Alkaline Phosphatase 10/06/2024 69  34 - 104 U/L Final    Total Protein 10/06/2024 6.9  6.4 - 8.4 g/dL Final    Albumin 10/06/2024 4.0  3.5 - 5.0 g/dL Final    Total Bilirubin 10/06/2024 0.39  0.20 - 1.00 mg/dL Final    Use of this assay is not recommended for patients undergoing treatment with eltrombopag due to the potential for falsely elevated results.  N-acetyl-p-benzoquinone imine (metabolite of Acetaminophen) will generate erroneously low results in samples for patients that have taken an overdose of Acetaminophen.    eGFR 10/06/2024 69  ml/min/1.73sq m Final    Magnesium 10/06/2024 1.7 (L)  1.9 - 2.7 mg/dL Final    hs TnI 0hr 10/06/2024 3  \"Refer to ACS Flowchart\"- see link ng/L Final    Comment:                                              Initial (time 0) result  If >=50 ng/L, Myocardial injury suggested ;  Type of myocardial injury and treatment strategy  to be determined.  If 5-49 ng/L, a delta result at 2 hours and or 4 hours will be needed to further evaluate.  If <4 ng/L, and chest pain has been >3 hours since onset, patient may qualify for discharge based on the HEART score in the ED.  If <5 ng/L and <3hours since onset of chest pain, a delta result at 2 hours will be needed to further evaluate.    HS Troponin 99th Percentile URL of a Health Population=12 ng/L with a 95% Confidence Interval of 8-18 ng/L.    Second Troponin (time 2 hours)  If calculated delta >= 20 ng/L,  Myocardial injury suggested ; Type of myocardial injury and treatment strategy to be determined.  If 5-49 ng/L and the calculated delta is 5-19 ng/L, consult medical service for evaluation.  Continue evaluation for ischemia on ecg and other possible etiology and repeat hs troponin at 4 hours.  If delta                            is <5 ng/L at 2 hours, consider discharge based on risk stratification via the HEART score (if in " "ED), or TYLER risk score in IP/Observation.    HS Troponin 99th Percentile URL of a Health Population=12 ng/L with a 95% Confidence Interval of 8-18 ng/L.    hs TnI 2hr 10/06/2024 3  \"Refer to ACS Flowchart\"- see link ng/L Final    Comment:                                              Initial (time 0) result  If >=50 ng/L, Myocardial injury suggested ;  Type of myocardial injury and treatment strategy  to be determined.  If 5-49 ng/L, a delta result at 2 hours and or 4 hours will be needed to further evaluate.  If <4 ng/L, and chest pain has been >3 hours since onset, patient may qualify for discharge based on the HEART score in the ED.  If <5 ng/L and <3hours since onset of chest pain, a delta result at 2 hours will be needed to further evaluate.    HS Troponin 99th Percentile URL of a Health Population=12 ng/L with a 95% Confidence Interval of 8-18 ng/L.    Second Troponin (time 2 hours)  If calculated delta >= 20 ng/L,  Myocardial injury suggested ; Type of myocardial injury and treatment strategy to be determined.  If 5-49 ng/L and the calculated delta is 5-19 ng/L, consult medical service for evaluation.  Continue evaluation for ischemia on ecg and other possible etiology and repeat hs troponin at 4 hours.  If delta                            is <5 ng/L at 2 hours, consider discharge based on risk stratification via the HEART score (if in ED), or TYLER risk score in IP/Observation.    HS Troponin 99th Percentile URL of a Health Population=12 ng/L with a 95% Confidence Interval of 8-18 ng/L.    Delta 2hr hsTnI 10/06/2024 0  <20 ng/L Final    Ventricular Rate 10/06/2024 57  BPM Final    Atrial Rate 10/06/2024 57  BPM Final    ND Interval 10/06/2024 180  ms Final    QRSD Interval 10/06/2024 96  ms Final    QT Interval 10/06/2024 454  ms Final    QTC Interval 10/06/2024 441  ms Final    P Axis 10/06/2024 44  degrees Final    QRS Axis 10/06/2024 53  degrees Final    T Wave Axis 10/06/2024 70  degrees Final "   Appointment on 09/10/2024   Component Date Value Ref Range Status    WBC 09/10/2024 13.92 (H)  4.31 - 10.16 Thousand/uL Final    RBC 09/10/2024 4.49  3.81 - 5.12 Million/uL Final    Hemoglobin 09/10/2024 12.9  11.5 - 15.4 g/dL Final    Hematocrit 09/10/2024 42.7  34.8 - 46.1 % Final    MCV 09/10/2024 95  82 - 98 fL Final    MCH 09/10/2024 28.7  26.8 - 34.3 pg Final    MCHC 09/10/2024 30.2 (L)  31.4 - 37.4 g/dL Final    RDW 09/10/2024 16.0 (H)  11.6 - 15.1 % Final    MPV 09/10/2024 11.6  8.9 - 12.7 fL Final    Platelets 09/10/2024 262  149 - 390 Thousands/uL Final    nRBC 09/10/2024 0  /100 WBCs Final    Segmented % 09/10/2024 75  43 - 75 % Final    Immature Grans % 09/10/2024 1  0 - 2 % Final    Lymphocytes % 09/10/2024 13 (L)  14 - 44 % Final    Monocytes % 09/10/2024 6  4 - 12 % Final    Eosinophils Relative 09/10/2024 4  0 - 6 % Final    Basophils Relative 09/10/2024 1  0 - 1 % Final    Absolute Neutrophils 09/10/2024 10.54 (H)  1.85 - 7.62 Thousands/µL Final    Absolute Immature Grans 09/10/2024 0.09  0.00 - 0.20 Thousand/uL Final    Absolute Lymphocytes 09/10/2024 1.81  0.60 - 4.47 Thousands/µL Final    Absolute Monocytes 09/10/2024 0.88  0.17 - 1.22 Thousand/µL Final    Eosinophils Absolute 09/10/2024 0.51  0.00 - 0.61 Thousand/µL Final    Basophils Absolute 09/10/2024 0.09  0.00 - 0.10 Thousands/µL Final    Sodium 09/10/2024 139  135 - 147 mmol/L Final    Potassium 09/10/2024 5.1  3.5 - 5.3 mmol/L Final    Chloride 09/10/2024 100  96 - 108 mmol/L Final    CO2 09/10/2024 34 (H)  21 - 32 mmol/L Final    ANION GAP 09/10/2024 5  4 - 13 mmol/L Final    BUN 09/10/2024 26 (H)  5 - 25 mg/dL Final    Creatinine 09/10/2024 0.66  0.60 - 1.30 mg/dL Final    Standardized to IDMS reference method    Glucose, Fasting 09/10/2024 120 (H)  65 - 99 mg/dL Final    Calcium 09/10/2024 10.4 (H)  8.4 - 10.2 mg/dL Final    AST 09/10/2024 39  13 - 39 U/L Final    ALT 09/10/2024 31  7 - 52 U/L Final    Specimen collection should  occur prior to Sulfasalazine administration due to the potential for falsely depressed results.     Alkaline Phosphatase 09/10/2024 98  34 - 104 U/L Final    Total Protein 09/10/2024 7.3  6.4 - 8.4 g/dL Final    Albumin 09/10/2024 4.3  3.5 - 5.0 g/dL Final    Total Bilirubin 09/10/2024 0.41  0.20 - 1.00 mg/dL Final    Use of this assay is not recommended for patients undergoing treatment with eltrombopag due to the potential for falsely elevated results.  N-acetyl-p-benzoquinone imine (metabolite of Acetaminophen) will generate erroneously low results in samples for patients that have taken an overdose of Acetaminophen.    eGFR 09/10/2024 80  ml/min/1.73sq m Final    Hemoglobin A1C 09/10/2024 5.8 (H)  Normal 4.0-5.6%; PreDiabetic 5.7-6.4%; Diabetic >=6.5%; Glycemic control for adults with diabetes <7.0% % Final    EAG 09/10/2024 120  mg/dl Final    Cholesterol 09/10/2024 211 (H)  See Comment mg/dL Final    Cholesterol:         Pediatric <18 Years        Desirable          <170 mg/dL      Borderline High    170-199 mg/dL      High               >=200 mg/dL        Adult >=18 Years            Desirable         <200 mg/dL      Borderline High   200-239 mg/dL      High              >239 mg/dL      Triglycerides 09/10/2024 265 (H)  See Comment mg/dL Final    Triglyceride:     0-9Y            <75mg/dL     10Y-17Y         <90 mg/dL       >=18Y     Normal          <150 mg/dL     Borderline High 150-199 mg/dL     High            200-499 mg/dL        Very High       >499 mg/dL    Specimen collection should occur prior to Metamizole administration due to the potential for falsely depressed results.    HDL, Direct 09/10/2024 36 (L)  >=50 mg/dL Final    LDL Calculated 09/10/2024 122 (H)  0 - 100 mg/dL Final    LDL Cholesterol:     Optimal           <100 mg/dl     Near Optimal      100-129 mg/dl     Above Optimal       Borderline High 130-159 mg/dl       High            160-189 mg/dl       Very High       >189 mg/dl         This  screening LDL is a calculated result.   It does not have the accuracy of the Direct Measured LDL in the monitoring of patients with hyperlipidemia and/or statin therapy.   Direct Measure LDL (XBG808) must be ordered separately in these patients.    Non-HDL-Chol (CHOL-HDL) 09/10/2024 175  mg/dl Final    TSH 3RD GENERATON 09/10/2024 0.787  0.450 - 4.500 uIU/mL Final    The recommended reference ranges for TSH during pregnancy are as follows:   First trimester 0.100 to 2.500 uIU/mL   Second trimester  0.200 to 3.000 uIU/mL   Third trimester 0.300 to 3.000 uIU/m    Note: Normal ranges may not apply to patients who are transgender, non-binary, or whose legal sex, sex at birth, and gender identity differ.  Adult TSH (3rd generation) reference range follows the recommended guidelines of the American [employer], January, 2020.    BNP 09/10/2024 167 (H)  0 - 100 pg/mL Final   Appointment on 08/29/2024   Component Date Value Ref Range Status    Creatinine, Ur 08/29/2024 68.1  Reference range not established. mg/dL Final    Albumin,U,Random 08/29/2024 18.8  <20.0 mg/L Final    Albumin Creat Ratio 08/29/2024 28  0 - 30 mg/g creatinine Final    Color, UA 08/29/2024 Yellow   Final    Clarity, UA 08/29/2024 Clear   Final    Specific Gravity, UA 08/29/2024 1.020  1.005 - 1.030 Final    pH, UA 08/29/2024 6.0  4.5, 5.0, 5.5, 6.0, 6.5, 7.0, 7.5, 8.0 Final    Leukocytes, UA 08/29/2024 Large (A)  Negative Final    Nitrite, UA 08/29/2024 Negative  Negative Final    Protein, UA 08/29/2024 Negative  Negative mg/dl Final    Glucose, UA 08/29/2024 Negative  Negative mg/dl Final    Ketones, UA 08/29/2024 Negative  Negative mg/dl Final    Urobilinogen, UA 08/29/2024 <2.0  <2.0 mg/dl mg/dl Final    Bilirubin, UA 08/29/2024 Negative  Negative Final    Occult Blood, UA 08/29/2024 Negative  Negative Final    RBC, UA 08/29/2024 0-1  None Seen, 0-1, 1-2, 2-4, 0-5 /hpf Final    WBC, UA 08/29/2024 Innumerable (A)  None Seen, 0-1, 1-2, 0-5,  "2-4 /hpf Final    Epithelial Cells 08/29/2024 Occasional  None Seen, Occasional /hpf Final    Bacteria, UA 08/29/2024 Innumerable (A)  None Seen, Occasional /hpf Final    Hyaline Casts, UA 08/29/2024 0-1 (A)  (none) /lpf Final    Urine Culture 08/29/2024 >100,000 cfu/ml Proteus mirabilis (A)   Final    Urine Culture 08/29/2024 40,000-49,000 cfu/ml   Final    Mixed Contaminants X3         Bladimir Caraballo MD        \"This note has been constructed using a voice recognition system.Therefore there may be syntax, spelling, and/or grammatical errors. Please call if you have any questions. \"  "

## 2024-10-10 ENCOUNTER — OFFICE VISIT (OUTPATIENT)
Dept: FAMILY MEDICINE CLINIC | Facility: CLINIC | Age: 84
End: 2024-10-10
Payer: MEDICARE

## 2024-10-10 VITALS
DIASTOLIC BLOOD PRESSURE: 70 MMHG | SYSTOLIC BLOOD PRESSURE: 136 MMHG | HEIGHT: 66 IN | OXYGEN SATURATION: 97 % | BODY MASS INDEX: 24.59 KG/M2 | HEART RATE: 70 BPM | WEIGHT: 153 LBS

## 2024-10-10 DIAGNOSIS — I10 ESSENTIAL HYPERTENSION: ICD-10-CM

## 2024-10-10 DIAGNOSIS — E11.9 TYPE 2 DIABETES MELLITUS WITHOUT COMPLICATION, WITHOUT LONG-TERM CURRENT USE OF INSULIN (HCC): ICD-10-CM

## 2024-10-10 DIAGNOSIS — I50.32 CHRONIC DIASTOLIC CHF (CONGESTIVE HEART FAILURE) (HCC): ICD-10-CM

## 2024-10-10 DIAGNOSIS — E03.9 ACQUIRED HYPOTHYROIDISM: ICD-10-CM

## 2024-10-10 DIAGNOSIS — E83.42 HYPOMAGNESEMIA: ICD-10-CM

## 2024-10-10 DIAGNOSIS — K21.9 GASTROESOPHAGEAL REFLUX DISEASE WITHOUT ESOPHAGITIS: ICD-10-CM

## 2024-10-10 DIAGNOSIS — R29.6 FREQUENT FALLS: ICD-10-CM

## 2024-10-10 DIAGNOSIS — R10.13 DYSPEPSIA: ICD-10-CM

## 2024-10-10 DIAGNOSIS — E78.5 DYSLIPIDEMIA: ICD-10-CM

## 2024-10-10 DIAGNOSIS — J96.11 CHRONIC HYPOXEMIC RESPIRATORY FAILURE (HCC): Chronic | ICD-10-CM

## 2024-10-10 DIAGNOSIS — I27.82 CHRONIC PULMONARY EMBOLISM WITHOUT ACUTE COR PULMONALE, UNSPECIFIED PULMONARY EMBOLISM TYPE (HCC): ICD-10-CM

## 2024-10-10 DIAGNOSIS — R42 DIZZINESS: ICD-10-CM

## 2024-10-10 DIAGNOSIS — Z86.73 HISTORY OF TRANSIENT ISCHEMIC ATTACK (TIA): ICD-10-CM

## 2024-10-10 DIAGNOSIS — Z23 ENCOUNTER FOR IMMUNIZATION: Primary | ICD-10-CM

## 2024-10-10 DIAGNOSIS — I82.413 ACUTE DEEP VEIN THROMBOSIS (DVT) OF FEMORAL VEIN OF BOTH LOWER EXTREMITIES (HCC): ICD-10-CM

## 2024-10-10 DIAGNOSIS — M17.11 PRIMARY OSTEOARTHRITIS OF RIGHT KNEE: ICD-10-CM

## 2024-10-10 DIAGNOSIS — F41.9 ANXIETY: ICD-10-CM

## 2024-10-10 PROBLEM — R41.82 CHANGE IN MENTAL STATUS: Status: RESOLVED | Noted: 2024-03-14 | Resolved: 2024-10-10

## 2024-10-10 PROCEDURE — G2211 COMPLEX E/M VISIT ADD ON: HCPCS | Performed by: INTERNAL MEDICINE

## 2024-10-10 PROCEDURE — 99214 OFFICE O/P EST MOD 30 MIN: CPT | Performed by: INTERNAL MEDICINE

## 2024-10-10 PROCEDURE — G0008 ADMIN INFLUENZA VIRUS VAC: HCPCS

## 2024-10-10 PROCEDURE — 90662 IIV NO PRSV INCREASED AG IM: CPT

## 2024-10-10 RX ORDER — ASCORBIC ACID 125 MG
125 TABLET,CHEWABLE ORAL DAILY
Start: 2024-10-10

## 2024-10-10 NOTE — ASSESSMENT & PLAN NOTE
Patient with right knee osteoarthritis reviewed the reports of the x-ray from before however patient is not complaining of any pain if necessary will refer the patient to the orthopedic for steroid injections.

## 2024-10-10 NOTE — ASSESSMENT & PLAN NOTE
Wt Readings from Last 3 Encounters:   10/10/24 69.4 kg (153 lb)   09/17/24 74.8 kg (164 lb 12.8 oz)   09/06/24 73.5 kg (162 lb)     Current weight is 153 pounds stable on furosemide 40 mg twice a day

## 2024-10-10 NOTE — ASSESSMENT & PLAN NOTE
Patient with a history of DVT and PE on Eliquis will continue with the same denies any symptoms of chest pain palpitation shortness of breath patient is oxygen dependent.  History of thrombectomy when she was in the hospital admitted with PE

## 2024-10-10 NOTE — ASSESSMENT & PLAN NOTE
As mentioned in the history of present illness her right knee gave out and she had a fall injuring her head workup in the emergency room negative except for magnesium level running low at 1.7 CT scan of the head was normal right knee examination shows findings suggestive of arthritis.

## 2024-10-10 NOTE — ASSESSMENT & PLAN NOTE
Patient magnesium level is at 1.7 was 1.9 before and prior to that it was 1.8 since patient is running on the lower side of the normal we will recommend patient take magnesium citrate 125 mg daily

## 2024-10-11 ENCOUNTER — PATIENT OUTREACH (OUTPATIENT)
Dept: CASE MANAGEMENT | Facility: OTHER | Age: 84
End: 2024-10-11

## 2024-10-11 NOTE — LETTER
Date: 10/11/24    Dear Danyelle Martinez,   My name is Isabelle Swartz. I am a registered nurse care manager working with   [facility]   [address] [place] [id number] [phone].   I have not been able to reach you and would like to set a time that I can talk with you over the phone.  My work is to help patients that have complex medical conditions get the care they need. This includes patients who may have been in the hospital or emergency room.    Please call me with any questions you may have. I look forward to speaking with you.  Sincerely,  Isabelle Sheridan  [phone]  Outpatient Care Manager  Copy:  (primary care physician name and address)

## 2024-10-11 NOTE — PROGRESS NOTES
Outpatient Care Management Note:    Voice mail message left for bridger, with my contact information, introducing myself and requesting a call back. (2nd attempt)     Unable to reach letter sent via my chart.

## 2024-10-18 DIAGNOSIS — E78.5 HYPERLIPIDEMIA, UNSPECIFIED HYPERLIPIDEMIA TYPE: ICD-10-CM

## 2024-10-18 NOTE — TELEPHONE ENCOUNTER
Reason for call:   [x] Refill   [] Prior Auth  [] Other: Prescribed in the ED    Office:   [x] PCP/Provider - Bladimir Caraballo/Elbert NEWTON  [] Specialty/Provider -     Medication: Lipitor    Dose/Frequency: 80 mg    Quantity: #90    Pharmacy: [facility] [address] [place] [address]    Does the patient have enough for 3 days?   [] Yes   [x] No - Send as HP to POD

## 2024-10-20 RX ORDER — ATORVASTATIN CALCIUM 80 MG/1
TABLET, FILM COATED ORAL
Qty: 90 TABLET | Refills: 1 | Status: SHIPPED | OUTPATIENT
Start: 2024-10-20

## 2024-10-22 DIAGNOSIS — I10 ESSENTIAL HYPERTENSION: ICD-10-CM

## 2024-10-22 NOTE — TELEPHONE ENCOUNTER
Reason for call:   [x] Refill   [] Prior Auth  [] Other:     Office:   [x] PCP/Provider - Bladimir Caraballo MD   [] Specialty/Provider -     Medication:   bisoprolol (ZEBETA) 5 mg tablet 2.5 mg, Daily         Pharmacy: [facility] #[id number] [address]      Does the patient have enough for 3 days?   [x] Yes   [] No - Send as HP to POD

## 2024-10-23 RX ORDER — BISOPROLOL FUMARATE 5 MG/1
2.5 TABLET, FILM COATED ORAL DAILY
Qty: 45 TABLET | Refills: 1 | Status: SHIPPED | OUTPATIENT
Start: 2024-10-23 | End: 2025-04-21

## 2024-10-25 ENCOUNTER — PATIENT OUTREACH (OUTPATIENT)
Dept: CASE MANAGEMENT | Facility: OTHER | Age: 84
End: 2024-10-25

## 2024-10-28 ENCOUNTER — TELEPHONE (OUTPATIENT)
Dept: PULMONOLOGY | Facility: CLINIC | Age: 84
End: 2024-10-28

## 2024-10-28 NOTE — TELEPHONE ENCOUNTER
Called patient to schedule the 6M FU AROUND 2/1/2025 from the  Recall List and left a voicemail message. Dr. Gerardo has no availability, as a reminder patient can be scheduled with a fellow  Instead if patient is ok with a Fellow

## 2024-11-09 DIAGNOSIS — I26.99 PULMONARY EMBOLI (HCC): ICD-10-CM

## 2024-11-10 DIAGNOSIS — F41.9 ANXIETY: ICD-10-CM

## 2024-11-10 RX ORDER — APIXABAN 5 MG/1
5 TABLET, FILM COATED ORAL 2 TIMES DAILY
Qty: 60 TABLET | Refills: 5 | Status: SHIPPED | OUTPATIENT
Start: 2024-11-10

## 2024-11-11 RX ORDER — CLONAZEPAM 0.5 MG/1
0.5 TABLET ORAL
Qty: 30 TABLET | Refills: 0 | Status: SHIPPED | OUTPATIENT
Start: 2024-11-11

## 2024-12-03 ENCOUNTER — TELEPHONE (OUTPATIENT)
Dept: FAMILY MEDICINE CLINIC | Facility: CLINIC | Age: 84
End: 2024-12-03

## 2024-12-03 ENCOUNTER — TELEPHONE (OUTPATIENT)
Age: 84
End: 2024-12-03

## 2024-12-03 DIAGNOSIS — N39.0 URINARY TRACT INFECTION WITHOUT HEMATURIA, SITE UNSPECIFIED: Primary | ICD-10-CM

## 2024-12-03 RX ORDER — SULFAMETHOXAZOLE AND TRIMETHOPRIM 800; 160 MG/1; MG/1
1 TABLET ORAL 2 TIMES DAILY
Qty: 14 TABLET | Refills: 0 | Status: SHIPPED | OUTPATIENT
Start: 2024-12-03 | End: 2024-12-10

## 2024-12-03 NOTE — TELEPHONE ENCOUNTER
Patient called stating she has a bad bladder infection again.  She is asking if PCP can send in a prescription for her

## 2024-12-05 ENCOUNTER — RA CDI HCC (OUTPATIENT)
Dept: OTHER | Facility: HOSPITAL | Age: 84
End: 2024-12-05

## 2024-12-05 NOTE — PROGRESS NOTES
HCC coding opportunities          Chart Reviewed number of suggestions sent to Provider: 1     Patients Insurance     Medicare Insurance: Medicare        I11.0.

## 2024-12-11 NOTE — PROGRESS NOTES
Office Visit Note  24     Danyelle Martinez 84 y.o. female MRN: [id number]  : 1940    Assessment:     1. Chronic hypoxemic respiratory failure (HCC)  Assessment & Plan:  Patient is on 2 L of oxygen stable no shortness of breath chest pain palpitations continue with the same  2. Acute deep vein thrombosis (DVT) of femoral vein of both lower extremities (HCC)  Assessment & Plan:  Patient with a history of DVT and pulmonary embolism on Eliquis status post thrombectomy when she was in the hospital in the month of April.  Patient is also being followed by the pulmonary who recommended for patient to be on Eliquis indefinitely currently she is taking 5 mg twice a day.  3. Acquired hypothyroidism  Assessment & Plan:  Patient with hypothyroidism currently on levothyroxine 88 mcg daily we will continue the same.  4. Acute hypoxemic respiratory failure (HCC)  5. Anxiety  Assessment & Plan:  Currently patient is taking duloxetine 60 mg daily and she is also on clonazepam 0.5 mg at bedtime as needed will continue the same  Orders:  -     clonazePAM (KlonoPIN) 0.5 mg tablet; Take 1 tablet (0.5 mg total) by mouth daily at bedtime  6. Chronic diastolic CHF (congestive heart failure) ([facility])  Assessment & Plan:  Wt Readings from Last 3 Encounters:   10/10/24 69.4 kg (153 lb)   24 74.8 kg (164 lb 12.8 oz)   24 73.5 kg (162 lb)   Patient's weight today is 153 pounds she has lost about 7 pounds in last couple of months on furosemide 40 mg twice a day will continue with the same follow-up with the lab work at the later date          7. Dyslipidemia  Assessment & Plan:  Patient's cholesterol last 1 was 211 on Lipitor 80 mg at bedtime we will follow-up with repeat lab in couple of months time discussed with patient regarding the diet.  8. Dyspepsia  Assessment & Plan:  If continue with pantoprazole 40 mg daily  9. Elevated liver enzymes  10. Urinary tract infection without hematuria, site unspecified  Assessment &  Plan:  Patient had another episode of symptoms suggestive of urinary tract infection we will call in antibiotic to which she has responded very well patient was on Bactrim again stable at present time if it recurs we will do an ultrasound of the kidneys  11. Essential hypertension  Assessment & Plan:  Today's blood pressure is 126/65 on nebivolol and amlodipine will continue with the same also patient has a history of orthostatic hypotension on midodrine needs to be monitored closely with periodic blood pressure checking  12. Frequent falls  13. Gastroesophageal reflux disease without esophagitis  14. History of transient ischemic attack (TIA)  Assessment & Plan:  Patient is not clopidogrel as well as atorvastatin continue  15. Primary osteoarthritis of right knee  Assessment & Plan:  Knee pain is better at present time symptomatic treatment Tylenol as needed  16. Type 2 diabetes mellitus without complication, without long-term current use of insulin ([facility])  Assessment & Plan:    Lab Results   Component Value Date    HGBA1C 5.8 (H) 09/10/2024   Patient's blood sugars are under control currently she is not on any medication.  Last A1c is at 5.8.  Continue with diet control for the time being  17. Sinus congestion  -     fluticasone (FLONASE) 50 mcg/act nasal spray; 2 sprays into each nostril daily  18. Dizziness  Assessment & Plan:  Patient with a history of fluctuating blood pressure readings sometimes running on the lower side currently patient is also on nebivolol for her hypertension along with amlodipine however with orthostatic blood pressure changes she is on midodrine and needs to be monitored closely with the combination of these medications.  Today's blood pressure is 126/65.             Discussion Summary and Plan:  Today's care plan and medications were reviewed with patient in detail and all their questions answered to their satisfaction.    Chief Complaint   Patient presents with    Follow-up       Subjective:  Patient is coming here for the follow-up evaluation of multiple medical problems complains of sinus congestion symptoms and also recently had urinary tract infection symptoms resolved with antibiotics.  Patient does not have any symptoms of urinary tract infection now no flank pain no increased frequency or urgency now.  Medication reviewed labs reviewed denies any symptoms or chest pains palpitation shortness of breath.    I have recommended patient to take cranberry capsules will also prescribe Flonase nasal spray.        The following portions of the patient's history were reviewed and updated as appropriate: allergies, current medications, past family history, past medical history, past social history, past surgical history and problem list.    Review of Systems   Constitutional:  Negative for chills and fever.   HENT:  Negative for ear pain and sore throat.    Eyes:  Negative for pain and visual disturbance.   Respiratory:  Negative for cough and shortness of breath.    Cardiovascular:  Negative for chest pain and palpitations.   Gastrointestinal:  Negative for abdominal pain and vomiting.   Genitourinary:  Negative for dysuria and hematuria.   Musculoskeletal:  Negative for arthralgias and back pain.   Skin:  Negative for color change and rash.   Neurological:  Negative for seizures and syncope.   All other systems reviewed and are negative.        Historical Information   Patient Active Problem List   Diagnosis    Shortness of breath on exertion    Dizziness    Chronic hypoxemic respiratory failure (HCC)    Seasonal allergic rhinitis due to pollen    Essential hypertension    Dyslipidemia    History of transient ischemic attack (TIA)    Acquired hypothyroidism    Thyroid nodule    Palpitations    Tremor    Nocturnal hypoxemia    Gastroesophageal reflux disease without esophagitis    Localized swelling of right lower leg    Anxiety    Frequent falls    Dyspepsia    SVT (supraventricular  tachycardia) (HCC)    Osteoarthritis of right knee    Elevated liver enzymes    Type 2 diabetes mellitus, without long-term current use of insulin (HCC)    Recurrent major depressive disorder, in remission (HCC)    Syncope, cardiogenic    Overflow incontinence of urine    Enlarged thyroid    Chronic diastolic CHF (congestive heart failure) (HCC)    Cigarette nicotine dependence in remission    Parotitis, acute    Facial cellulitis    Pulmonary emboli (HCC)    Acute deep vein thrombosis (DVT) of both lower extremities (HCC)    Hypertensive heart disease with congestive heart failure (HCC)    UTI (urinary tract infection)    Hypomagnesemia    Sinus congestion     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  No urinary symptoms.  Neurological symptoms none.  Get lab work done including urinalysis and culture rule out UTI also MRI if necessary      CHF (congestive heart failure) (HCC)     Closed wedge compression fracture of T1 vertebra with routine healing 2023    Deaf, left     Depression     Diabetes mellitus (HCC)     Disease of thyroid gland     DVT complicating pregnancy, unspecified trimester (HCC) postpartum    Fall 11/15/2023    Hearing loss     LEFT EAR    History of shingles 2007    fACIAL     Hyperlipidemia     Hypertension     Liver disease     Lyme disease     Meniere's disease     Obesity     Orthostatic hypotension     Psychiatric problem     Sinus congestion     Sleep apnea     Stroke (HCC)      Past Surgical History:   Procedure Laterality Date    APPENDECTOMY      BREAST BIOPSY Left 2010     SECTION      x2    DXA PROCEDURE (HISTORICAL)  10/28/2020     EYE SURGERY      HAND SURGERY Left     HERNIA REPAIR      HYSTERECTOMY      IR PE ENDOVASCULAR THERAPY  2024    ROTATOR CUFF REPAIR Left     TONSILLECTOMY      TYMPANOSTOMY TUBE PLACEMENT       Social History     Substance and Sexual Activity   Alcohol Use Not Currently     Social History     Substance and Sexual Activity   Drug Use Never     Social History     Tobacco Use   Smoking Status Former    Current packs/day: 0.00    Average packs/day: 0.8 packs/day for 44.0 years (33.0 ttl pk-yrs)    Types: Cigarettes    Start date: 1946    Quit date: 1990    Years since quittin.8    Passive exposure: Past   Smokeless Tobacco Never     Family History   Problem Relation Age of Onset    Depression Mother     Hypertension Mother     Obesity Mother     Depression Father     Alcohol abuse Father     Stroke Father     Breast cancer Sister 68    Ovarian cancer Daughter 53    BRCA1 Negative Daughter     BRCA2 Negative Daughter      Health Maintenance Due   Topic    Pneumococcal Vaccine: 65+ Years (1 of 2 - PCV)    DM Eye Exam     Hepatitis A Vaccine (1 of 2 - Risk 2-dose series)    Zoster Vaccine (1 of 2)    RSV Vaccine Age 60+ Years (1 - 1-dose 75+ series)    COVID-19 Vaccine (2024- season)    HEMOGLOBIN A1C     Depression Screening       Meds/Allergies       Current Outpatient Medications:     amLODIPine (NORVASC) 2.5 mg tablet, TAKE 1 TABLET BY MOUTH EVERY MORNING, Disp: 90 tablet, Rfl: 1    apixaban (Eliquis) 5 mg, TAKE 1 TABLET(5 MG) BY MOUTH TWICE DAILY, Disp: 60 tablet, Rfl: 5    atorvastatin (LIPITOR) 80 mg tablet, Taking at morning, Disp: 90 tablet, Rfl: 1    bisoprolol (ZEBETA) 5 mg tablet, Take 0.5 tablets (2.5 mg total) by mouth daily, Disp: 45 tablet, Rfl: 1    Calcium Carb-Cholecalciferol (Calcium 500 + D) 500-3.125 MG-MCG TABS, 2 tabs daily ORAL, Disp: , Rfl:     cetirizine (ZyrTEC) 10 mg tablet, Take 10 mg by mouth daily, Disp: , Rfl:     clobetasol (TEMOVATE) 0.05 % cream, Apply topically  "2 (two) times a day, Disp: 45 g, Rfl: 1    clonazePAM (KlonoPIN) 0.5 mg tablet, Take 1 tablet (0.5 mg total) by mouth daily at bedtime, Disp: 30 tablet, Rfl: 0    clopidogrel (PLAVIX) 75 mg tablet, TAKE 1 TABLET(75 MG) BY MOUTH DAILY, Disp: 90 tablet, Rfl: 1    DULoxetine (CYMBALTA) 60 mg delayed release capsule, TAKE 1 CAPSULE(60 MG) BY MOUTH DAILY, Disp: 90 capsule, Rfl: 1    fluticasone (FLONASE) 50 mcg/act nasal spray, 2 sprays into each nostril daily, Disp: 11.1 mL, Rfl: 1    furosemide (LASIX) 40 mg tablet, TAKE 1 TABLET(40 MG) BY MOUTH TWICE DAILY, Disp: 30 tablet, Rfl: 2    levothyroxine 88 mcg tablet, TAKE 1 TABLET(88 MCG) BY MOUTH DAILY, Disp: 90 tablet, Rfl: 1    Magnesium Citrate 125 MG CAPS, Take 1 capsule (125 mg total) by mouth daily, Disp: , Rfl:     midodrine (PROAMATINE) 5 mg tablet, Take 1 tablet (5 mg total) by mouth 2 (two) times a day before meals, Disp: 60 tablet, Rfl: 6    Multiple Vitamins-Minerals (CENTRUM SILVER 50+WOMEN PO), ORAL, Disp: , Rfl:     Multiple Vitamins-Minerals (PreserVision AREDS) TABS, Take 2 tablets by mouth in the morning, Disp: , Rfl:     Norwegian Cod Liver Oil CAPS, ORAL, Disp: , Rfl:     pantoprazole (PROTONIX) 40 mg tablet, TAKE 1 TABLET(40 MG) BY MOUTH EVERY MORNING, Disp: 90 tablet, Rfl: 1    Potassium Bicarb-Citric Acid (Effer-K) 20 MEQ TBEF, Take 1 tablet (20 mEq total) by mouth once a week, Disp: 12 tablet, Rfl: 3    primidone (MYSOLINE) 50 mg tablet, TAKE 1 TABLET(50 MG) BY MOUTH DAILY, Disp: 90 tablet, Rfl: 1      Objective:    Vitals:   /65 (BP Location: Right arm, Patient Position: Sitting, Cuff Size: Standard)   Pulse 75   Ht 5' 6\" (1.676 m)   SpO2 91%   BMI 24.69 kg/m²   Body mass index is 24.69 kg/m².  There were no vitals filed for this visit.    Physical Exam  Vitals and nursing note reviewed.   Constitutional:       Appearance: Normal appearance.   Cardiovascular:      Rate and Rhythm: Normal rate and regular rhythm.      Heart sounds: Normal " heart sounds.   Pulmonary:      Effort: Pulmonary effort is normal.      Breath sounds: Normal breath sounds.   Abdominal:      General: Abdomen is flat.      Palpations: Abdomen is soft.   Musculoskeletal:      Cervical back: Normal range of motion and neck supple.      Right lower leg: No edema.      Left lower leg: No edema.   Skin:     General: Skin is warm and dry.   Neurological:      Mental Status: She is alert and oriented to person, place, and time.   Psychiatric:         Mood and Affect: Mood normal.         Lab Review   No visits with results within 2 Month(s) from this visit.   Latest known visit with results is:   Admission on 10/06/2024, Discharged on 10/06/2024   Component Date Value Ref Range Status    WBC 10/06/2024 9.26  4.31 - 10.16 Thousand/uL Final    RBC 10/06/2024 4.06  3.81 - 5.12 Million/uL Final    Hemoglobin 10/06/2024 12.0  11.5 - 15.4 g/dL Final    Hematocrit 10/06/2024 38.8  34.8 - 46.1 % Final    MCV 10/06/2024 96  82 - 98 fL Final    MCH 10/06/2024 29.6  26.8 - 34.3 pg Final    MCHC 10/06/2024 30.9 (L)  31.4 - 37.4 g/dL Final    RDW 10/06/2024 16.2 (H)  11.6 - 15.1 % Final    MPV 10/06/2024 11.0  8.9 - 12.7 fL Final    Platelets 10/06/2024 185  149 - 390 Thousands/uL Final    nRBC 10/06/2024 0  /100 WBCs Final    Segmented % 10/06/2024 76 (H)  43 - 75 % Final    Immature Grans % 10/06/2024 0  0 - 2 % Final    Lymphocytes % 10/06/2024 12 (L)  14 - 44 % Final    Monocytes % 10/06/2024 6  4 - 12 % Final    Eosinophils Relative 10/06/2024 5  0 - 6 % Final    Basophils Relative 10/06/2024 1  0 - 1 % Final    Absolute Neutrophils 10/06/2024 7.03  1.85 - 7.62 Thousands/µL Final    Absolute Immature Grans 10/06/2024 0.03  0.00 - 0.20 Thousand/uL Final    Absolute Lymphocytes 10/06/2024 1.14  0.60 - 4.47 Thousands/µL Final    Absolute Monocytes 10/06/2024 0.58  0.17 - 1.22 Thousand/µL Final    Eosinophils Absolute 10/06/2024 0.43  0.00 - 0.61 Thousand/µL Final    Basophils Absolute 10/06/2024  "0.05  0.00 - 0.10 Thousands/µL Final    Protime 10/06/2024 15.6 (H)  12.3 - 15.0 seconds Final    INR 10/06/2024 1.19  0.85 - 1.19 Final    PTT 10/06/2024 30  23 - 34 seconds Final    Therapeutic Heparin Range =  60-90 seconds    Sodium 10/06/2024 138  135 - 147 mmol/L Final    Potassium 10/06/2024 3.9  3.5 - 5.3 mmol/L Final    Chloride 10/06/2024 102  96 - 108 mmol/L Final    CO2 10/06/2024 29  21 - 32 mmol/L Final    ANION GAP 10/06/2024 7  4 - 13 mmol/L Final    BUN 10/06/2024 25  5 - 25 mg/dL Final    Creatinine 10/06/2024 0.78  0.60 - 1.30 mg/dL Final    Standardized to IDMS reference method    Glucose 10/06/2024 119  65 - 140 mg/dL Final    If the patient is fasting, the ADA then defines impaired fasting glucose as > 100 mg/dL and diabetes as > or equal to 123 mg/dL.    Calcium 10/06/2024 9.6  8.4 - 10.2 mg/dL Final    AST 10/06/2024 28  13 - 39 U/L Final    ALT 10/06/2024 17  7 - 52 U/L Final    Specimen collection should occur prior to Sulfasalazine administration due to the potential for falsely depressed results.     Alkaline Phosphatase 10/06/2024 69  34 - 104 U/L Final    Total Protein 10/06/2024 6.9  6.4 - 8.4 g/dL Final    Albumin 10/06/2024 4.0  3.5 - 5.0 g/dL Final    Total Bilirubin 10/06/2024 0.39  0.20 - 1.00 mg/dL Final    Use of this assay is not recommended for patients undergoing treatment with eltrombopag due to the potential for falsely elevated results.  N-acetyl-p-benzoquinone imine (metabolite of Acetaminophen) will generate erroneously low results in samples for patients that have taken an overdose of Acetaminophen.    eGFR 10/06/2024 69  ml/min/1.73sq m Final    Magnesium 10/06/2024 1.7 (L)  1.9 - 2.7 mg/dL Final    hs TnI 0hr 10/06/2024 3  \"Refer to ACS Flowchart\"- see link ng/L Final    Comment:                                              Initial (time 0) result  If >=50 ng/L, Myocardial injury suggested ;  Type of myocardial injury and treatment strategy  to be determined.  If 5-49 " "ng/L, a delta result at 2 hours and or 4 hours will be needed to further evaluate.  If <4 ng/L, and chest pain has been >3 hours since onset, patient may qualify for discharge based on the HEART score in the ED.  If <5 ng/L and <3hours since onset of chest pain, a delta result at 2 hours will be needed to further evaluate.    HS Troponin 99th Percentile URL of a Health Population=12 ng/L with a 95% Confidence Interval of 8-18 ng/L.    Second Troponin (time 2 hours)  If calculated delta >= 20 ng/L,  Myocardial injury suggested ; Type of myocardial injury and treatment strategy to be determined.  If 5-49 ng/L and the calculated delta is 5-19 ng/L, consult medical service for evaluation.  Continue evaluation for ischemia on ecg and other possible etiology and repeat hs troponin at 4 hours.  If delta                            is <5 ng/L at 2 hours, consider discharge based on risk stratification via the HEART score (if in ED), or TYLER risk score in IP/Observation.    HS Troponin 99th Percentile URL of a Health Population=12 ng/L with a 95% Confidence Interval of 8-18 ng/L.    hs TnI 2hr 10/06/2024 3  \"Refer to ACS Flowchart\"- see link ng/L Final    Comment:                                              Initial (time 0) result  If >=50 ng/L, Myocardial injury suggested ;  Type of myocardial injury and treatment strategy  to be determined.  If 5-49 ng/L, a delta result at 2 hours and or 4 hours will be needed to further evaluate.  If <4 ng/L, and chest pain has been >3 hours since onset, patient may qualify for discharge based on the HEART score in the ED.  If <5 ng/L and <3hours since onset of chest pain, a delta result at 2 hours will be needed to further evaluate.    HS Troponin 99th Percentile URL of a Health Population=12 ng/L with a 95% Confidence Interval of 8-18 ng/L.    Second Troponin (time 2 hours)  If calculated delta >= 20 ng/L,  Myocardial injury suggested ; Type of myocardial injury and treatment strategy " "to be determined.  If 5-49 ng/L and the calculated delta is 5-19 ng/L, consult medical service for evaluation.  Continue evaluation for ischemia on ecg and other possible etiology and repeat hs troponin at 4 hours.  If delta                            is <5 ng/L at 2 hours, consider discharge based on risk stratification via the HEART score (if in ED), or TYLER risk score in IP/Observation.    HS Troponin 99th Percentile URL of a Health Population=12 ng/L with a 95% Confidence Interval of 8-18 ng/L.    Delta 2hr hsTnI 10/06/2024 0  <20 ng/L Final    Ventricular Rate 10/06/2024 57  BPM Final    Atrial Rate 10/06/2024 57  BPM Final    OH Interval 10/06/2024 180  ms Final    QRSD Interval 10/06/2024 96  ms Final    QT Interval 10/06/2024 454  ms Final    QTC Interval 10/06/2024 441  ms Final    P Axis 10/06/2024 44  degrees Final    QRS Axis 10/06/2024 53  degrees Final    T Wave Axis 10/06/2024 70  degrees Final         Bladimir Caraballo MD        \"This note has been constructed using a voice recognition system.Therefore there may be syntax, spelling, and/or grammatical errors. Please call if you have any questions. \"  "

## 2024-12-12 ENCOUNTER — OFFICE VISIT (OUTPATIENT)
Dept: FAMILY MEDICINE CLINIC | Facility: CLINIC | Age: 84
End: 2024-12-12
Payer: MEDICARE

## 2024-12-12 VITALS
HEART RATE: 75 BPM | SYSTOLIC BLOOD PRESSURE: 126 MMHG | BODY MASS INDEX: 24.69 KG/M2 | HEIGHT: 66 IN | DIASTOLIC BLOOD PRESSURE: 65 MMHG | OXYGEN SATURATION: 91 %

## 2024-12-12 DIAGNOSIS — N39.0 URINARY TRACT INFECTION WITHOUT HEMATURIA, SITE UNSPECIFIED: ICD-10-CM

## 2024-12-12 DIAGNOSIS — E03.9 ACQUIRED HYPOTHYROIDISM: ICD-10-CM

## 2024-12-12 DIAGNOSIS — J96.01 ACUTE HYPOXEMIC RESPIRATORY FAILURE (HCC): ICD-10-CM

## 2024-12-12 DIAGNOSIS — M17.11 PRIMARY OSTEOARTHRITIS OF RIGHT KNEE: ICD-10-CM

## 2024-12-12 DIAGNOSIS — R42 DIZZINESS: ICD-10-CM

## 2024-12-12 DIAGNOSIS — R09.81 SINUS CONGESTION: ICD-10-CM

## 2024-12-12 DIAGNOSIS — R29.6 FREQUENT FALLS: ICD-10-CM

## 2024-12-12 DIAGNOSIS — F41.9 ANXIETY: ICD-10-CM

## 2024-12-12 DIAGNOSIS — I82.413 ACUTE DEEP VEIN THROMBOSIS (DVT) OF FEMORAL VEIN OF BOTH LOWER EXTREMITIES (HCC): ICD-10-CM

## 2024-12-12 DIAGNOSIS — K21.9 GASTROESOPHAGEAL REFLUX DISEASE WITHOUT ESOPHAGITIS: ICD-10-CM

## 2024-12-12 DIAGNOSIS — Z86.73 HISTORY OF TRANSIENT ISCHEMIC ATTACK (TIA): ICD-10-CM

## 2024-12-12 DIAGNOSIS — E11.9 TYPE 2 DIABETES MELLITUS WITHOUT COMPLICATION, WITHOUT LONG-TERM CURRENT USE OF INSULIN (HCC): ICD-10-CM

## 2024-12-12 DIAGNOSIS — I50.32 CHRONIC DIASTOLIC CHF (CONGESTIVE HEART FAILURE) (HCC): ICD-10-CM

## 2024-12-12 DIAGNOSIS — R74.8 ELEVATED LIVER ENZYMES: ICD-10-CM

## 2024-12-12 DIAGNOSIS — J96.11 CHRONIC HYPOXEMIC RESPIRATORY FAILURE (HCC): Primary | Chronic | ICD-10-CM

## 2024-12-12 DIAGNOSIS — I10 ESSENTIAL HYPERTENSION: ICD-10-CM

## 2024-12-12 DIAGNOSIS — E78.5 DYSLIPIDEMIA: ICD-10-CM

## 2024-12-12 DIAGNOSIS — R10.13 DYSPEPSIA: ICD-10-CM

## 2024-12-12 PROCEDURE — G2211 COMPLEX E/M VISIT ADD ON: HCPCS | Performed by: INTERNAL MEDICINE

## 2024-12-12 PROCEDURE — 99214 OFFICE O/P EST MOD 30 MIN: CPT | Performed by: INTERNAL MEDICINE

## 2024-12-12 RX ORDER — CLONAZEPAM 0.5 MG/1
0.5 TABLET ORAL
Qty: 30 TABLET | Refills: 0 | Status: SHIPPED | OUTPATIENT
Start: 2024-12-12

## 2024-12-12 RX ORDER — FLUTICASONE PROPIONATE 50 MCG
2 SPRAY, SUSPENSION (ML) NASAL DAILY
Qty: 11.1 ML | Refills: 1 | Status: SHIPPED | OUTPATIENT
Start: 2024-12-12

## 2024-12-14 NOTE — ASSESSMENT & PLAN NOTE
Patient is on 2 L of oxygen stable no shortness of breath chest pain palpitations continue with the same

## 2024-12-14 NOTE — ASSESSMENT & PLAN NOTE
Patient had another episode of symptoms suggestive of urinary tract infection we will call in antibiotic to which she has responded very well patient was on Bactrim again stable at present time if it recurs we will do an ultrasound of the kidneys

## 2024-12-14 NOTE — ASSESSMENT & PLAN NOTE
Today's blood pressure is 126/65 on nebivolol and amlodipine will continue with the same also patient has a history of orthostatic hypotension on midodrine needs to be monitored closely with periodic blood pressure checking

## 2024-12-14 NOTE — ASSESSMENT & PLAN NOTE
Patient with a history of DVT and pulmonary embolism on Eliquis status post thrombectomy when she was in the hospital in the month of April.  Patient is also being followed by the pulmonary who recommended for patient to be on Eliquis indefinitely currently she is taking 5 mg twice a day.

## 2024-12-14 NOTE — ASSESSMENT & PLAN NOTE
Currently patient is taking duloxetine 60 mg daily and she is also on clonazepam 0.5 mg at bedtime as needed will continue the same

## 2024-12-14 NOTE — ASSESSMENT & PLAN NOTE
Patient's cholesterol last 1 was 211 on Lipitor 80 mg at bedtime we will follow-up with repeat lab in couple of months time discussed with patient regarding the diet.

## 2024-12-14 NOTE — ASSESSMENT & PLAN NOTE
Lab Results   Component Value Date    HGBA1C 5.8 (H) 09/10/2024   Patient's blood sugars are under control currently she is not on any medication.  Last A1c is at 5.8.  Continue with diet control for the time being

## 2024-12-14 NOTE — ASSESSMENT & PLAN NOTE
Wt Readings from Last 3 Encounters:   10/10/24 69.4 kg (153 lb)   09/17/24 74.8 kg (164 lb 12.8 oz)   09/06/24 73.5 kg (162 lb)   Patient's weight today is 153 pounds she has lost about 7 pounds in last couple of months on furosemide 40 mg twice a day will continue with the same follow-up with the lab work at the later date

## 2024-12-14 NOTE — ASSESSMENT & PLAN NOTE
Patient with a history of fluctuating blood pressure readings sometimes running on the lower side currently patient is also on nebivolol for her hypertension along with amlodipine however with orthostatic blood pressure changes she is on midodrine and needs to be monitored closely with the combination of these medications.  Today's blood pressure is 126/65.

## 2024-12-20 DIAGNOSIS — R25.1 TREMORS OF NERVOUS SYSTEM: ICD-10-CM

## 2024-12-20 DIAGNOSIS — I10 HYPERTENSION: ICD-10-CM

## 2024-12-20 DIAGNOSIS — K21.9 GASTROESOPHAGEAL REFLUX DISEASE WITHOUT ESOPHAGITIS: ICD-10-CM

## 2024-12-20 RX ORDER — PRIMIDONE 50 MG/1
50 TABLET ORAL DAILY
Qty: 90 TABLET | Refills: 1 | Status: SHIPPED | OUTPATIENT
Start: 2024-12-20

## 2024-12-20 RX ORDER — AMLODIPINE BESYLATE 2.5 MG/1
2.5 TABLET ORAL EVERY MORNING
Qty: 90 TABLET | Refills: 1 | Status: SHIPPED | OUTPATIENT
Start: 2024-12-20

## 2024-12-20 RX ORDER — PANTOPRAZOLE SODIUM 40 MG/1
40 TABLET, DELAYED RELEASE ORAL DAILY
Qty: 90 TABLET | Refills: 1 | Status: SHIPPED | OUTPATIENT
Start: 2024-12-20

## 2024-12-30 DIAGNOSIS — I10 HYPERTENSION: ICD-10-CM

## 2024-12-30 DIAGNOSIS — I26.99 PULMONARY EMBOLI (HCC): ICD-10-CM

## 2024-12-30 DIAGNOSIS — K11.20 PAROTITIS: ICD-10-CM

## 2024-12-30 DIAGNOSIS — F41.9 ANXIETY: ICD-10-CM

## 2024-12-30 NOTE — TELEPHONE ENCOUNTER
Reason for call:   [x] Refill   [] Prior Auth  [] Other:     Office:   [x] PCP/Provider -   [] Specialty/Provider -     Medication: Amlodipine    Dose/Frequency: 2.5 mg    Quantity: 90 tablets    Pharmacy: [facility] [address] [place] [address]     Does the patient have enough for 3 days?   [x] Yes   [] No - Send as HP to POD    Reason for call:   [x] Refill   [] Prior Auth  [] Other:     Office:   [x] PCP/Provider -   [] Specialty/Provider -     Medication: Apixaban    Dose/Frequency: 5 mg    Quantity: 90 tablets    Pharmacy: [facility] [address] [place] [address] [address] [place] [address]    Does the patient have enough for 3 days?   [x] Yes   [] No - Send as HP to POD      Reason for call:   [x] Refill   [] Prior Auth  [] Other:     Office:   [x] PCP/Provider -   [] Specialty/Provider -     Medication: Clonazepam    Dose/Frequency: 0.5 mg    Quantity: 30 tablets    Pharmacy: [facility] [address] [place] [address]     Does the patient have enough for 3 days?   [x] Yes   [] No - Send as HP to POD    Reason for call:   [x] Refill   [] Prior Auth  [] Other:     Office:   [x] PCP/Provider -   [] Specialty/Provider -     Medication: Midodrine    Dose/Frequency: 5 mg    Quantity: 60 tablets    Pharmacy: [facility] [address] [place] [address]     Does the patient have enough for 3 days?   [x] Yes   [] No - Send as HP to POD

## 2024-12-31 RX ORDER — AMLODIPINE BESYLATE 2.5 MG/1
2.5 TABLET ORAL EVERY MORNING
Qty: 90 TABLET | Refills: 1 | Status: SHIPPED | OUTPATIENT
Start: 2024-12-31

## 2025-01-01 RX ORDER — CLONAZEPAM 0.5 MG/1
0.5 TABLET ORAL
Qty: 30 TABLET | Refills: 0 | Status: SHIPPED | OUTPATIENT
Start: 2025-01-01

## 2025-01-01 RX ORDER — MIDODRINE HYDROCHLORIDE 5 MG/1
5 TABLET ORAL
Qty: 60 TABLET | Refills: 2 | Status: SHIPPED | OUTPATIENT
Start: 2025-01-01 | End: 2025-07-30

## 2025-01-09 DIAGNOSIS — N39.0 URINARY TRACT INFECTION WITHOUT HEMATURIA, SITE UNSPECIFIED: ICD-10-CM

## 2025-01-09 RX ORDER — SULFAMETHOXAZOLE AND TRIMETHOPRIM 800; 160 MG/1; MG/1
1 TABLET ORAL 2 TIMES DAILY
Qty: 14 TABLET | Refills: 0 | Status: SHIPPED | OUTPATIENT
Start: 2025-01-09 | End: 2025-01-16

## 2025-01-11 PROBLEM — N39.0 UTI (URINARY TRACT INFECTION): Status: RESOLVED | Noted: 2024-08-29 | Resolved: 2025-01-11

## 2025-01-21 DIAGNOSIS — R60.9 EDEMA, UNSPECIFIED TYPE: ICD-10-CM

## 2025-01-22 RX ORDER — FUROSEMIDE 40 MG/1
40 TABLET ORAL 2 TIMES DAILY
Qty: 30 TABLET | Refills: 5 | Status: SHIPPED | OUTPATIENT
Start: 2025-01-22

## 2025-02-06 ENCOUNTER — RA CDI HCC (OUTPATIENT)
Dept: OTHER | Facility: HOSPITAL | Age: 85
End: 2025-02-06

## 2025-02-11 NOTE — ASSESSMENT & PLAN NOTE
Wt Readings from Last 3 Encounters:   04/02/24 79.3 kg (174 lb 14.4 oz)   03/25/24 75.8 kg (167 lb)   03/23/24 75.9 kg (167 lb 5.3 oz)     Holding Lasix to avoid dehydration in setting of parotitis.  Will follow daily weight and I/Os    4/4 - Patient with grade I diastolic dysfunction. Currently euvolemic. Will continue to hold lasix therapy for now. Patient instructed to restart lasix therapy if she develops any signs of volume overload (increased weight, lower extremity edema) and to let her cardiologist know.         Female

## 2025-02-12 NOTE — PROGRESS NOTES
Office Visit Note  25     Danyelle Martinez 84 y.o. female MRN: [id number]  : 1940    Assessment:     1. Upper respiratory tract infection, unspecified type  Assessment & Plan:  As mentioned in the history of present illness patient with upper respiratory symptoms most likely virus sinus congestion postnasal dripping mild cough exam expiration lungs slightly prolonged otherwise unremarkable throat is mildly congested will monitor for now with antibiotics.  Orders:  -     azithromycin (ZITHROMAX) 250 mg tablet; Take 2 the first day, then take 1 daily  2. Anxiety  Assessment & Plan:  Patient is currently on duloxetine 60 mg daily and clonazepam 0.5 mg at bedtime as needed will continue the same.  Orders:  -     clonazePAM (KlonoPIN) 0.5 mg tablet; Take 1 tablet (0.5 mg total) by mouth daily at bedtime  3. Acquired hypothyroidism  Assessment & Plan:  Patient is currently taking levothyroxine 88 mcg daily continue the same follow-up with repeat TSH level  Orders:  -     TSH, 3rd generation with Free T4 reflex; Future; Expected date: 2025  4. Acute deep vein thrombosis (DVT) of femoral vein of both lower extremities (HCC)  Assessment & Plan:  History of DVT and pulmonary embolism on Eliquis patient also status post thrombectomy when she was in the hospital in the month of April last year also being followed by the pulmonary continue Eliquis indefinitely at this time.  5. Chronic hypoxemic respiratory failure (HCC)  Assessment & Plan:  Patient is on 3L of oxygen at bedtime denies any shortness of breath now however she does get shortness of breath with exertion.  She also uses oxygen during the daytime as needed  6. Cigarette nicotine dependence in remission  7. Dyslipidemia  Assessment & Plan:  Continue Lipitor 80 mg repeat labs ordered  Orders:  -     Lipid panel; Future  8. Essential hypertension  Assessment & Plan:  As mentioned above patient with history of hypertension on nebivolol and amlodipine  with a history of orthostatic hypotension on midodrine need to be monitored closely  9. Frequent falls  Assessment & Plan:  No episodes of falling in the recent past stable  10. Hypertensive heart disease with right-sided congestive heart failure, unspecified HF chronicity ([facility])  Assessment & Plan:  Wt Readings from Last 3 Encounters:   02/13/25 73 kg (161 lb)   10/10/24 69.4 kg (153 lb)   09/17/24 74.8 kg (164 lb 12.8 oz)     No evidence of fluid retention at present time stable        11. Screening for thyroid disorder  12. Screening for deficiency anemia  -     CBC and differential; Future  13. Type 2 diabetes mellitus without complication, without long-term current use of insulin ([facility])  Assessment & Plan:    Lab Results   Component Value Date    HGBA1C 5.8 (H) 09/10/2024   Diet controlled not on any medication follow-up with repeat lab  Orders:  -     Comprehensive metabolic panel; Future  -     Hemoglobin A1C; Future; Expected date: 02/13/2025  -     UA w Reflex to Microscopic w Reflex to Culture; Future; Expected date: 02/13/2025  -     Albumin / creatinine urine ratio; Future  14. History of transient ischemic attack (TIA)  Assessment & Plan:  Patient is on clopidogrel she is also on atorvastatin  15. Chronic obstructive pulmonary disease, unspecified COPD type ([facility])  Assessment & Plan:  Continue with oxygen not on any nebulizer medications or inhalers at present time  16. Dyspepsia  17. Dizziness  Assessment & Plan:  Patient with a history of hypertension and also orthostatic hypotension currently she is taking nebivolol amlodipine for the blood pressures however patient does get orthostatic blood pressure changes with drop in blood pressures and patient takes midodrine currently she is on twice daily 5 mg  18. Gastroesophageal reflux disease without esophagitis  Assessment & Plan:  Continue pantoprazole recommend patient not to eat late in the night and to keep the head of the bed up while she is  sleeping  19. Nocturnal hypoxemia  Assessment & Plan:  Patient is on 3 L of oxygen at bedtime and also during the daytime as needed  20. Chronic pulmonary embolism without acute cor pulmonale, unspecified pulmonary embolism type (HCC)  Assessment & Plan:  On Eliquis  21. Recurrent major depressive disorder, in remission (HCC)        Falls Plan of Care: balance, strength, and gait training instructions were provided. Recommended assistive device to help with gait and balance. No episodes of falling in the recent past          Discussion Summary and Plan:  Today's care plan and medications were reviewed with patient in detail and all their questions answered to their satisfaction.    Chief Complaint   Patient presents with   • Follow-up      Subjective:  Patient is coming in for evaluation regarding symptoms of sinus congestion postnasal dripping feels tired and achy and also sore throat for the past few days he has been using Flonase nasal spray.  No fever no cough congestion symptoms.  Patient has no definite sleep pattern and she has been feeling more tired in the recent past and sometimes sleeping 10 to 12 hours a day.  No chest pain palpitation shortness of breath.  Patient denies any urinary symptoms history of urinary tract infections in the past.    Medications reviewed labs reviewed.    Will continue Flonase nasal spray Zyrtec if necessary antibiotic Z-Robb patient allergic to penicillin we will also recommend patient to get lab work done including chemistry CBC etc.        The following portions of the patient's history were reviewed and updated as appropriate: allergies, current medications, past family history, past medical history, past social history, past surgical history and problem list.    Review of Systems   Constitutional:  Negative for chills and fever.   HENT:  Negative for ear pain and sore throat.    Eyes:  Negative for pain and visual disturbance.   Respiratory:  Negative for cough and  shortness of breath.    Cardiovascular:  Negative for chest pain and palpitations.   Gastrointestinal:  Negative for abdominal pain and vomiting.   Genitourinary:  Negative for dysuria and hematuria.   Musculoskeletal:  Negative for arthralgias and back pain.   Skin:  Negative for color change and rash.   Neurological:  Negative for seizures and syncope.   All other systems reviewed and are negative.        Historical Information   Patient Active Problem List   Diagnosis   • Shortness of breath on exertion   • Dizziness   • Chronic hypoxemic respiratory failure (HCC)   • Seasonal allergic rhinitis due to pollen   • Essential hypertension   • Dyslipidemia   • History of transient ischemic attack (TIA)   • Acquired hypothyroidism   • Thyroid nodule   • Tremor   • Nocturnal hypoxemia   • Gastroesophageal reflux disease without esophagitis   • Anxiety   • Frequent falls   • Dyspepsia   • Osteoarthritis of right knee   • Elevated liver enzymes   • Type 2 diabetes mellitus, without long-term current use of insulin ([facility])   • Recurrent major depressive disorder, in remission ([facility])   • Syncope, cardiogenic   • Overflow incontinence of urine   • Enlarged thyroid   • Cigarette nicotine dependence in remission   • Facial cellulitis   • Pulmonary emboli ([facility])   • Acute deep vein thrombosis (DVT) of both lower extremities ([facility])   • Hypertensive heart disease with congestive heart failure ([facility])   • Sinus congestion   • Chronic obstructive pulmonary disease, unspecified COPD type ([facility])   • Upper respiratory tract infection     Past Medical History:   Diagnosis Date   • Acute on chronic diastolic (congestive) heart failure ([facility]) 01/26/2024   • Anxiety    • Arthritis     r knee and shoulders   • 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  No urinary symptoms.  Neurological symptoms none.  Get lab work done including urinalysis and culture rule out UTI also MRI if necessary     • CHF (congestive heart failure) (HCC)    • Closed wedge compression fracture of T1 vertebra with routine healing 2023   • Deaf, left    • Depression    • Diabetes mellitus (HCC)    • Disease of thyroid gland    • DVT complicating pregnancy, unspecified trimester (HCC) postpartum   • Fall 11/15/2023   • Hearing loss     LEFT EAR   • History of shingles 2007    fACIAL    • Hyperlipidemia    • Hypertension    • Hypomagnesemia 10/10/2024   • Liver disease    • Lyme disease    • Meniere's disease    • Obesity    • Orthostatic hypotension    • Palpitations 2021   • Psychiatric problem    • Sinus congestion    • Sleep apnea    • Stroke (HCC)      Past Surgical History:   Procedure Laterality Date   • APPENDECTOMY     • BREAST BIOPSY Left    •  SECTION      x2   • DXA PROCEDURE (HISTORICAL)  10/28/2020   • EYE SURGERY     • HAND SURGERY Left    • HERNIA REPAIR     • HYSTERECTOMY     • IR PE ENDOVASCULAR THERAPY  2024   • ROTATOR CUFF REPAIR Left    • TONSILLECTOMY     • TYMPANOSTOMY TUBE PLACEMENT       Social History     Substance and Sexual Activity   Alcohol Use Not Currently     Social History     Substance and Sexual Activity   Drug Use Never     Social History     Tobacco Use   Smoking Status Former   • Current packs/day: 0.00   • Average packs/day: 0.8 packs/day for 44.0 years (33.0 ttl pk-yrs)   • Types: Cigarettes   • Start date: 1946   • Quit date: 1990   • Years since quittin.0   • Passive exposure: Past   Smokeless Tobacco Never     Family History   Problem Relation Age of Onset   • Depression Mother    • Hypertension Mother    • Obesity Mother    • Depression Father    • Alcohol abuse Father    • Stroke Father    • Breast cancer Sister 68   • Ovarian cancer Daughter 53   •  BRCA1 Negative Daughter    • BRCA2 Negative Daughter      Health Maintenance Due   Topic   • Pneumococcal Vaccine: 65+ Years (1 of 2 - PCV)   • Diabetic Eye Exam    • Hepatitis A Vaccine (1 of 2 - Risk 2-dose series)   • Zoster Vaccine (1 of 2)   • RSV Vaccine for Pregnant Patients and Patients Age 60+ Years (1 - 1-dose 75+ series)   • COVID-19 Vaccine (5 - 2024-25 season)   • HEMOGLOBIN A1C    • Depression Screening       Meds/Allergies       Current Outpatient Medications:   •  amLODIPine (NORVASC) 2.5 mg tablet, Take 1 tablet (2.5 mg total) by mouth every morning, Disp: 90 tablet, Rfl: 1  •  apixaban (Eliquis) 5 mg, Take 1 tablet (5 mg total) by mouth 2 (two) times a day, Disp: 60 tablet, Rfl: 5  •  atorvastatin (LIPITOR) 80 mg tablet, Taking at morning, Disp: 90 tablet, Rfl: 1  •  azithromycin (ZITHROMAX) 250 mg tablet, Take 2 the first day, then take 1 daily, Disp: 6 tablet, Rfl: 0  •  bisoprolol (ZEBETA) 5 mg tablet, Take 0.5 tablets (2.5 mg total) by mouth daily, Disp: 45 tablet, Rfl: 1  •  Calcium Carb-Cholecalciferol (Calcium 500 + D) 500-3.125 MG-MCG TABS, 2 tabs daily ORAL, Disp: , Rfl:   •  cetirizine (ZyrTEC) 10 mg tablet, Take 10 mg by mouth daily, Disp: , Rfl:   •  clobetasol (TEMOVATE) 0.05 % cream, Apply topically 2 (two) times a day, Disp: 45 g, Rfl: 1  •  clonazePAM (KlonoPIN) 0.5 mg tablet, Take 1 tablet (0.5 mg total) by mouth daily at bedtime, Disp: 30 tablet, Rfl: 0  •  clopidogrel (PLAVIX) 75 mg tablet, TAKE 1 TABLET(75 MG) BY MOUTH DAILY, Disp: 90 tablet, Rfl: 1  •  DULoxetine (CYMBALTA) 60 mg delayed release capsule, TAKE 1 CAPSULE(60 MG) BY MOUTH DAILY, Disp: 90 capsule, Rfl: 1  •  fluticasone (FLONASE) 50 mcg/act nasal spray, 2 sprays into each nostril daily, Disp: 11.1 mL, Rfl: 1  •  furosemide (LASIX) 40 mg tablet, Take 1 tablet (40 mg total) by mouth 2 (two) times a day, Disp: 30 tablet, Rfl: 5  •  levothyroxine 88 mcg tablet, TAKE 1 TABLET(88 MCG) BY MOUTH DAILY, Disp: 90 tablet, Rfl:  "1  •  Magnesium Citrate 125 MG CAPS, Take 1 capsule (125 mg total) by mouth daily, Disp: , Rfl:   •  midodrine (PROAMATINE) 5 mg tablet, Take 1 tablet (5 mg total) by mouth 2 (two) times a day before meals, Disp: 60 tablet, Rfl: 2  •  Multiple Vitamins-Minerals (CENTRUM SILVER 50+WOMEN PO), ORAL, Disp: , Rfl:   •  Multiple Vitamins-Minerals (PreserVision AREDS) TABS, Take 2 tablets by mouth in the morning, Disp: , Rfl:   •  [place] Cod Liver Oil CAPS, ORAL, Disp: , Rfl:   •  pantoprazole (PROTONIX) 40 mg tablet, TAKE 1 TABLET(40 MG) BY MOUTH EVERY MORNING, Disp: 90 tablet, Rfl: 1  •  Potassium Bicarb-Citric Acid (Effer-K) 20 MEQ TBEF, Take 1 tablet (20 mEq total) by mouth once a week, Disp: 12 tablet, Rfl: 3  •  primidone (MYSOLINE) 50 mg tablet, TAKE 1 TABLET(50 MG) BY MOUTH DAILY, Disp: 90 tablet, Rfl: 1      Objective:    Vitals:   /65 (BP Location: Right arm, Patient Position: Sitting, Cuff Size: Standard)   Pulse 81   Ht 5' 6\" (1.676 m)   Wt 73 kg (161 lb)   SpO2 94%   BMI 25.99 kg/m²   Body mass index is 25.99 kg/m².  Vitals:    02/13/25 1425   Weight: 73 kg (161 lb)       Physical Exam  Vitals and nursing note reviewed.   Constitutional:       Appearance: Normal appearance.   Cardiovascular:      Rate and Rhythm: Normal rate and regular rhythm.      Heart sounds: Normal heart sounds.   Pulmonary:      Effort: Pulmonary effort is normal.      Breath sounds: Normal breath sounds.   Abdominal:      Palpations: Abdomen is soft.   Musculoskeletal:      Cervical back: Normal range of motion and neck supple.      Right lower leg: No edema.      Left lower leg: No edema.   Skin:     General: Skin is warm and dry.   Neurological:      Mental Status: She is alert and oriented to person, place, and time.   Psychiatric:         Mood and Affect: Mood normal.         Behavior: Behavior normal.         Lab Review   No visits with results within 2 Month(s) from this visit.   Latest known visit with results is: "   Admission on 10/06/2024, Discharged on 10/06/2024   Component Date Value Ref Range Status   • WBC 10/06/2024 9.26  4.31 - 10.16 Thousand/uL Final   • RBC 10/06/2024 4.06  3.81 - 5.12 Million/uL Final   • Hemoglobin 10/06/2024 12.0  11.5 - 15.4 g/dL Final   • Hematocrit 10/06/2024 38.8  34.8 - 46.1 % Final   • MCV 10/06/2024 96  82 - 98 fL Final   • MCH 10/06/2024 29.6  26.8 - 34.3 pg Final   • MCHC 10/06/2024 30.9 (L)  31.4 - 37.4 g/dL Final   • RDW 10/06/2024 16.2 (H)  11.6 - 15.1 % Final   • MPV 10/06/2024 11.0  8.9 - 12.7 fL Final   • Platelets 10/06/2024 185  149 - 390 Thousands/uL Final   • nRBC 10/06/2024 0  /100 WBCs Final   • Segmented % 10/06/2024 76 (H)  43 - 75 % Final   • Immature Grans % 10/06/2024 0  0 - 2 % Final   • Lymphocytes % 10/06/2024 12 (L)  14 - 44 % Final   • Monocytes % 10/06/2024 6  4 - 12 % Final   • Eosinophils Relative 10/06/2024 5  0 - 6 % Final   • Basophils Relative 10/06/2024 1  0 - 1 % Final   • Absolute Neutrophils 10/06/2024 7.03  1.85 - 7.62 Thousands/µL Final   • Absolute Immature Grans 10/06/2024 0.03  0.00 - 0.20 Thousand/uL Final   • Absolute Lymphocytes 10/06/2024 1.14  0.60 - 4.47 Thousands/µL Final   • Absolute Monocytes 10/06/2024 0.58  0.17 - 1.22 Thousand/µL Final   • Eosinophils Absolute 10/06/2024 0.43  0.00 - 0.61 Thousand/µL Final   • Basophils Absolute 10/06/2024 0.05  0.00 - 0.10 Thousands/µL Final   • Protime 10/06/2024 15.6 (H)  12.3 - 15.0 seconds Final   • INR 10/06/2024 1.19  0.85 - 1.19 Final   • PTT 10/06/2024 30  23 - 34 seconds Final    Therapeutic Heparin Range =  60-90 seconds   • Sodium 10/06/2024 138  135 - 147 mmol/L Final   • Potassium 10/06/2024 3.9  3.5 - 5.3 mmol/L Final   • Chloride 10/06/2024 102  96 - 108 mmol/L Final   • CO2 10/06/2024 29  21 - 32 mmol/L Final   • ANION GAP 10/06/2024 7  4 - 13 mmol/L Final   • BUN 10/06/2024 25  5 - 25 mg/dL Final   • Creatinine 10/06/2024 0.78  0.60 - 1.30 mg/dL Final    Standardized to IDMS reference  "method   • Glucose 10/06/2024 119  65 - 140 mg/dL Final    If the patient is fasting, the ADA then defines impaired fasting glucose as > 100 mg/dL and diabetes as > or equal to 123 mg/dL.   • Calcium 10/06/2024 9.6  8.4 - 10.2 mg/dL Final   • AST 10/06/2024 28  13 - 39 U/L Final   • ALT 10/06/2024 17  7 - 52 U/L Final    Specimen collection should occur prior to Sulfasalazine administration due to the potential for falsely depressed results.    • Alkaline Phosphatase 10/06/2024 69  34 - 104 U/L Final   • Total Protein 10/06/2024 6.9  6.4 - 8.4 g/dL Final   • Albumin 10/06/2024 4.0  3.5 - 5.0 g/dL Final   • Total Bilirubin 10/06/2024 0.39  0.20 - 1.00 mg/dL Final    Use of this assay is not recommended for patients undergoing treatment with eltrombopag due to the potential for falsely elevated results.  N-acetyl-p-benzoquinone imine (metabolite of Acetaminophen) will generate erroneously low results in samples for patients that have taken an overdose of Acetaminophen.   • eGFR 10/06/2024 69  ml/min/1.73sq m Final   • Magnesium 10/06/2024 1.7 (L)  1.9 - 2.7 mg/dL Final   • hs TnI 0hr 10/06/2024 3  \"Refer to ACS Flowchart\"- see link ng/L Final    Comment:                                              Initial (time 0) result  If >=50 ng/L, Myocardial injury suggested ;  Type of myocardial injury and treatment strategy  to be determined.  If 5-49 ng/L, a delta result at 2 hours and or 4 hours will be needed to further evaluate.  If <4 ng/L, and chest pain has been >3 hours since onset, patient may qualify for discharge based on the HEART score in the ED.  If <5 ng/L and <3hours since onset of chest pain, a delta result at 2 hours will be needed to further evaluate.    HS Troponin 99th Percentile URL of a Health Population=12 ng/L with a 95% Confidence Interval of 8-18 ng/L.    Second Troponin (time 2 hours)  If calculated delta >= 20 ng/L,  Myocardial injury suggested ; Type of myocardial injury and treatment strategy to " "be determined.  If 5-49 ng/L and the calculated delta is 5-19 ng/L, consult medical service for evaluation.  Continue evaluation for ischemia on ecg and other possible etiology and repeat hs troponin at 4 hours.  If delta                            is <5 ng/L at 2 hours, consider discharge based on risk stratification via the HEART score (if in ED), or TYLER risk score in IP/Observation.    HS Troponin 99th Percentile URL of a Health Population=12 ng/L with a 95% Confidence Interval of 8-18 ng/L.   • hs TnI 2hr 10/06/2024 3  \"Refer to ACS Flowchart\"- see link ng/L Final    Comment:                                              Initial (time 0) result  If >=50 ng/L, Myocardial injury suggested ;  Type of myocardial injury and treatment strategy  to be determined.  If 5-49 ng/L, a delta result at 2 hours and or 4 hours will be needed to further evaluate.  If <4 ng/L, and chest pain has been >3 hours since onset, patient may qualify for discharge based on the HEART score in the ED.  If <5 ng/L and <3hours since onset of chest pain, a delta result at 2 hours will be needed to further evaluate.    HS Troponin 99th Percentile URL of a Health Population=12 ng/L with a 95% Confidence Interval of 8-18 ng/L.    Second Troponin (time 2 hours)  If calculated delta >= 20 ng/L,  Myocardial injury suggested ; Type of myocardial injury and treatment strategy to be determined.  If 5-49 ng/L and the calculated delta is 5-19 ng/L, consult medical service for evaluation.  Continue evaluation for ischemia on ecg and other possible etiology and repeat hs troponin at 4 hours.  If delta                            is <5 ng/L at 2 hours, consider discharge based on risk stratification via the HEART score (if in ED), or TYLER risk score in IP/Observation.    HS Troponin 99th Percentile URL of a Health Population=12 ng/L with a 95% Confidence Interval of 8-18 ng/L.   • Delta 2hr hsTnI 10/06/2024 0  <20 ng/L Final   • Ventricular Rate 10/06/2024 " "57  BPM Final   • Atrial Rate 10/06/2024 57  BPM Final   • NJ Interval 10/06/2024 180  ms Final   • QRSD Interval 10/06/2024 96  ms Final   • QT Interval 10/06/2024 454  ms Final   • QTC Interval 10/06/2024 441  ms Final   • P Axis 10/06/2024 44  degrees Final   • QRS Axis 10/06/2024 53  degrees Final   • T Wave Axis 10/06/2024 70  degrees Final         Bladimir Caraballo MD        \"This note has been constructed using a voice recognition system.Therefore there may be syntax, spelling, and/or grammatical errors. Please call if you have any questions. \"  "

## 2025-02-13 ENCOUNTER — OFFICE VISIT (OUTPATIENT)
Dept: FAMILY MEDICINE CLINIC | Facility: CLINIC | Age: 85
End: 2025-02-13
Payer: MEDICARE

## 2025-02-13 VITALS
BODY MASS INDEX: 25.88 KG/M2 | SYSTOLIC BLOOD PRESSURE: 123 MMHG | OXYGEN SATURATION: 94 % | DIASTOLIC BLOOD PRESSURE: 65 MMHG | HEIGHT: 66 IN | WEIGHT: 161 LBS | HEART RATE: 81 BPM

## 2025-02-13 DIAGNOSIS — Z13.29 SCREENING FOR THYROID DISORDER: ICD-10-CM

## 2025-02-13 DIAGNOSIS — J06.9 UPPER RESPIRATORY TRACT INFECTION, UNSPECIFIED TYPE: Primary | ICD-10-CM

## 2025-02-13 DIAGNOSIS — I10 ESSENTIAL HYPERTENSION: ICD-10-CM

## 2025-02-13 DIAGNOSIS — I82.413 ACUTE DEEP VEIN THROMBOSIS (DVT) OF FEMORAL VEIN OF BOTH LOWER EXTREMITIES (HCC): ICD-10-CM

## 2025-02-13 DIAGNOSIS — I27.82 CHRONIC PULMONARY EMBOLISM WITHOUT ACUTE COR PULMONALE, UNSPECIFIED PULMONARY EMBOLISM TYPE (HCC): ICD-10-CM

## 2025-02-13 DIAGNOSIS — F33.40 RECURRENT MAJOR DEPRESSIVE DISORDER, IN REMISSION (HCC): ICD-10-CM

## 2025-02-13 DIAGNOSIS — J44.9 CHRONIC OBSTRUCTIVE PULMONARY DISEASE, UNSPECIFIED COPD TYPE (HCC): ICD-10-CM

## 2025-02-13 DIAGNOSIS — J96.11 CHRONIC HYPOXEMIC RESPIRATORY FAILURE (HCC): Chronic | ICD-10-CM

## 2025-02-13 DIAGNOSIS — R10.13 DYSPEPSIA: ICD-10-CM

## 2025-02-13 DIAGNOSIS — E03.9 ACQUIRED HYPOTHYROIDISM: ICD-10-CM

## 2025-02-13 DIAGNOSIS — K21.9 GASTROESOPHAGEAL REFLUX DISEASE WITHOUT ESOPHAGITIS: ICD-10-CM

## 2025-02-13 DIAGNOSIS — F17.211 CIGARETTE NICOTINE DEPENDENCE IN REMISSION: ICD-10-CM

## 2025-02-13 DIAGNOSIS — Z13.0 SCREENING FOR DEFICIENCY ANEMIA: ICD-10-CM

## 2025-02-13 DIAGNOSIS — E78.5 DYSLIPIDEMIA: ICD-10-CM

## 2025-02-13 DIAGNOSIS — G47.34 NOCTURNAL HYPOXEMIA: ICD-10-CM

## 2025-02-13 DIAGNOSIS — R42 DIZZINESS: ICD-10-CM

## 2025-02-13 DIAGNOSIS — I11.0 HYPERTENSIVE HEART DISEASE WITH RIGHT-SIDED CONGESTIVE HEART FAILURE, UNSPECIFIED HF CHRONICITY (HCC): ICD-10-CM

## 2025-02-13 DIAGNOSIS — I50.810 HYPERTENSIVE HEART DISEASE WITH RIGHT-SIDED CONGESTIVE HEART FAILURE, UNSPECIFIED HF CHRONICITY (HCC): ICD-10-CM

## 2025-02-13 DIAGNOSIS — R29.6 FREQUENT FALLS: ICD-10-CM

## 2025-02-13 DIAGNOSIS — Z86.73 HISTORY OF TRANSIENT ISCHEMIC ATTACK (TIA): ICD-10-CM

## 2025-02-13 DIAGNOSIS — F41.9 ANXIETY: ICD-10-CM

## 2025-02-13 DIAGNOSIS — E11.9 TYPE 2 DIABETES MELLITUS WITHOUT COMPLICATION, WITHOUT LONG-TERM CURRENT USE OF INSULIN (HCC): ICD-10-CM

## 2025-02-13 PROCEDURE — 99214 OFFICE O/P EST MOD 30 MIN: CPT | Performed by: INTERNAL MEDICINE

## 2025-02-13 PROCEDURE — G2211 COMPLEX E/M VISIT ADD ON: HCPCS | Performed by: INTERNAL MEDICINE

## 2025-02-13 RX ORDER — AZITHROMYCIN 250 MG/1
TABLET, FILM COATED ORAL
Qty: 6 TABLET | Refills: 0 | Status: SHIPPED | OUTPATIENT
Start: 2025-02-13 | End: 2025-02-18

## 2025-02-13 RX ORDER — CLONAZEPAM 0.5 MG/1
0.5 TABLET ORAL
Qty: 30 TABLET | Refills: 0 | Status: SHIPPED | OUTPATIENT
Start: 2025-02-13

## 2025-02-16 PROBLEM — R00.2 PALPITATIONS: Status: RESOLVED | Noted: 2021-11-18 | Resolved: 2025-02-16

## 2025-02-16 PROBLEM — I47.10 SVT (SUPRAVENTRICULAR TACHYCARDIA) (HCC): Status: RESOLVED | Noted: 2023-01-03 | Resolved: 2025-02-16

## 2025-02-16 PROBLEM — R22.41 LOCALIZED SWELLING OF RIGHT LOWER LEG: Status: RESOLVED | Noted: 2022-11-03 | Resolved: 2025-02-16

## 2025-02-16 PROBLEM — I50.33 ACUTE ON CHRONIC DIASTOLIC (CONGESTIVE) HEART FAILURE (HCC): Status: RESOLVED | Noted: 2024-01-26 | Resolved: 2025-02-16

## 2025-02-16 PROBLEM — E83.42 HYPOMAGNESEMIA: Status: RESOLVED | Noted: 2024-10-10 | Resolved: 2025-02-16

## 2025-02-16 PROBLEM — K11.21 PAROTITIS, ACUTE: Status: RESOLVED | Noted: 2024-03-30 | Resolved: 2025-02-16

## 2025-02-16 PROBLEM — J06.9 UPPER RESPIRATORY TRACT INFECTION: Status: ACTIVE | Noted: 2025-02-16

## 2025-02-16 NOTE — ASSESSMENT & PLAN NOTE
As mentioned in the history of present illness patient with upper respiratory symptoms most likely virus sinus congestion postnasal dripping mild cough exam expiration lungs slightly prolonged otherwise unremarkable throat is mildly congested will monitor for now with antibiotics.

## 2025-02-16 NOTE — ASSESSMENT & PLAN NOTE
Wt Readings from Last 3 Encounters:   02/13/25 73 kg (161 lb)   10/10/24 69.4 kg (153 lb)   09/17/24 74.8 kg (164 lb 12.8 oz)     No evidence of fluid retention at present time stable

## 2025-02-16 NOTE — ASSESSMENT & PLAN NOTE
Patient is currently on duloxetine 60 mg daily and clonazepam 0.5 mg at bedtime as needed will continue the same.

## 2025-02-16 NOTE — ASSESSMENT & PLAN NOTE
As mentioned above patient with history of hypertension on nebivolol and amlodipine with a history of orthostatic hypotension on midodrine need to be monitored closely

## 2025-02-16 NOTE — ASSESSMENT & PLAN NOTE
Patient is currently taking levothyroxine 88 mcg daily continue the same follow-up with repeat TSH level

## 2025-02-16 NOTE — ASSESSMENT & PLAN NOTE
Patient is on 3L of oxygen at bedtime denies any shortness of breath now however she does get shortness of breath with exertion.  She also uses oxygen during the daytime as needed

## 2025-02-16 NOTE — ASSESSMENT & PLAN NOTE
Lab Results   Component Value Date    HGBA1C 5.8 (H) 09/10/2024   Diet controlled not on any medication follow-up with repeat lab

## 2025-02-16 NOTE — ASSESSMENT & PLAN NOTE
History of DVT and pulmonary embolism on Eliquis patient also status post thrombectomy when she was in the hospital in the month of April last year also being followed by the pulmonary continue Eliquis indefinitely at this time.

## 2025-02-16 NOTE — ASSESSMENT & PLAN NOTE
Continue pantoprazole recommend patient not to eat late in the night and to keep the head of the bed up while she is sleeping

## 2025-02-16 NOTE — ASSESSMENT & PLAN NOTE
Patient with a history of hypertension and also orthostatic hypotension currently she is taking nebivolol amlodipine for the blood pressures however patient does get orthostatic blood pressure changes with drop in blood pressures and patient takes midodrine currently she is on twice daily 5 mg

## 2025-02-17 DIAGNOSIS — F33.40 RECURRENT MAJOR DEPRESSIVE DISORDER, IN REMISSION (HCC): ICD-10-CM

## 2025-02-17 RX ORDER — DULOXETIN HYDROCHLORIDE 60 MG/1
CAPSULE, DELAYED RELEASE ORAL
Qty: 90 CAPSULE | Refills: 1 | Status: SHIPPED | OUTPATIENT
Start: 2025-02-17

## 2025-02-18 ENCOUNTER — TELEPHONE (OUTPATIENT)
Age: 85
End: 2025-02-18

## 2025-02-18 NOTE — TELEPHONE ENCOUNTER
Patient thinks she has another bladder infection because she feels burning and its very painful to urinate. Please advise if medication will be sent or if she needs to provide a urine sample.

## 2025-02-19 DIAGNOSIS — N39.0 URINARY TRACT INFECTION WITHOUT HEMATURIA, SITE UNSPECIFIED: ICD-10-CM

## 2025-02-19 RX ORDER — SULFAMETHOXAZOLE AND TRIMETHOPRIM 800; 160 MG/1; MG/1
1 TABLET ORAL 2 TIMES DAILY
Qty: 14 TABLET | Refills: 0 | Status: SHIPPED | OUTPATIENT
Start: 2025-02-19 | End: 2025-02-26

## 2025-02-21 ENCOUNTER — APPOINTMENT (OUTPATIENT)
Dept: LAB | Facility: CLINIC | Age: 85
End: 2025-02-21
Payer: MEDICARE

## 2025-02-21 DIAGNOSIS — N39.0 URINARY TRACT INFECTION WITHOUT HEMATURIA, SITE UNSPECIFIED: ICD-10-CM

## 2025-02-21 DIAGNOSIS — E11.9 TYPE 2 DIABETES MELLITUS WITHOUT COMPLICATION, WITHOUT LONG-TERM CURRENT USE OF INSULIN (HCC): ICD-10-CM

## 2025-02-21 DIAGNOSIS — Z13.0 SCREENING FOR DEFICIENCY ANEMIA: ICD-10-CM

## 2025-02-21 DIAGNOSIS — E78.5 DYSLIPIDEMIA: ICD-10-CM

## 2025-02-21 DIAGNOSIS — E03.9 ACQUIRED HYPOTHYROIDISM: ICD-10-CM

## 2025-02-21 LAB
ALBUMIN SERPL BCG-MCNC: 4.2 G/DL (ref 3.5–5)
ALP SERPL-CCNC: 100 U/L (ref 34–104)
ALT SERPL W P-5'-P-CCNC: 18 U/L (ref 7–52)
ANION GAP SERPL CALCULATED.3IONS-SCNC: 8 MMOL/L (ref 4–13)
AST SERPL W P-5'-P-CCNC: 26 U/L (ref 13–39)
BACTERIA UR QL AUTO: ABNORMAL /HPF
BASOPHILS # BLD AUTO: 0.1 THOUSANDS/ΜL (ref 0–0.1)
BASOPHILS NFR BLD AUTO: 1 % (ref 0–1)
BILIRUB SERPL-MCNC: 0.45 MG/DL (ref 0.2–1)
BILIRUB UR QL STRIP: NEGATIVE
BUN SERPL-MCNC: 13 MG/DL (ref 5–25)
CALCIUM SERPL-MCNC: 10.6 MG/DL (ref 8.4–10.2)
CHLORIDE SERPL-SCNC: 98 MMOL/L (ref 96–108)
CHOLEST SERPL-MCNC: 167 MG/DL (ref ?–200)
CLARITY UR: CLEAR
CO2 SERPL-SCNC: 32 MMOL/L (ref 21–32)
COLOR UR: YELLOW
CREAT SERPL-MCNC: 0.89 MG/DL (ref 0.6–1.3)
CREAT UR-MCNC: 100.5 MG/DL
EOSINOPHIL # BLD AUTO: 0.54 THOUSAND/ΜL (ref 0–0.61)
EOSINOPHIL NFR BLD AUTO: 4 % (ref 0–6)
ERYTHROCYTE [DISTWIDTH] IN BLOOD BY AUTOMATED COUNT: 15.7 % (ref 11.6–15.1)
EST. AVERAGE GLUCOSE BLD GHB EST-MCNC: 120 MG/DL
GFR SERPL CREATININE-BSD FRML MDRD: 58 ML/MIN/1.73SQ M
GLUCOSE P FAST SERPL-MCNC: 107 MG/DL (ref 65–99)
GLUCOSE UR STRIP-MCNC: NEGATIVE MG/DL
HBA1C MFR BLD: 5.8 %
HCT VFR BLD AUTO: 43.3 % (ref 34.8–46.1)
HDLC SERPL-MCNC: 29 MG/DL
HGB BLD-MCNC: 13.2 G/DL (ref 11.5–15.4)
HGB UR QL STRIP.AUTO: NEGATIVE
HYALINE CASTS #/AREA URNS LPF: ABNORMAL /LPF
IMM GRANULOCYTES # BLD AUTO: 0.06 THOUSAND/UL (ref 0–0.2)
IMM GRANULOCYTES NFR BLD AUTO: 1 % (ref 0–2)
KETONES UR STRIP-MCNC: NEGATIVE MG/DL
LDLC SERPL CALC-MCNC: 65 MG/DL (ref 0–100)
LEUKOCYTE ESTERASE UR QL STRIP: ABNORMAL
LYMPHOCYTES # BLD AUTO: 1.63 THOUSANDS/ΜL (ref 0.6–4.47)
LYMPHOCYTES NFR BLD AUTO: 13 % (ref 14–44)
MCH RBC QN AUTO: 29.7 PG (ref 26.8–34.3)
MCHC RBC AUTO-ENTMCNC: 30.5 G/DL (ref 31.4–37.4)
MCV RBC AUTO: 97 FL (ref 82–98)
MICROALBUMIN UR-MCNC: 29.6 MG/L
MICROALBUMIN/CREAT 24H UR: 29 MG/G CREATININE (ref 0–30)
MONOCYTES # BLD AUTO: 0.6 THOUSAND/ΜL (ref 0.17–1.22)
MONOCYTES NFR BLD AUTO: 5 % (ref 4–12)
MUCOUS THREADS UR QL AUTO: ABNORMAL
NEUTROPHILS # BLD AUTO: 9.23 THOUSANDS/ΜL (ref 1.85–7.62)
NEUTS SEG NFR BLD AUTO: 76 % (ref 43–75)
NITRITE UR QL STRIP: NEGATIVE
NON-SQ EPI CELLS URNS QL MICRO: ABNORMAL /HPF
NONHDLC SERPL-MCNC: 138 MG/DL
NRBC BLD AUTO-RTO: 0 /100 WBCS
PH UR STRIP.AUTO: 6 [PH]
PLATELET # BLD AUTO: 294 THOUSANDS/UL (ref 149–390)
PMV BLD AUTO: 11.7 FL (ref 8.9–12.7)
POTASSIUM SERPL-SCNC: 4.6 MMOL/L (ref 3.5–5.3)
PROT SERPL-MCNC: 7.5 G/DL (ref 6.4–8.4)
PROT UR STRIP-MCNC: ABNORMAL MG/DL
RBC # BLD AUTO: 4.45 MILLION/UL (ref 3.81–5.12)
RBC #/AREA URNS AUTO: ABNORMAL /HPF
SODIUM SERPL-SCNC: 138 MMOL/L (ref 135–147)
SP GR UR STRIP.AUTO: 1.02 (ref 1–1.03)
TRANS CELLS #/AREA URNS HPF: PRESENT /[HPF]
TRIGL SERPL-MCNC: 364 MG/DL (ref ?–150)
TSH SERPL DL<=0.05 MIU/L-ACNC: 1.18 UIU/ML (ref 0.45–4.5)
UROBILINOGEN UR STRIP-ACNC: <2 MG/DL
WBC # BLD AUTO: 12.16 THOUSAND/UL (ref 4.31–10.16)
WBC #/AREA URNS AUTO: ABNORMAL /HPF

## 2025-02-21 PROCEDURE — 85025 COMPLETE CBC W/AUTO DIFF WBC: CPT

## 2025-02-21 PROCEDURE — 84443 ASSAY THYROID STIM HORMONE: CPT

## 2025-02-21 PROCEDURE — 82043 UR ALBUMIN QUANTITATIVE: CPT

## 2025-02-21 PROCEDURE — 83036 HEMOGLOBIN GLYCOSYLATED A1C: CPT

## 2025-02-21 PROCEDURE — 87086 URINE CULTURE/COLONY COUNT: CPT

## 2025-02-21 PROCEDURE — 82570 ASSAY OF URINE CREATININE: CPT

## 2025-02-21 PROCEDURE — 80053 COMPREHEN METABOLIC PANEL: CPT

## 2025-02-21 PROCEDURE — 81001 URINALYSIS AUTO W/SCOPE: CPT

## 2025-02-21 PROCEDURE — 80061 LIPID PANEL: CPT

## 2025-02-21 PROCEDURE — 36415 COLL VENOUS BLD VENIPUNCTURE: CPT

## 2025-02-23 LAB — BACTERIA UR CULT: NORMAL

## 2025-02-24 DIAGNOSIS — E04.1 THYROID NODULE: ICD-10-CM

## 2025-02-24 DIAGNOSIS — E03.9 ACQUIRED HYPOTHYROIDISM: ICD-10-CM

## 2025-02-25 ENCOUNTER — RESULTS FOLLOW-UP (OUTPATIENT)
Dept: FAMILY MEDICINE CLINIC | Facility: CLINIC | Age: 85
End: 2025-02-25

## 2025-02-25 RX ORDER — LEVOTHYROXINE SODIUM 88 UG/1
TABLET ORAL
Qty: 90 TABLET | Refills: 1 | Status: SHIPPED | OUTPATIENT
Start: 2025-02-25

## 2025-04-04 DIAGNOSIS — I10 ESSENTIAL HYPERTENSION: ICD-10-CM

## 2025-04-16 RX ORDER — BISOPROLOL FUMARATE 5 MG/1
2.5 TABLET, FILM COATED ORAL DAILY
Qty: 45 TABLET | Refills: 1 | Status: SHIPPED | OUTPATIENT
Start: 2025-04-16

## 2025-04-16 NOTE — TELEPHONE ENCOUNTER
Patient called to request a refill for their bisoprolol advised a refill was requested on 4/4/25 and is pending approval. Patient verbalized understanding and is in agreement.     Does the patient have enough for 3 days?   [x] Yes   [] No - Send as HP to POD

## 2025-05-02 DIAGNOSIS — N39.0 URINARY TRACT INFECTION WITHOUT HEMATURIA, SITE UNSPECIFIED: ICD-10-CM

## 2025-05-02 DIAGNOSIS — R39.9 UTI SYMPTOMS: Primary | ICD-10-CM

## 2025-05-02 RX ORDER — SULFAMETHOXAZOLE AND TRIMETHOPRIM 800; 160 MG/1; MG/1
1 TABLET ORAL 2 TIMES DAILY
Qty: 14 TABLET | Refills: 0 | Status: SHIPPED | OUTPATIENT
Start: 2025-05-02 | End: 2025-05-09

## 2025-05-03 ENCOUNTER — APPOINTMENT (OUTPATIENT)
Dept: LAB | Facility: HOSPITAL | Age: 85
End: 2025-05-03
Attending: INTERNAL MEDICINE
Payer: MEDICARE

## 2025-05-03 DIAGNOSIS — R39.9 UTI SYMPTOMS: ICD-10-CM

## 2025-05-03 LAB
BACTERIA UR QL AUTO: ABNORMAL /HPF
BILIRUB UR QL STRIP: NEGATIVE
CLARITY UR: CLEAR
COLOR UR: YELLOW
GLUCOSE UR STRIP-MCNC: NEGATIVE MG/DL
HGB UR QL STRIP.AUTO: NEGATIVE
KETONES UR STRIP-MCNC: NEGATIVE MG/DL
LEUKOCYTE ESTERASE UR QL STRIP: ABNORMAL
NITRITE UR QL STRIP: NEGATIVE
NON-SQ EPI CELLS URNS QL MICRO: ABNORMAL /HPF
PH UR STRIP.AUTO: 6.5 [PH]
PROT UR STRIP-MCNC: NEGATIVE MG/DL
RBC #/AREA URNS AUTO: ABNORMAL /HPF
SP GR UR STRIP.AUTO: 1.01 (ref 1–1.03)
UROBILINOGEN UR STRIP-ACNC: <2 MG/DL
WBC #/AREA URNS AUTO: ABNORMAL /HPF
WBC CLUMPS # UR AUTO: PRESENT /UL

## 2025-05-03 PROCEDURE — 87186 SC STD MICRODIL/AGAR DIL: CPT

## 2025-05-03 PROCEDURE — 87181 SC STD AGAR DILUTION PER AGT: CPT

## 2025-05-03 PROCEDURE — 81001 URINALYSIS AUTO W/SCOPE: CPT

## 2025-05-03 PROCEDURE — 87086 URINE CULTURE/COLONY COUNT: CPT

## 2025-05-03 PROCEDURE — 87077 CULTURE AEROBIC IDENTIFY: CPT

## 2025-05-06 LAB
BACTERIA UR CULT: ABNORMAL
BACTERIA UR CULT: ABNORMAL

## 2025-05-07 DIAGNOSIS — I63.9 CVA (CEREBRAL VASCULAR ACCIDENT) (HCC): ICD-10-CM

## 2025-05-07 RX ORDER — CLOPIDOGREL BISULFATE 75 MG/1
75 TABLET ORAL DAILY
Qty: 90 TABLET | Refills: 1 | Status: SHIPPED | OUTPATIENT
Start: 2025-05-07

## 2025-05-15 ENCOUNTER — RA CDI HCC (OUTPATIENT)
Dept: OTHER | Facility: HOSPITAL | Age: 85
End: 2025-05-15

## 2025-05-16 ENCOUNTER — HOSPITAL ENCOUNTER (OUTPATIENT)
Dept: RADIOLOGY | Facility: HOSPITAL | Age: 85
Discharge: HOME | End: 2025-05-16
Attending: INTERNAL MEDICINE
Payer: MEDICARE

## 2025-05-16 DIAGNOSIS — N39.0 URINARY TRACT INFECTION WITHOUT HEMATURIA, SITE UNSPECIFIED: ICD-10-CM

## 2025-05-16 PROCEDURE — 76775 US EXAM ABDO BACK WALL LIM: CPT

## 2025-05-21 NOTE — PROGRESS NOTES
Name: Danyelle Martinez      : 1940      MRN: [id number]  Encounter Provider: Bladimir Caraballo MD  Encounter Date: 2025   Encounter department: [facility] PRIMARY CARE [facility]  :  Assessment & Plan  Acquired hypothyroidism  Patient is currently taking 88 mcg of levothyroxine we will continue with the same last TSH level came back normal 1.184 on        Acute deep vein thrombosis (DVT) of femoral vein of both lower extremities (HCC)  Patient has a history of DVT and pulmonary embolism on Eliquis status post thrombectomy when she was in the hospital in the month of April last year being followed by the pulmonary also continue Eliquis indefinitely at this time.       Anxiety  Currently patient is taking clonazepam 0.5 mg daily at bedtime as needed will continue with the same prescription entered  Orders:  •  clonazePAM (KlonoPIN) 0.5 mg tablet; Take 1 tablet (0.5 mg total) by mouth daily at bedtime    Chronic hypoxemic respiratory failure (HCC)  Patient is oxygen dependent gets 3 L at bedtime and also with exertion she uses the oxygen denies any symptoms of chest pain palpitations or shortness of breath at this time discussed with patient and the daughter.       Urinary tract infection without hematuria, site unspecified  Patient with recurrent urinary tract infections with E. coli ESBL treated with antibiotics.  Currently asymptomatic recommend patient to be seen by the urologist she had an ultrasound of the bladder kidneys done which came back unremarkable discussed with patient at length she is taking cranberry capsules also cranberry juice also drinking water.       Type 2 diabetes mellitus without complication, without long-term current use of insulin ([facility])    Lab Results   Component Value Date    HGBA1C 5.8 (H) 2025   Last hemoglobin A1c is 5.8 done on  currently not on any medication we will continue to monitor get a repeat labs done later along with albumin creatinine  ratio.         Dyslipidemia  Continue atorvastatin 80 mg at bedtime last lipid profile in the range.       Essential hypertension  Patient has a history of hypertension and hypotension currently on amlodipine 2.5 mg every day in the morning and bisoprolol 5 mg tablet half a tablet daily in the morning however patient tends to have low blood pressure readings currently on midodrine 5 mg oral 2 times daily before meals today blood pressure 121/70.       Gastroesophageal reflux disease without esophagitis  Currently patient is on pantoprazole 40 mg daily will continue       Hypertensive heart disease with right-sided congestive heart failure, unspecified HF chronicity (HCC)  Wt Readings from Last 3 Encounters:   05/22/25 71.7 kg (158 lb)   02/13/25 73 kg (161 lb)   10/10/24 69.4 kg (153 lb)                    Chronic pulmonary embolism without acute cor pulmonale, unspecified pulmonary embolism type (HCC)  Currently on oxygen does not have any nebulizer machine so treatments.  Stable lungs expiration prolonged.       Chronic obstructive pulmonary disease, unspecified COPD type (HCC)         History of transient ischemic attack (TIA)  Patient is on clopidogrel will continue             Depression Screening and Follow-up Plan: Patient was screened for depression during today's encounter. They screened negative with a PHQ-9 score of 0.        History of Present Illness   Patient is coming for evaluation regarding symptoms of recurrent urinary tract infection also history of hypothyroidism, hypertension/hypotension history of pulmonary emboli chronic hypoxemic respiratory failure chronic obstructive pulmonary disease anxiety depression disorder dyspepsia, dyslipidemia.    There is a question with the patient also has Juan M-Danlos syndrome.    Medications reviewed labs reviewed discussed with patient at length since patient is having recurrent UTIs we will have her seen once by the urologist also ultrasound came back  "unremarkable urine is showing E. coli ESBL responded to the antibiotics.  Patient on valgus increase urgency frequency sensation with passing urine.      Review of Systems   Constitutional:  Negative for chills and fever.   HENT:  Negative for ear pain and sore throat.    Eyes:  Negative for pain and visual disturbance.   Respiratory:  Negative for cough and shortness of breath.    Cardiovascular:  Negative for chest pain and palpitations.   Gastrointestinal:  Negative for abdominal pain and vomiting.   Genitourinary:  Negative for dysuria and hematuria.   Musculoskeletal:  Negative for arthralgias and back pain.   Skin:  Negative for color change and rash.   Neurological:  Negative for seizures and syncope.   All other systems reviewed and are negative.      Objective   /70 (BP Location: Right arm, Patient Position: Sitting, Cuff Size: Standard)   Pulse 74   Ht 5' 6\" (1.676 m)   Wt 71.7 kg (158 lb)   SpO2 93%   BMI 25.50 kg/m²      Physical Exam  Vitals and nursing note reviewed.   Constitutional:       General: She is not in acute distress.     Appearance: She is well-developed.   HENT:      Head: Normocephalic and atraumatic.     Eyes:      Conjunctiva/sclera: Conjunctivae normal.       Cardiovascular:      Rate and Rhythm: Normal rate and regular rhythm.      Heart sounds: No murmur heard.  Pulmonary:      Effort: Pulmonary effort is normal. No respiratory distress.      Breath sounds: Normal breath sounds.   Abdominal:      Palpations: Abdomen is soft.      Tenderness: There is no abdominal tenderness.     Musculoskeletal:         General: No swelling.      Cervical back: Neck supple.     Skin:     General: Skin is warm and dry.      Capillary Refill: Capillary refill takes less than 2 seconds.     Neurological:      Mental Status: She is alert.     Psychiatric:         Mood and Affect: Mood normal.       Recent Results (from the past 8 weeks)   Urinalysis with microscopic    Collection Time: " 05/03/25 12:13 PM   Result Value Ref Range    Color, UA Yellow     Clarity, UA Clear     Specific Gravity, UA 1.015 1.005 - 1.030    pH, UA 6.5 4.5, 5.0, 5.5, 6.0, 6.5, 7.0, 7.5, 8.0    Leukocytes, UA Large (A) Negative    Nitrite, UA Negative Negative    Protein, UA Negative Negative mg/dl    Glucose, UA Negative Negative mg/dl    Ketones, UA Negative Negative mg/dl    Urobilinogen, UA <2.0 <2.0 mg/dl mg/dl    Bilirubin, UA Negative Negative    Occult Blood, UA Negative Negative    RBC, UA None Seen None Seen, 2-4 /hpf    WBC, UA 4-10 (A) None Seen, 2-4, 5-60 /hpf    Epithelial Cells Occasional None Seen, Occasional /hpf    Bacteria, UA Occasional None Seen, Occasional /hpf    WBC Clumps Present    Urine culture    Collection Time: 05/03/25 12:13 PM    Specimen: Urine, Clean Catch   Result Value Ref Range    Urine Culture >100,000 cfu/ml Escherichia coli ESBL (A)     Urine Culture 30,000-39,000 cfu/ml        Susceptibility    Escherichia coli ESBL - KATELYN     ZID Performed Yes       Amoxicillin + Clavulanate <=8/4 Susceptible ug/ml     Ampicillin ($$) >16.00 Resistant ug/ml     Ampicillin + Sulbactam ($) 8/4 Susceptible ug/ml     Aztreonam ($$$)  >16 Resistant ug/ml     Cefazolin ($) >16.00 Resistant ug/ml     Cefepime ($) >16.00 Resistant ug/ml     Ceftazidime ($$) 8 Resistant ug/ml     Ceftriaxone ($$) >32.00 Resistant ug/ml     Cefuroxime ($$) >16 Resistant ug/ml     Ciprofloxacin ($)  >2.00 Resistant ug/ml     Ertapenem ($$$) <=0.5 Susceptible ug/ml     Gentamicin ($$) <=2 Susceptible ug/ml     Levofloxacin ($) >4.00 Resistant ug/ml     Nitrofurantoin <=32 Susceptible ug/ml     Piperacillin + Tazobactam ($$$) <=8 Susceptible ug/ml     Tetracycline <=4 Susceptible ug/ml     Trimethoprim + Sulfamethoxazole ($$$) <=0.5/9.5 Susceptible ug/ml     Fosfomycin   0.500 Susceptible ug/ml

## 2025-05-22 ENCOUNTER — OFFICE VISIT (OUTPATIENT)
Dept: FAMILY MEDICINE CLINIC | Facility: CLINIC | Age: 85
End: 2025-05-22
Payer: MEDICARE

## 2025-05-22 VITALS
WEIGHT: 158 LBS | OXYGEN SATURATION: 93 % | HEIGHT: 66 IN | HEART RATE: 74 BPM | SYSTOLIC BLOOD PRESSURE: 121 MMHG | DIASTOLIC BLOOD PRESSURE: 70 MMHG | BODY MASS INDEX: 25.39 KG/M2

## 2025-05-22 DIAGNOSIS — F41.9 ANXIETY: ICD-10-CM

## 2025-05-22 DIAGNOSIS — J96.11 CHRONIC HYPOXEMIC RESPIRATORY FAILURE (HCC): Chronic | ICD-10-CM

## 2025-05-22 DIAGNOSIS — E11.9 TYPE 2 DIABETES MELLITUS WITHOUT COMPLICATION, WITHOUT LONG-TERM CURRENT USE OF INSULIN (HCC): ICD-10-CM

## 2025-05-22 DIAGNOSIS — I11.0 HYPERTENSIVE HEART DISEASE WITH RIGHT-SIDED CONGESTIVE HEART FAILURE, UNSPECIFIED HF CHRONICITY (HCC): ICD-10-CM

## 2025-05-22 DIAGNOSIS — J44.9 CHRONIC OBSTRUCTIVE PULMONARY DISEASE, UNSPECIFIED COPD TYPE (HCC): ICD-10-CM

## 2025-05-22 DIAGNOSIS — I10 ESSENTIAL HYPERTENSION: ICD-10-CM

## 2025-05-22 DIAGNOSIS — E78.5 DYSLIPIDEMIA: ICD-10-CM

## 2025-05-22 DIAGNOSIS — I50.810 HYPERTENSIVE HEART DISEASE WITH RIGHT-SIDED CONGESTIVE HEART FAILURE, UNSPECIFIED HF CHRONICITY (HCC): ICD-10-CM

## 2025-05-22 DIAGNOSIS — I82.413 ACUTE DEEP VEIN THROMBOSIS (DVT) OF FEMORAL VEIN OF BOTH LOWER EXTREMITIES (HCC): ICD-10-CM

## 2025-05-22 DIAGNOSIS — K21.9 GASTROESOPHAGEAL REFLUX DISEASE WITHOUT ESOPHAGITIS: ICD-10-CM

## 2025-05-22 DIAGNOSIS — I27.82 CHRONIC PULMONARY EMBOLISM WITHOUT ACUTE COR PULMONALE, UNSPECIFIED PULMONARY EMBOLISM TYPE (HCC): ICD-10-CM

## 2025-05-22 DIAGNOSIS — N39.0 URINARY TRACT INFECTION WITHOUT HEMATURIA, SITE UNSPECIFIED: Primary | ICD-10-CM

## 2025-05-22 DIAGNOSIS — E03.9 ACQUIRED HYPOTHYROIDISM: ICD-10-CM

## 2025-05-22 DIAGNOSIS — Z86.73 HISTORY OF TRANSIENT ISCHEMIC ATTACK (TIA): ICD-10-CM

## 2025-05-22 PROCEDURE — G2211 COMPLEX E/M VISIT ADD ON: HCPCS | Performed by: INTERNAL MEDICINE

## 2025-05-22 PROCEDURE — 99214 OFFICE O/P EST MOD 30 MIN: CPT | Performed by: INTERNAL MEDICINE

## 2025-05-22 RX ORDER — ADHESIVE TAPE 3"X 2.3 YD
TAPE, NON-MEDICATED TOPICAL
COMMUNITY

## 2025-05-22 RX ORDER — CLONAZEPAM 0.5 MG/1
0.5 TABLET ORAL
Qty: 30 TABLET | Refills: 0 | Status: SHIPPED | OUTPATIENT
Start: 2025-05-22

## 2025-05-22 NOTE — ASSESSMENT & PLAN NOTE
Patient is oxygen dependent gets 3 L at bedtime and also with exertion she uses the oxygen denies any symptoms of chest pain palpitations or shortness of breath at this time discussed with patient and the daughter.

## 2025-05-22 NOTE — ASSESSMENT & PLAN NOTE
Patient has a history of DVT and pulmonary embolism on Eliquis status post thrombectomy when she was in the hospital in the month of April last year being followed by the pulmonary also continue Eliquis indefinitely at this time.

## 2025-05-22 NOTE — ASSESSMENT & PLAN NOTE
Patient with recurrent urinary tract infections with E. coli ESBL treated with antibiotics.  Currently asymptomatic recommend patient to be seen by the urologist she had an ultrasound of the bladder kidneys done which came back unremarkable discussed with patient at length she is taking cranberry capsules also cranberry juice also drinking water.

## 2025-05-22 NOTE — ASSESSMENT & PLAN NOTE
Currently patient is taking clonazepam 0.5 mg daily at bedtime as needed will continue with the same prescription entered  Orders:  •  clonazePAM (KlonoPIN) 0.5 mg tablet; Take 1 tablet (0.5 mg total) by mouth daily at bedtime

## 2025-05-22 NOTE — ASSESSMENT & PLAN NOTE
Wt Readings from Last 3 Encounters:   05/22/25 71.7 kg (158 lb)   02/13/25 73 kg (161 lb)   10/10/24 69.4 kg (153 lb)

## 2025-05-22 NOTE — ASSESSMENT & PLAN NOTE
Currently on oxygen does not have any nebulizer machine so treatments.  Stable lungs expiration prolonged.

## 2025-05-22 NOTE — ASSESSMENT & PLAN NOTE
Patient is currently taking 88 mcg of levothyroxine we will continue with the same last TSH level came back normal 1.184 on February 21

## 2025-05-22 NOTE — ASSESSMENT & PLAN NOTE
Lab Results   Component Value Date    HGBA1C 5.8 (H) 02/21/2025   Last hemoglobin A1c is 5.8 done on February 21 currently not on any medication we will continue to monitor get a repeat labs done later along with albumin creatinine ratio.

## 2025-05-22 NOTE — ASSESSMENT & PLAN NOTE
Patient has a history of hypertension and hypotension currently on amlodipine 2.5 mg every day in the morning and bisoprolol 5 mg tablet half a tablet daily in the morning however patient tends to have low blood pressure readings currently on midodrine 5 mg oral 2 times daily before meals today blood pressure 121/70.

## 2025-05-27 ENCOUNTER — HOSPITAL ENCOUNTER (OUTPATIENT)
Dept: RADIOLOGY | Facility: HOSPITAL | Age: 85
Discharge: HOME | End: 2025-05-27

## 2025-05-27 DIAGNOSIS — R25.1 TREMOR: ICD-10-CM

## 2025-05-27 DIAGNOSIS — R29.818 PARKINSONIAN FEATURES: ICD-10-CM

## 2025-05-30 ENCOUNTER — TELEPHONE (OUTPATIENT)
Dept: NEUROLOGY | Facility: CLINIC | Age: 85
End: 2025-05-30

## 2025-05-30 NOTE — TELEPHONE ENCOUNTER
I attempted to contact the patient and her daughter however, no one received my call. I did leave a voicemail for the patient. If the patient or her daughter call, then please inform the patient that she needs to hold her Duloxetine for 3-4 days prior to her DATscan.

## 2025-06-03 NOTE — CASE MANAGEMENT
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    SVT (supraventricular tachycardia) 2023    Osteoarthritis of right knee 2023    Elevated liver enzymes 2023    Type 2 diabetes mellitus, without long-term current use of insulin ([address]) 2023    Dyspepsia 2022    Frequent falls 2022    Localized swelling of right lower leg 2022    Anxiety 2022    Gastroesophageal reflux disease without esophagitis 10/12/2022    Nocturnal hypoxemia 2021    Acquired hypothyroidism 2021    Thyroid nodule 2021    Palpitations 2021    Tremor 2021    History of transient ischemic attack (TIA) 2021    Essential hypertension 2021    Dyslipidemia 2021    Seasonal allergic rhinitis due to pollen 2021    Chronic hypoxemic respiratory failure ([address]) 2020    Dizziness 2020    Shortness of breath on exertion       LOS (days): 2  Geometric Mean LOS (GMLOS) (days): 2.40  Days to GMLOS:0.1     OBJECTIVE:  Risk of Unplanned Readmission Score: 12.31         Current admission status: Inpatient   Preferred Pharmacy:   [address] [place] [address]  Phone: [phone] Fax: [phone]    Primary Care Provider: Shaniqua Ho MD    Primary Insurance: MEDICARE  Secondary Insurance: AARP    DISCHARGE DETAILS:      Other Referral/Resources/Interventions Provided:  Referral Comments: CM requested [facility] transporation in Respiratory Therapist Broncho-Pulmonary Hygiene Progress Note      Patient Name:  Kash Huff  YOB: 1974    Ksah Huff meets the qualification for Level 1 of the Bronco-Pulmonary Hygiene Protocol. This was based on my daily patient assessment and includes review of chest x-ray results, cough ability and quality, oxygenation, secretions or risk for secretion development and patient mobility.     Broncho-Pulmonary Hygiene Assessment:    Level of Movement: Actively changing positions without assistance  Alert/ oriented/ cooperative    Breath Sounds: Clear to slightly diminished    Cough: Strong, effective    Chest X-Ray: Possible signs of consolidation and/or atelectasis or clear.     Sputum Productions: None or small amount of thin or watery secretions with effective cough    History and Physical: None    SpO2 to Oxygen Need: greater than 92% on room air or  less than 3L nasal canula    Current SpO2 is: 98% on 21% BiPAP    Based on this information, I have completed the following interventions: Teach/Instruct patient on cough and deep breathe      Electronically signed by Bassam Maharaj RRT, 06/03/25, 9:35 AM EDT.       [employer], requested 12:30pm , time to be confirmed. Transport at Discharge : Wheelchair van  Dispatcher Contacted: Yes  Number/Name of Dispatcher: [employer]  Transported by [employer] and Unit #): TBD  ETA of Transport (Date): 11/18/23    Transfer Mode: Wheelchair      CM copied weekend CESILIA ETIENNE to receive TT from [address] [place] [address]. CM to call patient's Daughter Ana Martin tomorrow morning to confirm transportation  time.

## 2025-06-10 ENCOUNTER — HOSPITAL ENCOUNTER (OUTPATIENT)
Dept: RADIOLOGY | Facility: HOSPITAL | Age: 85
Discharge: HOME | End: 2025-06-10
Payer: MEDICARE

## 2025-06-10 DIAGNOSIS — E04.1 THYROID NODULE: ICD-10-CM

## 2025-06-10 PROCEDURE — 76536 US EXAM OF HEAD AND NECK: CPT

## 2025-06-11 ENCOUNTER — TELEPHONE (OUTPATIENT)
Age: 85
End: 2025-06-11

## 2025-06-11 NOTE — TELEPHONE ENCOUNTER
The patient called to report that yesterday after a long night without sleep she stand up from bed at 11:00 am and had a minor fall with no injuries. The patient state that she was able to get up.     The patient also would like to inform the provider that she is schedule for a thyroid ultrasound

## 2025-06-17 ENCOUNTER — TELEPHONE (OUTPATIENT)
Dept: INTERNAL MEDICINE CLINIC | Facility: CLINIC | Age: 85
End: 2025-06-17

## 2025-06-17 NOTE — TELEPHONE ENCOUNTER
Called pt  no answer left a detail message to let her know dr. Caraballo is out of the office and we have covering providers she can see if she chooses

## 2025-06-18 ENCOUNTER — OFFICE VISIT (OUTPATIENT)
Dept: INTERNAL MEDICINE CLINIC | Facility: CLINIC | Age: 85
End: 2025-06-18
Payer: MEDICARE

## 2025-06-18 ENCOUNTER — APPOINTMENT (OUTPATIENT)
Dept: LAB | Facility: HOSPITAL | Age: 85
End: 2025-06-18
Payer: MEDICARE

## 2025-06-18 VITALS
WEIGHT: 160.4 LBS | OXYGEN SATURATION: 97 % | SYSTOLIC BLOOD PRESSURE: 120 MMHG | BODY MASS INDEX: 25.78 KG/M2 | HEART RATE: 93 BPM | DIASTOLIC BLOOD PRESSURE: 74 MMHG | HEIGHT: 66 IN

## 2025-06-18 DIAGNOSIS — R30.0 DYSURIA: ICD-10-CM

## 2025-06-18 DIAGNOSIS — R30.0 DYSURIA: Primary | ICD-10-CM

## 2025-06-18 LAB
BACTERIA UR QL AUTO: ABNORMAL /HPF
BILIRUB UR QL STRIP: NEGATIVE
CLARITY UR: ABNORMAL
COLOR UR: YELLOW
GLUCOSE UR STRIP-MCNC: NEGATIVE MG/DL
HGB UR QL STRIP.AUTO: NEGATIVE
HYALINE CASTS #/AREA URNS LPF: ABNORMAL /LPF
KETONES UR STRIP-MCNC: NEGATIVE MG/DL
LEUKOCYTE ESTERASE UR QL STRIP: ABNORMAL
NITRITE UR QL STRIP: NEGATIVE
NON-SQ EPI CELLS URNS QL MICRO: ABNORMAL /HPF
PH UR STRIP.AUTO: 5 [PH]
PROT UR STRIP-MCNC: NEGATIVE MG/DL
RBC #/AREA URNS AUTO: ABNORMAL /HPF
SP GR UR STRIP.AUTO: 1.01 (ref 1–1.03)
UROBILINOGEN UR STRIP-ACNC: <2 MG/DL
WBC #/AREA URNS AUTO: ABNORMAL /HPF

## 2025-06-18 PROCEDURE — 87086 URINE CULTURE/COLONY COUNT: CPT

## 2025-06-18 PROCEDURE — 81001 URINALYSIS AUTO W/SCOPE: CPT

## 2025-06-18 PROCEDURE — G2211 COMPLEX E/M VISIT ADD ON: HCPCS

## 2025-06-18 PROCEDURE — 99213 OFFICE O/P EST LOW 20 MIN: CPT

## 2025-06-18 RX ORDER — PHENAZOPYRIDINE HYDROCHLORIDE 200 MG/1
200 TABLET, FILM COATED ORAL 3 TIMES DAILY PRN
Qty: 6 TABLET | Refills: 0 | Status: SHIPPED | OUTPATIENT
Start: 2025-06-18 | End: 2025-06-18

## 2025-06-18 RX ORDER — SULFAMETHOXAZOLE AND TRIMETHOPRIM 800; 160 MG/1; MG/1
1 TABLET ORAL 2 TIMES DAILY
Qty: 14 TABLET | Refills: 0 | Status: SHIPPED | OUTPATIENT
Start: 2025-06-18 | End: 2025-06-18

## 2025-06-18 RX ORDER — SULFAMETHOXAZOLE AND TRIMETHOPRIM 800; 160 MG/1; MG/1
1 TABLET ORAL 2 TIMES DAILY
Qty: 14 TABLET | Refills: 0 | Status: SHIPPED | OUTPATIENT
Start: 2025-06-18 | End: 2025-06-25

## 2025-06-18 RX ORDER — PHENAZOPYRIDINE HYDROCHLORIDE 100 MG/1
100 TABLET, FILM COATED ORAL 3 TIMES DAILY PRN
Qty: 6 TABLET | Refills: 0 | Status: SHIPPED | OUTPATIENT
Start: 2025-06-18 | End: 2025-06-20

## 2025-06-18 NOTE — PROGRESS NOTES
"Name: Danyelle Martinez      : 1940      MRN: [id number]  Encounter Provider: Lesly Dumont MD  Encounter Date: 2025   Encounter department: [facility] INTERNAL MEDICINE  :  Assessment & Plan  Dysuria  Check UA and urine culture  Start empiric treatment for Bactrim (chart review revealed susceptibility and treatment with this in the past)  Trial of Pyridium recommended for symptom management  Increase hydration  We will follow-up with lab results  Orders:  •  Urinalysis with microscopic; Future  •  Urine culture; Future  •  sulfamethoxazole-trimethoprim (BACTRIM DS) 800-160 mg per tablet; Take 1 tablet by mouth 2 (two) times a day for 7 days  •  phenazopyridine (PYRIDIUM) 100 mg tablet; Take 1 tablet (100 mg total) by mouth 3 (three) times a day as needed for bladder spasms for up to 2 days      Return if symptoms worsen or fail to improve, for Recheck.       History of Present Illness   HPI  85-year-old female presents with complaint of dysuria including urinary incontinence, increased urgency/frequency and pain with urination.  Similar to prior UTIs and she has had multiple recurring infections over the past several months.     Denies fever/chills, flank pain, confusion or other concerning systemic symptoms at this time.    Review of Systems   Constitutional:  Negative for chills and fever.   Respiratory:  Negative for shortness of breath.    Cardiovascular:  Negative for chest pain.   Gastrointestinal:  Negative for abdominal pain.   Genitourinary:  Positive for dysuria, frequency, pelvic pain and urgency.   Neurological:  Negative for headaches.   Psychiatric/Behavioral:  Negative for confusion.        Objective   /74 (BP Location: Right arm, Patient Position: Sitting, Cuff Size: Standard)   Pulse 93   Ht 5' 6\" (1.676 m)   Wt 72.8 kg (160 lb 6.4 oz)   SpO2 97%   BMI 25.89 kg/m²      Physical Exam  Vitals and nursing note reviewed.   Constitutional:       General: She is not " in acute distress.     Appearance: She is not ill-appearing or toxic-appearing.   HENT:      Head: Normocephalic.     Eyes:      Extraocular Movements: Extraocular movements intact.      Conjunctiva/sclera: Conjunctivae normal.       Cardiovascular:      Rate and Rhythm: Normal rate.      Pulses: Normal pulses.   Pulmonary:      Effort: Pulmonary effort is normal.      Comments: Nasal canula in place for ambulatory oxygen    Skin:     General: Skin is warm and dry.     Neurological:      Mental Status: She is alert and oriented to person, place, and time.     Psychiatric:         Behavior: Behavior normal.

## 2025-06-19 ENCOUNTER — NURSE TRIAGE (OUTPATIENT)
Age: 85
End: 2025-06-19

## 2025-06-19 ENCOUNTER — RESULTS FOLLOW-UP (OUTPATIENT)
Dept: INTERNAL MEDICINE CLINIC | Facility: CLINIC | Age: 85
End: 2025-06-19

## 2025-06-19 DIAGNOSIS — R60.9 EDEMA, UNSPECIFIED TYPE: ICD-10-CM

## 2025-06-19 LAB — BACTERIA UR CULT: NORMAL

## 2025-06-19 NOTE — TELEPHONE ENCOUNTER
Reason for Disposition  • Taking new prescription antibiotic  (Exception: Finished taking new prescription antibiotic.)    Protocols used: Rash - Widespread On Drugs-Adult-[place]

## 2025-06-19 NOTE — TELEPHONE ENCOUNTER
"Reason for Disposition   Hives or itching    Answer Assessment - Initial Assessment Questions  1. APPEARANCE of RASH: \"Describe the rash.\" (e.g., spots, blisters, raised areas, skin peeling, scaly)          Itchy red spots   9 pm last night started 2 new medications and rash and itching  several hours after.    One big one on face size of quarter itchy     Both shoulders top of chest both arms     2. SIZE: \"How big are the spots?\" (e.g., tip of pen, eraser, coin; inches, centimeters)         Multiple         3. LOCATION: \"Where is the rash located?\"         One on face, arms , chest itching     Most leyla size     4. COLOR: \"What color is the rash?\" (Note: It is difficult to assess rash color in people with darker-colored skin. When this situation occurs, simply ask the caller to describe what they see.)        Red     5. ONSET: \"When did the rash begin?\"        Last night after starting 2 new medications     6. FEVER: \"Do you have a fever?\" If Yes, ask: \"What is your temperature, how was it measured, and when did it start?\"       Denies     7. ITCHING: \"Does the rash itch?\" If Yes, ask: \"How bad is the itch?\" (Scale 1-10; or mild, moderate, severe)          Yes pretty severe      8. CAUSE: \"What do you think is causing the rash?\"        Medications pyridium        9. NEW MEDICINES: \"What new medicines are you taking?\" (e.g., name of antibiotic) \"When did you start taking this medication?\".         Pyridium and Bactrim       10. OTHER SYMPTOMS: \"Do you have any other symptoms?\" (e.g., sore throat, fever, joint pain)           Denies    Protocols used: Rash - Widespread On Drugs-Adult-[place]        REASON FOR CONVERSATION: Rash    SYMPTOMS: Red rash after starting Bactrim & Pyridium last night.         OTHER HEALTH INFORMATION:   Patient calls stating she started with rash and itching last night after starting bactrim and pyridium.  Last dose of medications was 9 pm last night. Rash red on arms , chest .  Denies " swelling in face  eyes or lips, fevers, N/V, shortness of breath, difficulty swallowing.      Please review.  Should patient stop both medications ? How should she proceed?  States first quentin e ever used pyridium and bactrim.          PROTOCOL DISPOSITION: No disposition on file.    CARE ADVICE PROVIDED:     Home care advice    PRACTICE FOLLOW-UP:     Yes call back

## 2025-06-19 NOTE — TELEPHONE ENCOUNTER
Returned patient's call advised to stop taking the pyridium and start taking OTC benadryl at night can cause drowsiness if rash does not go away call us back.

## 2025-06-20 RX ORDER — FUROSEMIDE 40 MG/1
40 TABLET ORAL 2 TIMES DAILY
Qty: 60 TABLET | Refills: 5 | Status: SHIPPED | OUTPATIENT
Start: 2025-06-20

## 2025-06-24 DIAGNOSIS — F33.40 RECURRENT MAJOR DEPRESSIVE DISORDER, IN REMISSION (HCC): ICD-10-CM

## 2025-06-24 DIAGNOSIS — E78.5 HYPERLIPIDEMIA, UNSPECIFIED HYPERLIPIDEMIA TYPE: ICD-10-CM

## 2025-06-24 RX ORDER — ATORVASTATIN CALCIUM 80 MG/1
80 TABLET, FILM COATED ORAL EVERY MORNING
Qty: 90 TABLET | Refills: 1 | Status: SHIPPED | OUTPATIENT
Start: 2025-06-24

## 2025-06-25 RX ORDER — DULOXETIN HYDROCHLORIDE 60 MG/1
60 CAPSULE, DELAYED RELEASE ORAL DAILY
Qty: 90 CAPSULE | Refills: 1 | Status: SHIPPED | OUTPATIENT
Start: 2025-06-25

## 2025-07-10 ENCOUNTER — TELEPHONE (OUTPATIENT)
Age: 85
End: 2025-07-10

## 2025-07-10 DIAGNOSIS — N39.0 URINARY TRACT INFECTION WITHOUT HEMATURIA, SITE UNSPECIFIED: ICD-10-CM

## 2025-07-10 DIAGNOSIS — N39.0 RECURRENT UTI: Primary | ICD-10-CM

## 2025-07-10 RX ORDER — SULFAMETHOXAZOLE AND TRIMETHOPRIM 800; 160 MG/1; MG/1
1 TABLET ORAL 2 TIMES DAILY
Qty: 14 TABLET | Refills: 0 | Status: ON HOLD | OUTPATIENT
Start: 2025-07-10 | End: 2025-07-17

## 2025-07-10 NOTE — PROGRESS NOTES
Patient with recurrent UTI symptoms received antibiotics sulfamethoxazole trimethoprim.  Recommend patient be seen by the urology because of the recurrent urinary tract infections

## 2025-07-10 NOTE — TELEPHONE ENCOUNTER
Patient called in to cancel her appointment as she did not getting any rest last night and is not physically capable of coming in today.  She is asking for some type of prescription as she stated the pain is very bad.  Please advise.

## 2025-07-11 ENCOUNTER — NURSE TRIAGE (OUTPATIENT)
Age: 85
End: 2025-07-11

## 2025-07-11 ENCOUNTER — TELEPHONE (OUTPATIENT)
Age: 85
End: 2025-07-11

## 2025-07-11 NOTE — TELEPHONE ENCOUNTER
Spoke with patient directly. Advised Emergency Room for evaluation. If she has no transportation. Advised her to call the squad to assist her to the hospital. Patient agreed.

## 2025-07-11 NOTE — TELEPHONE ENCOUNTER
Made a follow up phone call to the patient. Left another message to go to the Emergency Room for Evaluation for shortness of breath.

## 2025-07-11 NOTE — TELEPHONE ENCOUNTER
"REASON FOR CONVERSATION: breathing difficulty    SYMPTOMS: patient feels winded today stating that at one point her SpO2 level was in the 70's.  Had patient recheck while I was on the phone and it was 91% on RA.blood pressure 105/55 and heart rate 50    OTHER HEALTH INFORMATION: has history of COPD and PE but stated \"it feels nothing like that.\"  Denied fever, chest pain, and dizziness     PROTOCOL DISPOSITION: Discuss With PCP and Callback by Nurse Within 1 Hour    CARE ADVICE PROVIDED: EMERGENCY DEPARTMENT precautions given    PRACTICE FOLLOW-UP: Requesting call back for recommendation              Reason for Disposition   Oxygen level (e.g., pulse oximetry) 91 to 94%    Answer Assessment - Initial Assessment Questions  1. RESPIRATORY STATUS: \"Describe your breathing?\" (e.g., wheezing, shortness of breath, unable to speak, severe coughing)       Feels winded  2. ONSET: \"When did this breathing problem begin?\"       This morning   3. PATTERN \"Does the difficult breathing come and go, or has it been constant since it started?\"       Comes and goes  4. SEVERITY: \"How bad is your breathing?\" (e.g., mild, moderate, severe)       mild  5. RECURRENT SYMPTOM: \"Have you had difficulty breathing before?\" If Yes, ask: \"When was the last time?\" and \"What happened that time?\"       no  6. CARDIAC HISTORY: \"Do you have any history of heart disease?\" (e.g., heart attack, angina, bypass surgery, angioplasty)       *No Answer*  7. LUNG HISTORY: \"Do you have any history of lung disease?\"  (e.g., pulmonary embolus, asthma, emphysema)      History of a PE, COPD  8. CAUSE: \"What do you think is causing the breathing problem?\"       unsure  9. OTHER SYMPTOMS: \"Do you have any other symptoms?\" (e.g., chest pain, cough, dizziness, fever, runny nose)      no  10. O2 SATURATION MONITOR:  \"Do you use an oxygen saturation monitor (pulse oximeter) at home?\" If Yes, ask: \"What is your reading (oxygen level) today?\" \"What is your usual oxygen " "saturation reading?\" (e.g., 95%)        90%    Protocols used: Breathing Difficulty-Adult-[place]    "

## 2025-07-11 NOTE — TELEPHONE ENCOUNTER
Pharmacist called and stated that prescription for   sulfamethoxazole-trimethoprim (BACTRIM DS) 800-160 mg per tablet will not be available until Monday.Pharmacist is requesting an alternative antibiotic.Patient has all medications delivered to her home.Please advise.

## 2025-07-11 NOTE — TELEPHONE ENCOUNTER
Left message for patient. Spoke with Dr Caraballo. Shortness of breath is taken very seriously and patient should go to the Emergency Room for an evaluation. She will be served better.     Patient should follow up with Dr Caraballo Monday to let Dr Caraballo of the outcome.

## 2025-07-14 ENCOUNTER — TELEPHONE (OUTPATIENT)
Age: 85
End: 2025-07-14

## 2025-07-17 ENCOUNTER — APPOINTMENT (EMERGENCY)
Dept: RADIOLOGY | Facility: HOSPITAL | Age: 85
DRG: 291 | End: 2025-07-17
Payer: MEDICARE

## 2025-07-17 ENCOUNTER — HOSPITAL ENCOUNTER (INPATIENT)
Facility: HOSPITAL | Age: 85
LOS: 4 days | Discharge: HOME HEALTH | DRG: 291 | End: 2025-07-22
Attending: EMERGENCY MEDICINE | Admitting: FAMILY MEDICINE
Payer: MEDICARE

## 2025-07-17 DIAGNOSIS — J96.11 CHRONIC HYPOXEMIC RESPIRATORY FAILURE (HCC): Chronic | ICD-10-CM

## 2025-07-17 DIAGNOSIS — I50.9 CHF (CONGESTIVE HEART FAILURE) (HCC): ICD-10-CM

## 2025-07-17 DIAGNOSIS — R06.00 DYSPNEA: Primary | ICD-10-CM

## 2025-07-17 DIAGNOSIS — N30.00 ACUTE CYSTITIS WITHOUT HEMATURIA: ICD-10-CM

## 2025-07-17 DIAGNOSIS — E83.52 HYPERCALCEMIA: ICD-10-CM

## 2025-07-17 DIAGNOSIS — J81.1 PULMONARY EDEMA: ICD-10-CM

## 2025-07-17 PROBLEM — E03.9 HYPOTHYROIDISM: Status: ACTIVE | Noted: 2025-07-17

## 2025-07-17 PROBLEM — Z86.718 HISTORY OF DVT (DEEP VEIN THROMBOSIS): Status: ACTIVE | Noted: 2025-07-17

## 2025-07-17 PROBLEM — I10 HYPERTENSION: Status: ACTIVE | Noted: 2025-07-17

## 2025-07-17 PROBLEM — J44.1 COPD EXACERBATION (HCC): Status: ACTIVE | Noted: 2025-07-17

## 2025-07-17 PROBLEM — Z86.73 HISTORY OF TIA (TRANSIENT ISCHEMIC ATTACK): Status: ACTIVE | Noted: 2025-07-17

## 2025-07-17 PROBLEM — E78.5 HYPERLIPIDEMIA: Status: ACTIVE | Noted: 2025-07-17

## 2025-07-17 PROBLEM — I50.33 ACUTE ON CHRONIC DIASTOLIC (CONGESTIVE) HEART FAILURE (HCC): Status: ACTIVE | Noted: 2025-07-17

## 2025-07-17 PROBLEM — Z86.711 HISTORY OF PULMONARY EMBOLISM: Status: ACTIVE | Noted: 2025-07-17

## 2025-07-17 PROBLEM — E11.9 TYPE II DIABETES MELLITUS (HCC): Status: ACTIVE | Noted: 2025-07-17

## 2025-07-17 PROBLEM — R06.09 DYSPNEA ON EXERTION: Status: ACTIVE | Noted: 2025-07-17

## 2025-07-17 LAB
2HR DELTA HS TROPONIN: -1 NG/L
4HR DELTA HS TROPONIN: -1 NG/L
ALBUMIN SERPL BCG-MCNC: 3.8 G/DL (ref 3.5–5)
ALP SERPL-CCNC: 62 U/L (ref 34–104)
ALT SERPL W P-5'-P-CCNC: 12 U/L (ref 7–52)
ANION GAP SERPL CALCULATED.3IONS-SCNC: 8 MMOL/L (ref 4–13)
APTT PPP: 31 SECONDS (ref 23–34)
AST SERPL W P-5'-P-CCNC: 25 U/L (ref 13–39)
ATRIAL RATE: 71 BPM
BACTERIA UR QL AUTO: ABNORMAL /HPF
BASOPHILS # BLD AUTO: 0.06 THOUSANDS/ÂΜL (ref 0–0.1)
BASOPHILS NFR BLD AUTO: 1 % (ref 0–1)
BILIRUB SERPL-MCNC: 0.51 MG/DL (ref 0.2–1)
BILIRUB UR QL STRIP: NEGATIVE
BNP SERPL-MCNC: 143 PG/ML (ref 0–100)
BUN SERPL-MCNC: 20 MG/DL (ref 5–25)
CALCIUM SERPL-MCNC: 10.5 MG/DL (ref 8.4–10.2)
CARDIAC TROPONIN I PNL SERPL HS: 6 NG/L (ref ?–50)
CARDIAC TROPONIN I PNL SERPL HS: 6 NG/L (ref ?–50)
CARDIAC TROPONIN I PNL SERPL HS: 7 NG/L (ref ?–50)
CHLORIDE SERPL-SCNC: 98 MMOL/L (ref 96–108)
CLARITY UR: CLEAR
CO2 SERPL-SCNC: 29 MMOL/L (ref 21–32)
COLOR UR: YELLOW
CREAT SERPL-MCNC: 0.94 MG/DL (ref 0.6–1.3)
D DIMER PPP FEU-MCNC: 0.41 UG/ML FEU
EOSINOPHIL # BLD AUTO: 0.36 THOUSAND/ÂΜL (ref 0–0.61)
EOSINOPHIL NFR BLD AUTO: 4 % (ref 0–6)
ERYTHROCYTE [DISTWIDTH] IN BLOOD BY AUTOMATED COUNT: 15.8 % (ref 11.6–15.1)
GFR SERPL CREATININE-BSD FRML MDRD: 54 ML/MIN/1.73SQ M
GLUCOSE SERPL-MCNC: 106 MG/DL (ref 65–140)
GLUCOSE SERPL-MCNC: 175 MG/DL (ref 65–140)
GLUCOSE SERPL-MCNC: 217 MG/DL (ref 65–140)
GLUCOSE UR STRIP-MCNC: NEGATIVE MG/DL
HCT VFR BLD AUTO: 41 % (ref 34.8–46.1)
HGB BLD-MCNC: 12.8 G/DL (ref 11.5–15.4)
HGB UR QL STRIP.AUTO: NEGATIVE
IMM GRANULOCYTES # BLD AUTO: 0.04 THOUSAND/UL (ref 0–0.2)
IMM GRANULOCYTES NFR BLD AUTO: 0 % (ref 0–2)
INR PPP: 1.12 (ref 0.85–1.19)
KETONES UR STRIP-MCNC: NEGATIVE MG/DL
LEUKOCYTE ESTERASE UR QL STRIP: ABNORMAL
LYMPHOCYTES # BLD AUTO: 1.3 THOUSANDS/ÂΜL (ref 0.6–4.47)
LYMPHOCYTES NFR BLD AUTO: 14 % (ref 14–44)
MAGNESIUM SERPL-MCNC: 1.6 MG/DL (ref 1.9–2.7)
MCH RBC QN AUTO: 29.3 PG (ref 26.8–34.3)
MCHC RBC AUTO-ENTMCNC: 31.2 G/DL (ref 31.4–37.4)
MCV RBC AUTO: 94 FL (ref 82–98)
MONOCYTES # BLD AUTO: 0.55 THOUSAND/ÂΜL (ref 0.17–1.22)
MONOCYTES NFR BLD AUTO: 6 % (ref 4–12)
NEUTROPHILS # BLD AUTO: 7.08 THOUSANDS/ÂΜL (ref 1.85–7.62)
NEUTS SEG NFR BLD AUTO: 75 % (ref 43–75)
NITRITE UR QL STRIP: NEGATIVE
NON-SQ EPI CELLS URNS QL MICRO: ABNORMAL /HPF
NRBC BLD AUTO-RTO: 0 /100 WBCS
P AXIS: 48 DEGREES
PH UR STRIP.AUTO: 5.5 [PH]
PLATELET # BLD AUTO: 219 THOUSANDS/UL (ref 149–390)
PMV BLD AUTO: 11.3 FL (ref 8.9–12.7)
POTASSIUM SERPL-SCNC: 3.9 MMOL/L (ref 3.5–5.3)
PR INTERVAL: 178 MS
PROT SERPL-MCNC: 6.8 G/DL (ref 6.4–8.4)
PROT UR STRIP-MCNC: NEGATIVE MG/DL
PROTHROMBIN TIME: 14.9 SECONDS (ref 12.3–15)
QRS AXIS: 37 DEGREES
QRSD INTERVAL: 94 MS
QT INTERVAL: 420 MS
QTC INTERVAL: 456 MS
RBC # BLD AUTO: 4.37 MILLION/UL (ref 3.81–5.12)
RBC #/AREA URNS AUTO: ABNORMAL /HPF
SODIUM SERPL-SCNC: 135 MMOL/L (ref 135–147)
SP GR UR STRIP.AUTO: 1.02 (ref 1–1.03)
T WAVE AXIS: 56 DEGREES
TSH SERPL DL<=0.05 MIU/L-ACNC: 2.18 UIU/ML (ref 0.45–4.5)
UROBILINOGEN UR STRIP-ACNC: <2 MG/DL
VENTRICULAR RATE: 71 BPM
WBC # BLD AUTO: 9.39 THOUSAND/UL (ref 4.31–10.16)
WBC #/AREA URNS AUTO: ABNORMAL /HPF

## 2025-07-17 PROCEDURE — 85025 COMPLETE CBC W/AUTO DIFF WBC: CPT | Performed by: EMERGENCY MEDICINE

## 2025-07-17 PROCEDURE — 80053 COMPREHEN METABOLIC PANEL: CPT | Performed by: EMERGENCY MEDICINE

## 2025-07-17 PROCEDURE — 96375 TX/PRO/DX INJ NEW DRUG ADDON: CPT

## 2025-07-17 PROCEDURE — 85379 FIBRIN DEGRADATION QUANT: CPT | Performed by: EMERGENCY MEDICINE

## 2025-07-17 PROCEDURE — 82948 REAGENT STRIP/BLOOD GLUCOSE: CPT

## 2025-07-17 PROCEDURE — 99285 EMERGENCY DEPT VISIT HI MDM: CPT

## 2025-07-17 PROCEDURE — 94760 N-INVAS EAR/PLS OXIMETRY 1: CPT

## 2025-07-17 PROCEDURE — 83880 ASSAY OF NATRIURETIC PEPTIDE: CPT | Performed by: EMERGENCY MEDICINE

## 2025-07-17 PROCEDURE — 81001 URINALYSIS AUTO W/SCOPE: CPT | Performed by: FAMILY MEDICINE

## 2025-07-17 PROCEDURE — 93005 ELECTROCARDIOGRAM TRACING: CPT

## 2025-07-17 PROCEDURE — 99285 EMERGENCY DEPT VISIT HI MDM: CPT | Performed by: EMERGENCY MEDICINE

## 2025-07-17 PROCEDURE — 85610 PROTHROMBIN TIME: CPT | Performed by: EMERGENCY MEDICINE

## 2025-07-17 PROCEDURE — 99223 1ST HOSP IP/OBS HIGH 75: CPT | Performed by: FAMILY MEDICINE

## 2025-07-17 PROCEDURE — 85730 THROMBOPLASTIN TIME PARTIAL: CPT | Performed by: EMERGENCY MEDICINE

## 2025-07-17 PROCEDURE — 83735 ASSAY OF MAGNESIUM: CPT | Performed by: EMERGENCY MEDICINE

## 2025-07-17 PROCEDURE — 83036 HEMOGLOBIN GLYCOSYLATED A1C: CPT | Performed by: FAMILY MEDICINE

## 2025-07-17 PROCEDURE — 94640 AIRWAY INHALATION TREATMENT: CPT

## 2025-07-17 PROCEDURE — 84443 ASSAY THYROID STIM HORMONE: CPT | Performed by: EMERGENCY MEDICINE

## 2025-07-17 PROCEDURE — 84484 ASSAY OF TROPONIN QUANT: CPT | Performed by: FAMILY MEDICINE

## 2025-07-17 PROCEDURE — 71045 X-RAY EXAM CHEST 1 VIEW: CPT

## 2025-07-17 PROCEDURE — 84484 ASSAY OF TROPONIN QUANT: CPT | Performed by: EMERGENCY MEDICINE

## 2025-07-17 PROCEDURE — 93010 ELECTROCARDIOGRAM REPORT: CPT | Performed by: INTERNAL MEDICINE

## 2025-07-17 PROCEDURE — 36415 COLL VENOUS BLD VENIPUNCTURE: CPT | Performed by: EMERGENCY MEDICINE

## 2025-07-17 PROCEDURE — 96365 THER/PROPH/DIAG IV INF INIT: CPT

## 2025-07-17 RX ORDER — INSULIN LISPRO 100 [IU]/ML
1-6 INJECTION, SOLUTION INTRAVENOUS; SUBCUTANEOUS
Status: DISCONTINUED | OUTPATIENT
Start: 2025-07-17 | End: 2025-07-22 | Stop reason: HOSPADM

## 2025-07-17 RX ORDER — IPRATROPIUM BROMIDE AND ALBUTEROL SULFATE 2.5; .5 MG/3ML; MG/3ML
3 SOLUTION RESPIRATORY (INHALATION) ONCE
Status: COMPLETED | OUTPATIENT
Start: 2025-07-17 | End: 2025-07-17

## 2025-07-17 RX ORDER — BISOPROLOL FUMARATE 5 MG/1
2.5 TABLET, FILM COATED ORAL DAILY
Status: DISCONTINUED | OUTPATIENT
Start: 2025-07-17 | End: 2025-07-22 | Stop reason: HOSPADM

## 2025-07-17 RX ORDER — POLYETHYLENE GLYCOL 3350 17 G/17G
17 POWDER, FOR SOLUTION ORAL DAILY PRN
Status: DISCONTINUED | OUTPATIENT
Start: 2025-07-17 | End: 2025-07-22 | Stop reason: HOSPADM

## 2025-07-17 RX ORDER — ATORVASTATIN CALCIUM 80 MG/1
80 TABLET, FILM COATED ORAL EVERY MORNING
Status: DISCONTINUED | OUTPATIENT
Start: 2025-07-18 | End: 2025-07-22 | Stop reason: HOSPADM

## 2025-07-17 RX ORDER — CLOPIDOGREL BISULFATE 75 MG/1
75 TABLET ORAL DAILY
Status: DISCONTINUED | OUTPATIENT
Start: 2025-07-17 | End: 2025-07-22 | Stop reason: HOSPADM

## 2025-07-17 RX ORDER — FLUTICASONE PROPIONATE 50 MCG
2 SPRAY, SUSPENSION (ML) NASAL DAILY
Status: DISCONTINUED | OUTPATIENT
Start: 2025-07-17 | End: 2025-07-22 | Stop reason: HOSPADM

## 2025-07-17 RX ORDER — PRIMIDONE 50 MG/1
50 TABLET ORAL DAILY
Status: DISCONTINUED | OUTPATIENT
Start: 2025-07-17 | End: 2025-07-22 | Stop reason: HOSPADM

## 2025-07-17 RX ORDER — PANTOPRAZOLE SODIUM 40 MG/1
40 TABLET, DELAYED RELEASE ORAL DAILY
Status: DISCONTINUED | OUTPATIENT
Start: 2025-07-18 | End: 2025-07-22 | Stop reason: HOSPADM

## 2025-07-17 RX ORDER — PREDNISONE 20 MG/1
40 TABLET ORAL DAILY
Status: DISCONTINUED | OUTPATIENT
Start: 2025-07-18 | End: 2025-07-19

## 2025-07-17 RX ORDER — LEVOTHYROXINE SODIUM 88 UG/1
88 TABLET ORAL
Status: DISCONTINUED | OUTPATIENT
Start: 2025-07-18 | End: 2025-07-22 | Stop reason: HOSPADM

## 2025-07-17 RX ORDER — AMLODIPINE BESYLATE 2.5 MG/1
2.5 TABLET ORAL EVERY MORNING
Status: DISCONTINUED | OUTPATIENT
Start: 2025-07-18 | End: 2025-07-22 | Stop reason: HOSPADM

## 2025-07-17 RX ORDER — DOCUSATE SODIUM 100 MG/1
100 CAPSULE, LIQUID FILLED ORAL 2 TIMES DAILY
Status: DISCONTINUED | OUTPATIENT
Start: 2025-07-17 | End: 2025-07-17

## 2025-07-17 RX ORDER — ACETAMINOPHEN 325 MG/1
650 TABLET ORAL EVERY 6 HOURS PRN
Status: DISCONTINUED | OUTPATIENT
Start: 2025-07-17 | End: 2025-07-22 | Stop reason: HOSPADM

## 2025-07-17 RX ORDER — DULOXETIN HYDROCHLORIDE 60 MG/1
60 CAPSULE, DELAYED RELEASE ORAL DAILY
Status: DISCONTINUED | OUTPATIENT
Start: 2025-07-17 | End: 2025-07-22 | Stop reason: HOSPADM

## 2025-07-17 RX ORDER — MIDODRINE HYDROCHLORIDE 5 MG/1
5 TABLET ORAL
Status: DISCONTINUED | OUTPATIENT
Start: 2025-07-17 | End: 2025-07-22 | Stop reason: HOSPADM

## 2025-07-17 RX ORDER — METHYLPREDNISOLONE SODIUM SUCCINATE 40 MG/ML
40 INJECTION, POWDER, LYOPHILIZED, FOR SOLUTION INTRAMUSCULAR; INTRAVENOUS DAILY
Status: DISCONTINUED | OUTPATIENT
Start: 2025-07-18 | End: 2025-07-17

## 2025-07-17 RX ORDER — SIMETHICONE 80 MG
80 TABLET,CHEWABLE ORAL EVERY 6 HOURS PRN
Status: DISCONTINUED | OUTPATIENT
Start: 2025-07-17 | End: 2025-07-22 | Stop reason: HOSPADM

## 2025-07-17 RX ORDER — FUROSEMIDE 10 MG/ML
40 INJECTION INTRAMUSCULAR; INTRAVENOUS ONCE
Status: COMPLETED | OUTPATIENT
Start: 2025-07-17 | End: 2025-07-17

## 2025-07-17 RX ORDER — CLONAZEPAM 0.5 MG/1
0.5 TABLET ORAL
Status: DISCONTINUED | OUTPATIENT
Start: 2025-07-17 | End: 2025-07-22 | Stop reason: HOSPADM

## 2025-07-17 RX ORDER — IPRATROPIUM BROMIDE AND ALBUTEROL SULFATE 2.5; .5 MG/3ML; MG/3ML
3 SOLUTION RESPIRATORY (INHALATION)
Status: DISCONTINUED | OUTPATIENT
Start: 2025-07-17 | End: 2025-07-19

## 2025-07-17 RX ORDER — MAGNESIUM SULFATE HEPTAHYDRATE 40 MG/ML
2 INJECTION, SOLUTION INTRAVENOUS ONCE
Status: COMPLETED | OUTPATIENT
Start: 2025-07-17 | End: 2025-07-17

## 2025-07-17 RX ORDER — METHYLPREDNISOLONE SODIUM SUCCINATE 125 MG/2ML
125 INJECTION, POWDER, LYOPHILIZED, FOR SOLUTION INTRAMUSCULAR; INTRAVENOUS ONCE
Status: COMPLETED | OUTPATIENT
Start: 2025-07-17 | End: 2025-07-17

## 2025-07-17 RX ORDER — FUROSEMIDE 40 MG/1
40 TABLET ORAL
Status: DISCONTINUED | OUTPATIENT
Start: 2025-07-18 | End: 2025-07-17

## 2025-07-17 RX ORDER — LORATADINE 10 MG/1
10 TABLET ORAL DAILY
Status: DISCONTINUED | OUTPATIENT
Start: 2025-07-18 | End: 2025-07-22 | Stop reason: HOSPADM

## 2025-07-17 RX ORDER — FUROSEMIDE 10 MG/ML
40 INJECTION INTRAMUSCULAR; INTRAVENOUS
Status: DISCONTINUED | OUTPATIENT
Start: 2025-07-17 | End: 2025-07-19

## 2025-07-17 RX ADMIN — CLOPIDOGREL 75 MG: 75 TABLET ORAL at 18:30

## 2025-07-17 RX ADMIN — BISOPROLOL FUMARATE 2.5 MG: 5 TABLET, FILM COATED ORAL at 18:30

## 2025-07-17 RX ADMIN — FLUTICASONE PROPIONATE 2 SPRAY: 50 SPRAY, METERED NASAL at 18:30

## 2025-07-17 RX ADMIN — MAGNESIUM SULFATE HEPTAHYDRATE 2 G: 40 INJECTION, SOLUTION INTRAVENOUS at 14:40

## 2025-07-17 RX ADMIN — FUROSEMIDE 40 MG: 10 INJECTION, SOLUTION INTRAMUSCULAR; INTRAVENOUS at 21:44

## 2025-07-17 RX ADMIN — IPRATROPIUM BROMIDE AND ALBUTEROL SULFATE 3 ML: .5; 3 SOLUTION RESPIRATORY (INHALATION) at 14:38

## 2025-07-17 RX ADMIN — INSULIN LISPRO 2 UNITS: 100 INJECTION, SOLUTION INTRAVENOUS; SUBCUTANEOUS at 21:48

## 2025-07-17 RX ADMIN — PRIMIDONE 50 MG: 50 TABLET ORAL at 18:30

## 2025-07-17 RX ADMIN — IPRATROPIUM BROMIDE AND ALBUTEROL SULFATE 3 ML: .5; 3 SOLUTION RESPIRATORY (INHALATION) at 20:10

## 2025-07-17 RX ADMIN — METHYLPREDNISOLONE SODIUM SUCCINATE 125 MG: 125 INJECTION, POWDER, FOR SOLUTION INTRAMUSCULAR; INTRAVENOUS at 14:36

## 2025-07-17 RX ADMIN — DULOXETINE 60 MG: 60 CAPSULE, DELAYED RELEASE ORAL at 18:30

## 2025-07-17 RX ADMIN — MIDODRINE HYDROCHLORIDE 5 MG: 5 TABLET ORAL at 18:30

## 2025-07-17 RX ADMIN — FUROSEMIDE 40 MG: 10 INJECTION, SOLUTION INTRAMUSCULAR; INTRAVENOUS at 18:30

## 2025-07-17 RX ADMIN — CLONAZEPAM 0.5 MG: 0.5 TABLET ORAL at 21:36

## 2025-07-17 RX ADMIN — APIXABAN 5 MG: 5 TABLET, FILM COATED ORAL at 18:30

## 2025-07-18 ENCOUNTER — APPOINTMENT (OUTPATIENT)
Dept: NON INVASIVE DIAGNOSTICS | Facility: HOSPITAL | Age: 85
DRG: 291 | End: 2025-07-18
Payer: MEDICARE

## 2025-07-18 LAB
ALBUMIN SERPL BCG-MCNC: 3.8 G/DL (ref 3.5–5)
ALP SERPL-CCNC: 58 U/L (ref 34–104)
ALT SERPL W P-5'-P-CCNC: 11 U/L (ref 7–52)
ANION GAP SERPL CALCULATED.3IONS-SCNC: 8 MMOL/L (ref 4–13)
AORTIC ROOT: 3.6 CM
AST SERPL W P-5'-P-CCNC: 23 U/L (ref 13–39)
AV LVOT PEAK GRADIENT: 5 MMHG
AV PEAK GRADIENT: 14 MMHG
BILIRUB SERPL-MCNC: 0.35 MG/DL (ref 0.2–1)
BSA FOR ECHO PROCEDURE: 1.82 M2
BUN SERPL-MCNC: 24 MG/DL (ref 5–25)
CALCIUM SERPL-MCNC: 10.5 MG/DL (ref 8.4–10.2)
CHLORIDE SERPL-SCNC: 100 MMOL/L (ref 96–108)
CO2 SERPL-SCNC: 28 MMOL/L (ref 21–32)
CREAT SERPL-MCNC: 0.93 MG/DL (ref 0.6–1.3)
DOP CALC LVOT AREA: 3.46 CM2
DOP CALC LVOT DIAMETER: 2.1 CM
E WAVE DECELERATION TIME: 190 MS
E/A RATIO: 0.79
ERYTHROCYTE [DISTWIDTH] IN BLOOD BY AUTOMATED COUNT: 15.5 % (ref 11.6–15.1)
EST. AVERAGE GLUCOSE BLD GHB EST-MCNC: 120 MG/DL
FRACTIONAL SHORTENING: 27 (ref 28–44)
GFR SERPL CREATININE-BSD FRML MDRD: 55 ML/MIN/1.73SQ M
GLUCOSE P FAST SERPL-MCNC: 116 MG/DL (ref 65–99)
GLUCOSE SERPL-MCNC: 116 MG/DL (ref 65–140)
GLUCOSE SERPL-MCNC: 119 MG/DL (ref 65–140)
GLUCOSE SERPL-MCNC: 146 MG/DL (ref 65–140)
GLUCOSE SERPL-MCNC: 160 MG/DL (ref 65–140)
GLUCOSE SERPL-MCNC: 187 MG/DL (ref 65–140)
HBA1C MFR BLD: 5.8 %
HCT VFR BLD AUTO: 38.2 % (ref 34.8–46.1)
HGB BLD-MCNC: 12 G/DL (ref 11.5–15.4)
INTERVENTRICULAR SEPTUM IN DIASTOLE (PARASTERNAL SHORT AXIS VIEW): 1 CM
INTERVENTRICULAR SEPTUM: 1 CM (ref 0.6–1.1)
LAAS-AP2: 23.3 CM2
LAAS-AP4: 17.3 CM2
LEFT ATRIUM AREA SYSTOLE SINGLE PLANE A4C: 17.8 CM2
LEFT ATRIUM SIZE: 3.8 CM
LEFT ATRIUM VOLUME (MOD BIPLANE): 62 ML
LEFT ATRIUM VOLUME INDEX (MOD BIPLANE): 35 ML/M2
LEFT INTERNAL DIMENSION IN SYSTOLE: 3.3 CM (ref 2.1–4)
LEFT VENTRICULAR INTERNAL DIMENSION IN DIASTOLE: 4.5 CM (ref 3.5–6)
LEFT VENTRICULAR POSTERIOR WALL IN END DIASTOLE: 1.1 CM
LEFT VENTRICULAR STROKE VOLUME: 46 ML
LVSV (TEICH): 46 ML
MAGNESIUM SERPL-MCNC: 2.1 MG/DL (ref 1.9–2.7)
MCH RBC QN AUTO: 28.9 PG (ref 26.8–34.3)
MCHC RBC AUTO-ENTMCNC: 31.4 G/DL (ref 31.4–37.4)
MCV RBC AUTO: 92 FL (ref 82–98)
MV E'TISSUE VEL-LAT: 7 CM/S
MV E'TISSUE VEL-SEP: 6 CM/S
MV PEAK A VEL: 1.31 M/S
MV PEAK E VEL: 104 CM/S
MV STENOSIS PRESSURE HALF TIME: 55 MS
MV VALVE AREA P 1/2 METHOD: 4
PLATELET # BLD AUTO: 212 THOUSANDS/UL (ref 149–390)
PMV BLD AUTO: 11.2 FL (ref 8.9–12.7)
POTASSIUM SERPL-SCNC: 4.2 MMOL/L (ref 3.5–5.3)
PROT SERPL-MCNC: 6.9 G/DL (ref 6.4–8.4)
PV PEAK GRADIENT: 4 MMHG
RBC # BLD AUTO: 4.15 MILLION/UL (ref 3.81–5.12)
RIGHT ATRIUM AREA SYSTOLE A4C: 11.4 CM2
RIGHT VENTRICLE ID DIMENSION: 2.9 CM
SL CV LEFT ATRIUM LENGTH A2C: 6 CM
SL CV LV EF: 60
SL CV PED ECHO LEFT VENTRICLE DIASTOLIC VOLUME (MOD BIPLANE) 2D: 91 ML
SL CV PED ECHO LEFT VENTRICLE SYSTOLIC VOLUME (MOD BIPLANE) 2D: 45 ML
SODIUM SERPL-SCNC: 136 MMOL/L (ref 135–147)
TR MAX PG: 43 MMHG
TR PEAK VELOCITY: 3.3 M/S
TRICUSPID ANNULAR PLANE SYSTOLIC EXCURSION: 1.8 CM
TRICUSPID VALVE PEAK REGURGITATION VELOCITY: 3.27 M/S
WBC # BLD AUTO: 8.61 THOUSAND/UL (ref 4.31–10.16)

## 2025-07-18 PROCEDURE — 97530 THERAPEUTIC ACTIVITIES: CPT

## 2025-07-18 PROCEDURE — 93306 TTE W/DOPPLER COMPLETE: CPT

## 2025-07-18 PROCEDURE — 93306 TTE W/DOPPLER COMPLETE: CPT | Performed by: INTERNAL MEDICINE

## 2025-07-18 PROCEDURE — 94760 N-INVAS EAR/PLS OXIMETRY 1: CPT

## 2025-07-18 PROCEDURE — 82948 REAGENT STRIP/BLOOD GLUCOSE: CPT

## 2025-07-18 PROCEDURE — 99222 1ST HOSP IP/OBS MODERATE 55: CPT | Performed by: INTERNAL MEDICINE

## 2025-07-18 PROCEDURE — 94640 AIRWAY INHALATION TREATMENT: CPT

## 2025-07-18 PROCEDURE — 83735 ASSAY OF MAGNESIUM: CPT | Performed by: FAMILY MEDICINE

## 2025-07-18 PROCEDURE — 97163 PT EVAL HIGH COMPLEX 45 MIN: CPT

## 2025-07-18 PROCEDURE — 99232 SBSQ HOSP IP/OBS MODERATE 35: CPT | Performed by: FAMILY MEDICINE

## 2025-07-18 PROCEDURE — 97167 OT EVAL HIGH COMPLEX 60 MIN: CPT

## 2025-07-18 PROCEDURE — 97535 SELF CARE MNGMENT TRAINING: CPT

## 2025-07-18 PROCEDURE — 80053 COMPREHEN METABOLIC PANEL: CPT | Performed by: FAMILY MEDICINE

## 2025-07-18 PROCEDURE — 85027 COMPLETE CBC AUTOMATED: CPT | Performed by: FAMILY MEDICINE

## 2025-07-18 RX ADMIN — IPRATROPIUM BROMIDE AND ALBUTEROL SULFATE 3 ML: .5; 3 SOLUTION RESPIRATORY (INHALATION) at 07:30

## 2025-07-18 RX ADMIN — LEVOTHYROXINE SODIUM 88 MCG: 88 TABLET ORAL at 05:59

## 2025-07-18 RX ADMIN — INSULIN LISPRO 1 UNITS: 100 INJECTION, SOLUTION INTRAVENOUS; SUBCUTANEOUS at 22:04

## 2025-07-18 RX ADMIN — IPRATROPIUM BROMIDE AND ALBUTEROL SULFATE 3 ML: .5; 3 SOLUTION RESPIRATORY (INHALATION) at 02:37

## 2025-07-18 RX ADMIN — BISOPROLOL FUMARATE 2.5 MG: 5 TABLET, FILM COATED ORAL at 08:59

## 2025-07-18 RX ADMIN — IPRATROPIUM BROMIDE AND ALBUTEROL SULFATE 3 ML: .5; 3 SOLUTION RESPIRATORY (INHALATION) at 13:16

## 2025-07-18 RX ADMIN — MIDODRINE HYDROCHLORIDE 5 MG: 5 TABLET ORAL at 06:00

## 2025-07-18 RX ADMIN — PANTOPRAZOLE SODIUM 40 MG: 40 TABLET, DELAYED RELEASE ORAL at 08:59

## 2025-07-18 RX ADMIN — PREDNISONE 40 MG: 20 TABLET ORAL at 08:59

## 2025-07-18 RX ADMIN — CLOPIDOGREL 75 MG: 75 TABLET ORAL at 08:59

## 2025-07-18 RX ADMIN — INSULIN LISPRO 1 UNITS: 100 INJECTION, SOLUTION INTRAVENOUS; SUBCUTANEOUS at 17:27

## 2025-07-18 RX ADMIN — IPRATROPIUM BROMIDE AND ALBUTEROL SULFATE 3 ML: .5; 3 SOLUTION RESPIRATORY (INHALATION) at 19:45

## 2025-07-18 RX ADMIN — APIXABAN 5 MG: 5 TABLET, FILM COATED ORAL at 08:59

## 2025-07-18 RX ADMIN — B-COMPLEX W/ C & FOLIC ACID TAB 1 TABLET: TAB at 08:59

## 2025-07-18 RX ADMIN — LORATADINE 10 MG: 10 TABLET ORAL at 08:59

## 2025-07-18 RX ADMIN — ATORVASTATIN CALCIUM 80 MG: 80 TABLET, FILM COATED ORAL at 08:59

## 2025-07-18 RX ADMIN — FLUTICASONE PROPIONATE 2 SPRAY: 50 SPRAY, METERED NASAL at 09:03

## 2025-07-18 RX ADMIN — DULOXETINE 60 MG: 60 CAPSULE, DELAYED RELEASE ORAL at 08:59

## 2025-07-18 RX ADMIN — CLONAZEPAM 0.5 MG: 0.5 TABLET ORAL at 22:04

## 2025-07-18 RX ADMIN — FUROSEMIDE 40 MG: 10 INJECTION, SOLUTION INTRAMUSCULAR; INTRAVENOUS at 09:01

## 2025-07-18 RX ADMIN — AMLODIPINE BESYLATE 2.5 MG: 2.5 TABLET ORAL at 08:59

## 2025-07-18 RX ADMIN — FUROSEMIDE 40 MG: 10 INJECTION, SOLUTION INTRAMUSCULAR; INTRAVENOUS at 17:27

## 2025-07-18 RX ADMIN — MIDODRINE HYDROCHLORIDE 5 MG: 5 TABLET ORAL at 17:27

## 2025-07-18 RX ADMIN — APIXABAN 5 MG: 5 TABLET, FILM COATED ORAL at 17:27

## 2025-07-18 RX ADMIN — PRIMIDONE 50 MG: 50 TABLET ORAL at 08:59

## 2025-07-19 PROBLEM — N39.0 URINARY TRACT INFECTION: Status: ACTIVE | Noted: 2025-07-19

## 2025-07-19 PROBLEM — D72.829 LEUKOCYTOSIS: Status: ACTIVE | Noted: 2025-07-19

## 2025-07-19 LAB
ANION GAP SERPL CALCULATED.3IONS-SCNC: 7 MMOL/L (ref 4–13)
BACTERIA UR QL AUTO: ABNORMAL /HPF
BILIRUB UR QL STRIP: NEGATIVE
BUN SERPL-MCNC: 24 MG/DL (ref 5–25)
CALCIUM SERPL-MCNC: 10 MG/DL (ref 8.4–10.2)
CHLORIDE SERPL-SCNC: 100 MMOL/L (ref 96–108)
CLARITY UR: ABNORMAL
CO2 SERPL-SCNC: 29 MMOL/L (ref 21–32)
COLOR UR: ABNORMAL
CREAT SERPL-MCNC: 0.83 MG/DL (ref 0.6–1.3)
ERYTHROCYTE [DISTWIDTH] IN BLOOD BY AUTOMATED COUNT: 15.7 % (ref 11.6–15.1)
GFR SERPL CREATININE-BSD FRML MDRD: 63 ML/MIN/1.73SQ M
GLUCOSE SERPL-MCNC: 102 MG/DL (ref 65–140)
GLUCOSE SERPL-MCNC: 112 MG/DL (ref 65–140)
GLUCOSE SERPL-MCNC: 153 MG/DL (ref 65–140)
GLUCOSE SERPL-MCNC: 198 MG/DL (ref 65–140)
GLUCOSE SERPL-MCNC: 95 MG/DL (ref 65–140)
GLUCOSE UR STRIP-MCNC: NEGATIVE MG/DL
HCT VFR BLD AUTO: 37.1 % (ref 34.8–46.1)
HGB BLD-MCNC: 11.8 G/DL (ref 11.5–15.4)
HGB UR QL STRIP.AUTO: NEGATIVE
KETONES UR STRIP-MCNC: NEGATIVE MG/DL
LEUKOCYTE ESTERASE UR QL STRIP: ABNORMAL
MAGNESIUM SERPL-MCNC: 2 MG/DL (ref 1.9–2.7)
MCH RBC QN AUTO: 29.4 PG (ref 26.8–34.3)
MCHC RBC AUTO-ENTMCNC: 31.8 G/DL (ref 31.4–37.4)
MCV RBC AUTO: 92 FL (ref 82–98)
NITRITE UR QL STRIP: NEGATIVE
NON-SQ EPI CELLS URNS QL MICRO: ABNORMAL /HPF
PH UR STRIP.AUTO: 5.5 [PH]
PLATELET # BLD AUTO: 190 THOUSANDS/UL (ref 149–390)
PMV BLD AUTO: 11 FL (ref 8.9–12.7)
POTASSIUM SERPL-SCNC: 3.9 MMOL/L (ref 3.5–5.3)
PROT UR STRIP-MCNC: NEGATIVE MG/DL
RBC # BLD AUTO: 4.02 MILLION/UL (ref 3.81–5.12)
RBC #/AREA URNS AUTO: ABNORMAL /HPF
SODIUM SERPL-SCNC: 136 MMOL/L (ref 135–147)
SP GR UR STRIP.AUTO: 1.01 (ref 1–1.03)
UROBILINOGEN UR STRIP-ACNC: <2 MG/DL
WBC # BLD AUTO: 11.04 THOUSAND/UL (ref 4.31–10.16)
WBC #/AREA URNS AUTO: ABNORMAL /HPF

## 2025-07-19 PROCEDURE — 99232 SBSQ HOSP IP/OBS MODERATE 35: CPT | Performed by: FAMILY MEDICINE

## 2025-07-19 PROCEDURE — 85027 COMPLETE CBC AUTOMATED: CPT | Performed by: FAMILY MEDICINE

## 2025-07-19 PROCEDURE — 87077 CULTURE AEROBIC IDENTIFY: CPT | Performed by: FAMILY MEDICINE

## 2025-07-19 PROCEDURE — 87186 SC STD MICRODIL/AGAR DIL: CPT | Performed by: FAMILY MEDICINE

## 2025-07-19 PROCEDURE — 97535 SELF CARE MNGMENT TRAINING: CPT

## 2025-07-19 PROCEDURE — 80048 BASIC METABOLIC PNL TOTAL CA: CPT | Performed by: FAMILY MEDICINE

## 2025-07-19 PROCEDURE — 94640 AIRWAY INHALATION TREATMENT: CPT

## 2025-07-19 PROCEDURE — 83735 ASSAY OF MAGNESIUM: CPT | Performed by: FAMILY MEDICINE

## 2025-07-19 PROCEDURE — 99232 SBSQ HOSP IP/OBS MODERATE 35: CPT | Performed by: INTERNAL MEDICINE

## 2025-07-19 PROCEDURE — 82948 REAGENT STRIP/BLOOD GLUCOSE: CPT

## 2025-07-19 PROCEDURE — 81001 URINALYSIS AUTO W/SCOPE: CPT | Performed by: FAMILY MEDICINE

## 2025-07-19 PROCEDURE — 87086 URINE CULTURE/COLONY COUNT: CPT | Performed by: FAMILY MEDICINE

## 2025-07-19 PROCEDURE — 94760 N-INVAS EAR/PLS OXIMETRY 1: CPT

## 2025-07-19 RX ORDER — IPRATROPIUM BROMIDE AND ALBUTEROL SULFATE 2.5; .5 MG/3ML; MG/3ML
3 SOLUTION RESPIRATORY (INHALATION)
Status: DISCONTINUED | OUTPATIENT
Start: 2025-07-20 | End: 2025-07-22 | Stop reason: HOSPADM

## 2025-07-19 RX ORDER — TORSEMIDE 20 MG/1
20 TABLET ORAL DAILY
Status: DISCONTINUED | OUTPATIENT
Start: 2025-07-20 | End: 2025-07-22 | Stop reason: HOSPADM

## 2025-07-19 RX ORDER — CEFTRIAXONE 1 G/50ML
1000 INJECTION, SOLUTION INTRAVENOUS EVERY 24 HOURS
Status: DISCONTINUED | OUTPATIENT
Start: 2025-07-19 | End: 2025-07-22 | Stop reason: HOSPADM

## 2025-07-19 RX ADMIN — APIXABAN 5 MG: 5 TABLET, FILM COATED ORAL at 08:50

## 2025-07-19 RX ADMIN — IPRATROPIUM BROMIDE AND ALBUTEROL SULFATE 3 ML: .5; 3 SOLUTION RESPIRATORY (INHALATION) at 07:11

## 2025-07-19 RX ADMIN — IPRATROPIUM BROMIDE AND ALBUTEROL SULFATE 3 ML: .5; 3 SOLUTION RESPIRATORY (INHALATION) at 19:42

## 2025-07-19 RX ADMIN — FUROSEMIDE 40 MG: 10 INJECTION, SOLUTION INTRAMUSCULAR; INTRAVENOUS at 08:49

## 2025-07-19 RX ADMIN — CLONAZEPAM 0.5 MG: 0.5 TABLET ORAL at 21:30

## 2025-07-19 RX ADMIN — CEFTRIAXONE 1000 MG: 1 INJECTION, SOLUTION INTRAVENOUS at 10:50

## 2025-07-19 RX ADMIN — LEVOTHYROXINE SODIUM 88 MCG: 88 TABLET ORAL at 06:25

## 2025-07-19 RX ADMIN — ATORVASTATIN CALCIUM 80 MG: 80 TABLET, FILM COATED ORAL at 08:49

## 2025-07-19 RX ADMIN — IPRATROPIUM BROMIDE AND ALBUTEROL SULFATE 3 ML: .5; 3 SOLUTION RESPIRATORY (INHALATION) at 13:22

## 2025-07-19 RX ADMIN — PREDNISONE 40 MG: 20 TABLET ORAL at 08:49

## 2025-07-19 RX ADMIN — LORATADINE 10 MG: 10 TABLET ORAL at 08:49

## 2025-07-19 RX ADMIN — PANTOPRAZOLE SODIUM 40 MG: 40 TABLET, DELAYED RELEASE ORAL at 08:50

## 2025-07-19 RX ADMIN — INSULIN LISPRO 1 UNITS: 100 INJECTION, SOLUTION INTRAVENOUS; SUBCUTANEOUS at 21:30

## 2025-07-19 RX ADMIN — IPRATROPIUM BROMIDE AND ALBUTEROL SULFATE 3 ML: .5; 3 SOLUTION RESPIRATORY (INHALATION) at 03:18

## 2025-07-19 RX ADMIN — PRIMIDONE 50 MG: 50 TABLET ORAL at 08:49

## 2025-07-19 RX ADMIN — BISOPROLOL FUMARATE 2.5 MG: 5 TABLET, FILM COATED ORAL at 08:49

## 2025-07-19 RX ADMIN — INSULIN LISPRO 2 UNITS: 100 INJECTION, SOLUTION INTRAVENOUS; SUBCUTANEOUS at 16:41

## 2025-07-19 RX ADMIN — MIDODRINE HYDROCHLORIDE 5 MG: 5 TABLET ORAL at 06:25

## 2025-07-19 RX ADMIN — AMLODIPINE BESYLATE 2.5 MG: 2.5 TABLET ORAL at 08:49

## 2025-07-19 RX ADMIN — MIDODRINE HYDROCHLORIDE 5 MG: 5 TABLET ORAL at 16:41

## 2025-07-19 RX ADMIN — FLUTICASONE PROPIONATE 2 SPRAY: 50 SPRAY, METERED NASAL at 08:50

## 2025-07-19 RX ADMIN — APIXABAN 5 MG: 5 TABLET, FILM COATED ORAL at 17:03

## 2025-07-19 RX ADMIN — CLOPIDOGREL 75 MG: 75 TABLET ORAL at 08:49

## 2025-07-19 RX ADMIN — DULOXETINE 60 MG: 60 CAPSULE, DELAYED RELEASE ORAL at 08:50

## 2025-07-19 RX ADMIN — B-COMPLEX W/ C & FOLIC ACID TAB 1 TABLET: TAB at 08:49

## 2025-07-20 LAB
ANION GAP SERPL CALCULATED.3IONS-SCNC: 6 MMOL/L (ref 4–13)
BUN SERPL-MCNC: 27 MG/DL (ref 5–25)
CALCIUM SERPL-MCNC: 10.8 MG/DL (ref 8.4–10.2)
CHLORIDE SERPL-SCNC: 101 MMOL/L (ref 96–108)
CO2 SERPL-SCNC: 30 MMOL/L (ref 21–32)
CREAT SERPL-MCNC: 0.83 MG/DL (ref 0.6–1.3)
ERYTHROCYTE [DISTWIDTH] IN BLOOD BY AUTOMATED COUNT: 15.7 % (ref 11.6–15.1)
GFR SERPL CREATININE-BSD FRML MDRD: 63 ML/MIN/1.73SQ M
GLUCOSE SERPL-MCNC: 105 MG/DL (ref 65–140)
GLUCOSE SERPL-MCNC: 125 MG/DL (ref 65–140)
GLUCOSE SERPL-MCNC: 144 MG/DL (ref 65–140)
GLUCOSE SERPL-MCNC: 75 MG/DL (ref 65–140)
GLUCOSE SERPL-MCNC: 84 MG/DL (ref 65–140)
HCT VFR BLD AUTO: 39.6 % (ref 34.8–46.1)
HGB BLD-MCNC: 12.3 G/DL (ref 11.5–15.4)
MAGNESIUM SERPL-MCNC: 2.1 MG/DL (ref 1.9–2.7)
MCH RBC QN AUTO: 28.9 PG (ref 26.8–34.3)
MCHC RBC AUTO-ENTMCNC: 31.1 G/DL (ref 31.4–37.4)
MCV RBC AUTO: 93 FL (ref 82–98)
PLATELET # BLD AUTO: 194 THOUSANDS/UL (ref 149–390)
PMV BLD AUTO: 10.9 FL (ref 8.9–12.7)
POTASSIUM SERPL-SCNC: 4.1 MMOL/L (ref 3.5–5.3)
RBC # BLD AUTO: 4.26 MILLION/UL (ref 3.81–5.12)
SODIUM SERPL-SCNC: 137 MMOL/L (ref 135–147)
WBC # BLD AUTO: 11 THOUSAND/UL (ref 4.31–10.16)

## 2025-07-20 PROCEDURE — 80048 BASIC METABOLIC PNL TOTAL CA: CPT | Performed by: FAMILY MEDICINE

## 2025-07-20 PROCEDURE — 85027 COMPLETE CBC AUTOMATED: CPT | Performed by: FAMILY MEDICINE

## 2025-07-20 PROCEDURE — 94640 AIRWAY INHALATION TREATMENT: CPT

## 2025-07-20 PROCEDURE — 82948 REAGENT STRIP/BLOOD GLUCOSE: CPT

## 2025-07-20 PROCEDURE — 83735 ASSAY OF MAGNESIUM: CPT | Performed by: FAMILY MEDICINE

## 2025-07-20 PROCEDURE — 99232 SBSQ HOSP IP/OBS MODERATE 35: CPT | Performed by: INTERNAL MEDICINE

## 2025-07-20 PROCEDURE — 94760 N-INVAS EAR/PLS OXIMETRY 1: CPT

## 2025-07-20 PROCEDURE — 99232 SBSQ HOSP IP/OBS MODERATE 35: CPT | Performed by: FAMILY MEDICINE

## 2025-07-20 RX ADMIN — LEVOTHYROXINE SODIUM 88 MCG: 88 TABLET ORAL at 05:47

## 2025-07-20 RX ADMIN — APIXABAN 5 MG: 5 TABLET, FILM COATED ORAL at 10:02

## 2025-07-20 RX ADMIN — CLOPIDOGREL 75 MG: 75 TABLET ORAL at 10:02

## 2025-07-20 RX ADMIN — FLUTICASONE PROPIONATE 2 SPRAY: 50 SPRAY, METERED NASAL at 10:04

## 2025-07-20 RX ADMIN — PRIMIDONE 50 MG: 50 TABLET ORAL at 10:02

## 2025-07-20 RX ADMIN — APIXABAN 5 MG: 5 TABLET, FILM COATED ORAL at 19:10

## 2025-07-20 RX ADMIN — MIDODRINE HYDROCHLORIDE 5 MG: 5 TABLET ORAL at 08:12

## 2025-07-20 RX ADMIN — AMLODIPINE BESYLATE 2.5 MG: 2.5 TABLET ORAL at 10:02

## 2025-07-20 RX ADMIN — IPRATROPIUM BROMIDE AND ALBUTEROL SULFATE 3 ML: .5; 3 SOLUTION RESPIRATORY (INHALATION) at 13:00

## 2025-07-20 RX ADMIN — LORATADINE 10 MG: 10 TABLET ORAL at 10:03

## 2025-07-20 RX ADMIN — POLYETHYLENE GLYCOL 3350 17 G: 17 POWDER, FOR SOLUTION ORAL at 21:04

## 2025-07-20 RX ADMIN — DULOXETINE 60 MG: 60 CAPSULE, DELAYED RELEASE ORAL at 10:01

## 2025-07-20 RX ADMIN — B-COMPLEX W/ C & FOLIC ACID TAB 1 TABLET: TAB at 10:01

## 2025-07-20 RX ADMIN — BISOPROLOL FUMARATE 2.5 MG: 5 TABLET, FILM COATED ORAL at 10:02

## 2025-07-20 RX ADMIN — MIDODRINE HYDROCHLORIDE 5 MG: 5 TABLET ORAL at 16:53

## 2025-07-20 RX ADMIN — TORSEMIDE 20 MG: 20 TABLET ORAL at 10:02

## 2025-07-20 RX ADMIN — PANTOPRAZOLE SODIUM 40 MG: 40 TABLET, DELAYED RELEASE ORAL at 10:02

## 2025-07-20 RX ADMIN — IPRATROPIUM BROMIDE AND ALBUTEROL SULFATE 3 ML: .5; 3 SOLUTION RESPIRATORY (INHALATION) at 19:20

## 2025-07-20 RX ADMIN — CEFTRIAXONE 1000 MG: 1 INJECTION, SOLUTION INTRAVENOUS at 10:05

## 2025-07-20 RX ADMIN — ATORVASTATIN CALCIUM 80 MG: 80 TABLET, FILM COATED ORAL at 10:04

## 2025-07-20 RX ADMIN — CLONAZEPAM 0.5 MG: 0.5 TABLET ORAL at 21:08

## 2025-07-20 RX ADMIN — IPRATROPIUM BROMIDE AND ALBUTEROL SULFATE 3 ML: .5; 3 SOLUTION RESPIRATORY (INHALATION) at 07:13

## 2025-07-21 LAB
ANION GAP SERPL CALCULATED.3IONS-SCNC: 7 MMOL/L (ref 4–13)
BACTERIA UR CULT: ABNORMAL
BUN SERPL-MCNC: 24 MG/DL (ref 5–25)
CALCIUM SERPL-MCNC: 10.4 MG/DL (ref 8.4–10.2)
CHLORIDE SERPL-SCNC: 100 MMOL/L (ref 96–108)
CO2 SERPL-SCNC: 28 MMOL/L (ref 21–32)
CREAT SERPL-MCNC: 0.88 MG/DL (ref 0.6–1.3)
ERYTHROCYTE [DISTWIDTH] IN BLOOD BY AUTOMATED COUNT: 15.8 % (ref 11.6–15.1)
GFR SERPL CREATININE-BSD FRML MDRD: 59 ML/MIN/1.73SQ M
GLUCOSE SERPL-MCNC: 104 MG/DL (ref 65–140)
GLUCOSE SERPL-MCNC: 116 MG/DL (ref 65–140)
GLUCOSE SERPL-MCNC: 140 MG/DL (ref 65–140)
GLUCOSE SERPL-MCNC: 163 MG/DL (ref 65–140)
GLUCOSE SERPL-MCNC: 83 MG/DL (ref 65–140)
HCT VFR BLD AUTO: 41.9 % (ref 34.8–46.1)
HGB BLD-MCNC: 13.2 G/DL (ref 11.5–15.4)
MAGNESIUM SERPL-MCNC: 1.7 MG/DL (ref 1.9–2.7)
MCH RBC QN AUTO: 29.1 PG (ref 26.8–34.3)
MCHC RBC AUTO-ENTMCNC: 31.5 G/DL (ref 31.4–37.4)
MCV RBC AUTO: 92 FL (ref 82–98)
PLATELET # BLD AUTO: 193 THOUSANDS/UL (ref 149–390)
PMV BLD AUTO: 11.3 FL (ref 8.9–12.7)
POTASSIUM SERPL-SCNC: 4.5 MMOL/L (ref 3.5–5.3)
RBC # BLD AUTO: 4.54 MILLION/UL (ref 3.81–5.12)
SODIUM SERPL-SCNC: 135 MMOL/L (ref 135–147)
WBC # BLD AUTO: 13.11 THOUSAND/UL (ref 4.31–10.16)

## 2025-07-21 PROCEDURE — 94640 AIRWAY INHALATION TREATMENT: CPT

## 2025-07-21 PROCEDURE — 80048 BASIC METABOLIC PNL TOTAL CA: CPT | Performed by: FAMILY MEDICINE

## 2025-07-21 PROCEDURE — 82948 REAGENT STRIP/BLOOD GLUCOSE: CPT

## 2025-07-21 PROCEDURE — 94760 N-INVAS EAR/PLS OXIMETRY 1: CPT

## 2025-07-21 PROCEDURE — 99232 SBSQ HOSP IP/OBS MODERATE 35: CPT | Performed by: FAMILY MEDICINE

## 2025-07-21 PROCEDURE — 97530 THERAPEUTIC ACTIVITIES: CPT

## 2025-07-21 PROCEDURE — 83735 ASSAY OF MAGNESIUM: CPT | Performed by: FAMILY MEDICINE

## 2025-07-21 PROCEDURE — 94664 DEMO&/EVAL PT USE INHALER: CPT

## 2025-07-21 PROCEDURE — 85027 COMPLETE CBC AUTOMATED: CPT | Performed by: FAMILY MEDICINE

## 2025-07-21 RX ORDER — MAGNESIUM SULFATE HEPTAHYDRATE 40 MG/ML
2 INJECTION, SOLUTION INTRAVENOUS ONCE
Status: COMPLETED | OUTPATIENT
Start: 2025-07-21 | End: 2025-07-21

## 2025-07-21 RX ADMIN — CLOPIDOGREL 75 MG: 75 TABLET ORAL at 10:20

## 2025-07-21 RX ADMIN — MIDODRINE HYDROCHLORIDE 5 MG: 5 TABLET ORAL at 16:22

## 2025-07-21 RX ADMIN — MAGNESIUM SULFATE HEPTAHYDRATE 2 G: 40 INJECTION, SOLUTION INTRAVENOUS at 12:45

## 2025-07-21 RX ADMIN — INSULIN LISPRO 1 UNITS: 100 INJECTION, SOLUTION INTRAVENOUS; SUBCUTANEOUS at 21:38

## 2025-07-21 RX ADMIN — ATORVASTATIN CALCIUM 80 MG: 80 TABLET, FILM COATED ORAL at 10:20

## 2025-07-21 RX ADMIN — FLUTICASONE PROPIONATE 2 SPRAY: 50 SPRAY, METERED NASAL at 10:22

## 2025-07-21 RX ADMIN — DULOXETINE 60 MG: 60 CAPSULE, DELAYED RELEASE ORAL at 10:21

## 2025-07-21 RX ADMIN — B-COMPLEX W/ C & FOLIC ACID TAB 1 TABLET: TAB at 10:20

## 2025-07-21 RX ADMIN — LEVOTHYROXINE SODIUM 88 MCG: 88 TABLET ORAL at 05:33

## 2025-07-21 RX ADMIN — BISOPROLOL FUMARATE 2.5 MG: 5 TABLET, FILM COATED ORAL at 10:19

## 2025-07-21 RX ADMIN — CLONAZEPAM 0.5 MG: 0.5 TABLET ORAL at 21:38

## 2025-07-21 RX ADMIN — APIXABAN 5 MG: 5 TABLET, FILM COATED ORAL at 10:21

## 2025-07-21 RX ADMIN — APIXABAN 5 MG: 5 TABLET, FILM COATED ORAL at 18:10

## 2025-07-21 RX ADMIN — LORATADINE 10 MG: 10 TABLET ORAL at 10:21

## 2025-07-21 RX ADMIN — IPRATROPIUM BROMIDE AND ALBUTEROL SULFATE 3 ML: .5; 3 SOLUTION RESPIRATORY (INHALATION) at 20:08

## 2025-07-21 RX ADMIN — AMLODIPINE BESYLATE 2.5 MG: 2.5 TABLET ORAL at 10:21

## 2025-07-21 RX ADMIN — MIDODRINE HYDROCHLORIDE 5 MG: 5 TABLET ORAL at 08:06

## 2025-07-21 RX ADMIN — IPRATROPIUM BROMIDE AND ALBUTEROL SULFATE 3 ML: .5; 3 SOLUTION RESPIRATORY (INHALATION) at 07:07

## 2025-07-21 RX ADMIN — IPRATROPIUM BROMIDE AND ALBUTEROL SULFATE 3 ML: .5; 3 SOLUTION RESPIRATORY (INHALATION) at 13:34

## 2025-07-21 RX ADMIN — PRIMIDONE 50 MG: 50 TABLET ORAL at 10:20

## 2025-07-21 RX ADMIN — TORSEMIDE 20 MG: 20 TABLET ORAL at 10:18

## 2025-07-21 RX ADMIN — PANTOPRAZOLE SODIUM 40 MG: 40 TABLET, DELAYED RELEASE ORAL at 10:18

## 2025-07-22 VITALS
WEIGHT: 153.8 LBS | DIASTOLIC BLOOD PRESSURE: 64 MMHG | HEART RATE: 60 BPM | BODY MASS INDEX: 24.72 KG/M2 | SYSTOLIC BLOOD PRESSURE: 133 MMHG | RESPIRATION RATE: 18 BRPM | OXYGEN SATURATION: 95 % | HEIGHT: 66 IN | TEMPERATURE: 97.3 F

## 2025-07-22 PROBLEM — D72.829 LEUKOCYTOSIS: Status: RESOLVED | Noted: 2025-07-19 | Resolved: 2025-07-22

## 2025-07-22 LAB
ANION GAP SERPL CALCULATED.3IONS-SCNC: 9 MMOL/L (ref 4–13)
BUN SERPL-MCNC: 26 MG/DL (ref 5–25)
CALCIUM SERPL-MCNC: 10.3 MG/DL (ref 8.4–10.2)
CHLORIDE SERPL-SCNC: 100 MMOL/L (ref 96–108)
CO2 SERPL-SCNC: 28 MMOL/L (ref 21–32)
CREAT SERPL-MCNC: 0.79 MG/DL (ref 0.6–1.3)
ERYTHROCYTE [DISTWIDTH] IN BLOOD BY AUTOMATED COUNT: 15.7 % (ref 11.6–15.1)
GFR SERPL CREATININE-BSD FRML MDRD: 67 ML/MIN/1.73SQ M
GLUCOSE SERPL-MCNC: 104 MG/DL (ref 65–140)
GLUCOSE SERPL-MCNC: 115 MG/DL (ref 65–140)
GLUCOSE SERPL-MCNC: 132 MG/DL (ref 65–140)
HCT VFR BLD AUTO: 41.5 % (ref 34.8–46.1)
HGB BLD-MCNC: 13.2 G/DL (ref 11.5–15.4)
MAGNESIUM SERPL-MCNC: 2.2 MG/DL (ref 1.9–2.7)
MCH RBC QN AUTO: 29.6 PG (ref 26.8–34.3)
MCHC RBC AUTO-ENTMCNC: 31.8 G/DL (ref 31.4–37.4)
MCV RBC AUTO: 93 FL (ref 82–98)
PLATELET # BLD AUTO: 194 THOUSANDS/UL (ref 149–390)
PMV BLD AUTO: 11.5 FL (ref 8.9–12.7)
POTASSIUM SERPL-SCNC: 3.6 MMOL/L (ref 3.5–5.3)
RBC # BLD AUTO: 4.46 MILLION/UL (ref 3.81–5.12)
SODIUM SERPL-SCNC: 137 MMOL/L (ref 135–147)
WBC # BLD AUTO: 9.23 THOUSAND/UL (ref 4.31–10.16)

## 2025-07-22 PROCEDURE — 94760 N-INVAS EAR/PLS OXIMETRY 1: CPT

## 2025-07-22 PROCEDURE — 82948 REAGENT STRIP/BLOOD GLUCOSE: CPT

## 2025-07-22 PROCEDURE — 80048 BASIC METABOLIC PNL TOTAL CA: CPT | Performed by: FAMILY MEDICINE

## 2025-07-22 PROCEDURE — 83735 ASSAY OF MAGNESIUM: CPT | Performed by: FAMILY MEDICINE

## 2025-07-22 PROCEDURE — 99239 HOSP IP/OBS DSCHRG MGMT >30: CPT | Performed by: INTERNAL MEDICINE

## 2025-07-22 PROCEDURE — 85027 COMPLETE CBC AUTOMATED: CPT | Performed by: FAMILY MEDICINE

## 2025-07-22 PROCEDURE — 94640 AIRWAY INHALATION TREATMENT: CPT

## 2025-07-22 RX ORDER — CEFPODOXIME PROXETIL 200 MG/1
200 TABLET, FILM COATED ORAL 2 TIMES DAILY
Qty: 4 TABLET | Refills: 0 | Status: SHIPPED | OUTPATIENT
Start: 2025-07-23 | End: 2025-07-25

## 2025-07-22 RX ORDER — TORSEMIDE 20 MG/1
20 TABLET ORAL DAILY
Qty: 30 TABLET | Refills: 0 | Status: SHIPPED | OUTPATIENT
Start: 2025-07-23 | End: 2025-07-29 | Stop reason: SDUPTHER

## 2025-07-22 RX ADMIN — BISOPROLOL FUMARATE 2.5 MG: 5 TABLET, FILM COATED ORAL at 10:11

## 2025-07-22 RX ADMIN — LEVOTHYROXINE SODIUM 88 MCG: 88 TABLET ORAL at 06:01

## 2025-07-22 RX ADMIN — AMLODIPINE BESYLATE 2.5 MG: 2.5 TABLET ORAL at 10:11

## 2025-07-22 RX ADMIN — CLOPIDOGREL 75 MG: 75 TABLET ORAL at 10:11

## 2025-07-22 RX ADMIN — CEFTRIAXONE 1000 MG: 1 INJECTION, SOLUTION INTRAVENOUS at 10:27

## 2025-07-22 RX ADMIN — TORSEMIDE 20 MG: 20 TABLET ORAL at 10:11

## 2025-07-22 RX ADMIN — IPRATROPIUM BROMIDE AND ALBUTEROL SULFATE 3 ML: .5; 3 SOLUTION RESPIRATORY (INHALATION) at 14:22

## 2025-07-22 RX ADMIN — APIXABAN 5 MG: 5 TABLET, FILM COATED ORAL at 10:11

## 2025-07-22 RX ADMIN — ATORVASTATIN CALCIUM 80 MG: 80 TABLET, FILM COATED ORAL at 10:11

## 2025-07-22 RX ADMIN — LORATADINE 10 MG: 10 TABLET ORAL at 10:11

## 2025-07-22 RX ADMIN — IPRATROPIUM BROMIDE AND ALBUTEROL SULFATE 3 ML: .5; 3 SOLUTION RESPIRATORY (INHALATION) at 07:38

## 2025-07-22 RX ADMIN — DULOXETINE 60 MG: 60 CAPSULE, DELAYED RELEASE ORAL at 10:12

## 2025-07-22 RX ADMIN — FLUTICASONE PROPIONATE 2 SPRAY: 50 SPRAY, METERED NASAL at 10:13

## 2025-07-22 RX ADMIN — B-COMPLEX W/ C & FOLIC ACID TAB 1 TABLET: TAB at 10:11

## 2025-07-22 RX ADMIN — MIDODRINE HYDROCHLORIDE 5 MG: 5 TABLET ORAL at 06:01

## 2025-07-22 RX ADMIN — PRIMIDONE 50 MG: 50 TABLET ORAL at 10:11

## 2025-07-22 RX ADMIN — PANTOPRAZOLE SODIUM 40 MG: 40 TABLET, DELAYED RELEASE ORAL at 10:11

## 2025-07-23 ENCOUNTER — TRANSITIONAL CARE MANAGEMENT (OUTPATIENT)
Age: 85
End: 2025-07-23

## 2025-07-23 ENCOUNTER — PATIENT OUTREACH (OUTPATIENT)
Dept: CASE MANAGEMENT | Facility: OTHER | Age: 85
End: 2025-07-23

## 2025-07-23 DIAGNOSIS — K21.9 GASTROESOPHAGEAL REFLUX DISEASE WITHOUT ESOPHAGITIS: ICD-10-CM

## 2025-07-23 NOTE — PROGRESS NOTES
Pt referred for care transitions admitted to [facility] 7/17-7/22 for dyspnea on exertion r/t HF.Hx PE ad on Eliquis, UTI, DM2,TIA.   Chart reviewed. Call placed to pt but voice mail is full and unable to leave message. Will place reminder for second attempt in a couple days.

## 2025-07-24 DIAGNOSIS — I10 HYPERTENSION: ICD-10-CM

## 2025-07-24 RX ORDER — PANTOPRAZOLE SODIUM 40 MG/1
40 TABLET, DELAYED RELEASE ORAL DAILY
Qty: 90 TABLET | Refills: 1 | Status: SHIPPED | OUTPATIENT
Start: 2025-07-24

## 2025-07-25 ENCOUNTER — TELEPHONE (OUTPATIENT)
Age: 85
End: 2025-07-25

## 2025-07-25 ENCOUNTER — PATIENT OUTREACH (OUTPATIENT)
Dept: CASE MANAGEMENT | Facility: OTHER | Age: 85
End: 2025-07-25

## 2025-07-25 NOTE — LETTER
Date: 07/25/25    Dear Danyelle Martinez,   My name is Sherrie; I am a registered nurse care manager working with [facility]   .   I have not been able to reach you by phone and would like to discuss any concerns you have about your health conditions.  My work is to help patients that have complex medical conditions understand and better manage their health.  This includes patients who may have been in the hospital or emergency room.    My contact information is enclosed below.  Please call me if with any questions you may have or if you believe this would be something helpful to you.  I look forward to speaking with you.    Sincerely,  Sherrie De León RN  [phone]  Outpatient Care Manager

## 2025-07-25 NOTE — TELEPHONE ENCOUNTER
"Hello,    The following message was sent via e-mail to the leadership team:     Please advise if you can help facilitate the following overbook request:    Patient Name: Danyelle Martinez    Patient MRN: [id number]    Call back #: [phone]    Insurance: Medicare/[employer]    Department:Cardiology    Speciality: General Cardiology    Reason for overbook request: 24-72 HOUR HEART FAILURE HOSPITAL FOLLOW UP    Comments (Write \"N/a\" if no comments):  prefers Tuesday/Fridays.    Requested doctor and location: [place]    Date of current appointment: August 12.       Thank you.      "

## 2025-07-25 NOTE — TELEPHONE ENCOUNTER
Patito called from [facility] to report nursing services were started today.Patient will be evaluated for physical and occupational services. Patient is also scheduled for a BMP lab draw on 7/29 ordered by .

## 2025-07-25 NOTE — PROGRESS NOTES
Second outreach attempt to pt with no contact, message left requesting return call back. Will send UTR letter through pts active My Chart.

## 2025-07-27 RX ORDER — AMLODIPINE BESYLATE 2.5 MG/1
TABLET ORAL
Qty: 90 TABLET | Refills: 1 | Status: SHIPPED | OUTPATIENT
Start: 2025-07-27

## 2025-07-29 ENCOUNTER — OFFICE VISIT (OUTPATIENT)
Age: 85
End: 2025-07-29
Payer: MEDICARE

## 2025-07-29 VITALS
HEIGHT: 66 IN | DIASTOLIC BLOOD PRESSURE: 64 MMHG | BODY MASS INDEX: 25.33 KG/M2 | SYSTOLIC BLOOD PRESSURE: 130 MMHG | OXYGEN SATURATION: 91 % | HEART RATE: 63 BPM | WEIGHT: 157.6 LBS

## 2025-07-29 DIAGNOSIS — F41.9 ANXIETY: ICD-10-CM

## 2025-07-29 DIAGNOSIS — J96.11 CHRONIC HYPOXIC RESPIRATORY FAILURE (HCC): ICD-10-CM

## 2025-07-29 DIAGNOSIS — R06.09 DYSPNEA ON EXERTION: Primary | ICD-10-CM

## 2025-07-29 DIAGNOSIS — J44.1 COPD EXACERBATION (HCC): ICD-10-CM

## 2025-07-29 DIAGNOSIS — J81.1 PULMONARY EDEMA: ICD-10-CM

## 2025-07-29 DIAGNOSIS — Z86.73 HISTORY OF TRANSIENT ISCHEMIC ATTACK (TIA): ICD-10-CM

## 2025-07-29 DIAGNOSIS — E78.5 HYPERLIPIDEMIA, UNSPECIFIED HYPERLIPIDEMIA TYPE: ICD-10-CM

## 2025-07-29 DIAGNOSIS — Z86.718 HISTORY OF DVT (DEEP VEIN THROMBOSIS): ICD-10-CM

## 2025-07-29 DIAGNOSIS — Z86.711 HISTORY OF PULMONARY EMBOLISM: ICD-10-CM

## 2025-07-29 DIAGNOSIS — E83.52 HYPERCALCEMIA: ICD-10-CM

## 2025-07-29 DIAGNOSIS — R73.03 PREDIABETES: ICD-10-CM

## 2025-07-29 DIAGNOSIS — I50.33 ACUTE ON CHRONIC DIASTOLIC (CONGESTIVE) HEART FAILURE (HCC): ICD-10-CM

## 2025-07-29 DIAGNOSIS — N30.00 ACUTE CYSTITIS WITHOUT HEMATURIA: ICD-10-CM

## 2025-07-29 DIAGNOSIS — E11.9 TYPE 2 DIABETES MELLITUS WITHOUT COMPLICATION, WITHOUT LONG-TERM CURRENT USE OF INSULIN (HCC): ICD-10-CM

## 2025-07-29 DIAGNOSIS — E03.9 HYPOTHYROIDISM, UNSPECIFIED TYPE: ICD-10-CM

## 2025-07-29 PROCEDURE — 99495 TRANSJ CARE MGMT MOD F2F 14D: CPT | Performed by: INTERNAL MEDICINE

## 2025-07-29 RX ORDER — CLONAZEPAM 0.5 MG/1
0.5 TABLET ORAL
Qty: 30 TABLET | Refills: 0 | Status: SHIPPED | OUTPATIENT
Start: 2025-07-29

## 2025-07-29 RX ORDER — TORSEMIDE 20 MG/1
20 TABLET ORAL DAILY
Qty: 30 TABLET | Refills: 3 | Status: SHIPPED | OUTPATIENT
Start: 2025-07-29

## 2025-08-05 ENCOUNTER — APPOINTMENT (OUTPATIENT)
Dept: LAB | Facility: HOSPITAL | Age: 85
End: 2025-08-05
Attending: INTERNAL MEDICINE
Payer: MEDICARE

## 2025-08-05 DIAGNOSIS — I50.9 CHF (CONGESTIVE HEART FAILURE) (HCC): ICD-10-CM

## 2025-08-05 DIAGNOSIS — R73.03 PREDIABETES: ICD-10-CM

## 2025-08-05 DIAGNOSIS — E83.52 HYPERCALCEMIA: ICD-10-CM

## 2025-08-05 LAB
ALBUMIN SERPL BCG-MCNC: 3.9 G/DL (ref 3.5–5)
ALP SERPL-CCNC: 57 U/L (ref 34–104)
ALT SERPL W P-5'-P-CCNC: 20 U/L (ref 7–52)
ANION GAP SERPL CALCULATED.3IONS-SCNC: 8 MMOL/L (ref 4–13)
AST SERPL W P-5'-P-CCNC: 37 U/L (ref 13–39)
BILIRUB SERPL-MCNC: 0.64 MG/DL (ref 0.2–1)
BUN SERPL-MCNC: 32 MG/DL (ref 5–25)
CALCIUM SERPL-MCNC: 9.9 MG/DL (ref 8.4–10.2)
CHLORIDE SERPL-SCNC: 101 MMOL/L (ref 96–108)
CO2 SERPL-SCNC: 30 MMOL/L (ref 21–32)
CREAT SERPL-MCNC: 1.13 MG/DL (ref 0.6–1.3)
GFR SERPL CREATININE-BSD FRML MDRD: 43 ML/MIN/1.73SQ M
GLUCOSE P FAST SERPL-MCNC: 100 MG/DL (ref 65–99)
POTASSIUM SERPL-SCNC: 4.4 MMOL/L (ref 3.5–5.3)
PROT SERPL-MCNC: 6.9 G/DL (ref 6.4–8.4)
SODIUM SERPL-SCNC: 139 MMOL/L (ref 135–147)

## 2025-08-05 PROCEDURE — 36415 COLL VENOUS BLD VENIPUNCTURE: CPT

## 2025-08-05 PROCEDURE — 80053 COMPREHEN METABOLIC PANEL: CPT

## 2025-08-07 ENCOUNTER — TELEPHONE (OUTPATIENT)
Age: 85
End: 2025-08-07

## 2025-08-12 ENCOUNTER — OFFICE VISIT (OUTPATIENT)
Dept: CARDIOLOGY CLINIC | Facility: CLINIC | Age: 85
End: 2025-08-12
Attending: INTERNAL MEDICINE
Payer: MEDICARE

## 2025-08-15 ENCOUNTER — APPOINTMENT (OUTPATIENT)
Dept: LAB | Facility: HOSPITAL | Age: 85
End: 2025-08-15
Payer: MEDICARE

## 2025-08-18 PROBLEM — N39.0 URINARY TRACT INFECTION: Status: RESOLVED | Noted: 2025-07-19 | Resolved: 2025-08-18

## 2025-08-19 DIAGNOSIS — R25.1 TREMORS OF NERVOUS SYSTEM: ICD-10-CM

## 2025-08-19 DIAGNOSIS — K11.20 PAROTITIS: ICD-10-CM

## 2025-08-19 DIAGNOSIS — I26.99 PULMONARY EMBOLI (HCC): ICD-10-CM

## 2025-08-20 RX ORDER — PRIMIDONE 50 MG/1
50 TABLET ORAL DAILY
Qty: 90 TABLET | Refills: 1 | Status: SHIPPED | OUTPATIENT
Start: 2025-08-20

## 2025-08-20 RX ORDER — APIXABAN 5 MG/1
5 TABLET, FILM COATED ORAL 2 TIMES DAILY
Qty: 60 TABLET | Refills: 5 | Status: SHIPPED | OUTPATIENT
Start: 2025-08-20

## 2025-08-20 RX ORDER — MIDODRINE HYDROCHLORIDE 5 MG/1
5 TABLET ORAL
Qty: 60 TABLET | Refills: 2 | Status: SHIPPED | OUTPATIENT
Start: 2025-08-20

## 2025-08-22 DIAGNOSIS — E04.1 THYROID NODULE: ICD-10-CM

## 2025-08-22 DIAGNOSIS — E03.9 ACQUIRED HYPOTHYROIDISM: ICD-10-CM

## 2025-08-22 RX ORDER — LEVOTHYROXINE SODIUM 88 UG/1
88 TABLET ORAL DAILY
Qty: 90 TABLET | Refills: 1 | Status: SHIPPED | OUTPATIENT
Start: 2025-08-22

## 2025-08-27 PROBLEM — N30.00 ACUTE CYSTITIS WITHOUT HEMATURIA: Status: ACTIVE | Noted: 2025-08-27
